# Patient Record
Sex: FEMALE | Race: WHITE | NOT HISPANIC OR LATINO | Employment: OTHER | URBAN - METROPOLITAN AREA
[De-identification: names, ages, dates, MRNs, and addresses within clinical notes are randomized per-mention and may not be internally consistent; named-entity substitution may affect disease eponyms.]

---

## 2017-01-05 RX ORDER — METFORMIN HYDROCHLORIDE 500 MG/1
500 TABLET ORAL
Qty: 90 TABLET | Refills: 3 | Status: SHIPPED | OUTPATIENT
Start: 2017-01-05 | End: 2017-12-22 | Stop reason: SDUPTHER

## 2017-01-11 ENCOUNTER — TELEPHONE (OUTPATIENT)
Dept: SLEEP MEDICINE | Facility: CLINIC | Age: 62
End: 2017-01-11

## 2017-01-19 ENCOUNTER — OFFICE VISIT (OUTPATIENT)
Dept: ORTHOPEDICS | Facility: CLINIC | Age: 62
End: 2017-01-19
Payer: COMMERCIAL

## 2017-01-19 VITALS — BODY MASS INDEX: 32.96 KG/M2 | WEIGHT: 186 LBS | HEIGHT: 63 IN

## 2017-01-19 DIAGNOSIS — M25.812 SHOULDER IMPINGEMENT, LEFT: Primary | ICD-10-CM

## 2017-01-19 DIAGNOSIS — M54.30 SCIATICA, UNSPECIFIED LATERALITY: ICD-10-CM

## 2017-01-19 DIAGNOSIS — M75.42 IMPINGEMENT SYNDROME OF LEFT SHOULDER: ICD-10-CM

## 2017-01-19 PROCEDURE — 99999 PR PBB SHADOW E&M-EST. PATIENT-LVL II: CPT | Mod: PBBFAC,,, | Performed by: ORTHOPAEDIC SURGERY

## 2017-01-19 PROCEDURE — 99213 OFFICE O/P EST LOW 20 MIN: CPT | Mod: S$GLB,,, | Performed by: ORTHOPAEDIC SURGERY

## 2017-01-19 PROCEDURE — 20610 DRAIN/INJ JOINT/BURSA W/O US: CPT | Mod: LT,S$GLB,, | Performed by: ORTHOPAEDIC SURGERY

## 2017-01-19 PROCEDURE — 1159F MED LIST DOCD IN RCRD: CPT | Mod: S$GLB,,, | Performed by: ORTHOPAEDIC SURGERY

## 2017-01-19 RX ORDER — TRIAMCINOLONE ACETONIDE 40 MG/ML
40 INJECTION, SUSPENSION INTRA-ARTICULAR; INTRAMUSCULAR
Status: COMPLETED | OUTPATIENT
Start: 2017-01-19 | End: 2017-01-19

## 2017-01-19 RX ADMIN — TRIAMCINOLONE ACETONIDE 40 MG: 40 INJECTION, SUSPENSION INTRA-ARTICULAR; INTRAMUSCULAR at 03:01

## 2017-01-19 NOTE — PROGRESS NOTES
Ms. Dimas seen previously for triggering of the thumb, had excellent result   after the previous injection.  No further triggering of the right thumb.    However, she has ongoing symptoms in the left shoulder related to lifting and   overhead activities.    PHYSICAL EXAMINATION:  LEFT SHOULDER:  Demonstrating mild tenderness anterolaterally.  No bruising, no   swelling.  Range of motion of left shoulder is full, but she does have a mildly   positive impingement sign with abduction, internal rotation of the shoulder.    Rotator cuff strength intact.  NEUROLOGIC:  Normal.  NECK:  Nontender.  She does have a mild muscle spasm on the left side at the   base of the neck in the trapezius.  NEUROLOGIC:  Intact right, hand demonstrate no triggering of the thumb.    IMPRESSION:  Left shoulder impingement with mild muscle spasm.    PLAN:  I recommended and performed an injection, left shoulder, combination of   Kenalog 40 mg, 2 mL of Xylocaine, sterile technique.  I have also recommended   anti-inflammatory medication by mouth and maybe some heat to the area and   stretching exercises.  Avoidance of overhead lifting.  Follow up in one month   for recheck.      AUTUMN/IN  dd: 01/19/2017 15:41:16 (CST)  td: 01/20/2017 04:23:48 (CST)  Doc ID   #[id number]  Job ID #[id number]    CC:

## 2017-01-23 ENCOUNTER — PATIENT MESSAGE (OUTPATIENT)
Dept: FAMILY MEDICINE | Facility: CLINIC | Age: 62
End: 2017-01-23

## 2017-01-24 ENCOUNTER — PATIENT MESSAGE (OUTPATIENT)
Dept: FAMILY MEDICINE | Facility: CLINIC | Age: 62
End: 2017-01-24

## 2017-02-20 ENCOUNTER — OFFICE VISIT (OUTPATIENT)
Dept: ORTHOPEDICS | Facility: CLINIC | Age: 62
End: 2017-02-20
Payer: COMMERCIAL

## 2017-02-20 VITALS — BODY MASS INDEX: 31.89 KG/M2 | WEIGHT: 180 LBS | HEIGHT: 63 IN

## 2017-02-20 DIAGNOSIS — M75.42 IMPINGEMENT SYNDROME OF LEFT SHOULDER: Primary | ICD-10-CM

## 2017-02-20 PROCEDURE — 99213 OFFICE O/P EST LOW 20 MIN: CPT | Mod: S$GLB,,, | Performed by: ORTHOPAEDIC SURGERY

## 2017-02-20 PROCEDURE — 99999 PR PBB SHADOW E&M-EST. PATIENT-LVL II: CPT | Mod: PBBFAC,,, | Performed by: ORTHOPAEDIC SURGERY

## 2017-02-20 NOTE — PROGRESS NOTES
Ms. Dimas in followup of left shoulder impingement, doing much better.  The   injection basically relieved all of her symptoms a month ago.    PHYSICAL EXAMINATION:  LEFT SHOULDER:  No tenderness.  No swelling.  Full range of motion.  Negative   impingement sign.  Good strength.    PLAN:  I have recommended she return to regular activities, but may be avoid   overhead lifting.  Advil or Motrin as needed.  Follow up siri CASTELLANOS  dd: 02/20/2017 15:47:20 (CST)  td: 02/21/2017 08:13:52 (CST)  Doc ID   #[id number]  Job ID #[id number]    CC:

## 2017-02-24 ENCOUNTER — HOSPITAL ENCOUNTER (OUTPATIENT)
Dept: DIABETES | Facility: HOSPITAL | Age: 62
Discharge: HOME | End: 2017-02-24
Attending: FAMILY MEDICINE
Payer: COMMERCIAL

## 2017-02-24 VITALS — BODY MASS INDEX: 32.06 KG/M2 | WEIGHT: 181 LBS

## 2017-02-24 DIAGNOSIS — E11.9 NON-INSULIN TREATED TYPE 2 DIABETES MELLITUS: Primary | ICD-10-CM

## 2017-02-24 PROCEDURE — G0108 DIAB MANAGE TRN  PER INDIV: HCPCS | Performed by: REGISTERED NURSE

## 2017-02-24 NOTE — PROGRESS NOTES
Pt came to clinic for 3 month follow up visit. Pt has lost over 10 lbs in the last 3 months. Pt measures and weighs her food and plans her meals. Review of pts current meal plan showed that she is eating balanced and portioned. Pt drinks mainly water. Pt does eat meals out but she plans ahead for these and tries to put her food groups together. Pt does exercise by walking. She states that she has cut her portions and enjoys the variety of foods. Pt was encouraged to continue with compliance. Pt is due for her A1C and she will check with her mD regarding this. Encouraged pt to continue her good work and to call for any problems or questions.

## 2017-02-27 ENCOUNTER — PATIENT MESSAGE (OUTPATIENT)
Dept: OBSTETRICS AND GYNECOLOGY | Facility: CLINIC | Age: 62
End: 2017-02-27

## 2017-03-01 DIAGNOSIS — Z12.31 VISIT FOR SCREENING MAMMOGRAM: Primary | ICD-10-CM

## 2017-04-13 ENCOUNTER — HOSPITAL ENCOUNTER (OUTPATIENT)
Dept: RADIOLOGY | Facility: HOSPITAL | Age: 62
Discharge: HOME | End: 2017-04-13
Attending: OBSTETRICS & GYNECOLOGY
Payer: COMMERCIAL

## 2017-04-13 ENCOUNTER — OFFICE VISIT (OUTPATIENT)
Dept: OBSTETRICS AND GYNECOLOGY | Facility: CLINIC | Age: 62
End: 2017-04-13
Payer: COMMERCIAL

## 2017-04-13 VITALS
DIASTOLIC BLOOD PRESSURE: 84 MMHG | BODY MASS INDEX: 32.58 KG/M2 | WEIGHT: 183.88 LBS | SYSTOLIC BLOOD PRESSURE: 140 MMHG | HEIGHT: 63 IN

## 2017-04-13 DIAGNOSIS — Z12.4 ROUTINE PAPANICOLAOU SMEAR: ICD-10-CM

## 2017-04-13 DIAGNOSIS — Z01.419 ROUTINE GYNECOLOGICAL EXAMINATION: Primary | ICD-10-CM

## 2017-04-13 DIAGNOSIS — Z12.31 VISIT FOR SCREENING MAMMOGRAM: ICD-10-CM

## 2017-04-13 PROCEDURE — 99999 PR PBB SHADOW E&M-EST. PATIENT-LVL II: CPT | Mod: PBBFAC,,, | Performed by: OBSTETRICS & GYNECOLOGY

## 2017-04-13 PROCEDURE — 77063 BREAST TOMOSYNTHESIS BI: CPT | Mod: 26,,, | Performed by: RADIOLOGY

## 2017-04-13 PROCEDURE — 88175 CYTOPATH C/V AUTO FLUID REDO: CPT | Performed by: PATHOLOGY

## 2017-04-13 PROCEDURE — 77067 SCR MAMMO BI INCL CAD: CPT | Mod: TC

## 2017-04-13 PROCEDURE — 87624 HPV HI-RISK TYP POOLED RSLT: CPT

## 2017-04-13 PROCEDURE — 3079F DIAST BP 80-89 MM HG: CPT | Mod: S$GLB,,, | Performed by: OBSTETRICS & GYNECOLOGY

## 2017-04-13 PROCEDURE — 3077F SYST BP >= 140 MM HG: CPT | Mod: S$GLB,,, | Performed by: OBSTETRICS & GYNECOLOGY

## 2017-04-13 PROCEDURE — 88141 CYTOPATH C/V INTERPRET: CPT | Mod: ,,, | Performed by: PATHOLOGY

## 2017-04-13 PROCEDURE — 99396 PREV VISIT EST AGE 40-64: CPT | Mod: S$GLB,,, | Performed by: OBSTETRICS & GYNECOLOGY

## 2017-04-13 PROCEDURE — 77067 SCR MAMMO BI INCL CAD: CPT | Mod: 26,,, | Performed by: RADIOLOGY

## 2017-04-13 NOTE — PROGRESS NOTES
"OBSTETRIC HISTORY:   OB History      Para Term  AB TAB SAB Ectopic Multiple Living    4 4 4                COMPREHENSIVE GYN HISTORY:  PAP History: Denies abnormal Paps.  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 Denies PID.  Benign History: Denies uterine fibroids. Denies ovarian cysts. Denies endometriosis.   Cancer History: Denies cervical cancer. Denies uterine cancer or hyperplasia. Denies ovarian cancer. Denies vulvar cancer or pre-cancer. Denies vaginal cancer or pre-cancer. Denies breast cancer. Denies colon cancer.  Sexual Activity History:   reports that she currently engages in sexual activity and has had male partners.       HPI:   62 y.o.  No LMP recorded. Patient is postmenopausal.   Patient is  here for her annual gynecologic exam.  She has no complaints. She denies bladder, breast and bowel complaints.    ROS:  GENERAL: Denies weight gain or weight loss. Feeling well overall.   SKIN: Denies rash or lesions.   HEAD: Denies headache.   NODES: Denies enlarged lymph nodes.   CHEST: Denies shortness of breath.   ABDOMEN: No abdominal pain, constipation, diarrhea, nausea, vomiting or rectal bleeding.   URINARY: No frequency, dysuria, hematuria, or burning on urination.  REPRODUCTIVE: See HPI.   BREASTS: The patient denies pain, lumps, or nipple discharge.   HEMATOLOGIC: No easy bruisability.   MUSCULOSKELETAL: Denies joint pain or back pain.   NEUROLOGIC: Denies weakness.   PSYCHIATRIC: Denies depression, anxiety or mood swings.    PE:   BP (!) 140/84  Ht 5' 3" (1.6 m)  Wt 83.4 kg (183 lb 13.8 oz)  BMI 32.57 kg/m2  APPEARANCE: Well nourished, well developed, in no acute distress.  NECK: Neck symmetric without  thyromegaly.  NODES: No inguinal, cervical lymph node enlargement.  CHEST: Lungs clear to auscultation.  HEART: Regular rate and rhythm, no murmurs, rubs or gallops.  ABDOMEN: Soft. No tenderness or masses. No hernias.  BREASTS: Symmetrical, no skin changes or visible lesions. No " palpable masses, nipple discharge or adenopathy bilaterally.  PELVIC:   VULVA: No lesions. Normal female genitalia.  URETHRAL MEATUS: Normal size and location, no lesions, no prolapse.  URETHRA: No masses, tenderness, prolapse or scarring.  VAGINA: Moist and well rugated, no discharge, no significant cystocele or rectocele.  CERVIX: No lesions and discharge.  UTERUS: Normal size, regular shape, mobile, non-tender, bladder base nontender.  ADNEXA: No masses or tenderness.    PROCEDURES:  Pap smear    Assessment:  Normal Gynecologic Exam    Plan:  Mammogram and Colonoscopy if indicated by current recommendations.  Return to clinic in one year or for any problems or complaints.    Counseling:  Patient was counseled today on A.C.S. Pap guidelines and recommendations for yearly pelvic exams and monthly self breast exams; to see her PCP for other health maintenance. Regular exercise and healthy diet.

## 2017-05-01 ENCOUNTER — OFFICE VISIT (OUTPATIENT)
Dept: FAMILY MEDICINE | Facility: CLINIC | Age: 62
End: 2017-05-01
Payer: COMMERCIAL

## 2017-05-01 VITALS
SYSTOLIC BLOOD PRESSURE: 124 MMHG | BODY MASS INDEX: 32.46 KG/M2 | HEIGHT: 63 IN | OXYGEN SATURATION: 97 % | DIASTOLIC BLOOD PRESSURE: 76 MMHG | HEART RATE: 77 BPM | WEIGHT: 183.19 LBS

## 2017-05-01 DIAGNOSIS — R19.7 DIARRHEA, UNSPECIFIED TYPE: ICD-10-CM

## 2017-05-01 DIAGNOSIS — R10.32 LEFT LOWER QUADRANT PAIN: Primary | ICD-10-CM

## 2017-05-01 DIAGNOSIS — Z87.19 HISTORY OF DIVERTICULITIS: ICD-10-CM

## 2017-05-01 LAB
BILIRUB UR QL STRIP: NEGATIVE
CLARITY UR REFRACT.AUTO: CLEAR
COLOR UR AUTO: NORMAL
GLUCOSE UR QL STRIP: NEGATIVE
HGB UR QL STRIP: NEGATIVE
KETONES UR QL STRIP: NEGATIVE
LEUKOCYTE ESTERASE UR QL STRIP: NEGATIVE
NITRITE UR QL STRIP: NEGATIVE
PH UR STRIP: 7 [PH] (ref 5–8)
PROT UR QL STRIP: NEGATIVE
SP GR UR STRIP: 1 (ref 1–1.03)
URN SPEC COLLECT METH UR: NORMAL
UROBILINOGEN UR STRIP-ACNC: NEGATIVE EU/DL

## 2017-05-01 PROCEDURE — 3078F DIAST BP <80 MM HG: CPT | Mod: S$GLB,,, | Performed by: NURSE PRACTITIONER

## 2017-05-01 PROCEDURE — 3074F SYST BP LT 130 MM HG: CPT | Mod: S$GLB,,, | Performed by: NURSE PRACTITIONER

## 2017-05-01 PROCEDURE — 1160F RVW MEDS BY RX/DR IN RCRD: CPT | Mod: S$GLB,,, | Performed by: NURSE PRACTITIONER

## 2017-05-01 PROCEDURE — 99213 OFFICE O/P EST LOW 20 MIN: CPT | Mod: S$GLB,,, | Performed by: NURSE PRACTITIONER

## 2017-05-01 PROCEDURE — 81003 URINALYSIS AUTO W/O SCOPE: CPT

## 2017-05-01 PROCEDURE — 99999 PR PBB SHADOW E&M-EST. PATIENT-LVL IV: CPT | Mod: PBBFAC,,, | Performed by: NURSE PRACTITIONER

## 2017-05-01 RX ORDER — AMOXICILLIN AND CLAVULANATE POTASSIUM 875; 125 MG/1; MG/1
1 TABLET, FILM COATED ORAL EVERY 12 HOURS
Qty: 20 TABLET | Refills: 0 | Status: SHIPPED | OUTPATIENT
Start: 2017-05-01 | End: 2017-05-11

## 2017-05-01 RX ORDER — AMOXICILLIN AND CLAVULANATE POTASSIUM 875; 125 MG/1; MG/1
1 TABLET, FILM COATED ORAL EVERY 12 HOURS
Qty: 20 TABLET | Refills: 0 | Status: CANCELLED | OUTPATIENT
Start: 2017-05-01 | End: 2017-05-11

## 2017-05-01 NOTE — MR AVS SNAPSHOT
[facility]   [place]  [place] [address]  Phone: [phone]  Fax: [phone]                  Parisa Dimas   2017 4:20 PM   Office Visit    Description:  Female : 1955   Provider:  Avani Abarca NP   Department:  [facility]           Reason for Visit     Abdominal Pain           Diagnoses this Visit        Comments    Left lower quadrant pain    -  Primary     Diarrhea, unspecified type         History of diverticulitis                To Do List           Future Appointments        Provider Department Dept Phone    2017 9:00 AM [facility] XR1 [address] LIMIT [facility] [phone]      Goals (5 Years of Data)     None      Follow-Up and Disposition     Return if symptoms worsen or fail to improve.       These Medications        Disp Refills Start End    amoxicillin-clavulanate 875-125mg (AUGMENTIN) 875-125 mg per tablet 20 tablet 0 2017    Take 1 tablet by mouth every 12 (twelve) hours. - Oral    Pharmacy: [facility] - [address] [facility] [address] [place] Ph #: [phone]         [facility] On Call     [facility] -  Assistance  Unless otherwise directed by your provider, please contact [facility], our nurse care line that is available for  assistance.     Registered nurses in the [facility] provide: appointment scheduling, clinical advisement, health education, and other advisory services.  Call: [phone] (toll free)               Medications           Message regarding Medications     Verify the changes and/or additions to your medication regime listed below are the same as discussed with your clinician today.  If any of these changes or additions are incorrect, please notify your healthcare provider.        START taking these NEW medications        Refills    amoxicillin-clavulanate 875-125mg (AUGMENTIN) 875-125 mg per tablet 0    Sig: Take 1 tablet by  "mouth every 12 (twelve) hours.    Class: Normal    Route: Oral           Verify that the below list of medications is an accurate representation of the medications you are currently taking.  If none reported, the list may be blank. If incorrect, please contact your healthcare provider. Carry this list with you in case of emergency.           Current Medications     amlodipine (NORVASC) 10 MG tablet Take 1 tablet (10 mg total) by mouth once daily.    amoxicillin-clavulanate 875-125mg (AUGMENTIN) 875-125 mg per tablet Take 1 tablet by mouth every 12 (twelve) hours.    aspirin (ECOTRIN) 81 MG EC tablet Take 81 mg by mouth once daily.      b complex vitamins tablet Take 1 tablet by mouth once daily.    blood sugar diagnostic Strp Test daily, insurance covered brand.  Include 100 lancets    calcium carbonate (OS-RYLEE) 600 mg (1,500 mg) Tab Take 600 mg by mouth once daily.      cetirizine (ZYRTEC) 10 MG tablet Take 10 mg by mouth once daily.      co-enzyme Q-10 30 mg capsule Take 30 mg by mouth 3 (three) times daily.    hydrochlorothiazide (HYDRODIURIL) 25 MG tablet Take 1 tablet (25 mg total) by mouth once daily.    lancets Misc Test daily    lovastatin (MEVACOR) 20 MG tablet Take 1 tablet (20 mg total) by mouth every evening.    metformin (GLUCOPHAGE) 500 MG tablet Take 1 tablet (500 mg total) by mouth daily with breakfast.    multivitamin capsule Take 1 capsule by mouth once daily.      OMEGA-3S/DHA/EPA/FISH OIL (OMEGA 3 ORAL) Take 2,800 mg by mouth once daily.      omeprazole (PRILOSEC) 40 MG capsule Take 1 capsule (40 mg total) by mouth once daily.    vitamin E 400 UNIT capsule Take 400 Units by mouth 2 (two) times daily.             Clinical Reference Information           Your Vitals Were     BP Pulse Height Weight SpO2 BMI    124/76 77 5' 3" (1.6 m) 83.1 kg (183 lb 3.2 oz) 97% 32.45 kg/m2      Blood Pressure          Most Recent Value    BP  124/76      Allergies as of 5/1/2017     No Known Allergies    "   Immunizations Administered on Date of Encounter - 5/1/2017     None      Orders Placed During Today's Visit      Normal Orders This Visit    Urinalysis     Future Labs/Procedures Expected by Expires    X-Ray Abdomen AP 1 View  5/1/2017 5/1/2018      Language Assistance Services     ATTENTION: Language assistance services are available, free of charge. Please call [phone].      ATENCIÓN: Si habla español, tiene a harvey disposición servicios gratuitos de asistencia lingüística. Llame al [phone].     CHÚ Ý: N?u b?n nói Ti?ng Vi?t, có các d?ch v? h? tr? ngôn ng? mi?n phí dành cho b?n. G?i s? [phone].         [facility] complies with applicable Federal civil rights laws and does not discriminate on the basis of race, color, national origin, age, disability, or sex.

## 2017-05-01 NOTE — PROGRESS NOTES
Subjective:       Patient ID: Parisa Dimas is a 62 y.o. female.    Chief Complaint: Abdominal Pain    Abdominal Pain   This is a new problem. The current episode started in the past 7 days. The onset quality is undetermined. The problem occurs intermittently. The problem has been unchanged. The pain is located in the LUQ and left flank. The pain is at a severity of 6/10. The pain is moderate. The quality of the pain is aching. The abdominal pain does not radiate. Associated symptoms include diarrhea. The pain is aggravated by palpation and movement. The pain is relieved by nothing. She has tried nothing for the symptoms. Her past medical history is significant for GERD.     Review of Systems   Gastrointestinal: Positive for abdominal pain and diarrhea.   All other systems reviewed and are negative.      Objective:      Physical Exam   Constitutional: She is oriented to person, place, and time. She appears well-developed and well-nourished. No distress.   HENT:   Head: Normocephalic and atraumatic.   Eyes: EOM are normal. Pupils are equal, round, and reactive to light.   Neck: Neck supple. No JVD present. No tracheal deviation present.   Cardiovascular: Normal rate, regular rhythm, normal heart sounds and intact distal pulses.    No murmur heard.  Pulmonary/Chest: Effort normal and breath sounds normal. No respiratory distress. She has no wheezes. She has no rales.   Abdominal: Soft. Normal appearance and bowel sounds are normal. She exhibits no distension, no abdominal bruit and no mass. There is tenderness in the left upper quadrant and left lower quadrant. There is no CVA tenderness and no tenderness at McBurney's point.   Musculoskeletal: Normal range of motion. She exhibits no edema or tenderness.   Neurological: She is alert and oriented to person, place, and time. Coordination normal.   Skin: Skin is warm and dry. No erythema. No pallor.   Psychiatric: She has a normal mood and affect. Her behavior is  normal. Judgment and thought content normal. Cognition and memory are normal. She expresses no homicidal and no suicidal ideation.   Nursing note and vitals reviewed.      Assessment:       1. Left lower quadrant pain    2. Diarrhea, unspecified type    3. History of diverticulitis        Plan:     Parisa was seen today for abdominal pain.    Diagnoses and all orders for this visit:    Left lower quadrant pain  -     Urinalysis  -     X-Ray Abdomen AP 1 View; Future  -     amoxicillin-clavulanate 875-125mg (AUGMENTIN) 875-125 mg per tablet; Take 1 tablet by mouth every 12 (twelve) hours.    Diarrhea, unspecified type  -     amoxicillin-clavulanate 875-125mg (AUGMENTIN) 875-125 mg per tablet; Take 1 tablet by mouth every 12 (twelve) hours.    History of diverticulitis  -     X-Ray Abdomen AP 1 View; Future  -     amoxicillin-clavulanate 875-125mg (AUGMENTIN) 875-125 mg per tablet; Take 1 tablet by mouth every 12 (twelve) hours.      Follow-up with PCP if symptoms worsen or fail to improve.

## 2017-05-02 ENCOUNTER — HOSPITAL ENCOUNTER (OUTPATIENT)
Dept: RADIOLOGY | Facility: HOSPITAL | Age: 62
Discharge: HOME | End: 2017-05-02
Attending: NURSE PRACTITIONER
Payer: COMMERCIAL

## 2017-05-02 DIAGNOSIS — Z87.19 HISTORY OF DIVERTICULITIS: ICD-10-CM

## 2017-05-02 DIAGNOSIS — R10.32 LEFT LOWER QUADRANT PAIN: ICD-10-CM

## 2017-05-02 PROCEDURE — 74000 XR ABDOMEN AP 1 VIEW: CPT | Mod: TC,PO

## 2017-05-02 PROCEDURE — 74000 XR ABDOMEN AP 1 VIEW: CPT | Mod: 26,,, | Performed by: RADIOLOGY

## 2017-05-04 ENCOUNTER — TELEPHONE (OUTPATIENT)
Dept: OBSTETRICS AND GYNECOLOGY | Facility: CLINIC | Age: 62
End: 2017-05-04

## 2017-05-04 LAB
HPV16 DNA SPEC QL NAA+PROBE: NEGATIVE
HPV16+18+H RISK 12 DNA CVX-IMP: NEGATIVE
HPV18 DNA SPEC QL NAA+PROBE: NEGATIVE

## 2017-05-12 ENCOUNTER — OFFICE VISIT (OUTPATIENT)
Dept: PODIATRY | Facility: CLINIC | Age: 62
End: 2017-05-12
Payer: COMMERCIAL

## 2017-05-12 VITALS
HEART RATE: 89 BPM | DIASTOLIC BLOOD PRESSURE: 83 MMHG | BODY MASS INDEX: 32.46 KG/M2 | SYSTOLIC BLOOD PRESSURE: 156 MMHG | HEIGHT: 63 IN | WEIGHT: 183.19 LBS

## 2017-05-12 DIAGNOSIS — E11.9 ENCOUNTER FOR COMPREHENSIVE DIABETIC FOOT EXAMINATION, TYPE 2 DIABETES MELLITUS: Primary | ICD-10-CM

## 2017-05-12 DIAGNOSIS — I87.2 VENOUS INSUFFICIENCY OF BOTH LOWER EXTREMITIES: ICD-10-CM

## 2017-05-12 DIAGNOSIS — L84 CORN OR CALLUS: ICD-10-CM

## 2017-05-12 DIAGNOSIS — M21.619 HALLUX VALGUS WITH BUNIONS, UNSPECIFIED LATERALITY: ICD-10-CM

## 2017-05-12 DIAGNOSIS — M20.10 HALLUX VALGUS WITH BUNIONS, UNSPECIFIED LATERALITY: ICD-10-CM

## 2017-05-12 PROCEDURE — 1160F RVW MEDS BY RX/DR IN RCRD: CPT | Mod: S$GLB,,, | Performed by: PODIATRIST

## 2017-05-12 PROCEDURE — 99999 PR PBB SHADOW E&M-EST. PATIENT-LVL III: CPT | Mod: PBBFAC,,, | Performed by: PODIATRIST

## 2017-05-12 PROCEDURE — 3045F PR MOST RECENT HEMOGLOBIN A1C LEVEL 7.0-9.0%: CPT | Mod: S$GLB,,, | Performed by: PODIATRIST

## 2017-05-12 PROCEDURE — 3077F SYST BP >= 140 MM HG: CPT | Mod: S$GLB,,, | Performed by: PODIATRIST

## 2017-05-12 PROCEDURE — 99213 OFFICE O/P EST LOW 20 MIN: CPT | Mod: S$GLB,,, | Performed by: PODIATRIST

## 2017-05-12 PROCEDURE — 3079F DIAST BP 80-89 MM HG: CPT | Mod: S$GLB,,, | Performed by: PODIATRIST

## 2017-05-12 NOTE — MR AVS SNAPSHOT
[facility]   [place] [address]  Phone: [phone]                  Parisa Dimas   2017 3:15 PM   Office Visit    Description:  Female : 1955   Provider:  Refugio Hodges DPM   Department:  [facility]           Reason for Visit     Diabetic Foot Exam           Diagnoses this Visit        Comments    Encounter for comprehensive diabetic foot examination, type 2 diabetes mellitus    -  Primary     Venous insufficiency of both lower extremities         Hallux valgus with bunions, unspecified laterality         Corn or callus                To Do List           Goals (5 Years of Data)     None      Follow-Up and Disposition     Return in about 1 year (around 2018).      [facility]     [facility] Care [facility] -  Assistance  Unless otherwise directed by your provider, please contact [facility], our nurse care line that is available for  assistance.     Registered nurses in the [facility] provide: appointment scheduling, clinical advisement, health education, and other advisory services.  Call: [phone] (toll free)               Medications           Message regarding Medications     Verify the changes and/or additions to your medication regime listed below are the same as discussed with your clinician today.  If any of these changes or additions are incorrect, please notify your healthcare provider.             Verify that the below list of medications is an accurate representation of the medications you are currently taking.  If none reported, the list may be blank. If incorrect, please contact your healthcare provider. Carry this list with you in case of emergency.           Current Medications     amlodipine (NORVASC) 10 MG tablet Take 1 tablet (10 mg total) by mouth once daily.    aspirin (ECOTRIN) 81 MG EC tablet Take 81 mg by mouth once daily.      b complex vitamins tablet Take 1 tablet by mouth once  "daily.    blood sugar diagnostic Strp Test daily, insurance covered brand.  Include 100 lancets    calcium carbonate (OS-RYLEE) 600 mg (1,500 mg) Tab Take 600 mg by mouth once daily.      cetirizine (ZYRTEC) 10 MG tablet Take 10 mg by mouth once daily.      co-enzyme Q-10 30 mg capsule Take 30 mg by mouth 3 (three) times daily.    hydrochlorothiazide (HYDRODIURIL) 25 MG tablet Take 1 tablet (25 mg total) by mouth once daily.    lancets Misc Test daily    lovastatin (MEVACOR) 20 MG tablet Take 1 tablet (20 mg total) by mouth every evening.    metformin (GLUCOPHAGE) 500 MG tablet Take 1 tablet (500 mg total) by mouth daily with breakfast.    multivitamin capsule Take 1 capsule by mouth once daily.      OMEGA-3S/DHA/EPA/FISH OIL (OMEGA 3 ORAL) Take 2,800 mg by mouth once daily.      omeprazole (PRILOSEC) 40 MG capsule Take 1 capsule (40 mg total) by mouth once daily.    vitamin E 400 UNIT capsule Take 400 Units by mouth 2 (two) times daily.             Clinical Reference Information           Your Vitals Were     BP Pulse Height Weight BMI    156/83 89 5' 3" (1.6 m) 83.1 kg (183 lb 3.2 oz) 32.45 kg/m2      Blood Pressure          Most Recent Value    BP  (!)  156/83      Allergies as of 5/12/2017     No Known Allergies      Immunizations Administered on Date of Encounter - 5/12/2017     None      Orders Placed During Today's Visit      Normal Orders This Visit    COMPRESSION STOCKINGS     DIABETIC SHOES FOR HOME USE       Instructions      Diabetic Foot Care  Diabetes can lead to a number of foot complications. Fortunately, you can prevent most of these with a little extra foot care. If diabetes is not well controlled, it can cause damage to blood vessels and result in poor circulation to the foot. When the skin does not get enough blood flow, it becomes prone to pressure sores and ulcers, which heal slowly.  Diabetes can also damage nerves, interfering with the ability to feel pain and pressure. When you cant feel your " foot normally, it is easy to injure your skin, bones, and joints without knowing it. For these reasons diabetes increases the risk of fungal infections, bunions, and ulcers. An ulcer is a sore or break in the skin. With ulcers, often the skin seems to have worn away. Deep ulcers can lead to bone infection.  Gangrene is the most serious foot complication of diabetes. It usually occurs on the tips of the toes as blackened areas of skin. The black area is dead tissue. In severe cases, gangrene spreads to involve the entire toe, other toes, and the entire foot. Foot or toe amputation may be required. Good foot care and blood sugar control can prevent this.  Home care  · Wear comfortable, well-fitting shoes.  · Wash your feet daily with warm water and mild soap.  · After drying, apply a moisturizing cream or lotion to the top and bottom of your feet. Don't put lotion between toes.  · Check your feet daily for skin breaks, blisters, swelling, or redness. Look between your toes as well. If you cannot see the bottoms of your feet, ask someone to look or use a mirror.  · Wear cotton socks and change them every day.  · Trim toenails carefully, and do not cut your cuticles.  · Strive to keep your blood sugar under control with a combination of medicines, diet, and activity.  · If you smoke and have diabetes, it is very important that you stop. Smoking reduces blood flow to your foot.  · Schedule foot exams at least every year, or more often if you have foot problems.  · Put your feet up when sitting, wiggle toes, and move ankles to help improve blood flow.  Avoid activities that increase your risk of foot injury:  · Do not walk barefoot.  · Do not use heating pads or hot water bottles on your feet.  · Do not put your foot in a hot tub without first checking the temperature with your hand.  Follow-up care  Follow up with your healthcare provider, or as advised. Be sure to take off your shoes and socks before your appointment  starts so your healthcare provider will be sure to check your feet. Report any cut, puncture, scrape, blister, or other injury to your foot. Also report if you have a bunion, hammertoes, ingrown toenail, or ulcer on your foot.  When to seek medical advice  Call your healthcare provider right away if any of these occur:  · Black skin color anywhere on the foot  · Open ulcer with pus draining from the wound  · Increasing foot or leg pain  · New areas of redness or swelling or tender areas of the foot  · Fever of 100.4°F (38°C) or greater  Date Last Reviewed: 5/25/2016  © 6100-8781 [employer]. [address] [place] [address], [place] [address]. All rights reserved. This information is not intended as a substitute for professional medical care. Always follow your healthcare professional's instructions.             Language Assistance Services     ATTENTION: Language assistance services are available, free of charge. Please call [phone].      ATENCIÓN: Si habla trish, tiene a harvey disposición servicios gratuitos de asistencia lingüística. Llame al [phone].     [facility] Ý: N?u b?n nói Ti?ng Vi?t, có các d?ch v? h? tr? ngôn ng? mi?n phí dành cho b?n. G?i s? [phone].         [facility] complies with applicable Federal civil rights laws and does not discriminate on the basis of race, color, national origin, age, disability, or sex.

## 2017-05-12 NOTE — PATIENT INSTRUCTIONS
Diabetic Foot Care  Diabetes can lead to a number of foot complications. Fortunately, you can prevent most of these with a little extra foot care. If diabetes is not well controlled, it can cause damage to blood vessels and result in poor circulation to the foot. When the skin does not get enough blood flow, it becomes prone to pressure sores and ulcers, which heal slowly.  Diabetes can also damage nerves, interfering with the ability to feel pain and pressure. When you cant feel your foot normally, it is easy to injure your skin, bones, and joints without knowing it. For these reasons diabetes increases the risk of fungal infections, bunions, and ulcers. An ulcer is a sore or break in the skin. With ulcers, often the skin seems to have worn away. Deep ulcers can lead to bone infection.  Gangrene is the most serious foot complication of diabetes. It usually occurs on the tips of the toes as blackened areas of skin. The black area is dead tissue. In severe cases, gangrene spreads to involve the entire toe, other toes, and the entire foot. Foot or toe amputation may be required. Good foot care and blood sugar control can prevent this.  Home care  · Wear comfortable, well-fitting shoes.  · Wash your feet daily with warm water and mild soap.  · After drying, apply a moisturizing cream or lotion to the top and bottom of your feet. Don't put lotion between toes.  · Check your feet daily for skin breaks, blisters, swelling, or redness. Look between your toes as well. If you cannot see the bottoms of your feet, ask someone to look or use a mirror.  · Wear cotton socks and change them every day.  · Trim toenails carefully, and do not cut your cuticles.  · Strive to keep your blood sugar under control with a combination of medicines, diet, and activity.  · If you smoke and have diabetes, it is very important that you stop. Smoking reduces blood flow to your foot.  · Schedule foot exams at least every year, or more often if  you have foot problems.  · Put your feet up when sitting, wiggle toes, and move ankles to help improve blood flow.  Avoid activities that increase your risk of foot injury:  · Do not walk barefoot.  · Do not use heating pads or hot water bottles on your feet.  · Do not put your foot in a hot tub without first checking the temperature with your hand.  Follow-up care  Follow up with your healthcare provider, or as advised. Be sure to take off your shoes and socks before your appointment starts so your healthcare provider will be sure to check your feet. Report any cut, puncture, scrape, blister, or other injury to your foot. Also report if you have a bunion, hammertoes, ingrown toenail, or ulcer on your foot.  When to seek medical advice  Call your healthcare provider right away if any of these occur:  · Black skin color anywhere on the foot  · Open ulcer with pus draining from the wound  · Increasing foot or leg pain  · New areas of redness or swelling or tender areas of the foot  · Fever of 100.4°F (38°C) or greater  Date Last Reviewed: 5/25/2016  © 4716-7654 [employer]. [address] [place] [address], [place] [address]. All rights reserved. This information is not intended as a substitute for professional medical care. Always follow your healthcare professional's instructions.

## 2017-05-15 NOTE — PROGRESS NOTES
"Subjective:      Patient ID: Parisa Dimas is a 62 y.o. female.    Chief Complaint: Diabetic Foot Exam    Parisa is a 62 y.o. female who presents to the clinic for evaluation and treatment of high risk feet. Parisa has a past medical history of AR (allergic rhinitis); Carotid stenosis; Diabetes mellitus; Fatty liver; GERD (gastroesophageal reflux disease); HLD (hyperlipidemia); and HTN (hypertension). This patient has documented high risk feet requiring routine maintenance secondary to diabetes mellitis and those secondary complications of diabetes, as mentioned. She has a history of venous stasis edema controlled with compression stockings.       PCP: Alex Cast MD    Date Last Seen by PCP: 5/1/17    Current shoe gear:   Tennis shoes    Hemoglobin A1C   Date Value Ref Range Status   11/12/2016 7.0 (H) 4.5 - 6.2 % Final     Comment:     According to ADA guidelines, hemoglobin A1C <7.0% represents  optimal control in non-pregnant diabetic patients.  Different  metrics may apply to specific populations.   Standards of Medical Care in Diabetes - 2016.  For the purpose of screening for the presence of diabetes:  <5.7%     Consistent with the absence of diabetes  5.7-6.4%  Consistent with increasing risk for diabetes   (prediabetes)  >or=6.5%  Consistent with diabetes  Currently no consensus exists for use of hemoglobin A1C  for diagnosis of diabetes for children.       Vitals:    05/12/17 1506   BP: (!) 156/83   Pulse: 89   Weight: 83.1 kg (183 lb 3.2 oz)   Height: 5' 3" (1.6 m)   PainSc:   3      Past Medical History:   Diagnosis Date    AR (allergic rhinitis)     Carotid stenosis     50% RCA    Diabetes mellitus     Fatty liver     GERD (gastroesophageal reflux disease)     HLD (hyperlipidemia)     HTN (hypertension)        No past surgical history on file.    Family History   Problem Relation Age of Onset    Cancer Mother      gallbladder    Prostate cancer Brother 57    Diabetes Brother     Diabetes " Maternal Grandmother     Hypertension       multiple    Coronary artery disease Father 79    Diabetes Father     Diabetes Maternal Aunt     Diabetes Maternal Uncle        Social History     Social History    Marital status:      Spouse name: N/A    Number of children: N/A    Years of education: N/A     Social History Main Topics    Smoking status: Never Smoker    Smokeless tobacco: Never Used    Alcohol use Yes    Drug use: No    Sexual activity: Yes     Partners: Male     Other Topics Concern    None     Social History Narrative       Current Outpatient Prescriptions   Medication Sig Dispense Refill    amlodipine (NORVASC) 10 MG tablet Take 1 tablet (10 mg total) by mouth once daily. 90 tablet 3    aspirin (ECOTRIN) 81 MG EC tablet Take 81 mg by mouth once daily.        b complex vitamins tablet Take 1 tablet by mouth once daily.      blood sugar diagnostic Strp Test daily, insurance covered brand.  Include 100 lancets 100 strip 11    calcium carbonate (OS-RYLEE) 600 mg (1,500 mg) Tab Take 600 mg by mouth once daily.        cetirizine (ZYRTEC) 10 MG tablet Take 10 mg by mouth once daily.        co-enzyme Q-10 30 mg capsule Take 30 mg by mouth 3 (three) times daily.      hydrochlorothiazide (HYDRODIURIL) 25 MG tablet Take 1 tablet (25 mg total) by mouth once daily. 90 tablet 3    lancets Misc Test daily 100 each 11    lovastatin (MEVACOR) 20 MG tablet Take 1 tablet (20 mg total) by mouth every evening. 90 tablet 3    metformin (GLUCOPHAGE) 500 MG tablet Take 1 tablet (500 mg total) by mouth daily with breakfast. 90 tablet 3    multivitamin capsule Take 1 capsule by mouth once daily.        OMEGA-3S/DHA/EPA/FISH OIL (OMEGA 3 ORAL) Take 2,800 mg by mouth once daily.        omeprazole (PRILOSEC) 40 MG capsule Take 1 capsule (40 mg total) by mouth once daily. 90 capsule 3    vitamin E 400 UNIT capsule Take 400 Units by mouth 2 (two) times daily.         No current facility-administered  medications for this visit.        Review of patient's allergies indicates:  No Known Allergies    Review of Systems   Constitution: Negative for chills and fever.   Cardiovascular: Positive for leg swelling. Negative for claudication.   Respiratory: Negative for shortness of breath.    Skin: Positive for nail changes. Negative for itching and rash.   Musculoskeletal: Negative for muscle cramps, muscle weakness and myalgias.   Gastrointestinal: Negative for nausea and vomiting.   Neurological: Negative for focal weakness, loss of balance, numbness and paresthesias.           Objective:      Physical Exam   Constitutional: She is oriented to person, place, and time. She appears well-developed and well-nourished. No distress.   Cardiovascular:   Pulses:       Dorsalis pedis pulses are 2+ on the right side, and 2+ on the left side.        Posterior tibial pulses are 2+ on the right side, and 2+ on the left side.   < 3 sec capillary refill time to toes 1-5 bilateral. Toes and feet are warm to touch proximally with normal distal cooling b/l. There is some hair growth on the feet and toes b/l. There is moderate 2+ edema b/l. There are varicosities present b/l.      Musculoskeletal:   Bilateral Medial 1st MTPJ exostosis. Lateral deviation of hallux, non trackbound. No pain w/ ROM to 1st or 2nd MTPJs. No First ray hypermobility or sub second MT head callus. No lesser toe deformities or pain.     Equinus noted b/l ankles with < 10 deg DF noted. MMT 5/5 in DF/PF/Inv/Ev resistance with no reproduction of pain in any direction. Passive range of motion of ankle and pedal joints is painless b/l.     Neurological: She is alert and oriented to person, place, and time. She has normal strength. She displays no atrophy and no tremor. No sensory deficit. She exhibits normal muscle tone.   Reflex Scores:       Achilles reflexes are 2+ on the right side and 2+ on the left side.  Negative tinel sign bilateral.   Skin: Skin is warm, dry  and intact. Lesion noted. No abrasion, no bruising, no burn, no ecchymosis, no laceration, no petechiae and no rash noted. She is not diaphoretic. No cyanosis or erythema. No pallor. Nails show no clubbing.   Skin temperature, texture and turgor within normal limits.    Bilateral hyperkeratotic lesions at the plantar medial hallux IPJ   Psychiatric: She has a normal mood and affect. Her behavior is normal.             Assessment:       Encounter Diagnoses   Name Primary?    Encounter for comprehensive diabetic foot examination, type 2 diabetes mellitus Yes    Venous insufficiency of both lower extremities     Hallux valgus with bunions, unspecified laterality     Corn or callus          Plan:       Parisa was seen today for diabetic foot exam.    Diagnoses and all orders for this visit:    Encounter for comprehensive diabetic foot examination, type 2 diabetes mellitus  -     COMPRESSION STOCKINGS  -     DIABETIC SHOES FOR HOME USE    Venous insufficiency of both lower extremities  -     COMPRESSION STOCKINGS  -     DIABETIC SHOES FOR HOME USE    Hallux valgus with bunions, unspecified laterality  -     DIABETIC SHOES FOR HOME USE    Corn or callus  -     DIABETIC SHOES FOR HOME USE      I counseled the patient on her conditions, their implications and medical management.    Shoe inspection. Diabetic Foot Education. Patient reminded of the importance of good nutrition and blood sugar control to help prevent podiatric complications of diabetes. Patient instructed on proper foot hygeine. We discussed wearing proper shoe gear, daily foot inspections, never walking without protective shoe gear, never putting sharp instruments to feet    -Diabetic shoes prescribed    -Continue with compression stockings    Return in 1 year for diabetic foot exam    Yemi Garcia DPM PGY-3    I have personally taken the history and examined this patient and agree with the resident's note as stated as above.   Refugio Hodges DPM,  FACFAS

## 2017-05-23 ENCOUNTER — TELEPHONE (OUTPATIENT)
Dept: FAMILY MEDICINE | Facility: CLINIC | Age: 62
End: 2017-05-23

## 2017-05-26 DIAGNOSIS — E11.9 TYPE 2 DIABETES MELLITUS WITHOUT COMPLICATION: ICD-10-CM

## 2017-05-29 ENCOUNTER — PATIENT MESSAGE (OUTPATIENT)
Dept: FAMILY MEDICINE | Facility: CLINIC | Age: 62
End: 2017-05-29

## 2017-05-31 ENCOUNTER — PATIENT MESSAGE (OUTPATIENT)
Dept: FAMILY MEDICINE | Facility: CLINIC | Age: 62
End: 2017-05-31

## 2017-05-31 ENCOUNTER — LAB VISIT (OUTPATIENT)
Dept: LAB | Facility: HOSPITAL | Age: 62
End: 2017-05-31
Attending: FAMILY MEDICINE
Payer: COMMERCIAL

## 2017-05-31 ENCOUNTER — PATIENT MESSAGE (OUTPATIENT)
Dept: ADMINISTRATIVE | Facility: OTHER | Age: 62
End: 2017-05-31

## 2017-05-31 LAB
ALBUMIN SERPL BCP-MCNC: 4.3 G/DL
ALP SERPL-CCNC: 65 U/L
ALT SERPL W/O P-5'-P-CCNC: 75 U/L
ANION GAP SERPL CALC-SCNC: 9 MMOL/L
AST SERPL-CCNC: 57 U/L
BILIRUB SERPL-MCNC: 0.7 MG/DL
BUN SERPL-MCNC: 14 MG/DL
CALCIUM SERPL-MCNC: 10.3 MG/DL
CHLORIDE SERPL-SCNC: 98 MMOL/L
CHOLEST/HDLC SERPL: 3.5 {RATIO}
CO2 SERPL-SCNC: 32 MMOL/L
CREAT SERPL-MCNC: 0.8 MG/DL
EST. GFR  (AFRICAN AMERICAN): >60 ML/MIN/1.73 M^2
EST. GFR  (NON AFRICAN AMERICAN): >60 ML/MIN/1.73 M^2
GLUCOSE SERPL-MCNC: 88 MG/DL
HDL/CHOLESTEROL RATIO: 28.4 %
HDLC SERPL-MCNC: 190 MG/DL
HDLC SERPL-MCNC: 54 MG/DL
LDLC SERPL CALC-MCNC: 90 MG/DL
NONHDLC SERPL-MCNC: 136 MG/DL
POTASSIUM SERPL-SCNC: 3.4 MMOL/L
PROT SERPL-MCNC: 7.7 G/DL
SODIUM SERPL-SCNC: 139 MMOL/L
TRIGL SERPL-MCNC: 230 MG/DL

## 2017-05-31 PROCEDURE — 80061 LIPID PANEL: CPT

## 2017-05-31 PROCEDURE — 83036 HEMOGLOBIN GLYCOSYLATED A1C: CPT

## 2017-05-31 PROCEDURE — 80053 COMPREHEN METABOLIC PANEL: CPT

## 2017-05-31 PROCEDURE — 36415 COLL VENOUS BLD VENIPUNCTURE: CPT

## 2017-06-01 ENCOUNTER — PATIENT MESSAGE (OUTPATIENT)
Dept: ADMINISTRATIVE | Facility: OTHER | Age: 62
End: 2017-06-01

## 2017-06-01 LAB
ESTIMATED AVG GLUCOSE: 114 MG/DL
HBA1C MFR BLD HPLC: 5.6 %

## 2017-06-06 ENCOUNTER — PATIENT MESSAGE (OUTPATIENT)
Dept: FAMILY MEDICINE | Facility: CLINIC | Age: 62
End: 2017-06-06

## 2017-06-19 ENCOUNTER — TELEPHONE (OUTPATIENT)
Dept: PODIATRY | Facility: CLINIC | Age: 62
End: 2017-06-19

## 2017-06-19 NOTE — TELEPHONE ENCOUNTER
----- Message from Shabana Carrasco sent at 6/19/2017 10:46 AM CDT -----  Patient's , Pio, called.   No. [phone]   Patient needs a new script for compression stockings.  [facility] fax to [facility] at fax no. [phone]

## 2017-06-20 NOTE — TELEPHONE ENCOUNTER
----- Message from Melissa Thacker sent at 6/19/2017  4:47 PM CDT -----  Contact: Self [phone]  Patient Returning Your Phone Call

## 2017-10-26 RX ORDER — OMEPRAZOLE 40 MG/1
CAPSULE, DELAYED RELEASE ORAL
Qty: 90 CAPSULE | Refills: 3 | Status: SHIPPED | OUTPATIENT
Start: 2017-10-26 | End: 2018-03-20 | Stop reason: SDUPTHER

## 2017-10-26 RX ORDER — LOVASTATIN 20 MG/1
TABLET ORAL
Qty: 90 TABLET | Refills: 3 | Status: SHIPPED | OUTPATIENT
Start: 2017-10-26 | End: 2018-03-20 | Stop reason: SDUPTHER

## 2017-10-26 RX ORDER — AMLODIPINE BESYLATE 10 MG/1
TABLET ORAL
Qty: 90 TABLET | Refills: 3 | Status: SHIPPED | OUTPATIENT
Start: 2017-10-26 | End: 2018-03-20 | Stop reason: SDUPTHER

## 2017-10-26 RX ORDER — HYDROCHLOROTHIAZIDE 25 MG/1
TABLET ORAL
Qty: 90 TABLET | Refills: 3 | Status: SHIPPED | OUTPATIENT
Start: 2017-10-26 | End: 2018-03-20 | Stop reason: SDUPTHER

## 2017-11-10 ENCOUNTER — TELEPHONE (OUTPATIENT)
Dept: FAMILY MEDICINE | Facility: CLINIC | Age: 62
End: 2017-11-10

## 2017-11-10 DIAGNOSIS — K76.0 NAFL (NONALCOHOLIC FATTY LIVER): ICD-10-CM

## 2017-11-10 DIAGNOSIS — Z00.00 ROUTINE GENERAL MEDICAL EXAMINATION AT A HEALTH CARE FACILITY: ICD-10-CM

## 2017-11-10 DIAGNOSIS — I10 ESSENTIAL HYPERTENSION: ICD-10-CM

## 2017-11-14 ENCOUNTER — LAB VISIT (OUTPATIENT)
Dept: LAB | Facility: HOSPITAL | Age: 62
End: 2017-11-14
Attending: FAMILY MEDICINE
Payer: COMMERCIAL

## 2017-11-14 DIAGNOSIS — Z00.00 ROUTINE GENERAL MEDICAL EXAMINATION AT A HEALTH CARE FACILITY: ICD-10-CM

## 2017-11-14 DIAGNOSIS — K76.0 NAFL (NONALCOHOLIC FATTY LIVER): ICD-10-CM

## 2017-11-14 DIAGNOSIS — I10 ESSENTIAL HYPERTENSION: ICD-10-CM

## 2017-11-14 LAB
ALBUMIN SERPL BCP-MCNC: 4.2 G/DL
ALP SERPL-CCNC: 79 U/L
ALT SERPL W/O P-5'-P-CCNC: 84 U/L
ANION GAP SERPL CALC-SCNC: 9 MMOL/L
AST SERPL-CCNC: 58 U/L
BASOPHILS # BLD AUTO: 0.03 K/UL
BASOPHILS NFR BLD: 0.6 %
BILIRUB SERPL-MCNC: 0.8 MG/DL
BUN SERPL-MCNC: 11 MG/DL
CALCIUM SERPL-MCNC: 10 MG/DL
CHLORIDE SERPL-SCNC: 99 MMOL/L
CHOLEST SERPL-MCNC: 195 MG/DL
CHOLEST/HDLC SERPL: 3.3 {RATIO}
CO2 SERPL-SCNC: 32 MMOL/L
CREAT SERPL-MCNC: 0.8 MG/DL
DIFFERENTIAL METHOD: NORMAL
EOSINOPHIL # BLD AUTO: 0.1 K/UL
EOSINOPHIL NFR BLD: 1.8 %
ERYTHROCYTE [DISTWIDTH] IN BLOOD BY AUTOMATED COUNT: 12.8 %
EST. GFR  (AFRICAN AMERICAN): >60 ML/MIN/1.73 M^2
EST. GFR  (NON AFRICAN AMERICAN): >60 ML/MIN/1.73 M^2
ESTIMATED AVG GLUCOSE: 111 MG/DL
GLUCOSE SERPL-MCNC: 126 MG/DL
HBA1C MFR BLD HPLC: 5.5 %
HCT VFR BLD AUTO: 43.8 %
HDLC SERPL-MCNC: 59 MG/DL
HDLC SERPL: 30.3 %
HGB BLD-MCNC: 14.8 G/DL
LDLC SERPL CALC-MCNC: 96.6 MG/DL
LYMPHOCYTES # BLD AUTO: 1.8 K/UL
LYMPHOCYTES NFR BLD: 36.3 %
MCH RBC QN AUTO: 30 PG
MCHC RBC AUTO-ENTMCNC: 33.8 G/DL
MCV RBC AUTO: 89 FL
MONOCYTES # BLD AUTO: 0.5 K/UL
MONOCYTES NFR BLD: 9.4 %
NEUTROPHILS # BLD AUTO: 2.5 K/UL
NEUTROPHILS NFR BLD: 51.7 %
NONHDLC SERPL-MCNC: 136 MG/DL
PLATELET # BLD AUTO: 168 K/UL
PMV BLD AUTO: 9.4 FL
POTASSIUM SERPL-SCNC: 3.6 MMOL/L
PROT SERPL-MCNC: 7.9 G/DL
RBC # BLD AUTO: 4.93 M/UL
SODIUM SERPL-SCNC: 140 MMOL/L
TRIGL SERPL-MCNC: 197 MG/DL
TSH SERPL DL<=0.005 MIU/L-ACNC: 0.72 UIU/ML
WBC # BLD AUTO: 4.91 K/UL

## 2017-11-14 PROCEDURE — 80061 LIPID PANEL: CPT

## 2017-11-14 PROCEDURE — 83036 HEMOGLOBIN GLYCOSYLATED A1C: CPT

## 2017-11-14 PROCEDURE — 84443 ASSAY THYROID STIM HORMONE: CPT

## 2017-11-14 PROCEDURE — 85025 COMPLETE CBC W/AUTO DIFF WBC: CPT

## 2017-11-14 PROCEDURE — 80053 COMPREHEN METABOLIC PANEL: CPT

## 2017-11-14 PROCEDURE — 36415 COLL VENOUS BLD VENIPUNCTURE: CPT

## 2017-11-24 ENCOUNTER — OFFICE VISIT (OUTPATIENT)
Dept: FAMILY MEDICINE | Facility: CLINIC | Age: 62
End: 2017-11-24
Payer: COMMERCIAL

## 2017-11-24 VITALS
HEART RATE: 77 BPM | SYSTOLIC BLOOD PRESSURE: 140 MMHG | BODY MASS INDEX: 33.25 KG/M2 | HEIGHT: 63 IN | WEIGHT: 187.63 LBS | OXYGEN SATURATION: 95 % | DIASTOLIC BLOOD PRESSURE: 78 MMHG | TEMPERATURE: 99 F

## 2017-11-24 DIAGNOSIS — E11.9 CONTROLLED TYPE 2 DIABETES MELLITUS WITHOUT COMPLICATION, WITHOUT LONG-TERM CURRENT USE OF INSULIN: ICD-10-CM

## 2017-11-24 DIAGNOSIS — Z00.00 ROUTINE GENERAL MEDICAL EXAMINATION AT A HEALTH CARE FACILITY: Primary | ICD-10-CM

## 2017-11-24 DIAGNOSIS — K76.0 NAFL (NONALCOHOLIC FATTY LIVER): ICD-10-CM

## 2017-11-24 DIAGNOSIS — R29.818 SUSPECTED SLEEP APNEA: ICD-10-CM

## 2017-11-24 DIAGNOSIS — I10 ESSENTIAL HYPERTENSION: ICD-10-CM

## 2017-11-24 DIAGNOSIS — G47.61 PLMD (PERIODIC LIMB MOVEMENT DISORDER): ICD-10-CM

## 2017-11-24 PROCEDURE — 99396 PREV VISIT EST AGE 40-64: CPT | Mod: 25,S$GLB,, | Performed by: FAMILY MEDICINE

## 2017-11-24 PROCEDURE — 99999 PR PBB SHADOW E&M-EST. PATIENT-LVL III: CPT | Mod: PBBFAC,,, | Performed by: FAMILY MEDICINE

## 2017-11-24 PROCEDURE — 90662 IIV NO PRSV INCREASED AG IM: CPT | Mod: S$GLB,,, | Performed by: FAMILY MEDICINE

## 2017-11-24 PROCEDURE — 90471 IMMUNIZATION ADMIN: CPT | Mod: S$GLB,,, | Performed by: FAMILY MEDICINE

## 2017-11-24 RX ORDER — LISINOPRIL 10 MG/1
10 TABLET ORAL DAILY
Qty: 30 TABLET | Refills: 11 | Status: SHIPPED | OUTPATIENT
Start: 2017-11-24 | End: 2018-03-20 | Stop reason: SDUPTHER

## 2017-11-24 NOTE — PROGRESS NOTES
(Portions of this note were dictated using voice recognition software and may contain dictation related errors in spelling/grammar/syntax not found on text review)    CC: Annual exam    HPI: 62 y.o. female here for annual exam    Hypertension on HCTZ 25 mg daily and amlodipine 10 mg daily     Hyperlipidemia on lovastatin 20 mg daily     Fatty liver disease, elevated LFTs . Negative hepatitis screening. Ultrasound of the liver done in 2015 demonstrating hepatic cyst and fatty infiltration.  Has followed up with hepatology clinic December of 2015     Carotid stenosis history. Last ultrasound 2016 showed less than 50% stenosis bilaterally    Diabetes: On metformin 500 mg daily.  Last A1c has below.  Peak A1c of 7.0 in November 2016.  Was recent test was 5.5.  Normal [facility] ratio.  Saw podiatry for foot exam in May    At last visit also had reported symptoms concerning of sleep apnea.  ESS 14.  She also complained of some symptoms of periodic limb movement disorder  Sleep study was ordered but apparently was not covered by insurance for the in-hospital test      Past Medical History:   Diagnosis Date    AR (allergic rhinitis)     Carotid stenosis     50% RCA    Diabetes mellitus     Fatty liver     GERD (gastroesophageal reflux disease)     HLD (hyperlipidemia)     HTN (hypertension)        No past surgical history on file.    Family History   Problem Relation Age of Onset    Cancer Mother      gallbladder    Prostate cancer Brother 57    Diabetes Brother     Diabetes Maternal Grandmother     Hypertension       multiple    Coronary artery disease Father 79    Diabetes Father     Diabetes Maternal Aunt     Diabetes Maternal Uncle        Social History     Social History    Marital status:      Spouse name: N/A    Number of children: N/A    Years of education: N/A     Occupational History    Not on file.     Social History Main Topics    Smoking status: Never Smoker    Smokeless tobacco: Never  Used    Alcohol use Yes    Drug use: No    Sexual activity: Yes     Partners: Male     Other Topics Concern    Not on file     Social History Narrative    No narrative on file      SCREENINGS:  Colonoscopy 2014.  2 diminutive polyps, diverticulosis. Repeat in 5 years.    GYN: Dr. Cynthia Dinero    MMG 2017, April    Tetanus: 2012  Flu today  PVX: We will wait until she 63 since this just a couple months away  Zvx: Has not had but can get at local pharmacy    Lab Results   Component Value Date    WBC 4.91 11/14/2017    HGB 14.8 11/14/2017    HCT 43.8 11/14/2017     11/14/2017    CHOL 195 11/14/2017    TRIG 197 (H) 11/14/2017    HDL 59 11/14/2017    ALT 84 (H) 11/14/2017    AST 58 (H) 11/14/2017     11/14/2017    K 3.6 11/14/2017    CL 99 11/14/2017    CREATININE 0.8 11/14/2017    CALCIUM 10.0 11/14/2017    BUN 11 11/14/2017    CO2 32 (H) 11/14/2017    TSH 0.717 11/14/2017    INR 1.0 12/01/2015    HGBA1C 5.5 11/14/2017    LDLCALC 96.6 11/14/2017     (H) 11/14/2017                     Vital signs reviewed  PE:   APPEARANCE: Well nourished, well developed, in no acute distress.    HEAD: Normocephalic, atraumatic.  EYES: PERRL. EOMI.   Conjunctivae noninjected.  EARS: TM's intact. Light reflex normal. No retraction or perforation.    NOSE: Mucosa pink. Airway clear.  MOUTH & THROAT: No tonsillar enlargement. No pharyngeal erythema or exudate.   NECK: Supple with no cervical lymphadenopathy.  No carotid bruits.  No thyromegaly  CHEST: Good inspiratory effort. Lungs clear to auscultation with no wheezes or crackles.  CARDIOVASCULAR: Normal S1, S2. No rubs, murmurs, or gallops.  ABDOMEN: Bowel sounds normal. Not distended. Soft. No tenderness or masses. No organomegaly.  EXTREMITIES: No edema, cyanosis, or clubbing.      IMPRESSION  1. Routine general medical examination at a health care facility    2. Suspected sleep apnea    3. PLMD (periodic limb movement disorder)    4. NAFL (nonalcoholic fatty  liver)    5. Essential hypertension    6. Controlled type 2 diabetes mellitus without complication, without long-term current use of insulin        PLAN  Orders Placed This Encounter   Procedures    Influenza - High Dose (65+) (PF) (IM)    Ambulatory consult to Sleep Disorders    Hypertension Digital Medicine (HDMP) Enrollment Order     Reviewed recent labs    Diabetes health maintenance:  -- advise regular and consistent glucose monitoring and medication compliance.  -- advise daily foot checks  -- advise yearly ophthalmologic exams  -- advise adequate dietary and exercise modification  -- advise regular dental visits  -- advise daily low-dose aspirin use if patient is not on other anticoagulants and there are no other contraindications.  -- Medication management: Continue metformin 500 mg daily    Hypertension: Suboptimal.  Continue amlodipine 10 and HCTZ 25 g daily.  Add lisinopril 10 mg daily.  Enrollment in digital hypertension program    Hyperlipidemia: Continue statin    Fatty liver: Lifestyle modification    Suspected sleep apnea with periodic limb movement disorder: Insurance denied hospital sleep study.  We will consult sleep medicine for consideration of in-home sleep study if she qualifies.    Answers for HPI/ROS submitted by the patient on 11/21/2017   activity change: No  unexpected weight change: No  neck pain: No  hearing loss: No  rhinorrhea: No  trouble swallowing: No  eye discharge: No  visual disturbance: No  chest tightness: No  wheezing: No  chest pain: No  palpitations: No  blood in stool: No  constipation: No  vomiting: No  diarrhea: No  polydipsia: No  polyuria: No  difficulty urinating: No  hematuria: No  menstrual problem: No  dysuria: No  joint swelling: Yes  arthralgias: Yes  headaches: No  weakness: No  confusion: No  dysphoric mood: No

## 2017-11-30 ENCOUNTER — LAB VISIT (OUTPATIENT)
Dept: LAB | Facility: OTHER | Age: 62
End: 2017-11-30
Attending: NURSE PRACTITIONER
Payer: COMMERCIAL

## 2017-11-30 ENCOUNTER — OFFICE VISIT (OUTPATIENT)
Dept: SLEEP MEDICINE | Facility: CLINIC | Age: 62
End: 2017-11-30
Payer: COMMERCIAL

## 2017-11-30 VITALS
OXYGEN SATURATION: 97 % | HEART RATE: 80 BPM | BODY MASS INDEX: 33.42 KG/M2 | DIASTOLIC BLOOD PRESSURE: 76 MMHG | HEIGHT: 63 IN | SYSTOLIC BLOOD PRESSURE: 130 MMHG | WEIGHT: 188.63 LBS

## 2017-11-30 DIAGNOSIS — G47.30 SLEEP APNEA, UNSPECIFIED TYPE: Primary | ICD-10-CM

## 2017-11-30 DIAGNOSIS — G25.81 RESTLESS LEG SYNDROME: ICD-10-CM

## 2017-11-30 LAB — FERRITIN SERPL-MCNC: 142 NG/ML

## 2017-11-30 PROCEDURE — 36415 COLL VENOUS BLD VENIPUNCTURE: CPT

## 2017-11-30 PROCEDURE — 99204 OFFICE O/P NEW MOD 45 MIN: CPT | Mod: S$GLB,,, | Performed by: NURSE PRACTITIONER

## 2017-11-30 PROCEDURE — 82728 ASSAY OF FERRITIN: CPT

## 2017-11-30 PROCEDURE — 99999 PR PBB SHADOW E&M-EST. PATIENT-LVL III: CPT | Mod: PBBFAC,,, | Performed by: NURSE PRACTITIONER

## 2017-11-30 NOTE — LETTER
November 30, 2017      Alex Cast MD  [address]  [address]  [place] [address]           [facility]  [address]  [facility] [address]  Phone: [phone]          Patient: Parisa Dimas   MR Number: [id number]   YOB: 1955   Date of Visit: 11/30/2017       Dear Dr. Alex Cast:    Thank you for referring Parisa Dimas to me for evaluation. Attached you will find relevant portions of my assessment and plan of care.    If you have questions, please do not hesitate to call me. I look forward to following Parisa Dimas along with you.    Sincerely,    Carol Bajwa, MARK    Enclosure  CC:  No Recipients    If you would like to receive this communication electronically, please contact [email or web address] or [phone] to request more information on [employer] access.    For providers and/or their staff who would like to refer a patient to [facility], please contact us through our one-stop-shop provider referral line, [facility] , at [phone].    If you feel you have received this communication in error or would no longer like to receive these types of communications, please e-mail [email or web address]

## 2017-11-30 NOTE — PROGRESS NOTES
Parisa Dimas  was seen as a new patient at the request of  Alex Cast MD for the evaluation of obstructive sleep apnea.    11/30/2017 Checked-in at 8:52 am for 8:40 am appt.     CHIEF COMPLAINT:    Chief Complaint   Patient presents with    Snoring    Sleep Apnea       11/30/2017 RM Bajwa NP: Initial HISTORY OF PRESENT ILLNESS: Parisa Dimas is a 62 y.o. female is here for sleep evaluation.       PSG ordered in recent past but denied by insurance. Never had sleep study done prior    Patient complaints include: snoring, air gasping, interrupted sleep, and excessive daytime sleepiness.   Difficulty maintaining sleep. Has tried melatonin and trazodone in past for sleep maintenance.     Reports symptoms of restless legs or kicking during sleep. Leg cramps and discomfort if sedentary in car. Leg cramps and pain as she's falling asleep. Limb jerking at night that wake her from sleep.     [place] Sleepiness Scale score during initial sleep evaluation was 10.    SLEEP ROUTINE:    Bed partner:    Time to bed:  10 pm  Sleep onset latency:  <1 hour  Disruptions or awakenings:    2-3  Wakeup time:     6 am  Perceived sleep quality:  fair      PAST MEDICAL HISTORY:    Active Ambulatory Problems     Diagnosis Date Noted    HTN (hypertension)     HLD (hyperlipidemia)     Fatty liver     GERD (gastroesophageal reflux disease)     AR (allergic rhinitis)     Foot pain 08/21/2014    Heel cord tightness 08/21/2014    Disorder of soft tissue 08/21/2014    Special screening for malignant neoplasms, colon 10/10/2014    Foot pain, right 09/30/2015    Tendonitis of ankle or foot 09/30/2015    Impingement syndrome of left shoulder 01/19/2017    History of diverticulitis 05/01/2017     Resolved Ambulatory Problems     Diagnosis Date Noted    No Resolved Ambulatory Problems     Past Medical History:   Diagnosis Date    AR (allergic rhinitis)     Carotid stenosis     Diabetes mellitus     Fatty liver      GERD (gastroesophageal reflux disease)     HLD (hyperlipidemia)     HTN (hypertension)                 PAST SURGICAL HISTORY:    No past surgical history on file.      FAMILY HISTORY:                Family History   Problem Relation Age of Onset    Cancer Mother      gallbladder    Prostate cancer Brother 57    Diabetes Brother     Diabetes Maternal Grandmother     Hypertension       multiple    Coronary artery disease Father 79    Diabetes Father     Diabetes Maternal Aunt     Diabetes Maternal Uncle        SOCIAL HISTORY:          Tobacco:   History   Smoking Status    Never Smoker   Smokeless Tobacco    Never Used       Alcohol use:    History   Alcohol Use    Yes                 ALLERGIES:  Review of patient's allergies indicates:  No Known Allergies    CURRENT MEDICATIONS:    Current Outpatient Prescriptions   Medication Sig Dispense Refill    amLODIPine (NORVASC) 10 MG tablet TAKE 1 TABLET DAILY 90 tablet 3    aspirin (ECOTRIN) 81 MG EC tablet Take 81 mg by mouth once daily.        b complex vitamins tablet Take 1 tablet by mouth once daily.      blood sugar diagnostic Strp Test daily, insurance covered brand.  Include 100 lancets 100 strip 11    calcium carbonate (OS-RYLEE) 600 mg (1,500 mg) Tab Take 600 mg by mouth once daily.        cetirizine (ZYRTEC) 10 MG tablet Take 10 mg by mouth once daily.        co-enzyme Q-10 30 mg capsule Take 30 mg by mouth 3 (three) times daily.      hydroCHLOROthiazide (HYDRODIURIL) 25 MG tablet TAKE 1 TABLET DAILY 90 tablet 3    lancets Misc Test daily 100 each 11    lisinopril 10 MG tablet Take 1 tablet (10 mg total) by mouth once daily. 30 tablet 11    lovastatin (MEVACOR) 20 MG tablet TAKE 1 TABLET EVERY EVENING 90 tablet 3    metformin (GLUCOPHAGE) 500 MG tablet Take 1 tablet (500 mg total) by mouth daily with breakfast. 90 tablet 3    multivitamin capsule Take 1 capsule by mouth once daily.        OMEGA-3S/DHA/EPA/FISH OIL (OMEGA 3 ORAL) Take  "2,800 mg by mouth once daily.        omeprazole (PRILOSEC) 40 MG capsule TAKE 1 CAPSULE DAILY 90 capsule 3    vitamin E 400 UNIT capsule Take 400 Units by mouth 2 (two) times daily.         No current facility-administered medications for this visit.                   REVIEW OF SYSTEMS:     Sleep related symptoms as per HPI.  CONST:Denies weight gain    HEENT: Denies sinus congestion  PULM: Denies dyspnea  CARD:  Denies palpitations   GI:  Reports acid reflux  : Denies polyuria  NEURO: Denies headaches  PSYCH: Sometimes mood disturbance  HEME: Denies anemia    Otherwise, a balance of systems reviewed is negative.          PHYSICAL EXAM:  Vitals:    11/30/17 0856   BP: 130/76   Pulse: 80   SpO2: 97%   Weight: 85.6 kg (188 lb 9.7 oz)   Height: 5' 3" (1.6 m)   PainSc: 0-No pain     Body mass index is 33.41 kg/m².     GENERAL: Obese body habitus, well groomed  HEENT:  Conjunctivae are non-erythematous; Pupils equal, round, and reactive to light; Nose is symmetrical; Nasal mucosa is pink and moist; Septum is midline; Inferior turbinates are normal; Nasal airflow is normal; Posterior pharynx is pink; Modified Mallampati: IV; Posterior palate is normal; Tonsils +1; Uvula is normal and pink;Tongue is scalloped; Dentition is fair; No TMJ tenderness; Jaw opening and protrusion without click and without discomfort.  NECK: Supple. Neck circumference is 13.75 inches. No thyromegaly. No palpable nodes.    SKIN: On face and neck: No abrasions, no rashes, no lesions.  No subcutaneous nodules are palpable.  RESPIRATORY: Chest is clear to auscultation.  Normal chest expansion and non-labored breathing at rest.  CARDIOVASCULAR: Normal S1, S2.  No murmurs, gallops or rubs. No carotid bruits bilaterally.  EXTREMITIES: No edema. No clubbing. No cyanosis. Station normal. Gait normal.        NEURO/PSYCH: Oriented to time, place and person. Normal attention span and concentration. Affect is full. Mood is normal.                  " 11/14/2017 CBC, TSH, and creatinine normal. Glucose elevated.                                 ASSESSMENT:    Sleep apnea, unspecified. The patient symptomatically has snoring, air gasping, interrupted sleep, and excessive daytime sleepiness with findings of crowded oral airway and elevated body mas index. Medical co-morbidities: systemic HTN, RLS, HLD, GERD, and obesity. This warrants further investigation for possible obstructive sleep apnea.      Obesity, BMI > 30, discussed relationship between SHOAIB and weight    Restless leg syndrome, akathisia worsens in late afternoon relieved by movement and rubbing alcohol on legs, limb jerking affecting sleep maintenance       PLAN:    Diagnostic: Polysomnogram in-lab only due to RLS causing sleep disturbance. The nature of this procedure and its indication was discussed with the patient. If not PSG, then HST. Discussed with patient that if HST is negative or depending on severity of positive HST, in-lab sleep study may be necessary. Patient will be contacted after sleep study is done.  RTC to discuss results.     RLS   -check Ferritin levels, email results via [facility]  -general measurements discussed: discontinue provocative habits, avoid sleep deprivation, relaxing bedtime routine  -if + SHOAIB, SHOAIB tx may also alleviate RLS symptoms; if not, further discuss pharmacologic management later     Education: During our discussion today, we talked about the etiology of obstructive sleep apnea as well as the potential ramifications of untreated sleep apnea, which could include daytime sleepiness, hypertension, heart disease and/or stroke. We discussed potential treatment options, which could include weight loss, body positioning, continuous positive airway pressure (CPAP), OA, EPAP, or referral for surgical consideration.     Precautions: The patient was advised to abstain from driving should they feel sleepy  or drowsy.     Thank you for allowing me the opportunity to participate  in the care of your patient.

## 2017-12-01 ENCOUNTER — PATIENT MESSAGE (OUTPATIENT)
Dept: ADMINISTRATIVE | Facility: OTHER | Age: 62
End: 2017-12-01

## 2017-12-01 ENCOUNTER — PATIENT MESSAGE (OUTPATIENT)
Dept: SLEEP MEDICINE | Facility: CLINIC | Age: 62
End: 2017-12-01

## 2017-12-12 ENCOUNTER — PATIENT MESSAGE (OUTPATIENT)
Dept: ADMINISTRATIVE | Facility: OTHER | Age: 62
End: 2017-12-12

## 2017-12-14 ENCOUNTER — PATIENT OUTREACH (OUTPATIENT)
Dept: OTHER | Facility: OTHER | Age: 62
End: 2017-12-14

## 2017-12-14 NOTE — LETTER
Dayan Anders, PharmD  [address] [place] [address]     Dear Parisa Dimas,    Welcome to the [facility] Program!         My name is Dayan Anders PharmD and I am your dedicated Digital Medicine clinician.  As an expert in medication management, I will help ensure that the medications you are taking continue to provide you with the intended benefits.      I am Kelsey Summers and I will be your health  for the duration of the program.  My  job is to help you identify lifestyle changes to improve your blood pressure control.  We will talk about nutrition, exercise, and other ways that you may be able to adjust your current habits to better your health. Together, we will work to improve your overall health and encourage you to meet your goals for a healthier lifestyle.    What we expect from YOU:    You will need to take blood pressure readings multiple times a week and no less than one reading per week.   It is important that you take your measurements at different times during the day, when possible.     What you should expect from your Digital Medicine Care Team:   We will provide you with education about high blood pressure, including lifestyle changes that could help you to control your blood pressure.   We will review your weekly readings and provide you with monthly blood pressure progress reports after you have been in the program for more than 30 days.   We will send monthly progress reports on your blood pressure control to your physician so they can follow along with your progress as well.    You will be able to reach me by phone at [phone] or through your [facility] account by clicking my name under Care Team on the right side of the home screen.    I look forward to working with you to achieve your blood pressure goals!    Sincerely,    Dayan Anders PharmD  Your personal clinician    Please visit  [email or web address] to learn more about high blood pressure and what you can do lower your blood pressure.                                                                                           Parisa Dimas  [address] [place] [id number]

## 2017-12-14 NOTE — PROGRESS NOTES
Mrs. Parisa Dimas is a 62 y.o. female who is newly enrolled in the [facility]pert[facility].     The following information was reviewed/updated:  Preferred pharmacy   [facility] [address] [place], [place] [address] - [address] [address] [place] [address]  Phone: [phone] Fax: [phone]    [facility] - [address] [address]  Phone: [phone] Fax: [phone]    [facility] - [address] [address]  Phone: [phone] Fax: [phone]    Patient prefers a 90 days supply  Review of patient's allergies indicates:  No Known Allergies  Current Outpatient Prescriptions on File Prior to Visit   Medication Sig Dispense Refill    amLODIPine (NORVASC) 10 MG tablet TAKE 1 TABLET DAILY 90 tablet 3    aspirin (ECOTRIN) 81 MG EC tablet Take 81 mg by mouth once daily.        b complex vitamins tablet Take 1 tablet by mouth once daily.      blood sugar diagnostic Strp Test daily, insurance covered brand.  Include 100 lancets 100 strip 11    calcium carbonate (OS-RYLEE) 600 mg (1,500 mg) Tab Take 600 mg by mouth once daily.        cetirizine (ZYRTEC) 10 MG tablet Take 10 mg by mouth once daily.        co-enzyme Q-10 30 mg capsule Take 30 mg by mouth 3 (three) times daily.      hydroCHLOROthiazide (HYDRODIURIL) 25 MG tablet TAKE 1 TABLET DAILY 90 tablet 3    lancets Misc Test daily 100 each 11    lisinopril 10 MG tablet Take 1 tablet (10 mg total) by mouth once daily. 30 tablet 11    lovastatin (MEVACOR) 20 MG tablet TAKE 1 TABLET EVERY EVENING 90 tablet 3    metformin (GLUCOPHAGE) 500 MG tablet Take 1 tablet (500 mg total) by mouth daily with breakfast. 90 tablet 3    multivitamin capsule Take 1 capsule by mouth once daily.        OMEGA-3S/DHA/EPA/FISH OIL (OMEGA 3 ORAL) Take 2,800 mg by mouth once daily.        omeprazole (PRILOSEC)  40 MG capsule TAKE 1 CAPSULE DAILY 90 capsule 3    vitamin E 400 UNIT capsule Take 400 Units by mouth 2 (two) times daily.         No current facility-administered medications on file prior to visit.        IF DEPRESSED    Responses to the depression screening suggest patient is having depressive symptoms. Patient is not followed for this and is not interested in referral. Patient has good days and bad days.     Reviewed non-pharmacologic therapies and impact on BP:    1. Low-sodium diet- 11 mmHg reduction 2-4 weeks. I have reviewed D.A.S.[facility] diet sodium restrictions (<2000mg/day). Patient watches her sodium intake. She does not add salt to her foods. She does not count her sodium intake. She adds salt to her foods when she is cooking.  2. Exercise- 7 mmHg reduction 4-12 weeks. I have recommended patient engage in exercise as tolerated at least 30 minutes 5x per week to improve cardiovascular health. Patient exercising with a walking DVD three times a week for 30 minutes. She has been doing this regimen off and on depending on her life.   3. Alcohol intake- 3 mmHg reduction 4-12 weeks. I have discussed with patient the maximum recommended number of 2 drink(s) per day for men/women. Patient does not consume alcohol.    Explained that we expect patient to obtain several blood pressures per week at random times of day.   Our goal is to get  BP to consistently below 130/80mmHg and make the process convenient so patient can avoid extra trips to the office. Getting your blood pressure below 130/80mmHg (definition of control) will reduce your risk for heart attack, kidney failure, stroke and death (as well as kidney failure, eye disease, & dementia).     Patient is meeting the goal already.     Instructed patient not to allow anyone else to use phone and BP cuff.   I'm not available for emergencies. Patient will call [facility] on-call ([phone] or [phone]) or [phone] if needed.     Discussed appropriate BP measuring  technique: She is not resting for five minutes prior to taking her pressure. I recommended starting today 12/15.  Before taking your blood pressure  Find a quiet place. You will need to listen for your heartbeat.  Roll up the sleeve on your left arm or remove any tight-sleeved clothing, if needed. (It's best to take your blood pressure from your left arm if you are right-handed.You can use the other arm if you have been told by your health care provider to do so.)  Rest in a chair next to a table for 5 to 10 minutes. (Your left arm should rest comfortably at heart level.)  Sit up straight with your back against the chair, legs uncrossed and on the ground.  Rest your forearm on the table with the palm of your hand facing up.  You should not talk, read the newspaper, or watch television during this process.      Patient and I agreed that she will continue to monitor blood pressure and sodium intake, and continue to remain adherent to medications.     I will plan to follow-up with the patient in 3 weeks.   Emailed patient link to [facility] webpage and my contact information in case she has any questions.       Last 5 Patient Entered Readings Current 30 Day Average: 127/77       Units 12/14/2017 12/14/2017 12/13/2017 12/12/2017 12/12/2017    Time -  7:35 AM 12:00 AM 12:00 AM  2:56 PM 12:00 AM    Systolic Blood Pressure - 137 - - 117 -    Diastolic Blood Pressure - 77 - - 76 -    Pulse bpm 77 - - 76 -    Steps Walked - - 8 5805 - 0816         Maintain.

## 2017-12-20 ENCOUNTER — PATIENT OUTREACH (OUTPATIENT)
Dept: OTHER | Facility: OTHER | Age: 62
End: 2017-12-20

## 2017-12-20 NOTE — PROGRESS NOTES
Last 5 Patient Entered Readings Current 30 Day Average: 124/71       Units 12/20/2017 12/19/2017 12/18/2017 12/18/2017 12/17/2017    Time - 12:00 AM 12:00 AM  3:07 PM 12:00 AM 11:46 PM    Systolic Blood Pressure - - - 129 - 127    Diastolic Blood Pressure - - - 71 - 64    Pulse bpm - - 76 - 86    Steps Walked - 5682 14 - 8504 -          Hypertension Digital Medicine Program (HDMP): Health  Introduction    Introduced Mrs. Parisa Dimas to HDMP. Discussed health  role and recommended lifestyle modifications.    Diet (i.e. Low sodium, food labels): Patient reports she eats mainly at home but goes dining out occasionally. Patient states she eats a variety of foods when eating at restaurants but hardly never eat fried foods. Patient reports she bakes food like chicken or beef at home. Patient states when she is cooking she adds a little of salt to her meals while she prepares them but no additional salt once the food is finished. Patient reports she buys fresh or frozen vegetables from the store and does not add salt to them when she cooks them. Patient states when she is grocery shopping she does read food labels for the sodium content. We discussed trying salt free seasonings and looking for low sodium options when dining out.     Exercise: Patient reports she has started doing exercise videos at least 4 times a week for half an hour.    Alcohol/Tobacco: Patient reports she does not smoke or drink.     Medication Adherence: has been compliant with the medicaiton regimen.    Other goals: Patient reports she wants to lose weight and increase physical activity as her goal.     Reviewed AHA recommendations:  Limit sodium intake to <2000mg/day  Perform 150 minutes of physical activity per week    Patient is aware of the importance of taking daily BP readings at various times of the day  Patient aware that the health  can be used as a resource for lifestyle modifications to help reduce or maintain a healthy  BP  Patient is aware of the importance of medication adherence.  Patient is aware that HDMP team is not available for emergencies. Patient should call 911 or [facility] if one arises.

## 2017-12-23 RX ORDER — METFORMIN HYDROCHLORIDE 500 MG/1
TABLET ORAL
Qty: 90 TABLET | Refills: 3 | Status: SHIPPED | OUTPATIENT
Start: 2017-12-23 | End: 2018-03-20 | Stop reason: SDUPTHER

## 2018-01-03 ENCOUNTER — TELEPHONE (OUTPATIENT)
Dept: SLEEP MEDICINE | Facility: CLINIC | Age: 63
End: 2018-01-03

## 2018-01-03 DIAGNOSIS — G47.30 SLEEP APNEA, UNSPECIFIED TYPE: Primary | ICD-10-CM

## 2018-01-03 NOTE — TELEPHONE ENCOUNTER
Please notify pt that insurance denied in-lab sleep study. As discussed during our clinic visit, home sleep test will be completed instead.     Pending insurance approval, Diana Woodard will contact pt to schedule sleep study. Their number is [phone] (ext [phone]). The [facility] is located on 7th floor of the [facility].    We will call pt when the sleep study results are ready - if pt has not heard from us by 2 weeks from the date of the study, please call [phone] (ext 1).

## 2018-01-05 ENCOUNTER — PATIENT OUTREACH (OUTPATIENT)
Dept: OTHER | Facility: OTHER | Age: 63
End: 2018-01-05

## 2018-01-05 NOTE — PROGRESS NOTES
Last 5 Patient Entered Readings Current 30 Day Average: 125/71     Recent Readings 1/4/2018 1/3/2018 1/2/2018 1/1/2018 12/30/2017    SBP (mmHg) 132 114 124 128 119    DBP (mmHg) 72 67 77 71 66    Pulse 81 80 82 77 78        Patient's BP average is controlled based on 2017 ACC/AHA HTN guidelines of goal BP <130/80.      Patient denies s/s of hypotension (lightheadedness, dizziness, nausea, fatigue) associated with low readings. Instructed patient to inform me if this occurs, patient confirms understanding.      Patient denies s/s of hypertension (SOB, CP, severe headaches, changes in vision) associated with high readings. Instructed patient to go to the ED if BP > 180/110 and accompanied by hypertensive s/s, patient confirms understanding.     Patient's health , Kelsey Summers, will be following up every 3-4 weeks. I will continue to monitor regularly and will follow up in 2 months, sooner if BP begins to trend upward or downward.    Patient has my contact information and knows to call with any concerns or clinical changes.     Current HTN regimen:  Hypertension Medications             amLODIPine (NORVASC) 10 MG tablet TAKE 1 TABLET DAILY    hydroCHLOROthiazide (HYDRODIURIL) 25 MG tablet TAKE 1 TABLET DAILY    lisinopril 10 MG tablet Take 1 tablet (10 mg total) by mouth once daily.

## 2018-01-19 ENCOUNTER — TELEPHONE (OUTPATIENT)
Dept: SLEEP MEDICINE | Facility: HOSPITAL | Age: 63
End: 2018-01-19

## 2018-01-26 ENCOUNTER — PATIENT OUTREACH (OUTPATIENT)
Dept: OTHER | Facility: OTHER | Age: 63
End: 2018-01-26

## 2018-01-26 ENCOUNTER — TELEPHONE (OUTPATIENT)
Dept: SLEEP MEDICINE | Facility: HOSPITAL | Age: 63
End: 2018-01-26

## 2018-01-26 NOTE — PROGRESS NOTES
"Last 5 Patient Entered Readings                                      Current 30 Day Average: 118/69     Recent Readings 1/26/2018 1/25/2018 1/23/2018 1/21/2018 1/20/2018    SBP (mmHg) 115 114 107 128 98    DBP (mmHg) 66 67 65 85 62    Pulse 81 78 83 77 81          Hypertension Digital Medicine Program (HDMP): Health  Follow Up    Lifestyle Modifications:    1.Low sodium diet: yes. Patient reports she been trying to watch her sodium intake. Patient states she has cut back from dining out.     2.Physical activity: yes. Patient reports she is exercising more and still doing her exercise videos.     3.Hypotension/Hypertension symptoms: no. Patient reports she has no s/s of hypo/hypertension.   Frequency/Alleviating factors/Precipitating factors, etc.     4.Patient has been compliant with the medication regimen.     Follow up with Mrs. Parisa Kwokball completed. No further questions or concerns. I will follow up in a few weeks to assess progress.     Patient states she is doing " good".   "

## 2018-01-30 ENCOUNTER — TELEPHONE (OUTPATIENT)
Dept: SLEEP MEDICINE | Facility: HOSPITAL | Age: 63
End: 2018-01-30

## 2018-02-01 ENCOUNTER — TELEPHONE (OUTPATIENT)
Dept: SLEEP MEDICINE | Facility: HOSPITAL | Age: 63
End: 2018-02-01

## 2018-02-02 ENCOUNTER — HOSPITAL ENCOUNTER (OUTPATIENT)
Dept: SLEEP MEDICINE | Facility: HOSPITAL | Age: 63
Discharge: HOME | End: 2018-02-02
Attending: FAMILY MEDICINE
Payer: COMMERCIAL

## 2018-02-02 DIAGNOSIS — G47.30 SLEEP APNEA, UNSPECIFIED TYPE: ICD-10-CM

## 2018-02-02 PROCEDURE — 95800 SLP STDY UNATTENDED: CPT | Mod: 26,,, | Performed by: INTERNAL MEDICINE

## 2018-02-02 PROCEDURE — 95800 PR SLEEP STUDY, UNATTENDED, RECORD HEART RATE/O2 SAT/RESP ANAL/SLEEP TIME: ICD-10-PCS | Mod: 26,,, | Performed by: INTERNAL MEDICINE

## 2018-02-02 PROCEDURE — 95800 SLP STDY UNATTENDED: CPT

## 2018-02-03 NOTE — PATIENT INSTRUCTIONS
Your Home sleep  study will be scored and interpreted by one of our physicians who are board certified in sleep medicine.  Within two weeks the results will be sent to the physician who referred you. Your physician should then contact you to go over the results, along with any recommendations. If you do not hear from your physician within two weeks, please call them.

## 2018-02-09 ENCOUNTER — TELEPHONE (OUTPATIENT)
Dept: SLEEP MEDICINE | Facility: CLINIC | Age: 63
End: 2018-02-09

## 2018-02-09 NOTE — TELEPHONE ENCOUNTER
Left voice message informing patient sleep study results available, requested call back to schedule f/u

## 2018-02-09 NOTE — TELEPHONE ENCOUNTER
Please notify pt that 02/02/2018 home sleep test results available. Schedule for f/u to discuss results.

## 2018-02-15 ENCOUNTER — OFFICE VISIT (OUTPATIENT)
Dept: SLEEP MEDICINE | Facility: CLINIC | Age: 63
End: 2018-02-15
Payer: COMMERCIAL

## 2018-02-15 VITALS
WEIGHT: 188.06 LBS | SYSTOLIC BLOOD PRESSURE: 107 MMHG | HEART RATE: 81 BPM | BODY MASS INDEX: 33.32 KG/M2 | HEIGHT: 63 IN | DIASTOLIC BLOOD PRESSURE: 66 MMHG

## 2018-02-15 DIAGNOSIS — G47.33 OBSTRUCTIVE SLEEP APNEA HYPOPNEA, SEVERE: Primary | ICD-10-CM

## 2018-02-15 PROCEDURE — 99214 OFFICE O/P EST MOD 30 MIN: CPT | Mod: S$GLB,,, | Performed by: NURSE PRACTITIONER

## 2018-02-15 PROCEDURE — 99999 PR PBB SHADOW E&M-EST. PATIENT-LVL IV: CPT | Mod: PBBFAC,,, | Performed by: NURSE PRACTITIONER

## 2018-02-15 PROCEDURE — 3008F BODY MASS INDEX DOCD: CPT | Mod: S$GLB,,, | Performed by: NURSE PRACTITIONER

## 2018-02-15 NOTE — PROGRESS NOTES
Parisa Dimas  was seen in follow-up to discuss sleep study results and potential treatment options.     11/30/2017 Checked-in at 8:52 am for 8:40 am appt.     CHIEF COMPLAINT:    Chief Complaint   Patient presents with    Sleep Apnea     review results      INTERVAL HISTORY:    02/15/2018 RM Bajwa NP: Discussed 02/02/2018 home sleep study results in detail. Of note, PSG recommended but denied by insurance. Pt complaints of snoring, air gasping, interrupted sleep, and excessive daytime sleepiness remain the same. ESS 9.     Baseline Sleep Study: 02/02/2018  lb. The overall AHI was 35 and overall RDI was 57 . The oxygen demetrius was 64.9 % and % time < 90% SpO2 was 3.5 %.      11/30/2017 RM Bajwa NP: Initial HISTORY OF PRESENT ILLNESS: Parisa Dimas is a 62 y.o. female is here for sleep evaluation.       PSG ordered in recent past but denied by insurance. Never had sleep study done prior    Patient complaints include: snoring, air gasping, interrupted sleep, and excessive daytime sleepiness.   Difficulty maintaining sleep. Has tried melatonin and trazodone in past for sleep maintenance.     Reports symptoms of restless legs or kicking during sleep. Leg cramps and discomfort if sedentary in car. Leg cramps and pain as she's falling asleep. Limb jerking at night that wake her from sleep.     [place] Sleepiness Scale score during initial sleep evaluation was 10.    SLEEP ROUTINE:    Bed partner:    Time to bed:  10 pm  Sleep onset latency:  <1 hour  Disruptions or awakenings:    2-3  Wakeup time:     6 am  Perceived sleep quality:  fair      PAST MEDICAL HISTORY:    Active Ambulatory Problems     Diagnosis Date Noted    HTN (hypertension)     HLD (hyperlipidemia)     Fatty liver     GERD (gastroesophageal reflux disease)     AR (allergic rhinitis)     Foot pain 08/21/2014    Heel cord tightness 08/21/2014    Disorder of soft tissue 08/21/2014    Special screening for malignant neoplasms, colon  10/10/2014    Foot pain, right 09/30/2015    Tendonitis of ankle or foot 09/30/2015    Impingement syndrome of left shoulder 01/19/2017    History of diverticulitis 05/01/2017    Sleep apnea      Resolved Ambulatory Problems     Diagnosis Date Noted    No Resolved Ambulatory Problems     Past Medical History:   Diagnosis Date    AR (allergic rhinitis)     Carotid stenosis     Diabetes mellitus     Fatty liver     GERD (gastroesophageal reflux disease)     HLD (hyperlipidemia)     HTN (hypertension)                 PAST SURGICAL HISTORY:    No past surgical history on file.      FAMILY HISTORY:                Family History   Problem Relation Age of Onset    Cancer Mother      gallbladder    Prostate cancer Brother 57    Diabetes Brother     Diabetes Maternal Grandmother     Hypertension       multiple    Coronary artery disease Father 79    Diabetes Father     Diabetes Maternal Aunt     Diabetes Maternal Uncle        SOCIAL HISTORY:          Tobacco:   History   Smoking Status    Never Smoker   Smokeless Tobacco    Never Used       Alcohol use:    History   Alcohol Use    Yes                 ALLERGIES:  Review of patient's allergies indicates:  No Known Allergies    CURRENT MEDICATIONS:    Current Outpatient Prescriptions   Medication Sig Dispense Refill    amLODIPine (NORVASC) 10 MG tablet TAKE 1 TABLET DAILY 90 tablet 3    aspirin (ECOTRIN) 81 MG EC tablet Take 81 mg by mouth once daily.        b complex vitamins tablet Take 1 tablet by mouth once daily.      blood sugar diagnostic Strp Test daily, insurance covered brand.  Include 100 lancets 100 strip 11    calcium carbonate (OS-RYLEE) 600 mg (1,500 mg) Tab Take 600 mg by mouth once daily.        cetirizine (ZYRTEC) 10 MG tablet Take 10 mg by mouth once daily.        co-enzyme Q-10 30 mg capsule Take 30 mg by mouth once daily.      hydroCHLOROthiazide (HYDRODIURIL) 25 MG tablet TAKE 1 TABLET DAILY 90 tablet 3     "L.ACID/L.CASEI/B.BIF/B.MELISSA/FOS (PROBIOTIC BLEND ORAL) Take 2 tablets by mouth once daily.      lancets Misc Test daily 100 each 11    lisinopril 10 MG tablet Take 1 tablet (10 mg total) by mouth once daily. 30 tablet 11    lovastatin (MEVACOR) 20 MG tablet TAKE 1 TABLET EVERY EVENING 90 tablet 3    metFORMIN (GLUCOPHAGE) 500 MG tablet TAKE 1 TABLET DAILY WITH BREAKFAST 90 tablet 3    multivitamin capsule Take 1 capsule by mouth once daily.        OMEGA-3S/DHA/EPA/FISH OIL (OMEGA 3 ORAL) Take 2,800 mg by mouth once daily.        omeprazole (PRILOSEC) 40 MG capsule TAKE 1 CAPSULE DAILY 90 capsule 3    vitamin E 400 UNIT capsule Take 400 Units by mouth once daily.         No current facility-administered medications for this visit.                   REVIEW OF SYSTEMS:     Sleep related symptoms as per HPI.  CONST:Denies weight gain    HEENT: Denies sinus congestion  PULM: Denies dyspnea  CARD:  Denies palpitations   GI:  Reports acid reflux  : Denies polyuria  NEURO: Denies headaches  PSYCH: Sometimes mood disturbance  HEME: Denies anemia    Otherwise, a balance of systems reviewed is negative.          PHYSICAL EXAM:  Vitals:    02/15/18 1104   Weight: 85.3 kg (188 lb 0.8 oz)   Height: 5' 3" (1.6 m)   PainSc: 0-No pain     Body mass index is 33.31 kg/m².     GENERAL: Obese body habitus, well groomed  HEENT:  Conjunctivae are non-erythematous; Pupils equal, round, and reactive to light; Nose is symmetrical; Nasal mucosa is pink and moist; Septum is midline; Inferior turbinates are normal; Nasal airflow is normal; Posterior pharynx is pink; Modified Mallampati: IV; Posterior palate is normal; Tonsils +1; Uvula is normal and pink;Tongue is scalloped; Dentition is fair; No TMJ tenderness; Jaw opening and protrusion without click and without discomfort.  NECK: Supple. Neck circumference is 13.75 inches. No thyromegaly. No palpable nodes.    SKIN: On face and neck: No abrasions, no rashes, no lesions.  No " subcutaneous nodules are palpable.  RESPIRATORY: Chest is clear to auscultation.  Normal chest expansion and non-labored breathing at rest.  CARDIOVASCULAR: Normal S1, S2.  No murmurs, gallops or rubs. No carotid bruits bilaterally.  EXTREMITIES: No edema. No clubbing. No cyanosis. Station normal. Gait normal.        NEURO/PSYCH: Oriented to time, place and person. Normal attention span and concentration. Affect is full. Mood is normal.                  11/14/2017 CBC, TSH, and creatinine normal. Glucose elevated.                  11/30/2017 Ferritin 142     ASSESSMENT:    SHOAIB, severe by AHI. The patient symptomatically has snoring, air gasping, interrupted sleep, and excessive daytime sleepiness with findings of crowded oral airway and elevated body mas index. Medical co-morbidities: systemic HTN, RLS, HLD, GERD, and obesity. This warrants treatment for obstructive sleep apnea.      Obesity, BMI > 30, discussed relationship between SHOAIB and weight    Restless leg syndrome, akathisia worsens in late afternoon relieved by movement and rubbing alcohol on legs, limb jerking affecting sleep maintenance       PLAN:    Education: During our discussion today, we talked about the etiology of obstructive sleep apnea as well as the potential ramifications of untreated sleep apnea, which could include daytime sleepiness, hypertension, heart disease and/or stroke. We discussed potential treatment options, which could include weight loss, body positioning, continuous positive airway pressure (CPAP), OA, EPAP, or referral for surgical consideration.     SHOAIB:  -trial APAP 6 - 20 cm @ [facility] [employer] [facility] (pt switching from [employer] to Medicare March 2018). RTC 31 - 90 days after set up  -If patient has difficulty with CPAP adherence or ongoing SHOAIB symptoms despite CPAP adherence, then consider an in-lab titration sleep study in order to determine optimal fixed CPAP setting.    RLS   -general measurements discussed: discontinue  provocative habits, avoid sleep deprivation, relaxing bedtime routine  - SHOAIB tx may also alleviate RLS symptoms; if not, further discuss pharmacologic management later       Precautions: The patient was advised to abstain from driving should they feel sleepy  or drowsy.

## 2018-02-16 ENCOUNTER — PATIENT OUTREACH (OUTPATIENT)
Dept: OTHER | Facility: OTHER | Age: 63
End: 2018-02-16

## 2018-02-16 NOTE — PROGRESS NOTES
"Last 5 Patient Entered Readings                                      Current 30 Day Average: 115/69     Recent Readings 2/14/2018 2/11/2018 2/7/2018 2/4/2018 2/1/2018    SBP (mmHg) 107 118 121 118 114    DBP (mmHg) 68 72 70 73 64    Pulse 76 78 75 79 82          Hypertension Digital Medicine Program (HDMP): Health  Follow Up    Lifestyle Modifications:    1.Low sodium diet: yes. Patient reports she has been watching her sodium intake. Patient reports still cutting back on dining out.     2.Physical activity: yes.  Patient reports she has been moving more doing a fish koch. Patient reports she still doing her exercise videos.     3.Hypotension/Hypertension symptoms: no. Patient reports she has no s/s of hypo/hypertension.   Frequency/Alleviating factors/Precipitating factors, etc.     4.Patient has been compliant with the medication regimen.     Follow up with Mrs. Parisa Kwokball completed. No further questions or concerns. I will follow up in a few weeks to assess progress.     Patient states she is doing "good".  "

## 2018-03-09 ENCOUNTER — PATIENT OUTREACH (OUTPATIENT)
Dept: OTHER | Facility: OTHER | Age: 63
End: 2018-03-09

## 2018-03-12 ENCOUNTER — PATIENT OUTREACH (OUTPATIENT)
Dept: OTHER | Facility: OTHER | Age: 63
End: 2018-03-12

## 2018-03-12 NOTE — PROGRESS NOTES
Last 5 Patient Entered Readings                                      Current 30 Day Average: 122/69     Recent Readings 3/11/2018 3/8/2018 3/7/2018 3/5/2018 3/1/2018    SBP (mmHg) 123 123 125 128 127    DBP (mmHg) 64 73 66 74 62    Pulse 84 78 85 76 81

## 2018-03-16 ENCOUNTER — PATIENT OUTREACH (OUTPATIENT)
Dept: OTHER | Facility: OTHER | Age: 63
End: 2018-03-16

## 2018-03-20 RX ORDER — LISINOPRIL 10 MG/1
10 TABLET ORAL DAILY
Qty: 30 TABLET | Refills: 11 | Status: SHIPPED | OUTPATIENT
Start: 2018-03-20 | End: 2018-03-29 | Stop reason: SDUPTHER

## 2018-03-20 RX ORDER — METFORMIN HYDROCHLORIDE 500 MG/1
500 TABLET ORAL
Qty: 90 TABLET | Refills: 3 | Status: SHIPPED | OUTPATIENT
Start: 2018-03-20 | End: 2018-03-29 | Stop reason: SDUPTHER

## 2018-03-20 RX ORDER — HYDROCHLOROTHIAZIDE 25 MG/1
25 TABLET ORAL DAILY
Qty: 90 TABLET | Refills: 3 | Status: SHIPPED | OUTPATIENT
Start: 2018-03-20 | End: 2020-01-23 | Stop reason: SDUPTHER

## 2018-03-20 RX ORDER — LOVASTATIN 20 MG/1
20 TABLET ORAL NIGHTLY
Qty: 90 TABLET | Refills: 3 | Status: SHIPPED | OUTPATIENT
Start: 2018-03-20 | End: 2019-02-17 | Stop reason: SDUPTHER

## 2018-03-20 RX ORDER — AMLODIPINE BESYLATE 10 MG/1
10 TABLET ORAL DAILY
Qty: 90 TABLET | Refills: 3 | Status: SHIPPED | OUTPATIENT
Start: 2018-03-20 | End: 2018-03-29 | Stop reason: SDUPTHER

## 2018-03-20 RX ORDER — OMEPRAZOLE 40 MG/1
40 CAPSULE, DELAYED RELEASE ORAL DAILY
Qty: 90 CAPSULE | Refills: 3 | Status: SHIPPED | OUTPATIENT
Start: 2018-03-20 | End: 2019-05-30 | Stop reason: SDUPTHER

## 2018-03-20 NOTE — TELEPHONE ENCOUNTER
----- Message from Kevyn Pope sent at 3/20/2018  3:44 PM CDT -----  Contact: [phone] self  Patient would like refill of hydroCHLOROthiazide (HYDRODIURIL) 25 MG tablet, lovastatin (MEVACOR) 20 MG tablet, omeprazole (PRILOSEC) 40 MG capsule, metFORMIN (GLUCOPHAGE) 500 MG tablet, lisinopril 10 MG tablet and amLODIPine (NORVASC) 10 MG tablet sent to [facility]. Please advise.

## 2018-03-26 ENCOUNTER — OFFICE VISIT (OUTPATIENT)
Dept: FAMILY MEDICINE | Facility: CLINIC | Age: 63
End: 2018-03-26
Payer: MEDICARE

## 2018-03-26 VITALS
HEIGHT: 63 IN | HEART RATE: 89 BPM | OXYGEN SATURATION: 98 % | DIASTOLIC BLOOD PRESSURE: 68 MMHG | TEMPERATURE: 98 F | WEIGHT: 186.31 LBS | BODY MASS INDEX: 33.01 KG/M2 | SYSTOLIC BLOOD PRESSURE: 112 MMHG

## 2018-03-26 DIAGNOSIS — R25.2 LEG CRAMP: ICD-10-CM

## 2018-03-26 DIAGNOSIS — E11.9 CONTROLLED TYPE 2 DIABETES MELLITUS WITHOUT COMPLICATION, WITHOUT LONG-TERM CURRENT USE OF INSULIN: ICD-10-CM

## 2018-03-26 DIAGNOSIS — Z78.0 MENOPAUSE: ICD-10-CM

## 2018-03-26 DIAGNOSIS — K21.9 GASTROESOPHAGEAL REFLUX DISEASE, ESOPHAGITIS PRESENCE NOT SPECIFIED: ICD-10-CM

## 2018-03-26 DIAGNOSIS — I65.29 STENOSIS OF CAROTID ARTERY, UNSPECIFIED LATERALITY: Primary | ICD-10-CM

## 2018-03-26 DIAGNOSIS — I10 HYPERTENSION, UNSPECIFIED TYPE: ICD-10-CM

## 2018-03-26 DIAGNOSIS — I77.1 STRICTURE OF ARTERY: ICD-10-CM

## 2018-03-26 DIAGNOSIS — G56.00 CARPAL TUNNEL SYNDROME, UNSPECIFIED LATERALITY: ICD-10-CM

## 2018-03-26 PROCEDURE — 99215 OFFICE O/P EST HI 40 MIN: CPT | Mod: S$PBB,,, | Performed by: FAMILY MEDICINE

## 2018-03-26 PROCEDURE — G0009 ADMIN PNEUMOCOCCAL VACCINE: HCPCS | Mod: PBBFAC,PO

## 2018-03-26 PROCEDURE — 99215 OFFICE O/P EST HI 40 MIN: CPT | Mod: PBBFAC,PO,25 | Performed by: FAMILY MEDICINE

## 2018-03-26 PROCEDURE — 99999 PR PBB SHADOW E&M-EST. PATIENT-LVL V: CPT | Mod: PBBFAC,,, | Performed by: FAMILY MEDICINE

## 2018-03-26 RX ORDER — IBUPROFEN 200 MG
200 TABLET ORAL EVERY 6 HOURS PRN
Status: ON HOLD | COMMUNITY
End: 2022-03-07 | Stop reason: HOSPADM

## 2018-03-26 NOTE — PROGRESS NOTES
(Portions of this note were dictated using voice recognition software and may contain dictation related errors in spelling/grammar/syntax not found on text review)    CC:   Chief Complaint   Patient presents with    Follow-up     4 month    Medication Refill       HPI: 63 y.o. female Seen in November for annual exam.  Blood pressure was high.  Her amlodipine 10 mg and hydrochlorothiazide 25 mg daily were continued.  Added lisinopril 10 mg daily.  Pressure has been doing much better    Diabetes on metformin 500 mg daily.  Last labs as below    Sometimes tingling and numbness in both hands of the median nerve distribution, sometimes when she wakes up in the morning.  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  This is just completing however but she wonders whether a lot of this activity could have flared something up.  No significant radiating type symptoms.  Does get some stiffness in her hands when she wakes up in the morning    Couple weeks ago she started with a severe pain in her right thigh and inguinal region that got better after 10 minutes or so.  She had similar symptoms several years prior.  This was not exertional, in that she was sitting on the couch watching TV when the symptoms happen.  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    Family History   Problem Relation Age of Onset    Cancer Mother      gallbladder    Prostate cancer Brother 57    Diabetes Brother     Diabetes Maternal Grandmother     Hypertension       multiple    Coronary artery disease  Father 79    Diabetes Father     Diabetes Maternal Aunt     Diabetes Maternal Uncle        Social History     Social History    Marital status:      Spouse name: N/A    Number of children: N/A    Years of education: N/A     Occupational History    Not on file.     Social History Main Topics    Smoking status: Never Smoker    Smokeless tobacco: Never Used    Alcohol use Yes    Drug use: No    Sexual activity: Yes     Partners: Male     Other Topics Concern    Not on file     Social History Narrative    No narrative on file      SCREENINGS:  Colonoscopy 2014.  2 diminutive polyps, diverticulosis. Repeat in 5 years.     GYN: Dr. Cynthia Dinero     MMG 2017, April     Tetanus: 2012  Flu up-to-date  PVX:  Has not done yet  Prevnar today  Zvx: Has not had but can get at local pharmacy     DEXA due      Lab Results   Component Value Date    WBC 4.91 11/14/2017    HGB 14.8 11/14/2017    HCT 43.8 11/14/2017     11/14/2017    CHOL 195 11/14/2017    TRIG 197 (H) 11/14/2017    HDL 59 11/14/2017    ALT 84 (H) 11/14/2017    AST 58 (H) 11/14/2017     11/14/2017    K 3.6 11/14/2017    CL 99 11/14/2017    CREATININE 0.8 11/14/2017    CALCIUM 10.0 11/14/2017    BUN 11 11/14/2017    CO2 32 (H) 11/14/2017    TSH 0.717 11/14/2017    INR 1.0 12/01/2015    HGBA1C 5.5 11/14/2017    LDLCALC 96.6 11/14/2017     (H) 11/14/2017     normal [facility] ratio in November 2017    ROS:  GENERAL: No fever, chills, fatigability or weight loss.  SKIN: No rashes, no itching.  HEAD: No headaches.  EYES: No visual changes  EARS: No ear pain or changes in hearing.  NOSE: No congestion or rhinorrhea.  MOUTH & THROAT: No hoarseness, change in voice, or sore throat.  NODES: Denies swollen glands.  CHEST: Denies TOMLINSON, cyanosis, wheezing, cough and sputum production.  CARDIOVASCULAR: Denies chest pain, PND, orthopnea.  ABDOMEN: No nausea, vomiting, or changes in bowel function.  URINARY: No flank pain, dysuria or  hematuria.  PERIPHERAL VASCULAR: No claudication or cyanosis.  MUSCULOSKELETAL: Above.  NEUROLOGIC: Above.    Vital signs reviewed  PE:   APPEARANCE: Well nourished, well developed, in no acute distress.    HEAD: Normocephalic, atraumatic.  EYES: PERRL. EOMI.   Conjunctivae noninjected.  EARS: TM's intact. Light reflex normal. No retraction or perforation  NOSE: Mucosa pink. Airway clear.  MOUTH & THROAT: No tonsillar enlargement. No pharyngeal erythema or exudate.   NECK: Supple with no cervical lymphadenopathy.  No carotid bruits.  No thyromegaly  CHEST: Good inspiratory effort. Lungs clear to auscultation with no wheezes or crackles.  CARDIOVASCULAR: Normal S1, S2.  2/6 systolic ejection murmur noted aortic region   ABDOMEN: Bowel sounds normal. Not distended. Soft. No tenderness or masses. No organomegaly.  EXTREMITIES: No edema, cyanosis, or clubbing.  2+ DP and PT pulses bilaterally  NEURO: Negative sign bilaterally.  Positive Phalen sign bilaterally      IMPRESSION    1. Stenosis of carotid artery, unspecified laterality    2. Stricture of artery    3. Leg cramp    4. Controlled type 2 diabetes mellitus without complication, without long-term current use of insulin    5. Hypertension, unspecified type    6. Gastroesophageal reflux disease, esophagitis presence not specified    7. Menopause    8. Carpal tunnel syndrome, unspecified laterality          PLAN  Reviewed prior note from November for her annual exam.  Reviewed her labs and health screenings as noted above.    Hypertension much better controlled.  Continue current therapy    Diabetes health maintenance:  -- advise regular and consistent glucose monitoring and medication compliance.  -- advise daily foot checks  -- advise yearly ophthalmologic exams  -- advise adequate dietary and exercise modification  -- advise regular dental visits  -- advise daily low-dose aspirin use if patient is not on other anticoagulants and there are no other  contraindications.  -- Medication management: Continue metformin 500 mg daily    Hyperlipidemia: Continue statin    Likely carpal tunnel syndrome bilaterally.  Advised wrist bracing daily for 4 weeks when sleeping and with doing a lot of activities with her hands    Carotid artery stenosis: Repeat ultrasound    Slight aortic region murmur: Possibly aortic sclerosis/stenosis.  Patient is asymptomatic.  No echo on file.  Can defer echo for now but can consider if worsening murmur or if any cardiac symptoms develop    Leg cramp sensation.  Did not seem PAD related given history.  Could be activity related given the fact that she was working a lot of last few weeks and was on her feet a lot as above.  We will check labs as below.  Notify symptoms worsen.  Leg stretches advise    Health maintenance: Needs DEXA scan.  We are out of Prevnar but will do Pneumovax and the do Prevnar next year for pneumococcal vaccination        Orders Placed This Encounter   Procedures    US Carotid Bilateral    DXA Bone Density Spine And Hip    (In Office Administered) Pneumococcal Polysaccharide Vaccine (23 Valent) (SQ/IM)    Magnesium    Comprehensive metabolic panel    Hemoglobin A1c    Lipid panel             Answers for HPI/ROS submitted by the patient on 3/26/2018   Hypertension  Chronicity: chronic  Onset: more than 1 year ago  Progression since onset: gradually improving  Condition status: controlled  anxiety: Yes  blurred vision: No  chest pain: No  headaches: Yes  malaise/fatigue: Yes  neck pain: Yes  orthopnea: No  palpitations: No  peripheral edema: Yes  PND: Yes  shortness of breath: No  sweats: No  Agents associated with hypertension: NSAIDs  CAD risks: diabetes mellitus, dyslipidemia, obesity, post-menopausal state, stress  Compliance problems: exercise, psychosocial issues  Past treatments: diuretics  Improvement on treatment: moderate

## 2018-03-29 RX ORDER — METFORMIN HYDROCHLORIDE 500 MG/1
500 TABLET ORAL
Qty: 90 TABLET | Refills: 3 | Status: SHIPPED | OUTPATIENT
Start: 2018-03-29 | End: 2018-12-07

## 2018-03-29 RX ORDER — AMLODIPINE BESYLATE 10 MG/1
10 TABLET ORAL DAILY
Qty: 90 TABLET | Refills: 3 | Status: SHIPPED | OUTPATIENT
Start: 2018-03-29 | End: 2019-02-17 | Stop reason: SDUPTHER

## 2018-03-29 RX ORDER — LISINOPRIL 10 MG/1
10 TABLET ORAL DAILY
Qty: 90 TABLET | Refills: 3 | Status: SHIPPED | OUTPATIENT
Start: 2018-03-29 | End: 2019-02-07 | Stop reason: DRUGHIGH

## 2018-04-06 ENCOUNTER — HOSPITAL ENCOUNTER (OUTPATIENT)
Dept: RADIOLOGY | Facility: HOSPITAL | Age: 63
Discharge: HOME | End: 2018-04-06
Attending: FAMILY MEDICINE
Payer: MEDICARE

## 2018-04-06 DIAGNOSIS — I77.1 STRICTURE OF ARTERY: ICD-10-CM

## 2018-04-06 DIAGNOSIS — I65.29 STENOSIS OF CAROTID ARTERY, UNSPECIFIED LATERALITY: ICD-10-CM

## 2018-04-06 DIAGNOSIS — Z78.0 MENOPAUSE: ICD-10-CM

## 2018-04-06 PROCEDURE — 93880 EXTRACRANIAL BILAT STUDY: CPT | Mod: 26,,, | Performed by: RADIOLOGY

## 2018-04-06 PROCEDURE — 77080 DXA BONE DENSITY AXIAL: CPT | Mod: 26,,, | Performed by: RADIOLOGY

## 2018-04-06 PROCEDURE — 77080 DXA BONE DENSITY AXIAL: CPT | Mod: TC

## 2018-04-06 PROCEDURE — 93880 EXTRACRANIAL BILAT STUDY: CPT | Mod: TC

## 2018-04-15 ENCOUNTER — PATIENT MESSAGE (OUTPATIENT)
Dept: FAMILY MEDICINE | Facility: CLINIC | Age: 63
End: 2018-04-15

## 2018-04-19 ENCOUNTER — TELEPHONE (OUTPATIENT)
Dept: SLEEP MEDICINE | Facility: CLINIC | Age: 63
End: 2018-04-19

## 2018-04-19 ENCOUNTER — PATIENT MESSAGE (OUTPATIENT)
Dept: SLEEP MEDICINE | Facility: CLINIC | Age: 63
End: 2018-04-19

## 2018-04-24 ENCOUNTER — OFFICE VISIT (OUTPATIENT)
Dept: SLEEP MEDICINE | Facility: CLINIC | Age: 63
End: 2018-04-24
Payer: MEDICARE

## 2018-04-24 VITALS
SYSTOLIC BLOOD PRESSURE: 120 MMHG | OXYGEN SATURATION: 97 % | BODY MASS INDEX: 33.71 KG/M2 | WEIGHT: 190.25 LBS | HEIGHT: 63 IN | HEART RATE: 80 BPM | DIASTOLIC BLOOD PRESSURE: 72 MMHG

## 2018-04-24 DIAGNOSIS — G47.33 OBSTRUCTIVE SLEEP APNEA HYPOPNEA, SEVERE: Primary | ICD-10-CM

## 2018-04-24 PROCEDURE — 99215 OFFICE O/P EST HI 40 MIN: CPT | Mod: PBBFAC | Performed by: NURSE PRACTITIONER

## 2018-04-24 PROCEDURE — 99999 PR PBB SHADOW E&M-EST. PATIENT-LVL V: CPT | Mod: PBBFAC,,, | Performed by: NURSE PRACTITIONER

## 2018-04-24 PROCEDURE — 99213 OFFICE O/P EST LOW 20 MIN: CPT | Mod: S$PBB,,, | Performed by: NURSE PRACTITIONER

## 2018-04-24 NOTE — PATIENT INSTRUCTIONS
Dentist options for an oral appliance    1. Kolby Polk DDS ([email or web address], [place] based) [phone]   2. Caio Cartagena DDS (more lester, [place] based, but an office in [facility]) [phone]   3. Aneudy Hu DDS ([facility]) [phone]   4. Moises Hill DDS ([facility]) [address]   5. Ramy Martinez DDS ([place]) [phone]   6. [facility] [phone]

## 2018-04-24 NOTE — PROGRESS NOTES
"Parisa Dimas  was seen in follow-up to discuss alternative treatments for SHOAIB     11/30/2017 Checked-in at 8:52 am for 8:40 am appt.     CHIEF COMPLAINT:    No chief complaint on file.    INTERVAL HISTORY:    04/24/2018 RM Bajwa NP: Pt could not tolerate CPAP interface during set up at [facility]. Here today to discuss alternative therapies. Pt had "severe panic attack" during set up and is not open to recommended desensitization steps prior to CPAP set up. Most interested in body positioning avoiding supine sleep and weight reduction. In addition to lifestyle modification pt considering OA/MAD. Provided pt with list of local dentists for this service should her DDS not provide this service. Pt to contact me regarding treatment decision.     02/15/2018 RM Bajwa NP: Discussed 02/02/2018 home sleep study results in detail. Of note, PSG recommended but denied by insurance. Pt complaints of snoring, air gasping, interrupted sleep, and excessive daytime sleepiness remain the same. ESS 9.     Baseline Sleep Study: 02/02/2018  lb. The overall AHI was 35 and overall RDI was 57 . The oxygen demetrius was 64.9 % and % time < 90% SpO2 was 3.5 %.      11/30/2017 RM Bajwa NP: Initial HISTORY OF PRESENT ILLNESS: Parisa Dimas is a 63 y.o. female is here for sleep evaluation.       PSG ordered in recent past but denied by insurance. Never had sleep study done prior    Patient complaints include: snoring, air gasping, interrupted sleep, and excessive daytime sleepiness.   Difficulty maintaining sleep. Has tried melatonin and trazodone in past for sleep maintenance.     Reports symptoms of restless legs or kicking during sleep. Leg cramps and discomfort if sedentary in car. Leg cramps and pain as she's falling asleep. Limb jerking at night that wake her from sleep.     [place] Sleepiness Scale score during initial sleep evaluation was 10.    SLEEP ROUTINE:    Bed partner:    Time to bed:  10 pm  Sleep onset latency:  " <1 hour  Disruptions or awakenings:    2-3  Wakeup time:     6 am  Perceived sleep quality:  fair      PAST MEDICAL HISTORY:    Active Ambulatory Problems     Diagnosis Date Noted    HTN (hypertension)     HLD (hyperlipidemia)     Fatty liver     GERD (gastroesophageal reflux disease)     AR (allergic rhinitis)     Foot pain 08/21/2014    Heel cord tightness 08/21/2014    Disorder of soft tissue 08/21/2014    Special screening for malignant neoplasms, colon 10/10/2014    Foot pain, right 09/30/2015    Tendonitis of ankle or foot 09/30/2015    Impingement syndrome of left shoulder 01/19/2017    History of diverticulitis 05/01/2017    Obstructive sleep apnea hypopnea, severe      Resolved Ambulatory Problems     Diagnosis Date Noted    No Resolved Ambulatory Problems     Past Medical History:   Diagnosis Date    AR (allergic rhinitis)     Carotid stenosis     Diabetes mellitus     Fatty liver     GERD (gastroesophageal reflux disease)     HLD (hyperlipidemia)     HTN (hypertension)                 PAST SURGICAL HISTORY:    History reviewed. No pertinent surgical history.      FAMILY HISTORY:                Family History   Problem Relation Age of Onset    Cancer Mother      gallbladder    Prostate cancer Brother 57    Diabetes Brother     Diabetes Maternal Grandmother     Hypertension       multiple    Coronary artery disease Father 79    Diabetes Father     Diabetes Maternal Aunt     Diabetes Maternal Uncle        SOCIAL HISTORY:          Tobacco:   History   Smoking Status    Never Smoker   Smokeless Tobacco    Never Used       Alcohol use:    History   Alcohol Use    Yes                 ALLERGIES:  Review of patient's allergies indicates:  No Known Allergies    CURRENT MEDICATIONS:    Current Outpatient Prescriptions   Medication Sig Dispense Refill    amLODIPine (NORVASC) 10 MG tablet Take 1 tablet (10 mg total) by mouth once daily. 90 tablet 3    aspirin (ECOTRIN) 81 MG EC  tablet Take 81 mg by mouth once daily.        b complex vitamins tablet Take 1 tablet by mouth once daily.      blood sugar diagnostic Strp Test daily, insurance covered brand.  Include 100 lancets 100 strip 11    calcium carbonate (OS-RYLEE) 600 mg (1,500 mg) Tab Take 600 mg by mouth once daily.        cetirizine (ZYRTEC) 10 MG tablet Take 10 mg by mouth once daily.        co-enzyme Q-10 30 mg capsule Take 30 mg by mouth once daily.      hydroCHLOROthiazide (HYDRODIURIL) 25 MG tablet Take 1 tablet (25 mg total) by mouth once daily. 90 tablet 3    ibuprofen (ADVIL,MOTRIN) 200 MG tablet Take 200 mg by mouth every 6 (six) hours as needed for Pain.      L.ACID/L.CASEI/B.BIF/B.MELISSA/FOS (PROBIOTIC BLEND ORAL) Take 2 tablets by mouth once daily.      lancets Misc Test daily 100 each 11    lisinopril 10 MG tablet Take 1 tablet (10 mg total) by mouth once daily. 90 tablet 3    lovastatin (MEVACOR) 20 MG tablet Take 1 tablet (20 mg total) by mouth every evening. 90 tablet 3    metFORMIN (GLUCOPHAGE) 500 MG tablet Take 1 tablet (500 mg total) by mouth daily with breakfast. 90 tablet 3    multivitamin capsule Take 1 capsule by mouth once daily.        OMEGA-3S/DHA/EPA/FISH OIL (OMEGA 3 ORAL) Take 2,800 mg by mouth once daily.        omeprazole (PRILOSEC) 40 MG capsule Take 1 capsule (40 mg total) by mouth once daily. 90 capsule 3    vitamin E 400 UNIT capsule Take 400 Units by mouth once daily.         No current facility-administered medications for this visit.                   REVIEW OF SYSTEMS:     Sleep related symptoms as per HPI.  CONST:Denies weight gain    HEENT: Denies sinus congestion  PULM: Denies dyspnea  CARD:  Denies palpitations   GI:  Reports acid reflux  : Denies polyuria  NEURO: Denies headaches  PSYCH: Sometimes mood disturbance  HEME: Denies anemia    Otherwise, a balance of systems reviewed is negative.          PHYSICAL EXAM:  Vitals:    04/24/18 1008   BP: 120/72   Pulse: 80   SpO2: 97%  "  Weight: 86.3 kg (190 lb 4.1 oz)   Height: 5' 3" (1.6 m)   PainSc: 0-No pain     Body mass index is 33.7 kg/m².     GENERAL: Obese body habitus, well groomed  HEENT:  Conjunctivae are non-erythematous; Pupils equal, round, and reactive to light; Nose is symmetrical; Nasal mucosa is pink and moist; Septum is midline; Inferior turbinates are normal; Nasal airflow is normal; Posterior pharynx is pink; Modified Mallampati: IV; Posterior palate is normal; Tonsils +1; Uvula is normal and pink;Tongue is scalloped; Dentition is fair; No TMJ tenderness; Jaw opening and protrusion without click and without discomfort.  NECK: Supple. Neck circumference is 13.75 inches. No thyromegaly. No palpable nodes.    SKIN: On face and neck: No abrasions, no rashes, no lesions.  No subcutaneous nodules are palpable.  RESPIRATORY: Chest is clear to auscultation.  Normal chest expansion and non-labored breathing at rest.  CARDIOVASCULAR: Normal S1, S2.  No murmurs, gallops or rubs. No carotid bruits bilaterally.  EXTREMITIES: No edema. No clubbing. No cyanosis. Station normal. Gait normal.        NEURO/PSYCH: Oriented to time, place and person. Normal attention span and concentration. Affect is full. Mood is normal.                  11/14/2017 CBC, TSH, and creatinine normal. Glucose elevated.                  11/30/2017 Ferritin 142     ASSESSMENT:    SHOAIB, severe by AHI. The patient symptomatically has snoring, air gasping, interrupted sleep, and excessive daytime sleepiness with findings of crowded oral airway and elevated body mas index. Medical co-morbidities: systemic HTN, RLS, HLD, GERD, and obesity. This warrants treatment for obstructive sleep apnea.  Unable to tolerate CPAP interface and pressure during set up, considering alternative therapies to include body positioning, weight reduction, and potentially OA.     Obesity, BMI > 30, discussed relationship between SHOAIB and weight    Restless leg syndrome, currently controlled " without meds, akathisia worsens in late afternoon relieved by movement and rubbing alcohol on legs, limb jerking affecting sleep maintenance       PLAN:    Education: During our discussion today, we talked about the etiology of obstructive sleep apnea as well as the potential ramifications of untreated sleep apnea, which could include daytime sleepiness, hypertension, heart disease and/or stroke. We discussed potential treatment options, which could include weight loss, body positioning, continuous positive airway pressure (CPAP), OA, EPAP, or referral for surgical consideration.     SHOAIB:  -Prefers body positioning avoiding supine sleep and weight loss for primary treatment. In addition, considering recommended OA/MAD therapy. Pt understands that repeat HST necessary in order to check OA/MAD efficacy. Provided with dentist list. Pt to contact me with decision.     RLS   -general measurements discussed: discontinue provocative habits, avoid sleep deprivation, relaxing bedtime routine  - SHOAIB tx may also alleviate RLS symptoms; if not, further discuss pharmacologic management later       Precautions: The patient was advised to abstain from driving should they feel sleepy  or drowsy.

## 2018-04-30 ENCOUNTER — TELEPHONE (OUTPATIENT)
Dept: OBSTETRICS AND GYNECOLOGY | Facility: CLINIC | Age: 63
End: 2018-04-30

## 2018-04-30 DIAGNOSIS — Z12.31 ENCOUNTER FOR SCREENING MAMMOGRAM FOR MALIGNANT NEOPLASM OF BREAST: Primary | ICD-10-CM

## 2018-05-04 ENCOUNTER — OFFICE VISIT (OUTPATIENT)
Dept: OBSTETRICS AND GYNECOLOGY | Facility: CLINIC | Age: 63
End: 2018-05-04
Payer: MEDICARE

## 2018-05-04 ENCOUNTER — HOSPITAL ENCOUNTER (OUTPATIENT)
Dept: RADIOLOGY | Facility: HOSPITAL | Age: 63
Discharge: HOME | End: 2018-05-04
Attending: OBSTETRICS & GYNECOLOGY
Payer: MEDICARE

## 2018-05-04 VITALS
DIASTOLIC BLOOD PRESSURE: 78 MMHG | SYSTOLIC BLOOD PRESSURE: 132 MMHG | HEIGHT: 63 IN | WEIGHT: 190.25 LBS | BODY MASS INDEX: 33.71 KG/M2

## 2018-05-04 DIAGNOSIS — Z12.31 ENCOUNTER FOR SCREENING MAMMOGRAM FOR MALIGNANT NEOPLASM OF BREAST: ICD-10-CM

## 2018-05-04 DIAGNOSIS — Z91.89 PERSONAL HISTORY PRESENTING HAZARDS TO HEALTH: Primary | ICD-10-CM

## 2018-05-04 DIAGNOSIS — Z87.42 HISTORY OF ABNORMAL CERVICAL PAP SMEAR: ICD-10-CM

## 2018-05-04 DIAGNOSIS — Z12.4 ROUTINE PAPANICOLAOU SMEAR: ICD-10-CM

## 2018-05-04 PROCEDURE — 77063 BREAST TOMOSYNTHESIS BI: CPT | Mod: 26,,, | Performed by: RADIOLOGY

## 2018-05-04 PROCEDURE — 88175 CYTOPATH C/V AUTO FLUID REDO: CPT

## 2018-05-04 PROCEDURE — 77067 SCR MAMMO BI INCL CAD: CPT | Mod: 26,,, | Performed by: RADIOLOGY

## 2018-05-04 PROCEDURE — 77067 SCR MAMMO BI INCL CAD: CPT | Mod: TC

## 2018-05-04 PROCEDURE — G0101 CA SCREEN;PELVIC/BREAST EXAM: HCPCS | Mod: S$PBB,,, | Performed by: OBSTETRICS & GYNECOLOGY

## 2018-05-04 PROCEDURE — 99999 PR PBB SHADOW E&M-EST. PATIENT-LVL III: CPT | Mod: PBBFAC,,, | Performed by: OBSTETRICS & GYNECOLOGY

## 2018-05-04 PROCEDURE — 99213 OFFICE O/P EST LOW 20 MIN: CPT | Mod: PBBFAC,PO,25 | Performed by: OBSTETRICS & GYNECOLOGY

## 2018-05-04 PROCEDURE — G0101 CA SCREEN;PELVIC/BREAST EXAM: HCPCS | Mod: PBBFAC,PO | Performed by: OBSTETRICS & GYNECOLOGY

## 2018-05-14 NOTE — PROGRESS NOTES
Last 5 Patient Entered Readings                                      Current 30 Day Average: 117/68     Recent Readings 5/9/2018 5/6/2018 5/1/2018 4/25/2018 4/22/2018    SBP (mmHg) 116 115 111 108 133    DBP (mmHg) 68 64 63 65 76    Pulse 75 74 84 82 74        Digital [facility]: Health  Follow Up    Lifestyle Modifications:    1.Dietary Modifications (Sodium intake <2,000mg/day, food labels, dining out): Patient reports she still is watching her sodium intake.     2.Physical Activity: Patient reports she still doing her exercise videos.     3.Medication Therapy: Patient has been compliant with the medication regimen.    4.Patient has the following medication side effects/concerns: Patient reports she has no s/s of hypotension ( dizziness, LH, nausea, or fatigue) with low readings. Patient reports she has no s/s of hypertension ( CP, SOB, severe headaches, or change in vision ) with high readings.   (Frequency/Alleviating factors/Precipitating factors, etc.)     Follow up with Mrs. Parisa EARL Wing completed. No further questions or concerns.     Patient's current 30 day average is at goal.    Plan: Will continue follow up to achieve health goals.

## 2018-05-17 ENCOUNTER — PATIENT MESSAGE (OUTPATIENT)
Dept: OTHER | Facility: OTHER | Age: 63
End: 2018-05-17

## 2018-06-11 ENCOUNTER — PATIENT OUTREACH (OUTPATIENT)
Dept: OTHER | Facility: OTHER | Age: 63
End: 2018-06-11

## 2018-06-11 NOTE — PROGRESS NOTES
Last 5 Patient Entered Readings                                      Current 30 Day Average: 120/75     Recent Readings 6/4/2018 6/1/2018 5/27/2018 5/21/2018 5/17/2018    SBP (mmHg) 118 124 115 121 122    DBP (mmHg) 75 77 71 70 80    Pulse 74 80 71 83 76        6/11-Digital Medicine: Health  Follow Up  Left voicemail to follow up with Mrs. Parisa Dimas.  Current BP average 120/75 mmHg is at goal, [<130/80].

## 2018-06-12 ENCOUNTER — PATIENT OUTREACH (OUTPATIENT)
Dept: OTHER | Facility: OTHER | Age: 63
End: 2018-06-12

## 2018-06-26 NOTE — PROGRESS NOTES
Last 5 Patient Entered Readings                                      Current 30 Day Average: 120/70     Recent Readings 6/26/2018 6/25/2018 6/17/2018 6/4/2018 6/1/2018    SBP (mmHg) 124 124 113 118 124    DBP (mmHg) 69 64 65 75 77    Pulse 88 87 83 74 80        6/26-Digital Medicine: Health  Follow Up  Left voicemail to follow up with  Parisalinda Dimas.  Current BP average 120/70 mmHg is at goal, [<130/80].

## 2018-07-12 NOTE — PROGRESS NOTES
Last 5 Patient Entered Readings                                      Current 30 Day Average: 117/66     Recent Readings 7/5/2018 7/2/2018 6/26/2018 6/25/2018 6/17/2018    SBP (mmHg) 116 108 124 124 113    DBP (mmHg) 67 63 69 64 65    Pulse 77 75 88 87 83        7/12-Digital Medicine: Health  Follow Up  Left voicemail to follow up with Brenna Parisa MADY Dimas.  Current BP average 117/66 mmHg is at goal, [<130/80].

## 2018-07-26 NOTE — PROGRESS NOTES
Last 5 Patient Entered Readings                                      Current 30 Day Average: 122/70     Recent Readings 7/21/2018 7/17/2018 7/13/2018 7/5/2018 7/2/2018    SBP (mmHg) 129 127 128 116 108    DBP (mmHg) 75 69 77 67 63    Pulse 81 78 78 77 75        7/26-Digital Medicine: Health  Follow Up  Left voicemail to follow up with Brenna Parisa MADY Dimas.  Current BP average 122/70 mmHg is at goal, [<130/80].

## 2018-08-13 NOTE — PROGRESS NOTES
Last 5 Patient Entered Readings                                      Current 30 Day Average: 129/70     Recent Readings 8/12/2018 8/6/2018 7/29/2018 7/29/2018 7/21/2018    SBP (mmHg) 133 126 132 140 129    DBP (mmHg) 71 68 69 76 75    Pulse 82 78 77 81 81        Digital Medicine: Health  Follow Up    Lifestyle Modifications:    1.Dietary Modifications (Sodium intake <2,000mg/day, food labels, dining out): Patient reports she still watching her sodium intake closely.     2.Physical Activity: Patient reports she has not been exercising lately d/t her sciatic nerve. Asked patient if she would like information on stretches to do to help with sciatic pain. Patient states she would like information on stretches to do to help with sciatic and shoulder pain.  Will send resources to patient's personal e-mail.     3.Medication Therapy: Patient has been compliant with the medication regimen.    4.Patient has the following medication side effects/concerns: Patient denies any s/s of hypotension (dizziness, LH, nausea, or fatigue) with low readings. Patient denies any s/s of hypertension (CP,SOB,severe headaches,or change in vision) with high readings.   (Frequency/Alleviating factors/Precipitating factors, etc.)     Follow up with Mrs. Parisa Kwokball completed. No further questions or concerns.     Patient's current 30 day average BP is at goal.     Plan: Will continue follow up to achieve health goals.

## 2018-09-04 NOTE — PROGRESS NOTES
Last 5 Patient Entered Readings                                      Current 30 Day Average: 129/70     Recent Readings 8/29/2018 8/23/2018 8/19/2018 8/15/2018 8/12/2018    SBP (mmHg) 130 124 128 132 133    DBP (mmHg) 74 64 67 73 71    Pulse 87 85 80 86 82        Reviewed chart and no BP medication adjustments are required at this time. She is due for a repeat BMP 4/19.

## 2018-09-10 ENCOUNTER — PATIENT OUTREACH (OUTPATIENT)
Dept: OTHER | Facility: OTHER | Age: 63
End: 2018-09-10

## 2018-09-10 NOTE — PROGRESS NOTES
Last 5 Patient Entered Readings                                      Current 30 Day Average: 126/70     Recent Readings 9/5/2018 8/29/2018 8/23/2018 8/19/2018 8/15/2018    SBP (mmHg) 111 130 124 128 132    DBP (mmHg) 72 74 64 67 73    Pulse 76 87 85 80 86        Digital Medicine: Health  Follow Up    Lifestyle Modifications:    1.Dietary Modifications (Sodium intake <2,000mg/day, food labels, dining out): Patient reports she still watching her sodium intake closely.     2.Physical Activity: Patient reports she has not been doing any stretches for her sciatic pain d/t the pian going away. Patient states she plans to getting back to doing her stretches for her back soon.     3.Medication Therapy: Patient has been compliant with the medication regimen.    4.Patient has the following medication side effects/concerns: Patient denies any s/s of hypotension (dizziness, LH, nausea,or fatigue) with low readings. Patient denies any s/s of hypertension (CP,SOB,severe headaches, or change in vision) with high readings.   (Frequency/Alleviating factors/Precipitating factors, etc.)     Follow up with Mrs. Parisa Kwokball completed. No further questions or concerns.     Patient's current 30 day average BP is at goal.     Plan: Will continue follow up to achieve health goals.

## 2018-10-22 ENCOUNTER — PATIENT OUTREACH (OUTPATIENT)
Dept: OTHER | Facility: OTHER | Age: 63
End: 2018-10-22

## 2018-10-22 NOTE — PROGRESS NOTES
Last 5 Patient Entered Readings                                      Current 30 Day Average: 128/70     Recent Readings 10/17/2018 10/16/2018 10/11/2018 10/10/2018 10/2/2018    SBP (mmHg) 137 131 126 125 134    DBP (mmHg) 71 72 66 71 68    Pulse 78 80 85 78 83        Digital Medicine: Health  Follow Up    Lifestyle Modifications:    1.Dietary Modifications (Sodium intake <2,000mg/day, food labels, dining out): Patient reports she still watching her sodium intake closely.     2.Physical Activity: Patient reports she still not exercising. Encouraged patient to get as much physical activity as possible.     3.Medication Therapy: Patient has been compliant with the medication regimen.    4.Patient has the following medication side effects/concerns: Patient denies any s/s of hypotenison (dizziness, LH,nausea,or fatigue) with low readings. Patient denies any s/s of hypertension (CP,SOB,severe headaches, or change in vision) with high readings.   (Frequency/Alleviating factors/Precipitating factors, etc.)     Follow up with Mrs. Parisa Kwokball completed. No further questions or concerns.     Patient's current 30 day average BP is at goal.     Plan: Will continue to follow up to achieve health goals.

## 2018-11-02 ENCOUNTER — TELEPHONE (OUTPATIENT)
Dept: FAMILY MEDICINE | Facility: CLINIC | Age: 63
End: 2018-11-02

## 2018-11-02 DIAGNOSIS — E11.9 CONTROLLED TYPE 2 DIABETES MELLITUS WITHOUT COMPLICATION, WITHOUT LONG-TERM CURRENT USE OF INSULIN: Primary | ICD-10-CM

## 2018-11-02 DIAGNOSIS — E78.5 HYPERLIPIDEMIA, UNSPECIFIED HYPERLIPIDEMIA TYPE: ICD-10-CM

## 2018-11-02 DIAGNOSIS — I10 HYPERTENSION, UNSPECIFIED TYPE: ICD-10-CM

## 2018-11-02 DIAGNOSIS — K76.0 FATTY LIVER: ICD-10-CM

## 2018-11-05 NOTE — TELEPHONE ENCOUNTER
Orders Placed This Encounter   Procedures    CBC auto differential    Comprehensive metabolic panel    Lipid panel    TSH    Hemoglobin A1c    Microalbumin/creatinine urine ratio     Labs 1 wk prior to appt

## 2018-11-06 ENCOUNTER — TELEPHONE (OUTPATIENT)
Dept: FAMILY MEDICINE | Facility: CLINIC | Age: 63
End: 2018-11-06

## 2018-11-08 ENCOUNTER — LAB VISIT (OUTPATIENT)
Dept: LAB | Facility: HOSPITAL | Age: 63
End: 2018-11-08
Attending: FAMILY MEDICINE
Payer: MEDICARE

## 2018-11-08 DIAGNOSIS — E11.9 CONTROLLED TYPE 2 DIABETES MELLITUS WITHOUT COMPLICATION, WITHOUT LONG-TERM CURRENT USE OF INSULIN: ICD-10-CM

## 2018-11-08 DIAGNOSIS — I10 HYPERTENSION, UNSPECIFIED TYPE: ICD-10-CM

## 2018-11-08 DIAGNOSIS — E78.5 HYPERLIPIDEMIA, UNSPECIFIED HYPERLIPIDEMIA TYPE: ICD-10-CM

## 2018-11-08 DIAGNOSIS — K76.0 FATTY LIVER: ICD-10-CM

## 2018-11-08 LAB
ALBUMIN SERPL BCP-MCNC: 4.2 G/DL
ALP SERPL-CCNC: 64 U/L
ALT SERPL W/O P-5'-P-CCNC: 106 U/L
ANION GAP SERPL CALC-SCNC: 9 MMOL/L
AST SERPL-CCNC: 67 U/L
BASOPHILS # BLD AUTO: 0.05 K/UL
BASOPHILS NFR BLD: 0.9 %
BILIRUB SERPL-MCNC: 0.6 MG/DL
BUN SERPL-MCNC: 17 MG/DL
CALCIUM SERPL-MCNC: 10.3 MG/DL
CHLORIDE SERPL-SCNC: 100 MMOL/L
CHOLEST SERPL-MCNC: 185 MG/DL
CHOLEST/HDLC SERPL: 2.8 {RATIO}
CO2 SERPL-SCNC: 28 MMOL/L
CREAT SERPL-MCNC: 0.8 MG/DL
DIFFERENTIAL METHOD: NORMAL
EOSINOPHIL # BLD AUTO: 0.2 K/UL
EOSINOPHIL NFR BLD: 3.1 %
ERYTHROCYTE [DISTWIDTH] IN BLOOD BY AUTOMATED COUNT: 12.7 %
EST. GFR  (AFRICAN AMERICAN): >60 ML/MIN/1.73 M^2
EST. GFR  (NON AFRICAN AMERICAN): >60 ML/MIN/1.73 M^2
ESTIMATED AVG GLUCOSE: 123 MG/DL
GLUCOSE SERPL-MCNC: 129 MG/DL
HBA1C MFR BLD HPLC: 5.9 %
HCT VFR BLD AUTO: 40.8 %
HDLC SERPL-MCNC: 65 MG/DL
HDLC SERPL: 35.1 %
HGB BLD-MCNC: 13.5 G/DL
IMM GRANULOCYTES # BLD AUTO: 0.02 K/UL
IMM GRANULOCYTES NFR BLD AUTO: 0.4 %
LDLC SERPL CALC-MCNC: 85.8 MG/DL
LYMPHOCYTES # BLD AUTO: 2.1 K/UL
LYMPHOCYTES NFR BLD: 38.4 %
MCH RBC QN AUTO: 29.8 PG
MCHC RBC AUTO-ENTMCNC: 33.1 G/DL
MCV RBC AUTO: 90 FL
MONOCYTES # BLD AUTO: 0.6 K/UL
MONOCYTES NFR BLD: 11.2 %
NEUTROPHILS # BLD AUTO: 2.5 K/UL
NEUTROPHILS NFR BLD: 46 %
NONHDLC SERPL-MCNC: 120 MG/DL
NRBC BLD-RTO: 0 /100 WBC
PLATELET # BLD AUTO: 199 K/UL
PMV BLD AUTO: 10.4 FL
POTASSIUM SERPL-SCNC: 3.8 MMOL/L
PROT SERPL-MCNC: 7.7 G/DL
RBC # BLD AUTO: 4.53 M/UL
SODIUM SERPL-SCNC: 137 MMOL/L
TRIGL SERPL-MCNC: 171 MG/DL
TSH SERPL DL<=0.005 MIU/L-ACNC: 0.7 UIU/ML
WBC # BLD AUTO: 5.47 K/UL

## 2018-11-08 PROCEDURE — 36415 COLL VENOUS BLD VENIPUNCTURE: CPT | Mod: PO

## 2018-11-08 PROCEDURE — 80053 COMPREHEN METABOLIC PANEL: CPT

## 2018-11-08 PROCEDURE — 83036 HEMOGLOBIN GLYCOSYLATED A1C: CPT

## 2018-11-08 PROCEDURE — 85025 COMPLETE CBC W/AUTO DIFF WBC: CPT

## 2018-11-08 PROCEDURE — 84443 ASSAY THYROID STIM HORMONE: CPT

## 2018-11-08 PROCEDURE — 80061 LIPID PANEL: CPT

## 2018-11-13 ENCOUNTER — CLINICAL SUPPORT (OUTPATIENT)
Dept: FAMILY MEDICINE | Facility: CLINIC | Age: 63
End: 2018-11-13
Payer: MEDICARE

## 2018-11-13 DIAGNOSIS — Z23 FLU VACCINE NEED: Primary | ICD-10-CM

## 2018-11-13 PROCEDURE — 90662 IIV NO PRSV INCREASED AG IM: CPT | Mod: PBBFAC,PO

## 2018-11-15 ENCOUNTER — PATIENT OUTREACH (OUTPATIENT)
Dept: OTHER | Facility: OTHER | Age: 63
End: 2018-11-15

## 2018-11-15 NOTE — PROGRESS NOTES
Last 5 Patient Entered Readings                                      Current 30 Day Average: 129/69     Recent Readings 11/14/2018 11/4/2018 10/29/2018 10/28/2018 10/22/2018    SBP (mmHg) 132 119 118 139 124    DBP (mmHg) 73 67 71 72 60    Pulse 79 81 82 77 80        Digital Medicine: Health  Follow Up    Lifestyle Modifications:    1.Dietary Modifications (Sodium intake <2,000mg/day, food labels, dining out): Patient reports she has not been watching her sodium intake closely these few past days. Advised patient to watch her sodium intake and choose low sodium options.     2.Physical Activity: Patient reports she still not exercising at all. Encouraged patient to get as much physical activity as possible.     3.Medication Therapy: Patient has been compliant with the medication regimen.    4.Patient has the following medication side effects/concerns: Patient denies any s/s of hypotension (dizziness, LH, nausea, or fatigue) with low readings. Patient denies any s/s of hypertension (CP,SOB,severe headaches, or change in vision) with high readings.   (Frequency/Alleviating factors/Precipitating factors, etc.)     Follow up with Mrs. Parisa EARL Wing completed. No further questions or concerns.     Patient's current 30 day average BP is slightly elevated.     Plan: Will continue to follow up to achieve health goals.

## 2018-11-27 ENCOUNTER — PATIENT OUTREACH (OUTPATIENT)
Dept: OTHER | Facility: OTHER | Age: 63
End: 2018-11-27

## 2018-11-27 DIAGNOSIS — I10 ESSENTIAL HYPERTENSION: Primary | ICD-10-CM

## 2018-11-27 NOTE — PROGRESS NOTES
Last 5 Patient Entered Readings                                      Current 30 Day Average: 129/71     Recent Readings 11/25/2018 11/19/2018 11/14/2018 11/4/2018 10/29/2018    SBP (mmHg) 133 133 132 119 118    DBP (mmHg) 73 69 73 67 71    Pulse 80 79 79 81 82        LMB for routine follow-up.

## 2018-11-27 NOTE — LETTER
January 10, 2019     Parisa Dimas  [address] [place] [address]       Dear Parisa,    Thank you for enrolling in the [facility]. To participate in our programs, we ask that you submit weekly home readings through your [facility] account and maintain regular contact with your Care Team.  We have not received any data or heard from you in some time.     The Digital Medicine Care Team has attempted to reach you on multiple occasions to determine if you would like to continue participating in the program. While we encourage you to continue participating fully, we understand that circumstances may change.      To continue participating in the program, please contact me at [phone]. If we do not hear back, you will be un-enrolled and your physician will be notified of your decision.    If you have submitted home readings readings during the past 3 days and believe you are receiving this letter in error, please call the Digital Medicine Patient Support Line at [phone] for troubleshooting.      We look forward to hearing from you soon.    Sincerely,     Dayan Anders  [facility]

## 2018-12-07 ENCOUNTER — OFFICE VISIT (OUTPATIENT)
Dept: FAMILY MEDICINE | Facility: CLINIC | Age: 63
End: 2018-12-07
Payer: MEDICARE

## 2018-12-07 DIAGNOSIS — Z00.00 ROUTINE GENERAL MEDICAL EXAMINATION AT A HEALTH CARE FACILITY: Primary | ICD-10-CM

## 2018-12-07 DIAGNOSIS — E11.9 CONTROLLED TYPE 2 DIABETES MELLITUS WITHOUT COMPLICATION, WITHOUT LONG-TERM CURRENT USE OF INSULIN: ICD-10-CM

## 2018-12-07 DIAGNOSIS — I10 HYPERTENSION, UNSPECIFIED TYPE: ICD-10-CM

## 2018-12-07 DIAGNOSIS — G47.33 OBSTRUCTIVE SLEEP APNEA HYPOPNEA, SEVERE: ICD-10-CM

## 2018-12-07 DIAGNOSIS — E78.5 HYPERLIPIDEMIA, UNSPECIFIED HYPERLIPIDEMIA TYPE: ICD-10-CM

## 2018-12-07 PROCEDURE — 99397 PER PM REEVAL EST PAT 65+ YR: CPT | Mod: S$PBB,,, | Performed by: FAMILY MEDICINE

## 2018-12-07 PROCEDURE — 99999 PR PBB SHADOW E&M-EST. PATIENT-LVL III: CPT | Mod: PBBFAC,,, | Performed by: FAMILY MEDICINE

## 2018-12-07 PROCEDURE — 99213 OFFICE O/P EST LOW 20 MIN: CPT | Mod: PBBFAC,PO | Performed by: FAMILY MEDICINE

## 2018-12-07 RX ORDER — METFORMIN HYDROCHLORIDE 500 MG/1
500 TABLET, EXTENDED RELEASE ORAL
Qty: 90 TABLET | Refills: 3 | Status: SHIPPED | OUTPATIENT
Start: 2018-12-07 | End: 2019-05-30 | Stop reason: SDUPTHER

## 2018-12-07 NOTE — PROGRESS NOTES
(Portions of this note were dictated using voice recognition software and may contain dictation related errors in spelling/grammar/syntax not found on text review)    CC:   Chief Complaint   Patient presents with    Annual Exam    Diabetic Foot Exam       HPI: 63 y.o. female here for annual exam    Hypertension on HCTZ 25 mg daily, amlodipine 10 mg daily, lisinopril 10 mg daily.  Occasionally gets leg cramps, not frequently but sometimes severely when they happen.  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  Does try to wear compression stockings when she is standing for while.  No significant exercise but admits that she needs to get started on this.     Hyperlipidemia on lovastatin 20 mg daily     Fatty liver disease, elevated LFTs . Negative hepatitis screening. Ultrasound of the liver done in 2015 demonstrating hepatic cyst and fatty infiltration.  Has followed up with hepatology clinic December of 2015     Carotid stenosis history. Last ultrasound April 2018 showed an no hemodynamically significant stenosis     Diabetes: On metformin 500 mg daily.  Last A1c controlled as below  Normal [facility] ratio.   occasionally gets diarrhea and some lower abdominal cramping     Sleep apnea, visit with Sleep Medicine in April.  Could not tolerate CPAP secondary to anxiety with machine.      Past Medical History:   Diagnosis Date    AR (allergic rhinitis)     Carotid stenosis     50% RCA    Diabetes mellitus     Fatty liver     GERD (gastroesophageal reflux disease)     HLD (hyperlipidemia)     HTN (hypertension)     Sleep apnea 2018       Past Surgical History:   Procedure Laterality Date    COLONOSCOPY N/A 10/10/2014    Performed by V[facility] Washington [facility]       Family History   Problem Relation Age of Onset    Cancer Mother         gallbladder    Prostate cancer Brother 57    Diabetes  Brother     Diabetes Maternal Grandmother     Hypertension Unknown         multiple    Coronary artery disease Father 79    Diabetes Father     Diabetes Maternal Aunt     Diabetes Maternal Uncle        Social History     Socioeconomic History    Marital status:      Spouse name: Not on file    Number of children: Not on file    Years of education: Not on file    Highest education level: Not on file   Social Needs    Financial resource strain: Not on file    Food insecurity - worry: Not on file    Food insecurity - inability: Not on file    Transportation needs - medical: Not on file    Transportation needs - non-medical: Not on file   Occupational History    Not on file   Tobacco Use    Smoking status: Never Smoker    Smokeless tobacco: Never Used   Substance and Sexual Activity    Alcohol use: Yes    Drug use: No    Sexual activity: Yes     Partners: Male   Other Topics Concern    Not on file   Social History Narrative    Not on file       Lab Results   Component Value Date    WBC 5.47 11/08/2018    HGB 13.5 11/08/2018    HCT 40.8 11/08/2018     11/08/2018    CHOL 185 11/08/2018    TRIG 171 (H) 11/08/2018    HDL 65 11/08/2018     (H) 11/08/2018    AST 67 (H) 11/08/2018     11/08/2018    K 3.8 11/08/2018     11/08/2018    CREATININE 0.8 11/08/2018    CALCIUM 10.3 11/08/2018    ALBUMIN 4.2 11/08/2018    BUN 17 11/08/2018    CO2 28 11/08/2018    TSH 0.704 11/08/2018    INR 1.0 12/01/2015    HGBA1C 5.9 (H) 11/08/2018    LDLCALC 85.8 11/08/2018     (H) 11/08/2018                 Vital signs reviewed  PE:   APPEARANCE: Well nourished, well developed, in no acute distress.    HEAD: Normocephalic, atraumatic.  EYES: PERRL. EOMI.   Conjunctivae noninjected.  EARS: TM's intact. Light reflex normal. No retraction or perforation.    NOSE: Mucosa pink. Airway clear.  MOUTH & THROAT: No tonsillar enlargement. No pharyngeal erythema or exudate.   NECK: Supple with no  cervical lymphadenopathy.  No carotid bruits.  No thyromegaly  CHEST: Good inspiratory effort. Lungs clear to auscultation with no wheezes or crackles.  CARDIOVASCULAR: Normal S1, S2. No rubs, murmurs, or gallops.  ABDOMEN: Bowel sounds normal. Not distended. Soft. No tenderness or masses. No organomegaly.  EXTREMITIES: No edema, cyanosis, or clubbing.  DIABETIC FOOT EXAM: Protective Sensation (w/ 10 gram monofilament):  Right: Intact  Left: Intact    Visual Inspection:  Normal -  Bilateral bilateral hallux valgus    Pedal Pulses:   Right: Present  Left: Present    Posterior tibialis:   Right:Present  Left: Present          IMPRESSION  1. Routine general medical examination at a health care facility    2. Controlled type 2 diabetes mellitus without complication, without long-term current use of insulin    3. Hypertension, unspecified type    4. Hyperlipidemia, unspecified hyperlipidemia type    5. Obstructive sleep apnea hypopnea, severe        PLAN  Diabetes health maintenance  -advise proper dietary and exercise modification  -advise medication compliance  -advise daily aspirin if there are no other contraindications  -advise consistent blood sugar monitoring  -advise regular dentist and ophthalmology visits  -advise regular foot checks  -Medication management:  Switched to metformin extended release 500 mg daily in case this alleviates some of her diarrhea and GI cramping issues    Hypertension well controlled.  Continue lisinopril 10 mg daily, amlodipine 10 mg daily, hydrochlorothiazide 25 mg daily.  Advise adequate fluid hydration in case some mild dehydration could be contributing to some occasional leg cramps that she experiences.  If continued issues, could consider trying off her thiazide perhaps just titrating her lisinopril to manage her blood pressure along with her amlodipine    Sleep apnea, could not tolerate CPAP.  Encourage importance of lifestyle modification including healthy diet, regular  exercise, and weight loss not only on sleep apnea management but also improvement with some occasional myalgias symptoms, blood pressure control, diabetes control    Dyslipidemia:  Continue lovastatin         SCREENINGS:  Colonoscopy 2014.  2 diminutive polyps, diverticulosis. Repeat in 5 years.     GYN: Dr. Cynthia Dinero     MMG 2018     Tetanus: 2012  Flu up-to-date  PVX:   March 2018  Prevnar needed 3/2019  Zvx: Has not had but can get at local pharmacy     DEXA 04/2018:  Osteopenia right femoral neck T-score -1.1.  Normal density lumbar spine and left femoral neck.

## 2019-02-07 RX ORDER — LOSARTAN POTASSIUM 50 MG/1
50 TABLET ORAL DAILY
Qty: 30 TABLET | Refills: 3 | Status: SHIPPED | OUTPATIENT
Start: 2019-02-07 | End: 2019-02-17 | Stop reason: SDUPTHER

## 2019-02-07 NOTE — PROGRESS NOTES
Last 5 Patient Entered Readings                                      Current 30 Day Average: 134/73     Recent Readings 2/4/2019 1/27/2019 1/26/2019 1/23/2019 1/19/2019    SBP (mmHg) 132 142 131 140 127    DBP (mmHg) 71 73 75 70 73    Pulse 83 87 83 82 81      HPI:  Called patient to follow up on her upward trend. Patient endorses adherence to medication regimen. Patient denies hypotensive s/sx (lightheadedness, dizziness, nausea, fatigue); patient denies hypertensive s/sx (SOB, CP, severe headaches, changes in vision, dizziness, fatigue, confusion, anxiety, nosebleeds). Instructed patient to seek medical care if BP > 180/110 and is accompanied by hypertensive s/sx associated, patient confirms understanding.     Assessment:  Reviewed recent readings. Per 2017 ACC/ AHA HTN guidelines (goal of BP < 130/80), current 30-day average needs to be addressed more thoroughly today.     Plan:  Change lisinopril 10mg to losartan 50mg daily to help improve her blood pressure and reduce her lung cancer risk. I will continue to monitor regularly and will follow-up in 2 to 3 weeks, sooner if blood pressure begins to trend upward or downward.     Current medication regimen:  Hypertension Medications             amLODIPine (NORVASC) 10 MG tablet Take 1 tablet (10 mg total) by mouth once daily.    hydroCHLOROthiazide (HYDRODIURIL) 25 MG tablet Take 1 tablet (25 mg total) by mouth once daily.    lisinopril 10 MG tablet Take 1 tablet (10 mg total) by mouth once daily.          Patient denies having questions or concerns. Patient has my contact information and knows to call with any concerns or clinical changes.

## 2019-02-12 ENCOUNTER — PATIENT MESSAGE (OUTPATIENT)
Dept: FAMILY MEDICINE | Facility: CLINIC | Age: 64
End: 2019-02-12

## 2019-02-12 ENCOUNTER — TELEPHONE (OUTPATIENT)
Dept: FAMILY MEDICINE | Facility: CLINIC | Age: 64
End: 2019-02-12

## 2019-02-12 DIAGNOSIS — I10 ESSENTIAL HYPERTENSION: ICD-10-CM

## 2019-02-12 DIAGNOSIS — G47.33 OBSTRUCTIVE SLEEP APNEA HYPOPNEA, SEVERE: Primary | ICD-10-CM

## 2019-02-12 NOTE — TELEPHONE ENCOUNTER
Patient is requesting a referral to [facility].  She needs this so she can get a membership through [employer] insurance.

## 2019-02-13 ENCOUNTER — PATIENT MESSAGE (OUTPATIENT)
Dept: ADMINISTRATIVE | Facility: OTHER | Age: 64
End: 2019-02-13

## 2019-02-13 ENCOUNTER — PATIENT MESSAGE (OUTPATIENT)
Dept: FAMILY MEDICINE | Facility: CLINIC | Age: 64
End: 2019-02-13

## 2019-02-15 ENCOUNTER — PATIENT OUTREACH (OUTPATIENT)
Dept: OTHER | Facility: OTHER | Age: 64
End: 2019-02-15

## 2019-02-15 DIAGNOSIS — I10 ESSENTIAL HYPERTENSION: ICD-10-CM

## 2019-02-15 NOTE — PROGRESS NOTES
Last 5 Patient Entered Readings                                      Current 30 Day Average: 136/74     Recent Readings 2/13/2019 2/13/2019 2/9/2019 2/8/2019 2/4/2019    SBP (mmHg) 133 133 142 141 132    DBP (mmHg) 73 73 75 80 71    Pulse 86 86 84 82 83        Digital Medicine: Health  Follow Up    Lifestyle Modifications:    1.Dietary Modifications (Sodium intake <2,000mg/day, food labels, dining out): Patient reports she still watching her sodium intake.     2.Physical Activity: Patient reports she join [employer] yesterday. Patient states she look forward to working out at the gym next week.     3.Medication Therapy: Patient has been compliant with the medication regimen. Patient reports she has not started the New BP medication and wait to finish out this week and will start new medication next week.     4.Patient has the following medication side effects/concerns: Patient denies any s/s of hypotension (Dizziness, LH, nausea, or fatigue) with low readings. Patient denies any s/s of hypertension (CP,SOB,severe headaches, or change in vision) with high readings.   (Frequency/Alleviating factors/Precipitating factors, etc.)     Follow up with Mrs. Parisa Kwokball completed. No further questions or concerns.     Plan: Will continue to follow up to achieve health goals.

## 2019-02-17 RX ORDER — LOVASTATIN 20 MG/1
20 TABLET ORAL NIGHTLY
Qty: 90 TABLET | Refills: 3 | Status: SHIPPED | OUTPATIENT
Start: 2019-02-17 | End: 2020-01-23 | Stop reason: SDUPTHER

## 2019-02-17 RX ORDER — AMLODIPINE BESYLATE 10 MG/1
10 TABLET ORAL DAILY
Qty: 90 TABLET | Refills: 3 | Status: SHIPPED | OUTPATIENT
Start: 2019-02-17 | End: 2020-01-23 | Stop reason: SDUPTHER

## 2019-02-17 RX ORDER — LOSARTAN POTASSIUM 50 MG/1
50 TABLET ORAL DAILY
Qty: 30 TABLET | Refills: 3 | Status: SHIPPED | OUTPATIENT
Start: 2019-02-17 | End: 2019-05-20 | Stop reason: SDUPTHER

## 2019-02-21 ENCOUNTER — PATIENT OUTREACH (OUTPATIENT)
Dept: OTHER | Facility: OTHER | Age: 64
End: 2019-02-21

## 2019-02-21 DIAGNOSIS — I10 ESSENTIAL HYPERTENSION: ICD-10-CM

## 2019-02-21 NOTE — LETTER
March 25, 2019     Parisa Dimas  [address] [place] [address]       Dear Parisa,    Thank you for enrolling in [facility]. To participate, we ask that you submit information at least once weekly through your [facility] account and maintain regular contact with your Care Team. We have not received any data or heard from you in some time.     The Digital Medicine Care Team has attempted to reach you on multiple occasions to determine if you would like to continue participating in the program. While we encourage you to continue participating fully, we understand that circumstances may change.     To continue participating in the program, please contact me at [phone]. If we do not hear back, you will be un-enrolled, and your physician will be notified of your decision.    If you have submitted data and believe you are receiving this letter in error, please call the Digital Medicine Patient Support Line at [phone] for troubleshooting.      We look forward to hearing from you soon.    Sincerely,     Kelsey Summers  [facility]

## 2019-02-21 NOTE — PROGRESS NOTES
Last 5 Patient Entered Readings                                      Current 30 Day Average: 135/74     Recent Readings 2/20/2019 2/20/2019 2/18/2019 2/18/2019 2/18/2019    SBP (mmHg) 113 113 139 139 144    DBP (mmHg) 63 63 82 82 79    Pulse 81 81 69 69 75        LMFCB to discuss how she is tolerating losartan and discuss increasing to 100mg daily.    4/22: She is now at goal. Will continue to monitor and will space out her noncompliance protocol.

## 2019-05-20 ENCOUNTER — PATIENT OUTREACH (OUTPATIENT)
Dept: OTHER | Facility: OTHER | Age: 64
End: 2019-05-20

## 2019-05-20 RX ORDER — LOSARTAN POTASSIUM 100 MG/1
100 TABLET ORAL DAILY
Qty: 90 TABLET | Refills: 1 | Status: SHIPPED | OUTPATIENT
Start: 2019-05-20 | End: 2019-05-20 | Stop reason: SDUPTHER

## 2019-05-20 RX ORDER — LOSARTAN POTASSIUM 100 MG/1
100 TABLET ORAL DAILY
Qty: 30 TABLET | Refills: 3 | Status: SHIPPED | OUTPATIENT
Start: 2019-05-20 | End: 2019-05-20

## 2019-05-20 RX ORDER — LOSARTAN POTASSIUM 100 MG/1
100 TABLET ORAL DAILY
Qty: 90 TABLET | Refills: 1 | Status: SHIPPED | OUTPATIENT
Start: 2019-05-20 | End: 2019-11-13 | Stop reason: SDUPTHER

## 2019-05-20 NOTE — PROGRESS NOTES
Last 5 Patient Entered Readings                                      Current 30 Day Average: 137/76     Recent Readings 5/13/2019 5/13/2019 5/12/2019 5/12/2019 5/7/2019    SBP (mmHg) 139 139 133 133 143    DBP (mmHg) 75 75 71 71 88    Pulse 79 79 81 81 73        HPI:  Called patient to follow up on her high blood pressure.   Patient endorses adherence to medication regimen.   She hasn't been exercising causing her upward trend in BP.  Patient denies hypotensive s/sx (lightheadedness, dizziness, nausea, fatigue); patient denies hypertensive s/sx (SOB, CP, severe headaches, changes in vision). Instructed patient to seek medical care if BP > 180/110 and is accompanied by hypertensive s/sx associated, patient confirms understanding.     Assessment:  Reviewed recent readings. Per 2017 ACC/ AHA HTN guidelines (goal of BP < 130/80), current 30-day average needs to be addressed more thoroughly today.     Plan:  Increase losartan to 100mg daily.   Resume lifestyle changes by exercising.   I will continue to monitor regularly and will follow-up in 2 to 3 weeks, sooner if blood pressure begins to trend upward or downward.     Current medication regimen:  Hypertension Medications             amLODIPine (NORVASC) 10 MG tablet Take 1 tablet (10 mg total) by mouth once daily.    hydroCHLOROthiazide (HYDRODIURIL) 25 MG tablet Take 1 tablet (25 mg total) by mouth once daily.    losartan (COZAAR) 50 MG tablet Take 1 tablet (50 mg total) by mouth once daily.          Patient denies having questions or concerns. Patient has my contact information and knows to call with any concerns or clinical changes.

## 2019-05-29 ENCOUNTER — TELEPHONE (OUTPATIENT)
Dept: FAMILY MEDICINE | Facility: CLINIC | Age: 64
End: 2019-05-29

## 2019-05-29 RX ORDER — LANCETS
EACH MISCELLANEOUS
Qty: 100 EACH | Refills: 11 | Status: SHIPPED | OUTPATIENT
Start: 2019-05-29 | End: 2019-06-21 | Stop reason: SDUPTHER

## 2019-05-29 RX ORDER — LANCETS
EACH MISCELLANEOUS
Qty: 100 EACH | Refills: 11 | Status: SHIPPED | OUTPATIENT
Start: 2019-05-29 | End: 2019-05-29 | Stop reason: SDUPTHER

## 2019-05-29 RX ORDER — INSULIN PUMP SYRINGE, 3 ML
EACH MISCELLANEOUS
Qty: 1 EACH | Refills: 0 | Status: SHIPPED | OUTPATIENT
Start: 2019-05-29 | End: 2019-06-21 | Stop reason: SDUPTHER

## 2019-05-29 RX ORDER — INSULIN PUMP SYRINGE, 3 ML
EACH MISCELLANEOUS
Qty: 1 EACH | Refills: 0 | Status: SHIPPED | OUTPATIENT
Start: 2019-05-29 | End: 2019-05-29 | Stop reason: SDUPTHER

## 2019-05-30 RX ORDER — OMEPRAZOLE 40 MG/1
40 CAPSULE, DELAYED RELEASE ORAL DAILY
Qty: 90 CAPSULE | Refills: 3 | Status: SHIPPED | OUTPATIENT
Start: 2019-05-30 | End: 2020-01-23 | Stop reason: SDUPTHER

## 2019-05-30 RX ORDER — METFORMIN HYDROCHLORIDE 500 MG/1
500 TABLET, EXTENDED RELEASE ORAL
Qty: 90 TABLET | Refills: 3 | Status: SHIPPED | OUTPATIENT
Start: 2019-05-30 | End: 2020-01-23 | Stop reason: SDUPTHER

## 2019-05-31 ENCOUNTER — OFFICE VISIT (OUTPATIENT)
Dept: OBSTETRICS AND GYNECOLOGY | Facility: CLINIC | Age: 64
End: 2019-05-31
Payer: MEDICARE

## 2019-05-31 VITALS
DIASTOLIC BLOOD PRESSURE: 76 MMHG | BODY MASS INDEX: 34.57 KG/M2 | WEIGHT: 195.13 LBS | SYSTOLIC BLOOD PRESSURE: 138 MMHG | HEIGHT: 63 IN

## 2019-05-31 DIAGNOSIS — Z87.42 HISTORY OF ABNORMAL CERVICAL PAP SMEAR: ICD-10-CM

## 2019-05-31 DIAGNOSIS — Z91.89 PERSONAL HISTORY PRESENTING HAZARDS TO HEALTH: Primary | ICD-10-CM

## 2019-05-31 DIAGNOSIS — Z12.4 ROUTINE PAPANICOLAOU SMEAR: ICD-10-CM

## 2019-05-31 DIAGNOSIS — Z12.31 ENCOUNTER FOR SCREENING MAMMOGRAM FOR MALIGNANT NEOPLASM OF BREAST: ICD-10-CM

## 2019-05-31 PROCEDURE — 88175 CYTOPATH C/V AUTO FLUID REDO: CPT

## 2019-05-31 PROCEDURE — G0101 PR CA SCREEN;PELVIC/BREAST EXAM: ICD-10-PCS | Mod: S$GLB,,, | Performed by: OBSTETRICS & GYNECOLOGY

## 2019-05-31 PROCEDURE — 99999 PR PBB SHADOW E&M-EST. PATIENT-LVL III: CPT | Mod: PBBFAC,,, | Performed by: OBSTETRICS & GYNECOLOGY

## 2019-05-31 PROCEDURE — G0101 CA SCREEN;PELVIC/BREAST EXAM: HCPCS | Mod: S$GLB,,, | Performed by: OBSTETRICS & GYNECOLOGY

## 2019-05-31 PROCEDURE — 99999 PR PBB SHADOW E&M-EST. PATIENT-LVL III: ICD-10-PCS | Mod: PBBFAC,,, | Performed by: OBSTETRICS & GYNECOLOGY

## 2019-05-31 NOTE — PROGRESS NOTES
"OBSTETRIC HISTORY:   OB History        4    Para   4    Term   4            AB        Living           SAB        TAB        Ectopic        Multiple        Live Births                      COMPREHENSIVE GYN HISTORY:  PAP History: Denies abnormal Paps.  Infection History: Denies STDs. Denies PID.  Benign History: Denies uterine fibroids. Denies ovarian cysts. Denies endometriosis.   Cancer History: Denies cervical cancer. Denies uterine cancer or hyperplasia. Denies ovarian cancer. Denies vulvar cancer or pre-cancer. Denies vaginal cancer or pre-cancer. Denies breast cancer. Denies colon cancer.  Sexual Activity History:   reports that she currently engages in sexual activity and has had partners who are Male.       HPI:   64 y.o. Patient's last menstrual period was 2003.    Patient is  here for high risk Medicare pap, pelvic and breast exam. She has no complaints. She denies bladder, bowel and breast complaints.    ROS:  GENERAL: Denies weight gain or weight loss. Feeling well overall.   SKIN: Denies rash or lesions.   HEAD: Denies headache.   NODES: Denies enlarged lymph nodes.   CHEST: Denies shortness of breath.   ABDOMEN: No abdominal pain, constipation, diarrhea, nausea, vomiting or rectal bleeding.   URINARY: No frequency, dysuria, hematuria, or burning on urination.  REPRODUCTIVE: See HPI.   BREASTS: The patient denies pain, lumps, or nipple discharge.   HEMATOLOGIC: No easy bruisability.   MUSCULOSKELETAL: Denies joint pain or back pain.   NEUROLOGIC: Denies weakness.   PSYCHIATRIC: Denies depression, anxiety or mood swings.        PE:   /76   Ht 5' 2.5" (1.588 m)   Wt 88.5 kg (195 lb 1.7 oz)   LMP 2003   BMI 35.12 kg/m²   APPEARANCE: Well nourished, well developed, in no acute distress.  NECK: Neck symmetric without thyromegaly.  NODES: No inguinal or cervical lymph node enlargement.  CHEST: Lungs clear to auscultation.  HEART: Regular rate and rhythm, no murmurs, rubs or " gallops.  ABDOMEN: Soft. No tenderness or masses. No hernias.  BREASTS: Symmetrical, no skin changes or visible lesions. No palpable masses, nipple discharge or adenopathy bilaterally.  VULVA: No lesions. Normal female genitalia.  URETHRAL MEATUS: Normal size and location, no lesions, no prolapse.  URETHRA: No masses, tenderness, prolapse or scarring.  VAGINA: Atrophic and not well rugated, no discharge, no significant cystocele or rectocele.  CERVIX: No lesions and discharge.  UTERUS: Normal size, regular shape, mobile, non-tender, bladder base nontender.  ADNEXA: No masses or tenderness.    PROCEDURES:  Pap smear     Assessment/Plan:  Medicare exam  Abnormal pap high risk   Discussed weight loss for breast cancer risk reduction

## 2019-06-06 ENCOUNTER — HOSPITAL ENCOUNTER (OUTPATIENT)
Dept: RADIOLOGY | Facility: HOSPITAL | Age: 64
Discharge: HOME | End: 2019-06-06
Attending: OBSTETRICS & GYNECOLOGY
Payer: MEDICARE

## 2019-06-06 ENCOUNTER — TELEPHONE (OUTPATIENT)
Dept: OBSTETRICS AND GYNECOLOGY | Facility: CLINIC | Age: 64
End: 2019-06-06

## 2019-06-06 DIAGNOSIS — Z12.31 ENCOUNTER FOR SCREENING MAMMOGRAM FOR MALIGNANT NEOPLASM OF BREAST: ICD-10-CM

## 2019-06-06 PROCEDURE — 77063 MAMMO DIGITAL SCREENING BILAT WITH TOMOSYNTHESIS_CAD: ICD-10-PCS | Mod: 26,,, | Performed by: RADIOLOGY

## 2019-06-06 PROCEDURE — 77063 BREAST TOMOSYNTHESIS BI: CPT | Mod: 26,,, | Performed by: RADIOLOGY

## 2019-06-06 PROCEDURE — 77067 MAMMO DIGITAL SCREENING BILAT WITH TOMOSYNTHESIS_CAD: ICD-10-PCS | Mod: 26,,, | Performed by: RADIOLOGY

## 2019-06-06 PROCEDURE — 77067 SCR MAMMO BI INCL CAD: CPT | Mod: TC

## 2019-06-06 PROCEDURE — 77067 SCR MAMMO BI INCL CAD: CPT | Mod: 26,,, | Performed by: RADIOLOGY

## 2019-06-06 NOTE — TELEPHONE ENCOUNTER
----- Message from Cynthia Dinero MD sent at 6/6/2019 11:56 AM CDT -----  Please send Pap letter wnl

## 2019-06-06 NOTE — LETTER
June 6, 2019    Parisa Dimas  [address] [place] [id number]             [facility]  [address] [facility], [address]  5th [address] [facility]  [place] [address]  Phone: [phone] Dear Ms. Dimas:    The results of your most recent Pap smear are normal. This means that no cancerous or precancerous cells were seen. We recommend that you come back in 1 year for your annual exam.    If you have any questions or concerns, please don't hesitate to call.    Sincerely,          Cynthia Dinero MD

## 2019-06-10 ENCOUNTER — PATIENT OUTREACH (OUTPATIENT)
Dept: OTHER | Facility: OTHER | Age: 64
End: 2019-06-10

## 2019-06-10 NOTE — PROGRESS NOTES
Last 5 Patient Entered Readings                                      Current 30 Day Average: 133/73     Recent Readings 6/6/2019 6/6/2019 6/1/2019 6/1/2019 5/28/2019    SBP (mmHg) 145 145 123 123 122    DBP (mmHg) 80 80 69 69 73    Pulse 72 72 88 88 78        LMFCB to assess how she is tolerating losartan 100mg daily.

## 2019-06-17 ENCOUNTER — PATIENT OUTREACH (OUTPATIENT)
Dept: OTHER | Facility: OTHER | Age: 64
End: 2019-06-17

## 2019-06-17 NOTE — PROGRESS NOTES
Last 5 Patient Entered Readings                                      Current 30 Day Average: 132/73     Recent Readings 6/14/2019 6/14/2019 6/12/2019 6/12/2019 6/6/2019    SBP (mmHg) 130 130 135 135 145    DBP (mmHg) 70 70 80 80 80    Pulse 77 77 78 78 72        Digital Medicine: Health  Follow Up    Lifestyle Modifications:  Dietary Modifications (Sodium intake <2,000mg/day, food labels, dining out):   Patient stated that her and her  had been on the road a lot recently and so they have been eating out more than she wants to but still trying to use tips/tricks to keep sodium intake down.    Physical Activity:   Patient states that she has been walking 2-3x per week.   She plans to start walking more but for now will continue on with walking 2-3x per week.    Medication Therapy:   Patient has been compliant with the medication regimen.    Patient has the following medication side effects/concerns: None  (Frequency/Alleviating factors/Precipitating factors, etc.)     Follow up with Mrs. Parisa EARL [place] completed. No further questions or concerns. Will continue to follow up to achieve health goals.    Notes:  Patient is actively managed and BP numbers are trending upward slightly.  Will f/u on walking 2-3x per week and patient's diet at next outreach.

## 2019-06-20 ENCOUNTER — TELEPHONE (OUTPATIENT)
Dept: FAMILY MEDICINE | Facility: CLINIC | Age: 64
End: 2019-06-20

## 2019-06-20 DIAGNOSIS — E11.9 CONTROLLED TYPE 2 DIABETES MELLITUS WITHOUT COMPLICATION, WITHOUT LONG-TERM CURRENT USE OF INSULIN: Primary | ICD-10-CM

## 2019-06-21 RX ORDER — LANCETS
EACH MISCELLANEOUS
Qty: 100 EACH | Refills: 11 | Status: SHIPPED | OUTPATIENT
Start: 2019-06-21

## 2019-06-21 RX ORDER — INSULIN PUMP SYRINGE, 3 ML
EACH MISCELLANEOUS
Qty: 1 EACH | Refills: 0 | Status: SHIPPED | OUTPATIENT
Start: 2019-06-21 | End: 2024-01-02

## 2019-06-27 ENCOUNTER — OFFICE VISIT (OUTPATIENT)
Dept: PODIATRY | Facility: CLINIC | Age: 64
End: 2019-06-27
Payer: MEDICARE

## 2019-06-27 VITALS
DIASTOLIC BLOOD PRESSURE: 81 MMHG | HEART RATE: 82 BPM | WEIGHT: 195 LBS | SYSTOLIC BLOOD PRESSURE: 144 MMHG | BODY MASS INDEX: 35.88 KG/M2 | HEIGHT: 62 IN

## 2019-06-27 DIAGNOSIS — M20.12 VALGUS DEFORMITY OF BOTH GREAT TOES: ICD-10-CM

## 2019-06-27 DIAGNOSIS — M20.11 VALGUS DEFORMITY OF BOTH GREAT TOES: ICD-10-CM

## 2019-06-27 DIAGNOSIS — I87.2 VENOUS INSUFFICIENCY OF BOTH LOWER EXTREMITIES: ICD-10-CM

## 2019-06-27 DIAGNOSIS — E11.9 TYPE 2 DIABETES MELLITUS WITHOUT COMPLICATION, WITHOUT LONG-TERM CURRENT USE OF INSULIN: Primary | ICD-10-CM

## 2019-06-27 PROCEDURE — 99213 OFFICE O/P EST LOW 20 MIN: CPT | Mod: S$GLB,,, | Performed by: PODIATRIST

## 2019-06-27 PROCEDURE — 99499 RISK ADDL DX/OHS AUDIT: ICD-10-PCS | Mod: S$GLB,,, | Performed by: PODIATRIST

## 2019-06-27 PROCEDURE — 3079F PR MOST RECENT DIASTOLIC BLOOD PRESSURE 80-89 MM HG: ICD-10-PCS | Mod: CPTII,S$GLB,, | Performed by: PODIATRIST

## 2019-06-27 PROCEDURE — 3077F PR MOST RECENT SYSTOLIC BLOOD PRESSURE >= 140 MM HG: ICD-10-PCS | Mod: CPTII,S$GLB,, | Performed by: PODIATRIST

## 2019-06-27 PROCEDURE — 1101F PR PT FALLS ASSESS DOC 0-1 FALLS W/OUT INJ PAST YR: ICD-10-PCS | Mod: CPTII,S$GLB,, | Performed by: PODIATRIST

## 2019-06-27 PROCEDURE — 3077F SYST BP >= 140 MM HG: CPT | Mod: CPTII,S$GLB,, | Performed by: PODIATRIST

## 2019-06-27 PROCEDURE — 3044F PR MOST RECENT HEMOGLOBIN A1C LEVEL <7.0%: ICD-10-PCS | Mod: CPTII,S$GLB,, | Performed by: PODIATRIST

## 2019-06-27 PROCEDURE — 3044F HG A1C LEVEL LT 7.0%: CPT | Mod: CPTII,S$GLB,, | Performed by: PODIATRIST

## 2019-06-27 PROCEDURE — 99999 PR PBB SHADOW E&M-EST. PATIENT-LVL III: ICD-10-PCS | Mod: PBBFAC,,, | Performed by: PODIATRIST

## 2019-06-27 PROCEDURE — 99999 PR PBB SHADOW E&M-EST. PATIENT-LVL III: CPT | Mod: PBBFAC,,, | Performed by: PODIATRIST

## 2019-06-27 PROCEDURE — 99499 UNLISTED E&M SERVICE: CPT | Mod: S$GLB,,, | Performed by: PODIATRIST

## 2019-06-27 PROCEDURE — 3079F DIAST BP 80-89 MM HG: CPT | Mod: CPTII,S$GLB,, | Performed by: PODIATRIST

## 2019-06-27 PROCEDURE — 1101F PT FALLS ASSESS-DOCD LE1/YR: CPT | Mod: CPTII,S$GLB,, | Performed by: PODIATRIST

## 2019-06-27 PROCEDURE — 99213 PR OFFICE/OUTPT VISIT, EST, LEVL III, 20-29 MIN: ICD-10-PCS | Mod: S$GLB,,, | Performed by: PODIATRIST

## 2019-06-27 NOTE — PROGRESS NOTES
Subjective:      Patient ID: Parisa Dimas is a 64 y.o. female.    Chief Complaint: Diabetes Mellitus (Dr. Cast 12/7/18); Diabetic Foot Exam (cramping in toe going up foot and sometime ankle ); and Routine Foot Care    Parisa is a 64 y.o. female who presents to the clinic for evaluation and treatment of diabetic feet. Parisa has a past medical history of AR (allergic rhinitis), Carotid stenosis, Diabetes mellitus, Fatty liver, GERD (gastroesophageal reflux disease), HLD (hyperlipidemia), HTN (hypertension), and Sleep apnea (2018). Patient relates no major problem with feet.     PCP: Alex Cast MD    Date Last Seen by PCP:  12/07/2018    Current shoe gear: Tennis shoes    Hemoglobin A1C   Date Value Ref Range Status   11/08/2018 5.9 (H) 4.0 - 5.6 % Final     Comment:     ADA Screening Guidelines:  5.7-6.4%  Consistent with prediabetes  >or=6.5%  Consistent with diabetes  High levels of fetal hemoglobin interfere with the HbA1C  assay. Heterozygous hemoglobin variants (HbS, HgC, etc)do  not significantly interfere with this assay.   However, presence of multiple variants may affect accuracy.     04/06/2018 5.5 4.0 - 5.6 % Final     Comment:     According to ADA guidelines, hemoglobin A1c <7.0% represents  optimal control in non-pregnant diabetic patients. Different  metrics may apply to specific patient populations.   Standards of Medical Care in Diabetes-2016.  For the purpose of screening for the presence of diabetes:  <5.7%     Consistent with the absence of diabetes  5.7-6.4%  Consistent with increasing risk for diabetes   (prediabetes)  >or=6.5%  Consistent with diabetes  Currently, no consensus exists for use of hemoglobin A1c  for diagnosis of diabetes for children.  This Hemoglobin A1c assay has significant interference with fetal   hemoglobin   (HbF). The results are invalid for patients with abnormal amounts of   HbF,   including those with known Hereditary Persistence   of Fetal Hemoglobin.  "Heterozygous hemoglobin variants (HbAS, HbAC,   HbAD, HbAE, HbA2) do not significantly interfere with this assay;   however, presence of multiple variants in a sample may impact the %   interference.     11/14/2017 5.5 4.0 - 5.6 % Final     Comment:     According to ADA guidelines, hemoglobin A1c <7.0% represents  optimal control in non-pregnant diabetic patients. Different  metrics may apply to specific patient populations.   Standards of Medical Care in Diabetes-2016.  For the purpose of screening for the presence of diabetes:  <5.7%     Consistent with the absence of diabetes  5.7-6.4%  Consistent with increasing risk for diabetes   (prediabetes)  >or=6.5%  Consistent with diabetes  Currently, no consensus exists for use of hemoglobin A1c  for diagnosis of diabetes for children.  This Hemoglobin A1c assay has significant interference with fetal   hemoglobin   (HbF). The results are invalid for patients with abnormal amounts of   HbF,   including those with known Hereditary Persistence   of Fetal Hemoglobin. Heterozygous hemoglobin variants (HbAS, HbAC,   HbAD, HbAE, HbA2) do not significantly interfere with this assay;   however, presence of multiple variants in a sample may impact the %   interference.       Vitals:    06/27/19 0917   BP: (!) 144/81   Pulse: 82   Weight: 88.5 kg (195 lb)   Height: 5' 2" (1.575 m)      Past Medical History:   Diagnosis Date    AR (allergic rhinitis)     Carotid stenosis     50% RCA    Diabetes mellitus     Fatty liver     GERD (gastroesophageal reflux disease)     HLD (hyperlipidemia)     HTN (hypertension)     Sleep apnea 2018       Past Surgical History:   Procedure Laterality Date    COLONOSCOPY N/A 10/10/2014    Performed by [facility] Washington [facility] at [facility]       Family History   Problem Relation Age of Onset    Cancer Mother         gallbladder    Prostate cancer Brother 57    Diabetes Brother     Diabetes Maternal Grandmother     Hypertension Unknown     "     multiple    Coronary artery disease Father 79    Diabetes Father     Diabetes Maternal Aunt     Diabetes Maternal Uncle        Social History     Socioeconomic History    Marital status:      Spouse name: Not on file    Number of children: Not on file    Years of education: Not on file    Highest education level: Not on file   Occupational History    Not on file   Social Needs    Financial resource strain: Not on file    Food insecurity:     Worry: Not on file     Inability: Not on file    Transportation needs:     Medical: Not on file     Non-medical: Not on file   Tobacco Use    Smoking status: Never Smoker    Smokeless tobacco: Never Used   Substance and Sexual Activity    Alcohol use: Yes    Drug use: No    Sexual activity: Yes     Partners: Male   Lifestyle    Physical activity:     Days per week: Not on file     Minutes per session: Not on file    Stress: Not on file   Relationships    Social connections:     Talks on phone: Not on file     Gets together: Not on file     Attends Orthodoxy service: Not on file     Active member of club or organization: Not on file     Attends meetings of clubs or organizations: Not on file     Relationship status: Not on file   Other Topics Concern    Not on file   Social History Narrative    Not on file       Current Outpatient Medications   Medication Sig Dispense Refill    amLODIPine (NORVASC) 10 MG tablet Take 1 tablet (10 mg total) by mouth once daily. 90 tablet 3    aspirin (ECOTRIN) 81 MG EC tablet Take 81 mg by mouth once daily.        b complex vitamins tablet Take 1 tablet by mouth once daily.      blood sugar diagnostic Strp Test daily, insurance covered brand.  Include 100 lancets 100 strip 11    blood sugar diagnostic Strp To check BG 1 times daily, to use with insurance preferred meter 100 strip 11    blood-glucose meter kit To check BG 1 times daily, to use with insurance preferred meter 1 each 0    calcium carbonate  (OS-RYLEE) 600 mg (1,500 mg) Tab Take 600 mg by mouth once daily.        cetirizine (ZYRTEC) 10 MG tablet Take 10 mg by mouth once daily.        co-enzyme Q-10 30 mg capsule Take 30 mg by mouth once daily.      hydroCHLOROthiazide (HYDRODIURIL) 25 MG tablet Take 1 tablet (25 mg total) by mouth once daily. 90 tablet 3    ibuprofen (ADVIL,MOTRIN) 200 MG tablet Take 200 mg by mouth every 6 (six) hours as needed for Pain.      L.ACID/L.CASEI/B.BIF/B.MELISSA/FOS (PROBIOTIC BLEND ORAL) Take 2 tablets by mouth once daily.      lancets Misc Test daily 100 each 11    lancets Misc To check BG 1 times daily, to use with insurance preferred meter 100 each 11    losartan (COZAAR) 100 MG tablet Take 1 tablet (100 mg total) by mouth once daily. 90 tablet 1    lovastatin (MEVACOR) 20 MG tablet Take 1 tablet (20 mg total) by mouth every evening. 90 tablet 3    metFORMIN (GLUCOPHAGE-XR) 500 MG 24 hr tablet Take 1 tablet (500 mg total) by mouth daily with breakfast. 90 tablet 3    multivitamin capsule Take 1 capsule by mouth once daily.        OMEGA-3S/DHA/EPA/FISH OIL (OMEGA 3 ORAL) Take 2,800 mg by mouth once daily.        omeprazole (PRILOSEC) 40 MG capsule Take 1 capsule (40 mg total) by mouth once daily. 90 capsule 3    vitamin E 400 UNIT capsule Take 400 Units by mouth once daily.         No current facility-administered medications for this visit.        Review of patient's allergies indicates:  No Known Allergies        Review of Systems   Constitution: Negative for chills, fever and malaise/fatigue.   HENT: Negative for congestion.    Cardiovascular: Negative for chest pain, claudication and leg swelling.   Respiratory: Negative for cough and shortness of breath.    Skin: Negative for color change, itching and poor wound healing.   Musculoskeletal: Negative for back pain, joint pain, muscle cramps and muscle weakness.   Gastrointestinal: Negative for nausea and vomiting.   Neurological: Negative for numbness,  paresthesias and weakness.   Psychiatric/Behavioral: Negative for altered mental status.           Objective:      Physical Exam   Constitutional: She is oriented to person, place, and time. She appears well-developed and well-nourished. No distress.   Cardiovascular: Intact distal pulses.   Pulses:       Dorsalis pedis pulses are 2+ on the right side, and 2+ on the left side.        Posterior tibial pulses are 2+ on the right side, and 2+ on the left side.   CFT< 3 secs all toes bilateral foot, skin temp warm bilateral foot, + digital hair growth bilateral foot, mild to moderate lower extremity edema with telangiectasia bilateral.     Musculoskeletal:        Right foot: There is decreased range of motion and deformity.        Left foot: There is decreased range of motion and deformity.   Track bound hallux abductovalgus bilateral foot.    Reducible claw-toe deformities toes 2 through 4 bilateral foot. Mild adductovarus contracture 5th toe bilateral.    Case with structure bilateral foot.    Mild reducible gastrocnemius equinus bilateral.   Feet:   Right Foot:   Protective Sensation: 10 sites tested. 10 sites sensed.   Skin Integrity: Negative for ulcer, blister, skin breakdown, erythema, warmth, callus or dry skin.   Left Foot:   Protective Sensation: 10 sites tested. 10 sites sensed.   Skin Integrity: Negative for ulcer, blister, skin breakdown, erythema, warmth, callus or dry skin.   Neurological: She is alert and oriented to person, place, and time. She has normal strength. No sensory deficit.   Skin: Skin is warm, dry and intact. Capillary refill takes less than 2 seconds. No ecchymosis and no rash noted. She is not diaphoretic. No cyanosis or erythema. No pallor. Nails show no clubbing.   No open lesions or macerations bilateral lower extremity.    Nails 1-5 bilateral foot are well trimmed and normal in appearance.      Skin turgor and elasticity are within normal limits bilateral foot.             Assessment:        Encounter Diagnoses   Name Primary?    Type 2 diabetes mellitus without complication, without long-term current use of insulin Yes    Venous insufficiency of both lower extremities     Valgus deformity of both great toes          Plan:       Parisa was seen today for diabetes mellitus, diabetic foot exam and routine foot care.    Diagnoses and all orders for this visit:    Type 2 diabetes mellitus without complication, without long-term current use of insulin  -     COMPRESSION STOCKINGS    Venous insufficiency of both lower extremities  -     COMPRESSION STOCKINGS    Valgus deformity of both great toes      I counseled the patient on her conditions, their implications and medical management.    Shoe inspection. Diabetic Foot Education. Patient reminded of the importance of good nutrition and blood sugar control to help prevent podiatric complications of diabetes. Patient instructed on proper foot hygeine. We discussed wearing proper shoe gear, daily foot inspections, never walking without protective shoe gear, never putting sharp instruments to feet.      Discussed appropriate shoe gear characteristics and frequent replacement every 6-12 months.    Patient is a low risk for diabetic foot complication.    Return to clinic 1 year or as needed.

## 2019-06-27 NOTE — PATIENT INSTRUCTIONS
Diabetic Foot Care  Diabetes can lead to a number of foot complications. Fortunately, you can prevent most of these with a little extra foot care. If diabetes is not well controlled, it can cause damage to blood vessels and result in poor circulation to the foot. When the skin does not get enough blood flow, it becomes prone to pressure sores and ulcers, which heal slowly.  Diabetes can also damage nerves, interfering with the ability to feel pain and pressure. When you cant feel your foot normally, it is easy to injure your skin, bones, and joints without knowing it. For these reasons diabetes increases the risk of fungal infections, bunions, and ulcers. An ulcer is a sore or break in the skin. With ulcers, often the skin seems to have worn away. Deep ulcers can lead to bone infection.  Gangrene is the most serious foot complication of diabetes. It usually occurs on the tips of the toes as blackened areas of skin. The black area is dead tissue. In severe cases, gangrene spreads to involve the entire toe, other toes, and the entire foot. Foot or toe amputation may be required. Good foot care and blood sugar control can prevent this.  Home care  · Wear comfortable, well-fitting shoes.  · Wash your feet daily with warm water and mild soap.  · After drying, apply a moisturizing cream or lotion to the top and bottom of your feet. Don't put lotion between toes.  · Check your feet daily for skin breaks, blisters, swelling, or redness. Look between your toes as well. If you cannot see the bottoms of your feet, ask someone to look or use a mirror.  · Wear cotton socks and change them every day.  · Trim toenails carefully, and do not cut your cuticles.  · Strive to keep your blood sugar under control with a combination of medicines, diet, and activity.  · If you smoke and have diabetes, it is very important that you stop. Smoking reduces blood flow to your foot.  · Schedule foot exams at least every year, or more often if  you have foot problems.  · Put your feet up when sitting, wiggle toes, and move ankles to help improve blood flow.  Avoid activities that increase your risk of foot injury:  · Do not walk barefoot.  · Do not use heating pads or hot water bottles on your feet.  · Do not put your foot in a hot tub without first checking the temperature with your hand.  Follow-up care  Follow up with your healthcare provider, or as advised. Be sure to take off your shoes and socks before your appointment starts so your healthcare provider will be sure to check your feet. Report any cut, puncture, scrape, blister, or other injury to your foot. Also report if you have a bunion, hammertoes, ingrown toenail, or ulcer on your foot.  When to seek medical advice  Call your healthcare provider right away if any of these occur:  · Black skin color anywhere on the foot  · Open ulcer with pus draining from the wound  · Increasing foot or leg pain  · New areas of redness or swelling or tender areas of the foot  · Fever of 100.4°F (38°C) or greater  Date Last Reviewed: 5/25/2016  © 8952-9351 [employer]. [address] [place] [address], [place] [address]. All rights reserved. This information is not intended as a substitute for professional medical care. Always follow your healthcare professional's instructions.

## 2019-06-28 ENCOUNTER — TELEPHONE (OUTPATIENT)
Dept: PODIATRY | Facility: CLINIC | Age: 64
End: 2019-06-28

## 2019-06-28 NOTE — TELEPHONE ENCOUNTER
----- Message from Socorro Maddox sent at 6/28/2019  4:05 PM CDT -----  Contact: Jose elias/[facility]  [phone]   Jose is requesting a order for support stocking above the knee # [id number] faxed to [phone]

## 2019-06-28 NOTE — TELEPHONE ENCOUNTER
Order for compression stockings was faxed at the number that was sent in the message to [employer].

## 2019-07-01 ENCOUNTER — OFFICE VISIT (OUTPATIENT)
Dept: DERMATOLOGY | Facility: CLINIC | Age: 64
End: 2019-07-01
Payer: MEDICARE

## 2019-07-01 DIAGNOSIS — Z12.83 SCREENING EXAM FOR SKIN CANCER: ICD-10-CM

## 2019-07-01 DIAGNOSIS — D18.00 ANGIOMA: ICD-10-CM

## 2019-07-01 DIAGNOSIS — L81.4 LENTIGO: ICD-10-CM

## 2019-07-01 DIAGNOSIS — L82.1 SK (SEBORRHEIC KERATOSIS): Primary | ICD-10-CM

## 2019-07-01 PROCEDURE — 99999 PR PBB SHADOW E&M-EST. PATIENT-LVL II: ICD-10-PCS | Mod: PBBFAC,,, | Performed by: DERMATOLOGY

## 2019-07-01 PROCEDURE — 99203 PR OFFICE/OUTPT VISIT, NEW, LEVL III, 30-44 MIN: ICD-10-PCS | Mod: S$GLB,,, | Performed by: DERMATOLOGY

## 2019-07-01 PROCEDURE — 1101F PT FALLS ASSESS-DOCD LE1/YR: CPT | Mod: CPTII,S$GLB,, | Performed by: DERMATOLOGY

## 2019-07-01 PROCEDURE — 1101F PR PT FALLS ASSESS DOC 0-1 FALLS W/OUT INJ PAST YR: ICD-10-PCS | Mod: CPTII,S$GLB,, | Performed by: DERMATOLOGY

## 2019-07-01 PROCEDURE — 99999 PR PBB SHADOW E&M-EST. PATIENT-LVL II: CPT | Mod: PBBFAC,,, | Performed by: DERMATOLOGY

## 2019-07-01 PROCEDURE — 99203 OFFICE O/P NEW LOW 30 MIN: CPT | Mod: S$GLB,,, | Performed by: DERMATOLOGY

## 2019-07-01 NOTE — PROGRESS NOTES
Subjective:       Patient ID:  Parisa Dimas is a 64 y.o. female who presents for   Chief Complaint   Patient presents with    Skin Check     TBSE     Here for TBSE  No h/o nmsc or mm  Last seen by Dr. [facility] 1 year ago    Lesions on back x years + raised and itchy no prev treatment      Review of Systems   Skin: Positive for activity-related sunscreen use. Negative for daily sunscreen use and recent sunburn.   Hematologic/Lymphatic: Does not bruise/bleed easily.        Objective:    Physical Exam   Constitutional: She appears well-developed and well-nourished. No distress.   Neurological: She is alert and oriented to person, place, and time. She is not disoriented.   Psychiatric: She has a normal mood and affect.   Skin:   Areas Examined (abnormalities noted in diagram):   Scalp / Hair Palpated and Inspected  Head / Face Inspection Performed  Neck Inspection Performed  Chest / Axilla Inspection Performed  Abdomen Inspection Performed  Genitals / Buttocks / Groin Inspection Performed  Back Inspection Performed  RUE Inspected  LUE Inspection Performed  RLE Inspected  LLE Inspection Performed  Nails and Digits Inspection Performed                       Diagram Legend     Erythematous scaling macule/papule c/w actinic keratosis       Vascular papule c/w angioma      Pigmented verrucoid papule/plaque c/w seborrheic keratosis      Yellow umbilicated papule c/w sebaceous hyperplasia      Irregularly shaped tan macule c/w lentigo     1-2 mm smooth white papules consistent with Milia      Movable subcutaneous cyst with punctum c/w epidermal inclusion cyst      Subcutaneous movable cyst c/w pilar cyst      Firm pink to brown papule c/w dermatofibroma      Pedunculated fleshy papule(s) c/w skin tag(s)      Evenly pigmented macule c/w junctional nevus     Mildly variegated pigmented, slightly irregular-bordered macule c/w mildly atypical nevus      Flesh colored to evenly pigmented papule c/w intradermal nevus       Pink  pearly papule/plaque c/w basal cell carcinoma      Erythematous hyperkeratotic cursted plaque c/w SCC      Surgical scar with no sign of skin cancer recurrence      Open and closed comedones      Inflammatory papules and pustules      Verrucoid papule consistent consistent with wart     Erythematous eczematous patches and plaques     Dystrophic onycholytic nail with subungual debris c/w onychomycosis     Umbilicated papule    Erythematous-base heme-crusted tan verrucoid plaque consistent with inflamed seborrheic keratosis     Erythematous Silvery Scaling Plaque c/w Psoriasis     See annotation      Assessment / Plan:        SK (seborrheic keratosis)  These are benign inherited growths without a malignant potential. Reassurance given to patient. No treatment is necessary.       Angiomas  This is a benign vascular lesion. Reassurance given. No treatment required.       Lentigo  This is a benign hyperpigmented sun induced lesion. Daily sun protection will reduce the number of new lesions. Treatment of these benign lesions are considered cosmetic.      Screening exam for skin cancer  Total body skin examination performed today including at least 12 points as noted in physical examination. No lesions suspicious for malignancy noted.               Follow up if symptoms worsen or fail to improve.

## 2019-07-24 ENCOUNTER — PATIENT OUTREACH (OUTPATIENT)
Dept: OTHER | Facility: OTHER | Age: 64
End: 2019-07-24

## 2019-07-24 NOTE — PROGRESS NOTES
Last 5 Patient Entered Readings                                      Current 30 Day Average: 134/70     Recent Readings 7/19/2019 7/19/2019 7/14/2019 7/14/2019 7/8/2019    SBP (mmHg) 141 141 134 134 144    DBP (mmHg) 77 77 68 68 75    Pulse 75 75 83 83 78          Digital Medicine: Health  Follow Up    Left voicemail to follow up with Mrs. Parisa Kwokball.  Current BP average 134/70 mmHg is not at goal, 130/80.  Patient BP is almost within goal. WCB in 3 weeks.

## 2019-08-14 NOTE — PROGRESS NOTES
Last 5 Patient Entered Readings                                      Current 30 Day Average: 133/73     Recent Readings 8/12/2019 8/12/2019 8/10/2019 8/10/2019 8/1/2019    SBP (mmHg) 146 146 137 137 125    DBP (mmHg) 77 77 71 71 74    Pulse 71 71 79 79 79        Digital Medicine: Health  Follow Up    Lifestyle Modifications:  Dietary Modifications (Sodium intake <2,000mg/day, food labels, dining out):   Patient states that her and her  have been cooking at home a lot more which has helped to regulate their salt intake and have cut back eating out to once a week.    Physical Activity:   Patient is still walking 2-3x per week, while also doing house chores and yard work weekly.     Medication Therapy:   Patient has been compliant with the medication regimen.    Patient has the following medication side effects/concerns: none  (Frequency/Alleviating factors/Precipitating factors, etc.)     Follow up with Mrs. Parisa Kwokball completed. No further questions or concerns. Will continue to follow up to achieve health goals.    Notes:  Patient seems to be doing well with diet and physical activity. Patient asked about foods to help reduce inflammation in joints. Sent resource with some information about different types of diets that could be beneficial for reducing inflammation. Will f/u in 4-6 weeks.

## 2019-09-13 DIAGNOSIS — Z13.5 SCREENING FOR DIABETIC RETINOPATHY: Primary | ICD-10-CM

## 2019-09-20 ENCOUNTER — PATIENT OUTREACH (OUTPATIENT)
Dept: OTHER | Facility: OTHER | Age: 64
End: 2019-09-20

## 2019-09-20 NOTE — PROGRESS NOTES
Digital Medicine: Health  Follow-Up    Patient is doing well. BP is well within goal and numbers are trending down. Will f/u in 6 weeks.     The history is provided by the patient.     Follow Up  Follow-up reason(s): reading review      Readings are trending down due to lifestyle change.            Diet:   Patient reports eating or drinking the following: fruit, water and fresh vegetablesShe has the following dietary restrictions: none    Patient states that they only eat out once a week at the most.     Physical Activity:   When asked if exercising, patient responded: yes    Patient participates in the following activities: walking and yard/housework    Medication Adherence:   She misses doses: never      Patient reports no s/s or any issues taking medication as prescribed.     Tobacco and Alcohol:       Patient is not eligible for referral to smoking cessation.        [facility]    INTERVENTION(S)  encouragement/support    Patients numbers look great. Encouraged patient to keep up the good work.           Topic    Hemoglobin A1C        Last 5 Patient Entered Readings                                      Current 30 Day Average: 117/64     Recent Readings 9/17/2019 9/17/2019 9/12/2019 9/12/2019 9/8/2019    SBP (mmHg) 118 118 108 108 108    DBP (mmHg) 61 61 65 65 57    Pulse 82 82 77 77 74

## 2019-10-03 VITALS
SYSTOLIC BLOOD PRESSURE: 127 MMHG | WEIGHT: 196.63 LBS | DIASTOLIC BLOOD PRESSURE: 74 MMHG | BODY MASS INDEX: 34.84 KG/M2 | TEMPERATURE: 99 F | HEIGHT: 63 IN | HEART RATE: 83 BPM | OXYGEN SATURATION: 97 %

## 2019-10-04 ENCOUNTER — PATIENT OUTREACH (OUTPATIENT)
Dept: OTHER | Facility: OTHER | Age: 64
End: 2019-10-04

## 2019-10-04 ENCOUNTER — PATIENT OUTREACH (OUTPATIENT)
Dept: ADMINISTRATIVE | Facility: HOSPITAL | Age: 64
End: 2019-10-04

## 2019-10-04 DIAGNOSIS — E11.9 DIABETES MELLITUS WITHOUT COMPLICATION: Primary | ICD-10-CM

## 2019-10-04 DIAGNOSIS — Z13.5 DIABETIC RETINOPATHY SCREENING: Primary | ICD-10-CM

## 2019-10-08 NOTE — PROGRESS NOTES
Digital Medicine: Clinician Follow-Up    Patient reports restless leg symptoms that occur on occasion. She reported this to a physician who checked her labs that came back WNL. She cannot correlate when this is occurring. She stated it's almost every night.     The history is provided by the patient.     Follow Up  Follow-up reason(s): medication change follow-up      Medication Change: dose increase    Patient started new medication.    Is patient tolerating med change?:  YesPatient is tolerating the higher dose of losartan.    Patient feels fatigue at times when she is more active. She hasn't tested her BP during this time. She denies dizziness, lightheadedness, or nausea.                  Medication Adherence:   She misses doses: never    She does not wonder if medications are working.  She knows purpose of medications.      Adherence tools used: pill box      INTERVENTION(S)  reviewed appropriate dose schedule    PLAN  patient verbalizes understanding    Patient will continue her current HTN medications.    Patient will keep track of fatigue symptoms and will take her BP during this time. Patient will contact me if she notices any trends before my next outreach.    Recommended contacting her MD for further investigation into her restless leg symptoms. She will see her PCP in one week and will discuss her symptoms at this visit.    Patient will contact me with any hypotension symptoms.           Topic    Hemoglobin A1C        Last 5 Patient Entered Readings                                      Current 30 Day Average: 121/66     Recent Readings 10/7/2019 10/7/2019 10/3/2019 10/3/2019 9/25/2019    SBP (mmHg) 116 116 127 127 146    DBP (mmHg) 68 68 74 74 71    Pulse 78 78 77 77 88             Hypertension Medications             amLODIPine (NORVASC) 10 MG tablet Take 1 tablet (10 mg total) by mouth once daily.    hydroCHLOROthiazide (HYDRODIURIL) 25 MG tablet Take 1 tablet (25 mg total) by mouth once daily.     losartan (COZAAR) 100 MG tablet Take 1 tablet (100 mg total) by mouth once daily.

## 2019-10-17 NOTE — PROGRESS NOTES
(Portions of this note were dictated using voice recognition software and may contain dictation related errors in spelling/grammar/syntax not found on text review)    CC:   Chief Complaint   Patient presents with    Follow-up       HPI: 64 y.o. female here for annual exam    Hypertension on HCTZ 25 mg daily, amlodipine 10 mg daily, losartan 100 mg daily.  BP initially elevated but better on recheck     Hyperlipidemia on lovastatin 20 mg daily     Fatty liver disease, elevated LFTs . Negative hepatitis screening. Ultrasound of the liver done in 2015 demonstrating hepatic cyst and fatty infiltration.  Has followed up with hepatology clinic December of 2015     Carotid stenosis history. Last ultrasound April 2018 showed an no hemodynamically significant stenosis     Diabetes: On metformin extended release 500 mg daily.  Last A1c controlled as below  Normal [facility] ratio.        Sleep apnea, visit with Sleep Medicine in April.  Could not tolerate CPAP secondary to anxiety with machine.    URI symptoms started last night with coughing and sore throat and congestion. Tried to take some Tessalon Perles that she had left over but they did not seem to help.    Trying to eat healthy diet given her diabetes.  Not much exercise, does state that she needs to get better with this.  Does have some family stresses going on, feels that exercise may with.    Past Medical History:   Diagnosis Date    AR (allergic rhinitis)     Carotid stenosis     50% RCA    Diabetes mellitus     Fatty liver     GERD (gastroesophageal reflux disease)     HLD (hyperlipidemia)     HTN (hypertension)     Joint pain     Sleep apnea 2018       Past Surgical History:   Procedure Laterality Date    SKIN BIOPSY  10yrs.    negative       Family History   Problem Relation Age of Onset    Cancer Mother         gallbladder    Prostate cancer Brother 57    Diabetes Brother     Diabetes Maternal Grandmother     Hypertension Unknown         multiple     Coronary artery disease Father 79    Diabetes Father     Diabetes Maternal Aunt     Diabetes Maternal Uncle     Melanoma Neg Hx        Social History     Socioeconomic History    Marital status:      Spouse name: Not on file    Number of children: Not on file    Years of education: Not on file    Highest education level: Not on file   Occupational History    Not on file   Social Needs    Financial resource strain: Not on file    Food insecurity:     Worry: Not on file     Inability: Not on file    Transportation needs:     Medical: Not on file     Non-medical: Not on file   Tobacco Use    Smoking status: Never Smoker    Smokeless tobacco: Never Used   Substance and Sexual Activity    Alcohol use: Not Currently     Alcohol/week: 0.0 standard drinks    Drug use: No    Sexual activity: Yes     Partners: Male     Birth control/protection: Post-menopausal   Lifestyle    Physical activity:     Days per week: Not on file     Minutes per session: Not on file    Stress: Not on file   Relationships    Social connections:     Talks on phone: Not on file     Gets together: Not on file     Attends Restorationism service: Not on file     Active member of club or organization: Not on file     Attends meetings of clubs or organizations: Not on file     Relationship status: Not on file   Other Topics Concern    Are you pregnant or think you may be? No    Breast-feeding No   Social History Narrative    Not on file       Lab Results   Component Value Date    WBC 5.47 11/08/2018    HGB 13.5 11/08/2018    HCT 40.8 11/08/2018    MCV 90 11/08/2018     11/08/2018    CHOL 185 11/08/2018    TRIG 171 (H) 11/08/2018    HDL 65 11/08/2018     (H) 11/08/2018    AST 67 (H) 11/08/2018    BILITOT 0.6 11/08/2018    ALKPHOS 64 11/08/2018     11/08/2018    K 3.8 11/08/2018     11/08/2018    CREATININE 0.8 11/08/2018    ESTGFRAFRICA >60.0 11/08/2018    EGFRNONAA >60.0 11/08/2018    CALCIUM 10.3  11/08/2018    ALBUMIN 4.2 11/08/2018    BUN 17 11/08/2018    CO2 28 11/08/2018    TSH 0.704 11/08/2018    INR 1.0 12/01/2015    HGBA1C 5.9 (H) 11/08/2018    MICALBCREAT 7.7 11/08/2018    LDLCALC 85.8 11/08/2018     (H) 11/08/2018                       Vital signs reviewed  PE:   APPEARANCE: Well nourished, well developed, in no acute distress.    HEAD: Normocephalic, atraumatic.  EYES: PERRL. EOMI.   Conjunctivae noninjected.  EARS: TM's intact. Light reflex normal. No retraction or perforation.    NOSE: Mucosa pink. Airway clear.  MOUTH & THROAT: No tonsillar enlargement. No pharyngeal erythema or exudate.   NECK: Supple with no cervical lymphadenopathy.  No carotid bruits.  No thyromegaly  CHEST: Good inspiratory effort. Lungs clear to auscultation with no wheezes or crackles.  CARDIOVASCULAR: Normal S1, S2.  2/6 systolic ejection murmur noted in aortic region.  No rubs, murmurs, or gallops.  ABDOMEN: Bowel sounds normal. Not distended. Soft. No tenderness or masses. No organomegaly.  EXTREMITIES: No edema, cyanosis, or clubbing.  Diabetic foot exam up-to-date June 2019        IMPRESSION  1. Routine general medical examination at a health care facility    2. Controlled type 2 diabetes mellitus without complication, without long-term current use of insulin    3. Obstructive sleep apnea hypopnea, severe    4. Essential hypertension    5. Hyperlipidemia, unspecified hyperlipidemia type    6. Fatty liver    7. Colon cancer screening    8. Upper respiratory tract infection, unspecified type    9. Stricture of artery    10. Stenosis of carotid artery, unspecified laterality        PLAN  Diabetes health maintenance  -advise proper dietary and exercise modification  -advise medication compliance  -advise daily aspirin if there are no other contraindications  -advise consistent blood sugar monitoring  -advise regular dentist and ophthalmology visits  -advise regular foot checks  -Medication management:   Continue  metformin 500 mg extended release daily.  Check labs    Hypertension well controlled.  Continue losartan 100 mg daily, amlodipine 10 mg daily, hydrochlorothiazide 25 mg daily.      Sleep apnea, could not tolerate CPAP.  Encourage importance of lifestyle modification including healthy diet, regular exercise, and weight loss not only on sleep apnea management but also improvement with some occasional myalgias symptoms, blood pressure control, diabetes control    Dyslipidemia:  Continue lovastatin    Carotid artery plaque, last ultrasound him significant stenosis.  Emphasize risk factor control with blood pressure management, aspirin, statin, diabetes control, healthy diet, regular exercise    URI:  Conservative management.  Can take Mucinex DM.  As long as blood pressure is doing well she did take temporary Sudafed    Orders Placed This Encounter   Procedures    CBC auto differential    Comprehensive metabolic panel    Lipid panel    TSH    Microalbumin/creatinine urine ratio    Hemoglobin A1c           SCREENINGS:  Colonoscopy 2014.  2 diminutive hyperplastic polyps, diverticulosis. .     GYN: Dr. Cynthia Dinero     MMG 06/06/2019     Tetanus: 2012  Flu .  She would like to get when she is feeling better, nurse's visit 2 weeks  PVX:   March 2018  Prevnar needed 3/2019.  She would like to get an feeling better, nurse's visit in 2 weeks  Zvx: Has not had but can get at local pharmacy     DEXA 04/2018:  Osteopenia right femoral neck T-score -1.1.  Normal density lumbar spine and left femoral neck.

## 2019-10-18 ENCOUNTER — OFFICE VISIT (OUTPATIENT)
Dept: FAMILY MEDICINE | Facility: CLINIC | Age: 64
End: 2019-10-18
Payer: MEDICARE

## 2019-10-18 VITALS
WEIGHT: 199.94 LBS | OXYGEN SATURATION: 97 % | TEMPERATURE: 98 F | HEIGHT: 62 IN | HEART RATE: 86 BPM | BODY MASS INDEX: 36.79 KG/M2 | SYSTOLIC BLOOD PRESSURE: 136 MMHG | DIASTOLIC BLOOD PRESSURE: 80 MMHG

## 2019-10-18 DIAGNOSIS — K76.0 FATTY LIVER: ICD-10-CM

## 2019-10-18 DIAGNOSIS — I65.29 STENOSIS OF CAROTID ARTERY, UNSPECIFIED LATERALITY: ICD-10-CM

## 2019-10-18 DIAGNOSIS — Z00.00 ROUTINE GENERAL MEDICAL EXAMINATION AT A HEALTH CARE FACILITY: Primary | ICD-10-CM

## 2019-10-18 DIAGNOSIS — Z12.11 COLON CANCER SCREENING: ICD-10-CM

## 2019-10-18 DIAGNOSIS — G47.33 OBSTRUCTIVE SLEEP APNEA HYPOPNEA, SEVERE: ICD-10-CM

## 2019-10-18 DIAGNOSIS — E78.5 HYPERLIPIDEMIA, UNSPECIFIED HYPERLIPIDEMIA TYPE: ICD-10-CM

## 2019-10-18 DIAGNOSIS — I77.1 STRICTURE OF ARTERY: ICD-10-CM

## 2019-10-18 DIAGNOSIS — E66.01 SEVERE OBESITY WITH BODY MASS INDEX (BMI) OF 36.0 TO 36.9 WITH SERIOUS COMORBIDITY: ICD-10-CM

## 2019-10-18 DIAGNOSIS — J06.9 UPPER RESPIRATORY TRACT INFECTION, UNSPECIFIED TYPE: ICD-10-CM

## 2019-10-18 DIAGNOSIS — I10 ESSENTIAL HYPERTENSION: ICD-10-CM

## 2019-10-18 DIAGNOSIS — E11.9 CONTROLLED TYPE 2 DIABETES MELLITUS WITHOUT COMPLICATION, WITHOUT LONG-TERM CURRENT USE OF INSULIN: ICD-10-CM

## 2019-10-18 PROCEDURE — 99214 OFFICE O/P EST MOD 30 MIN: CPT | Mod: S$GLB,,, | Performed by: FAMILY MEDICINE

## 2019-10-18 PROCEDURE — 3075F SYST BP GE 130 - 139MM HG: CPT | Mod: CPTII,S$GLB,, | Performed by: FAMILY MEDICINE

## 2019-10-18 PROCEDURE — 99214 PR OFFICE/OUTPT VISIT, EST, LEVL IV, 30-39 MIN: ICD-10-PCS | Mod: S$GLB,,, | Performed by: FAMILY MEDICINE

## 2019-10-18 PROCEDURE — 99499 UNLISTED E&M SERVICE: CPT | Mod: S$GLB,,, | Performed by: FAMILY MEDICINE

## 2019-10-18 PROCEDURE — 3079F PR MOST RECENT DIASTOLIC BLOOD PRESSURE 80-89 MM HG: ICD-10-PCS | Mod: CPTII,S$GLB,, | Performed by: FAMILY MEDICINE

## 2019-10-18 PROCEDURE — 99999 PR PBB SHADOW E&M-EST. PATIENT-LVL V: CPT | Mod: PBBFAC,,, | Performed by: FAMILY MEDICINE

## 2019-10-18 PROCEDURE — 3075F PR MOST RECENT SYSTOLIC BLOOD PRESS GE 130-139MM HG: ICD-10-PCS | Mod: CPTII,S$GLB,, | Performed by: FAMILY MEDICINE

## 2019-10-18 PROCEDURE — 3079F DIAST BP 80-89 MM HG: CPT | Mod: CPTII,S$GLB,, | Performed by: FAMILY MEDICINE

## 2019-10-18 PROCEDURE — 99499 RISK ADDL DX/OHS AUDIT: ICD-10-PCS | Mod: S$GLB,,, | Performed by: FAMILY MEDICINE

## 2019-10-18 PROCEDURE — 3044F PR MOST RECENT HEMOGLOBIN A1C LEVEL <7.0%: ICD-10-PCS | Mod: CPTII,S$GLB,, | Performed by: FAMILY MEDICINE

## 2019-10-18 PROCEDURE — 99999 PR PBB SHADOW E&M-EST. PATIENT-LVL V: ICD-10-PCS | Mod: PBBFAC,,, | Performed by: FAMILY MEDICINE

## 2019-10-18 PROCEDURE — 3044F HG A1C LEVEL LT 7.0%: CPT | Mod: CPTII,S$GLB,, | Performed by: FAMILY MEDICINE

## 2019-10-18 NOTE — PROGRESS NOTES
Patient, Parisa Dimas (MRN #[id number]), presented with a recorded BMI of 36.57 kg/m^2 and a documented comorbidity(s):  - Diabetes Mellitus Type 2  - Obstructive Sleep Apnea  - Hypertension  - Hyperlipidemia  to which the severe obesity is a contributing factor. This is consistent with the definition of severe obesity (BMI 35.0-39.9) with comorbidity (ICD-10 E66.01, Z68.35). The patient's severe obesity was monitored, evaluated, addressed and/or treated. This addendum to the medical record is made on 10/18/2019.

## 2019-10-28 ENCOUNTER — CLINICAL SUPPORT (OUTPATIENT)
Dept: FAMILY MEDICINE | Facility: CLINIC | Age: 64
End: 2019-10-28
Payer: MEDICARE

## 2019-10-28 ENCOUNTER — LAB VISIT (OUTPATIENT)
Dept: LAB | Facility: HOSPITAL | Age: 64
End: 2019-10-28
Attending: FAMILY MEDICINE
Payer: MEDICARE

## 2019-10-28 DIAGNOSIS — G47.33 OBSTRUCTIVE SLEEP APNEA HYPOPNEA, SEVERE: ICD-10-CM

## 2019-10-28 DIAGNOSIS — Z00.00 ROUTINE GENERAL MEDICAL EXAMINATION AT A HEALTH CARE FACILITY: ICD-10-CM

## 2019-10-28 DIAGNOSIS — K76.0 FATTY LIVER: ICD-10-CM

## 2019-10-28 DIAGNOSIS — E11.9 CONTROLLED TYPE 2 DIABETES MELLITUS WITHOUT COMPLICATION, WITHOUT LONG-TERM CURRENT USE OF INSULIN: ICD-10-CM

## 2019-10-28 DIAGNOSIS — I10 ESSENTIAL HYPERTENSION: ICD-10-CM

## 2019-10-28 DIAGNOSIS — E78.5 HYPERLIPIDEMIA, UNSPECIFIED HYPERLIPIDEMIA TYPE: ICD-10-CM

## 2019-10-28 LAB
ALBUMIN SERPL BCP-MCNC: 4.1 G/DL (ref 3.5–5.2)
ALP SERPL-CCNC: 73 U/L (ref 55–135)
ALT SERPL W/O P-5'-P-CCNC: 77 U/L (ref 10–44)
ANION GAP SERPL CALC-SCNC: 10 MMOL/L (ref 8–16)
AST SERPL-CCNC: 49 U/L (ref 10–40)
BASOPHILS # BLD AUTO: 0.02 K/UL (ref 0–0.2)
BASOPHILS NFR BLD: 0.4 % (ref 0–1.9)
BILIRUB SERPL-MCNC: 0.5 MG/DL (ref 0.1–1)
BUN SERPL-MCNC: 15 MG/DL (ref 8–23)
CALCIUM SERPL-MCNC: 10.2 MG/DL (ref 8.7–10.5)
CHLORIDE SERPL-SCNC: 100 MMOL/L (ref 95–110)
CHOLEST SERPL-MCNC: 173 MG/DL (ref 120–199)
CHOLEST/HDLC SERPL: 3.3 {RATIO} (ref 2–5)
CO2 SERPL-SCNC: 29 MMOL/L (ref 23–29)
CREAT SERPL-MCNC: 0.7 MG/DL (ref 0.5–1.4)
DIFFERENTIAL METHOD: NORMAL
EOSINOPHIL # BLD AUTO: 0.1 K/UL (ref 0–0.5)
EOSINOPHIL NFR BLD: 2.8 % (ref 0–8)
ERYTHROCYTE [DISTWIDTH] IN BLOOD BY AUTOMATED COUNT: 12.7 % (ref 11.5–14.5)
EST. GFR  (AFRICAN AMERICAN): >60 ML/MIN/1.73 M^2
EST. GFR  (NON AFRICAN AMERICAN): >60 ML/MIN/1.73 M^2
ESTIMATED AVG GLUCOSE: 131 MG/DL (ref 68–131)
GLUCOSE SERPL-MCNC: 142 MG/DL (ref 70–110)
HBA1C MFR BLD HPLC: 6.2 % (ref 4–5.6)
HCT VFR BLD AUTO: 40.7 % (ref 37–48.5)
HDLC SERPL-MCNC: 53 MG/DL (ref 40–75)
HDLC SERPL: 30.6 % (ref 20–50)
HGB BLD-MCNC: 13.3 G/DL (ref 12–16)
LDLC SERPL CALC-MCNC: 76.2 MG/DL (ref 63–159)
LYMPHOCYTES # BLD AUTO: 1.7 K/UL (ref 1–4.8)
LYMPHOCYTES NFR BLD: 33.9 % (ref 18–48)
MCH RBC QN AUTO: 29.8 PG (ref 27–31)
MCHC RBC AUTO-ENTMCNC: 32.7 G/DL (ref 32–36)
MCV RBC AUTO: 91 FL (ref 82–98)
MONOCYTES # BLD AUTO: 0.7 K/UL (ref 0.3–1)
MONOCYTES NFR BLD: 13.3 % (ref 4–15)
NEUTROPHILS # BLD AUTO: 2.5 K/UL (ref 1.8–7.7)
NEUTROPHILS NFR BLD: 49.6 % (ref 38–73)
NONHDLC SERPL-MCNC: 120 MG/DL
PLATELET # BLD AUTO: 167 K/UL (ref 150–350)
PMV BLD AUTO: 9.7 FL (ref 9.2–12.9)
POTASSIUM SERPL-SCNC: 4.4 MMOL/L (ref 3.5–5.1)
PROT SERPL-MCNC: 7.3 G/DL (ref 6–8.4)
RBC # BLD AUTO: 4.46 M/UL (ref 4–5.4)
SODIUM SERPL-SCNC: 139 MMOL/L (ref 136–145)
TRIGL SERPL-MCNC: 219 MG/DL (ref 30–150)
TSH SERPL DL<=0.005 MIU/L-ACNC: 0.85 UIU/ML (ref 0.4–4)
WBC # BLD AUTO: 5.04 K/UL (ref 3.9–12.7)

## 2019-10-28 PROCEDURE — 85025 COMPLETE CBC W/AUTO DIFF WBC: CPT

## 2019-10-28 PROCEDURE — 36415 COLL VENOUS BLD VENIPUNCTURE: CPT

## 2019-10-28 PROCEDURE — G0009 ADMIN PNEUMOCOCCAL VACCINE: HCPCS | Mod: S$GLB,,, | Performed by: FAMILY MEDICINE

## 2019-10-28 PROCEDURE — 83036 HEMOGLOBIN GLYCOSYLATED A1C: CPT

## 2019-10-28 PROCEDURE — 80053 COMPREHEN METABOLIC PANEL: CPT

## 2019-10-28 PROCEDURE — 90662 IIV NO PRSV INCREASED AG IM: CPT | Mod: S$GLB,,, | Performed by: FAMILY MEDICINE

## 2019-10-28 PROCEDURE — 80061 LIPID PANEL: CPT

## 2019-10-28 PROCEDURE — 90662 FLU VACCINE - HIGH DOSE (65+) PRESERVATIVE FREE IM: ICD-10-PCS | Mod: S$GLB,,, | Performed by: FAMILY MEDICINE

## 2019-10-28 PROCEDURE — G0009 PNEUMOCOCCAL CONJUGATE VACCINE 13-VALENT LESS THAN 5YO & GREATER THAN: ICD-10-PCS | Mod: S$GLB,,, | Performed by: FAMILY MEDICINE

## 2019-10-28 PROCEDURE — G0008 ADMIN INFLUENZA VIRUS VAC: HCPCS | Mod: S$GLB,,, | Performed by: FAMILY MEDICINE

## 2019-10-28 PROCEDURE — 90670 PNEUMOCOCCAL CONJUGATE VACCINE 13-VALENT LESS THAN 5YO & GREATER THAN: ICD-10-PCS | Mod: S$GLB,,, | Performed by: FAMILY MEDICINE

## 2019-10-28 PROCEDURE — 90670 PCV13 VACCINE IM: CPT | Mod: S$GLB,,, | Performed by: FAMILY MEDICINE

## 2019-10-28 PROCEDURE — 84443 ASSAY THYROID STIM HORMONE: CPT

## 2019-10-28 PROCEDURE — G0008 FLU VACCINE - HIGH DOSE (65+) PRESERVATIVE FREE IM: ICD-10-PCS | Mod: S$GLB,,, | Performed by: FAMILY MEDICINE

## 2019-10-30 ENCOUNTER — PATIENT MESSAGE (OUTPATIENT)
Dept: FAMILY MEDICINE | Facility: CLINIC | Age: 64
End: 2019-10-30

## 2019-10-30 NOTE — TELEPHONE ENCOUNTER
Dear Parisa Dimas    Your recent labs were reviewed and released to your account. Your lab results show diabetes is controlled. Liver tests are mildly high but improving. Focusing on healthy diet and regular exercise should help.  Please let me know if you have any questions.    Alex Cast MD

## 2019-11-05 NOTE — PROGRESS NOTES
Digital Medicine: Health  Follow-Up    Patient seems to be doing pretty well despite recent health setbacks. Patient caught a cold about two weeks ago coinciding with the increase in BP on 10/18/19. Patient went [place] a [place] the mountains and fell on cement busting her knee and spraining her wrist. Patient attributes higher BP readings in the last two weeks to that. Will f/u in 4-6 weeks.    The history is provided by the patient.     Follow Up  Follow-up reason(s): reading review      Readings are trending up due to lifestyle change.        INTERVENTION(S)  encouragement/support          Topic    Eye Exam        Last 5 Patient Entered Readings                                      Current 30 Day Average: 132/72     Recent Readings 10/30/2019 10/30/2019 10/27/2019 10/27/2019 10/22/2019    SBP (mmHg) 145 145 143 143 142    DBP (mmHg) 77 77 71 71 74    Pulse 75 75 79 79 78                      Diet Screening       Patient states that her diet was a bit more lax while out on vacation. Patient stated that they tried to make healthy choices while out but they were on vacation. Patient is back and has gotten back to making healthier choices.     Physical Activity Screening   When asked if exercising, patient responded: yes    Patient participates in the following activities: walking and yard/housework    She identified the following barriers to physical activity: pain/injury/recent surgery    Patient got plenty of walking in while on vacation until she fell and bruised knee/sprained wrist. Patient has been limited since. She is still able to get around and it is getting easier but its still labored to move around.     Medication Adherence Screening   She misses doses: never      Patient identified the following reasons for non-compliance: none    Patient reports no s/s or issues taking medication as prescribed.       SDOH

## 2019-11-13 DIAGNOSIS — I10 ESSENTIAL HYPERTENSION: ICD-10-CM

## 2019-11-13 RX ORDER — LOSARTAN POTASSIUM 100 MG/1
TABLET ORAL
Qty: 90 TABLET | Refills: 3 | Status: SHIPPED | OUTPATIENT
Start: 2019-11-13 | End: 2020-01-23 | Stop reason: SDUPTHER

## 2019-12-09 ENCOUNTER — PATIENT OUTREACH (OUTPATIENT)
Dept: OTHER | Facility: OTHER | Age: 64
End: 2019-12-09

## 2019-12-09 NOTE — PROGRESS NOTES
Digital Medicine: Health  Follow-Up    Patient reports that she is doing well. Patient is mostly healed up from her fall in October. Patient states that she has been trying to increase her activity levels by walking with her . Patient states that they will try to fit in actvitiy and walking where they can.  Patient and I also reviewed BP monitoring technique to see if maybe there was something we could fix. Patient is resting before taking readings but not resting with the cuff on. Directed patient to start resting with the cuff on for 2-5 minutes to see if she gets some better readings and also so she's not having to take multiple readings. Patient understood. Will f/u in 4-6 weeks.     The history is provided by the patient.     Follow Up  Follow-up reason(s): reading review and routine education      Readings are trending up due to lifestyle change and inaccurate technique.    Routine Education Topics: Diabetes distress        INTERVENTION(S)  reviewed monitoring technique and encouragement/support      There are no preventive care reminders to display for this patient.    Last 5 Patient Entered Readings                                      Current 30 Day Average: 141/73     Recent Readings 12/9/2019 12/9/2019 12/5/2019 12/5/2019 12/1/2019    SBP (mmHg) 154 154 139 139 150    DBP (mmHg) 73 73 70 70 75    Pulse 84 84 78 78 85                      Diet Screening   No change to diet.      Patient reports making no major changes to her diet. She states that its not bad but it's not the greatest, especially during the holidays. I reminded patient that moderation and portion control is key to not letting the holidays get the best of her.     Intervention(s): portion control    Physical Activity Screening   When asked if exercising, patient responded: yes    Patient participates in the following activities: walking and yard/housework    Patient states that she and her  have been trying to increase the  amount of walking they do since she is mostly healed up. Encouraged patient that making a designated time to go walk and then also adding in walking where she can will help to regulate BP numbers.    Medication Adherence Screening   She misses doses: never      Patient identified the following reasons for non-compliance: none    Patient reports no s/s or issues taking BP medications as prescribed.       SDOH

## 2020-01-17 ENCOUNTER — PATIENT OUTREACH (OUTPATIENT)
Dept: OTHER | Facility: OTHER | Age: 65
End: 2020-01-17

## 2020-01-17 NOTE — PROGRESS NOTES
Digital Medicine: Health  Follow-Up    Patient reports that she is doing well. Patient used the tip about resting with the cuff on to take BP readings and her readings have looked much better. Patient asked about a cough being associated with any of her BP medications. Will let PharmD Dayan MCDONALD Know that patient is experiencing persistent cough since before Thanksgiving. Patient thought it may be due to sinus drainage. Patient reports remaining active and is still working on her diet. Will f/u in 6 weeks.    The history is provided by the patient.     Follow Up  Follow-up reason(s): reading review and routine education      Readings are trending down       INTERVENTION(S)  encouragement/support      There are no preventive care reminders to display for this patient.    Last 5 Patient Entered Readings                                      Current 30 Day Average: 123/71     Recent Readings 1/15/2020 1/15/2020 1/7/2020 1/7/2020 1/3/2020    SBP (mmHg) 112 112 131 131 114    DBP (mmHg) 65 65 71 71 67    Pulse 72 72 75 75 86                      Diet Screening   No change to diet.  Patient reports eating or drinking the following: fruit, water and fresh vegetables    Barriers to a Healthy Diet: no barriers to healthy eating    Patient reports that she has made no major change sot her diet. Her and her  eat plenty of fruits and vegetables. Encouraged patient to keep monitoring sodium intake to help regulate BP numbers.    Physical Activity Screening   When asked if exercising, patient responded: yes    Patient participates in the following activities: yard/housework and walking    She identified the following barriers to physical activity: no barriers to being active    Patient states that she and her  have increased activity by trying to walk more where they can. They will walk around stores, park in the back of parking lots, go for walks in the evening, etc.    Medication Adherence Screening   She misses  doses: never      Patient identified the following reasons for non-compliance: none    Patient reports a dry cough that has been lingering since before thanksgiving. Patient thought it may have to do with sinus issues or that she had a cold but that has passed and patient is still experiencing dry cough. Will notify PharmMADY MCDONALD Patient reports taking BP medication as prescribed.       SDOH

## 2020-01-20 ENCOUNTER — TELEPHONE (OUTPATIENT)
Dept: FAMILY MEDICINE | Facility: CLINIC | Age: 65
End: 2020-01-20

## 2020-01-20 NOTE — TELEPHONE ENCOUNTER
----- Message from Michael Mitchell sent at 1/20/2020  4:29 PM CST -----  Contact: Pio/   called in and wanted to speak with someone at the office regarding the patient prescription and would like a call back from the office. He can be reached at    [phone]

## 2020-01-21 ENCOUNTER — PATIENT OUTREACH (OUTPATIENT)
Dept: OTHER | Facility: OTHER | Age: 65
End: 2020-01-21

## 2020-01-22 DIAGNOSIS — I10 ESSENTIAL HYPERTENSION: ICD-10-CM

## 2020-01-23 RX ORDER — METFORMIN HYDROCHLORIDE 500 MG/1
500 TABLET, EXTENDED RELEASE ORAL
Qty: 90 TABLET | Refills: 3 | Status: SHIPPED | OUTPATIENT
Start: 2020-01-23 | End: 2020-06-30

## 2020-01-23 RX ORDER — LOVASTATIN 20 MG/1
20 TABLET ORAL NIGHTLY
Qty: 90 TABLET | Refills: 3 | Status: SHIPPED | OUTPATIENT
Start: 2020-01-23 | End: 2020-11-08 | Stop reason: SDUPTHER

## 2020-01-23 RX ORDER — HYDROCHLOROTHIAZIDE 25 MG/1
25 TABLET ORAL DAILY
Qty: 90 TABLET | Refills: 3 | Status: SHIPPED | OUTPATIENT
Start: 2020-01-23 | End: 2020-11-30 | Stop reason: SDUPTHER

## 2020-01-23 RX ORDER — AMLODIPINE BESYLATE 10 MG/1
10 TABLET ORAL DAILY
Qty: 90 TABLET | Refills: 3 | Status: SHIPPED | OUTPATIENT
Start: 2020-01-23 | End: 2020-11-08 | Stop reason: SDUPTHER

## 2020-01-23 RX ORDER — OMEPRAZOLE 40 MG/1
40 CAPSULE, DELAYED RELEASE ORAL DAILY
Qty: 90 CAPSULE | Refills: 3 | Status: SHIPPED | OUTPATIENT
Start: 2020-01-23 | End: 2020-12-04 | Stop reason: SDUPTHER

## 2020-01-23 RX ORDER — LOSARTAN POTASSIUM 100 MG/1
100 TABLET ORAL DAILY
Qty: 90 TABLET | Refills: 3 | Status: SHIPPED | OUTPATIENT
Start: 2020-01-23 | End: 2020-11-30 | Stop reason: SDUPTHER

## 2020-02-07 NOTE — PROGRESS NOTES
Digital Medicine: Clinician Follow-Up    Called patient to discuss her dry cough.    The history is provided by the patient.     Follow Up  Follow-up reason(s): reading review    Patient remains well controlled.    She has a dry cough that is occurring but she is assuming it's allergy related.    She reported one hypotensive episode since our last outreach.       INTERVENTION(S)  reviewed appropriate dose schedule and encouragement/support    PLAN  patient verbalizes understanding and continue monitoring    Patient would like to continue her current hypertension medication regimen. We discussed trying to reduce losartan to see if this improves her cough and prevents further hypotension symptoms however she decided to maintain her medications at this time. She will contact me if further hypotension symptoms occur or if she could like to reduce hypertension medications.       There are no preventive care reminders to display for this patient.    Last 5 Patient Entered Readings                                      Current 30 Day Average: 114/68     Recent Readings 2/4/2020 2/4/2020 1/27/2020 1/27/2020 1/22/2020    SBP (mmHg) 120 120 111 111 111    DBP (mmHg) 71 71 67 67 70    Pulse 79 79 67 67 70             Hypertension Medications             amLODIPine (NORVASC) 10 MG tablet Take 1 tablet (10 mg total) by mouth once daily.    hydroCHLOROthiazide (HYDRODIURIL) 25 MG tablet Take 1 tablet (25 mg total) by mouth once daily.    losartan (COZAAR) 100 MG tablet Take 1 tablet (100 mg total) by mouth once daily.                 Screenings

## 2020-02-19 ENCOUNTER — PATIENT OUTREACH (OUTPATIENT)
Dept: OTHER | Facility: OTHER | Age: 65
End: 2020-02-19

## 2020-04-03 NOTE — PROGRESS NOTES
Digital Medicine: Health  Follow-Up    Patient reports that she is doing well. Patient AVG BP has increased since our last encounter but patient BP readings are trending down. Patient attributes increase due to a recent decline in activity. Will f/u in 6 weeks.    The history is provided by the patient. No  was used.     Follow Up  Follow-up reason(s): reading review      Readings are trending down due to lifestyle change.    Routine Education Topics: eating patterns and physical activity        INTERVENTION(S)  recommended diet modifications, recommend physical activity and encouragement/support      There are no preventive care reminders to display for this patient.    Last 5 Patient Entered Readings                                      Current 30 Day Average: 132/75     Recent Readings 3/28/2020 3/28/2020 3/21/2020 3/21/2020 3/17/2020    SBP (mmHg) 136 136 138 138 120    DBP (mmHg) 73 73 75 75 65    Pulse 79 79 73 73 75                      Diet Screening   No change to diet.  She has the following dietary restrictions: low sodium diet and GERD    Barriers to a Healthy Diet: no barriers to healthy eating    Patient reports making no major changes to her diet since our last encounter.     Physical Activity Screening   When asked if exercising, patient responded: yes    Patient participates in the following activities: walking and yard/housework    She identified the following barriers to physical activity: coronavirus    Patient reports that her and her 's activity levels are down since our last encounter. 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 Encouraged patient to look for opportunities to be active and to try and break up long sedentary periods with some movement. Patient understood.     Medication Adherence Screening   She did not miss a dose this month.    Patient identified the following reasons for  non-compliance: None    Patient reports no s/s or issues taking BP medication as prescribed.       SDOH

## 2020-05-01 NOTE — PROGRESS NOTES
Chart review completed. No changes in antihypertensive medications. Informed health  that she has declined in activity and stated that COIVD19 pandemic is the cause. Will follow up at future encounter.

## 2020-05-22 ENCOUNTER — TELEPHONE (OUTPATIENT)
Dept: FAMILY MEDICINE | Facility: CLINIC | Age: 65
End: 2020-05-22

## 2020-05-22 ENCOUNTER — LAB VISIT (OUTPATIENT)
Dept: INTERNAL MEDICINE | Facility: CLINIC | Age: 65
End: 2020-05-22
Payer: MEDICARE

## 2020-05-22 DIAGNOSIS — R59.1 LYMPHADENOPATHY: ICD-10-CM

## 2020-05-22 DIAGNOSIS — R53.83 FATIGUE, UNSPECIFIED TYPE: ICD-10-CM

## 2020-05-22 DIAGNOSIS — R59.1 LYMPHADENOPATHY: Primary | ICD-10-CM

## 2020-05-22 LAB — SARS-COV-2 RNA RESP QL NAA+PROBE: NOT DETECTED

## 2020-05-22 PROCEDURE — U0003 INFECTIOUS AGENT DETECTION BY NUCLEIC ACID (DNA OR RNA); SEVERE ACUTE RESPIRATORY SYNDROME CORONAVIRUS 2 (SARS-COV-2) (CORONAVIRUS DISEASE [COVID-19]), AMPLIFIED PROBE TECHNIQUE, MAKING USE OF HIGH THROUGHPUT TECHNOLOGIES AS DESCRIBED BY CMS-2020-01-R: HCPCS

## 2020-05-22 NOTE — TELEPHONE ENCOUNTER
----- Message from Mara Parmar sent at 5/22/2020 11:10 AM CDT -----  Contact: Pio Dimas ()  Type:  Needs Medical Advice    Who Called:  Pio  Symptoms (please be specific):  Right side swollen neck glands, temp of 99.4 on 5/16/2020   How long has patient had these symptoms:  A week  Pharmacy name and phone #:  [id number] - [place], [place] - [address] [phone] (Phone)  [phone] (Fax)  Would the patient rather a call back or a response via [facility]?  Call back  Best Call Back Number:  [phone]  Additional Information:

## 2020-05-22 NOTE — TELEPHONE ENCOUNTER
Patient is requesting Covid testing.  Patient c/o swollen gland in neck, has been running 99.4 fever but is controlling with tylenol and has been extremely fatigued.  Please advise.

## 2020-06-01 ENCOUNTER — PATIENT OUTREACH (OUTPATIENT)
Dept: OTHER | Facility: OTHER | Age: 65
End: 2020-06-01

## 2020-06-01 NOTE — PROGRESS NOTES
Digital Medicine: Clinician Follow-Up    Called patient to follow up. Patient endorses adherence to medication regimen. Patient denies hypotensive s/sx (lightheadedness, dizziness, nausea); patient denies hypertensive s/sx (SOB, CP, changes in vision). she reports that she has been having fevers and takes Tylenol. She has been having headaches, fatigue, and high blood sugar. She received a flu shot in October 2019. She states that she got tested for COVID-19 last week due to her symptoms however her results came back negative. She states that she has a virtual appointment with her PCP on Wednesday, June 3rd, 2020.     The history is provided by the patient. No  was used.     Follow Up  Follow-up reason(s): routine education      Assessment:  Reviewed recent readings. Per 2017 ACC/ AHA HTN guidelines (goal of BP < 130/80), current 30-day average is well controlled.     Last 5 Patient Entered Readings                                      Current 30 Day Average: 124/72     Recent Readings 5/29/2020 5/29/2020 5/21/2020 5/21/2020 5/13/2020    SBP (mmHg) 119 119 120 120 135    DBP (mmHg) 75 75 73 73 72    Pulse 84 84 77 77 80        INTERVENTION(S)  reviewed appropriate dose schedule, encouragement/support and goal setting    PLAN  patient verbalizes understanding and continue monitoring    >Continue current medication regimen.   >Reviewed maximum Tylenol dose per day is 3200 mg. Informed patient that she can self treat fever for 2 days.   >I will continue to monitor regularly and will follow-up in 12 weeks, sooner if blood pressure begins to trend upward or downward.   >Patient denies having questions or concerns. Patient has my contact information and knows to call with any concerns or clinical changes.          Topic    Hemoglobin A1C      Hypertension Medications             amLODIPine (NORVASC) 10 MG tablet Take 1 tablet (10 mg total) by mouth once daily.    hydroCHLOROthiazide (HYDRODIURIL) 25  MG tablet Take 1 tablet (25 mg total) by mouth once daily.    losartan (COZAAR) 100 MG tablet Take 1 tablet (100 mg total) by mouth once daily.

## 2020-06-03 ENCOUNTER — OFFICE VISIT (OUTPATIENT)
Dept: FAMILY MEDICINE | Facility: CLINIC | Age: 65
End: 2020-06-03
Payer: MEDICARE

## 2020-06-03 DIAGNOSIS — R53.83 FATIGUE, UNSPECIFIED TYPE: Primary | ICD-10-CM

## 2020-06-03 DIAGNOSIS — E11.9 CONTROLLED TYPE 2 DIABETES MELLITUS WITHOUT COMPLICATION, WITHOUT LONG-TERM CURRENT USE OF INSULIN: ICD-10-CM

## 2020-06-03 DIAGNOSIS — R50.9 FEVER, UNSPECIFIED FEVER CAUSE: ICD-10-CM

## 2020-06-03 DIAGNOSIS — R59.1 LYMPHADENOPATHY: ICD-10-CM

## 2020-06-03 PROCEDURE — 99499 RISK ADDL DX/OHS AUDIT: ICD-10-PCS | Mod: 95,,, | Performed by: FAMILY MEDICINE

## 2020-06-03 PROCEDURE — 1159F PR MEDICATION LIST DOCUMENTED IN MEDICAL RECORD: ICD-10-PCS | Mod: 95,,, | Performed by: FAMILY MEDICINE

## 2020-06-03 PROCEDURE — 99499 UNLISTED E&M SERVICE: CPT | Mod: 95,,, | Performed by: FAMILY MEDICINE

## 2020-06-03 PROCEDURE — 3044F HG A1C LEVEL LT 7.0%: CPT | Mod: CPTII,95,, | Performed by: FAMILY MEDICINE

## 2020-06-03 PROCEDURE — 99214 PR OFFICE/OUTPT VISIT, EST, LEVL IV, 30-39 MIN: ICD-10-PCS | Mod: 95,,, | Performed by: FAMILY MEDICINE

## 2020-06-03 PROCEDURE — 99214 OFFICE O/P EST MOD 30 MIN: CPT | Mod: 95,,, | Performed by: FAMILY MEDICINE

## 2020-06-03 PROCEDURE — 1101F PT FALLS ASSESS-DOCD LE1/YR: CPT | Mod: CPTII,95,, | Performed by: FAMILY MEDICINE

## 2020-06-03 PROCEDURE — 1159F MED LIST DOCD IN RCRD: CPT | Mod: 95,,, | Performed by: FAMILY MEDICINE

## 2020-06-03 PROCEDURE — 1101F PR PT FALLS ASSESS DOC 0-1 FALLS W/OUT INJ PAST YR: ICD-10-PCS | Mod: CPTII,95,, | Performed by: FAMILY MEDICINE

## 2020-06-03 PROCEDURE — 3044F PR MOST RECENT HEMOGLOBIN A1C LEVEL <7.0%: ICD-10-PCS | Mod: CPTII,95,, | Performed by: FAMILY MEDICINE

## 2020-06-03 NOTE — PROGRESS NOTES
The patient location is: home  The chief complaint leading to consultation is:  No chief complaint on file.    Visit type: Virtual visit with synchronous audio and video  Total time spent with patient:  25 min  Each patient to whom he or she provides medical services by telemedicine is:  (1) informed of the relationship between the physician and patient and the respective role of any other health care provider with respect to management of the patient; and (2) notified that he or she may decline to receive medical services by telemedicine and may withdraw from such care at any time.    (Portions of this note were dictated using voice recognition software and may contain dictation related errors in spelling/grammar/syntax not found on text review)         HPI: 65 y.o. female      virtual visit for:  Fever, lymphadenopathy    Couple weeks ago had swollen lymph node pre-auricular right side, tender to palpate.  Seemed to get better but then a week later she started running low-grade fever.  Had been tested for CoVID and was negative.  Symptoms lasted a few days and then went away.  Fever came back on May 29th and May 30th.  Was ranging from 100-101 degrees.  Had headache.  Off and on lymphadenopathy pre-auricular right-sided again but not as bad as the 1st time.  Occasional cough.  Occasional chest tightness but was not sure if this was related to allergies.  Occasional off and on shortness of breath.  Would occasionally get night sweats.  Tylenol would be use occasionally with fever and would help symptoms.  She denies any throat pain.  Denies any trouble swallowing.  Denies any body aches, loss of taste and smell, abdominal pain, nausea, vomiting, diarrhea.  No sick contacts known although a week before she started with the initial symptoms she had started back reading at [employer].  They do have very strict social distancing and mask wearing guidelines since returning to [employer].   is at home with her, he is  not ill    Other hx as follows    Hypertension on HCTZ 25 mg daily, amlodipine 10 mg daily, losartan 100 mg daily.        Hyperlipidemia on lovastatin 20 mg daily     Fatty liver disease, elevated LFTs . Negative hepatitis screening. Ultrasound of the liver done in 2015 demonstrating hepatic cyst and fatty infiltration.  Has followed up with hepatology clinic December of 2015     Carotid stenosis history. Last ultrasound April 2018 showed an no hemodynamically significant stenosis     Diabetes: On metformin extended release 500 mg daily.  Last A1c controlled as below  Normal [facility] ratio.  Has been noticing some increased blood sugars in the last couple weeks given her illness above, couple of morning sugars fasting were 146 and 159.  Had some 170-198 postprandial readings.         Sleep apnea,   Could not tolerate CPAP secondary to anxiety with machine.              Past Medical History:   Diagnosis Date    AR (allergic rhinitis)     Carotid stenosis     50% RCA    Diabetes mellitus     Fatty liver     GERD (gastroesophageal reflux disease)     HLD (hyperlipidemia)     HTN (hypertension)     Joint pain     Sleep apnea 2018       Past Surgical History:   Procedure Laterality Date    SKIN BIOPSY  10yrs.    negative       Family History   Problem Relation Age of Onset    Cancer Mother         gallbladder    Prostate cancer Brother 57    Diabetes Brother     Diabetes Maternal Grandmother     Hypertension Unknown         multiple    Coronary artery disease Father 79    Diabetes Father     Diabetes Maternal Aunt     Diabetes Maternal Uncle     Melanoma Neg Hx        Social History     Tobacco Use    Smoking status: Never Smoker    Smokeless tobacco: Never Used   Substance Use Topics    Alcohol use: Not Currently     Alcohol/week: 0.0 standard drinks    Drug use: No       Lab Results   Component Value Date    WBC 5.04 10/28/2019    HGB 13.3 10/28/2019    HCT 40.7 10/28/2019    MCV 91 10/28/2019    PLT  167 10/28/2019    CHOL 173 10/28/2019    TRIG 219 (H) 10/28/2019    HDL 53 10/28/2019    ALT 77 (H) 10/28/2019    AST 49 (H) 10/28/2019    BILITOT 0.5 10/28/2019    ALKPHOS 73 10/28/2019     10/28/2019    K 4.4 10/28/2019     10/28/2019    CREATININE 0.7 10/28/2019    ESTGFRAFRICA >60 10/28/2019    EGFRNONAA >60 10/28/2019    CALCIUM 10.2 10/28/2019    ALBUMIN 4.1 10/28/2019    BUN 15 10/28/2019    CO2 29 10/28/2019    TSH 0.846 10/28/2019    INR 1.0 12/01/2015    HGBA1C 6.2 (H) 10/28/2019    MICALBCREAT 9.6 10/28/2019    LDLCALC 76.2 10/28/2019     (H) 10/28/2019             Answers for HPI/ROS submitted by the patient on 6/1/2020   Fever  Chronicity: new  Onset: 1 to 4 weeks ago  Frequency: every several days  Progression since onset: waxing and waning  Max temp prior to arrival: 101 to 101.9 F  Temperature source: an oral thermometer  abdominal pain: No  chest pain: No  congestion: No  cough: No  diarrhea: No  ear pain: No  headaches: Yes  muscle aches: No  nausea: No  rash: No  sleepiness: Yes  sore throat: No  vomiting: No  wheezing: No  urinary pain: No  Treatments tried: acetaminophen  Improvement on treatment: significant       PE (elements able to be captured on virtual encounter)  APPEARANCE: Well nourished, well developed, in no acute distress.    HEAD: Normocephalic, atraumatic.  EYES:  .   Conjunctivae noninjected.  RESPIRATORY:  No increased work of breathing or objective visual signs of dyspnea  PSYCHIATRIC:  Normal mood and affect, alert and oriented, appropriate answers to questions      IMPRESSION  1. Fatigue, unspecified type    2. Fever, unspecified fever cause    3. Controlled type 2 diabetes mellitus without complication, without long-term current use of insulin    4. Lymphadenopathy            PLAN  Symptoms above, likely seem viral in nature.  Given current state of COVID prevalence, did recommend retesting of CoVID in case of initial false negative.  Have recommended  Tylenol, rest, fluids, continued monitoring of symptoms.  Continue monitoring blood sugars as it is not unexpected that blood sugars could mildly elevated in the face of acute infection.  Continue current diabetes medications.  Had some questions about recent news report on recall of metformin:  Advised to check with pharmacy to see if her particular lot was part of the recall list.  If so, could perhaps transition to immediate release metformin 250 b.i.d. given issue with diarrhea on 500 mg daily which is prompting her changed over to the extended release in the 1st place    Notify for any progressive issues with the above symptoms at warrants further evaluation and testing    At the end of the visit patient does state that occasionally she gets some myoclonic leg episodes at nighttime,  states that it is also during the day sometimes.  Had some bad cramping at 1 time but not recently.  States that if she rubs some rubbing alcohol on it it seems to get better.  Advise regular leg massage and also leg stretches especially with activity before bedtime.  She also at the end of the visit reports some occasional chronic numbness in her fingertips, usually sparing the 5th finger.  This is bilateral.  No numbness of the hand.  No numbness up into her arms.  No neck issues.  Advised some potential etiologies like carpal tunnel syndrome.  Can try wrist brace at nighttime and with any activity.  Notify for further evaluation for any progressive symptoms      Orders Placed This Encounter   Procedures    COVID-19 Routine Screening

## 2020-06-04 ENCOUNTER — LAB VISIT (OUTPATIENT)
Dept: FAMILY MEDICINE | Facility: CLINIC | Age: 65
End: 2020-06-04
Payer: MEDICARE

## 2020-06-04 DIAGNOSIS — R59.1 LYMPHADENOPATHY: ICD-10-CM

## 2020-06-04 DIAGNOSIS — R50.9 FEVER, UNSPECIFIED FEVER CAUSE: ICD-10-CM

## 2020-06-04 DIAGNOSIS — R53.83 FATIGUE, UNSPECIFIED TYPE: ICD-10-CM

## 2020-06-04 PROCEDURE — U0003 INFECTIOUS AGENT DETECTION BY NUCLEIC ACID (DNA OR RNA); SEVERE ACUTE RESPIRATORY SYNDROME CORONAVIRUS 2 (SARS-COV-2) (CORONAVIRUS DISEASE [COVID-19]), AMPLIFIED PROBE TECHNIQUE, MAKING USE OF HIGH THROUGHPUT TECHNOLOGIES AS DESCRIBED BY CMS-2020-01-R: HCPCS

## 2020-06-05 LAB — SARS-COV-2 RNA RESP QL NAA+PROBE: NOT DETECTED

## 2020-07-20 ENCOUNTER — PATIENT OUTREACH (OUTPATIENT)
Dept: OTHER | Facility: OTHER | Age: 65
End: 2020-07-20

## 2020-08-25 LAB
LEFT EYE DM RETINOPATHY: NEGATIVE
RIGHT EYE DM RETINOPATHY: NEGATIVE

## 2020-09-28 NOTE — PROGRESS NOTES
Digital Medicine: Health  Follow-Up    The history is provided by the patient.             Reason for review: Blood pressure at goal        Topics Covered on Call: physical activity and Diet    Additional Follow-up details: Patient reports that she is doing pretty well. Patient AVG BP is within goal. Patient reports making no major changes to her diet but she states that she has been working to increase her activity levels. Patient and discussed how being active throughout the day should help to regulate BP readings. Patient understood. Will f/u in 4-6 weeks to check in.           Diet-no change to diet  No 24 hour dietary recall  No change to diet.  Patient reports eating or drinking the following: Patient reports making no major changes to her diet since our last encounter.       Physical Activity-Change      Additional physical activity details: Patient reports that while she is still not getting plenty of activity she is working to increase how active she is throughout the day with chores and whatnot. Encouraged patient to keep up the good work.       Medication Adherence-Medication adherence was assessed.      Substance, Sleep, Stress-Not assessed      Continue current diet/physical activity routine.       Addressed patient questions and patient has my contact information if needed prior to next outreach. Patient verbalizes understanding.          There are no preventive care reminders to display for this patient.    Last 5 Patient Entered Readings                                      Current 30 Day Average: 130/72     Recent Readings 9/24/2020 9/24/2020 9/18/2020 9/18/2020 9/9/2020    SBP (mmHg) 124 124 136 136 119    DBP (mmHg) 72 72 81 81 73    Pulse 77 77 76 76 70

## 2020-11-16 ENCOUNTER — PATIENT OUTREACH (OUTPATIENT)
Dept: OTHER | Facility: OTHER | Age: 65
End: 2020-11-16

## 2020-11-16 NOTE — PROGRESS NOTES
Digital Medicine: Clinician Follow-Up    The history is provided by the patient.   Follow-up reason(s): routine follow up.     Hypertension    Patient's blood pressure is stable.   Patient is not experiencing signs/symptoms of hypotension.  Patient is not experiencing signs/symptoms of hypertension.      Additional Follow-up details: She has orthostatic hypotension at times. She consumes at least 64 ounces of water per day and has 8 ounces of coffee in the morning.           Last 5 Patient Entered Readings                                      Current 30 Day Average: 119/66     Recent Readings 11/10/2020 11/10/2020 11/3/2020 11/3/2020 10/27/2020    SBP (mmHg) 120 120 126 126 111    DBP (mmHg) 57 57 75 75 65    Pulse 80 80 73 73 77                 Depression Screening  Did not address depression screening.    Sleep Apnea Screening    Did not address sleep apnea screening.     Medication Affordability Screening  Did not address medication affordability screening.     Medication Adherence-Medication adherence was assessed.          ASSESSMENT(S)  Patients BP average is 119/66 mmHg, which is at goal. Patient's BP goal is less than or equal to 130/80.    Hypertension Plan  Continue current therapy.  Provided patient education. Discussed holding HTN medications if she experiences hypotension symptoms and/or <90/60.   Discussed maintaining at least 64 ounces of water per day.     We discussed that she is due for annual labs and that I can schedule a BMP. She choose to contact Dr. Cast for a full panel of labs. She will contact me if she has any trouble obtaining these results.     Addressed patient questions and patient has my contact information if needed prior to next outreach. Patient verbalizes understanding.                 Topic    Lipid (Cholesterol) Test      There are no preventive care reminders to display for this patient.      Hypertension Medications             amLODIPine (NORVASC) 10 MG tablet TAKE 1  TABLET BY MOUTH ONCE DAILY    hydroCHLOROthiazide (HYDRODIURIL) 25 MG tablet Take 1 tablet (25 mg total) by mouth once daily.    losartan (COZAAR) 100 MG tablet Take 1 tablet (100 mg total) by mouth once daily.

## 2020-11-17 ENCOUNTER — OFFICE VISIT (OUTPATIENT)
Dept: FAMILY MEDICINE | Facility: CLINIC | Age: 65
End: 2020-11-17
Payer: MEDICARE

## 2020-11-17 ENCOUNTER — TELEPHONE (OUTPATIENT)
Dept: FAMILY MEDICINE | Facility: CLINIC | Age: 65
End: 2020-11-17

## 2020-11-17 DIAGNOSIS — R07.89 CHEST PRESSURE: ICD-10-CM

## 2020-11-17 DIAGNOSIS — I10 ESSENTIAL HYPERTENSION: Primary | ICD-10-CM

## 2020-11-17 DIAGNOSIS — E11.9 CONTROLLED TYPE 2 DIABETES MELLITUS WITHOUT COMPLICATION, WITHOUT LONG-TERM CURRENT USE OF INSULIN: ICD-10-CM

## 2020-11-17 DIAGNOSIS — Z20.822 EXPOSURE TO COVID-19 VIRUS: ICD-10-CM

## 2020-11-17 PROCEDURE — 99499 UNLISTED E&M SERVICE: CPT | Mod: 95,,, | Performed by: FAMILY MEDICINE

## 2020-11-17 PROCEDURE — 1159F MED LIST DOCD IN RCRD: CPT | Mod: 95,,, | Performed by: FAMILY MEDICINE

## 2020-11-17 PROCEDURE — 99214 PR OFFICE/OUTPT VISIT, EST, LEVL IV, 30-39 MIN: ICD-10-PCS | Mod: 95,,, | Performed by: FAMILY MEDICINE

## 2020-11-17 PROCEDURE — 99499 RISK ADDL DX/OHS AUDIT: ICD-10-PCS | Mod: 95,,, | Performed by: FAMILY MEDICINE

## 2020-11-17 PROCEDURE — 1159F PR MEDICATION LIST DOCUMENTED IN MEDICAL RECORD: ICD-10-PCS | Mod: 95,,, | Performed by: FAMILY MEDICINE

## 2020-11-17 PROCEDURE — 99214 OFFICE O/P EST MOD 30 MIN: CPT | Mod: 95,,, | Performed by: FAMILY MEDICINE

## 2020-11-17 RX ORDER — METFORMIN HYDROCHLORIDE 500 MG/1
1000 TABLET, EXTENDED RELEASE ORAL DAILY
Qty: 180 TABLET | Refills: 3 | Status: SHIPPED | OUTPATIENT
Start: 2020-11-17 | End: 2021-09-12 | Stop reason: SDUPTHER

## 2020-11-17 RX ORDER — METFORMIN HYDROCHLORIDE 500 MG/1
1000 TABLET, EXTENDED RELEASE ORAL DAILY
Qty: 90 TABLET | Refills: 3 | Status: SHIPPED | OUTPATIENT
Start: 2020-11-17 | End: 2020-11-17 | Stop reason: SDUPTHER

## 2020-11-17 RX ORDER — METFORMIN HYDROCHLORIDE 500 MG/1
1000 TABLET, EXTENDED RELEASE ORAL DAILY
Qty: 180 TABLET | Refills: 3 | Status: SHIPPED | OUTPATIENT
Start: 2020-11-17 | End: 2022-01-27

## 2020-11-17 NOTE — PROGRESS NOTES
"The patient location is: home  The chief complaint leading to consultation is:  No chief complaint on file.    Visit type: Virtual visit with synchronous audio and video  Total time spent with patient:  30 min  Each patient to whom he or she provides medical services by telemedicine is:  (1) informed of the relationship between the physician and patient and the respective role of any other health care provider with respect to management of the patient; and (2) notified that he or she may decline to receive medical services by telemedicine and may withdraw from such care at any time.    (Portions of this note were dictated using voice recognition software and may contain dictation related errors in spelling/grammar/syntax not found on text review)         HPI: 65 y.o. female Last visit 06/03/2020.  Had symptoms of fever and lymphadenopathy had concern about CoVID although testing has been negative at that time.  Had some prolonged low-grade fevers but later eventually improved.  Medical history as below.  Active would digital medicine for hypertension, on HCTZ 25 mg daily, amlodipine 10 mg daily, losartan 100 mg daily.  BP readings have been normal.  She has diabetes well controlled on metformin 500 mg extended release daily, hyperlipidemia on lovastatin 20 mg daily.  She has elevated LFTs and fatty liver disease noted on prior ultrasound 2015, negative hepatitis testing and negative NICKIE and anti smooth muscle and antimitochondrial antibody testing.    Currently pressure in middle upper chest with deep breathing, radiates to upper back. No sharp chest pain. Duration "couple weeks".  Dtr, son-in-law, and grandchild +claudy for covid but sx started before exposure.     No coughing, fever, chills, sweats, abdominal pain, nausea, vomiting.  She denies any similar symptoms with exertion or with movement change.  Denies any heavy exertion recently.  She denies any significant pain with shallow breathing although she will feel " some sensation in her upper back    Due for labs.  Feels like her blood sugars been running high recently around 150s to 170s.  She is on metformin 500 mg extended release daily        Past Medical History:   Diagnosis Date    AR (allergic rhinitis)     Carotid stenosis     50% RCA    Diabetes mellitus     Fatty liver     GERD (gastroesophageal reflux disease)     HLD (hyperlipidemia)     HTN (hypertension)     Joint pain     Sleep apnea 2018       Past Surgical History:   Procedure Laterality Date    SKIN BIOPSY  10yrs.    negative       Family History   Problem Relation Age of Onset    Cancer Mother         gallbladder    Prostate cancer Brother 57    Diabetes Brother     Diabetes Maternal Grandmother     Hypertension Unknown         multiple    Coronary artery disease Father 79    Diabetes Father     Diabetes Maternal Aunt     Diabetes Maternal Uncle     Melanoma Neg Hx        Social History     Tobacco Use    Smoking status: Never Smoker    Smokeless tobacco: Never Used   Substance Use Topics    Alcohol use: Not Currently     Alcohol/week: 0.0 standard drinks    Drug use: No       Lab Results   Component Value Date    WBC 5.04 10/28/2019    HGB 13.3 10/28/2019    HCT 40.7 10/28/2019    MCV 91 10/28/2019     10/28/2019    CHOL 173 10/28/2019    TRIG 219 (H) 10/28/2019    HDL 53 10/28/2019    ALT 77 (H) 10/28/2019    AST 49 (H) 10/28/2019    BILITOT 0.5 10/28/2019    ALKPHOS 73 10/28/2019     10/28/2019    K 4.4 10/28/2019     10/28/2019    CREATININE 0.7 10/28/2019    ESTGFRAFRICA >60 10/28/2019    EGFRNONAA >60 10/28/2019    CALCIUM 10.2 10/28/2019    ALBUMIN 4.1 10/28/2019    BUN 15 10/28/2019    CO2 29 10/28/2019    TSH 0.846 10/28/2019    INR 1.0 12/01/2015    HGBA1C 6.2 (H) 10/28/2019    MICALBCREAT 9.6 10/28/2019    LDLCALC 76.2 10/28/2019     (H) 10/28/2019         Hemoglobin A1C (%)   Date Value   10/28/2019 6.2 (H)     AST (U/L)   Date Value   10/28/2019  49 (H)   11/08/2018 67 (H)   04/06/2018 31   11/14/2017 58 (H)   05/31/2017 57 (H)   11/12/2016 65 (H)     ALT (U/L)   Date Value   10/28/2019 77 (H)   11/08/2018 106 (H)   04/06/2018 50 (H)   11/14/2017 84 (H)   05/31/2017 75 (H)   11/12/2016 88 (H)       Answers for HPI/ROS submitted by the patient on 11/17/2020   Shortness of breath  Chronicity: new  Onset: 1 to 4 weeks ago  Frequency: intermittently  Progression since onset: unchanged  Episode duration: 15 seconds  abdominal pain: No  chest pain: No  claudication: No  coryza: No  ear pain: No  fever: No  headaches: No  hemoptysis: No  leg pain: No  leg swelling: No  neck pain: Yes  orthopnea: No  PND: No  rash: No  rhinorrhea: No  sore throat: No  sputum production: No  swollen glands: No  syncope: No  vomiting: No  wheezing: No  Aggravating factors: nothing  Risk factors for DVT/PE: no known risk factors  Treatments tried: nothing  asthma: No  allergies: No  COPD: No  pneumonia: No  aspirin allergies: No  CAD: No  DVT: No  heart failure: No  PE: No  recent surgery: No  bronchiolitis: No  chronic lung disease: No       PE (elements able to be captured on virtual encounter)  APPEARANCE: Well nourished, well developed, in no acute distress.    HEAD: Normocephalic, atraumatic.  EYES:  .   Conjunctivae noninjected.  RESPIRATORY:  No increased work of breathing or objective visual signs of dyspnea  PSYCHIATRIC:  Normal mood and affect, alert and oriented, appropriate answers to questions  Denies any chest tenderness to palpation in the upper chest.  No worsening tenderness with palpation with deep breathing although she feels the abnormal sensation in her upper back during this maneuver.    IMPRESSION  1. Essential hypertension    2. Controlled type 2 diabetes mellitus without complication, without long-term current use of insulin    3. Chest pressure            PLAN  Her chest symptoms seem more related to MSK pathology such as costochondritis.  There is no  exertional component.  It seems less dyspnea related and more chest pressure with taking a deep breath.  Advised temporary trial of NSAID, heat locally to the chest and upper back, exercises for upper back and costochondritis which have been provided.  Notify if symptoms do not improve in which case further evaluation may need to include chest x-ray an EKG    Diabetes, increase metformin to 1000 mg daily.  Check labs below    Recent CoVID exposure on November 12th.  Discussed given close exposure that she and her  must quarantine for 14 days.  Notify for any development of new symptoms.  Will try to set up testing below after quarantine ends    Orders Placed This Encounter   Procedures    CBC Auto Differential    Comprehensive Metabolic Panel    Lipid Panel    TSH    Microalbumin/Creatinine Ratio, Urine    Hemoglobin A1C       Can follow-up in the next couple months in the office or sooner as needed

## 2020-11-17 NOTE — PATIENT INSTRUCTIONS
Costochondritis exercises:         Wall slide. Stand with your back against the wall. Raise your arms up so that your fingertips point to the ceiling and your elbows are raised about shoulder height. Keeping your arms against the wall, raise them up overhead. Hold and then lower to the start position. Repeat 10 to 12 times per session.   Scapula squeeze. Stand straight and tall, reaching your head toward the ceiling and breathing comfortably. Squeeze your shoulder blades together and hold the squeeze as long as it feels comfortable. Release. Repeat five to 10 times per session.   Stretch the pecs. Stand just outside a doorway and reach your arms up as high as you can on either side of the frame. Supporting your weight with your hands, push your chest and body straight forward. Keep your abs tight so that your back doesn't wobble or arch. Hold for a few seconds and return to the starting position. Repeat five to 10 times per session.   Thorax stretch. Sit down with your legs straight out in front of you, keeping your back nice and straight. With your hands on the floor at the side of your thighs, lean forward slowly. Curve down over your legs so that your bellybutton moves toward your spine. Hold for a few seconds and slowly sit back up. Repeat five to 10 times per session.   Side stretch. Starting in the same seated position as you did for the thorax stretch, except place your hands on your lower thighs. Bend your right arm and lean forward. Try to get your right elbow down to the knee on that side, allowing your hand to slide across to your left leg. For extra stretch, hold your elbow on the inside of the right knee, using the pressure between them to turn your chest further toward the left. Slowly release and return to the start position. Repeat with the left side of the body. Alternate sides, stretching each side 10 to 12 times per session.              Chest Wall Pain: Costochondritis    The chest  pain that you have had today is caused by costochondritis. This condition is caused by an inflammation of the cartilage joining your ribs to your breastbone. It is not caused by heart or lung problems. Your healthcare team has made sure that the chest pain you feel is not from a life threatening cause of chest pain such as heart attack, collapsed lung, blood clot in the lung, tear in the aorta, or esophageal rupture. The inflammation may have been brought on by a blow to the chest, lifting heavy objects, intense exercise, or an illness that made you cough and sneeze a lot. It often occurs during times of emotional stress. It can be painful, but it is not dangerous. It usually goes away in 1 to 2 weeks. But it may happen again. Rarely, a more serious condition may cause symptoms similar to costochondritis. Thats why its important to watch for the warning signs listed below.  Home care  Follow these guidelines when caring for yourself at home:  · If you feel that emotional stress is a cause of your condition, try to figure out the sources of that stress. It may not be obvious. Learn ways to deal with the stress in your life. This can include regular exercise, muscle relaxation, meditation, or simply taking time out for yourself.  · You may use acetaminophen, ibuprofen, or naproxen to control pain, unless another pain medicine was prescribed. If you have liver or kidney disease or ever had a stomach ulcer, talk with your healthcare provider before using these medicines.  · You can also help ease pain by using a hot, wet compress or heating pad. Use this with or without a medicated skin cream that helps relieves pain.  · Do stretching exercise as advised by your provider.  · Take any prescribed medicines as directed.  Follow-up care  Follow up with your healthcare provider, or as advised, if you do not start to get better in the next 2 days.  When to seek medical advice  Call your healthcare provider right away if any  of these occur:  · A change in the type of pain. Call if it feels different, becomes more serious, lasts longer, or spreads into your shoulder, arm, neck, jaw, or back.  · Shortness of breath or pain gets worse when you breathe  · Weakness, dizziness, or fainting  · Cough with dark-colored sputum (phlegm) or blood  · Abdominal pain  · Dark red or black stools  · Fever of 100.4ºF (38ºC) or higher, or as directed by your healthcare provider  Date Last Reviewed: 12/1/2016  © 2835-4362 [employer]. [address] [place] [id number]. All rights reserved. This information is not intended as a substitute for professional medical care. Always follow your healthcare professional's instructions.

## 2020-11-25 ENCOUNTER — PATIENT OUTREACH (OUTPATIENT)
Dept: OTHER | Facility: OTHER | Age: 65
End: 2020-11-25

## 2020-11-25 NOTE — PROGRESS NOTES
Digital Medicine: Health  Follow-Up    The history is provided by the patient.             Reason for review: Blood pressure at goal        Topics Covered on Call: physical activity and Diet    Additional Follow-up details: Patient reports that she is doing well. Patient AVG BP is within goal and readings are stable. Patient states that she has made no major changes to her diet or activity levels since our last encounter. Encouraged patient to keep up the good work. Will f/u in 6 weeks to check in.             Diet-no change to diet  No 24 hour dietary recall  No change to diet.  Patient reports eating or drinking the following: Patient reports making no major changes to her diet since our last encounter.       Physical Activity-no change to routine  No change to exercise routine.       Additional physical activity details: Patient reports making no major changes to her activity levels since our last encounter. Patient staying at home mostly. Walks around the house, performs chores, and will work in the yard. Encouraged patient to keep up the good work.       Medication Adherence-Medication adherence was assessed.      Substance, Sleep, Stress-Not assessed      Continue current diet/physical activity routine.       Addressed patient questions and patient has my contact information if needed prior to next outreach. Patient verbalizes understanding.      Explained the importance of self-monitoring and medication adherence. Encouraged the patient to communicate with their health  for lifestyle modifications to help improve or maintain a healthy lifestyle.                   Topic    Lipid (Cholesterol) Test          Last 5 Patient Entered Readings                                      Current 30 Day Average: 119/67     Recent Readings 11/23/2020 11/23/2020 11/17/2020 11/17/2020 11/10/2020    SBP (mmHg) 110 110 129 129 120    DBP (mmHg) 68 68 70 70 57    Pulse 72 72 72 72 80

## 2020-12-04 ENCOUNTER — LAB VISIT (OUTPATIENT)
Dept: LAB | Facility: HOSPITAL | Age: 65
End: 2020-12-04
Attending: FAMILY MEDICINE
Payer: MEDICARE

## 2020-12-04 DIAGNOSIS — E11.9 CONTROLLED TYPE 2 DIABETES MELLITUS WITHOUT COMPLICATION, WITHOUT LONG-TERM CURRENT USE OF INSULIN: ICD-10-CM

## 2020-12-04 DIAGNOSIS — I10 ESSENTIAL HYPERTENSION: ICD-10-CM

## 2020-12-04 LAB
ALBUMIN SERPL BCP-MCNC: 4.4 G/DL (ref 3.5–5.2)
ALP SERPL-CCNC: 64 U/L (ref 55–135)
ALT SERPL W/O P-5'-P-CCNC: 71 U/L (ref 10–44)
ANION GAP SERPL CALC-SCNC: 10 MMOL/L (ref 8–16)
AST SERPL-CCNC: 52 U/L (ref 10–40)
BASOPHILS # BLD AUTO: 0.02 K/UL (ref 0–0.2)
BASOPHILS NFR BLD: 0.5 % (ref 0–1.9)
BILIRUB SERPL-MCNC: 0.7 MG/DL (ref 0.1–1)
BUN SERPL-MCNC: 15 MG/DL (ref 8–23)
CALCIUM SERPL-MCNC: 9.9 MG/DL (ref 8.7–10.5)
CHLORIDE SERPL-SCNC: 99 MMOL/L (ref 95–110)
CHOLEST SERPL-MCNC: 162 MG/DL (ref 120–199)
CHOLEST/HDLC SERPL: 3.1 {RATIO} (ref 2–5)
CO2 SERPL-SCNC: 30 MMOL/L (ref 23–29)
CREAT SERPL-MCNC: 0.8 MG/DL (ref 0.5–1.4)
DIFFERENTIAL METHOD: NORMAL
EOSINOPHIL # BLD AUTO: 0.1 K/UL (ref 0–0.5)
EOSINOPHIL NFR BLD: 2.8 % (ref 0–8)
ERYTHROCYTE [DISTWIDTH] IN BLOOD BY AUTOMATED COUNT: 13 % (ref 11.5–14.5)
EST. GFR  (AFRICAN AMERICAN): >60 ML/MIN/1.73 M^2
EST. GFR  (NON AFRICAN AMERICAN): >60 ML/MIN/1.73 M^2
ESTIMATED AVG GLUCOSE: 131 MG/DL (ref 68–131)
GLUCOSE SERPL-MCNC: 146 MG/DL (ref 70–110)
HBA1C MFR BLD HPLC: 6.2 % (ref 4–5.6)
HCT VFR BLD AUTO: 41 % (ref 37–48.5)
HDLC SERPL-MCNC: 53 MG/DL (ref 40–75)
HDLC SERPL: 32.7 % (ref 20–50)
HGB BLD-MCNC: 13.7 G/DL (ref 12–16)
IMM GRANULOCYTES # BLD AUTO: 0.01 K/UL (ref 0–0.04)
IMM GRANULOCYTES NFR BLD AUTO: 0.2 % (ref 0–0.5)
LDLC SERPL CALC-MCNC: 63.6 MG/DL (ref 63–159)
LYMPHOCYTES # BLD AUTO: 1.9 K/UL (ref 1–4.8)
LYMPHOCYTES NFR BLD: 44.3 % (ref 18–48)
MCH RBC QN AUTO: 29.9 PG (ref 27–31)
MCHC RBC AUTO-ENTMCNC: 33.4 G/DL (ref 32–36)
MCV RBC AUTO: 90 FL (ref 82–98)
MONOCYTES # BLD AUTO: 0.5 K/UL (ref 0.3–1)
MONOCYTES NFR BLD: 11.1 % (ref 4–15)
NEUTROPHILS # BLD AUTO: 1.8 K/UL (ref 1.8–7.7)
NEUTROPHILS NFR BLD: 41.1 % (ref 38–73)
NONHDLC SERPL-MCNC: 109 MG/DL
NRBC BLD-RTO: 0 /100 WBC
PLATELET # BLD AUTO: 165 K/UL (ref 150–350)
PMV BLD AUTO: 10 FL (ref 9.2–12.9)
POTASSIUM SERPL-SCNC: 3.9 MMOL/L (ref 3.5–5.1)
PROT SERPL-MCNC: 7.7 G/DL (ref 6–8.4)
RBC # BLD AUTO: 4.58 M/UL (ref 4–5.4)
SODIUM SERPL-SCNC: 139 MMOL/L (ref 136–145)
TRIGL SERPL-MCNC: 227 MG/DL (ref 30–150)
TSH SERPL DL<=0.005 MIU/L-ACNC: 1.01 UIU/ML (ref 0.4–4)
WBC # BLD AUTO: 4.33 K/UL (ref 3.9–12.7)

## 2020-12-04 PROCEDURE — 80053 COMPREHEN METABOLIC PANEL: CPT

## 2020-12-04 PROCEDURE — 80061 LIPID PANEL: CPT

## 2020-12-04 PROCEDURE — 83036 HEMOGLOBIN GLYCOSYLATED A1C: CPT

## 2020-12-04 PROCEDURE — 36415 COLL VENOUS BLD VENIPUNCTURE: CPT

## 2020-12-04 PROCEDURE — 85025 COMPLETE CBC W/AUTO DIFF WBC: CPT

## 2020-12-04 PROCEDURE — 84443 ASSAY THYROID STIM HORMONE: CPT

## 2020-12-14 ENCOUNTER — PATIENT MESSAGE (OUTPATIENT)
Dept: FAMILY MEDICINE | Facility: CLINIC | Age: 65
End: 2020-12-14

## 2020-12-14 NOTE — TELEPHONE ENCOUNTER
Dear Parisa Dimas    Your recent labs were reviewed and released to your account. Your diabetes is well controlled.  Your triglycerides or fat in the blood were elevated along with a couple of your liver enzymes which can go along with fatty liver disease.  Healthy diet, regular exercise, and weight loss should help with normalizing these values.  We can recheck in around 3 months        Alex Cast MD

## 2021-01-11 ENCOUNTER — IMMUNIZATION (OUTPATIENT)
Dept: INTERNAL MEDICINE | Facility: CLINIC | Age: 66
End: 2021-01-11
Payer: MEDICARE

## 2021-01-11 PROCEDURE — 90694 VACC AIIV4 NO PRSRV 0.5ML IM: CPT | Mod: S$GLB,,, | Performed by: FAMILY MEDICINE

## 2021-01-11 PROCEDURE — G0008 ADMIN INFLUENZA VIRUS VAC: HCPCS | Mod: S$GLB,,, | Performed by: FAMILY MEDICINE

## 2021-01-11 PROCEDURE — 90694 FLU VACCINE - QUADRIVALENT - ADJUVANTED: ICD-10-PCS | Mod: S$GLB,,, | Performed by: FAMILY MEDICINE

## 2021-01-11 PROCEDURE — G0008 FLU VACCINE - QUADRIVALENT - ADJUVANTED: ICD-10-PCS | Mod: S$GLB,,, | Performed by: FAMILY MEDICINE

## 2021-03-25 ENCOUNTER — OFFICE VISIT (OUTPATIENT)
Dept: FAMILY MEDICINE | Facility: CLINIC | Age: 66
End: 2021-03-25
Payer: MEDICARE

## 2021-03-25 VITALS
OXYGEN SATURATION: 98 % | HEART RATE: 88 BPM | DIASTOLIC BLOOD PRESSURE: 70 MMHG | WEIGHT: 197.56 LBS | TEMPERATURE: 97 F | HEIGHT: 62 IN | BODY MASS INDEX: 36.35 KG/M2 | SYSTOLIC BLOOD PRESSURE: 126 MMHG

## 2021-03-25 DIAGNOSIS — G62.9 NEUROPATHY: ICD-10-CM

## 2021-03-25 DIAGNOSIS — H61.20 IMPACTED CERUMEN, UNSPECIFIED LATERALITY: ICD-10-CM

## 2021-03-25 DIAGNOSIS — Z00.00 ROUTINE GENERAL MEDICAL EXAMINATION AT A HEALTH CARE FACILITY: Primary | ICD-10-CM

## 2021-03-25 DIAGNOSIS — E11.9 CONTROLLED TYPE 2 DIABETES MELLITUS WITHOUT COMPLICATION, WITHOUT LONG-TERM CURRENT USE OF INSULIN: ICD-10-CM

## 2021-03-25 DIAGNOSIS — I77.1 STRICTURE OF ARTERY: ICD-10-CM

## 2021-03-25 DIAGNOSIS — E78.5 HYPERLIPIDEMIA, UNSPECIFIED HYPERLIPIDEMIA TYPE: ICD-10-CM

## 2021-03-25 DIAGNOSIS — M85.80 OSTEOPENIA, UNSPECIFIED LOCATION: ICD-10-CM

## 2021-03-25 DIAGNOSIS — I10 ESSENTIAL HYPERTENSION: ICD-10-CM

## 2021-03-25 DIAGNOSIS — M85.88 OTHER SPECIFIED DISORDERS OF BONE DENSITY AND STRUCTURE, OTHER SITE: ICD-10-CM

## 2021-03-25 DIAGNOSIS — R20.8 OTHER DISTURBANCES OF SKIN SENSATION (CODE): ICD-10-CM

## 2021-03-25 DIAGNOSIS — E66.01 SEVERE OBESITY WITH BODY MASS INDEX (BMI) OF 36.0 TO 36.9 WITH SERIOUS COMORBIDITY: ICD-10-CM

## 2021-03-25 DIAGNOSIS — Z12.31 SCREENING MAMMOGRAM, ENCOUNTER FOR: ICD-10-CM

## 2021-03-25 DIAGNOSIS — G47.33 OBSTRUCTIVE SLEEP APNEA HYPOPNEA, SEVERE: ICD-10-CM

## 2021-03-25 PROCEDURE — 99499 RISK ADDL DX/OHS AUDIT: ICD-10-PCS | Mod: S$GLB,,, | Performed by: FAMILY MEDICINE

## 2021-03-25 PROCEDURE — 3008F BODY MASS INDEX DOCD: CPT | Mod: CPTII,S$GLB,, | Performed by: FAMILY MEDICINE

## 2021-03-25 PROCEDURE — 99214 OFFICE O/P EST MOD 30 MIN: CPT | Mod: S$GLB,,, | Performed by: FAMILY MEDICINE

## 2021-03-25 PROCEDURE — 3078F PR MOST RECENT DIASTOLIC BLOOD PRESSURE < 80 MM HG: ICD-10-PCS | Mod: CPTII,S$GLB,, | Performed by: FAMILY MEDICINE

## 2021-03-25 PROCEDURE — 99999 PR PBB SHADOW E&M-EST. PATIENT-LVL V: ICD-10-PCS | Mod: PBBFAC,,, | Performed by: FAMILY MEDICINE

## 2021-03-25 PROCEDURE — 99499 UNLISTED E&M SERVICE: CPT | Mod: S$GLB,,, | Performed by: FAMILY MEDICINE

## 2021-03-25 PROCEDURE — 3044F PR MOST RECENT HEMOGLOBIN A1C LEVEL <7.0%: ICD-10-PCS | Mod: CPTII,S$GLB,, | Performed by: FAMILY MEDICINE

## 2021-03-25 PROCEDURE — 3008F PR BODY MASS INDEX (BMI) DOCUMENTED: ICD-10-PCS | Mod: CPTII,S$GLB,, | Performed by: FAMILY MEDICINE

## 2021-03-25 PROCEDURE — 3074F SYST BP LT 130 MM HG: CPT | Mod: CPTII,S$GLB,, | Performed by: FAMILY MEDICINE

## 2021-03-25 PROCEDURE — 99214 PR OFFICE/OUTPT VISIT, EST, LEVL IV, 30-39 MIN: ICD-10-PCS | Mod: S$GLB,,, | Performed by: FAMILY MEDICINE

## 2021-03-25 PROCEDURE — 3044F HG A1C LEVEL LT 7.0%: CPT | Mod: CPTII,S$GLB,, | Performed by: FAMILY MEDICINE

## 2021-03-25 PROCEDURE — 99999 PR PBB SHADOW E&M-EST. PATIENT-LVL V: CPT | Mod: PBBFAC,,, | Performed by: FAMILY MEDICINE

## 2021-03-25 PROCEDURE — 3078F DIAST BP <80 MM HG: CPT | Mod: CPTII,S$GLB,, | Performed by: FAMILY MEDICINE

## 2021-03-25 PROCEDURE — 3074F PR MOST RECENT SYSTOLIC BLOOD PRESSURE < 130 MM HG: ICD-10-PCS | Mod: CPTII,S$GLB,, | Performed by: FAMILY MEDICINE

## 2021-04-15 ENCOUNTER — PATIENT MESSAGE (OUTPATIENT)
Dept: RESEARCH | Facility: HOSPITAL | Age: 66
End: 2021-04-15

## 2021-04-16 ENCOUNTER — PATIENT OUTREACH (OUTPATIENT)
Dept: ADMINISTRATIVE | Facility: HOSPITAL | Age: 66
End: 2021-04-16

## 2021-07-22 ENCOUNTER — HOSPITAL ENCOUNTER (OUTPATIENT)
Dept: RADIOLOGY | Facility: HOSPITAL | Age: 66
Discharge: HOME | End: 2021-07-22
Attending: FAMILY MEDICINE
Payer: MEDICARE

## 2021-07-22 DIAGNOSIS — M85.80 OSTEOPENIA, UNSPECIFIED LOCATION: ICD-10-CM

## 2021-07-22 DIAGNOSIS — M85.88 OTHER SPECIFIED DISORDERS OF BONE DENSITY AND STRUCTURE, OTHER SITE: ICD-10-CM

## 2021-07-22 DIAGNOSIS — Z12.31 SCREENING MAMMOGRAM, ENCOUNTER FOR: ICD-10-CM

## 2021-07-22 PROCEDURE — 77080 DXA BONE DENSITY AXIAL: CPT | Mod: 26,,, | Performed by: RADIOLOGY

## 2021-07-22 PROCEDURE — 77080 DXA BONE DENSITY AXIAL: CPT | Mod: TC

## 2021-07-22 PROCEDURE — 77063 MAMMO DIGITAL SCREENING BILAT WITH TOMO: ICD-10-PCS | Mod: 26,,, | Performed by: RADIOLOGY

## 2021-07-22 PROCEDURE — 77080 DEXA BONE DENSITY SPINE HIP: ICD-10-PCS | Mod: 26,,, | Performed by: RADIOLOGY

## 2021-07-22 PROCEDURE — 77063 BREAST TOMOSYNTHESIS BI: CPT | Mod: 26,,, | Performed by: RADIOLOGY

## 2021-07-22 PROCEDURE — 77067 SCR MAMMO BI INCL CAD: CPT | Mod: TC

## 2021-07-22 PROCEDURE — 77067 MAMMO DIGITAL SCREENING BILAT WITH TOMO: ICD-10-PCS | Mod: 26,,, | Performed by: RADIOLOGY

## 2021-07-22 PROCEDURE — 77067 SCR MAMMO BI INCL CAD: CPT | Mod: 26,,, | Performed by: RADIOLOGY

## 2021-07-28 ENCOUNTER — PATIENT MESSAGE (OUTPATIENT)
Dept: FAMILY MEDICINE | Facility: CLINIC | Age: 66
End: 2021-07-28

## 2021-12-22 ENCOUNTER — HOSPITAL ENCOUNTER (EMERGENCY)
Facility: HOSPITAL | Age: 66
Discharge: HOME | End: 2021-12-23
Attending: EMERGENCY MEDICINE
Payer: MEDICARE
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
## 2021-12-22 DIAGNOSIS — S42.202A CLOSED FRACTURE OF PROXIMAL END OF LEFT HUMERUS, UNSPECIFIED FRACTURE MORPHOLOGY, INITIAL ENCOUNTER: Primary | ICD-10-CM

## 2021-12-22 PROCEDURE — 23605 CLTX PRX HMRL FX MNPJ+-TRACT: CPT | Mod: 54,LT,, | Performed by: EMERGENCY MEDICINE

## 2021-12-22 PROCEDURE — 23605 PR CLOSED RX PROX HUMERUS FX,MANIP: ICD-10-PCS | Mod: 54,LT,, | Performed by: EMERGENCY MEDICINE

## 2021-12-22 PROCEDURE — 99284 PR EMERGENCY DEPT VISIT,LEVEL IV: ICD-10-PCS | Mod: 57,,, | Performed by: EMERGENCY MEDICINE

## 2021-12-22 PROCEDURE — 99284 EMERGENCY DEPT VISIT MOD MDM: CPT | Mod: 57,,, | Performed by: EMERGENCY MEDICINE

## 2021-12-22 PROCEDURE — 99284 EMERGENCY DEPT VISIT MOD MDM: CPT | Mod: 25

## 2021-12-22 RX ORDER — OXYCODONE AND ACETAMINOPHEN 5; 325 MG/1; MG/1
1 TABLET ORAL EVERY 6 HOURS PRN
Qty: 10 TABLET | Refills: 0 | Status: ON HOLD | OUTPATIENT
Start: 2021-12-22 | End: 2022-03-07 | Stop reason: HOSPADM

## 2021-12-23 VITALS
SYSTOLIC BLOOD PRESSURE: 126 MMHG | WEIGHT: 196 LBS | TEMPERATURE: 99 F | RESPIRATION RATE: 18 BRPM | HEART RATE: 78 BPM | BODY MASS INDEX: 34.73 KG/M2 | HEIGHT: 63 IN | OXYGEN SATURATION: 97 % | DIASTOLIC BLOOD PRESSURE: 57 MMHG

## 2021-12-23 NOTE — ED NOTES
Discharge instuctions reviewed with pt by MD and this RN, pt verbalizes understanding. Pt questions were answered by this RN.  Pt is A&Ox4, respirations clear and unlabored, pt in no apparent distress. Sling was placed on pt by this RN and pt and pt  were educated on sling use.

## 2021-12-23 NOTE — ED PROVIDER NOTES
Encounter Date: 12/22/2021       History     Chief Complaint   Patient presents with    Fall     Fell in bathroom tonight, L shoulder pain, appears to be dislocated. PMS+, denies head injury or blood thinner use     Pleasant 66-year-old female presents the ER for evaluation of left shoulder pain and deformity.  Onset earlier today.  She fell in the bathroom and hit her left shoulder the bathtub.  No head to head or loss of consciousness, not on blood thinners.  She reports since the incident, difficulty moving her left arm from the shoulder with pain with movement.  Came to the ER for further evaluation.        Review of patient's allergies indicates:  No Known Allergies  Past Medical History:   Diagnosis Date    AR (allergic rhinitis)     AR (allergic rhinitis)     Carotid stenosis     50% RCA    Diabetes mellitus     Fatty liver     GERD (gastroesophageal reflux disease)     HLD (hyperlipidemia)     HTN (hypertension)     Joint pain     Sleep apnea 2018     Past Surgical History:   Procedure Laterality Date    SKIN BIOPSY  10yrs.    negative     Family History   Problem Relation Age of Onset    Cancer Mother         gallbladder    Prostate cancer Brother 57    Diabetes Brother     Diabetes Maternal Grandmother     Hypertension Unknown         multiple    Coronary artery disease Father 79    Diabetes Father     Diabetes Maternal Aunt     Diabetes Maternal Uncle     Melanoma Neg Hx      Social History     Tobacco Use    Smoking status: Never Smoker    Smokeless tobacco: Never Used   Substance Use Topics    Alcohol use: Not Currently     Alcohol/week: 0.0 standard drinks    Drug use: No     Review of Systems   Constitutional: Negative for fatigue and fever.   Respiratory: Negative for shortness of breath.    Cardiovascular: Negative for chest pain.   Musculoskeletal: Positive for arthralgias, joint swelling and myalgias.   Skin: Negative for rash and wound.   All other systems reviewed and  are negative.      Physical Exam     Initial Vitals [12/22/21 2214]   BP Pulse Resp Temp SpO2   134/63 76 18 98.5 °F (36.9 °C) 97 %      MAP       --         Physical Exam    Nursing note and vitals reviewed.  Constitutional: She appears well-developed and well-nourished.   HENT:   Head: Normocephalic and atraumatic.   Eyes: Pupils are equal, round, and reactive to light.   Neck:   Normal range of motion.  Abdominal: She exhibits no distension.   Musculoskeletal:      Cervical back: Normal range of motion.      Comments: Right shoulder normal    Left shoulder humeral head appears displaced inferior, reproducible tenderness on the elbow and humerus, no neurovascular compromise in distal extremity, no signs of open wounds     Neurological: She is alert and oriented to person, place, and time. She has normal strength. GCS score is 15. GCS eye subscore is 4. GCS verbal subscore is 5. GCS motor subscore is 6.   Skin: Skin is warm and dry. Capillary refill takes less than 2 seconds.   Psychiatric: She has a normal mood and affect. Thought content normal.         ED Course   Procedures  Labs Reviewed - No data to display       Imaging Results          X-Ray Shoulder Trauma Left (Final result)  Result time 12/22/21 23:48:08    Final result by Phoenix Arana MD (12/22/21 23:48:08)                 Impression:      Acute displaced fracture of the left proximal humerus as above.    Anterior-inferior subluxation of the humerus relative to the glenoid.      Electronically signed by: Phoenix Arana MD  Date:    12/22/2021  Time:    23:48             Narrative:    EXAMINATION:  XR SHOULDER TRAUMA 3 VIEW LEFT    CLINICAL HISTORY:  Unspecified fall, initial encounter    TECHNIQUE:  Three views of the left shoulder were performed.    COMPARISON  01/29/2016.    FINDINGS:  The bone mineralization is within normal limits.  There is an acute displaced fracture of the left proximal humerus with approximately 4.2 cm fragment involving the  greater tuberosity.  There is fracture extension into the surgical neck.  No additional fracture are identified.    There is some component of anterior-inferior subluxation of the left humerus relative to the glenoid.  The acromioclavicular joint is within normal limits. The coracoclavicular interval is within normal limits.                                X-Ray Humerus 2 View Left (Final result)  Result time 12/22/21 23:42:23    Final result by Davonte Serna MD (12/22/21 23:42:23)                 Impression:      Comminuted fracture of the proximal aspect of the left humerus.    No apparent injury involving the scapula or the distal clavicle.    This report was flagged in Epic as abnormal.      Electronically signed by: Davonte Serna  Date:    12/22/2021  Time:    23:42             Narrative:    EXAMINATION:  XR HUMERUS 2 VIEW LEFT    CLINICAL HISTORY:  fall;    TECHNIQUE:  X-ray: Left humerus-two views    COMPARISON:  None    FINDINGS:  There is a comminuted fracture of the head of the humerus.  The fracture fragments appear to be relatively approximated however there is significant angulation noted.    No apparent acute dish in all injury involving the distal aspect of the humerus.                               X-Ray Elbow Complete Left (Final result)  Result time 12/22/21 23:38:01    Final result by Davonte Serna MD (12/22/21 23:38:01)                 Impression:      No apparent evidence of acute fracture injury involving the left elbow.      Electronically signed by: Davonte Serna  Date:    12/22/2021  Time:    23:38             Narrative:    EXAMINATION:  XR ELBOW COMPLETE 3 VIEW LEFT    CLINICAL HISTORY:  Unspecified fall, initial encounter    TECHNIQUE:  AP, lateral, and oblique views of the left elbow were performed.    COMPARISON:  None    FINDINGS:  Multiple views of the left elbow indicates no obvious evidence of an acute fracture injury involving the proximal radius or ulna.  The distal  humerus also appears to be within normal limits for.  The fat pad adjacent to the distal humerus is not displaced.  No indication of soft tissue swelling or foreign body.                                 Medications - No data to display  Medical Decision Making:   Initial Assessment:   66-year-old female presents the ER for evaluation of left shoulder deformity after fall.  Concern for dislocation versus fracture.  No neurovascular compromise no nerve injury noted.  Will obtain x-ray imaging.  Pain adequately controlled at this time.  Will reassess after x-rays.  Differential Diagnosis:   Fracture, dislocation, sprain.  Clinical Tests:   Radiological Study: Ordered and Reviewed  ED Management:  Parisa Dimas is a 66 y.o. female with left shoulder pain.  Closed, neurovascular intact.  X-rays performed showing left comminuted proximal humerus fracture humeral head is well reduced.  Will set her up for discharge to home.  Will place her in a shoulder immobilizer, sling.  Will have her follow-up in 1 week in the orthopedic clinic.  Percocet for pain.  Call with questions.               ED Course as of 12/22/21 2357   Wed Dec 22, 2021   2350 X-Ray Shoulder Trauma Left [MW]      ED Course User Index  [MW] Davonte Newby MD             Clinical Impression:   Final diagnoses:  [W19.XXXA] Fall  [W19.XXXA] Fall - L shoulderdeformity  [S42.202A] Closed fracture of proximal end of left humerus, unspecified fracture morphology, initial encounter (Primary)          ED Disposition Condition    Discharge Stable        ED Prescriptions     Medication Sig Dispense Start Date End Date Auth. Provider    oxyCODONE-acetaminophen (PERCOCET) 5-325 mg per tablet Take 1 tablet by mouth every 6 (six) hours as needed for Pain. 10 tablet 12/22/2021  Davonte eNwby MD        Follow-up Information     Follow up With Specialties Details Why Contact Info Additional Information    [facility] 5[facility] [address] Orthoped[facility]   [address]  [facility] [id number] [phone] [facility] - Main Building, 5th Floor Please park in [facility] and take Atrium elevator           Davonte Newby MD  Resident  12/22/21 2179

## 2021-12-27 ENCOUNTER — PATIENT OUTREACH (OUTPATIENT)
Dept: ADMINISTRATIVE | Facility: OTHER | Age: 66
End: 2021-12-27
Payer: MEDICARE

## 2021-12-29 ENCOUNTER — HOSPITAL ENCOUNTER (OUTPATIENT)
Dept: RADIOLOGY | Facility: HOSPITAL | Age: 66
Discharge: HOME | End: 2021-12-29
Attending: ORTHOPAEDIC SURGERY
Payer: MEDICARE

## 2021-12-29 ENCOUNTER — OFFICE VISIT (OUTPATIENT)
Dept: ORTHOPEDICS | Facility: CLINIC | Age: 66
End: 2021-12-29
Payer: MEDICARE

## 2021-12-29 DIAGNOSIS — T14.8XXA FRACTURE: Primary | ICD-10-CM

## 2021-12-29 DIAGNOSIS — S42.292A CLOSED 4-PART FRACTURE OF PROXIMAL HUMERUS, LEFT, INITIAL ENCOUNTER: Primary | ICD-10-CM

## 2021-12-29 DIAGNOSIS — T14.8XXA FRACTURE: ICD-10-CM

## 2021-12-29 PROCEDURE — 99999 PR PBB SHADOW E&M-EST. PATIENT-LVL III: CPT | Mod: PBBFAC,,, | Performed by: ORTHOPAEDIC SURGERY

## 2021-12-29 PROCEDURE — 99999 PR PBB SHADOW E&M-EST. PATIENT-LVL III: ICD-10-PCS | Mod: PBBFAC,,, | Performed by: ORTHOPAEDIC SURGERY

## 2021-12-29 PROCEDURE — 1160F PR REVIEW ALL MEDS BY PRESCRIBER/CLIN PHARMACIST DOCUMENTED: ICD-10-PCS | Mod: CPTII,S$GLB,, | Performed by: ORTHOPAEDIC SURGERY

## 2021-12-29 PROCEDURE — 1159F PR MEDICATION LIST DOCUMENTED IN MEDICAL RECORD: ICD-10-PCS | Mod: CPTII,S$GLB,, | Performed by: ORTHOPAEDIC SURGERY

## 2021-12-29 PROCEDURE — 4010F PR ACE/ARB THEARPY RXD/TAKEN: ICD-10-PCS | Mod: CPTII,S$GLB,, | Performed by: ORTHOPAEDIC SURGERY

## 2021-12-29 PROCEDURE — 1160F RVW MEDS BY RX/DR IN RCRD: CPT | Mod: CPTII,S$GLB,, | Performed by: ORTHOPAEDIC SURGERY

## 2021-12-29 PROCEDURE — 73030 XR SHOULDER COMPLETE 2 OR MORE VIEWS LEFT: ICD-10-PCS | Mod: 26,LT,, | Performed by: RADIOLOGY

## 2021-12-29 PROCEDURE — 1159F MED LIST DOCD IN RCRD: CPT | Mod: CPTII,S$GLB,, | Performed by: ORTHOPAEDIC SURGERY

## 2021-12-29 PROCEDURE — 4010F ACE/ARB THERAPY RXD/TAKEN: CPT | Mod: CPTII,S$GLB,, | Performed by: ORTHOPAEDIC SURGERY

## 2021-12-29 PROCEDURE — 73030 X-RAY EXAM OF SHOULDER: CPT | Mod: TC,LT

## 2021-12-29 PROCEDURE — 99203 OFFICE O/P NEW LOW 30 MIN: CPT | Mod: S$GLB,,, | Performed by: ORTHOPAEDIC SURGERY

## 2021-12-29 PROCEDURE — 3044F PR MOST RECENT HEMOGLOBIN A1C LEVEL <7.0%: ICD-10-PCS | Mod: CPTII,S$GLB,, | Performed by: ORTHOPAEDIC SURGERY

## 2021-12-29 PROCEDURE — 73030 X-RAY EXAM OF SHOULDER: CPT | Mod: 26,LT,, | Performed by: RADIOLOGY

## 2021-12-29 PROCEDURE — 99203 PR OFFICE/OUTPT VISIT, NEW, LEVL III, 30-44 MIN: ICD-10-PCS | Mod: S$GLB,,, | Performed by: ORTHOPAEDIC SURGERY

## 2021-12-29 PROCEDURE — 3044F HG A1C LEVEL LT 7.0%: CPT | Mod: CPTII,S$GLB,, | Performed by: ORTHOPAEDIC SURGERY

## 2021-12-30 ENCOUNTER — HOSPITAL ENCOUNTER (OUTPATIENT)
Dept: RADIOLOGY | Facility: HOSPITAL | Age: 66
Discharge: HOME | End: 2021-12-30
Attending: ORTHOPAEDIC SURGERY
Payer: MEDICARE

## 2021-12-30 DIAGNOSIS — S42.292A CLOSED 4-PART FRACTURE OF PROXIMAL HUMERUS, LEFT, INITIAL ENCOUNTER: ICD-10-CM

## 2021-12-30 PROCEDURE — 73200 CT SHOULDER WITHOUT CONTRAST LEFT: ICD-10-PCS | Mod: 26,LT,, | Performed by: RADIOLOGY

## 2021-12-30 PROCEDURE — 73200 CT UPPER EXTREMITY W/O DYE: CPT | Mod: 26,LT,, | Performed by: RADIOLOGY

## 2021-12-30 PROCEDURE — 73200 CT UPPER EXTREMITY W/O DYE: CPT | Mod: TC,LT

## 2022-01-04 ENCOUNTER — OFFICE VISIT (OUTPATIENT)
Dept: ORTHOPEDICS | Facility: CLINIC | Age: 67
End: 2022-01-04
Payer: MEDICARE

## 2022-01-04 VITALS — HEIGHT: 63 IN | WEIGHT: 196 LBS | BODY MASS INDEX: 34.73 KG/M2

## 2022-01-04 DIAGNOSIS — S42.202D CLOSED TRAUMATIC DISPLACED FRACTURE OF PROXIMAL END OF LEFT HUMERUS WITH ROUTINE HEALING, SUBSEQUENT ENCOUNTER: Primary | ICD-10-CM

## 2022-01-04 PROCEDURE — 99212 PR OFFICE/OUTPT VISIT, EST, LEVL II, 10-19 MIN: ICD-10-PCS | Mod: S$GLB,,, | Performed by: ORTHOPAEDIC SURGERY

## 2022-01-04 PROCEDURE — 1125F AMNT PAIN NOTED PAIN PRSNT: CPT | Mod: CPTII,S$GLB,, | Performed by: ORTHOPAEDIC SURGERY

## 2022-01-04 PROCEDURE — 1160F RVW MEDS BY RX/DR IN RCRD: CPT | Mod: CPTII,S$GLB,, | Performed by: ORTHOPAEDIC SURGERY

## 2022-01-04 PROCEDURE — 99999 PR PBB SHADOW E&M-EST. PATIENT-LVL IV: ICD-10-PCS | Mod: PBBFAC,,, | Performed by: ORTHOPAEDIC SURGERY

## 2022-01-04 PROCEDURE — 1159F PR MEDICATION LIST DOCUMENTED IN MEDICAL RECORD: ICD-10-PCS | Mod: CPTII,S$GLB,, | Performed by: ORTHOPAEDIC SURGERY

## 2022-01-04 PROCEDURE — 99999 PR PBB SHADOW E&M-EST. PATIENT-LVL IV: CPT | Mod: PBBFAC,,, | Performed by: ORTHOPAEDIC SURGERY

## 2022-01-04 PROCEDURE — 3008F BODY MASS INDEX DOCD: CPT | Mod: CPTII,S$GLB,, | Performed by: ORTHOPAEDIC SURGERY

## 2022-01-04 PROCEDURE — 3008F PR BODY MASS INDEX (BMI) DOCUMENTED: ICD-10-PCS | Mod: CPTII,S$GLB,, | Performed by: ORTHOPAEDIC SURGERY

## 2022-01-04 PROCEDURE — 99212 OFFICE O/P EST SF 10 MIN: CPT | Mod: S$GLB,,, | Performed by: ORTHOPAEDIC SURGERY

## 2022-01-04 PROCEDURE — 1159F MED LIST DOCD IN RCRD: CPT | Mod: CPTII,S$GLB,, | Performed by: ORTHOPAEDIC SURGERY

## 2022-01-04 PROCEDURE — 1160F PR REVIEW ALL MEDS BY PRESCRIBER/CLIN PHARMACIST DOCUMENTED: ICD-10-PCS | Mod: CPTII,S$GLB,, | Performed by: ORTHOPAEDIC SURGERY

## 2022-01-04 PROCEDURE — 1125F PR PAIN SEVERITY QUANTIFIED, PAIN PRESENT: ICD-10-PCS | Mod: CPTII,S$GLB,, | Performed by: ORTHOPAEDIC SURGERY

## 2022-01-04 NOTE — PROGRESS NOTES
CC:  Left proximal humerus fracture      HISTORY       HPI:  66-year-old female, mechanical fall onto left shoulder 12/22/2021, immediate pain.  Seen in emergency department.  Diagnosed with 4 part left proximal humerus fracture.  Treated in a sling, referred to Orthopedics for follow-up.  Presents today for repeat eval        Currently complains of isolated pain left shoulder 5/10, sharp, stabbing no other areas of pain.  No numbness no tingling.    Having some difficulty sleeping.    Right-hand dominant  Retired  Lives at home with   Diabetes, hemoglobin A1c 6.3  Carotid stenosis  Denies history of heart attack, stroke, blood clot, cancer    ROS:  Constitutional: Denies fever/chills  Neurological: Denies numbness/tingling (any exceptions noted in orthopaedic exam)   Psychiatric/Behavioral: Denies change in normal mood  Eyes: Denies change in vision  Cardiovascular: Denies chest pain  Respiratory: Denies shortness of breath  Hematologic/Lymphatic: Denies easy bleeding/bruising   Skin: Denies new rash or skin lesions   Gastrointestinal: Denies nausea/vomitting/diarrhea, change in bowel habits, abdominal pain   Allergic/Immunologic: Denies adverse reactions to current medications  Musculoskeletal: see HPI    PAST MEDICAL HISTORY:   Past Medical History:   Diagnosis Date    AR (allergic rhinitis)     AR (allergic rhinitis)     Carotid stenosis     50% RCA    Colon polyp 10/2014    Diabetes mellitus     Fatty liver     GERD (gastroesophageal reflux disease)     HLD (hyperlipidemia)     HTN (hypertension)     Joint pain     Sleep apnea 2018     PAST SURGICAL HISTORY:   Past Surgical History:   Procedure Laterality Date    SKIN BIOPSY  10yrs.    negative     FAMILY HISTORY:   Family History   Problem Relation Age of Onset    Cancer Mother         gallbladder    Prostate cancer Brother 57    Diabetes Brother     Diabetes Maternal Grandmother     Hypertension Unknown         multiple     Coronary artery disease Father 79    Diabetes Father     Diabetes Maternal Aunt     Diabetes Maternal Uncle     Melanoma Neg Hx      SOCIAL HISTORY:   Social History     Socioeconomic History    Marital status:    Tobacco Use    Smoking status: Never Smoker    Smokeless tobacco: Never Used   Substance and Sexual Activity    Alcohol use: Not Currently     Alcohol/week: 0.0 standard drinks    Drug use: No    Sexual activity: Yes     Partners: Male     Birth control/protection: Post-menopausal   Other Topics Concern    Are you pregnant or think you may be? No    Breast-feeding No     Social Determinants of Health     Financial Resource Strain: Low Risk     Difficulty of Paying Living Expenses: Not hard at all   Food Insecurity: No Food Insecurity    Worried About Running Out of Food in the Last Year: Never true    Ran Out of Food in the Last Year: Never true   Transportation Needs: No Transportation Needs    Lack of Transportation (Medical): No    Lack of Transportation (Non-Medical): No   Physical Activity: Inactive    Days of Exercise per Week: 0 days    Minutes of Exercise per Session: 0 min   Stress: Stress Concern Present    Feeling of Stress : To some extent   Social Connections: Unknown    Frequency of Communication with Friends and Family: More than three times a week    Frequency of Social Gatherings with Friends and Family: More than three times a week    Active Member of Clubs or Organizations: Yes    Attends Club or Organization Meetings: More than 4 times per year    Marital Status:      MEDICATIONS:   Current Outpatient Medications:     amLODIPine (NORVASC) 10 MG tablet, TAKE 1 TABLET BY MOUTH ONCE DAILY, Disp: 90 tablet, Rfl: 3    aspirin (ECOTRIN) 81 MG EC tablet, Take 81 mg by mouth once daily., Disp: , Rfl:     b complex vitamins tablet, Take 1 tablet by mouth once daily., Disp: , Rfl:     blood sugar diagnostic Strp, Test daily, insurance covered brand.   Include 100 lancets, Disp: 100 strip, Rfl: 11    blood sugar diagnostic Strp, To check BG 1 times daily, to use with insurance preferred meter, Disp: 100 strip, Rfl: 11    calcium carbonate (OS-RYLEE) 600 mg (1,500 mg) Tab, Take 600 mg by mouth once daily., Disp: , Rfl:     cetirizine (ZYRTEC) 10 MG tablet, Take 10 mg by mouth once daily., Disp: , Rfl:     co-enzyme Q-10 30 mg capsule, Take 30 mg by mouth once daily., Disp: , Rfl:     hydroCHLOROthiazide (HYDRODIURIL) 25 MG tablet, TAKE 1 TABLET BY MOUTH ONCE DAILY, Disp: 90 tablet, Rfl: 3    ibuprofen (ADVIL,MOTRIN) 200 MG tablet, Take 200 mg by mouth every 6 (six) hours as needed for Pain., Disp: , Rfl:     L.ACID/L.CASEI/B.BIF/B.MELISSA/FOS (PROBIOTIC BLEND ORAL), Take 2 tablets by mouth once daily., Disp: , Rfl:     lancets Misc, Test daily, Disp: 100 each, Rfl: 11    lancets Misc, To check BG 1 times daily, to use with insurance preferred meter, Disp: 100 each, Rfl: 11    losartan (COZAAR) 100 MG tablet, TAKE 1 TABLET BY MOUTH ONCE DAILY, Disp: 90 tablet, Rfl: 3    lovastatin (MEVACOR) 20 MG tablet, TAKE 1 TABLET BY MOUTH IN  THE EVENING, Disp: 90 tablet, Rfl: 3    metFORMIN (GLUCOPHAGE-XR) 500 MG ER 24hr tablet, TAKE 2 TABLETS BY MOUTH  ONCE DAILY, Disp: 180 tablet, Rfl: 3    multivitamin capsule, Take 1 capsule by mouth once daily., Disp: , Rfl:     OMEGA-3S/DHA/EPA/FISH OIL (OMEGA 3 ORAL), Take 2,800 mg by mouth once daily., Disp: , Rfl:     omeprazole (PRILOSEC) 40 MG capsule, TAKE 1 CAPSULE BY MOUTH  ONCE DAILY, Disp: 90 capsule, Rfl: 3    oxyCODONE-acetaminophen (PERCOCET) 5-325 mg per tablet, Take 1 tablet by mouth every 6 (six) hours as needed for Pain., Disp: 10 tablet, Rfl: 0    vitamin E 400 UNIT capsule, Take 400 Units by mouth once daily., Disp: , Rfl:     zinc sulfate (ZINC-15 ORAL), Take by mouth., Disp: , Rfl:     blood-glucose meter kit, To check BG 1 times daily, to use with insurance preferred meter, Disp: 1 each, Rfl: 0     "metFORMIN (GLUCOPHAGE-XR) 500 MG ER 24hr tablet, Take 2 tablets (1,000 mg total) by mouth once daily., Disp: 180 tablet, Rfl: 3  ALLERGIES: Review of patient's allergies indicates:  No Known Allergies      EXAM      VITAL SIGNS:   Ht 5' 3" (1.6 m)   Wt 88.9 kg (195 lb 15.8 oz)   LMP 08/01/2003   BMI 34.72 kg/m²       PE:  General:  no acute distress, appears stated age    Neuro: alert and oriented x3  Psych: normal mood  Head: normocephalic, atraumatic.   Eyes: no scleral icterus  Mouth: moist mucous membranes  Cardiovascular: extremities warm and well perfused  Lungs: breathing comfortably, equal chest rise bilat  Skin: clean, dry, intact (any exceptions noted in below musculoskeletal exam)    Musculoskeletal:  Right upper extremity, bilateral lower extremities no overt signs of trauma, no bony tenderness or pain with range of motion    Left upper extremity significant edema and ecchymosis left shoulder, arm, chest wall at site of known proximal humerus fracture  Tender to palpation about proximal humerus.  Pain with any range of motion of shoulder.  Elbow range of motion full, 0-130 flexion no pain.  Pronation 80, supination 80.  Motor  1/5 deltoid  3/5 biceps, triceps limited due to pain  5/5 wrist flexion/extension, interossei, finger flexion/extension  Sensation intact to light touch axillary, radial, ulnar, median nerves  Palpable radial pulse, black brisk cap refill        XRAYS:  X-ray left shoulder and CT scan left shoulder demonstrate a comminuted 4 part proximal humerus fracture with limited remaining bone stock  (I independently reviewed and interpreted the above imaging)    MEDICAL DECISION MAKING       Encounter Diagnosis   Name Primary?    Closed traumatic displaced fracture of proximal end of left humerus with routine healing, subsequent encounter Yes       66-year-old female, fall 12/22/2021, left 4 part proximal humerus fracture  Significantly comminuted, displaced, limited bone " stock    Counseled on diagnosis treatment options.  Discussed both non operative, ORIF, reverse shoulder arthroplasty    If left in her current position and treated non operatively I have concern that there will be significant malunion both the head and tuberosities causing decreased/suboptimal outcome.      Discussed operative intervention the form of reverse shoulder arthroplasty.  I feel this best operative option due to her limited remaining bone stock.  Hopefully this would improve her pain, mobility, overall long-term function.    Pt would like to attempt nonop management at this time    Will refer to PT  Sling for comfort only  ROMAT LUE  NWB LUE x 6 wks    Refer to [facility]/sports for 2nd opinion      =====================  Hosea Simms MD  Orthopaedic Surgery

## 2022-01-05 ENCOUNTER — PATIENT MESSAGE (OUTPATIENT)
Dept: ORTHOPEDICS | Facility: CLINIC | Age: 67
End: 2022-01-05
Payer: MEDICARE

## 2022-01-05 ENCOUNTER — TELEPHONE (OUTPATIENT)
Dept: SPORTS MEDICINE | Facility: CLINIC | Age: 67
End: 2022-01-05
Payer: MEDICARE

## 2022-01-05 NOTE — TELEPHONE ENCOUNTER
----- Message from Greg Parker sent at 1/5/2022 12:03 PM CST -----  Regarding: FW: L prox humerus fx  Good Afternoon!    Could you please schedule this patient to see Dr. Santoyo?    Thanks!    Greg Parker, Ms, OTC,   Clinical Assistant to Dr. Santoyo      ----- Message -----  From: Duc Langston MA  Sent: 1/4/2022   2:07 PM CST  To: Duc Langston MA, Greg Parker  Subject: FW: L prox humerus fx                              ----- Message -----  From: MISSY Santoyo MD  Sent: 1/4/2022  12:46 PM CST  To: [facility] Staff  Subject: FW: L prox humerus fx                            Schedule patient to see me for a 2nd opinion for shoulder.    ----- Message -----  From: Hosea Simms MD  Sent: 1/4/2022  12:03 PM CST  To: MISSY Santoyo MD, Stacey Lorenz MA  Subject: L prox humerus fx                                66-year-old female, mechanical fall onto left shoulder 12/22/2021,     4 part left proximal humerus fracture.   Comminuted, poor bone stock.       Right-hand dominant  Retired  Lives at home with   Diabetes, hemoglobin A1c 6.3  Carotid stenosis  Denies history of heart attack, stroke, blood clot, cancer    I was planning on rTSA this Friday, but pt wants to give nonop a try.    Can I send her to your for a 2nd opinion f/u?    I will be out the next 6 wks.    If she wants surgery, go for it.

## 2022-01-14 NOTE — PROGRESS NOTES
CC: Left shoulder pain     66 y.o. Female presents as a new patient to me.  Accompanied by her . Patient was referred from Dr. Hosea Finn. Patient sustained a traumatic fall onto her left shoulder on 12/22/2021. Diagnosed with 4-part proximal humerus fracture. She has been NWB in a sling since the injury. Pain localizes over the anterior and lateral shoulder. Worse with attempted shoulder movement. Pain is disruptive to sleep at night. Better with rest. Denies neck pain or radicular symptoms. Treatment thus far has included activity modifications, rest, and oral medication. She discussed proceeding with reverse total shoulder arthroplasty previously with Dr. Finn but wanted to get a second opinion. Here today to discuss diagnosis and treatment options.     Right-hand-dominant.  Remains active around the house at baseline.  Denies any pre-existing left shoulder issues of significance.    No neck radicular symptoms.    Pain Score:   6     PAST MEDICAL HISTORY:   Past Medical History:   Diagnosis Date    AR (allergic rhinitis)     AR (allergic rhinitis)     Carotid stenosis     50% RCA    Colon polyp 10/2014    Diabetes mellitus     Fatty liver     GERD (gastroesophageal reflux disease)     HLD (hyperlipidemia)     HTN (hypertension)     Joint pain     Sleep apnea 2018     PAST SURGICAL HISTORY:  Past Surgical History:   Procedure Laterality Date    SKIN BIOPSY  10yrs.    negative     FAMILY HISTORY:  Family History   Problem Relation Age of Onset    Cancer Mother         gallbladder    Prostate cancer Brother 57    Diabetes Brother     Diabetes Maternal Grandmother     Hypertension Unknown         multiple    Coronary artery disease Father 79    Diabetes Father     Diabetes Maternal Aunt     Diabetes Maternal Uncle     Melanoma Neg Hx      MEDICATIONS:    Current Outpatient Medications:     amLODIPine (NORVASC) 10 MG tablet, TAKE 1 TABLET BY MOUTH ONCE DAILY, Disp: 90  tablet, Rfl: 3    aspirin (ECOTRIN) 81 MG EC tablet, Take 81 mg by mouth once daily., Disp: , Rfl:     b complex vitamins tablet, Take 1 tablet by mouth once daily., Disp: , Rfl:     blood sugar diagnostic Strp, Test daily, insurance covered brand.  Include 100 lancets, Disp: 100 strip, Rfl: 11    blood sugar diagnostic Strp, To check BG 1 times daily, to use with insurance preferred meter, Disp: 100 strip, Rfl: 11    blood-glucose meter kit, To check BG 1 times daily, to use with insurance preferred meter, Disp: 1 each, Rfl: 0    calcium carbonate (OS-RYLEE) 600 mg (1,500 mg) Tab, Take 600 mg by mouth once daily., Disp: , Rfl:     cetirizine (ZYRTEC) 10 MG tablet, Take 10 mg by mouth once daily., Disp: , Rfl:     co-enzyme Q-10 30 mg capsule, Take 30 mg by mouth once daily., Disp: , Rfl:     hydroCHLOROthiazide (HYDRODIURIL) 25 MG tablet, TAKE 1 TABLET BY MOUTH ONCE DAILY, Disp: 90 tablet, Rfl: 3    ibuprofen (ADVIL,MOTRIN) 200 MG tablet, Take 200 mg by mouth every 6 (six) hours as needed for Pain., Disp: , Rfl:     L.ACID/L.CASEI/B.BIF/B.MELISSA/FOS (PROBIOTIC BLEND ORAL), Take 2 tablets by mouth once daily., Disp: , Rfl:     lancets Misc, Test daily, Disp: 100 each, Rfl: 11    lancets Misc, To check BG 1 times daily, to use with insurance preferred meter, Disp: 100 each, Rfl: 11    losartan (COZAAR) 100 MG tablet, TAKE 1 TABLET BY MOUTH ONCE DAILY, Disp: 90 tablet, Rfl: 3    lovastatin (MEVACOR) 20 MG tablet, TAKE 1 TABLET BY MOUTH IN  THE EVENING, Disp: 90 tablet, Rfl: 3    metFORMIN (GLUCOPHAGE-XR) 500 MG ER 24hr tablet, Take 2 tablets (1,000 mg total) by mouth once daily., Disp: 180 tablet, Rfl: 3    metFORMIN (GLUCOPHAGE-XR) 500 MG ER 24hr tablet, TAKE 2 TABLETS BY MOUTH  ONCE DAILY, Disp: 180 tablet, Rfl: 3    multivitamin capsule, Take 1 capsule by mouth once daily., Disp: , Rfl:     OMEGA-3S/DHA/EPA/FISH OIL (OMEGA 3 ORAL), Take 2,800 mg by mouth once daily., Disp: , Rfl:     omeprazole  "(PRILOSEC) 40 MG capsule, TAKE 1 CAPSULE BY MOUTH  ONCE DAILY, Disp: 90 capsule, Rfl: 3    oxyCODONE-acetaminophen (PERCOCET) 5-325 mg per tablet, Take 1 tablet by mouth every 6 (six) hours as needed for Pain., Disp: 10 tablet, Rfl: 0    vitamin E 400 UNIT capsule, Take 400 Units by mouth once daily., Disp: , Rfl:     zinc sulfate (ZINC-15 ORAL), Take by mouth., Disp: , Rfl:     ALLERGIES:  Review of patient's allergies indicates:  No Known Allergies     REVIEW OF SYSTEMS:  Constitution: Negative. Negative for chills, fever and night sweats.    Hematologic/Lymphatic: Negative for bleeding problem. Does not bruise/bleed easily.   Skin: Negative for dry skin, itching and rash.   Musculoskeletal: Negative for falls. Positive for left shoulder pain and muscle weakness.     All other review of symptoms were reviewed and found to be noncontributory.    PHYSICAL EXAMINATION:  Vitals:  /71   Pulse 88   Ht 5' 3" (1.6 m)   Wt 88.5 kg (195 lb)   LMP 08/01/2003   BMI 34.54 kg/m²    General: Well-developed well-nourished 66 y.o. femalein no acute distress   Cardiovascular: Regular rhythm by palpation of distal pulse, normal color and temperature, no concerning varicosities on symptomatic side   Lungs: No labored breathing or wheezing appreciated   Neuro: Alert and oriented ×3   Psychiatric: well oriented to person, place and time, demonstrates normal mood and affect   Skin: No rashes, lesions or ulcers, normal temperature, turgor, and texture on uninvolved extremity    Ortho/SPM Exam  Examination of the left shoulder demonstrates active forward elevation to 20 degrees, ER with arm at side to 10, IR to hip. Passive FE to 70, ER to 30. Significant pain with both active and passive range of motion.  Diffuse tenderness about the shoulder. Negative AC tenderness. No midline neck tenderness. Negative Spurling's maneuver. NVI distally in the hand. Sensation intact over axillary distribution. Fires anterior, middle and " posterior deltoid.     IMAGING:  Xrays including AP, Outlet and Axillary Lateral of Left shoulder are ordered / images reviewed by me:   Displaced 4 part proximal humerus fracture with significant displacement.  Pseudosubluxation seen.    ASSESSMENT:      ICD-10-CM ICD-9-CM   1. Closed 4-part fracture of proximal humerus, left, initial encounter  S42.292A 812.00   2. Chronic left shoulder pain  M25.512 719.41    G89.29 338.29   3. Pre-op testing  Z01.818 V72.84       PLAN:     -Findings and treatment options were discussed with the patient and her .  Pain and functional limitation with regards to her left shoulder.  X-rays along with CT scan show significant comminuted proximal humerus fracture with displacement.  Discussed both operative and non operative treatment options.  I do think she will have some functional limitation with regards to her shoulder no matter what however I do think functional limitation will be more significant with non operative treatment most likely given her current alignment.  We discussed the details of reverse shoulder arthroplasty.  I do think that that procedure will be her better option with regards to regaining some overhead range of motion and function.  The details of surgery were discussed include the expected postop rehab and recovery course.  She understands the inherent risks of surgery which include but are not limited to dislocation, stiffness, continued functional limitation, continued or recurrent pain and upwards of 30% of patients, weakness, nerve injury, and potential need for further surgery.  She does wish to proceed.  -Plan for Reverse shoulder arthroplasty with the [place] system.  Have the fracture stem available.   -No dental clearance needed - recent cleaning w/o issues  -Will need medical clearance from PCP  -RTC for pre op appt  -All questions answered    Informed Consent:    The details of the surgical procedure were explained, including the location of  probable incisions and a description of possible hardware and/or grafts to be used. Alternatives to both operative and non-operative options with associated risks and benefits were discussed. The patient understands the likely convalescence after surgery and, in particular, the expected postop rehab and recovery course. The outlined risks and potential complications of the proposed procedure include but are not limited to: infection, poor wound healing, scarring, deformity, stiffness, swelling, continued or recurrent pain, instability, hardware or prosthetic failure if implanted, symptomatic hardware requiring removal, dislocation, weakness, neurovascular injury, numbness, chronic regional pain disorder, tissue nonhealing/irreparability/retear, subsequent contralateral limb injury or pathology, chondral injury, arthritis, fracture, blood clot formation, inability to return to previous level of activity, anesthetic or regional block complication up to death, need for additional procedure as indicated intraoperatively, and potential need for further surgery.    The patient was also informed and understands that the risks of surgery are greater for patients with a current condition or history of heart disease, obesity, clotting disorders, recurrent infections, steroid use, current or past smoking, and factors such as sedentary lifestyle and noncompliance with medications, therapy or follow-up. The degree of the increased risk is hard to estimate with any degree of precision. If applicable, smoking cessation was discussed.     All questions were answered. The patient has verbalized understanding of these issues and wishes to proceed with the surgery as discussed.    Procedures

## 2022-01-17 ENCOUNTER — PATIENT MESSAGE (OUTPATIENT)
Dept: SPORTS MEDICINE | Facility: CLINIC | Age: 67
End: 2022-01-17
Payer: MEDICARE

## 2022-01-18 ENCOUNTER — OFFICE VISIT (OUTPATIENT)
Dept: SPORTS MEDICINE | Facility: CLINIC | Age: 67
End: 2022-01-18
Payer: MEDICARE

## 2022-01-18 ENCOUNTER — HOSPITAL ENCOUNTER (OUTPATIENT)
Dept: RADIOLOGY | Facility: HOSPITAL | Age: 67
Discharge: HOME | End: 2022-01-18
Attending: ORTHOPAEDIC SURGERY
Payer: MEDICARE

## 2022-01-18 VITALS
WEIGHT: 195 LBS | HEIGHT: 63 IN | BODY MASS INDEX: 34.55 KG/M2 | DIASTOLIC BLOOD PRESSURE: 71 MMHG | SYSTOLIC BLOOD PRESSURE: 138 MMHG | HEART RATE: 88 BPM

## 2022-01-18 DIAGNOSIS — S42.292A CLOSED 4-PART FRACTURE OF PROXIMAL HUMERUS, LEFT, INITIAL ENCOUNTER: Primary | ICD-10-CM

## 2022-01-18 DIAGNOSIS — G89.29 CHRONIC LEFT SHOULDER PAIN: ICD-10-CM

## 2022-01-18 DIAGNOSIS — M25.512 CHRONIC LEFT SHOULDER PAIN: ICD-10-CM

## 2022-01-18 DIAGNOSIS — Z01.818 PRE-OP TESTING: ICD-10-CM

## 2022-01-18 PROCEDURE — 99204 PR OFFICE/OUTPT VISIT, NEW, LEVL IV, 45-59 MIN: ICD-10-PCS | Mod: S$GLB,,, | Performed by: ORTHOPAEDIC SURGERY

## 2022-01-18 PROCEDURE — 99999 PR PBB SHADOW E&M-EST. PATIENT-LVL III: CPT | Mod: PBBFAC,,, | Performed by: ORTHOPAEDIC SURGERY

## 2022-01-18 PROCEDURE — 73030 X-RAY EXAM OF SHOULDER: CPT | Mod: 26,LT,, | Performed by: RADIOLOGY

## 2022-01-18 PROCEDURE — 3008F PR BODY MASS INDEX (BMI) DOCUMENTED: ICD-10-PCS | Mod: CPTII,S$GLB,, | Performed by: ORTHOPAEDIC SURGERY

## 2022-01-18 PROCEDURE — 99204 OFFICE O/P NEW MOD 45 MIN: CPT | Mod: S$GLB,,, | Performed by: ORTHOPAEDIC SURGERY

## 2022-01-18 PROCEDURE — 1101F PT FALLS ASSESS-DOCD LE1/YR: CPT | Mod: CPTII,S$GLB,, | Performed by: ORTHOPAEDIC SURGERY

## 2022-01-18 PROCEDURE — 3075F SYST BP GE 130 - 139MM HG: CPT | Mod: CPTII,S$GLB,, | Performed by: ORTHOPAEDIC SURGERY

## 2022-01-18 PROCEDURE — 3078F PR MOST RECENT DIASTOLIC BLOOD PRESSURE < 80 MM HG: ICD-10-PCS | Mod: CPTII,S$GLB,, | Performed by: ORTHOPAEDIC SURGERY

## 2022-01-18 PROCEDURE — 73030 XR SHOULDER COMPLETE 2 OR MORE VIEWS LEFT: ICD-10-PCS | Mod: 26,LT,, | Performed by: RADIOLOGY

## 2022-01-18 PROCEDURE — 73030 X-RAY EXAM OF SHOULDER: CPT | Mod: TC,LT

## 2022-01-18 PROCEDURE — 1101F PR PT FALLS ASSESS DOC 0-1 FALLS W/OUT INJ PAST YR: ICD-10-PCS | Mod: CPTII,S$GLB,, | Performed by: ORTHOPAEDIC SURGERY

## 2022-01-18 PROCEDURE — 3288F PR FALLS RISK ASSESSMENT DOCUMENTED: ICD-10-PCS | Mod: CPTII,S$GLB,, | Performed by: ORTHOPAEDIC SURGERY

## 2022-01-18 PROCEDURE — 3075F PR MOST RECENT SYSTOLIC BLOOD PRESS GE 130-139MM HG: ICD-10-PCS | Mod: CPTII,S$GLB,, | Performed by: ORTHOPAEDIC SURGERY

## 2022-01-18 PROCEDURE — 3008F BODY MASS INDEX DOCD: CPT | Mod: CPTII,S$GLB,, | Performed by: ORTHOPAEDIC SURGERY

## 2022-01-18 PROCEDURE — 3288F FALL RISK ASSESSMENT DOCD: CPT | Mod: CPTII,S$GLB,, | Performed by: ORTHOPAEDIC SURGERY

## 2022-01-18 PROCEDURE — 1125F PR PAIN SEVERITY QUANTIFIED, PAIN PRESENT: ICD-10-PCS | Mod: CPTII,S$GLB,, | Performed by: ORTHOPAEDIC SURGERY

## 2022-01-18 PROCEDURE — 99999 PR PBB SHADOW E&M-EST. PATIENT-LVL III: ICD-10-PCS | Mod: PBBFAC,,, | Performed by: ORTHOPAEDIC SURGERY

## 2022-01-18 PROCEDURE — 3078F DIAST BP <80 MM HG: CPT | Mod: CPTII,S$GLB,, | Performed by: ORTHOPAEDIC SURGERY

## 2022-01-18 PROCEDURE — 1125F AMNT PAIN NOTED PAIN PRSNT: CPT | Mod: CPTII,S$GLB,, | Performed by: ORTHOPAEDIC SURGERY

## 2022-01-19 ENCOUNTER — TELEPHONE (OUTPATIENT)
Dept: PREADMISSION TESTING | Facility: HOSPITAL | Age: 67
End: 2022-01-19
Payer: MEDICARE

## 2022-01-19 DIAGNOSIS — E11.9 TYPE 2 DIABETES MELLITUS WITHOUT COMPLICATION, WITHOUT LONG-TERM CURRENT USE OF INSULIN: ICD-10-CM

## 2022-01-19 DIAGNOSIS — Z01.818 PRE-OP TESTING: Primary | ICD-10-CM

## 2022-01-19 NOTE — TELEPHONE ENCOUNTER
----- Message from Silvana Garcia RN sent at 1/19/2022  2:00 PM CST -----  (2/2) Please schedule NP appt, EKG and Labs (A1C, BMP).  Thanks,  Silvana

## 2022-01-20 DIAGNOSIS — Z12.11 COLON CANCER SCREENING: Primary | ICD-10-CM

## 2022-01-25 ENCOUNTER — PATIENT MESSAGE (OUTPATIENT)
Dept: SPORTS MEDICINE | Facility: CLINIC | Age: 67
End: 2022-01-25
Payer: MEDICARE

## 2022-01-27 ENCOUNTER — OFFICE VISIT (OUTPATIENT)
Dept: INTERNAL MEDICINE | Facility: CLINIC | Age: 67
End: 2022-01-27
Payer: MEDICARE

## 2022-01-27 ENCOUNTER — HOSPITAL ENCOUNTER (OUTPATIENT)
Dept: CARDIOLOGY | Facility: CLINIC | Age: 67
Discharge: HOME | End: 2022-01-27
Payer: MEDICARE

## 2022-01-27 ENCOUNTER — OFFICE VISIT (OUTPATIENT)
Dept: SPORTS MEDICINE | Facility: CLINIC | Age: 67
End: 2022-01-27
Payer: MEDICARE

## 2022-01-27 VITALS
HEIGHT: 63 IN | OXYGEN SATURATION: 96 % | BODY MASS INDEX: 35.08 KG/M2 | SYSTOLIC BLOOD PRESSURE: 145 MMHG | HEART RATE: 84 BPM | WEIGHT: 198 LBS | TEMPERATURE: 99 F | DIASTOLIC BLOOD PRESSURE: 71 MMHG

## 2022-01-27 VITALS
BODY MASS INDEX: 35.08 KG/M2 | DIASTOLIC BLOOD PRESSURE: 75 MMHG | RESPIRATION RATE: 18 BRPM | SYSTOLIC BLOOD PRESSURE: 145 MMHG | WEIGHT: 198 LBS | HEIGHT: 63 IN | HEART RATE: 94 BPM

## 2022-01-27 DIAGNOSIS — R01.1 HEART MURMUR: ICD-10-CM

## 2022-01-27 DIAGNOSIS — I10 PRIMARY HYPERTENSION: ICD-10-CM

## 2022-01-27 DIAGNOSIS — K21.9 GASTROESOPHAGEAL REFLUX DISEASE, UNSPECIFIED WHETHER ESOPHAGITIS PRESENT: ICD-10-CM

## 2022-01-27 DIAGNOSIS — G47.33 OBSTRUCTIVE SLEEP APNEA HYPOPNEA, SEVERE: ICD-10-CM

## 2022-01-27 DIAGNOSIS — S42.292A CLOSED 4-PART FRACTURE OF PROXIMAL HUMERUS, LEFT, INITIAL ENCOUNTER: Primary | ICD-10-CM

## 2022-01-27 DIAGNOSIS — E78.5 HYPERLIPIDEMIA, UNSPECIFIED HYPERLIPIDEMIA TYPE: ICD-10-CM

## 2022-01-27 DIAGNOSIS — Z87.19 HISTORY OF DIVERTICULITIS: ICD-10-CM

## 2022-01-27 DIAGNOSIS — M79.609 PAIN IN EXTREMITY, UNSPECIFIED EXTREMITY: Primary | ICD-10-CM

## 2022-01-27 DIAGNOSIS — K76.0 FATTY LIVER: ICD-10-CM

## 2022-01-27 DIAGNOSIS — Z01.818 PRE-OP TESTING: ICD-10-CM

## 2022-01-27 DIAGNOSIS — G25.81 RESTLESS LEG SYNDROME: ICD-10-CM

## 2022-01-27 DIAGNOSIS — R00.2 PALPITATIONS: ICD-10-CM

## 2022-01-27 PROBLEM — I77.9 BILATERAL CAROTID ARTERY DISEASE: Status: ACTIVE | Noted: 2022-01-27

## 2022-01-27 PROBLEM — I77.1 STRICTURE OF ARTERY: Status: RESOLVED | Noted: 2021-03-25 | Resolved: 2022-01-27

## 2022-01-27 PROBLEM — E11.9 TYPE 2 DIABETES MELLITUS, WITHOUT LONG-TERM CURRENT USE OF INSULIN: Status: ACTIVE | Noted: 2022-01-27

## 2022-01-27 PROCEDURE — 1101F PR PT FALLS ASSESS DOC 0-1 FALLS W/OUT INJ PAST YR: ICD-10-PCS | Mod: CPTII,S$GLB,, | Performed by: PHYSICIAN ASSISTANT

## 2022-01-27 PROCEDURE — 3008F PR BODY MASS INDEX (BMI) DOCUMENTED: ICD-10-PCS | Mod: CPTII,S$GLB,, | Performed by: PHYSICIAN ASSISTANT

## 2022-01-27 PROCEDURE — 3077F PR MOST RECENT SYSTOLIC BLOOD PRESSURE >= 140 MM HG: ICD-10-PCS | Mod: CPTII,S$GLB,, | Performed by: PHYSICIAN ASSISTANT

## 2022-01-27 PROCEDURE — 3288F PR FALLS RISK ASSESSMENT DOCUMENTED: ICD-10-PCS | Mod: CPTII,S$GLB,, | Performed by: PHYSICIAN ASSISTANT

## 2022-01-27 PROCEDURE — 3078F DIAST BP <80 MM HG: CPT | Mod: CPTII,S$GLB,, | Performed by: PHYSICIAN ASSISTANT

## 2022-01-27 PROCEDURE — 93005 EKG 12-LEAD: ICD-10-PCS | Mod: S$GLB,,, | Performed by: ANESTHESIOLOGY

## 2022-01-27 PROCEDURE — 3044F HG A1C LEVEL LT 7.0%: CPT | Mod: CPTII,S$GLB,, | Performed by: PHYSICIAN ASSISTANT

## 2022-01-27 PROCEDURE — 1159F MED LIST DOCD IN RCRD: CPT | Mod: CPTII,S$GLB,, | Performed by: HOSPITALIST

## 2022-01-27 PROCEDURE — 1160F PR REVIEW ALL MEDS BY PRESCRIBER/CLIN PHARMACIST DOCUMENTED: ICD-10-PCS | Mod: CPTII,S$GLB,, | Performed by: PHYSICIAN ASSISTANT

## 2022-01-27 PROCEDURE — 1159F MED LIST DOCD IN RCRD: CPT | Mod: CPTII,S$GLB,, | Performed by: PHYSICIAN ASSISTANT

## 2022-01-27 PROCEDURE — 3078F PR MOST RECENT DIASTOLIC BLOOD PRESSURE < 80 MM HG: ICD-10-PCS | Mod: CPTII,S$GLB,, | Performed by: HOSPITALIST

## 2022-01-27 PROCEDURE — 99499 NO LOS: ICD-10-PCS | Mod: S$GLB,,, | Performed by: PHYSICIAN ASSISTANT

## 2022-01-27 PROCEDURE — 93010 EKG 12-LEAD: ICD-10-PCS | Mod: S$GLB,,, | Performed by: INTERNAL MEDICINE

## 2022-01-27 PROCEDURE — 3008F BODY MASS INDEX DOCD: CPT | Mod: CPTII,S$GLB,, | Performed by: PHYSICIAN ASSISTANT

## 2022-01-27 PROCEDURE — 93010 ELECTROCARDIOGRAM REPORT: CPT | Mod: S$GLB,,, | Performed by: INTERNAL MEDICINE

## 2022-01-27 PROCEDURE — 3008F BODY MASS INDEX DOCD: CPT | Mod: CPTII,S$GLB,, | Performed by: HOSPITALIST

## 2022-01-27 PROCEDURE — 99214 PR OFFICE/OUTPT VISIT, EST, LEVL IV, 30-39 MIN: ICD-10-PCS | Mod: S$GLB,,, | Performed by: HOSPITALIST

## 2022-01-27 PROCEDURE — 3288F FALL RISK ASSESSMENT DOCD: CPT | Mod: CPTII,S$GLB,, | Performed by: PHYSICIAN ASSISTANT

## 2022-01-27 PROCEDURE — 1159F PR MEDICATION LIST DOCUMENTED IN MEDICAL RECORD: ICD-10-PCS | Mod: CPTII,S$GLB,, | Performed by: HOSPITALIST

## 2022-01-27 PROCEDURE — 3044F PR MOST RECENT HEMOGLOBIN A1C LEVEL <7.0%: ICD-10-PCS | Mod: CPTII,S$GLB,, | Performed by: HOSPITALIST

## 2022-01-27 PROCEDURE — 99499 UNLISTED E&M SERVICE: CPT | Mod: S$GLB,,, | Performed by: PHYSICIAN ASSISTANT

## 2022-01-27 PROCEDURE — 99214 OFFICE O/P EST MOD 30 MIN: CPT | Mod: S$GLB,,, | Performed by: HOSPITALIST

## 2022-01-27 PROCEDURE — 3077F PR MOST RECENT SYSTOLIC BLOOD PRESSURE >= 140 MM HG: ICD-10-PCS | Mod: CPTII,S$GLB,, | Performed by: HOSPITALIST

## 2022-01-27 PROCEDURE — 1101F PT FALLS ASSESS-DOCD LE1/YR: CPT | Mod: CPTII,S$GLB,, | Performed by: PHYSICIAN ASSISTANT

## 2022-01-27 PROCEDURE — 99999 PR PBB SHADOW E&M-EST. PATIENT-LVL V: CPT | Mod: PBBFAC,,, | Performed by: PHYSICIAN ASSISTANT

## 2022-01-27 PROCEDURE — 3078F PR MOST RECENT DIASTOLIC BLOOD PRESSURE < 80 MM HG: ICD-10-PCS | Mod: CPTII,S$GLB,, | Performed by: PHYSICIAN ASSISTANT

## 2022-01-27 PROCEDURE — 93005 ELECTROCARDIOGRAM TRACING: CPT | Mod: S$GLB,,, | Performed by: ANESTHESIOLOGY

## 2022-01-27 PROCEDURE — 99999 PR PBB SHADOW E&M-EST. PATIENT-LVL V: ICD-10-PCS | Mod: PBBFAC,,,

## 2022-01-27 PROCEDURE — 3008F PR BODY MASS INDEX (BMI) DOCUMENTED: ICD-10-PCS | Mod: CPTII,S$GLB,, | Performed by: HOSPITALIST

## 2022-01-27 PROCEDURE — 3044F PR MOST RECENT HEMOGLOBIN A1C LEVEL <7.0%: ICD-10-PCS | Mod: CPTII,S$GLB,, | Performed by: PHYSICIAN ASSISTANT

## 2022-01-27 PROCEDURE — 99999 PR PBB SHADOW E&M-EST. PATIENT-LVL V: CPT | Mod: PBBFAC,,,

## 2022-01-27 PROCEDURE — 1160F PR REVIEW ALL MEDS BY PRESCRIBER/CLIN PHARMACIST DOCUMENTED: ICD-10-PCS | Mod: CPTII,S$GLB,, | Performed by: HOSPITALIST

## 2022-01-27 PROCEDURE — 1160F RVW MEDS BY RX/DR IN RCRD: CPT | Mod: CPTII,S$GLB,, | Performed by: HOSPITALIST

## 2022-01-27 PROCEDURE — 1159F PR MEDICATION LIST DOCUMENTED IN MEDICAL RECORD: ICD-10-PCS | Mod: CPTII,S$GLB,, | Performed by: PHYSICIAN ASSISTANT

## 2022-01-27 PROCEDURE — 3077F SYST BP >= 140 MM HG: CPT | Mod: CPTII,S$GLB,, | Performed by: HOSPITALIST

## 2022-01-27 PROCEDURE — 3078F DIAST BP <80 MM HG: CPT | Mod: CPTII,S$GLB,, | Performed by: HOSPITALIST

## 2022-01-27 PROCEDURE — 1160F RVW MEDS BY RX/DR IN RCRD: CPT | Mod: CPTII,S$GLB,, | Performed by: PHYSICIAN ASSISTANT

## 2022-01-27 PROCEDURE — 99999 PR PBB SHADOW E&M-EST. PATIENT-LVL V: ICD-10-PCS | Mod: PBBFAC,,, | Performed by: PHYSICIAN ASSISTANT

## 2022-01-27 PROCEDURE — 3077F SYST BP >= 140 MM HG: CPT | Mod: CPTII,S$GLB,, | Performed by: PHYSICIAN ASSISTANT

## 2022-01-27 PROCEDURE — 3044F HG A1C LEVEL LT 7.0%: CPT | Mod: CPTII,S$GLB,, | Performed by: HOSPITALIST

## 2022-01-27 RX ORDER — ASPIRIN 81 MG/1
TABLET ORAL
Qty: 56 TABLET | Refills: 0 | Status: ON HOLD | OUTPATIENT
Start: 2022-01-27 | End: 2022-02-08 | Stop reason: HOSPADM

## 2022-01-27 RX ORDER — CELECOXIB 200 MG/1
200 CAPSULE ORAL DAILY
Status: CANCELLED | OUTPATIENT
Start: 2022-01-28

## 2022-01-27 RX ORDER — DEXTROSE MONOHYDRATE AND SODIUM CHLORIDE 5; .9 G/100ML; G/100ML
INJECTION, SOLUTION INTRAVENOUS CONTINUOUS
Status: CANCELLED | OUTPATIENT
Start: 2022-01-27

## 2022-01-27 RX ORDER — CHOLECALCIFEROL (VITAMIN D3) 25 MCG
1000 TABLET ORAL DAILY
COMMUNITY

## 2022-01-27 RX ORDER — ACETAMINOPHEN 500 MG
1000 TABLET ORAL EVERY 6 HOURS
Status: CANCELLED | OUTPATIENT
Start: 2022-01-27 | End: 2022-01-29

## 2022-01-27 RX ORDER — ROPIVACAINE HYDROCHLORIDE 2 MG/ML
6 INJECTION, SOLUTION EPIDURAL; INFILTRATION; PERINEURAL CONTINUOUS
Status: CANCELLED | OUTPATIENT
Start: 2022-01-27

## 2022-01-27 RX ORDER — MUPIROCIN 20 MG/G
OINTMENT TOPICAL
Status: CANCELLED | OUTPATIENT
Start: 2022-01-27

## 2022-01-27 RX ORDER — PREGABALIN 150 MG/1
150 CAPSULE ORAL NIGHTLY
Status: CANCELLED | OUTPATIENT
Start: 2022-01-27

## 2022-01-27 RX ORDER — OXYCODONE HYDROCHLORIDE 5 MG/1
5 TABLET ORAL
Status: CANCELLED | OUTPATIENT
Start: 2022-01-27

## 2022-01-27 RX ORDER — SODIUM CHLORIDE 0.9 % (FLUSH) 0.9 %
10 SYRINGE (ML) INJECTION
Status: CANCELLED | OUTPATIENT
Start: 2022-01-27

## 2022-01-27 RX ORDER — MUPIROCIN 20 MG/G
1 OINTMENT TOPICAL 2 TIMES DAILY
Status: CANCELLED | OUTPATIENT
Start: 2022-01-27 | End: 2022-02-01

## 2022-01-27 RX ORDER — ONDANSETRON 8 MG/1
8 TABLET, ORALLY DISINTEGRATING ORAL EVERY 8 HOURS PRN
Status: CANCELLED | OUTPATIENT
Start: 2022-01-27

## 2022-01-27 RX ORDER — OXYCODONE HYDROCHLORIDE 5 MG/1
TABLET ORAL
Qty: 28 TABLET | Refills: 0 | Status: SHIPPED | OUTPATIENT
Start: 2022-01-27 | End: 2022-06-24

## 2022-01-27 RX ORDER — ACETAMINOPHEN 500 MG
1000 TABLET ORAL
Status: CANCELLED | OUTPATIENT
Start: 2022-01-27

## 2022-01-27 RX ORDER — PROCHLORPERAZINE EDISYLATE 5 MG/ML
5 INJECTION INTRAMUSCULAR; INTRAVENOUS EVERY 6 HOURS PRN
Status: CANCELLED | OUTPATIENT
Start: 2022-01-27

## 2022-01-27 RX ORDER — IBUPROFEN 100 MG/5ML
1000 SUSPENSION, ORAL (FINAL DOSE FORM) ORAL DAILY
COMMUNITY

## 2022-01-27 RX ORDER — ONDANSETRON 4 MG/1
4 TABLET, FILM COATED ORAL EVERY 8 HOURS PRN
Qty: 30 TABLET | Refills: 0 | Status: SHIPPED | OUTPATIENT
Start: 2022-01-27 | End: 2022-10-05

## 2022-01-27 RX ORDER — OXYCODONE HYDROCHLORIDE 5 MG/1
10 TABLET ORAL
Status: CANCELLED | OUTPATIENT
Start: 2022-01-27

## 2022-01-27 RX ORDER — FAMOTIDINE 20 MG/1
20 TABLET, FILM COATED ORAL 2 TIMES DAILY
Status: CANCELLED | OUTPATIENT
Start: 2022-01-27

## 2022-01-27 RX ORDER — CELECOXIB 200 MG/1
400 CAPSULE ORAL
Status: CANCELLED | OUTPATIENT
Start: 2022-01-27

## 2022-01-27 RX ORDER — AMOXICILLIN 250 MG
1 CAPSULE ORAL 2 TIMES DAILY
Status: CANCELLED | OUTPATIENT
Start: 2022-01-27

## 2022-01-27 RX ORDER — POLYETHYLENE GLYCOL 3350 17 G/17G
17 POWDER, FOR SOLUTION ORAL DAILY
Status: CANCELLED | OUTPATIENT
Start: 2022-01-27

## 2022-01-27 NOTE — ASSESSMENT & PLAN NOTE
EXAMINATION: US CAROTID BILATERAL     CLINICAL HISTORY: Occlusion and stenosis of unspecified carotid artery     TECHNIQUE: Grayscale and color Doppler ultrasound examination of the carotid and vertebral artery systems bilaterally.  Stenosis estimates are per the NASCET measurement criteria.     COMPARISON: Prior dated 11/28/2016     FINDINGS:  Right:     Internal Carotid Artery:     Peak systolic velocity 109 cm/sec     End diastolic velocity 43  cm/sec     IC/CC ratio: 1.8     Plaque formation: Moderate     Vertebral artery: Antegrade flow and normal waveform.     Left:     Internal Carotid Artery:     Peak systolic velocity 82 cm/sec     End diastolic velocity 26 cm/sec     IC/CC ratio: 1.3.     Plaque formation: Mild     Vertebral artery: Antegrade flow and normal waveform.     IMPRESSION:   No evidence of a hemodynamically significant carotid bifurcation stenosis.        Electronically signed by: Dorothy Negrete MD  Date:                                            04/06/2018  Time:                                           09:01

## 2022-01-27 NOTE — ASSESSMENT & PLAN NOTE
This client has a possible diagnosis of obstructive sleep apnea (SHOAIB)    Unable to tolerate CPAP- tries to sleep on side to prevent apnea episodes    Instructions were given to avoid the following: sleeping supine, weight gain ,alcoholic beverages , sedative medications, and CNS depressant use as these can all worsen SHOAIB.    Untreated sleep apnea has been shown to increase daytime sleepiness, hypertension, heart disease and stroke which were discussed with the patient at this time      Please use caution with medications that induce respiratory depression in the perioperative period

## 2022-01-27 NOTE — ASSESSMENT & PLAN NOTE
DM x 6  years.  Currently takes: Metformin    Most recent A1c is 5.9.  Average daily accuchecks are 162. Reports peripheral neuropathy. Denies visual changes.  Maintain healthy weight. Exercise at least 150 minutes weekly. Encouraged diet rich in nutrients such as fruits, vegetables, and whole grains; reduce sugar intake from cakes, candy, and sugared drinks.    Micro changes: Denies- Retinopathy, Nephropathy   Macro changes: Denies-  Carotid, Coronary , Peripheral disease

## 2022-01-27 NOTE — ASSESSMENT & PLAN NOTE
Current BP  145/72  today.  Taking: Norvasc, HCTZ  Patient reports home BP readings of: normally 112//70; pressures have been higher since she has pain in her shoulder  Encouraged keeping a healthy weight and BMI  Lifestyle changes to reduce systolic BP:  exercise 30 minutes per day,  5 days per week or 150 minutes weekly; sodium reduction and avoidance of high salt foods such as processed meats, frozen meals and  fast foods.     BP acceptable for surgery. I recommend monitoring BP during perioperative period as well as uncontrolled pain which can elevated blood pressure.

## 2022-01-27 NOTE — PATIENT INSTRUCTIONS
Preventive perioperative care    Thromboembolic prophylaxis:  Her risk factors for thrombosis include obesity, surgical procedure and age.I suggest  thromboembolic prophylaxis ( mechanical/pharmacological, weighing the risk benefits of pharmacological agent use considering bisi procedural bleeding )  during the perioperative period.I suggested being active in the post operative period.      Postoperative pulmonary complication prophylaxis-Risk factors for post operative pulmonary complications include age over 65 years, surgery lasting over 3 hours and ASA class >2- I suggest incentive spirometry use, early ambulation and pain control so as to avoid diaphragmatic splinting  Brush teeth twice per day, oral rinses, sleep with the head of the bed up 30 degrees     Renal complication prophylaxis-Risk factors for renal complications include age, diabetes mellitus and hypertension . I suggest keeping her well hydrated and avoidance/ minimizing the use of  NSAID's,MACHADO 2 Inhibitors ,IV contrast if possible in the perioperative period.I suggested drinking 2 litre's of water a day      Surgical site Infection Prophylaxis-I  suggest appropriate antibiotic for Prophylaxis against Surgical site infections Shower with Hibiclens in the night before surgery and the morning of surgery     This visit was focused on Preoperative evaluation, Perioperative Medical management, complication reduction plans. I suggest that the patient follows up with primary care or relevant sub specialists for ongoing health care.    I appreciate the opportunity to be involved in this patients care. Please feel free to contact me if there were any questions about this consultation.    Patient is pending optimization

## 2022-01-27 NOTE — ASSESSMENT & PLAN NOTE
Enzymes  Component      Latest Ref Rng & Units 1/27/2022   PROTEIN TOTAL      6.0 - 8.4 g/dL 8.1   Albumin      3.5 - 5.2 g/dL 4.3   BILIRUBIN TOTAL      0.1 - 1.0 mg/dL 0.6   Alkaline Phosphatase      55 - 135 U/L 108   AST      10 - 40 U/L 45 (H)   ALT      10 - 44 U/L 67 (H)     PLT  INR 1    No evidence of hepatic decompensation

## 2022-01-27 NOTE — ASSESSMENT & PLAN NOTE
Currently being treated with Omeprazole  Symptoms controlled with medication  Encouraged to elevate HOB and avoid laying down for 2-3 hours after meals.   Weight loss encouraged as well as dietary changes such as reduction or avoidance of fatty foods, caffeine, spicy foods, and chocolate.    Smoking cessation was recommended as well as reduction of alcohol consumption.

## 2022-01-27 NOTE — PROGRESS NOTES
Cuong Burns [address]  Progress Note    Patient Name: Parisa Dimas  MRM: [id number]  Date of Evaluation- 02/23/2022  PCP- Alex Cast MD    Future cases for Parisa Dimas [[id number]]     Case ID Status Date Time Asad Procedure Provider Location    [id number] [facility] 2/2/2022 10:17  ARTHROPLASTY, SHOULDER, TOTAL, REVERSE BW Frandy Santoyo MD [[id number]] [facility]          HPI:  This is a 66 y.o. female  who presents today for a preoperative evaluation in preparation for a Orthopedic  procedure.  Scheduled for  2/2/22  Surgery is indicated for left shoulder arthroplasty.   Patient is new to me.  Details of current problem: The duration of problem   had an injury to her shoulder- tripped and had blunt force injury by fell into the door frame- occurred 12/22/22   Reports symptoms of  decreased ROM at shoulder pain  Aggravating factors include: movement, twisting   Relieving factors are   immobility, sleeping in recliner  Treated with Ibuprofen and Tylenol    Reports pain: 5/10  The history has been obtained by speaking with the patient and reviewing the electronic medical record and/or outside health information. Significant health conditions for the perioperative period are discussed below in assessment and plan.     Patient reports current health status to be Good.  Denies any new symptoms before surgery.         Subjective/ Objective:     Chief Complaint: Preoperative evaulation, perioperative medical management, and complication reduction plan.     Functional Capacity: Able to climb a flight of stairs without CP SOB or passing out- walked from car to office without complications.  Works at her [employer], housework, grocery shopping      Anesthesia issues: None known- has only had MAC    Difficulty mouth opening: no    Steroid use in the last 12 months: none     Dental Issues: none    Family anesthesia difficulty: None     Family Hx of Thrombosis none    Past Medical History:   Diagnosis Date    AR (allergic rhinitis)  "    AR (allergic rhinitis)     Carotid stenosis     50% RCA    Colon polyp 10/2014    Diabetes mellitus     Diabetes mellitus, type 2     Fatty liver     GERD (gastroesophageal reflux disease)     Heart murmur 1/27/2022    HLD (hyperlipidemia)     HTN (hypertension)     Joint pain     Sleep apnea 2018    Stricture of artery 3/25/2021       Past Surgical History:   Procedure Laterality Date    SKIN BIOPSY  10yrs.    negative       Review of Systems   Constitutional: Negative for chills, fatigue and fever.   HENT: Negative for trouble swallowing and voice change.    Eyes: Negative for photophobia and visual disturbance.        No acute visual changes   Respiratory: Negative for apnea, cough, chest tightness, shortness of breath and wheezing.         SHOAIB- declined CPAP   Cardiovascular: Positive for leg swelling. Negative for chest pain and palpitations.   Gastrointestinal: Positive for diarrhea. Negative for abdominal distention, abdominal pain, blood in stool, constipation, nausea and vomiting.        NO hepatitis, cirrhosis  No BRB or black tarry stool    Has had fecal urge incontinence     FLD   Endocrine: Negative for cold intolerance, heat intolerance, polydipsia, polyphagia and polyuria.   Genitourinary: Negative for difficulty urinating, dysuria, flank pain, frequency, hematuria and urgency.   Musculoskeletal: Positive for arthralgias. Negative for back pain, gait problem, joint swelling, myalgias, neck pain and neck stiffness.   Neurological: Positive for numbness. Negative for dizziness, tremors, seizures, syncope, weakness, light-headedness and headaches.        Fingers   Psychiatric/Behavioral: Negative for suicidal ideas. The patient is not nervous/anxious.       VITALS  Visit Vitals  BP (!) 145/71 (BP Location: Right arm, Patient Position: Sitting)   Pulse 84   Temp 98.7 °F (37.1 °C) (Oral)   Ht 5' 3" (1.6 m)   Wt 89.8 kg (198 lb)   LMP 08/01/2003   SpO2 96%   BMI 35.07 kg/m²    "       Physical Exam  Constitutional:       General: She is not in acute distress.     Appearance: She is well-developed and well-nourished. She is not diaphoretic.   HENT:      Head: Normocephalic.      Nose: Nose normal.      Mouth/Throat:      Mouth: Mucous membranes are moist.      Pharynx: No oropharyngeal exudate.   Eyes:      General: Lids are normal.         Right eye: No discharge.         Left eye: No discharge.      Extraocular Movements: EOM normal.      Conjunctiva/sclera: Conjunctivae normal.   Neck:      Thyroid: No thyromegaly.      Vascular: No JVD.      Trachea: Trachea and phonation normal. No tracheal deviation.   Cardiovascular:      Rate and Rhythm: Normal rate and regular rhythm.      Pulses: Intact distal pulses.           Carotid pulses are 2+ on the right side and 2+ on the left side.       Radial pulses are 2+ on the right side and 2+ on the left side.        Posterior tibial pulses are 2+ on the right side and 2+ on the left side.      Heart sounds: Murmur heard.    Systolic murmur is present with a grade of 2/6.  No friction rub. No gallop.       Comments: Systolic heart murmur noted loudest at the right sternal border, second IS, MCL  Pulmonary:      Effort: Pulmonary effort is normal. No respiratory distress.      Breath sounds: Normal breath sounds. No stridor. No wheezing or rales.   Chest:      Chest wall: No tenderness.   Abdominal:      General: Bowel sounds are normal. There is no distension.      Palpations: Abdomen is soft.      Tenderness: There is no abdominal tenderness. There is no guarding.   Musculoskeletal:         General: No tenderness, deformity or edema. Normal range of motion.      Cervical back: Normal range of motion and neck supple.      Right lower leg: No edema.      Left lower leg: No edema.   Lymphadenopathy:      Head:      Right side of head: No submental, submandibular, tonsillar, preauricular, posterior auricular or occipital adenopathy.      Left side of  head: No submental, submandibular, tonsillar, preauricular, posterior auricular or occipital adenopathy.      Cervical: No cervical adenopathy.      Right cervical: No superficial cervical adenopathy.     Left cervical: No superficial cervical adenopathy.   Skin:     General: Skin is warm and dry.      Capillary Refill: Capillary refill takes 2 to 3 seconds.      Coloration: Skin is not pale.      Findings: No erythema or rash.   Neurological:      Mental Status: She is alert and oriented to person, place, and time.      GCS: GCS eye subscore is 4. GCS verbal subscore is 5. GCS motor subscore is 6.      Motor: No abnormal muscle tone.      Coordination: Coordination normal.   Psychiatric:         Attention and Perception: Attention and perception normal.         Mood and Affect: Mood and affect, mood and affect normal.         Speech: Speech normal.         Behavior: Behavior normal. Behavior is cooperative.         Thought Content: Thought content normal.         Cognition and Memory: Cognition and memory normal.         Judgment: Judgment normal.          Significant Labs:  Lab Results   Component Value Date    WBC 4.39 02/08/2022    HGB 13.8 02/08/2022    HCT 42.4 02/08/2022     02/08/2022    CHOL 169 07/22/2021    TRIG 253 (H) 07/22/2021    HDL 48 07/22/2021    ALT 67 (H) 01/27/2022    AST 45 (H) 01/27/2022     02/08/2022    K 4.0 02/08/2022     02/08/2022    CREATININE 0.7 02/08/2022    BUN 12 02/08/2022    CO2 27 02/08/2022    TSH 0.865 07/22/2021    INR 1.0 02/08/2022    HGBA1C 5.9 (H) 01/27/2022       Diagnostic Studies: No relevant studies.    EKG:   Results for orders placed or performed during the hospital encounter of 02/08/22   EKG 12-lead    Collection Time: 02/08/22  9:20 AM    Narrative    Test Reason : Q24.8,E11.9,Q24.8,    Vent. Rate : 080 BPM     Atrial Rate : 080 BPM     P-R Int : 214 ms          QRS Dur : 076 ms      QT Int : 398 ms       P-R-T Axes : 069 018 046 degrees      QTc Int : 459 ms    Sinus rhythm with 1st degree A-V block with Premature atrial complexes  Nonspecific T wave abnormality  Abnormal ECG  When compared with ECG of 27-JAN-2022 08:07,  Premature atrial complexes are now Present  Nonspecific T wave abnormality has replaced inverted T waves in Anterior  leads  QT has lengthened  Confirmed by Dennise Watson MD ([id number]) on 2/9/2022 7:02:31 AM    Referred By: PRASHANT GARCIA           Confirmed By:Dennise Watson MD       2D ECHO:  TTE:  1/31/22  Summary    · The left ventricle is normal in size with concentric remodeling and hyperdynamic systolic function.  · The estimated ejection fraction is 70%.  · Normal left ventricular diastolic function.  · Elevated LVOT and aortic velocities of 3m/s, which increaes to 4m/s with valsalva. Turbulent flow seen along the basal septum. Unable to adequately visualize the aortic valve and LVOT. Consider further imaging evaluation.  · Normal right ventricular size with normal right ventricular systolic function.  · The estimated PA systolic pressure is 34 mmHg.  · Normal central venous pressure (3 mmHg).      MILDRED:  No results found for this or any previous visit.     Imaging     Active Cardiac Conditions: None      Revised Cardiac Risk Index   High -Risk Surgery  Intraperitoneal; Intrathoracic; suprainguinal vascular Yes- + 1 No- 0   History of Ischemic Heart Disease   (Hx of MI/positive exercise test/current chest pain due to ischemia/use of nitrate therapy/EKG with pathological Q waves) Yes- + 1 No- 0   History of CHF  (Pulmonary edema/bilateral rales or S3 gallop/PND/CXR showing pulmonary vascular redistribution) Yes- + 1 No- 0   History of CVA   (Prior stroke or TIA) Yes- + 1 No- 0   Pre-operative treatment with insulin Yes- + 1 No- 0   Pre-operative creatinine > 2mg/dl Yes- + 1 No- 0   Total:      Risk Status:  Estimated risk of cardiac complications after non-cardiac surgery using the Revised Cardiac Risk Index for Preoperative risk is  3.9 %      ARISCAT (Canet) risk index: Intermediate: 13.3% risk of post-op pulmonary complications.    American Society of Anesthesiologists Physical Status classification (ASA): 3             No further cardiac workup needed prior to surgery.        Preoperative cardiac risk assessment-  The patient does not have any active cardiac conditions . Revised cardiac risk index predictors- ---.Functional capacity is more than 4 Mets. She will be undergoing a Orthopedic procedure that carries a Moderate Risk risk     The estimated risk of the rate of adverse cardiac outcomes  3.9%    No further cardiac work up is indicated prior to proceeding with the surgery     Orders Placed This Encounter    Comprehensive Metabolic Panel    Protime-INR    Ambulatory referral/consult to Cardiology       American Society of Anesthesiologists Physical status classification ( ASA ) class: 3     Postoperative pulmonary complication risk assessment: 13.3     VARICATE ( Ca net) risk index- risk class -  Low, if duration of surgery is under 3 hours, intermediate, if duration of surgery is over 3 hours        Assessment/Plan:     HTN (hypertension)  Current BP  145/72  today.  Taking: Norvasc, HCTZ  Patient reports home BP readings of: normally 112//70; pressures have been higher since she has pain in her shoulder  Encouraged keeping a healthy weight and BMI  Lifestyle changes to reduce systolic BP:  exercise 30 minutes per day,  5 days per week or 150 minutes weekly; sodium reduction and avoidance of high salt foods such as processed meats, frozen meals and  fast foods.     BP acceptable for surgery. I recommend monitoring BP during perioperative period as well as uncontrolled pain which can elevated blood pressure.           Fatty liver  Enzymes  Component      Latest Ref Rng & Units 1/27/2022   PROTEIN TOTAL      6.0 - 8.4 g/dL 8.1   Albumin      3.5 - 5.2 g/dL 4.3   BILIRUBIN TOTAL      0.1 - 1.0 mg/dL 0.6   Alkaline Phosphatase       55 - 135 U/L 108   AST      10 - 40 U/L 45 (H)   ALT      10 - 44 U/L 67 (H)     PLT  INR 1    No evidence of hepatic decompensation    GERD (gastroesophageal reflux disease)  Currently being treated with Omeprazole  Symptoms controlled with medication  Encouraged to elevate HOB and avoid laying down for 2-3 hours after meals.   Weight loss encouraged as well as dietary changes such as reduction or avoidance of fatty foods, caffeine, spicy foods, and chocolate.    Smoking cessation was recommended as well as reduction of alcohol consumption.    Obstructive sleep apnea hypopnea, severe    This client has a possible diagnosis of obstructive sleep apnea (SHOAIB)    Unable to tolerate CPAP- tries to sleep on side to prevent apnea episodes    Instructions were given to avoid the following: sleeping supine, weight gain ,alcoholic beverages , sedative medications, and CNS depressant use as these can all worsen SHOAIB.    Untreated sleep apnea has been shown to increase daytime sleepiness, hypertension, heart disease and stroke which were discussed with the patient at this time      Please use caution with medications that induce respiratory depression in the perioperative period      BMI 35.0-35.9,adult  BMI 35.07    Patient would benefit from weight loss and has  tried to set goals to achieve success. Lifestyle changes should be made by eating healthy, exercising at least 150 minutes weekly, and avoiding sedentary behavior.   BMI 35    HLD (hyperlipidemia)  On statin    History of diverticulitis  Last episode was 20 years ago    Type 2 diabetes mellitus, without long-term current use of insulin   DM x 6  years.  Currently takes: Metformin    Most recent A1c is 5.9.  Average daily accuchecks are 162. Reports peripheral neuropathy. Denies visual changes.  Maintain healthy weight. Exercise at least 150 minutes weekly. Encouraged diet rich in nutrients such as fruits, vegetables, and whole grains; reduce sugar intake from cakes,  "candy, and sugared drinks.    Micro changes: Denies- Retinopathy, Nephropathy   Macro changes: Denies-  Carotid, Coronary , Peripheral disease       Heart murmur  Systolic murmur- 2/6 Pender loudest at the right sternal border MCL  She denies any symptoms of chest pain, shortness of breath at rest, peripheral edema, orthopnea, lightheadedness, presyncope, or syncope.  Reports one episode of    palpitations- she used a pulse ox to count her HR and it was 175  Sleeps with her beds head elevated 20-30 degrees    Cardiology consult placed and scheduled for 1/31/22  Dr. Felix Pre-op evaluation  Uses my chart.  No further cardiovascular  Evaluation is recommended prior to surgery.  RCRI score of 0 with 3.9% risk of 30 day events of death, MI or cardiac arrest.  Pt ahceives > 4 METS, walks 1 mile with no active symptoms. Walked from parking lot to clinic up ramp with no symptoms. Does not climb stairs.    Cardiologist notes addended:  "Update: TTE done today showed The estimated ejection fraction is 70%. Normal left ventricular diastolic function. Elevated LVOT and aortic velocities of 3m/s, which increaes to 4m/s with valsalva. Turbulent flow seen along the basal septum. Unable to adequately visualize the aortic valve and LVOT. Consider further imaging evaluation. Normal right ventricular size with normal right ventricular systolic function.     Will obtain MILDRED to further evaluate site of obstruction. Will consider cardiac MRI.  Will have to postpone surgery.  Updated patient.  Will discuss with surgery team."        Bilateral carotid artery disease  EXAMINATION: US CAROTID BILATERAL     CLINICAL HISTORY: Occlusion and stenosis of unspecified carotid artery     TECHNIQUE: Grayscale and color Doppler ultrasound examination of the carotid and vertebral artery systems bilaterally.  Stenosis estimates are per the NASCET measurement criteria.     COMPARISON: Prior dated 11/28/2016     FINDINGS:  Right:     Internal Carotid " Artery:     Peak systolic velocity 109 cm/sec     End diastolic velocity 43  cm/sec     IC/CC ratio: 1.8     Plaque formation: Moderate     Vertebral artery: Antegrade flow and normal waveform.     Left:     Internal Carotid Artery:     Peak systolic velocity 82 cm/sec     End diastolic velocity 26 cm/sec     IC/CC ratio: 1.3.     Plaque formation: Mild     Vertebral artery: Antegrade flow and normal waveform.     IMPRESSION:   No evidence of a hemodynamically significant carotid bifurcation stenosis.        Electronically signed by: Dorothy Negrete MD  Date:                                            04/06/2018  Time:                                           09:01      Preventive perioperative care    Thromboembolic prophylaxis:  Her risk factors for thrombosis include obesity, surgical procedure and age.I suggest  thromboembolic prophylaxis ( mechanical/pharmacological, weighing the risk benefits of pharmacological agent use considering bisi procedural bleeding )  during the perioperative period.I suggested being active in the post operative period.      Postoperative pulmonary complication prophylaxis-Risk factors for post operative pulmonary complications include age over 65 years, surgery lasting over 3 hours and ASA class >2- I suggest incentive spirometry use, early ambulation and pain control so as to avoid diaphragmatic splinting  Brush teeth twice per day, oral rinses, sleep with the head of the bed up 30 degrees     Renal complication prophylaxis-Risk factors for renal complications include age, diabetes mellitus and hypertension . I suggest keeping her well hydrated and avoidance/ minimizing the use of  NSAID's,MACHADO 2 Inhibitors ,IV contrast if possible in the perioperative period.I suggested drinking 2 litre's of water a day      Surgical site Infection Prophylaxis-I  suggest appropriate antibiotic for Prophylaxis against Surgical site infections Shower with Hibiclens in the night before surgery and  "the morning of surgery     This visit was focused on Preoperative evaluation, Perioperative Medical management, complication reduction plans. I suggest that the patient follows up with primary care or relevant sub specialists for ongoing health care.    I appreciate the opportunity to be involved in this patientS care. Please feel free to contact me if there were any questions about this consultation.    Patient is optimized    Cardiology clearance addended:  "Update: TE done today showed The estimated ejection fraction is 70%. Normal left ventricular diastolic function. Elevated LVOT and aortic velocities of 3m/s, which increases to 4m/s with valsalva. Turbulent flow seen along the basal septum. Unable to adequately visualize the aortic valve and LVOT. Consider further imaging evaluation. Normal right ventricular size with normal right ventricular systolic function.     Will obtain MILDRED to further evaluate site of obstruction. Will consider cardiac MRI.  Will have to postpone surgery.  Updated patient.  Will discuss with surgery team."    Cardiology addendum:    Addendum 3/2/22 Cardiology per Helga Felix MD   TTE  abnormal  but should not delay surgery.     Per Pato Damon MD  Should not# delay the surgery. I agree with good hydration perioperatively and phenylephrine if needed as the initial pressor.       Mo Quezada NP  Perioperative Medicine  [facility]   Pager [phone]    "

## 2022-01-27 NOTE — OUTPATIENT SUBJECTIVE & OBJECTIVE
Outpatient Subjective & Objective      Chief Complaint: Preoperative evaulation, perioperative medical management, and complication reduction plan.     Functional Capacity: Able to climb a flight of stairs without CP SOB or passing out- walked from car to office without complications.  Works at her [employer], housework, grocery shopping      Anesthesia issues: None known- has only had MAC    Difficulty mouth opening: no    Steroid use in the last 12 months: none     Dental Issues: none    Family anesthesia difficulty: None     Family Hx of Thrombosis none    Past Medical History:   Diagnosis Date    AR (allergic rhinitis)     AR (allergic rhinitis)     Carotid stenosis     50% RCA    Colon polyp 10/2014    Diabetes mellitus     Diabetes mellitus, type 2     Fatty liver     GERD (gastroesophageal reflux disease)     Heart murmur 1/27/2022    HLD (hyperlipidemia)     HTN (hypertension)     Joint pain     Sleep apnea 2018    Stricture of artery 3/25/2021       Past Surgical History:   Procedure Laterality Date    SKIN BIOPSY  10yrs.    negative       Review of Systems   Constitutional: Negative for chills, fatigue and fever.   HENT: Negative for trouble swallowing and voice change.    Eyes: Negative for photophobia and visual disturbance.        No acute visual changes   Respiratory: Negative for apnea, cough, chest tightness, shortness of breath and wheezing.         SHOAIB- declined CPAP   Cardiovascular: Positive for leg swelling. Negative for chest pain and palpitations.   Gastrointestinal: Positive for diarrhea. Negative for abdominal distention, abdominal pain, blood in stool, constipation, nausea and vomiting.        NO hepatitis, cirrhosis  No BRB or black tarry stool    Has had fecal urge incontinence     FLD   Endocrine: Negative for cold intolerance, heat intolerance, polydipsia, polyphagia and polyuria.   Genitourinary: Negative for difficulty urinating, dysuria, flank pain, frequency, hematuria  "and urgency.   Musculoskeletal: Positive for arthralgias. Negative for back pain, gait problem, joint swelling, myalgias, neck pain and neck stiffness.   Neurological: Positive for numbness. Negative for dizziness, tremors, seizures, syncope, weakness, light-headedness and headaches.        Fingers   Psychiatric/Behavioral: Negative for suicidal ideas. The patient is not nervous/anxious.       VITALS  Visit Vitals  BP (!) 145/71 (BP Location: Right arm, Patient Position: Sitting)   Pulse 84   Temp 98.7 °F (37.1 °C) (Oral)   Ht 5' 3" (1.6 m)   Wt 89.8 kg (198 lb)   LMP 08/01/2003   SpO2 96%   BMI 35.07 kg/m²          Physical Exam  Constitutional:       General: She is not in acute distress.     Appearance: She is well-developed and well-nourished. She is not diaphoretic.   HENT:      Head: Normocephalic.      Nose: Nose normal.      Mouth/Throat:      Mouth: Mucous membranes are moist.      Pharynx: No oropharyngeal exudate.   Eyes:      General: Lids are normal.         Right eye: No discharge.         Left eye: No discharge.      Extraocular Movements: EOM normal.      Conjunctiva/sclera: Conjunctivae normal.   Neck:      Thyroid: No thyromegaly.      Vascular: No JVD.      Trachea: Trachea and phonation normal. No tracheal deviation.   Cardiovascular:      Rate and Rhythm: Normal rate and regular rhythm.      Pulses: Intact distal pulses.           Carotid pulses are 2+ on the right side and 2+ on the left side.       Radial pulses are 2+ on the right side and 2+ on the left side.        Posterior tibial pulses are 2+ on the right side and 2+ on the left side.      Heart sounds: Murmur heard.    Systolic murmur is present with a grade of 2/6.  No friction rub. No gallop.       Comments: Systolic heart murmur noted loudest at the right sternal border, second IS, MCL  Pulmonary:      Effort: Pulmonary effort is normal. No respiratory distress.      Breath sounds: Normal breath sounds. No stridor. No wheezing or " rales.   Chest:      Chest wall: No tenderness.   Abdominal:      General: Bowel sounds are normal. There is no distension.      Palpations: Abdomen is soft.      Tenderness: There is no abdominal tenderness. There is no guarding.   Musculoskeletal:         General: No tenderness, deformity or edema. Normal range of motion.      Cervical back: Normal range of motion and neck supple.      Right lower leg: No edema.      Left lower leg: No edema.   Lymphadenopathy:      Head:      Right side of head: No submental, submandibular, tonsillar, preauricular, posterior auricular or occipital adenopathy.      Left side of head: No submental, submandibular, tonsillar, preauricular, posterior auricular or occipital adenopathy.      Cervical: No cervical adenopathy.      Right cervical: No superficial cervical adenopathy.     Left cervical: No superficial cervical adenopathy.   Skin:     General: Skin is warm and dry.      Capillary Refill: Capillary refill takes 2 to 3 seconds.      Coloration: Skin is not pale.      Findings: No erythema or rash.   Neurological:      Mental Status: She is alert and oriented to person, place, and time.      GCS: GCS eye subscore is 4. GCS verbal subscore is 5. GCS motor subscore is 6.      Motor: No abnormal muscle tone.      Coordination: Coordination normal.   Psychiatric:         Attention and Perception: Attention and perception normal.         Mood and Affect: Mood and affect, mood and affect normal.         Speech: Speech normal.         Behavior: Behavior normal. Behavior is cooperative.         Thought Content: Thought content normal.         Cognition and Memory: Cognition and memory normal.         Judgment: Judgment normal.          Significant Labs:  Lab Results   Component Value Date    WBC 4.39 02/08/2022    HGB 13.8 02/08/2022    HCT 42.4 02/08/2022     02/08/2022    CHOL 169 07/22/2021    TRIG 253 (H) 07/22/2021    HDL 48 07/22/2021    ALT 67 (H) 01/27/2022    AST 45 (H)  01/27/2022     02/08/2022    K 4.0 02/08/2022     02/08/2022    CREATININE 0.7 02/08/2022    BUN 12 02/08/2022    CO2 27 02/08/2022    TSH 0.865 07/22/2021    INR 1.0 02/08/2022    HGBA1C 5.9 (H) 01/27/2022       Diagnostic Studies: No relevant studies.    EKG:   Results for orders placed or performed during the hospital encounter of 02/08/22   EKG 12-lead    Collection Time: 02/08/22  9:20 AM    Narrative    Test Reason : Q24.8,E11.9,Q24.8,    Vent. Rate : 080 BPM     Atrial Rate : 080 BPM     P-R Int : 214 ms          QRS Dur : 076 ms      QT Int : 398 ms       P-R-T Axes : 069 018 046 degrees     QTc Int : 459 ms    Sinus rhythm with 1st degree A-V block with Premature atrial complexes  Nonspecific T wave abnormality  Abnormal ECG  When compared with ECG of 27-JAN-2022 08:07,  Premature atrial complexes are now Present  Nonspecific T wave abnormality has replaced inverted T waves in Anterior  leads  QT has lengthened  Confirmed by Dennise Watson MD ([id number]) on 2/9/2022 7:02:31 AM    Referred By: PRASHANT GARCIA           Confirmed By:Dennise Watson MD       2D ECHO:  TTE:  1/31/22  Summary    · The left ventricle is normal in size with concentric remodeling and hyperdynamic systolic function.  · The estimated ejection fraction is 70%.  · Normal left ventricular diastolic function.  · Elevated LVOT and aortic velocities of 3m/s, which increaes to 4m/s with valsalva. Turbulent flow seen along the basal septum. Unable to adequately visualize the aortic valve and LVOT. Consider further imaging evaluation.  · Normal right ventricular size with normal right ventricular systolic function.  · The estimated PA systolic pressure is 34 mmHg.  · Normal central venous pressure (3 mmHg).      MILDRED:  No results found for this or any previous visit.     Imaging     Active Cardiac Conditions: None      Revised Cardiac Risk Index   High -Risk Surgery  Intraperitoneal; Intrathoracic; suprainguinal vascular Yes- + 1 No- 0    History of Ischemic Heart Disease   (Hx of MI/positive exercise test/current chest pain due to ischemia/use of nitrate therapy/EKG with pathological Q waves) Yes- + 1 No- 0   History of CHF  (Pulmonary edema/bilateral rales or S3 gallop/PND/CXR showing pulmonary vascular redistribution) Yes- + 1 No- 0   History of CVA   (Prior stroke or TIA) Yes- + 1 No- 0   Pre-operative treatment with insulin Yes- + 1 No- 0   Pre-operative creatinine > 2mg/dl Yes- + 1 No- 0   Total:      Risk Status:  Estimated risk of cardiac complications after non-cardiac surgery using the Revised Cardiac Risk Index for Preoperative risk is 3.9 %      ARISCAT (Canet) risk index: Intermediate: 13.3% risk of post-op pulmonary complications.    American Society of Anesthesiologists Physical Status classification (ASA): 3             No further cardiac workup needed prior to surgery.    Outpatient Subjective & Objective

## 2022-01-27 NOTE — DISCHARGE INSTRUCTIONS
Your surgery has been scheduled for:__________________________________________    You should report to:  ____[facility], located on the [place] side of the first floor of the           [facility] ([phone])  ____[facility], located on the [facility] side of the            Second floor of the [facility] ([phone])  ____3rd Floor [facility] located on the [facility] side of the [facility] [phone]  ____[facility]: located at [address] [address].          Check in on the first floor of the hospital ([phone])    Please Note   - Tell your doctor if you take Aspirin, products containing Aspirin, herbal medications  or blood thinners, such as Coumadin, Ticlid, or Plavix.  (Consult your provider regarding holding or stopping before surgery).  - Arrange for someone to drive you home following surgery.  You will not be allowed to leave the surgical facility alone or drive yourself home following sedation and anesthesia.  Before Surgery  - Stop taking all herbal medications & vitamins 7 days prior to surgery  - No Motrin/Advil (Ibuprofen)  7 day before surgery  - No Aleve (Naproxen) 7 days before surgery  - Stop Taking Asprin, products containing Asprin _____days before surgery  - Stop taking blood thinners_______days before surgery  - No Goody's/BC  Powder 7 days before surgery  - Refrain from drinking alcoholic beverages for 24hours before and after surgery  - Stop or limit smoking _________days before surgery  - You may take Tylenol for pain    Night before Surgery  - Do not eat or drink after midnight  - Take a shower & bathe with Hibiclens soap or an antibacterial soap from the neck down.  If not supplied by your surgeon, hibiclens soap will need to be purchased over the counter in pharmacy.  Rinse soap off thoroughly.  - Shampoo your hair with your regular  shampoo    The Day of Surgery  - Take another bath or shower with hibiclens or any antibacterial soap, to reduce the chance of infection.  - Take heart and blood pressure medications with a small sip of water, as advised by the perioperative team.  - Do not take fluid pills  - You may brush your teeth and rinse your mouth, but do not swall any additional water.   - Do not apply perfumes, powder, body lotions or deodorant on the day of surgery.  - Nail polish should be removed.  - Do not wear makeup or moisturizer  - Wear comfortable clothes, such as a button front shirt and loose fitting pants.  - Leave all jewelry, including body piercings, and valuables at home.    - Bring any devices you will neeed after surgery such as crutches or canes.  - If you have sleep apnea, please bring your CPAP machine  In the event that your physical condition changes including the onset of a cold or respiratory illness, or if you have to delay or cancel your surgery, please notify your surgeon.     Anesthesia: General Anesthesia     You are watched continuously during your procedure by your anesthesia provider.     Youre due to have surgery. During surgery, youll be given medicine called anesthesia or anesthetic. This will keep you comfortable and pain-free. Your anesthesia provider will use general anesthesia.  What is general anesthesia?  General anesthesia puts you into a state like deep sleep. It goes into the bloodstream (IV anesthetics), into the lungs (gas anesthetics), or both. You feel nothing during the procedure. You will not remember it. During the procedure, the anesthesia provider monitors you continuously. He or she checks your heart rate and rhythm, blood pressure, breathing, and blood oxygen.  1. IV anesthetics. IV anesthetics are given through an IV line in your arm. Theyre often given first. This is so you are asleep before a gas anesthetic is started. Some kinds of IV anesthetics relieve pain. Others relax you.  Your doctor will decide which kind is best in your case.  2. Gas anesthetics. Gas anesthetics are breathed into the lungs. They are often used to keep you asleep. They can be given through a facemask or a tube placed in your larynx or trachea (breathing tube).  ? If you have a facemask, your anesthesia provider will most likely place it over your nose and mouth while youre still awake. Youll breathe oxygen through the mask as your IV anesthetic is started. Gas anesthetic may be added through the mask.  ? If you have a tube in the larynx or trachea, it will be inserted into your throat after youre asleep.  Anesthesia tools and medicines  You will likely have:  · IV anesthetics. These are put into an IV line into your bloodstream.  · Gas anesthetics. You breathe these anesthetics into your lungs, where they pass into your bloodstream.  · Pulse oximeter. This is a small clip that is attached to the end of your finger. This measures your blood oxygen level.  · Electrocardiography leads (electrodes). These are small sticky pads that are placed on your chest. They record your heart rate and rhythm.  · Blood pressure cuff. This reads your blood pressure.  Risks and possible complications  General anesthesia has some risks. These include:  · Breathing problems  · Nausea and vomiting  · Sore throat or hoarseness (usually temporary)  · Allergic reaction to the anesthetic  · Irregular heartbeat (rare)  · Cardiac arrest (rare)   Anesthesia safety  1. Follow all instructions you are given for how long not to eat or drink before your procedure.  2. Be sure your doctor knows what medicines and drugs you take. This includes over-the-counter medicines, herbs, supplements, alcohol or other drugs. You will be asked when those were last taken.  3. Have an adult family member or friend drive you home after the procedure.  4. For the first 24 hours after your surgery:  ? Do not drive or use heavy equipment.  ? Do not make important  decisions or sign legal documents. If important decisions or signing legal documents is necessary during the first 24 hours after surgery, have a trusted family member or spouse act on your behalf.  ? Avoid alcohol.  ? Have a responsible adult stay with you. He or she can watch for problems and help keep you safe.  Date Last Reviewed: 12/1/2016  © 4927-0192 [employer]. [address] [place] [address], [place] [address]. All rights reserved. This information is not intended as a substitute for professional medical care. Always follow your healthcare professional's instructions.

## 2022-01-27 NOTE — ASSESSMENT & PLAN NOTE
"Systolic murmur- 2/6 [place] loudest at the right sternal border MCL  She denies any symptoms of chest pain, shortness of breath at rest, peripheral edema, orthopnea, lightheadedness, presyncope, or syncope.  Reports one episode of    palpitations- she used a pulse ox to count her HR and it was 175  Sleeps with her beds head elevated 20-30 degrees    Cardiology consult placed and scheduled for 1/31/22  Dr. Felix Pre-op evaluation  Uses my chart.  No further cardiovascular  Evaluation is recommended prior to surgery.  RCRI score of 0 with 3.9% risk of 30 day events of death, MI or cardiac arrest.  Pt ahceives > 4 METS, walks 1 mile with no active symptoms. Walked from parking lot to clinic up ramp with no symptoms. Does not climb stairs.    Cardiologist notes addended:  "Update: TTE done today showed The estimated ejection fraction is 70%. Normal left ventricular diastolic function. Elevated LVOT and aortic velocities of 3m/s, which increaes to 4m/s with valsalva. Turbulent flow seen along the basal septum. Unable to adequately visualize the aortic valve and LVOT. Consider further imaging evaluation. Normal right ventricular size with normal right ventricular systolic function.     Will obtain MILDRED to further evaluate site of obstruction. Will consider cardiac MRI.  Will have to postpone surgery.  Updated patient.  Will discuss with surgery team."    Addendum 3/2/22 Cardiology per Helga Felix MD   TTE  abnormal  but should not delay surgery.     Per Pato Damon MD  Should not# delay the surgery. I agree with good hydration perioperatively and phenylephrine if needed as the initial pressor.         "

## 2022-01-27 NOTE — ASSESSMENT & PLAN NOTE
BMI 35.07    Patient would benefit from weight loss and has  tried to set goals to achieve success. Lifestyle changes should be made by eating healthy, exercising at least 150 minutes weekly, and avoiding sedentary behavior.   BMI 35

## 2022-01-27 NOTE — HPI
This is a 66 y.o. female  who presents today for a preoperative evaluation in preparation for a Orthopedic  procedure.  Scheduled for  2/2/22  Surgery is indicated for left shoulder arthroplasty.   Patient is new to me.  Details of current problem: The duration of problem   had an injury to her shoulder- tripped and had blunt force injury by fell into the door frame- occurred 12/22/22   Reports symptoms of  decreased ROM at shoulder pain  Aggravating factors include: movement, twisting   Relieving factors are   immobility, sleeping in recliner  Treated with Ibuprofen and Tylenol    Reports pain: 5/10  The history has been obtained by speaking with the patient and reviewing the electronic medical record and/or outside health information. Significant health conditions for the perioperative period are discussed below in assessment and plan.     Patient reports current health status to be Good.  Denies any new symptoms before surgery.

## 2022-01-27 NOTE — PRE-PROCEDURE INSTRUCTIONS
PreOp Instructions given:    *Medication information (what to hold and what to take)  *NPO guidelines as follows: (or as per your surgeon)  1. Stop ALL solid food, gum & candy @ MIDNIGHT.  2. If you are to take any medications on surgery morning, it may be taken with a sip of water or clear liquid.    3. Clear liquids includes only water, black coffee with no creamer, clear oral rehydration drinks, clear sports drinks or clear fruit JUICES (NO orange, pulpy or apple cider juices),   4. IF IN DOUBT, drink water instead.    * Arrival place and directions given; time to be given the day before surgery by the surgeon's office.  *Wash hair with regular shampoo the night before surgery.  * Bathing with antibacterial soap or Hibiclens the night before and morning of surgery from neck on down.  * Do not wear any jewelry or bring valuables AM of surgery.  * No makeup or moisturizer to face AM of surgery.  * Do not wear any perfume/cologne, powder, lotions, or aftershave AM of surgery.     Pt verbalized understanding.PreOp Instructions given:

## 2022-01-27 NOTE — H&P
Parisa Dimas  is here for a completion of her perioperative paperwork. she  Is scheduled to undergo left Reverse shoulder arthroplasty with the [place] system.  Have the fracture stem available on 2/2/2022.      She does need clearance for this procedure.     Per Preop center, optimization pending...    Per Cardiology, pending...    Risks, indications and benefits of the surgical procedure were discussed with the patient. All questions with regard to surgery, rehab, expected return to functional activities, activities of daily living and recreational endeavors were answered to her satisfaction.    Discussed COVID-19 with the patient, they are aware of our current policies and procedures, were given the option of delaying surgery, and they elect to proceed.    Patient was informed and understands the risks of surgery are greater for patients with a current condition or hx of heart disease, obesity, clotting disorders, recurrent infections, steroid use, current or past smoking, and factors such as sedentary lifestyle and noncompliance with medications, therapy or f/u. The degree of the increased risk is hard to estimate w/ any degree of precision.    Once no other questions were asked, a brief history and physical exam was then performed.    PAST MEDICAL HISTORY:   Past Medical History:   Diagnosis Date    AR (allergic rhinitis)     AR (allergic rhinitis)     Carotid stenosis     50% RCA    Colon polyp 10/2014    Diabetes mellitus     Diabetes mellitus, type 2     Fatty liver     GERD (gastroesophageal reflux disease)     Heart murmur 1/27/2022    HLD (hyperlipidemia)     HTN (hypertension)     Joint pain     Sleep apnea 2018    Stricture of artery 3/25/2021     PAST SURGICAL HISTORY:   Past Surgical History:   Procedure Laterality Date    SKIN BIOPSY  10yrs.    negative     FAMILY HISTORY:   Family History   Problem Relation Age of Onset    Cancer Mother         gallbladder    Prostate cancer  Brother 57    Diabetes Brother     Diabetes Maternal Grandmother     Hypertension Unknown         multiple    Coronary artery disease Father 79    Diabetes Father     Diabetes Maternal Aunt     Diabetes Maternal Uncle     Melanoma Neg Hx      SOCIAL HISTORY:   Social History     Socioeconomic History    Marital status:      Spouse name: Pio    Number of children: 4   Occupational History     Comment: retired- school work   Tobacco Use    Smoking status: Never Smoker    Smokeless tobacco: Never Used   Substance and Sexual Activity    Alcohol use: Not Currently     Alcohol/week: 0.0 standard drinks    Drug use: No    Sexual activity: Yes     Partners: Male     Birth control/protection: Post-menopausal   Other Topics Concern    Are you pregnant or think you may be? No    Breast-feeding No   Social History Narrative    Stairs- none     Social Determinants of Health     Financial Resource Strain: Low Risk     Difficulty of Paying Living Expenses: Not hard at all   Food Insecurity: No Food Insecurity    Worried About Running Out of Food in the Last Year: Never true    Ran Out of Food in the Last Year: Never true   Transportation Needs: No Transportation Needs    Lack of Transportation (Medical): No    Lack of Transportation (Non-Medical): No   Physical Activity: Inactive    Days of Exercise per Week: 0 days    Minutes of Exercise per Session: 0 min   Stress: Stress Concern Present    Feeling of Stress : To some extent   Social Connections: Unknown    Frequency of Communication with Friends and Family: More than three times a week    Frequency of Social Gatherings with Friends and Family: More than three times a week    Active Member of Clubs or Organizations: Yes    Attends Club or Organization Meetings: More than 4 times per year    Marital Status:        MEDICATIONS:   Current Outpatient Medications:     amLODIPine (NORVASC) 10 MG tablet, TAKE 1 TABLET BY MOUTH ONCE  DAILY, Disp: 90 tablet, Rfl: 3    ascorbic acid, vitamin C, (VITAMIN C) 1000 MG tablet, Take 1,000 mg by mouth once daily., Disp: , Rfl:     aspirin (ECOTRIN) 81 MG EC tablet, Take 81 mg by mouth once daily., Disp: , Rfl:     b complex vitamins tablet, Take 1 tablet by mouth once daily., Disp: , Rfl:     blood sugar diagnostic Strp, To check BG 1 times daily, to use with insurance preferred meter, Disp: 100 strip, Rfl: 11    calcium carbonate (OS-RYLEE) 600 mg (1,500 mg) Tab, Take 600 mg by mouth once daily., Disp: , Rfl:     cetirizine (ZYRTEC) 10 MG tablet, Take 10 mg by mouth once daily., Disp: , Rfl:     co-enzyme Q-10 30 mg capsule, Take 30 mg by mouth once daily., Disp: , Rfl:     hydroCHLOROthiazide (HYDRODIURIL) 25 MG tablet, TAKE 1 TABLET BY MOUTH ONCE DAILY, Disp: 90 tablet, Rfl: 3    ibuprofen (ADVIL,MOTRIN) 200 MG tablet, Take 200 mg by mouth every 6 (six) hours as needed for Pain., Disp: , Rfl:     L.ACID/L.CASEI/B.BIF/B.MELISSA/FOS (PROBIOTIC BLEND ORAL), Take 2 tablets by mouth once daily., Disp: , Rfl:     lancets Misc, To check BG 1 times daily, to use with insurance preferred meter, Disp: 100 each, Rfl: 11    losartan (COZAAR) 100 MG tablet, TAKE 1 TABLET BY MOUTH ONCE DAILY, Disp: 90 tablet, Rfl: 3    lovastatin (MEVACOR) 20 MG tablet, TAKE 1 TABLET BY MOUTH IN  THE EVENING, Disp: 90 tablet, Rfl: 3    metFORMIN (GLUCOPHAGE-XR) 500 MG ER 24hr tablet, TAKE 2 TABLETS BY MOUTH  ONCE DAILY, Disp: 180 tablet, Rfl: 3    multivitamin capsule, Take 1 capsule by mouth once daily., Disp: , Rfl:     OMEGA-3S/DHA/EPA/FISH OIL (OMEGA 3 ORAL), Take 2,800 mg by mouth once daily., Disp: , Rfl:     omeprazole (PRILOSEC) 40 MG capsule, TAKE 1 CAPSULE BY MOUTH  ONCE DAILY, Disp: 90 capsule, Rfl: 3    oxyCODONE-acetaminophen (PERCOCET) 5-325 mg per tablet, Take 1 tablet by mouth every 6 (six) hours as needed for Pain., Disp: 10 tablet, Rfl: 0    vitamin D (VITAMIN D3) 1000 units Tab, Take 1,000 Units by  mouth once daily., Disp: , Rfl:     vitamin E 400 UNIT capsule, Take 400 Units by mouth once daily., Disp: , Rfl:     zinc sulfate (ZINC-15 ORAL), Take by mouth., Disp: , Rfl:     aspirin (ECOTRIN) 81 MG EC tablet, Take 1 tablet twice a day with food starting after surgery (breakfast and dinner)., Disp: 56 tablet, Rfl: 0    blood-glucose meter kit, To check BG 1 times daily, to use with insurance preferred meter, Disp: 1 each, Rfl: 0    ondansetron (ZOFRAN) 4 MG tablet, Take 1 tablet (4 mg total) by mouth every 8 (eight) hours as needed for Nausea., Disp: 30 tablet, Rfl: 0    oxyCODONE (ROXICODONE) 5 MG immediate release tablet, Take 1-2 tablets as needed for pain every 6 hours., Disp: 28 tablet, Rfl: 0  ALLERGIES: Review of patient's allergies indicates:  No Known Allergies    Review of Systems   Constitution: Negative. Negative for chills, fever and night sweats.   HENT: Negative for congestion and headaches.    Eyes: Negative for blurred vision, left vision loss and right vision loss.   Cardiovascular: Negative for chest pain and syncope.   Respiratory: Negative for cough and shortness of breath.    Endocrine: Negative for polydipsia, polyphagia and polyuria.   Hematologic/Lymphatic: Negative for bleeding problem. Does not bruise/bleed easily.   Skin: Negative for dry skin, itching and rash.   Musculoskeletal: Negative for falls and muscle weakness.   Gastrointestinal: Negative for abdominal pain and bowel incontinence.   Genitourinary: Negative for bladder incontinence and nocturia.   Neurological: Negative for disturbances in coordination, loss of balance and seizures.   Psychiatric/Behavioral: Negative for depression. The patient does not have insomnia.    Allergic/Immunologic: Negative for hives and persistent infections.     PHYSICAL EXAM:  GEN: A&Ox3, WD WN NAD  HEENT: WNL  CHEST: CTAB, no W/R/R  HEART: RRR, no M/R/G   ABD: Soft, NT ND, BS x4 QUADS  MS: Refer to previous note for detailed MS  exam  NEURO: CN II-XII intact       The surgical consent was then reviewed with the patient, who agreed with all the contents of the consent form and it was signed.     Due to the serious nature of total joint infection and the high prevalence of community acquired MRSA, vancomycin will be used perioperatively.     PHYSICAL THERAPY:  She was also instructed regarding physical therapy and will begin on POD#1-3. She is doing physical therapy at [facility].    POST OP CARE: Instructions were reviewed including care of the wound and dressing after surgery and when she can shower.     PAIN MANAGEMENT: Parisa MADY [place] was instructed regarding the Polar ice unit that will be in place after surgery and her postoperative pain medications.     MEDICATION:  Roxicodone 5 mg 1-2 q 4 hours PRN for pain  Zofran 4 mg q 8 hours PRN for nausea and vomiting.  Aspirin 81mg BID x 4 weeks for DVT prophylaxis starting on the evening after surgery.    Post op meds to be delivered bedside prior to discharge. Deliver to family if patient is in surgery at 5pm.     Patient was instructed to purchase and take Colace to counter possible GI side effects of taking opiates.     DVT prophylaxis was discussed with the patient today including risk factors for developing DVTs and history of DVTs. The patient was asked if any specific recommendations were given from the doctor/s that did pre-operative surgical clearance.      If the patient was previously taking 81mg baby aspirin, they were told to not take additional baby aspirin, using the above stated aspirin and to restart the 81mg aspirin daily after completion of the aspirin dose.      Patient was also told to buy over the counter Prilosec medication and take it once daily for GI protection as long as they are taking NSAIDs or Aspirin.     The patient was told that narcotic pain medications may make them drowsy and instructions were given to not sign legal  documents, drive or operate heavy machinery, cars, or equipment while under the influence of narcotic medications.     Dr. Santoyo was present in clinic during this pre-op evaluation. The patient was offered the opportunity to ask Dr. Santoyo any further questions regarding the procedure which may not have been addressed during their previous informed consent discussion. The patient has declined to see Dr. Santoyo today.    As there were no other questions to be asked, she was given my business card along with Dr. Santoyo's business card if she has any questions or concerns prior to surgery or in the postop period.

## 2022-01-28 ENCOUNTER — OFFICE VISIT (OUTPATIENT)
Dept: FAMILY MEDICINE | Facility: CLINIC | Age: 67
End: 2022-01-28
Payer: MEDICARE

## 2022-01-28 VITALS
SYSTOLIC BLOOD PRESSURE: 126 MMHG | BODY MASS INDEX: 35.24 KG/M2 | DIASTOLIC BLOOD PRESSURE: 74 MMHG | TEMPERATURE: 97 F | HEIGHT: 63 IN | OXYGEN SATURATION: 98 % | HEART RATE: 89 BPM | WEIGHT: 198.88 LBS

## 2022-01-28 DIAGNOSIS — Z01.818 PREOP EXAMINATION: ICD-10-CM

## 2022-01-28 DIAGNOSIS — I77.1 STRICTURE OF ARTERY: ICD-10-CM

## 2022-01-28 DIAGNOSIS — R01.1 HEART MURMUR: ICD-10-CM

## 2022-01-28 DIAGNOSIS — E11.40 TYPE 2 DIABETES MELLITUS WITH DIABETIC NEUROPATHY, UNSPECIFIED WHETHER LONG TERM INSULIN USE: Primary | ICD-10-CM

## 2022-01-28 DIAGNOSIS — E66.01 SEVERE OBESITY WITH BODY MASS INDEX (BMI) OF 36.0 TO 36.9 WITH SERIOUS COMORBIDITY: ICD-10-CM

## 2022-01-28 DIAGNOSIS — S42.292A HUMERAL HEAD FRACTURE, LEFT, CLOSED, INITIAL ENCOUNTER: ICD-10-CM

## 2022-01-28 PROCEDURE — 3008F BODY MASS INDEX DOCD: CPT | Mod: CPTII,S$GLB,, | Performed by: FAMILY MEDICINE

## 2022-01-28 PROCEDURE — 3288F FALL RISK ASSESSMENT DOCD: CPT | Mod: CPTII,S$GLB,, | Performed by: FAMILY MEDICINE

## 2022-01-28 PROCEDURE — 99999 PR PBB SHADOW E&M-EST. PATIENT-LVL V: CPT | Mod: PBBFAC,,, | Performed by: FAMILY MEDICINE

## 2022-01-28 PROCEDURE — 99999 PR PBB SHADOW E&M-EST. PATIENT-LVL V: ICD-10-PCS | Mod: PBBFAC,,, | Performed by: FAMILY MEDICINE

## 2022-01-28 PROCEDURE — 3288F PR FALLS RISK ASSESSMENT DOCUMENTED: ICD-10-PCS | Mod: CPTII,S$GLB,, | Performed by: FAMILY MEDICINE

## 2022-01-28 PROCEDURE — 1100F PTFALLS ASSESS-DOCD GE2>/YR: CPT | Mod: CPTII,S$GLB,, | Performed by: FAMILY MEDICINE

## 2022-01-28 PROCEDURE — 1100F PR PT FALLS ASSESS DOC 2+ FALLS/FALL W/INJURY/YR: ICD-10-PCS | Mod: CPTII,S$GLB,, | Performed by: FAMILY MEDICINE

## 2022-01-28 PROCEDURE — 1160F RVW MEDS BY RX/DR IN RCRD: CPT | Mod: CPTII,S$GLB,, | Performed by: FAMILY MEDICINE

## 2022-01-28 PROCEDURE — 3078F PR MOST RECENT DIASTOLIC BLOOD PRESSURE < 80 MM HG: ICD-10-PCS | Mod: CPTII,S$GLB,, | Performed by: FAMILY MEDICINE

## 2022-01-28 PROCEDURE — 3044F HG A1C LEVEL LT 7.0%: CPT | Mod: CPTII,S$GLB,, | Performed by: FAMILY MEDICINE

## 2022-01-28 PROCEDURE — 99215 PR OFFICE/OUTPT VISIT, EST, LEVL V, 40-54 MIN: ICD-10-PCS | Mod: 25,S$GLB,, | Performed by: FAMILY MEDICINE

## 2022-01-28 PROCEDURE — 3044F PR MOST RECENT HEMOGLOBIN A1C LEVEL <7.0%: ICD-10-PCS | Mod: CPTII,S$GLB,, | Performed by: FAMILY MEDICINE

## 2022-01-28 PROCEDURE — 1125F PR PAIN SEVERITY QUANTIFIED, PAIN PRESENT: ICD-10-PCS | Mod: CPTII,S$GLB,, | Performed by: FAMILY MEDICINE

## 2022-01-28 PROCEDURE — 3074F PR MOST RECENT SYSTOLIC BLOOD PRESSURE < 130 MM HG: ICD-10-PCS | Mod: CPTII,S$GLB,, | Performed by: FAMILY MEDICINE

## 2022-01-28 PROCEDURE — 3078F DIAST BP <80 MM HG: CPT | Mod: CPTII,S$GLB,, | Performed by: FAMILY MEDICINE

## 2022-01-28 PROCEDURE — 1159F PR MEDICATION LIST DOCUMENTED IN MEDICAL RECORD: ICD-10-PCS | Mod: CPTII,S$GLB,, | Performed by: FAMILY MEDICINE

## 2022-01-28 PROCEDURE — 1160F PR REVIEW ALL MEDS BY PRESCRIBER/CLIN PHARMACIST DOCUMENTED: ICD-10-PCS | Mod: CPTII,S$GLB,, | Performed by: FAMILY MEDICINE

## 2022-01-28 PROCEDURE — 3074F SYST BP LT 130 MM HG: CPT | Mod: CPTII,S$GLB,, | Performed by: FAMILY MEDICINE

## 2022-01-28 PROCEDURE — 1159F MED LIST DOCD IN RCRD: CPT | Mod: CPTII,S$GLB,, | Performed by: FAMILY MEDICINE

## 2022-01-28 PROCEDURE — 99499 RISK ADDL DX/OHS AUDIT: ICD-10-PCS | Mod: S$GLB,,, | Performed by: FAMILY MEDICINE

## 2022-01-28 PROCEDURE — 3008F PR BODY MASS INDEX (BMI) DOCUMENTED: ICD-10-PCS | Mod: CPTII,S$GLB,, | Performed by: FAMILY MEDICINE

## 2022-01-28 PROCEDURE — 99215 OFFICE O/P EST HI 40 MIN: CPT | Mod: 25,S$GLB,, | Performed by: FAMILY MEDICINE

## 2022-01-28 PROCEDURE — 99499 UNLISTED E&M SERVICE: CPT | Mod: S$GLB,,, | Performed by: FAMILY MEDICINE

## 2022-01-28 PROCEDURE — 1125F AMNT PAIN NOTED PAIN PRSNT: CPT | Mod: CPTII,S$GLB,, | Performed by: FAMILY MEDICINE

## 2022-01-28 NOTE — LETTER
January 28, 2022        W Frandy Santoyo MD  [address] [facility]  [address]  [facility] [address]             [facility]  [address]  [facility] [address]  Phone: [phone]  Fax: [phone]   Patient: Parisa Dimas   MR Number: [id number]   YOB: 1955   Date of Visit: 1/28/2022       Dear Dr. Santoyo:    Thank you for referring Parisa Dimas to me for evaluation.  She is overall feeling well from a cardiovascular perspective and is medically stable for upcoming surgery.  She does have a heart murmur that I believe she is seeing a cardiologist for on Monday January 31st.  I do not immediately suspect any significant impact for surgical prospects to delay her procedure unless her cardiologist feels that further urgent cardiac workup is needed.  Otherwise her other medical conditions are well controlled.          If you have questions, please do not hesitate to call me. I look forward to following Parisa along with you.    Sincerely,           Alex Cast MD

## 2022-01-28 NOTE — PROGRESS NOTES
(Portions of this note were dictated using voice recognition software and may contain dictation related errors in spelling/grammar/syntax not found on text review)    CC:   Chief Complaint   Patient presents with    Pre-op Exam     Shoulder, 2/2       HPI: 66 y.o. female   Last visit for annual exam in March 2021.    Preoperative evaluation  Surgery:  Left reverse shoulder arthroplasty secondary to comminuted fracture left proximal humerus that occurred on 12/22/2021.  Followed by Orthopedics.  Surgeon: Dr. Santoyo  Date:  02/02/2022       Medical history includes hypertension on losartan 100 mg daily and amlodipine 10 mg daily, hyperlipidemia on lovastatin 20 mg daily,, fatty liver disease, carotid  atherosclerosis but with no significant stenosis, diabetes on metformin extended release 1000 mg daily, sleep apnea intolerant of CPAP      Had lab work below  EKG done yesterday personally reviewed shows normal sinus rhythm, normal axis.  Normal intervals.  Nonspecific T-wave changes noted throughout  precordial leads.      Denies any chest pain or shortness of breath with exertion.  Pain in the left arm is off and on.  Is not currently immobilized.    Does have cardiology appointment coming up on Monday January 31st    Past Medical History:   Diagnosis Date    AR (allergic rhinitis)     AR (allergic rhinitis)     Carotid stenosis     50% RCA    Colon polyp 10/2014    Diabetes mellitus     Diabetes mellitus, type 2     Fatty liver     GERD (gastroesophageal reflux disease)     Heart murmur 1/27/2022    HLD (hyperlipidemia)     HTN (hypertension)     Joint pain     Sleep apnea 2018    Stricture of artery 3/25/2021       Past Surgical History:   Procedure Laterality Date    SKIN BIOPSY  10yrs.    negative       Family History   Problem Relation Age of Onset    Cancer Mother         gallbladder    Prostate cancer Brother 57    Diabetes Brother     Diabetes Maternal Grandmother     Hypertension Unknown          multiple    Coronary artery disease Father 79    Diabetes Father     Diabetes Maternal Aunt     Diabetes Maternal Uncle     Melanoma Neg Hx        Social History     Tobacco Use    Smoking status: Never Smoker    Smokeless tobacco: Never Used   Substance Use Topics    Alcohol use: Not Currently     Alcohol/week: 0.0 standard drinks    Drug use: No       Lab Results   Component Value Date    WBC 6.76 01/27/2022    HGB 13.9 01/27/2022    HCT 43.7 01/27/2022    MCV 91 01/27/2022     01/27/2022    CHOL 169 07/22/2021    TRIG 253 (H) 07/22/2021    HDL 48 07/22/2021    ALT 67 (H) 01/27/2022    AST 45 (H) 01/27/2022    BILITOT 0.6 01/27/2022    ALKPHOS 108 01/27/2022     01/27/2022    K 3.7 01/27/2022    CL 98 01/27/2022    CREATININE 0.7 01/27/2022    ESTGFRAFRICA >60.0 01/27/2022    EGFRNONAA >60.0 01/27/2022    CALCIUM 11.1 (H) 01/27/2022    ALBUMIN 4.3 01/27/2022    BUN 14 01/27/2022    CO2 30 (H) 01/27/2022    TSH 0.865 07/22/2021    INR 1.0 01/27/2022    HGBA1C 5.9 (H) 01/27/2022    MICALBCREAT 8.9 12/04/2020    LDLCALC 70.4 07/22/2021     (H) 01/27/2022    DFOYXBON39SY 39 07/22/2021             Vital signs reviewed  PE:   APPEARANCE: Well nourished, well developed, in no acute distress.    HEAD: Normocephalic, atraumatic.  EYES: PERRL. EOMI.   Conjunctivae noninjected.  EARS: TM's intact. Light reflex normal. No retraction or perforation  NOSE: Mucosa pink. Airway clear.  MOUTH & THROAT: No tonsillar enlargement. No pharyngeal erythema or exudate.   NECK: Supple with no cervical lymphadenopathy.    CHEST: Good inspiratory effort. Lungs clear to auscultation with no wheezes or crackles.  CARDIOVASCULAR: Normal S1, S2.  3/6 systolic ejection murmur at the aortic region radiating up to both carotid arteries.  ABDOMEN: Bowel sounds normal. Not distended. Soft. No tenderness or masses. No organomegaly.  EXTREMITIES: No edema       IMPRESSION  1. Type 2 diabetes mellitus with diabetic  neuropathy, unspecified whether long term insulin use    2. Severe obesity with body mass index (BMI) of 36.0 to 36.9 with serious comorbidity    3. Stricture of artery    4. Humeral head fracture, left, closed, initial encounter    5. Preop examination    6. Heart murmur            PLAN  Reviewed orthopedic documentation.  Reviewed her labs.  Personally reviewed EKG as above    Blood pressure stable.  No clinical symptoms of ischemic heart disease.  Does have cardiac murmur, suspect aortic stenosis but not causing any clinical symptoms.  She has was see a cardiologist on Monday.  I do not suspect any emergent impact on her upcoming surgery.  Her diabetes is well controlled.  Will message her surgeon regarding medical stability unless her cardiologist feels that further workup is needed more urgently.

## 2022-01-30 ENCOUNTER — LAB VISIT (OUTPATIENT)
Dept: PRIMARY CARE CLINIC | Facility: CLINIC | Age: 67
End: 2022-01-30
Payer: MEDICARE

## 2022-01-30 DIAGNOSIS — Z01.818 PRE-OP TESTING: ICD-10-CM

## 2022-01-30 PROCEDURE — U0005 INFEC AGEN DETEC AMPLI PROBE: HCPCS | Performed by: ORTHOPAEDIC SURGERY

## 2022-01-30 PROCEDURE — U0003 INFECTIOUS AGENT DETECTION BY NUCLEIC ACID (DNA OR RNA); SEVERE ACUTE RESPIRATORY SYNDROME CORONAVIRUS 2 (SARS-COV-2) (CORONAVIRUS DISEASE [COVID-19]), AMPLIFIED PROBE TECHNIQUE, MAKING USE OF HIGH THROUGHPUT TECHNOLOGIES AS DESCRIBED BY CMS-2020-01-R: HCPCS | Performed by: ORTHOPAEDIC SURGERY

## 2022-01-31 ENCOUNTER — OFFICE VISIT (OUTPATIENT)
Dept: CARDIOLOGY | Facility: CLINIC | Age: 67
End: 2022-01-31
Payer: MEDICARE

## 2022-01-31 ENCOUNTER — HOSPITAL ENCOUNTER (OUTPATIENT)
Dept: CARDIOLOGY | Facility: HOSPITAL | Age: 67
Discharge: HOME | End: 2022-01-31
Payer: MEDICARE

## 2022-01-31 VITALS
SYSTOLIC BLOOD PRESSURE: 161 MMHG | WEIGHT: 198.88 LBS | HEART RATE: 93 BPM | HEIGHT: 63 IN | BODY MASS INDEX: 35.24 KG/M2 | DIASTOLIC BLOOD PRESSURE: 74 MMHG

## 2022-01-31 VITALS
WEIGHT: 198 LBS | DIASTOLIC BLOOD PRESSURE: 74 MMHG | BODY MASS INDEX: 35.08 KG/M2 | HEART RATE: 70 BPM | HEIGHT: 63 IN | SYSTOLIC BLOOD PRESSURE: 161 MMHG

## 2022-01-31 DIAGNOSIS — R01.1 HEART MURMUR: ICD-10-CM

## 2022-01-31 DIAGNOSIS — E11.40 TYPE 2 DIABETES MELLITUS WITH DIABETIC NEUROPATHY, UNSPECIFIED WHETHER LONG TERM INSULIN USE: ICD-10-CM

## 2022-01-31 DIAGNOSIS — G47.33 OBSTRUCTIVE SLEEP APNEA HYPOPNEA, SEVERE: ICD-10-CM

## 2022-01-31 DIAGNOSIS — R01.1 HEART MURMUR: Primary | ICD-10-CM

## 2022-01-31 DIAGNOSIS — I10 PRIMARY HYPERTENSION: ICD-10-CM

## 2022-01-31 DIAGNOSIS — I35.0 NONRHEUMATIC AORTIC (VALVE) STENOSIS: ICD-10-CM

## 2022-01-31 DIAGNOSIS — Z01.818 PRE-OP EVALUATION: ICD-10-CM

## 2022-01-31 DIAGNOSIS — E78.5 HYPERLIPIDEMIA, UNSPECIFIED HYPERLIPIDEMIA TYPE: ICD-10-CM

## 2022-01-31 DIAGNOSIS — R00.2 PALPITATIONS: ICD-10-CM

## 2022-01-31 LAB
ASCENDING AORTA: 3.4 CM
AV PEAK GRADIENT: 46 MMHG
BSA FOR ECHO PROCEDURE: 2 M2
CV ECHO LV RWT: 0.43 CM
DOP CALC AO PEAK VEL: 3.4 M/S
DOP CALC AO VTI: 57.6 CM
DOP CALC LVOT AREA: 2.8 CM2
DOP CALC LVOT DIAMETER: 1.88 CM
E WAVE DECELERATION TIME: 294.29 MSEC
E/A RATIO: 0.67
E/E' RATIO: 10 M/S
ECHO LV POSTERIOR WALL: 0.9 CM (ref 0.6–1.1)
EJECTION FRACTION: 70 %
FRACTIONAL SHORTENING: 50 % (ref 28–44)
INTERVENTRICULAR SEPTUM: 0.71 CM (ref 0.6–1.1)
LA MAJOR: 4.84 CM
LA MINOR: 4.67 CM
LA WIDTH: 3.22 CM
LEFT ATRIUM SIZE: 2.93 CM
LEFT ATRIUM VOLUME INDEX MOD: 18.5 ML/M2
LEFT ATRIUM VOLUME INDEX: 19.8 ML/M2
LEFT ATRIUM VOLUME MOD: 35.7 CM3
LEFT ATRIUM VOLUME: 38.12 CM3
LEFT INTERNAL DIMENSION IN SYSTOLE: 2.1 CM (ref 2.1–4)
LEFT VENTRICLE DIASTOLIC VOLUME INDEX: 40.33 ML/M2
LEFT VENTRICLE DIASTOLIC VOLUME: 77.84 ML
LEFT VENTRICLE MASS INDEX: 53 G/M2
LEFT VENTRICLE SYSTOLIC VOLUME INDEX: 7.5 ML/M2
LEFT VENTRICLE SYSTOLIC VOLUME: 14.41 ML
LEFT VENTRICULAR INTERNAL DIMENSION IN DIASTOLE: 4.18 CM (ref 3.5–6)
LEFT VENTRICULAR MASS: 101.33 G
LV LATERAL E/E' RATIO: 10 M/S
LV SEPTAL E/E' RATIO: 10 M/S
MV A" WAVE DURATION": 12.84 MSEC
MV PEAK A VEL: 1.04 M/S
MV PEAK E VEL: 0.7 M/S
MV STENOSIS PRESSURE HALF TIME: 85.34 MS
MV VALVE AREA P 1/2 METHOD: 2.58 CM2
PISA TR MAX VEL: 2.78 M/S
PULM VEIN S/D RATIO: 1.53
PV PEAK D VEL: 0.32 M/S
PV PEAK S VEL: 0.49 M/S
RA MAJOR: 4.63 CM
RA PRESSURE: 3 MMHG
RA WIDTH: 2.95 CM
RIGHT VENTRICULAR END-DIASTOLIC DIMENSION: 2.53 CM
RV TISSUE DOPPLER FREE WALL SYSTOLIC VELOCITY 1 (APICAL 4 CHAMBER VIEW): 18.53 CM/S
SARS-COV-2 RNA RESP QL NAA+PROBE: NOT DETECTED
SARS-COV-2- CYCLE NUMBER: NORMAL
SINUS: 2.7 CM
STJ: 3.14 CM
TDI LATERAL: 0.07 M/S
TDI SEPTAL: 0.07 M/S
TDI: 0.07 M/S
TR MAX PG: 31 MMHG
TRICUSPID ANNULAR PLANE SYSTOLIC EXCURSION: 2.68 CM
TV REST PULMONARY ARTERY PRESSURE: 34 MMHG

## 2022-01-31 PROCEDURE — 93306 TTE W/DOPPLER COMPLETE: CPT | Mod: 26,,, | Performed by: INTERNAL MEDICINE

## 2022-01-31 PROCEDURE — 93306 ECHO (CUPID ONLY): ICD-10-PCS | Mod: 26,,, | Performed by: INTERNAL MEDICINE

## 2022-01-31 PROCEDURE — 99999 PR PBB SHADOW E&M-EST. PATIENT-LVL V: ICD-10-PCS | Mod: PBBFAC,GC,,

## 2022-01-31 PROCEDURE — 99204 OFFICE O/P NEW MOD 45 MIN: CPT | Mod: GC,S$GLB,,

## 2022-01-31 PROCEDURE — 93306 TTE W/DOPPLER COMPLETE: CPT

## 2022-01-31 PROCEDURE — 99204 PR OFFICE/OUTPT VISIT, NEW, LEVL IV, 45-59 MIN: ICD-10-PCS | Mod: GC,S$GLB,,

## 2022-01-31 PROCEDURE — 99999 PR PBB SHADOW E&M-EST. PATIENT-LVL V: CPT | Mod: PBBFAC,GC,,

## 2022-01-31 NOTE — PROGRESS NOTES
Subjective:       Patient ID:  Parisa Dimas is a 66 y.o. female who presents for evaluation of Pre-op Exam (ARTHROPLASTY, SHOULDER, TOTAL, REVERSE)    66 yr old with known hypertension on losartan 100 mg daily and amlodipine 10 mg daily, hyperlipidemia on lovastatin 20 mg daily,, fatty liver disease, carotid  atherosclerosis but with no significant stenosis, diabetes on metformin extended release 1000 mg daily, sleep apnea intolerant of CPAP    Sent to PCP for Preoperative evaluation. Had h/o heart murmur so sent here for further evaluation. Stays active, walks around a mile with no symptoms, walked from parking lot to clinic and regularly in [email or web address] with no symptoms. No chest pain, pressure, tightness, SOB, orthopnea, PND, leg swelling, syncope.  No prior cardiac disease.   Surgery:  Left reverse shoulder arthroplasty secondary to comminuted fracture left proximal humerus that occurred on 12/22/2021.  Followed by Orthopedics.  Surgeon: Dr. Santoyo  Date:  02/02/2022  EKG: NSR with T wave flat to inversions V2-V6 ( no prior EKG).      HPI  Review of patient's allergies indicates:  No Known Allergies   Lab Results   Component Value Date     01/27/2022    K 3.7 01/27/2022    CL 98 01/27/2022    CO2 30 (H) 01/27/2022    BUN 14 01/27/2022    CREATININE 0.7 01/27/2022     (H) 01/27/2022    HGBA1C 5.9 (H) 01/27/2022    MG 2.1 04/06/2018    AST 45 (H) 01/27/2022    ALT 67 (H) 01/27/2022    ALBUMIN 4.3 01/27/2022    PROT 8.1 01/27/2022    BILITOT 0.6 01/27/2022    WBC 6.76 01/27/2022    HGB 13.9 01/27/2022    HCT 43.7 01/27/2022    MCV 91 01/27/2022     01/27/2022    INR 1.0 01/27/2022    TSH 0.865 07/22/2021         Lab Results   Component Value Date    CHOL 169 07/22/2021    HDL 48 07/22/2021    TRIG 253 (H) 07/22/2021       Lab Results   Component Value Date    LDLCALC 70.4 07/22/2021       Past Medical History:   Diagnosis Date    AR (allergic rhinitis)     AR (allergic rhinitis)     Carotid  stenosis     50% RCA    Colon polyp 10/2014    Diabetes mellitus     Diabetes mellitus, type 2     Fatty liver     GERD (gastroesophageal reflux disease)     Heart murmur 1/27/2022    HLD (hyperlipidemia)     HTN (hypertension)     Joint pain     Sleep apnea 2018    Stricture of artery 3/25/2021     Past Surgical History:   Procedure Laterality Date    SKIN BIOPSY  10yrs.    negative      Tobacco Use    Smoking status: Never Smoker    Smokeless tobacco: Never Used   Substance and Sexual Activity    Alcohol use: Not Currently     Alcohol/week: 0.0 standard drinks    Drug use: No    Sexual activity: Yes     Partners: Male     Birth control/protection: Post-menopausal      Family History   Problem Relation Age of Onset    Cancer Mother         gallbladder    Prostate cancer Brother 57    Diabetes Brother     Diabetes Maternal Grandmother     Hypertension Unknown         multiple    Coronary artery disease Father 79    Diabetes Father     Diabetes Maternal Aunt     Diabetes Maternal Uncle     Melanoma Neg Hx         Current Outpatient Medications:     amLODIPine (NORVASC) 10 MG tablet, TAKE 1 TABLET BY MOUTH ONCE DAILY, Disp: 90 tablet, Rfl: 3    ascorbic acid, vitamin C, (VITAMIN C) 1000 MG tablet, Take 1,000 mg by mouth once daily., Disp: , Rfl:     aspirin (ECOTRIN) 81 MG EC tablet, Take 81 mg by mouth once daily., Disp: , Rfl:     aspirin (ECOTRIN) 81 MG EC tablet, Take 1 tablet twice a day with food starting after surgery (breakfast and dinner)., Disp: 56 tablet, Rfl: 0    b complex vitamins tablet, Take 1 tablet by mouth once daily., Disp: , Rfl:     blood sugar diagnostic Strp, To check BG 1 times daily, to use with insurance preferred meter, Disp: 100 strip, Rfl: 11    calcium carbonate (OS-RYLEE) 600 mg (1,500 mg) Tab, Take 600 mg by mouth once daily., Disp: , Rfl:     cetirizine (ZYRTEC) 10 MG tablet, Take 10 mg by mouth once daily., Disp: , Rfl:     co-enzyme Q-10 30 mg  capsule, Take 30 mg by mouth once daily., Disp: , Rfl:     hydroCHLOROthiazide (HYDRODIURIL) 25 MG tablet, TAKE 1 TABLET BY MOUTH ONCE DAILY, Disp: 90 tablet, Rfl: 3    ibuprofen (ADVIL,MOTRIN) 200 MG tablet, Take 200 mg by mouth every 6 (six) hours as needed for Pain., Disp: , Rfl:     L.ACID/L.CASEI/B.BIF/B.MELISSA/FOS (PROBIOTIC BLEND ORAL), Take 2 tablets by mouth once daily., Disp: , Rfl:     lancets Misc, To check BG 1 times daily, to use with insurance preferred meter, Disp: 100 each, Rfl: 11    losartan (COZAAR) 100 MG tablet, TAKE 1 TABLET BY MOUTH ONCE DAILY, Disp: 90 tablet, Rfl: 3    lovastatin (MEVACOR) 20 MG tablet, TAKE 1 TABLET BY MOUTH IN  THE EVENING, Disp: 90 tablet, Rfl: 3    metFORMIN (GLUCOPHAGE-XR) 500 MG ER 24hr tablet, TAKE 2 TABLETS BY MOUTH  ONCE DAILY, Disp: 180 tablet, Rfl: 3    multivitamin capsule, Take 1 capsule by mouth once daily., Disp: , Rfl:     OMEGA-3S/DHA/EPA/FISH OIL (OMEGA 3 ORAL), Take 2,800 mg by mouth once daily., Disp: , Rfl:     omeprazole (PRILOSEC) 40 MG capsule, TAKE 1 CAPSULE BY MOUTH  ONCE DAILY, Disp: 90 capsule, Rfl: 3    ondansetron (ZOFRAN) 4 MG tablet, Take 1 tablet (4 mg total) by mouth every 8 (eight) hours as needed for Nausea., Disp: 30 tablet, Rfl: 0    oxyCODONE (ROXICODONE) 5 MG immediate release tablet, Take 1-2 tablets as needed for pain every 6 hours., Disp: 28 tablet, Rfl: 0    oxyCODONE-acetaminophen (PERCOCET) 5-325 mg per tablet, Take 1 tablet by mouth every 6 (six) hours as needed for Pain., Disp: 10 tablet, Rfl: 0    vitamin D (VITAMIN D3) 1000 units Tab, Take 1,000 Units by mouth once daily., Disp: , Rfl:     vitamin E 400 UNIT capsule, Take 400 Units by mouth once daily., Disp: , Rfl:     zinc sulfate (ZINC-15 ORAL), Take by mouth., Disp: , Rfl:     blood-glucose meter kit, To check BG 1 times daily, to use with insurance preferred meter, Disp: 1 each, Rfl: 0          Review of Systems   Constitutional: Negative for decreased  "appetite, fever and malaise/fatigue.   Eyes: Negative for blurred vision.   Cardiovascular: Negative for chest pain, dyspnea on exertion, orthopnea and palpitations.   Respiratory: Negative for cough and shortness of breath.    Gastrointestinal: Negative for abdominal pain.   Psychiatric/Behavioral: Negative for altered mental status.        Objective:BP (!) 161/74 (BP Location: Right arm, Patient Position: Sitting, BP Method: Medium (Automatic))   Pulse 93   Ht 5' 3" (1.6 m)   Wt 90.2 kg (198 lb 13.7 oz)   LMP 08/01/2003   BMI 35.23 kg/m²             Physical Exam  Constitutional:       Appearance: She is well-developed and well-nourished.   HENT:      Head: Normocephalic and atraumatic.      Mouth/Throat:      Mouth: Mucous membranes are moist.   Eyes:      Extraocular Movements: EOM normal.      Pupils: Pupils are equal, round, and reactive to light.   Cardiovascular:      Rate and Rhythm: Normal rate and regular rhythm.      Pulses: Normal pulses and intact distal pulses.      Heart sounds: Murmur (ESM best heard at arotic area. ) heard.       Pulmonary:      Effort: Pulmonary effort is normal. No respiratory distress.      Breath sounds: Normal breath sounds. No rales.   Abdominal:      General: Bowel sounds are normal. There is no distension.      Palpations: Abdomen is soft.      Tenderness: There is no abdominal tenderness. There is no guarding.   Musculoskeletal:         General: No edema.      Cervical back: Normal range of motion.      Right lower leg: No edema.      Left lower leg: No edema.   Skin:     General: Skin is warm.   Neurological:      General: No focal deficit present.      Mental Status: She is alert and oriented to person, place, and time.   Psychiatric:         Mood and Affect: Mood and affect and mood normal.           Assessment:       1. Primary hypertension    2. Pre-op evaluation    3. Hyperlipidemia, unspecified hyperlipidemia type    4. Type 2 diabetes mellitus with diabetic " neuropathy, unspecified whether long term insulin use    5. Obstructive sleep apnea hypopnea, severe         Plan:       Parisa was seen today for pre-op exam.    Diagnoses and all orders for this visit:    Pre-op evaluation  Uses my chart.  TTE abnormal, will have to postpone surgery until further studies.   RCRI score of 0 with 3.9% risk of 30 day events of death, MI or cardiac arrest.  Pt ahceives > 4 METS, walks 1 mile with no active symptoms. Walked from parking lot to clinic up ramp with no symptoms. Does not climb stairs.    Heart murmur:  Will order echo for baseline.    Primary hypertension  Checks at home, well controlled around 120-130.  C/W medications.     Hyperlipidemia, unspecified hyperlipidemia type    Type 2 diabetes mellitus with diabetic neuropathy, unspecified whether long term insulin use    Obstructive sleep apnea hypopnea, severe              Update: TTE done today showed The estimated ejection fraction is 70%. Normal left ventricular diastolic function. Elevated LVOT and aortic velocities of 3m/s, which increaes to 4m/s with valsalva. Turbulent flow seen along the basal septum. Unable to adequately visualize the aortic valve and LVOT. Consider further imaging evaluation. Normal right ventricular size with normal right ventricular systolic function.    Will obtain MILDRED to further evaluate site of obstruction. Will consider cardiac MRI.  Will have to postpone surgery.  Updated patient.  Will discuss with surgery team.    D/W Dr. Damon

## 2022-02-01 ENCOUNTER — TELEPHONE (OUTPATIENT)
Dept: SPORTS MEDICINE | Facility: CLINIC | Age: 67
End: 2022-02-01
Payer: MEDICARE

## 2022-02-01 NOTE — TELEPHONE ENCOUNTER
----- Message from MISSY Santoyo MD sent at 1/31/2022  5:02 PM CST -----  Her case needs to cancel for Wednesday.  Not cleared yet from a cardiac standpoint.  Put on hold and we will reschedule when she clears.

## 2022-02-07 ENCOUNTER — TELEPHONE (OUTPATIENT)
Dept: SPORTS MEDICINE | Facility: CLINIC | Age: 67
End: 2022-02-07
Payer: MEDICARE

## 2022-02-07 NOTE — TELEPHONE ENCOUNTER
Spoke with patient in regards to postponing surgery due to not being cleared by cardiologist. She stated she is having tests done tomorrow. I advised once her cardiologist gives us the go ahead, then we will call to reschedule her surgery. She verbalized understanding.

## 2022-02-07 NOTE — TELEPHONE ENCOUNTER
----- Message from Kai Villasenor sent at 2/7/2022 12:14 PM CST -----  Regarding: pt missed your call    The Pt states that her Cardiologist postponed her surgery, as he needs more testing to clear the Pt.    Please contact the Pt to discuss as she is having a test on her heart tomorrow to see about the heart murmer.      # [phone]

## 2022-02-08 ENCOUNTER — ANESTHESIA (OUTPATIENT)
Dept: MEDSURG UNIT | Facility: HOSPITAL | Age: 67
End: 2022-02-08
Payer: MEDICARE

## 2022-02-08 ENCOUNTER — HOSPITAL ENCOUNTER (OUTPATIENT)
Dept: CARDIOLOGY | Facility: HOSPITAL | Age: 67
Discharge: HOME | End: 2022-02-08
Payer: MEDICARE

## 2022-02-08 ENCOUNTER — HOSPITAL ENCOUNTER (OUTPATIENT)
Facility: HOSPITAL | Age: 67
Discharge: HOME | End: 2022-02-08
Attending: INTERNAL MEDICINE | Admitting: INTERNAL MEDICINE
Payer: MEDICARE

## 2022-02-08 ENCOUNTER — ANESTHESIA EVENT (OUTPATIENT)
Dept: MEDSURG UNIT | Facility: HOSPITAL | Age: 67
End: 2022-02-08
Payer: MEDICARE

## 2022-02-08 VITALS
WEIGHT: 196 LBS | BODY MASS INDEX: 34.73 KG/M2 | DIASTOLIC BLOOD PRESSURE: 77 MMHG | SYSTOLIC BLOOD PRESSURE: 161 MMHG | HEIGHT: 63 IN

## 2022-02-08 VITALS
BODY MASS INDEX: 34.73 KG/M2 | HEART RATE: 80 BPM | RESPIRATION RATE: 21 BRPM | DIASTOLIC BLOOD PRESSURE: 84 MMHG | TEMPERATURE: 97 F | HEIGHT: 63 IN | WEIGHT: 196 LBS | SYSTOLIC BLOOD PRESSURE: 166 MMHG | OXYGEN SATURATION: 99 %

## 2022-02-08 DIAGNOSIS — G47.33 OBSTRUCTIVE SLEEP APNEA HYPOPNEA, SEVERE: ICD-10-CM

## 2022-02-08 DIAGNOSIS — R01.1 HEART MURMUR: ICD-10-CM

## 2022-02-08 DIAGNOSIS — E66.01 SEVERE OBESITY WITH BODY MASS INDEX (BMI) OF 36.0 TO 36.9 WITH SERIOUS COMORBIDITY: ICD-10-CM

## 2022-02-08 DIAGNOSIS — Z12.11 SPECIAL SCREENING FOR MALIGNANT NEOPLASMS, COLON: ICD-10-CM

## 2022-02-08 DIAGNOSIS — I35.0 NONRHEUMATIC AORTIC (VALVE) STENOSIS: ICD-10-CM

## 2022-02-08 DIAGNOSIS — Z01.818 PRE-OP EVALUATION: ICD-10-CM

## 2022-02-08 DIAGNOSIS — E11.9 TYPE 2 DIABETES MELLITUS WITHOUT COMPLICATION, WITHOUT LONG-TERM CURRENT USE OF INSULIN: Primary | ICD-10-CM

## 2022-02-08 DIAGNOSIS — Z87.19 HISTORY OF DIVERTICULITIS: ICD-10-CM

## 2022-02-08 DIAGNOSIS — M79.671 FOOT PAIN, RIGHT: ICD-10-CM

## 2022-02-08 DIAGNOSIS — Q24.8 LEFT VENTRICULAR OUTFLOW TRACT OBSTRUCTION: ICD-10-CM

## 2022-02-08 DIAGNOSIS — G25.81 RESTLESS LEG SYNDROME: ICD-10-CM

## 2022-02-08 DIAGNOSIS — E11.40 TYPE 2 DIABETES MELLITUS WITH DIABETIC NEUROPATHY, UNSPECIFIED WHETHER LONG TERM INSULIN USE: ICD-10-CM

## 2022-02-08 DIAGNOSIS — M79.9 DISORDER OF SOFT TISSUE: ICD-10-CM

## 2022-02-08 DIAGNOSIS — M77.50 TENDONITIS OF ANKLE OR FOOT: ICD-10-CM

## 2022-02-08 DIAGNOSIS — M75.42 IMPINGEMENT SYNDROME OF LEFT SHOULDER: ICD-10-CM

## 2022-02-08 DIAGNOSIS — K76.0 FATTY LIVER: ICD-10-CM

## 2022-02-08 LAB
ANION GAP SERPL CALC-SCNC: 10 MMOL/L (ref 8–16)
BSA FOR ECHO PROCEDURE: 1.99 M2
BUN SERPL-MCNC: 12 MG/DL (ref 8–23)
CALCIUM SERPL-MCNC: 10.5 MG/DL (ref 8.7–10.5)
CHLORIDE SERPL-SCNC: 100 MMOL/L (ref 95–110)
CO2 SERPL-SCNC: 27 MMOL/L (ref 23–29)
CREAT SERPL-MCNC: 0.7 MG/DL (ref 0.5–1.4)
CTP QC/QA: YES
EJECTION FRACTION: 70 %
ERYTHROCYTE [DISTWIDTH] IN BLOOD BY AUTOMATED COUNT: 12.8 % (ref 11.5–14.5)
EST. GFR  (AFRICAN AMERICAN): >60 ML/MIN/1.73 M^2
EST. GFR  (NON AFRICAN AMERICAN): >60 ML/MIN/1.73 M^2
GLUCOSE SERPL-MCNC: 136 MG/DL (ref 70–110)
HCT VFR BLD AUTO: 42.4 % (ref 37–48.5)
HGB BLD-MCNC: 13.8 G/DL (ref 12–16)
INR PPP: 1 (ref 0.8–1.2)
MCH RBC QN AUTO: 29.8 PG (ref 27–31)
MCHC RBC AUTO-ENTMCNC: 32.5 G/DL (ref 32–36)
MCV RBC AUTO: 92 FL (ref 82–98)
PLATELET # BLD AUTO: 206 K/UL (ref 150–450)
PMV BLD AUTO: 10.1 FL (ref 9.2–12.9)
POCT GLUCOSE: 134 MG/DL (ref 70–110)
POTASSIUM SERPL-SCNC: 4 MMOL/L (ref 3.5–5.1)
PROTHROMBIN TIME: 10.5 SEC (ref 9–12.5)
RBC # BLD AUTO: 4.63 M/UL (ref 4–5.4)
SARS-COV-2 AG RESP QL IA.RAPID: NEGATIVE
SINUS: 2.9 CM
SODIUM SERPL-SCNC: 137 MMOL/L (ref 136–145)
STJ: 2.5 CM
WBC # BLD AUTO: 4.39 K/UL (ref 3.9–12.7)

## 2022-02-08 PROCEDURE — 85610 PROTHROMBIN TIME: CPT

## 2022-02-08 PROCEDURE — 37000009 HC ANESTHESIA EA ADD 15 MINS

## 2022-02-08 PROCEDURE — 25000003 PHARM REV CODE 250

## 2022-02-08 PROCEDURE — 87040 BLOOD CULTURE FOR BACTERIA: CPT | Mod: 59 | Performed by: INTERNAL MEDICINE

## 2022-02-08 PROCEDURE — 93320 DOPPLER ECHO COMPLETE: CPT | Mod: 26,,, | Performed by: INTERNAL MEDICINE

## 2022-02-08 PROCEDURE — 36415 COLL VENOUS BLD VENIPUNCTURE: CPT | Performed by: INTERNAL MEDICINE

## 2022-02-08 PROCEDURE — D9220A PRA ANESTHESIA: Mod: ANES,,, | Performed by: ANESTHESIOLOGY

## 2022-02-08 PROCEDURE — 63600175 PHARM REV CODE 636 W HCPCS: Performed by: NURSE ANESTHETIST, CERTIFIED REGISTERED

## 2022-02-08 PROCEDURE — 93005 ELECTROCARDIOGRAM TRACING: CPT

## 2022-02-08 PROCEDURE — 93010 EKG 12-LEAD: ICD-10-PCS | Mod: ,,, | Performed by: STUDENT IN AN ORGANIZED HEALTH CARE EDUCATION/TRAINING PROGRAM

## 2022-02-08 PROCEDURE — 25000003 PHARM REV CODE 250: Performed by: NURSE ANESTHETIST, CERTIFIED REGISTERED

## 2022-02-08 PROCEDURE — 93010 ELECTROCARDIOGRAM REPORT: CPT | Mod: ,,, | Performed by: STUDENT IN AN ORGANIZED HEALTH CARE EDUCATION/TRAINING PROGRAM

## 2022-02-08 PROCEDURE — D9220A PRA ANESTHESIA: ICD-10-PCS | Mod: ANES,,, | Performed by: ANESTHESIOLOGY

## 2022-02-08 PROCEDURE — 93325 DOPPLER ECHO COLOR FLOW MAPG: CPT

## 2022-02-08 PROCEDURE — 93325 DOPPLER ECHO COLOR FLOW MAPG: CPT | Mod: 26,,, | Performed by: INTERNAL MEDICINE

## 2022-02-08 PROCEDURE — 85027 COMPLETE CBC AUTOMATED: CPT

## 2022-02-08 PROCEDURE — 93320 TRANSESOPHAGEAL ECHO (TEE) (CUPID ONLY): ICD-10-PCS | Mod: 26,,, | Performed by: INTERNAL MEDICINE

## 2022-02-08 PROCEDURE — 93312 TRANSESOPHAGEAL ECHO (TEE) (CUPID ONLY): ICD-10-PCS | Mod: 26,,, | Performed by: INTERNAL MEDICINE

## 2022-02-08 PROCEDURE — D9220A PRA ANESTHESIA: Mod: CRNA,,, | Performed by: NURSE ANESTHETIST, CERTIFIED REGISTERED

## 2022-02-08 PROCEDURE — 93325 TRANSESOPHAGEAL ECHO (TEE) (CUPID ONLY): ICD-10-PCS | Mod: 26,,, | Performed by: INTERNAL MEDICINE

## 2022-02-08 PROCEDURE — 37000008 HC ANESTHESIA 1ST 15 MINUTES

## 2022-02-08 PROCEDURE — D9220A PRA ANESTHESIA: ICD-10-PCS | Mod: CRNA,,, | Performed by: NURSE ANESTHETIST, CERTIFIED REGISTERED

## 2022-02-08 PROCEDURE — 80048 BASIC METABOLIC PNL TOTAL CA: CPT

## 2022-02-08 PROCEDURE — 93312 ECHO TRANSESOPHAGEAL: CPT | Mod: 26,,, | Performed by: INTERNAL MEDICINE

## 2022-02-08 RX ORDER — ACETAMINOPHEN 325 MG/1
650 TABLET ORAL EVERY 4 HOURS PRN
Status: DISCONTINUED | OUTPATIENT
Start: 2022-02-08 | End: 2022-02-08 | Stop reason: HOSPADM

## 2022-02-08 RX ORDER — PROPOFOL 10 MG/ML
VIAL (ML) INTRAVENOUS CONTINUOUS PRN
Status: DISCONTINUED | OUTPATIENT
Start: 2022-02-08 | End: 2022-02-08

## 2022-02-08 RX ORDER — ONDANSETRON 8 MG/1
8 TABLET, ORALLY DISINTEGRATING ORAL EVERY 8 HOURS PRN
Status: DISCONTINUED | OUTPATIENT
Start: 2022-02-08 | End: 2022-02-08 | Stop reason: HOSPADM

## 2022-02-08 RX ORDER — LIDOCAINE HYDROCHLORIDE 20 MG/ML
INJECTION INTRAVENOUS
Status: DISCONTINUED | OUTPATIENT
Start: 2022-02-08 | End: 2022-02-08

## 2022-02-08 RX ORDER — SODIUM CHLORIDE 9 MG/ML
INJECTION, SOLUTION INTRAVENOUS ONCE
Status: COMPLETED | OUTPATIENT
Start: 2022-02-08 | End: 2022-02-08

## 2022-02-08 RX ORDER — PROPOFOL 10 MG/ML
VIAL (ML) INTRAVENOUS
Status: DISCONTINUED | OUTPATIENT
Start: 2022-02-08 | End: 2022-02-08

## 2022-02-08 RX ADMIN — SODIUM CHLORIDE: 0.9 INJECTION, SOLUTION INTRAVENOUS at 10:02

## 2022-02-08 RX ADMIN — Medication 150 MCG/KG/MIN: at 10:02

## 2022-02-08 RX ADMIN — PROPOFOL 50 MG: 10 INJECTION, EMULSION INTRAVENOUS at 10:02

## 2022-02-08 RX ADMIN — LIDOCAINE HYDROCHLORIDE 100 MG: 20 INJECTION, SOLUTION INTRAVENOUS at 10:02

## 2022-02-08 NOTE — DISCHARGE SUMMARY
[facility]  [facility] Cardiology  Discharge Summary      Patient Name: Parisa Dimas  MRN: [id number]  Admission Date: 2/8/2022  Hospital Length of Stay: 0 days  Discharge Date and Time:  02/08/2022 9:18 AM  Attending Physician: Helga Felix MD    Discharging Provider: Jenniffer Dey  Primary Care Physician: Alex Cast MD    Procedure(s) (LRB):  MILDRED     Hospital Course:  Parisa Dimas is a 66 y.o. female who presents for evaluation for LVOT gradient/MILDRED.      66 yr old with known hypertension on losartan 100 mg daily and amlodipine 10 mg daily, hyperlipidemia on lovastatin 20 mg daily, fatty liver disease, carotid  atherosclerosis but with no significant stenosis, diabetes on metformin extended release 1000 mg daily, sleep apnea intolerant of CPAP. Surface TTE with high LVOT and aortic valve velocities (3 m/s which increased to 4 m/s) however unable to adequately visualize LVOT. MILDRED today with nonspecific thickening of A2 leaflet and prolapse suspicious of endocarditis. Of note, A2 thickening and respective chordae likely causing systolic LVOT obstruction. Blood cultures ordered however patient denies stigama of endocarditis or infectious symptoms. Plan to pursue FDG PET if blood cultures negative.      Cardiac History:   Sent to PCP for Preoperative evaluation. Had h/o heart murmur so sent here for further evaluation. Stays active, walks around a mile with no symptoms, walked from parking lot to clinic and regularly in [employer] with no symptoms. No chest pain, pressure, tightness, SOB, orthopnea, PND, leg swelling, syncope.  No prior cardiac disease.   Surgery:  Left reverse shoulder arthroplasty secondary to comminuted fracture left proximal humerus that occurred on 12/22/2021.  Followed by Orthopedics.     EKG: NSR with T wave flat to inversions V2-V6 ( no prior EKG).     TTE 1/31/22  · The left ventricle is normal in size with concentric remodeling and hyperdynamic systolic  function.  · The estimated ejection fraction is 70%.  · Normal left ventricular diastolic function.  · Elevated LVOT and aortic velocities of 3m/s, which increaes to 4m/s with valsalva. Turbulent flow seen along the basal septum. Unable to adequately visualize the aortic valve and LVOT. Consider further imaging evaluation.  · Normal right ventricular size with normal right ventricular systolic function.  · The estimated PA systolic pressure is 34 mmHg.  · Normal central venous pressure (3 mmHg).          Discharged Condition: good    Disposition:   Home    Follow Up:  No follow-ups on file.    Patient Instructions:       Parisa Dimas was given education on their disease process and medications.      Medications:  Reconciled Home Medications:      Medication List      CHANGE how you take these medications    aspirin 81 MG EC tablet  Commonly known as: ECOTRIN  Take 81 mg by mouth once daily.  What changed: Another medication with the same name was removed. Continue taking this medication, and follow the directions you see here.        CONTINUE taking these medications    amLODIPine 10 MG tablet  Commonly known as: NORVASC  TAKE 1 TABLET BY MOUTH ONCE DAILY     ascorbic acid (vitamin C) 1000 MG tablet  Commonly known as: VITAMIN C  Take 1,000 mg by mouth once daily.     b complex vitamins tablet  Take 1 tablet by mouth once daily.     blood sugar diagnostic Strp  To check BG 1 times daily, to use with insurance preferred meter     blood-glucose meter kit  To check BG 1 times daily, to use with insurance preferred meter     calcium carbonate 600 mg calcium (1,500 mg) Tab  Commonly known as: OS-RYLEE  Take 600 mg by mouth once daily.     cetirizine 10 MG tablet  Commonly known as: ZYRTEC  Take 10 mg by mouth once daily.     co-enzyme Q-10 30 mg capsule  Take 30 mg by mouth once daily.     hydroCHLOROthiazide 25 MG tablet  Commonly known as: HYDRODIURIL  TAKE 1 TABLET BY MOUTH ONCE DAILY     ibuprofen 200 MG  tablet  Commonly known as: ADVIL,MOTRIN  Take 200 mg by mouth every 6 (six) hours as needed for Pain.     lancets Misc  To check BG 1 times daily, to use with insurance preferred meter     losartan 100 MG tablet  Commonly known as: COZAAR  TAKE 1 TABLET BY MOUTH ONCE DAILY     lovastatin 20 MG tablet  Commonly known as: MEVACOR  TAKE 1 TABLET BY MOUTH IN  THE EVENING     metFORMIN 500 MG ER 24hr tablet  Commonly known as: GLUCOPHAGE-XR  TAKE 2 TABLETS BY MOUTH  ONCE DAILY     multivitamin capsule  Take 1 capsule by mouth once daily.     OMEGA 3 ORAL  Take 2,800 mg by mouth once daily.     omeprazole 40 MG capsule  Commonly known as: PRILOSEC  TAKE 1 CAPSULE BY MOUTH  ONCE DAILY     ondansetron 4 MG tablet  Commonly known as: ZOFRAN  Take 1 tablet (4 mg total) by mouth every 8 (eight) hours as needed for Nausea.     oxyCODONE 5 MG immediate release tablet  Commonly known as: ROXICODONE  Take 1-2 tablets as needed for pain every 6 hours.     oxyCODONE-acetaminophen 5-325 mg per tablet  Commonly known as: PERCOCET  Take 1 tablet by mouth every 6 (six) hours as needed for Pain.     PROBIOTIC BLEND ORAL  Take 2 tablets by mouth once daily.     vitamin D 1000 units Tab  Commonly known as: VITAMIN D3  Take 1,000 Units by mouth once daily.     vitamin E 400 UNIT capsule  Take 400 Units by mouth once daily.     ZINC-15 ORAL  Take by mouth.            Time spent on the discharge of patient: 30 minutes    Signed:  Jenniffer Dey M.D.  Cardiology Fellow  [facility]

## 2022-02-08 NOTE — TRANSFER OF CARE
"Anesthesia Transfer of Care Note    Patient: Parisa Dimas    Procedure(s) Performed: Procedure(s) (LRB):  ECHOCARDIOGRAM,TRANSESOPHAGEAL (N/A)    Patient location: Cath Lab    Anesthesia Type: general    Transport from OR: Transported from OR on room air with adequate spontaneous ventilation    Post pain: adequate analgesia    Post assessment: no apparent anesthetic complications and tolerated procedure well    Post vital signs: stable    Level of consciousness: awake    Nausea/Vomiting: no nausea/vomiting    Complications: none    Transfer of care protocol was followed      Last vitals:   Visit Vitals  BP (!) 165/96 (BP Location: Right arm, Patient Position: Lying)   Pulse 81   Temp 36.7 °C (98.1 °F) (Temporal)   Resp 20   Ht 5' 3" (1.6 m)   Wt 88.9 kg (196 lb)   LMP 08/01/2003   SpO2 96%   Breastfeeding No   BMI 34.72 kg/m²     "

## 2022-02-08 NOTE — H&P
[facility] Short Stay Cardiac Unit  Cardiology  History and Physical     Patient Name: Parisa Dimas  MRN: [id number]  Admission Date: 2/8/2022  Code Status: Full Code   Attending Provider: Helga Felix MD   Primary Care Physician: Alex Cast MD  Principal Problem:<principal problem not specified>    Patient information was obtained from patient and ER records.     Subjective:       HPI:   Parisa iDmas is a 66 y.o. female who presents for evaluation for LVOT gradient/MILDRED.      66 yr old with known hypertension on losartan 100 mg daily and amlodipine 10 mg daily, hyperlipidemia on lovastatin 20 mg daily,, fatty liver disease, carotid  atherosclerosis but with no significant stenosis, diabetes on metformin extended release 1000 mg daily, sleep apnea intolerant of CPAP    Cardiac History:   Sent to PCP for Preoperative evaluation. Had h/o heart murmur so sent here for further evaluation. Stays active, walks around a mile with no symptoms, walked from parking lot to clinic and regularly in [employer] ([employer]) with no symptoms. No chest pain, pressure, tightness, SOB, orthopnea, PND, leg swelling, syncope.  No prior cardiac disease.   Surgery:  Left reverse shoulder arthroplasty secondary to comminuted fracture left proximal humerus that occurred on 12/22/2021.  Followed by Orthopedics.    EKG: NSR with T wave flat to inversions V2-V6 ( no prior EKG).    TTE 1/31/22  · The left ventricle is normal in size with concentric remodeling and hyperdynamic systolic function.  · The estimated ejection fraction is 70%.  · Normal left ventricular diastolic function.  · Elevated LVOT and aortic velocities of 3m/s, which increaes to 4m/s with valsalva. Turbulent flow seen along the basal septum. Unable to adequately visualize the aortic valve and LVOT. Consider further imaging evaluation.  · Normal right ventricular size with normal right ventricular systolic function.  · The estimated PA systolic pressure is 34 mmHg.  · Normal central  venous pressure (3 mmHg).        Dysphagia or odynophagia:  No  Liver Disease, esophageal disease, or known varices:  No  Upper GI Bleeding: No, history of PUD  Snoring:  Yes  Sleep Apnea:  Yes  Prior neck surgery or radiation:  No  History of anesthetic difficulties:  No  Family history of anesthetic difficulties:  No  Last oral intake: yesterday before midnight  Able to move neck in all directions:  Yes  Platelet count: 200  INR: 1        Past Medical History:   Diagnosis Date    AR (allergic rhinitis)     AR (allergic rhinitis)     Carotid stenosis     50% RCA    Colon polyp 10/2014    Diabetes mellitus     Diabetes mellitus, type 2     Fatty liver     GERD (gastroesophageal reflux disease)     Heart murmur 1/27/2022    HLD (hyperlipidemia)     HTN (hypertension)     Joint pain     Sleep apnea 2018    Stricture of artery 3/25/2021       Past Surgical History:   Procedure Laterality Date    SKIN BIOPSY  10yrs.    negative       Review of patient's allergies indicates:  No Known Allergies    No current facility-administered medications on file prior to encounter.     Current Outpatient Medications on File Prior to Encounter   Medication Sig    amLODIPine (NORVASC) 10 MG tablet TAKE 1 TABLET BY MOUTH ONCE DAILY    ascorbic acid, vitamin C, (VITAMIN C) 1000 MG tablet Take 1,000 mg by mouth once daily.    aspirin (ECOTRIN) 81 MG EC tablet Take 81 mg by mouth once daily.    aspirin (ECOTRIN) 81 MG EC tablet Take 1 tablet twice a day with food starting after surgery (breakfast and dinner).    b complex vitamins tablet Take 1 tablet by mouth once daily.    blood sugar diagnostic Strp To check BG 1 times daily, to use with insurance preferred meter    blood-glucose meter kit To check BG 1 times daily, to use with insurance preferred meter    calcium carbonate (OS-RYLEE) 600 mg (1,500 mg) Tab Take 600 mg by mouth once daily.    cetirizine (ZYRTEC) 10 MG tablet Take 10 mg by mouth once daily.     co-enzyme Q-10 30 mg capsule Take 30 mg by mouth once daily.    hydroCHLOROthiazide (HYDRODIURIL) 25 MG tablet TAKE 1 TABLET BY MOUTH ONCE DAILY    ibuprofen (ADVIL,MOTRIN) 200 MG tablet Take 200 mg by mouth every 6 (six) hours as needed for Pain.    L.ACID/L.CASEI/B.BIF/B.MELISSA/FOS (PROBIOTIC BLEND ORAL) Take 2 tablets by mouth once daily.    lancets Misc To check BG 1 times daily, to use with insurance preferred meter    losartan (COZAAR) 100 MG tablet TAKE 1 TABLET BY MOUTH ONCE DAILY    lovastatin (MEVACOR) 20 MG tablet TAKE 1 TABLET BY MOUTH IN  THE EVENING    metFORMIN (GLUCOPHAGE-XR) 500 MG ER 24hr tablet TAKE 2 TABLETS BY MOUTH  ONCE DAILY    multivitamin capsule Take 1 capsule by mouth once daily.    OMEGA-3S/DHA/EPA/FISH OIL (OMEGA 3 ORAL) Take 2,800 mg by mouth once daily.    omeprazole (PRILOSEC) 40 MG capsule TAKE 1 CAPSULE BY MOUTH  ONCE DAILY    ondansetron (ZOFRAN) 4 MG tablet Take 1 tablet (4 mg total) by mouth every 8 (eight) hours as needed for Nausea.    oxyCODONE (ROXICODONE) 5 MG immediate release tablet Take 1-2 tablets as needed for pain every 6 hours.    oxyCODONE-acetaminophen (PERCOCET) 5-325 mg per tablet Take 1 tablet by mouth every 6 (six) hours as needed for Pain.    vitamin D (VITAMIN D3) 1000 units Tab Take 1,000 Units by mouth once daily.    vitamin E 400 UNIT capsule Take 400 Units by mouth once daily.    zinc sulfate (ZINC-15 ORAL) Take by mouth.     Family History     Problem Relation (Age of Onset)    Cancer Mother    Coronary artery disease Father (79)    Diabetes Brother, Maternal Grandmother, Father, Maternal Aunt, Maternal Uncle    Hypertension     Prostate cancer Brother (57)        Tobacco Use    Smoking status: Never Smoker    Smokeless tobacco: Never Used   Substance and Sexual Activity    Alcohol use: Not Currently     Alcohol/week: 0.0 standard drinks    Drug use: No    Sexual activity: Yes     Partners: Male     Birth control/protection:  Post-menopausal     ROS     Constitution: Negative for fever, chills, weight loss or gain.   HENT: Negative for sore throat, rhinorrhea, or headache.  Eyes: Negative for blurred or double vision.   Cardiovascular: Negative for exertional chest pain  Pulmonary: - Dyspnea on exertion  Gastrointestinal: Negative for abdominal pain, nausea, vomiting, or diarrhea. Negative for melena/hematochezia.  : Negative for dysuria.   Neurological: Negative for focal weakness or sensory changes.  Skin: No bleeding or bruising    Objective:     Vital Signs (Most Recent):    Vital Signs (24h Range):           There is no height or weight on file to calculate BMI.            No intake or output data in the 24 hours ending 02/08/22 0913    Lines/Drains/Airways     Peripheral Intravenous Line                 Peripheral IV - Single Lumen 10/10/14 0950 Right Hand 2678 days                Physical Exam  Constitutional: No distress, obese, conversant  HEENT: Sclera anicteric, PERRLA, EOMI  Neck: No JVD, no masses, good movement  CV: RRR, S1 and S2 normal, no additional heart sounds or murmurs. Pulses 2+ and equal bilaterally in radial arteries, Chase's normal on right. Distal pulses are 2+ and equal in the femoral, DP and PT areas bilaterally  Pulm: Clear to auscultation bilaterally with symmetrical expansion. Chest wall palpated for reproduction of pain symptoms, and no pain was able to be produced on palpation or resistance exercises  GI: Abdomen soft, non-tender, good bowel sounds  Extremities: Both extremities intact and grossly normal, skin is warm, no edema noted  Skin: No ecchymosis, erythema, or ulcers  Psych: AOx3, appropriate affect  Neuro: CNII-XII intact, no focal deficits      Significant Labs: BMP: No results for input(s): GLU, NA, K, CL, CO2, BUN, CREATININE, CALCIUM, MG in the last 48 hours., CMP No results for input(s): NA, K, CL, CO2, GLU, BUN, CREATININE, CALCIUM, PROT, ALBUMIN, BILITOT, ALKPHOS, AST, ALT, ANIONGAP,  "ESTGFRAFRICA, EGFRNONAA in the last 48 hours., CBC No results for input(s): WBC, HGB, HCT, PLT in the last 48 hours., INR No results for input(s): INR, PROTIME in the last 48 hours. and Lipid Panel No results for input(s): CHOL, HDL, LDLCALC, TRIG, CHOLHDL in the last 48 hours.    Significant Imaging: Echocardiogram:   2D echo with color flow doppler: No results found for this or any previous visit. and Transthoracic echo (TTE) complete (Cupid Only):   Results for orders placed or performed during the hospital encounter of 01/31/22   Echo   Result Value Ref Range    Ascending aorta 3.40 cm    STJ 3.14 cm    IVS 0.71 0.6 - 1.1 cm    LA size 2.93 cm    Left Atrium Major Axis 4.84 cm    Left Atrium Minor Axis 4.67 cm    LVIDd 4.18 3.5 - 6.0 cm    LVIDs 2.10 2.1 - 4.0 cm    LVOT diameter 1.88 cm    Posterior Wall 0.90 0.6 - 1.1 cm    MV Peak A Vito 1.04 m/s    E wave deceleration time 294.29 msec    MV Peak E Vito 0.70 m/s    PV Peak D Vito 0.32 m/s    PV Peak S Vito 0.49 m/s    RA Major Axis 4.63 cm    RA Width 2.95 cm    RVDD 2.53 cm    Sinus 2.70 cm    TAPSE 2.68 cm    TR Max Vito 2.78 m/s    TDI LATERAL 0.07 m/s    TDI SEPTAL 0.07 m/s    LA WIDTH 3.22 cm    MV stenosis pressure 1/2 time 85.34 ms    LV Diastolic Volume 77.84 mL    LV Systolic Volume 14.41 mL    RV S' 18.53 cm/s    LA volume (mod) 35.70 cm3    MV "A" wave duration 12.84 msec    LV LATERAL E/E' RATIO 10.00 m/s    LV SEPTAL E/E' RATIO 10.00 m/s    FS 50 %    LA volume 38.12 cm3    LV mass 101.33 g    Left Ventricle Relative Wall Thickness 0.43 cm    MV valve area p 1/2 method 2.58 cm2    E/A ratio 0.67     Mean e' 0.07 m/s    Pulm vein S/D ratio 1.53     LVOT area 2.8 cm2    E/E' ratio 10.00 m/s    LV Systolic Volume Index 7.5 mL/m2    LV Diastolic Volume Index 40.33 mL/m2    LA Volume Index 19.8 mL/m2    LV Mass Index 53 g/m2    Triscuspid Valve Regurgitation Peak Gradient 31 mmHg    LA Volume Index (Mod) 18.5 mL/m2    BSA 2 m2    Right Atrial Pressure " (from IVC) 3 mmHg    EF 70 %    Ao peak jean marie 3.4 m/s    Ao VTI 57.60 cm    AV peak gradient 46 mmHg    TV rest pulmonary artery pressure 34 mmHg    Narrative    · The left ventricle is normal in size with concentric remodeling and   hyperdynamic systolic function.  · The estimated ejection fraction is 70%.  · Normal left ventricular diastolic function.  · Elevated LVOT and aortic velocities of 3m/s, which increaes to 4m/s with   valsalva. Turbulent flow seen along the basal septum. Unable to adequately   visualize the aortic valve and LVOT. Consider further imaging evaluation.  · Normal right ventricular size with normal right ventricular systolic   function.  · The estimated PA systolic pressure is 34 mmHg.  · Normal central venous pressure (3 mmHg).        Assessment and Plan:   Here today for MILDRED for eval of LVOT gradient.     -No absolute contraindications of esophageal stricture, tumor, perforation, laceration,or diverticulum and/or active GI bleed  -The risks, benefits & alternatives of the procedure were explained to the patient.   -The risks of transesophageal echo include but are not limited to:  Dental trauma, esophageal trauma/perforation, bleeding, laryngospasm/brochospasm, aspiration, sore throat/hoarseness, & dislodgement of the endotracheal tube/nasogastric tube (where applicable).    -The risks of ANES monitored sedation include hypotension, respiratory depression, arrhythmias, bronchospasm, & death.    -Informed consent was obtained. The patient is agreeable to proceed with the procedure and all questions and concerns addressed.    Case discussed with an attending in echocardiography lab.    Further recommendations per attending addendum    Jenniffer Dey MD  Cardiology   [facility] Short [facility] Cardiac [facility]

## 2022-02-08 NOTE — PLAN OF CARE
Patient arrived to room. PIV placed, labs sent. Admit assessment completed. Plan of care discussed with patient.  at bedside. Nurse call bell within reach.  Will monitor

## 2022-02-08 NOTE — DISCHARGE INSTRUCTIONS
"Patient Education       Endocarditis   The Basics   Written by the doctors and editors at [facility]   What is endocarditis? -- Endocarditis is the term doctors use when a heart valve (or valves) gets inflamed or infected (figure 1).  Endocarditis can lead to serious problems, such as:  · Heart damage and heart problems - Damage of the heart valves can lead to a condition called "heart failure." Heart failure is when the heart has trouble pumping blood around the body.  · Small growths that form on the heart valves - Pieces of these growths can break off and travel through the blood to different parts of the body. They can then get stuck in and block off small blood vessels. This can cause organ damage and symptoms.  What causes endocarditis? -- Endocarditis is usually caused by bacteria. It can happen when:  · People have an infection in another part of the body. The bacteria from that infection can travel through the blood to the heart.  · Bacteria enter the body through the skin or mouth, and then travel through the blood to the heart. This can happen during dental surgery, or if people have a catheter (a thin tube that goes into a vein) for a long time. It can also happen if people inject drugs into their vein.  Many people who get endocarditis already have a heart problem or have had heart valve surgery in the past.  What are the symptoms of endocarditis? -- Endocarditis usually causes a fever and chills.  If the endocarditis has caused problems with the heart or other organs, people can have other symptoms, too. For example, people who have heart failure can have trouble breathing, and swelling in their feet or legs. People who have kidney damage (from pieces of a growth that have broken off and traveled to the kidneys) can have red or brown urine.  If pieces of a growth travel to and block blood vessels in the brain, it can cause a stroke. A stroke is when a part of the brain is damaged because of a problem " "with blood flow. Symptoms of a stroke can include trouble speaking, or weakness or numbness in one or both arms.  Should I see a doctor or nurse? -- Yes. See your doctor or nurse if you have a fever that lasts for many days, especially if you have a heart condition. You should also tell your doctor or nurse if you have any of the other symptoms mentioned above.  Will I need tests? -- Yes. Your doctor or nurse will ask about your symptoms and do an exam. During the exam, they will listen for a heart murmur, which is an extra sound in your heartbeat. A heart murmur is sometimes a sign of endocarditis.  Your doctor or nurse will also probably do:  · Blood tests, including a test called a "blood culture"  · A chest X-ray  · An ECG (also called an "electrocardiogram") - This test measures the electrical activity in your heart (figure 2).  · An echocardiogram (also called an "echo") - This test uses sound waves to create a picture of your heart as it beats (figure 3).  How is endocarditis treated? -- Endocarditis is usually treated in the hospital with antibiotic medicines. These medicines go into your vein through a tube called an "IV."  In most cases, the antibiotic medicines get rid of the infection. But if they don't, or if your endocarditis has caused serious heart valve problems, you might need surgery. Surgery for endocarditis usually involves replacing your diseased heart valve with a heart valve that works normally.  How can I prevent getting endocarditis again? -- Some people need to take antibiotics before they go to the dentist or have certain procedures. This can help prevent getting endocarditis again. Ask your doctor or nurse if you need to take antibiotics before those times.  All topics are updated as new evidence becomes available and our peer review process is complete.  This topic retrieved from [employer] on: Sep 21, 2021.  Topic 68876 Version 8.0  Release: 29.4.2 - C29.263  © 2021 UpToDate, Inc. and/or " "its affiliates. All rights reserved.  figure 1: Chambers and valves of the heart     The heart has four main sections, or "chambers." The top two chambers are called the right atrium and left atrium. The bottom two chambers are called the right and left ventricles. Each of these chambers has a valve that keeps blood flowing in one direction. The valves in this picture appear in gray. When the heart is working normally, blood comes in from the body through the right atrium and into the right ventricle. From there it goes to the lungs, where it picks up oxygen. Then the blood comes back through the left atrium, into the left ventricle, and back out to the body through a blood vessel called the aorta. The aorta appears in red. If a valve doesn't work properly, it will either let blood flow backwards in the wrong direction or not let enough blood flow forwards.  Graphic 88102 Version 12.0    figure 2: Person having an ECG     This drawing shows a man having an ECG (also called an electrocardiogram or EKG). He has patches, called "electrodes," stuck onto his chest, arms, and legs. Wires run from the electrodes to the ECG machine. An ECG measures the electrical activity in the heart.  Graphic 45364 Version 2.0    figure 3: Transthoracic echocardiogram (echo)     This picture shows a person getting an echocardiogram (or "echo"). To do an echo, a doctor or nurse puts some gel on a person's chest. He or she presses a thick wand (called a "transducer") against the chest and moves it around. An echo uses sound waves to create images of the heart that appear on a computer screen. A test called an electrocardiogram (ECG) is done during an echo. For an ECG, patches (called "electrodes") are stuck to a person's chest. Wires run from the patches to a machine that records the electrical activity of the heart.  Graphic 39113 Version 4.0    Consumer Information Use and Disclaimer   This information is not specific medical advice and " does not replace information you receive from your health care provider. This is only a brief summary of general information. It does NOT include all information about conditions, illnesses, injuries, tests, procedures, treatments, therapies, discharge instructions or life-style choices that may apply to you. You must talk with your health care provider for complete information about your health and treatment options. This information should not be used to decide whether or not to accept your health care provider's advice, instructions or recommendations. Only your health care provider has the knowledge and training to provide advice that is right for you. The use of this information is governed by the [employer] End User License Agreement, available at https://www.[employer]/en/solutions/TVbeat/about/roel.The use of [employer] content is governed by the [employer] Terms of Use. ©2021 UpToDate, Inc. All rights reserved.  Copyright   © 2021 UpToDate, Inc. and/or its affiliates. All rights reserved.      Patient Education       Heart Failure, Adult   About this topic   Heart failure is a condition where your heart does not pump well. Your heart has trouble pumping the right amount of blood throughout your body. Because of this, fluid can back up in your body and your organs may not get as much blood as they need.  Heart failure is a long-term problem and will get worse over time. Your doctor will work hard to treat your heart failure and to keep you as healthy as possible.     What are the causes?   Heart failure is most often caused by coronary artery disease or a heart attack. It may also be caused by problems with the heart's valves. You may have heart failure because you had an infection in your heart muscle. Heart failure may be due to high blood pressure or an abnormal heart rhythm. Sleep apnea or high blood sugar may also cause your heart not to work as well as it should. These causes result in a weak or  damaged heart muscle. When your heart is weak or damaged, it has trouble pumping the right amount of blood throughout your body.  What can make this more likely to happen?   You are more likely to have heart failure based on:  · If your blood vessels that supply blood to the heart are narrow or blocked.  · If you have a problem with your heart valves.  · If you smoke.  · If you have high blood pressure.  · If you have diabetes.  · Your age. Your risk goes up as you get older.  · Your weight. Your risk goes up when you are overweight.  What are the main signs?   · Breathing problems like:  ? Shortness of breath  ? Cough that won't go away  ? Wheezing  ? More trouble breathing at night or when you lay down flat  · Extra fluid that causes:  ? Swelling of feet, ankles, legs, or belly  ? Gaining weight and you don't know why  ? Need to pass urine more often, especially at night  · Problems sleeping like:  ? Need to sleep sitting up or on many pillows  ? Waking up often during sleep times  ? Feeling tired, weak, or have no energy  · General signs like:  ? Increased heart rate or irregular heartbeat  ? Loss of appetite and nausea  ? Feeling lightheaded or dizzy, especially right when you stand up  ? Confusion and impaired thinking     How does the doctor diagnose this health problem?   The doctor will take your history and will do an exam. The doctor will listen to your heart and lungs, and may also feel your belly for liver swelling. The doctor will check your feet, ankles, and legs for swelling.  The doctor may order:  · Lab tests  · Chest x-ray  · Electrocardiogram (ECG or EKG)     · Echocardiogram  · Exercise stress test  · Radioactive imaging. This is a radionuclide scan.  · Dye injected into the heart's arteries. This is coronary angiography.  How does the doctor treat this health problem?   Your doctor will treat you to help your heart work better. The doctor will also work to control your signs. Since other health  problems may cause or make heart failure worse, it is important to also treat these. Your doctor may suggest:  · Diet and lifestyle changes to slow down progress of the heart failure  · Drugs to help your heart work better, get rid of the extra fluid, and control your heart rate and blood pressure  · Exercise and cardiac rehab  · Bypass surgery or a heart stent to open blocked vessels to the muscle of your heart  · Devices like a heart pump  · Heart transplant  What follow-up care is needed?   Your doctor may ask you to come back to the office to check on your progress. Be sure to keep these visits.  What lifestyle changes are needed?   · Limit how much beer, wine, and mixed drinks (alcohol) you drink.  · Your doctor may ask you to limit the amount of salt in your diet. You may also be asked to limit how much fluid you drink.  · Try to lose weight if you are overweight. Your doctor or nurse can help.  · If you smoke, try to quit. Your doctor or nurse can help.  · Unless your doctor tells you to limit your activities, try to be active. Walk, garden, or do something active for 30 minutes or more on most days of the week. Stop activity if you have symptoms like chest pain, shortness of breath, or feel dizzy.  · Let your doctor know if you get more tired while you do an activity or you are not able to do as much activity as you did before.  · Be careful that you take your drugs each day as ordered by your doctor.  ? If you cannot afford your drugs, talk to your doctor.  ? Do not stop your drugs if you have side effects. Talk to your doctor about them.  ? Take your drugs even if you feel well.  · Check your weight each morning and write down your weight in a notebook. This will tell you if you are building up too much fluid. Weigh in the morning after you have passed urine. Weigh yourself with clothes on or off, but do it the same way each day. Make sure your scale is on a hard surface, not on carpet. Your doctor will  tell you when you should call based on how much weight you gain in a day or over a week. Take your notebook to your doctor on your next visit.  What drugs may be needed?   Often patients will need to take more than one drug. Together, they will help the heart work as well as it can. Do not take any other prescription drugs, over-the-counter (OTC) drugs, herbals, or diet aids without asking your doctor.  The doctor may order drugs to:  · Help relax blood vessels. This makes it easier for the heart to work and may also lower your blood pressure. These are ACE inhibitors, ARBs, and calcium channel blockers.  · Slow down the heart rate so that it doesn't have to work as hard. These are beta-blockers and calcium channel blockers.  · Help the heart beat stronger and better. This is digoxin.  · Get rid of extra salt and water in the body. These are water pills or diuretics.  What changes to diet are needed?   · Ask your doctor or dietician what kind of diet is best for you. The doctor may tell you to limit your salt and fat or how much fluid you drink.  · The DASH diet may be helpful. The DASH diet helps to lower blood pressure. This diet includes lots of fruits, vegetables, low-fat dairy foods, and foods that are low in saturated fat, total fat, and cholesterol. Using the DASH diet with a low salt diet may lower blood pressure even more.  · Learn to read labels to see how much salt or sodium is in foods. Lowering your salt intake will help you control some of your symptoms.  When do I need to call the doctor?   Activate the emergency medical system right away if you have signs of a heart attack. Call 911 in the [place] or [place]. The sooner treatment begins, the better your chances for recovery. Call for emergency help right away if you have:  · Signs of heart attack, which may include:  ? Severe chest pain, pressure, or discomfort with:  § Breathing trouble; sweating; upset stomach; or cold, clammy skin.  § Pain in  your arms, back, or jaw.  § Worse pain with activity like walking up stairs.  ? Fast or irregular heartbeat.  ? Feeling dizzy, faint, or weak.  Call your doctor if:  · You have so much trouble breathing that you can only say one or two words at a time.  · You need to sit upright at all times to be able to breathe and/or cannot lie down.  · You are very tired from working to catch your breath or you are sweating from trying to breathe.  · You have trouble breathing when talking, sitting still, or with activity.  · You have wheezing or chest tightness when you are resting.  · You wake up at night because you cant breathe well.  · You become very confused or you faint.  · You have a very fast or irregular heartbeat.  · You cough more than normal or cough up frothy or pink saliva.  · You feel very weak, dizzy, or more tired than normal.  · You need more pillows than normal to sleep.  · You gain 2 to 3 pounds (0.9 to 1.4 kg) overnight or more than 5 pounds (2.3 kg) in 1 week.  · You have more swelling than usual, especially in your feet and ankles or in your belly.  · You feel like your heart is beating very fast.  Where can I learn more?   American Heart Association  http://www.heart.org/HEARTORG/Conditions/HeartFailure/AboutHeartFailure/About-Heart-Failure_UCM_002044_Article.jsp   Better Health Channel  https://www.betterhealth.[place]ilar[place].gov.au/health/conditionsandtreatments/congestive-heart-failure-chf   NHS Choices  http://www.nhs.uk/conditions/heart-failure/pages/introduction.aspx   Last Reviewed Date   2021-06-03  Consumer Information Use and Disclaimer   This information is not specific medical advice and does not replace information you receive from your health care provider. This is only a brief summary of general information. It does NOT include all information about conditions, illnesses, injuries, tests, procedures, treatments, therapies, discharge instructions or life-style choices that may apply to you. You must  talk with your health care provider for complete information about your health and treatment options. This information should not be used to decide whether or not to accept your health care providers advice, instructions or recommendations. Only your health care provider has the knowledge and training to provide advice that is right for you.  Copyright   Copyright © 2021 [employer] and its affiliates and/or licensors. All rights reserved.

## 2022-02-08 NOTE — NURSING
Received report from Aneta Zhang CRNA . Patient s/p MILDRED, AAOx3. VSS, no c/o pain or discomfort at this time, resp even and unlabored. Post procedure protocol reviewed with patient and patient's family. Understanding verbalized. Family members at bedside. Nurse call bell within reach. Will continue to monitor per post procedure protocol.

## 2022-02-08 NOTE — ANESTHESIA POSTPROCEDURE EVALUATION
Anesthesia Post Evaluation    Patient: Parisa Dimas    Procedure(s) Performed: Procedure(s) (LRB):  ECHOCARDIOGRAM,TRANSESOPHAGEAL (N/A)    Final Anesthesia Type: general      Patient location during evaluation: PACU  Patient participation: Yes- Able to Participate  Level of consciousness: awake and alert  Post-procedure vital signs: reviewed and stable  Pain management: adequate  Airway patency: patent    PONV status at discharge: No PONV  Anesthetic complications: no      Cardiovascular status: blood pressure returned to baseline  Respiratory status: spontaneous ventilation  Hydration status: euvolemic  Follow-up not needed.          Vitals Value Taken Time   /88 02/08/22 1234   Temp 36.3 °C (97.3 °F) 02/08/22 1110   Pulse 88 02/08/22 1256   Resp 38 02/08/22 1256   SpO2 98 % 02/08/22 1256   Vitals shown include unvalidated device data.      No case tracking events are documented in the log.      Pain/Adele Score: No data recorded

## 2022-02-08 NOTE — ANESTHESIA PREPROCEDURE EVALUATION
[facility]  Anesthesia Pre-Operative Evaluation         Patient Name: Parisa Dimas  YOB: 1955  MRN: [id number]    SUBJECTIVE:     Pre-operative evaluation for Procedure(s) (LRB):  ECHOCARDIOGRAM,TRANSESOPHAGEAL (N/A)     02/08/2022    Parisa Dimas is a 66 y.o. female w/ a significant PMHx of HTN, HLD, DM, SHOAIB (not on CPAP) and concern for LVOT obstruction (EF 70%).    Patient now presents for the above procedure(s).      LDA:        Peripheral IV - Single Lumen 10/10/14 0950 Right Hand (Active)   Number of days: 2678            Peripheral IV - Single Lumen 02/08/22 0938 20 G Right Forearm (Active)   Site Assessment Clean;Dry;Intact;No redness;No swelling;No drainage;No warmth 02/08/22 0938   Extremity Assessment Distal to IV No warmth;No swelling;No redness;No abnormal discoloration 02/08/22 0938   Line Status Blood return noted;Saline locked 02/08/22 0938   Dressing Status Clean;Dry;Intact 02/08/22 0938   Dressing Intervention First dressing 02/08/22 0938   Dressing Change Due 02/12/22 02/08/22 0938   Site Change Due 02/12/22 02/08/22 0938   Number of days: 0       Prev airway: None documented.    Drips: None documented.      Patient Active Problem List   Diagnosis    HTN (hypertension)    HLD (hyperlipidemia)    Fatty liver    GERD (gastroesophageal reflux disease)    AR (allergic rhinitis)    Foot pain    Heel cord tightness    Disorder of soft tissue    Special screening for malignant neoplasms, colon    Foot pain, right    Tendonitis of ankle or foot    Impingement syndrome of left shoulder    History of diverticulitis    Obstructive sleep apnea hypopnea, severe    BMI 35.0-35.9,adult    Restless leg syndrome    Type 2 diabetes mellitus, without long-term current use of insulin    Heart murmur    Bilateral carotid artery disease    Type 2 diabetes mellitus with diabetic neuropathy, unspecified whether long term insulin use    Severe obesity with body  mass index (BMI) of 36.0 to 36.9 with serious comorbidity    Pre-op evaluation       Review of patient's allergies indicates:  No Known Allergies    Current Inpatient Medications:   sodium chloride 0.9%   Intravenous Once       No current facility-administered medications on file prior to encounter.     Current Outpatient Medications on File Prior to Encounter   Medication Sig Dispense Refill    amLODIPine (NORVASC) 10 MG tablet TAKE 1 TABLET BY MOUTH ONCE DAILY 90 tablet 3    ascorbic acid, vitamin C, (VITAMIN C) 1000 MG tablet Take 1,000 mg by mouth once daily.      aspirin (ECOTRIN) 81 MG EC tablet Take 81 mg by mouth once daily.      b complex vitamins tablet Take 1 tablet by mouth once daily.      calcium carbonate (OS-RYLEE) 600 mg (1,500 mg) Tab Take 600 mg by mouth once daily.      cetirizine (ZYRTEC) 10 MG tablet Take 10 mg by mouth once daily.      co-enzyme Q-10 30 mg capsule Take 30 mg by mouth once daily.      hydroCHLOROthiazide (HYDRODIURIL) 25 MG tablet TAKE 1 TABLET BY MOUTH ONCE DAILY 90 tablet 3    ibuprofen (ADVIL,MOTRIN) 200 MG tablet Take 200 mg by mouth every 6 (six) hours as needed for Pain.      L.ACID/L.CASEI/B.BIF/B.MELISSA/FOS (PROBIOTIC BLEND ORAL) Take 2 tablets by mouth once daily.      losartan (COZAAR) 100 MG tablet TAKE 1 TABLET BY MOUTH ONCE DAILY 90 tablet 3    lovastatin (MEVACOR) 20 MG tablet TAKE 1 TABLET BY MOUTH IN  THE EVENING 90 tablet 3    metFORMIN (GLUCOPHAGE-XR) 500 MG ER 24hr tablet TAKE 2 TABLETS BY MOUTH  ONCE DAILY 180 tablet 3    multivitamin capsule Take 1 capsule by mouth once daily.      OMEGA-3S/DHA/EPA/FISH OIL (OMEGA 3 ORAL) Take 2,800 mg by mouth once daily.      omeprazole (PRILOSEC) 40 MG capsule TAKE 1 CAPSULE BY MOUTH  ONCE DAILY 90 capsule 3    vitamin D (VITAMIN D3) 1000 units Tab Take 1,000 Units by mouth once daily.      vitamin E 400 UNIT capsule Take 400 Units by mouth once daily.      zinc sulfate (ZINC-15 ORAL) Take by mouth.       blood sugar diagnostic Strp To check BG 1 times daily, to use with insurance preferred meter 100 strip 11    blood-glucose meter kit To check BG 1 times daily, to use with insurance preferred meter 1 each 0    lancets Misc To check BG 1 times daily, to use with insurance preferred meter 100 each 11    ondansetron (ZOFRAN) 4 MG tablet Take 1 tablet (4 mg total) by mouth every 8 (eight) hours as needed for Nausea. 30 tablet 0    oxyCODONE (ROXICODONE) 5 MG immediate release tablet Take 1-2 tablets as needed for pain every 6 hours. 28 tablet 0    oxyCODONE-acetaminophen (PERCOCET) 5-325 mg per tablet Take 1 tablet by mouth every 6 (six) hours as needed for Pain. 10 tablet 0       Past Surgical History:   Procedure Laterality Date    SKIN BIOPSY  10yrs.    negative       Social History     Socioeconomic History    Marital status:      Spouse name: Pio    Number of children: 4   Occupational History     Comment: retired- school work   Tobacco Use    Smoking status: Never Smoker    Smokeless tobacco: Never Used   Substance and Sexual Activity    Alcohol use: Not Currently     Alcohol/week: 0.0 standard drinks    Drug use: No    Sexual activity: Yes     Partners: Male     Birth control/protection: Post-menopausal   Other Topics Concern    Are you pregnant or think you may be? No    Breast-feeding No   Social History Narrative    Stairs- none     Social Determinants of Health     Financial Resource Strain: Low Risk     Difficulty of Paying Living Expenses: Not hard at all   Food Insecurity: No Food Insecurity    Worried About Running Out of Food in the Last Year: Never true    Ran Out of Food in the Last Year: Never true   Transportation Needs: No Transportation Needs    Lack of Transportation (Medical): No    Lack of Transportation (Non-Medical): No   Physical Activity: Inactive    Days of Exercise per Week: 0 days    Minutes of Exercise per Session: 0 min   Stress: Stress Concern Present     Feeling of Stress : To some extent   Social Connections: Unknown    Frequency of Communication with Friends and Family: More than three times a week    Frequency of Social Gatherings with Friends and Family: More than three times a week    Active Member of Clubs or Organizations: Yes    Attends Club or Organization Meetings: More than 4 times per year    Marital Status:        OBJECTIVE:     Vital Signs Range (Last 24H):  Temp:  [36.7 °C (98.1 °F)]   Pulse:  [81]   Resp:  [20]   BP: (165)/(96)   SpO2:  [96 %]       Significant Labs:  Lab Results   Component Value Date    WBC 4.39 02/08/2022    HGB 13.8 02/08/2022    HCT 42.4 02/08/2022     02/08/2022    CHOL 169 07/22/2021    TRIG 253 (H) 07/22/2021    HDL 48 07/22/2021    ALT 67 (H) 01/27/2022    AST 45 (H) 01/27/2022     01/27/2022    K 3.7 01/27/2022    CL 98 01/27/2022    CREATININE 0.7 01/27/2022    BUN 14 01/27/2022    CO2 30 (H) 01/27/2022    TSH 0.865 07/22/2021    INR 1.0 01/27/2022    HGBA1C 5.9 (H) 01/27/2022       Diagnostic Studies: No relevant studies.    EKG:   Results for orders placed or performed during the hospital encounter of 02/08/22   EKG 12-lead    Collection Time: 02/08/22  9:20 AM    Narrative    Test Reason : Q24.8,E11.9,Q24.8,    Vent. Rate : 080 BPM     Atrial Rate : 080 BPM     P-R Int : 214 ms          QRS Dur : 076 ms      QT Int : 398 ms       P-R-T Axes : 069 018 046 degrees     QTc Int : 459 ms    Sinus rhythm with 1st degree A-V block with Premature atrial complexes  Nonspecific T wave abnormality  Abnormal ECG  When compared with ECG of 27-JAN-2022 08:07,  Premature atrial complexes are now Present  Nonspecific T wave abnormality has replaced inverted T waves in Anterior  leads  QT has lengthened    Referred By: PRASHANT GARCIA           Confirmed By:        2D ECHO:  TTE:  Results for orders placed or performed during the hospital encounter of 01/31/22   Echo   Result Value Ref Range    Ascending aorta  "3.40 cm    STJ 3.14 cm    IVS 0.71 0.6 - 1.1 cm    LA size 2.93 cm    Left Atrium Major Axis 4.84 cm    Left Atrium Minor Axis 4.67 cm    LVIDd 4.18 3.5 - 6.0 cm    LVIDs 2.10 2.1 - 4.0 cm    LVOT diameter 1.88 cm    Posterior Wall 0.90 0.6 - 1.1 cm    MV Peak A Vito 1.04 m/s    E wave deceleration time 294.29 msec    MV Peak E Vito 0.70 m/s    PV Peak D Vito 0.32 m/s    PV Peak S Vito 0.49 m/s    RA Major Axis 4.63 cm    RA Width 2.95 cm    RVDD 2.53 cm    Sinus 2.70 cm    TAPSE 2.68 cm    TR Max Vito 2.78 m/s    TDI LATERAL 0.07 m/s    TDI SEPTAL 0.07 m/s    LA WIDTH 3.22 cm    MV stenosis pressure 1/2 time 85.34 ms    LV Diastolic Volume 77.84 mL    LV Systolic Volume 14.41 mL    RV S' 18.53 cm/s    LA volume (mod) 35.70 cm3    MV "A" wave duration 12.84 msec    LV LATERAL E/E' RATIO 10.00 m/s    LV SEPTAL E/E' RATIO 10.00 m/s    FS 50 %    LA volume 38.12 cm3    LV mass 101.33 g    Left Ventricle Relative Wall Thickness 0.43 cm    MV valve area p 1/2 method 2.58 cm2    E/A ratio 0.67     Mean e' 0.07 m/s    Pulm vein S/D ratio 1.53     LVOT area 2.8 cm2    E/E' ratio 10.00 m/s    LV Systolic Volume Index 7.5 mL/m2    LV Diastolic Volume Index 40.33 mL/m2    LA Volume Index 19.8 mL/m2    LV Mass Index 53 g/m2    Triscuspid Valve Regurgitation Peak Gradient 31 mmHg    LA Volume Index (Mod) 18.5 mL/m2    BSA 2 m2    Right Atrial Pressure (from IVC) 3 mmHg    EF 70 %    Ao peak vito 3.4 m/s    Ao VTI 57.60 cm    AV peak gradient 46 mmHg    TV rest pulmonary artery pressure 34 mmHg    Narrative    · The left ventricle is normal in size with concentric remodeling and   hyperdynamic systolic function.  · The estimated ejection fraction is 70%.  · Normal left ventricular diastolic function.  · Elevated LVOT and aortic velocities of 3m/s, which increaes to 4m/s with   valsalva. Turbulent flow seen along the basal septum. Unable to adequately   visualize the aortic valve and LVOT. Consider further imaging evaluation.  · Normal " right ventricular size with normal right ventricular systolic   function.  · The estimated PA systolic pressure is 34 mmHg.  · Normal central venous pressure (3 mmHg).          MILDRED:  No results found for this or any previous visit.    ASSESSMENT/PLAN:                Anesthesia Evaluation    I have reviewed the Patient Summary Reports.    I have reviewed the Nursing Notes. I have reviewed the NPO Status.   I have reviewed the Medications.     Review of Systems  Anesthesia Hx:  No problems with previous Anesthesia  History of prior surgery of interest to airway management or planning: Denies Family Hx of Anesthesia complications.   Denies Personal Hx of Anesthesia complications.   Hematology/Oncology:  Hematology Normal        EENT/Dental:EENT/Dental Normal   Cardiovascular:   Exercise tolerance: good Hypertension hyperlipidemia    Pulmonary:   Sleep Apnea    Musculoskeletal:  Musculoskeletal Normal    Neurological:  Neurology Normal    Endocrine:   Diabetes    Psych:  Psychiatric Normal           Physical Exam  General:  Well nourished    Airway/Jaw/Neck:  Airway Findings: Mouth Opening: Normal Tongue: Normal  TM Distance: 4 - 6 cm     Eyes/Ears/Nose:  EYES/EARS/NOSE FINDINGS: Normal         Mental Status:  Mental Status Findings:  Cooperative, Alert and Oriented         Anesthesia Plan  Type of Anesthesia, risks & benefits discussed:  Anesthesia Type:  MAC, general    Patient's Preference:   Plan Factors:          Intra-op Monitoring Plan: standard ASA monitors  Intra-op Monitoring Plan Comments:   Post Op Pain Control Plan: multimodal analgesia, IV/PO Opioids PRN and per primary service following discharge from PACU  Post Op Pain Control Plan Comments:     Induction:   IV  Beta Blocker:         Informed Consent: Patient understands risks and agrees with Anesthesia plan.  Questions answered. Anesthesia consent signed with patient.  ASA Score: 3     Day of Surgery Review of History & Physical:    H&P update referred  to the provider.         Ready For Surgery From Anesthesia Perspective.

## 2022-02-09 ENCOUNTER — PATIENT MESSAGE (OUTPATIENT)
Dept: CARDIOLOGY | Facility: CLINIC | Age: 67
End: 2022-02-09
Payer: MEDICARE

## 2022-02-09 LAB — POCT GLUCOSE: 132 MG/DL (ref 70–110)

## 2022-02-10 DIAGNOSIS — C38.0 MALIGNANT NEOPLASM OF HEART: ICD-10-CM

## 2022-02-10 DIAGNOSIS — I33.0 VEGETATION OF HEART VALVE: Primary | ICD-10-CM

## 2022-02-10 NOTE — PROGRESS NOTES
Discussed with patient reg MILDRED results. Blood cultures negative. Will order FDG PET. Informed patient to wait until until further testing before proceeding with surgery. Updated Dr Damon.

## 2022-02-13 ENCOUNTER — PATIENT MESSAGE (OUTPATIENT)
Dept: ADMINISTRATIVE | Facility: OTHER | Age: 67
End: 2022-02-13
Payer: MEDICARE

## 2022-02-13 LAB
BACTERIA BLD CULT: NORMAL
BACTERIA BLD CULT: NORMAL

## 2022-02-15 ENCOUNTER — PES CALL (OUTPATIENT)
Dept: ADMINISTRATIVE | Facility: CLINIC | Age: 67
End: 2022-02-15
Payer: MEDICARE

## 2022-02-16 ENCOUNTER — HOSPITAL ENCOUNTER (OUTPATIENT)
Dept: RADIOLOGY | Facility: HOSPITAL | Age: 67
Discharge: HOME | End: 2022-02-16
Payer: MEDICARE

## 2022-02-16 DIAGNOSIS — C38.0 MALIGNANT NEOPLASM OF HEART: ICD-10-CM

## 2022-02-16 DIAGNOSIS — I33.0 VEGETATION OF HEART VALVE: ICD-10-CM

## 2022-02-16 LAB — POCT GLUCOSE: 152 MG/DL (ref 70–110)

## 2022-02-16 PROCEDURE — A9698 NON-RAD CONTRAST MATERIALNOC: HCPCS

## 2022-02-16 PROCEDURE — 78815 PET IMAGE W/CT SKULL-THIGH: CPT | Mod: TC,PS

## 2022-02-16 PROCEDURE — 25500020 PHARM REV CODE 255

## 2022-02-16 PROCEDURE — 78815 PET IMAGE W/CT SKULL-THIGH: CPT | Mod: 26,PS,, | Performed by: STUDENT IN AN ORGANIZED HEALTH CARE EDUCATION/TRAINING PROGRAM

## 2022-02-16 PROCEDURE — 78815 NM PET CT ROUTINE: ICD-10-PCS | Mod: 26,PS,, | Performed by: STUDENT IN AN ORGANIZED HEALTH CARE EDUCATION/TRAINING PROGRAM

## 2022-02-16 RX ADMIN — IOHEXOL 1000 ML: 9 SOLUTION ORAL at 08:02

## 2022-02-20 ENCOUNTER — PATIENT MESSAGE (OUTPATIENT)
Dept: CARDIOLOGY | Facility: CLINIC | Age: 67
End: 2022-02-20
Payer: MEDICARE

## 2022-02-21 ENCOUNTER — DOCUMENTATION ONLY (OUTPATIENT)
Dept: CARDIOLOGY | Facility: CLINIC | Age: 67
End: 2022-02-21
Payer: MEDICARE

## 2022-02-21 DIAGNOSIS — R01.1 HEART MURMUR: Primary | ICD-10-CM

## 2022-02-23 ENCOUNTER — PATIENT MESSAGE (OUTPATIENT)
Dept: CARDIOLOGY | Facility: CLINIC | Age: 67
End: 2022-02-23
Payer: MEDICARE

## 2022-02-26 ENCOUNTER — DOCUMENTATION ONLY (OUTPATIENT)
Dept: CARDIAC CATH/INVASIVE PROCEDURES | Facility: HOSPITAL | Age: 67
End: 2022-02-26
Payer: MEDICARE

## 2022-02-26 NOTE — PROGRESS NOTES
Reviewed patient details including MILDRED and FDG PET.  Planned to get a cardiac MRI but it is not available for a few months (until mid year). Patient is having pain and would like to undergo surgery and she has already been risk stratified and no significant cardiac symptoms, so this should not delay surgery further. She needs to be well hydrated perioperatively and if she becomes hypotensive to be given fluids and phenylephrine would be the initial pressor of choice. Discussed at length with staff.

## 2022-03-02 ENCOUNTER — TELEPHONE (OUTPATIENT)
Dept: SPORTS MEDICINE | Facility: CLINIC | Age: 67
End: 2022-03-02
Payer: MEDICARE

## 2022-03-02 ENCOUNTER — PATIENT OUTREACH (OUTPATIENT)
Dept: ADMINISTRATIVE | Facility: OTHER | Age: 67
End: 2022-03-02
Payer: MEDICARE

## 2022-03-02 ENCOUNTER — PES CALL (OUTPATIENT)
Dept: ADMINISTRATIVE | Facility: CLINIC | Age: 67
End: 2022-03-02
Payer: MEDICARE

## 2022-03-02 DIAGNOSIS — G89.29 CHRONIC LEFT SHOULDER PAIN: ICD-10-CM

## 2022-03-02 DIAGNOSIS — S42.292A CLOSED 4-PART FRACTURE OF PROXIMAL HUMERUS, LEFT, INITIAL ENCOUNTER: Primary | ICD-10-CM

## 2022-03-02 DIAGNOSIS — Z01.818 PRE-OP TESTING: Primary | ICD-10-CM

## 2022-03-02 DIAGNOSIS — M25.512 CHRONIC LEFT SHOULDER PAIN: ICD-10-CM

## 2022-03-02 NOTE — PROGRESS NOTES
Care Everywhere:   Immunization: updated  Health Maintenance: updated  Media Review: review for outside colon cancer report  Legacy Review:   DIS:  Order placed:   Upcoming appts:  EFAX:  Task Tickets:  Referrals:  Colonoscopy case request 1.20.2022

## 2022-03-02 NOTE — TELEPHONE ENCOUNTER
Left VM for patient to return call to reschedule surgery with Dr. Santoyo. Can get rescheduled for Monday 3/7 at [facility]. Need to schedule pre-op and covid test. Callback number provided.

## 2022-03-02 NOTE — TELEPHONE ENCOUNTER
----- Message from Lorena Vallejo sent at 2/28/2022 11:54 AM CST -----  Regarding: FW: TTE  abnormal    ----- Message -----  From: MISSY Santoyo MD  Sent: 2/27/2022   1:20 PM CST  To: Yahir Lyn PA-C, Natanael Mcfadden Staff  Subject: FW: TTE  abnormal                                Danielle -     See below. Patient has cleared medically. We will have to get it done at [facility] given her heart history. Let's contact the patient tomorrow to find a date. I can do next Mon 3/7 if we can get a time at [facility].    We will need to have her see Celestine for a repeat preop, h&p.    SC    ----- Message -----  From: Helga Felix MD  Sent: 2/26/2022  11:26 AM CST  To: MISSY Santoyo MD, Alex Cast MD, #  Subject: RE: TTE  abnormal                                Hi Dr. Damon and selma,      Sorry for the typo. Should not delay surgery.    Regards,  Helga Felix  ----- Message -----  From: Pato Damon MD  Sent: 2/26/2022  11:07 AM CST  To: MISSY Santoyo MD, Alex Cast MD, #  Subject: RE: TTE  abnormal                                Should not# delay the surgery. I agree with good hydration perioperatively and phenylephrine if needed as the initial pressor.    Pato Damon MD    ----- Message -----  From: Helga Felix MD  Sent: 2/26/2022   7:55 AM CST  To: MISSY Santoyo MD, Alex Cast MD, #  Subject: RE: TTE  abnormal                                Hi all,    This patient had FDG PET with no significant uptake. We attempted to get cardiac MRI but given her shoulder pain is worse and MRI not available until June or July and she is asymptomatic, we recommend this should delay the surgery. She will need hydration in the periop period and if hypotensive during procedure prefer phenylephrine.     Best,  Helga Felix      ----- Message -----  From: MISSY Santoyo MD  Sent: 1/31/2022   5:01 PM CST  To: Alex Cast MD, Pato Damon MD, #  Subject: RE: TTE  abnormal                                 Dr. Felix -     Thank you for letting me know.  I appreciate your assistance on the case.  We will cancel the case for Wednesday and put her on hold.  Please let me know when she clears from a cardiac standpoint.    Froylan,  Frandy    [phone]    ----- Message -----  From: Helga Felix MD  Sent: 1/31/2022   4:50 PM CST  To: MISSY Santoyo MD, Alex Cast MD, #  Subject: TTE  abnormal                                    Hi all,    This patient was sent for heart murmur and preop evaluation. TTE showed LVOT obstruction. Will need MILDRED and further imaging. Patient will need further evaluation before proceeding with surgery. I am available at [phone] if any questions. Please call if any questions.     Regards,  Helga Felix

## 2022-03-02 NOTE — TELEPHONE ENCOUNTER
Spoke with patient about rescheduling surgery with Dr. Santoyo. Rescheduled for 3/7 Mon at [facility]. Patient was ok with this date. Pre-op and covid test scheduled. She asked about stopping certain medications, I advised that pre-op/anesthesia will contact her. She verbalized understanding.

## 2022-03-02 NOTE — TELEPHONE ENCOUNTER
----- Message from Lorena Vallejo sent at 2/28/2022 11:54 AM CST -----  Regarding: FW: TTE  abnormal    ----- Message -----  From: MISSY Santoyo MD  Sent: 2/27/2022   1:20 PM CST  To: Yahir Lyn PA-C, Natanael Mcfadden Staff  Subject: FW: TTE  abnormal                                Danielle -     See below. Patient has cleared medically. We will have to get it done at [facility] given her heart history. Let's contact the patient tomorrow to find a date. I can do next Mon 3/7 if we can get a time at [facility].    We will need to have her see Celestine for a repeat preop, h&p.    SC    ----- Message -----  From: Helga Felix MD  Sent: 2/26/2022  11:26 AM CST  To: MISSY Santoyo MD, Alex Cast MD, #  Subject: RE: TTE  abnormal                                Hi Dr. Damon and selma,      Sorry for the typo. Should not delay surgery.    Regards,  Helga Felix  ----- Message -----  From: Pato Damon MD  Sent: 2/26/2022  11:07 AM CST  To: MISSY Santoyo MD, Alex Cast MD, #  Subject: RE: TTE  abnormal                                Should not# delay the surgery. I agree with good hydration perioperatively and phenylephrine if needed as the initial pressor.    Pato Damon MD    ----- Message -----  From: Helga Felix MD  Sent: 2/26/2022   7:55 AM CST  To: MISSY Santoyo MD, Alex Cast MD, #  Subject: RE: TTE  abnormal                                Hi all,    This patient had FDG PET with no significant uptake. We attempted to get cardiac MRI but given her shoulder pain is worse and MRI not available until June or July and she is asymptomatic, we recommend this should delay the surgery. She will need hydration in the periop period and if hypotensive during procedure prefer phenylephrine.     Best,  Helga Felix      ----- Message -----  From: MISSY Santoyo MD  Sent: 1/31/2022   5:01 PM CST  To: Alex Cast MD, Pato Damon MD, #  Subject: RE: TTE  abnormal                                 Dr. Felix -     Thank you for letting me know.  I appreciate your assistance on the case.  We will cancel the case for Wednesday and put her on hold.  Please let me know when she clears from a cardiac standpoint.    Froylan,  Frandy    [phone]    ----- Message -----  From: Helga Felix MD  Sent: 1/31/2022   4:50 PM CST  To: MISSY Santoyo MD, Alex Cast MD, #  Subject: TTE  abnormal                                    Hi all,    This patient was sent for heart murmur and preop evaluation. TTE showed LVOT obstruction. Will need MILDRED and further imaging. Patient will need further evaluation before proceeding with surgery. I am available at [phone] if any questions. Please call if any questions.     Regards,  Helga Felix

## 2022-03-03 ENCOUNTER — OFFICE VISIT (OUTPATIENT)
Dept: SPORTS MEDICINE | Facility: CLINIC | Age: 67
End: 2022-03-03
Payer: MEDICARE

## 2022-03-03 ENCOUNTER — TELEPHONE (OUTPATIENT)
Dept: FAMILY MEDICINE | Facility: CLINIC | Age: 67
End: 2022-03-03

## 2022-03-03 VITALS
DIASTOLIC BLOOD PRESSURE: 77 MMHG | RESPIRATION RATE: 19 BRPM | HEIGHT: 63 IN | WEIGHT: 196 LBS | SYSTOLIC BLOOD PRESSURE: 156 MMHG | BODY MASS INDEX: 34.73 KG/M2 | HEART RATE: 89 BPM

## 2022-03-03 DIAGNOSIS — M25.512 CHRONIC LEFT SHOULDER PAIN: ICD-10-CM

## 2022-03-03 DIAGNOSIS — G89.29 CHRONIC LEFT SHOULDER PAIN: ICD-10-CM

## 2022-03-03 DIAGNOSIS — S42.292A CLOSED 4-PART FRACTURE OF PROXIMAL HUMERUS, LEFT, INITIAL ENCOUNTER: Primary | ICD-10-CM

## 2022-03-03 PROCEDURE — 3077F PR MOST RECENT SYSTOLIC BLOOD PRESSURE >= 140 MM HG: ICD-10-PCS | Mod: CPTII,S$GLB,, | Performed by: PHYSICIAN ASSISTANT

## 2022-03-03 PROCEDURE — 1125F PR PAIN SEVERITY QUANTIFIED, PAIN PRESENT: ICD-10-PCS | Mod: CPTII,S$GLB,, | Performed by: PHYSICIAN ASSISTANT

## 2022-03-03 PROCEDURE — 4010F PR ACE/ARB THEARPY RXD/TAKEN: ICD-10-PCS | Mod: CPTII,S$GLB,, | Performed by: PHYSICIAN ASSISTANT

## 2022-03-03 PROCEDURE — 99499 NO LOS: ICD-10-PCS | Mod: S$GLB,,, | Performed by: PHYSICIAN ASSISTANT

## 2022-03-03 PROCEDURE — 1160F RVW MEDS BY RX/DR IN RCRD: CPT | Mod: CPTII,S$GLB,, | Performed by: PHYSICIAN ASSISTANT

## 2022-03-03 PROCEDURE — 1125F AMNT PAIN NOTED PAIN PRSNT: CPT | Mod: CPTII,S$GLB,, | Performed by: PHYSICIAN ASSISTANT

## 2022-03-03 PROCEDURE — 3078F PR MOST RECENT DIASTOLIC BLOOD PRESSURE < 80 MM HG: ICD-10-PCS | Mod: CPTII,S$GLB,, | Performed by: PHYSICIAN ASSISTANT

## 2022-03-03 PROCEDURE — 3008F PR BODY MASS INDEX (BMI) DOCUMENTED: ICD-10-PCS | Mod: CPTII,S$GLB,, | Performed by: PHYSICIAN ASSISTANT

## 2022-03-03 PROCEDURE — 3008F BODY MASS INDEX DOCD: CPT | Mod: CPTII,S$GLB,, | Performed by: PHYSICIAN ASSISTANT

## 2022-03-03 PROCEDURE — 4010F ACE/ARB THERAPY RXD/TAKEN: CPT | Mod: CPTII,S$GLB,, | Performed by: PHYSICIAN ASSISTANT

## 2022-03-03 PROCEDURE — 1160F PR REVIEW ALL MEDS BY PRESCRIBER/CLIN PHARMACIST DOCUMENTED: ICD-10-PCS | Mod: CPTII,S$GLB,, | Performed by: PHYSICIAN ASSISTANT

## 2022-03-03 PROCEDURE — 3077F SYST BP >= 140 MM HG: CPT | Mod: CPTII,S$GLB,, | Performed by: PHYSICIAN ASSISTANT

## 2022-03-03 PROCEDURE — 3288F PR FALLS RISK ASSESSMENT DOCUMENTED: ICD-10-PCS | Mod: CPTII,S$GLB,, | Performed by: PHYSICIAN ASSISTANT

## 2022-03-03 PROCEDURE — 1101F PT FALLS ASSESS-DOCD LE1/YR: CPT | Mod: CPTII,S$GLB,, | Performed by: PHYSICIAN ASSISTANT

## 2022-03-03 PROCEDURE — 99999 PR PBB SHADOW E&M-EST. PATIENT-LVL III: ICD-10-PCS | Mod: PBBFAC,,, | Performed by: PHYSICIAN ASSISTANT

## 2022-03-03 PROCEDURE — 99499 UNLISTED E&M SERVICE: CPT | Mod: S$GLB,,, | Performed by: PHYSICIAN ASSISTANT

## 2022-03-03 PROCEDURE — 99999 PR PBB SHADOW E&M-EST. PATIENT-LVL III: CPT | Mod: PBBFAC,,, | Performed by: PHYSICIAN ASSISTANT

## 2022-03-03 PROCEDURE — 1101F PR PT FALLS ASSESS DOC 0-1 FALLS W/OUT INJ PAST YR: ICD-10-PCS | Mod: CPTII,S$GLB,, | Performed by: PHYSICIAN ASSISTANT

## 2022-03-03 PROCEDURE — 3044F HG A1C LEVEL LT 7.0%: CPT | Mod: CPTII,S$GLB,, | Performed by: PHYSICIAN ASSISTANT

## 2022-03-03 PROCEDURE — 3288F FALL RISK ASSESSMENT DOCD: CPT | Mod: CPTII,S$GLB,, | Performed by: PHYSICIAN ASSISTANT

## 2022-03-03 PROCEDURE — 3078F DIAST BP <80 MM HG: CPT | Mod: CPTII,S$GLB,, | Performed by: PHYSICIAN ASSISTANT

## 2022-03-03 PROCEDURE — 1159F PR MEDICATION LIST DOCUMENTED IN MEDICAL RECORD: ICD-10-PCS | Mod: CPTII,S$GLB,, | Performed by: PHYSICIAN ASSISTANT

## 2022-03-03 PROCEDURE — 1159F MED LIST DOCD IN RCRD: CPT | Mod: CPTII,S$GLB,, | Performed by: PHYSICIAN ASSISTANT

## 2022-03-03 PROCEDURE — 3044F PR MOST RECENT HEMOGLOBIN A1C LEVEL <7.0%: ICD-10-PCS | Mod: CPTII,S$GLB,, | Performed by: PHYSICIAN ASSISTANT

## 2022-03-03 RX ORDER — ASPIRIN 81 MG/1
81 TABLET ORAL 2 TIMES DAILY
Qty: 28 TABLET | Refills: 0 | Status: SHIPPED | OUTPATIENT
Start: 2022-03-03 | End: 2022-03-07

## 2022-03-03 RX ORDER — ONDANSETRON 4 MG/1
4 TABLET, FILM COATED ORAL EVERY 8 HOURS PRN
Qty: 21 TABLET | Refills: 0 | Status: SHIPPED | OUTPATIENT
Start: 2022-03-03 | End: 2022-03-14

## 2022-03-03 RX ORDER — OXYCODONE HYDROCHLORIDE 5 MG/1
5 TABLET ORAL EVERY 6 HOURS PRN
Qty: 28 TABLET | Refills: 0 | Status: SHIPPED | OUTPATIENT
Start: 2022-03-03 | End: 2022-03-14

## 2022-03-03 NOTE — TELEPHONE ENCOUNTER
----- Message from Yahir Lyn PA-C sent at 3/3/2022 10:26 AM CST -----  Regarding: pre op clearance  Good Morning,  This patient is scheduled for shoulder surgery on 3/7/22.  You had previously cleared her for surgery back in January, however her surgery was cancelled due to cardiology evaluating a heart murmur.  We now have cardiology clearance, however I was hoping to have documentation stating that you would clear her for this surgery as well since it is outside of the 30 day window from your last clearance.    Yahir Lyn PA-C  [facility]

## 2022-03-03 NOTE — H&P (VIEW-ONLY)
"Parisa Dimas  is here for a completion of her perioperative paperwork. she  Is scheduled to undergo left Reverse shoulder arthroplasty with the Stephenie system.  Have the fracture stem available  on 3/7/22.  She is a healthy individual and does need clearance for this procedure.     Cardiology clearance: in messages with Dr. Santoyo and Dr. Damon    PCP clearance: "I see that as long as Cardiology as provided clearance from the heart issue being investigated, she should be medically stable for surgery with respect to her other conditions." per Dr. Cast    Risks, indications and benefits of the surgical procedure were discussed with the patient. All questions with regard to surgery, rehab, expected return to functional activities, activities of daily living and recreational endeavors were answered to her satisfaction.    Discussed COVID-19 with the patient, they are aware of our current policies and procedures, were given the option of delaying surgery, and they elect to proceed.    Patient was informed and understands the risks of surgery are greater for patients with a current condition or hx of heart disease, obesity, clotting disorders, recurrent infections, steroid use, current or past smoking, and factors such as sedentary lifestyle and noncompliance with medications, therapy or f/u. The degree of the increased risk is hard to estimate w/ any degree of precision.    Once no other questions were asked, a brief history and physical exam was then performed.    PAST MEDICAL HISTORY:   Past Medical History:   Diagnosis Date    AR (allergic rhinitis)     AR (allergic rhinitis)     Carotid stenosis     50% RCA    Colon polyp 10/2014    Diabetes mellitus     Diabetes mellitus, type 2     Fatty liver     GERD (gastroesophageal reflux disease)     Heart murmur 1/27/2022    HLD (hyperlipidemia)     HTN (hypertension)     Joint pain     Sleep apnea 2018    Stricture of artery 3/25/2021     PAST SURGICAL " HISTORY:   Past Surgical History:   Procedure Laterality Date    SKIN BIOPSY  10yrs.    negative     FAMILY HISTORY:   Family History   Problem Relation Age of Onset    Cancer Mother         gallbladder    Prostate cancer Brother 57    Diabetes Brother     Diabetes Maternal Grandmother     Hypertension Unknown         multiple    Coronary artery disease Father 79    Diabetes Father     Diabetes Maternal Aunt     Diabetes Maternal Uncle     Melanoma Neg Hx      SOCIAL HISTORY:   Social History     Socioeconomic History    Marital status:      Spouse name: Pio    Number of children: 4   Occupational History     Comment: retired- school work   Tobacco Use    Smoking status: Never Smoker    Smokeless tobacco: Never Used   Substance and Sexual Activity    Alcohol use: Not Currently     Alcohol/week: 0.0 standard drinks    Drug use: No    Sexual activity: Yes     Partners: Male     Birth control/protection: Post-menopausal   Other Topics Concern    Are you pregnant or think you may be? No    Breast-feeding No   Social History Narrative    Stairs- none     Social Determinants of Health     Financial Resource Strain: Low Risk     Difficulty of Paying Living Expenses: Not hard at all   Food Insecurity: No Food Insecurity    Worried About Running Out of Food in the Last Year: Never true    Ran Out of Food in the Last Year: Never true   Transportation Needs: No Transportation Needs    Lack of Transportation (Medical): No    Lack of Transportation (Non-Medical): No   Physical Activity: Inactive    Days of Exercise per Week: 0 days    Minutes of Exercise per Session: 0 min   Stress: Stress Concern Present    Feeling of Stress : To some extent   Social Connections: Unknown    Frequency of Communication with Friends and Family: More than three times a week    Frequency of Social Gatherings with Friends and Family: More than three times a week    Active Member of Clubs or Organizations: Yes     Attends Club or Organization Meetings: More than 4 times per year    Marital Status:        MEDICATIONS:   Current Outpatient Medications:     amLODIPine (NORVASC) 10 MG tablet, TAKE 1 TABLET BY MOUTH ONCE DAILY, Disp: 90 tablet, Rfl: 3    ascorbic acid, vitamin C, (VITAMIN C) 1000 MG tablet, Take 1,000 mg by mouth once daily., Disp: , Rfl:     aspirin (ECOTRIN) 81 MG EC tablet, Take 81 mg by mouth once daily., Disp: , Rfl:     b complex vitamins tablet, Take 1 tablet by mouth once daily., Disp: , Rfl:     blood sugar diagnostic Strp, To check BG 1 times daily, to use with insurance preferred meter, Disp: 100 strip, Rfl: 11    blood-glucose meter kit, To check BG 1 times daily, to use with insurance preferred meter, Disp: 1 each, Rfl: 0    calcium carbonate (OS-RYLEE) 600 mg (1,500 mg) Tab, Take 600 mg by mouth once daily., Disp: , Rfl:     cetirizine (ZYRTEC) 10 MG tablet, Take 10 mg by mouth once daily., Disp: , Rfl:     co-enzyme Q-10 30 mg capsule, Take 30 mg by mouth once daily., Disp: , Rfl:     hydroCHLOROthiazide (HYDRODIURIL) 25 MG tablet, TAKE 1 TABLET BY MOUTH ONCE DAILY, Disp: 90 tablet, Rfl: 3    ibuprofen (ADVIL,MOTRIN) 200 MG tablet, Take 200 mg by mouth every 6 (six) hours as needed for Pain., Disp: , Rfl:     L.ACID/L.CASEI/B.BIF/B.MELISSA/FOS (PROBIOTIC BLEND ORAL), Take 2 tablets by mouth once daily., Disp: , Rfl:     lancets Misc, To check BG 1 times daily, to use with insurance preferred meter, Disp: 100 each, Rfl: 11    losartan (COZAAR) 100 MG tablet, TAKE 1 TABLET BY MOUTH ONCE DAILY, Disp: 90 tablet, Rfl: 3    lovastatin (MEVACOR) 20 MG tablet, TAKE 1 TABLET BY MOUTH IN  THE EVENING, Disp: 90 tablet, Rfl: 3    metFORMIN (GLUCOPHAGE-XR) 500 MG ER 24hr tablet, TAKE 2 TABLETS BY MOUTH  ONCE DAILY, Disp: 180 tablet, Rfl: 3    multivitamin capsule, Take 1 capsule by mouth once daily., Disp: , Rfl:     OMEGA-3S/DHA/EPA/FISH OIL (OMEGA 3 ORAL), Take 2,800 mg by mouth once  daily., Disp: , Rfl:     omeprazole (PRILOSEC) 40 MG capsule, TAKE 1 CAPSULE BY MOUTH  ONCE DAILY, Disp: 90 capsule, Rfl: 3    ondansetron (ZOFRAN) 4 MG tablet, Take 1 tablet (4 mg total) by mouth every 8 (eight) hours as needed for Nausea., Disp: 30 tablet, Rfl: 0    oxyCODONE (ROXICODONE) 5 MG immediate release tablet, Take 1-2 tablets as needed for pain every 6 hours., Disp: 28 tablet, Rfl: 0    oxyCODONE-acetaminophen (PERCOCET) 5-325 mg per tablet, Take 1 tablet by mouth every 6 (six) hours as needed for Pain., Disp: 10 tablet, Rfl: 0    vitamin D (VITAMIN D3) 1000 units Tab, Take 1,000 Units by mouth once daily., Disp: , Rfl:     vitamin E 400 UNIT capsule, Take 400 Units by mouth once daily., Disp: , Rfl:     zinc sulfate (ZINC-15 ORAL), Take by mouth., Disp: , Rfl:   ALLERGIES: Review of patient's allergies indicates:  No Known Allergies    Review of Systems   Constitution: Negative. Negative for chills, fever and night sweats.   HENT: Negative for congestion and headaches.    Eyes: Negative for blurred vision, left vision loss and right vision loss.   Cardiovascular: Negative for chest pain and syncope.   Respiratory: Negative for cough and shortness of breath.    Endocrine: Negative for polydipsia, polyphagia and polyuria.   Hematologic/Lymphatic: Negative for bleeding problem. Does not bruise/bleed easily.   Skin: Negative for dry skin, itching and rash.   Musculoskeletal: Negative for falls and muscle weakness.   Gastrointestinal: Negative for abdominal pain and bowel incontinence.   Genitourinary: Negative for bladder incontinence and nocturia.   Neurological: Negative for disturbances in coordination, loss of balance and seizures.   Psychiatric/Behavioral: Negative for depression. The patient does not have insomnia.    Allergic/Immunologic: Negative for hives and persistent infections.     PHYSICAL EXAM:  GEN: A&Ox3, WD WN NAD  HEENT: WNL  CHEST: CTAB, no W/R/R  HEART: RRR, no M/R/G   ABD: Soft,  NT ND, BS x4 QUADS  MS: Refer to previous note for detailed MS exam  NEURO: CN II-XII intact       The surgical consent was then reviewed with the patient, who agreed with all the contents of the consent form and it was signed.     PHYSICAL THERAPY:  She was also instructed regarding physical therapy and will begin on POD#1-3. She is doing physical therapy at [facility].    POST OP CARE: Instructions were reviewed including care of the wound and dressing after surgery and when she can shower.     PAIN MANAGEMENT: Parisa Dimas was instructed regarding the Polar ice unit that will be in place after surgery and her postoperative pain medications.     MEDICATION:  Roxicodone 5 mg 1-2 q 4 hours PRN for pain  Zofran 4 mg q 8 hours PRN for nausea and vomiting.  Aspirin 81mg BID x 2 weeks for DVT prophylaxis starting on the evening after surgery.      Post op meds to be delivered bedside prior to discharge. Deliver to family if patient is in surgery at 5pm.     Patient was instructed to purchase and take Colace to counter possible GI side effects of taking opiates.     DVT prophylaxis was discussed with the patient today including risk factors for developing DVTs and history of DVTs. The patient was asked if any specific recommendations were given from the doctor/s that did pre-operative surgical clearance.      If the patient was previously taking 81mg baby aspirin, they were told to not take additional baby aspirin, using the above stated aspirin and to restart the 81mg aspirin daily after completion of the aspirin dose.      Patient was also told to buy over the counter Prilosec medication and take it once daily for GI protection as long as they are taking NSAIDs or Aspirin.     The patient was told that narcotic pain medications may make them drowsy and instructions were given to not sign legal documents, drive or operate heavy machinery, cars, or equipment while under the influence  of narcotic medications.     Dr. Santoyo was present in clinic during this pre-op evaluation. The patient was offered the opportunity to ask Dr. Santoyo any further questions regarding the procedure which may not have been addressed during their previous informed consent discussion. The patient has declined to see Dr. Santoyo today.    As there were no other questions to be asked, she was given my business card along with Dr. Santoyo's business card if she has any questions or concerns prior to surgery or in the postop period.     I explained to following and the patient expressed understanding:  The patient is currently aware of the COVID19 pandemic and that proceeding with their surgical procedure could potentially increase exposure to coronavirus in the community. The patient understands that there is the possibility of delayed or cancelled appts or PT visits in the future. They understand that infection with the coronavirus could complicate their surgery recovery. They are aware of the current policies and procedures of [facility] and the government regarding the pandemic and they were given the option of delaying my surgery. The patient elects to proceed with surgery at this time.      Patient denies having any symptoms such as cough, SOB, fever, loss of taste/smell.     The patient has been scheduled to undergo coronavirus testing on the day prior to surgery.      The patient was instructed to practice strict social distancing, hand washing/hygiene, respiratory hygiene, and cough etiquette from now until 6 weeks following surgery to reduce the risk of rosy coronavirus.

## 2022-03-03 NOTE — TELEPHONE ENCOUNTER
Hi, yes her other medical conditions were stable.  I see that as long as Cardiology as provided clearance from the heart issue being investigated, she should be medically stable for surgery with respect to her other conditions.      Alex Cast MD

## 2022-03-04 ENCOUNTER — TELEPHONE (OUTPATIENT)
Dept: SPORTS MEDICINE | Facility: CLINIC | Age: 67
End: 2022-03-04
Payer: MEDICARE

## 2022-03-04 ENCOUNTER — ANESTHESIA EVENT (OUTPATIENT)
Dept: SURGERY | Facility: HOSPITAL | Age: 67
End: 2022-03-04
Payer: MEDICARE

## 2022-03-04 ENCOUNTER — LAB VISIT (OUTPATIENT)
Dept: PRIMARY CARE CLINIC | Facility: CLINIC | Age: 67
End: 2022-03-04
Payer: MEDICARE

## 2022-03-04 DIAGNOSIS — Z01.818 PRE-OP TESTING: ICD-10-CM

## 2022-03-04 PROCEDURE — U0003 INFECTIOUS AGENT DETECTION BY NUCLEIC ACID (DNA OR RNA); SEVERE ACUTE RESPIRATORY SYNDROME CORONAVIRUS 2 (SARS-COV-2) (CORONAVIRUS DISEASE [COVID-19]), AMPLIFIED PROBE TECHNIQUE, MAKING USE OF HIGH THROUGHPUT TECHNOLOGIES AS DESCRIBED BY CMS-2020-01-R: HCPCS | Performed by: ORTHOPAEDIC SURGERY

## 2022-03-04 PROCEDURE — U0005 INFEC AGEN DETEC AMPLI PROBE: HCPCS | Performed by: ORTHOPAEDIC SURGERY

## 2022-03-04 NOTE — TELEPHONE ENCOUNTER
Spoke with patient for surgery arrival time (5AM) and location ([facility] [place] 2nd floor) on Monday. Patient verbalized understanding. All questions answered. Advised to reach out with any other questions.

## 2022-03-05 LAB
SARS-COV-2 RNA RESP QL NAA+PROBE: NOT DETECTED
SARS-COV-2- CYCLE NUMBER: NORMAL

## 2022-03-06 ENCOUNTER — TELEPHONE (OUTPATIENT)
Dept: SPORTS MEDICINE | Facility: CLINIC | Age: 67
End: 2022-03-06
Payer: MEDICARE

## 2022-03-06 NOTE — TELEPHONE ENCOUNTER
I called the patient today to discuss the status of her shoulder and to answer any questions prior to tomorrow.  We previously discussed both operative and non operative treatment options for the shoulder.  It continues to bother her with regards to pain and functional limitation.  She describes subjective weakness.  Prior x-rays show head splitting comminuted fracture with varus impaction and secondary shortening with pseudosubluxation of the shoulder.  She has been cleared to proceed to surgery from a cardiac standpoint.  She does want to proceed given our prior discussions.  I do think that is reasonable given the extent of her fracture and her self stated goals.  All questions answered regarding surgery for tomorrow.  I will see them tomorrow morning.

## 2022-03-07 ENCOUNTER — HOSPITAL ENCOUNTER (OUTPATIENT)
Facility: HOSPITAL | Age: 67
Discharge: HOME | End: 2022-03-08
Attending: ORTHOPAEDIC SURGERY | Admitting: ORTHOPAEDIC SURGERY
Payer: MEDICARE

## 2022-03-07 ENCOUNTER — ANESTHESIA (OUTPATIENT)
Dept: SURGERY | Facility: HOSPITAL | Age: 67
End: 2022-03-07
Payer: MEDICARE

## 2022-03-07 DIAGNOSIS — S42.292A CLOSED 4-PART FRACTURE OF PROXIMAL HUMERUS, LEFT, INITIAL ENCOUNTER: Primary | ICD-10-CM

## 2022-03-07 DIAGNOSIS — E11.40 TYPE 2 DIABETES MELLITUS WITH DIABETIC NEUROPATHY, UNSPECIFIED WHETHER LONG TERM INSULIN USE: ICD-10-CM

## 2022-03-07 LAB
ABO + RH BLD: NORMAL
BLD GP AB SCN CELLS X3 SERPL QL: NORMAL
POCT GLUCOSE: 136 MG/DL (ref 70–110)
POCT GLUCOSE: 142 MG/DL (ref 70–110)
POCT GLUCOSE: 151 MG/DL (ref 70–110)
POCT GLUCOSE: 261 MG/DL (ref 70–110)

## 2022-03-07 PROCEDURE — 23472 RECONSTRUCT SHOULDER JOINT: CPT | Mod: 62,22,LT, | Performed by: ORTHOPAEDIC SURGERY

## 2022-03-07 PROCEDURE — 25000003 PHARM REV CODE 250: Performed by: PHYSICIAN ASSISTANT

## 2022-03-07 PROCEDURE — D9220A PRA ANESTHESIA: ICD-10-PCS | Mod: CRNA,,, | Performed by: NURSE ANESTHETIST, CERTIFIED REGISTERED

## 2022-03-07 PROCEDURE — 23430 REPAIR BICEPS TENDON: CPT | Mod: 62,59,51,LT | Performed by: ORTHOPAEDIC SURGERY

## 2022-03-07 PROCEDURE — 71000015 HC POSTOP RECOV 1ST HR: Performed by: ORTHOPAEDIC SURGERY

## 2022-03-07 PROCEDURE — 71000033 HC RECOVERY, INTIAL HOUR: Performed by: ORTHOPAEDIC SURGERY

## 2022-03-07 PROCEDURE — 63600175 PHARM REV CODE 636 W HCPCS: Performed by: PHYSICIAN ASSISTANT

## 2022-03-07 PROCEDURE — 36000710: Performed by: ORTHOPAEDIC SURGERY

## 2022-03-07 PROCEDURE — 71000016 HC POSTOP RECOV ADDL HR: Performed by: ORTHOPAEDIC SURGERY

## 2022-03-07 PROCEDURE — D9220A PRA ANESTHESIA: ICD-10-PCS | Mod: ANES,,, | Performed by: ANESTHESIOLOGY

## 2022-03-07 PROCEDURE — 64416 L INTERSCALENE CATHETER: ICD-10-PCS | Mod: 59,LT,, | Performed by: ANESTHESIOLOGY

## 2022-03-07 PROCEDURE — C1769 GUIDE WIRE: HCPCS | Performed by: ORTHOPAEDIC SURGERY

## 2022-03-07 PROCEDURE — 76942 ECHO GUIDE FOR BIOPSY: CPT | Mod: 26,,, | Performed by: ANESTHESIOLOGY

## 2022-03-07 PROCEDURE — 63600175 PHARM REV CODE 636 W HCPCS: Performed by: ANESTHESIOLOGY

## 2022-03-07 PROCEDURE — 25000003 PHARM REV CODE 250: Performed by: SURGERY

## 2022-03-07 PROCEDURE — 63600175 PHARM REV CODE 636 W HCPCS: Performed by: ORTHOPAEDIC SURGERY

## 2022-03-07 PROCEDURE — 63600175 PHARM REV CODE 636 W HCPCS: Performed by: NURSE ANESTHETIST, CERTIFIED REGISTERED

## 2022-03-07 PROCEDURE — 63600175 PHARM REV CODE 636 W HCPCS: Performed by: STUDENT IN AN ORGANIZED HEALTH CARE EDUCATION/TRAINING PROGRAM

## 2022-03-07 PROCEDURE — 76942 L INTERSCALENE CATHETER: ICD-10-PCS | Mod: 26,,, | Performed by: ANESTHESIOLOGY

## 2022-03-07 PROCEDURE — 71000039 HC RECOVERY, EACH ADD'L HOUR: Performed by: ORTHOPAEDIC SURGERY

## 2022-03-07 PROCEDURE — D9220A PRA ANESTHESIA: Mod: ANES,,, | Performed by: ANESTHESIOLOGY

## 2022-03-07 PROCEDURE — 82962 GLUCOSE BLOOD TEST: CPT | Mod: 91 | Performed by: ORTHOPAEDIC SURGERY

## 2022-03-07 PROCEDURE — 86850 RBC ANTIBODY SCREEN: CPT | Performed by: ORTHOPAEDIC SURGERY

## 2022-03-07 PROCEDURE — 82962 GLUCOSE BLOOD TEST: CPT | Performed by: ORTHOPAEDIC SURGERY

## 2022-03-07 PROCEDURE — D9220A PRA ANESTHESIA: Mod: CRNA,,, | Performed by: NURSE ANESTHETIST, CERTIFIED REGISTERED

## 2022-03-07 PROCEDURE — 23472 PR RECONSTR TOTAL SHOULDER IMPLANT: ICD-10-PCS | Mod: 62,22,LT, | Performed by: ORTHOPAEDIC SURGERY

## 2022-03-07 PROCEDURE — 36415 COLL VENOUS BLD VENIPUNCTURE: CPT | Performed by: ORTHOPAEDIC SURGERY

## 2022-03-07 PROCEDURE — 25000003 PHARM REV CODE 250: Performed by: ORTHOPAEDIC SURGERY

## 2022-03-07 PROCEDURE — 25000003 PHARM REV CODE 250: Performed by: STUDENT IN AN ORGANIZED HEALTH CARE EDUCATION/TRAINING PROGRAM

## 2022-03-07 PROCEDURE — 37000009 HC ANESTHESIA EA ADD 15 MINS: Performed by: ORTHOPAEDIC SURGERY

## 2022-03-07 PROCEDURE — 86900 BLOOD TYPING SEROLOGIC ABO: CPT | Performed by: ORTHOPAEDIC SURGERY

## 2022-03-07 PROCEDURE — 25000003 PHARM REV CODE 250: Performed by: NURSE ANESTHETIST, CERTIFIED REGISTERED

## 2022-03-07 PROCEDURE — 36000711: Performed by: ORTHOPAEDIC SURGERY

## 2022-03-07 PROCEDURE — 76942 ECHO GUIDE FOR BIOPSY: CPT | Performed by: SURGERY

## 2022-03-07 PROCEDURE — C1776 JOINT DEVICE (IMPLANTABLE): HCPCS | Performed by: ORTHOPAEDIC SURGERY

## 2022-03-07 PROCEDURE — 23430 PR REPAIR BICEPS LONG TENDON: ICD-10-PCS | Mod: 62,59,51,LT | Performed by: ORTHOPAEDIC SURGERY

## 2022-03-07 PROCEDURE — 99900035 HC TECH TIME PER 15 MIN (STAT)

## 2022-03-07 PROCEDURE — 37000008 HC ANESTHESIA 1ST 15 MINUTES: Performed by: ORTHOPAEDIC SURGERY

## 2022-03-07 PROCEDURE — C1713 ANCHOR/SCREW BN/BN,TIS/BN: HCPCS | Performed by: ORTHOPAEDIC SURGERY

## 2022-03-07 PROCEDURE — 63600175 PHARM REV CODE 636 W HCPCS: Performed by: SURGERY

## 2022-03-07 PROCEDURE — 64416 NJX AA&/STRD BRCH PL NFS IMG: CPT | Mod: 59,LT,, | Performed by: ANESTHESIOLOGY

## 2022-03-07 PROCEDURE — 27201423 OPTIME MED/SURG SUP & DEVICES STERILE SUPPLY: Performed by: ORTHOPAEDIC SURGERY

## 2022-03-07 DEVICE — CUP REUNION HUM RSA 36X10MM: Type: IMPLANTABLE DEVICE | Site: SHOULDER | Status: FUNCTIONAL

## 2022-03-07 DEVICE — IMPLANTABLE DEVICE: Type: IMPLANTABLE DEVICE | Site: SHOULDER | Status: FUNCTIONAL

## 2022-03-07 DEVICE — SCREW BONE TSA 4.5X20MM: Type: IMPLANTABLE DEVICE | Site: SHOULDER | Status: FUNCTIONAL

## 2022-03-07 DEVICE — SCREW PERIPH REUNION 4.5X16MM: Type: IMPLANTABLE DEVICE | Site: SHOULDER | Status: FUNCTIONAL

## 2022-03-07 DEVICE — WIRE FIXATION REUN NIT PILT: Type: IMPLANTABLE DEVICE | Site: SHOULDER | Status: FUNCTIONAL

## 2022-03-07 DEVICE — SCREW BONE TSA 6.5X32MM: Type: IMPLANTABLE DEVICE | Site: SHOULDER | Status: FUNCTIONAL

## 2022-03-07 DEVICE — COMPONENT GLENOSPHERE 36X2MM: Type: IMPLANTABLE DEVICE | Site: SHOULDER | Status: FUNCTIONAL

## 2022-03-07 DEVICE — CEMENT BONE W/GENT SIMPLEX HV: Type: IMPLANTABLE DEVICE | Site: SHOULDER | Status: FUNCTIONAL

## 2022-03-07 RX ORDER — INSULIN ASPART 100 [IU]/ML
0-5 INJECTION, SOLUTION INTRAVENOUS; SUBCUTANEOUS
Status: DISCONTINUED | OUTPATIENT
Start: 2022-03-07 | End: 2022-03-08 | Stop reason: HOSPADM

## 2022-03-07 RX ORDER — ONDANSETRON 8 MG/1
8 TABLET, ORALLY DISINTEGRATING ORAL EVERY 8 HOURS PRN
Status: CANCELLED | OUTPATIENT
Start: 2022-03-07

## 2022-03-07 RX ORDER — AMLODIPINE BESYLATE 10 MG/1
10 TABLET ORAL DAILY
Status: DISCONTINUED | OUTPATIENT
Start: 2022-03-08 | End: 2022-03-08 | Stop reason: HOSPADM

## 2022-03-07 RX ORDER — AMOXICILLIN 250 MG
1 CAPSULE ORAL 2 TIMES DAILY
Status: CANCELLED | OUTPATIENT
Start: 2022-03-07

## 2022-03-07 RX ORDER — MORPHINE SULFATE 2 MG/ML
2 INJECTION, SOLUTION INTRAMUSCULAR; INTRAVENOUS
Status: CANCELLED | OUTPATIENT
Start: 2022-03-07

## 2022-03-07 RX ORDER — FENTANYL CITRATE 50 UG/ML
25 INJECTION, SOLUTION INTRAMUSCULAR; INTRAVENOUS
Status: CANCELLED | OUTPATIENT
Start: 2022-03-07 | End: 2022-03-08

## 2022-03-07 RX ORDER — ROCURONIUM BROMIDE 10 MG/ML
INJECTION, SOLUTION INTRAVENOUS
Status: DISCONTINUED | OUTPATIENT
Start: 2022-03-07 | End: 2022-03-07

## 2022-03-07 RX ORDER — ONDANSETRON 2 MG/ML
4 INJECTION INTRAMUSCULAR; INTRAVENOUS DAILY PRN
Status: DISCONTINUED | OUTPATIENT
Start: 2022-03-07 | End: 2022-03-07 | Stop reason: HOSPADM

## 2022-03-07 RX ORDER — HYDROCHLOROTHIAZIDE 25 MG/1
25 TABLET ORAL DAILY
Status: DISCONTINUED | OUTPATIENT
Start: 2022-03-08 | End: 2022-03-08 | Stop reason: HOSPADM

## 2022-03-07 RX ORDER — KETAMINE HCL IN 0.9 % NACL 50 MG/5 ML
SYRINGE (ML) INTRAVENOUS
Status: DISCONTINUED | OUTPATIENT
Start: 2022-03-07 | End: 2022-03-07

## 2022-03-07 RX ORDER — INSULIN ASPART 100 [IU]/ML
0-5 INJECTION, SOLUTION INTRAVENOUS; SUBCUTANEOUS
Status: DISCONTINUED | OUTPATIENT
Start: 2022-03-07 | End: 2022-03-07 | Stop reason: SDUPTHER

## 2022-03-07 RX ORDER — PREGABALIN 75 MG/1
75 CAPSULE ORAL NIGHTLY
Status: DISCONTINUED | OUTPATIENT
Start: 2022-03-07 | End: 2022-03-08 | Stop reason: HOSPADM

## 2022-03-07 RX ORDER — MUPIROCIN 20 MG/G
OINTMENT TOPICAL
Status: DISCONTINUED | OUTPATIENT
Start: 2022-03-07 | End: 2022-03-07 | Stop reason: HOSPADM

## 2022-03-07 RX ORDER — PROCHLORPERAZINE EDISYLATE 5 MG/ML
5 INJECTION INTRAMUSCULAR; INTRAVENOUS EVERY 6 HOURS PRN
Status: DISCONTINUED | OUTPATIENT
Start: 2022-03-07 | End: 2022-03-08 | Stop reason: HOSPADM

## 2022-03-07 RX ORDER — ONDANSETRON 2 MG/ML
INJECTION INTRAMUSCULAR; INTRAVENOUS
Status: DISCONTINUED | OUTPATIENT
Start: 2022-03-07 | End: 2022-03-07

## 2022-03-07 RX ORDER — ROPIVACAINE/EPI/CLONIDINE/KET 2.46-0.005
SYRINGE (ML) INJECTION
Status: DISCONTINUED | OUTPATIENT
Start: 2022-03-07 | End: 2022-03-07 | Stop reason: HOSPADM

## 2022-03-07 RX ORDER — SODIUM CHLORIDE 0.9 % (FLUSH) 0.9 %
10 SYRINGE (ML) INJECTION
Status: DISCONTINUED | OUTPATIENT
Start: 2022-03-07 | End: 2022-03-08 | Stop reason: HOSPADM

## 2022-03-07 RX ORDER — FAMOTIDINE 20 MG/1
20 TABLET, FILM COATED ORAL 2 TIMES DAILY
Status: DISCONTINUED | OUTPATIENT
Start: 2022-03-07 | End: 2022-03-08 | Stop reason: HOSPADM

## 2022-03-07 RX ORDER — PROCHLORPERAZINE EDISYLATE 5 MG/ML
5 INJECTION INTRAMUSCULAR; INTRAVENOUS EVERY 6 HOURS PRN
Status: CANCELLED | OUTPATIENT
Start: 2022-03-07

## 2022-03-07 RX ORDER — SODIUM CHLORIDE 0.9 % (FLUSH) 0.9 %
10 SYRINGE (ML) INJECTION
Status: CANCELLED | OUTPATIENT
Start: 2022-03-07

## 2022-03-07 RX ORDER — IBUPROFEN 200 MG
16 TABLET ORAL
Status: DISCONTINUED | OUTPATIENT
Start: 2022-03-07 | End: 2022-03-08 | Stop reason: HOSPADM

## 2022-03-07 RX ORDER — CEFAZOLIN SODIUM/D5W 2 G/100 ML
2 PLASTIC BAG, INJECTION (ML) INTRAVENOUS
Status: COMPLETED | OUTPATIENT
Start: 2022-03-07 | End: 2022-03-08

## 2022-03-07 RX ORDER — OXYCODONE HYDROCHLORIDE 5 MG/1
5 TABLET ORAL
Status: DISCONTINUED | OUTPATIENT
Start: 2022-03-07 | End: 2022-03-07

## 2022-03-07 RX ORDER — CELECOXIB 200 MG/1
200 CAPSULE ORAL DAILY
Status: DISCONTINUED | OUTPATIENT
Start: 2022-03-08 | End: 2022-03-08 | Stop reason: HOSPADM

## 2022-03-07 RX ORDER — DEXTROSE MONOHYDRATE AND SODIUM CHLORIDE 5; .9 G/100ML; G/100ML
INJECTION, SOLUTION INTRAVENOUS CONTINUOUS
Status: DISCONTINUED | OUTPATIENT
Start: 2022-03-07 | End: 2022-03-08 | Stop reason: HOSPADM

## 2022-03-07 RX ORDER — MUPIROCIN 20 MG/G
OINTMENT TOPICAL
Status: CANCELLED | OUTPATIENT
Start: 2022-03-07

## 2022-03-07 RX ORDER — GLUCAGON 1 MG
1 KIT INJECTION
Status: DISCONTINUED | OUTPATIENT
Start: 2022-03-07 | End: 2022-03-07 | Stop reason: SDUPTHER

## 2022-03-07 RX ORDER — MIDAZOLAM HYDROCHLORIDE 1 MG/ML
0.5 INJECTION INTRAMUSCULAR; INTRAVENOUS
Status: DISCONTINUED | OUTPATIENT
Start: 2022-03-07 | End: 2022-03-07

## 2022-03-07 RX ORDER — ACETAMINOPHEN 500 MG
1000 TABLET ORAL EVERY 6 HOURS
Status: DISCONTINUED | OUTPATIENT
Start: 2022-03-07 | End: 2022-03-07

## 2022-03-07 RX ORDER — SODIUM CHLORIDE 0.9 % (FLUSH) 0.9 %
10 SYRINGE (ML) INJECTION
Status: DISCONTINUED | OUTPATIENT
Start: 2022-03-07 | End: 2022-03-07 | Stop reason: HOSPADM

## 2022-03-07 RX ORDER — PHENYLEPHRINE HYDROCHLORIDE 10 MG/ML
INJECTION INTRAVENOUS
Status: DISCONTINUED | OUTPATIENT
Start: 2022-03-07 | End: 2022-03-07

## 2022-03-07 RX ORDER — MIDAZOLAM HYDROCHLORIDE 1 MG/ML
0.5 INJECTION INTRAMUSCULAR; INTRAVENOUS
Status: CANCELLED | OUTPATIENT
Start: 2022-03-07 | End: 2022-03-08

## 2022-03-07 RX ORDER — CELECOXIB 200 MG/1
200 CAPSULE ORAL DAILY
Status: CANCELLED | OUTPATIENT
Start: 2022-03-08

## 2022-03-07 RX ORDER — MIDAZOLAM HYDROCHLORIDE 1 MG/ML
INJECTION, SOLUTION INTRAMUSCULAR; INTRAVENOUS
Status: DISCONTINUED | OUTPATIENT
Start: 2022-03-07 | End: 2022-03-07

## 2022-03-07 RX ORDER — CELECOXIB 200 MG/1
400 CAPSULE ORAL
Status: CANCELLED | OUTPATIENT
Start: 2022-03-07

## 2022-03-07 RX ORDER — TRANEXAMIC ACID 100 MG/ML
INJECTION, SOLUTION INTRAVENOUS
Status: DISCONTINUED | OUTPATIENT
Start: 2022-03-07 | End: 2022-03-07

## 2022-03-07 RX ORDER — LIDOCAINE HYDROCHLORIDE 20 MG/ML
INJECTION INTRAVENOUS
Status: DISCONTINUED | OUTPATIENT
Start: 2022-03-07 | End: 2022-03-07

## 2022-03-07 RX ORDER — ONDANSETRON 2 MG/ML
4 INJECTION INTRAMUSCULAR; INTRAVENOUS EVERY 12 HOURS PRN
Status: DISCONTINUED | OUTPATIENT
Start: 2022-03-07 | End: 2022-03-08 | Stop reason: HOSPADM

## 2022-03-07 RX ORDER — IBUPROFEN 200 MG
24 TABLET ORAL
Status: DISCONTINUED | OUTPATIENT
Start: 2022-03-07 | End: 2022-03-08 | Stop reason: HOSPADM

## 2022-03-07 RX ORDER — ROPIVACAINE HYDROCHLORIDE 5 MG/ML
INJECTION, SOLUTION EPIDURAL; INFILTRATION; PERINEURAL
Status: COMPLETED | OUTPATIENT
Start: 2022-03-07 | End: 2022-03-07

## 2022-03-07 RX ORDER — MIDAZOLAM HYDROCHLORIDE 1 MG/ML
.5-4 INJECTION INTRAMUSCULAR; INTRAVENOUS
Status: DISCONTINUED | OUTPATIENT
Start: 2022-03-07 | End: 2022-03-07

## 2022-03-07 RX ORDER — GLUCAGON 1 MG
1 KIT INJECTION
Status: DISCONTINUED | OUTPATIENT
Start: 2022-03-07 | End: 2022-03-08 | Stop reason: HOSPADM

## 2022-03-07 RX ORDER — FAMOTIDINE 20 MG/1
20 TABLET, FILM COATED ORAL 2 TIMES DAILY
Status: CANCELLED | OUTPATIENT
Start: 2022-03-07

## 2022-03-07 RX ORDER — ROPIVACAINE HYDROCHLORIDE 2 MG/ML
INJECTION, SOLUTION EPIDURAL; INFILTRATION; PERINEURAL CONTINUOUS
Status: DISCONTINUED | OUTPATIENT
Start: 2022-03-07 | End: 2022-03-08 | Stop reason: HOSPADM

## 2022-03-07 RX ORDER — DEXTROSE MONOHYDRATE AND SODIUM CHLORIDE 5; .9 G/100ML; G/100ML
INJECTION, SOLUTION INTRAVENOUS CONTINUOUS
Status: CANCELLED | OUTPATIENT
Start: 2022-03-07

## 2022-03-07 RX ORDER — ROPIVACAINE HYDROCHLORIDE 2 MG/ML
6 INJECTION, SOLUTION EPIDURAL; INFILTRATION; PERINEURAL CONTINUOUS
Status: DISCONTINUED | OUTPATIENT
Start: 2022-03-07 | End: 2022-03-07

## 2022-03-07 RX ORDER — ACETAMINOPHEN 500 MG
1000 TABLET ORAL
Status: CANCELLED | OUTPATIENT
Start: 2022-03-07

## 2022-03-07 RX ORDER — CEFAZOLIN SODIUM 2 G/50ML
2 SOLUTION INTRAVENOUS
Status: CANCELLED | OUTPATIENT
Start: 2022-03-07 | End: 2022-03-08

## 2022-03-07 RX ORDER — PREGABALIN 75 MG/1
75 CAPSULE ORAL NIGHTLY
Status: CANCELLED | OUTPATIENT
Start: 2022-03-07

## 2022-03-07 RX ORDER — ACETAMINOPHEN 500 MG
1000 TABLET ORAL
Status: COMPLETED | OUTPATIENT
Start: 2022-03-07 | End: 2022-03-07

## 2022-03-07 RX ORDER — MUPIROCIN 20 MG/G
1 OINTMENT TOPICAL 2 TIMES DAILY
Status: CANCELLED | OUTPATIENT
Start: 2022-03-07 | End: 2022-03-12

## 2022-03-07 RX ORDER — ACETAMINOPHEN 500 MG
1000 TABLET ORAL EVERY 6 HOURS
Status: CANCELLED | OUTPATIENT
Start: 2022-03-07 | End: 2022-03-09

## 2022-03-07 RX ORDER — FENTANYL CITRATE 50 UG/ML
25 INJECTION, SOLUTION INTRAMUSCULAR; INTRAVENOUS EVERY 5 MIN PRN
Status: DISCONTINUED | OUTPATIENT
Start: 2022-03-07 | End: 2022-03-07 | Stop reason: HOSPADM

## 2022-03-07 RX ORDER — ACETAMINOPHEN 500 MG
1000 TABLET ORAL EVERY 6 HOURS SCHEDULED
Status: DISCONTINUED | OUTPATIENT
Start: 2022-03-07 | End: 2022-03-08 | Stop reason: HOSPADM

## 2022-03-07 RX ORDER — FENTANYL CITRATE 50 UG/ML
25 INJECTION, SOLUTION INTRAMUSCULAR; INTRAVENOUS
Status: DISCONTINUED | OUTPATIENT
Start: 2022-03-07 | End: 2022-03-07

## 2022-03-07 RX ORDER — OXYCODONE HYDROCHLORIDE 5 MG/1
5 TABLET ORAL
Status: DISCONTINUED | OUTPATIENT
Start: 2022-03-07 | End: 2022-03-08 | Stop reason: HOSPADM

## 2022-03-07 RX ORDER — METHOCARBAMOL 500 MG/1
500 TABLET, FILM COATED ORAL 4 TIMES DAILY
Status: DISCONTINUED | OUTPATIENT
Start: 2022-03-07 | End: 2022-03-08 | Stop reason: HOSPADM

## 2022-03-07 RX ORDER — CEFAZOLIN SODIUM/D5W 2 G/100 ML
2 PLASTIC BAG, INJECTION (ML) INTRAVENOUS
Status: DISCONTINUED | OUTPATIENT
Start: 2022-03-07 | End: 2022-03-07

## 2022-03-07 RX ORDER — PRAVASTATIN SODIUM 10 MG/1
20 TABLET ORAL DAILY
Refills: 3 | Status: DISCONTINUED | OUTPATIENT
Start: 2022-03-08 | End: 2022-03-08 | Stop reason: HOSPADM

## 2022-03-07 RX ORDER — IBUPROFEN 200 MG
16 TABLET ORAL
Status: DISCONTINUED | OUTPATIENT
Start: 2022-03-07 | End: 2022-03-07 | Stop reason: SDUPTHER

## 2022-03-07 RX ORDER — AMOXICILLIN 250 MG
1 CAPSULE ORAL 2 TIMES DAILY
Status: DISCONTINUED | OUTPATIENT
Start: 2022-03-07 | End: 2022-03-08 | Stop reason: HOSPADM

## 2022-03-07 RX ORDER — CELECOXIB 200 MG/1
400 CAPSULE ORAL
Status: COMPLETED | OUTPATIENT
Start: 2022-03-07 | End: 2022-03-07

## 2022-03-07 RX ORDER — OXYCODONE HYDROCHLORIDE 5 MG/1
5 TABLET ORAL
Status: CANCELLED | OUTPATIENT
Start: 2022-03-07

## 2022-03-07 RX ORDER — PROPOFOL 10 MG/ML
VIAL (ML) INTRAVENOUS
Status: DISCONTINUED | OUTPATIENT
Start: 2022-03-07 | End: 2022-03-07

## 2022-03-07 RX ORDER — IBUPROFEN 200 MG
24 TABLET ORAL
Status: DISCONTINUED | OUTPATIENT
Start: 2022-03-07 | End: 2022-03-07 | Stop reason: SDUPTHER

## 2022-03-07 RX ORDER — ONDANSETRON 8 MG/1
8 TABLET, ORALLY DISINTEGRATING ORAL EVERY 8 HOURS PRN
Status: DISCONTINUED | OUTPATIENT
Start: 2022-03-07 | End: 2022-03-07

## 2022-03-07 RX ORDER — MUPIROCIN 20 MG/G
1 OINTMENT TOPICAL 2 TIMES DAILY
Status: DISCONTINUED | OUTPATIENT
Start: 2022-03-07 | End: 2022-03-08 | Stop reason: HOSPADM

## 2022-03-07 RX ORDER — POLYETHYLENE GLYCOL 3350 17 G/17G
17 POWDER, FOR SOLUTION ORAL DAILY
Status: DISCONTINUED | OUTPATIENT
Start: 2022-03-07 | End: 2022-03-08 | Stop reason: HOSPADM

## 2022-03-07 RX ORDER — OXYCODONE HYDROCHLORIDE 5 MG/1
10 TABLET ORAL
Status: CANCELLED | OUTPATIENT
Start: 2022-03-07

## 2022-03-07 RX ORDER — LOSARTAN POTASSIUM 50 MG/1
100 TABLET ORAL DAILY
Status: DISCONTINUED | OUTPATIENT
Start: 2022-03-08 | End: 2022-03-08 | Stop reason: HOSPADM

## 2022-03-07 RX ORDER — VANCOMYCIN HYDROCHLORIDE 1 G/20ML
INJECTION, POWDER, LYOPHILIZED, FOR SOLUTION INTRAVENOUS
Status: DISCONTINUED | OUTPATIENT
Start: 2022-03-07 | End: 2022-03-07 | Stop reason: HOSPADM

## 2022-03-07 RX ORDER — POLYETHYLENE GLYCOL 3350 17 G/17G
17 POWDER, FOR SOLUTION ORAL DAILY
Status: CANCELLED | OUTPATIENT
Start: 2022-03-07

## 2022-03-07 RX ORDER — OXYCODONE HYDROCHLORIDE 10 MG/1
10 TABLET ORAL
Status: CANCELLED | OUTPATIENT
Start: 2022-03-07

## 2022-03-07 RX ORDER — ROPIVACAINE HYDROCHLORIDE 2 MG/ML
6 INJECTION, SOLUTION EPIDURAL; INFILTRATION; PERINEURAL CONTINUOUS
Status: CANCELLED | OUTPATIENT
Start: 2022-03-07

## 2022-03-07 RX ORDER — FENTANYL CITRATE 50 UG/ML
INJECTION, SOLUTION INTRAMUSCULAR; INTRAVENOUS
Status: DISCONTINUED | OUTPATIENT
Start: 2022-03-07 | End: 2022-03-07

## 2022-03-07 RX ORDER — FENTANYL CITRATE 50 UG/ML
25-200 INJECTION, SOLUTION INTRAMUSCULAR; INTRAVENOUS
Status: DISCONTINUED | OUTPATIENT
Start: 2022-03-07 | End: 2022-03-07

## 2022-03-07 RX ADMIN — MIDAZOLAM 1 MG: 1 INJECTION INTRAMUSCULAR; INTRAVENOUS at 06:03

## 2022-03-07 RX ADMIN — POLYETHYLENE GLYCOL 3350 17 G: 17 POWDER, FOR SOLUTION ORAL at 09:03

## 2022-03-07 RX ADMIN — ACETAMINOPHEN 1000 MG: 500 TABLET ORAL at 07:03

## 2022-03-07 RX ADMIN — PROPOFOL 180 MG: 10 INJECTION, EMULSION INTRAVENOUS at 07:03

## 2022-03-07 RX ADMIN — FENTANYL CITRATE 50 MCG: 50 INJECTION INTRAMUSCULAR; INTRAVENOUS at 06:03

## 2022-03-07 RX ADMIN — VANCOMYCIN HYDROCHLORIDE 1250 MG: 1.25 INJECTION, POWDER, LYOPHILIZED, FOR SOLUTION INTRAVENOUS at 07:03

## 2022-03-07 RX ADMIN — SODIUM CHLORIDE: 0.9 INJECTION, SOLUTION INTRAVENOUS at 07:03

## 2022-03-07 RX ADMIN — ROCURONIUM BROMIDE 50 MG: 10 INJECTION, SOLUTION INTRAVENOUS at 07:03

## 2022-03-07 RX ADMIN — PHENYLEPHRINE HYDROCHLORIDE 100 MCG: 10 INJECTION INTRAVENOUS at 09:03

## 2022-03-07 RX ADMIN — ROCURONIUM BROMIDE 30 MG: 10 INJECTION, SOLUTION INTRAVENOUS at 08:03

## 2022-03-07 RX ADMIN — SENNOSIDES AND DOCUSATE SODIUM 1 TABLET: 50; 8.6 TABLET ORAL at 09:03

## 2022-03-07 RX ADMIN — DEXTROSE 2 G: 50 INJECTION, SOLUTION INTRAVENOUS at 07:03

## 2022-03-07 RX ADMIN — ONDANSETRON HYDROCHLORIDE 4 MG: 2 INJECTION INTRAMUSCULAR; INTRAVENOUS at 11:03

## 2022-03-07 RX ADMIN — CELECOXIB 400 MG: 200 CAPSULE ORAL at 07:03

## 2022-03-07 RX ADMIN — MUPIROCIN: 20 OINTMENT TOPICAL at 07:03

## 2022-03-07 RX ADMIN — DEXTROSE AND SODIUM CHLORIDE: 5; .9 INJECTION, SOLUTION INTRAVENOUS at 12:03

## 2022-03-07 RX ADMIN — METHOCARBAMOL 500 MG: 500 TABLET ORAL at 12:03

## 2022-03-07 RX ADMIN — PHENYLEPHRINE HYDROCHLORIDE 100 MCG: 10 INJECTION INTRAVENOUS at 08:03

## 2022-03-07 RX ADMIN — SUGAMMADEX 200 MG: 100 INJECTION, SOLUTION INTRAVENOUS at 11:03

## 2022-03-07 RX ADMIN — MUPIROCIN 1 G: 20 OINTMENT TOPICAL at 09:03

## 2022-03-07 RX ADMIN — ACETAMINOPHEN 1000 MG: 500 TABLET ORAL at 12:03

## 2022-03-07 RX ADMIN — ROPIVACAINE HYDROCHLORIDE 200 ML: 2 INJECTION, SOLUTION EPIDURAL; INFILTRATION at 12:03

## 2022-03-07 RX ADMIN — Medication 15 MG: at 09:03

## 2022-03-07 RX ADMIN — METHOCARBAMOL 500 MG: 500 TABLET ORAL at 09:03

## 2022-03-07 RX ADMIN — METHOCARBAMOL 500 MG: 500 TABLET ORAL at 05:03

## 2022-03-07 RX ADMIN — PREGABALIN 75 MG: 75 CAPSULE ORAL at 09:03

## 2022-03-07 RX ADMIN — FAMOTIDINE 20 MG: 20 TABLET ORAL at 09:03

## 2022-03-07 RX ADMIN — MIDAZOLAM HYDROCHLORIDE 1 MG: 1 INJECTION, SOLUTION INTRAMUSCULAR; INTRAVENOUS at 07:03

## 2022-03-07 RX ADMIN — ROPIVACAINE HYDROCHLORIDE 7.5 ML: 5 INJECTION, SOLUTION EPIDURAL; INFILTRATION; PERINEURAL at 07:03

## 2022-03-07 RX ADMIN — OXYCODONE 5 MG: 5 TABLET ORAL at 12:03

## 2022-03-07 RX ADMIN — SODIUM CHLORIDE, SODIUM GLUCONATE, SODIUM ACETATE, POTASSIUM CHLORIDE, MAGNESIUM CHLORIDE, SODIUM PHOSPHATE, DIBASIC, AND POTASSIUM PHOSPHATE: .53; .5; .37; .037; .03; .012; .00082 INJECTION, SOLUTION INTRAVENOUS at 08:03

## 2022-03-07 RX ADMIN — FENTANYL CITRATE 100 MCG: 50 INJECTION, SOLUTION INTRAMUSCULAR; INTRAVENOUS at 07:03

## 2022-03-07 RX ADMIN — TRANEXAMIC ACID 500 MG: 100 INJECTION, SOLUTION INTRAVENOUS at 10:03

## 2022-03-07 RX ADMIN — Medication 25 MG: at 07:03

## 2022-03-07 RX ADMIN — ACETAMINOPHEN 1000 MG: 500 TABLET ORAL at 05:03

## 2022-03-07 RX ADMIN — SODIUM CHLORIDE 0.3 MCG/KG/MIN: 9 INJECTION, SOLUTION INTRAVENOUS at 09:03

## 2022-03-07 RX ADMIN — OXYCODONE 5 MG: 5 TABLET ORAL at 09:03

## 2022-03-07 RX ADMIN — LIDOCAINE HYDROCHLORIDE 40 MG: 20 INJECTION, SOLUTION INTRAVENOUS at 07:03

## 2022-03-07 RX ADMIN — TRANEXAMIC ACID 500 MG: 100 INJECTION, SOLUTION INTRAVENOUS at 08:03

## 2022-03-07 RX ADMIN — DEXTROSE 2 G: 50 INJECTION, SOLUTION INTRAVENOUS at 05:03

## 2022-03-07 NOTE — ANESTHESIA PROCEDURE NOTES
Intubation    Date/Time: 3/7/2022 7:27 AM  Performed by: Veronique Chau CRNA  Authorized by: Nadja Atkins MD     Intubation:     Induction:  Intravenous    Intubated:  Postinduction    Mask Ventilation:  Easy mask    Attempts:  1    Attempted By:  CRNA    Method of Intubation:  Video laryngoscopy    Blade:  Beatty 3    Laryngeal View Grade: Grade I - full view of cords      Difficult Airway Encountered?: No      Complications:  None    Airway Device:  Oral endotracheal tube    Airway Device Size:  7.5    Style/Cuff Inflation:  Cuffed (inflated to minimal occlusive pressure)    Tube secured:  20    Secured at:  The lips    Placement Verified By:  Capnometry    Complicating Factors:  Anterior larynx and small mouth    Findings Post-Intubation:  BS equal bilateral and atraumatic/condition of teeth unchanged

## 2022-03-07 NOTE — TRANSFER OF CARE
"Anesthesia Transfer of Care Note    Patient: Parisa Dimas    Procedure(s) Performed: Procedure(s) (LRB):  ARTHROPLASTY, SHOULDER, TOTAL, REVERSE (Left)    Patient location: PACU    Anesthesia Type: general    Transport from OR: Transported from OR on 6-10 L/min O2 by face mask with adequate spontaneous ventilation    Post pain: adequate analgesia    Post assessment: no apparent anesthetic complications and tolerated procedure well    Post vital signs: stable    Level of consciousness: awake    Nausea/Vomiting: no nausea/vomiting    Complications: none    Transfer of care protocol was followed      Last vitals:   Visit Vitals  BP (!) 119/53   Pulse 81   Temp 36.8 °C (98.3 °F) (Oral)   Resp 18   Ht 5' 3" (1.6 m)   Wt 88.9 kg (195 lb 15.8 oz)   LMP 08/01/2003   SpO2 100%   Breastfeeding No   BMI 34.72 kg/m²     "

## 2022-03-07 NOTE — OP NOTE
?[facility]   OPERATIVE REPORT   ORTHOPAEDIC SURGERY   PROVIDER: DR. VAL ZULUAGA    PATIENT INFORMATION   Parisa Dimas 66 y.o. 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  Additional time in the operating room was required to take down the malunited bone and to carefully dissect through the adhesions.  Additional hands also were needed for the exposure and safe dissection.    SURGEON:  MD Hosea Parish MD - Co-Surgeon    Co-Surgeon Duties: Due to the complexity of the case and the need for significant intra-operative decision making co-surgeon duties were medically necessary. The chronicity of the disease process and the level of deformity dictated that co-surgeon duties be undertaken. A separate note will be dictated/reported by Dr. Mehrdad Simms stating the specific co-surgeon activities and roles performed during the surgery.     ASSISTANTS:  SMA Brendan Resident     ANESTHESIA: General with interscalene block and local anesthetic injection (KENYON)     ESTIMATED BLOOD LOSS: 250 mL    IMPLANTS:   Implant Name Type Inv. Item Serial No.  Lot No. LRB No. Used Action   ReUnion Glenoid Baseplate     EDGAR [id number] Left 1 Implanted   CEMENT BONE W/GENT SIMPLEX HV - [id number]  CEMENT BONE W/GENT SIMPLEX HV  [place] ARSLAN. [id number] Left 2 Implanted   SCREW  BONE TSA 6.5X32MM - [id number]  SCREW BONE TSA 6.5X32MM  EDGAR [employer] ARSLAN. [id number] Left 1 Implanted   ReUnion RSA  peripheral screw 4.5 x 32mm    EDGAR [id number] Left 1 Implanted   ReUnion RSA  peripheral screw 4.5 x 32mm     [id number] Left 1 Implanted   SCREW PERIPH REUNION 4.5X16MM - [id number]  SCREW PERIPH REUNION 4.5X16MM  EDGAR [employer] ARSLAN. [id number] Left 1 Implanted   SCREW BONE TSA 4.5X20MM - [id number]  SCREW BONE TSA 4.5X20MM  EDGAR [employer] ARSLAN. [id number] Left 1 Implanted   COMPONENT GLENOSPHERE 36X2MM - [id number]  COMPONENT GLENOSPHERE 36X2MM  EDGAR [employer] ARSLAN. [id number] Left 1 Implanted   STEM RFX REUNION HUM 118MM - [id number]  STEM RFX REUNION HUM 118MM  EDGAR [employer] ARSLAN. [id number] Left 1 Implanted   edgar sized cement plug    EDGAR [id number] Left 1 Implanted   STEM RFX REUNION HUM 118MM - [id number]  STEM RFX REUNION HUM 118MM  EDGAR [employer] ARSLAN. [id number] Left 1 Implanted   reunion RSA humeral insert diam 36mm, thkns 8mm    EDGAR [id number] Left 1 Implanted   CUP REUNION HUM RSA 21N19AM - [id number]  CUP REUNION HUM RSA [id number]  EDGAR [employer] ARSLAN. [id number] Left 1 Implanted      FINDINGS:  4-part proximal humerus fracture malunion with varus and posterior displacement of the humeral head and tuber segment. Significant bridging bone present with complete healing of prior fracture.  This bone extended into the subdeltoid recess posterior laterally.  Bone quality overall was good.  There was no rotator cuff tearing.  Significant biceps tendinopathy was encountered with malpositioning related to the proximal humerus malunion.    SPECIMENS:   Specimen (24h ago, onward)            None        COMPLICATIONS: None.     INTRAOPERATIVE COUNTS: Correct.     PROPHYLACTIC IV ANTIBIOTICS: Given per [facility] Protocol.    INDICATIONS FOR OPERATION: Parisa is a 66 year old female has been seen and evaluated in the office and found to have lifestyle-limiting pain secondary to a comminuted 4 part proximal humerus fracture.   Initial treatment options were reviewed, both operative and non operative.  The patient was found to have significant displacement of the fracture with collapse and head splitting component.  There was pseudo subluxation of the joint.  The patient was indicated for surgery which I think is reasonable given the degree of displacement  and her expectations regarding recovery.  Surgery was delayed due to her cardiac status. She now has been medically cleared to proceed. Full informed consent was obtained prior to proceeding.  The patient was originally referred to me by Dr. Mehrdad Simms for surgical treatment.    DESCRIPTION OF PROCEDURE: After informed consent was obtained, the patient was taken to the operating room and placed in the supine position.  General anesthetic was administered.     The operative shoulder was then prepped and draped.  A time-out was confirmed and it was also noted that the patient had received preoperative antibiotic per protocol.     The deltopectoral interval was exposed. The biceps long head tendon was identified and found to have significant tendinopathy.   There was significant alteration of normal anatomy with varus posterior angulated malunion of the proximal humerus. There was significant global scarring and adhesions which carefully were taken down.  In particular there was significant surrounding adhesions of soft tissue to the posterolateral shoulder.  This required careful dissection.  Additional time in the operating room was needed for identification of the bony anatomy, taking down of bridging bone within the subdeltoid recess, careful handling of soft tissues to avoid neurovascular injury, and removal of malunited proximal humeral bone. The biceps tendon was tagged with a #2 FiberWire suture and sutured to the proximal pectoralis major tendon for biceps tenodesis.  The remainder of the proximal biceps tendon was then cut and resected. This allowed exposure of the bicipital  groove.  A curved osteotome was used to split between the varus malpositioned greater tuberosity and the lesser tuberosity.  A Kocher clamp was then used to gain control over the lesser tuberosity and the subscapularis tendon was peeled from the fractured fragment.  A #2 Fiberwire was used to suture and gain control of the released subscapularis.  The comminuted, malunited greater tuberosity segment was removed piecemeal to some extent.  After this was done, the collapsed and varus malpositioned humeral head was extracted from the wound and the axillary recess.  This was a difficult dissection given the degree of displacement, malunion, and surrounding scar within the subdeltoid recess.  A perforating artery was encountered and required Bovie cauterization.  The axillary nerve was visualized and preserved.  A tug test was performed indicating an intact axillary nerve as well. Attention was then directed towards glenoid exposure. Circumferential releases were performed and the glenoid was exposed. There was no adaptive retroversion.  There was some inferior glenoid osseous changes secondary to the humeral head malpositioning.  The inferior rim of the glenoid was identified and exposed intraoperatively. The glenoid was then prepared for the standard baseplate which was placed and secured with the center screw (32 mm) and 4 peripheral locking screws (1 o'clock rotation clockwise: 32 mm, 20 mm, 32 mm, 20 mm). The base plate fit flush to the glenoid. The 36 +2 mm glenosphere was then impacted onto the baseplate. The larger glenosphere was utilized in this case given the fracture indication for surgery.    Attention was turned back towards the proximal humerus for preparation. The diaphysis was reamed by hand distally to a size 9.  The Stephenie fracture stem broaches were then impacted to a size 8 with the expandable diaphysis secured to fit the stem to the proper height and version.  Trialing was then performed and it was  determined that a size 10 tray would be best fitting.  Likely a +16 or 18 lateral offset.  The retained polyethylene spacers were used in this case again given the fracture indication for surgery.  With proper referencing with regards to trial stem height, the trial humeral components were removed.  The humeral canal was prepped using curette, bottle brush, and thorough irrigation.  A dry lap was placed within the canal and cement technique was used to cement a final [place] size 8 cemented stem, again at the proper height and version.  After the cement was hardened, additional trialing was performed and the final size 10 tray with +8 constrained polyethylene spacer was impacted into position.  Excellent stability was achieved upon examination of the shoulder. There wasx no anterior gapping at 60-70 degrees of passive external rotation.  There was some residual rotator cuff tissue within the subacromial space but nothing impinging.  All relevant bony debris was removed.     Dilute betadine wash followed by normal saline was used to irrigate the wound. Vancomycin powder was also placed prior to wound closure. There were no significant bleeders confirmed prior to wound closure.  Also prior to wound closure, C-arm fluoroscopy was brought into the field to examine the cemented humeral stem and to ensure no fracture or cement extrusion.  The wound was closed in layers per usual. Dermabond Prineo was placed over that incision as well.  Local anesthetic was injected around the surgical incision for postoperative pain relief.     Tranexamic acid 500 mg was given intravenously before incision and upon humeral stem cementation to limit blood loss.      Aquacel dressing was placed over the incision.  A polar Care and sling with abduction pillow was applied for immobilization.  Patient was then extubated and taken to the recovery room in stable condition.                POSTOPERATIVE PLAN: The patient will be admitted for  postoperative care per protocol.  24 hours of antibiotic prophylaxis.  DVT prophylaxis given. Follow a RTSA rehab protocol.

## 2022-03-07 NOTE — OP NOTE
OPERATIVE NOTE    DATE OF PROCEDURE:  03/07/2022    PREOPERATIVE DIAGNOSIS:   Left proximal humerus fracture, 4 part, closed, displaced, subsequent encounter with malunion  Left shoulder biceps tendinopathy    POSTOPERATIVE DIAGNOSIS:   Left proximal humerus fracture, 4 part, closed, displaced, subsequent encounter with malunion  Left shoulder biceps tendinopathy    PROCEDURE:   Left reverse total shoulder arthroplasty for fracture  Left shoulder open biceps tenodesis    SURGEON:  Frandy Santoyo MD    CO-SURGEON:   Hosea Simms MD    ANESTHESIA:   General + regional block    EBL:    250mL    COMPLICATIONS:  None    IMPLANTS:   See operative note by primary surgeon    SPECIMENS:   none    INDICATIONS FOR PROCEDURE:  66-year-old female, fall 12/22/2021, left 4 part proximal humerus fracture  Significantly comminuted, displaced, limited bone stock     Previously seen in clinic by me and Dr. Santoyo.  At that time we discussed non operative versus operative treatment including ORIF versus reverse total shoulder arthroplasty.  Chavies that given her fracture displacement, limited bone stock, best alternative to non operative management would be reverse arthroplasty.  Patient had continued dysfunction, pain and wanted to proceed with reverse total shoulder.  In the interval she had been seen by medical team for clearance.  She presents today for surgery.    Dr. Santoyo did not have assistance of a fellow or resident today, so I assisted with exposure of the glenoid, placement of the glenosphere, and humeral component.    OPERATIVE PROCEDURE:  Patient met in the preoperative hold area and the correct site and side of surgery being the left upper extremity were marked and verified.  Patient brought back to the operative suite.  General anesthesia smoothly induced.  Patient transferred over to operative table.  Placed in semi beach chair position. All bony prominences were appropriately padded.  Patient received 2 g  Ancef for preoperative antibiotics.  The left upper extremity was then prepped and draped in normal sterile fashion.    Time-out was performed verifying the correct patient, site/side of surgery, surgical consent, radiographs as applicable, preop antibiotics, necessary equipment, anticipated blood loss, length of procedure, postoperative disposition.      The deltopectoral interval was exposed. The biceps long head tendon was identified and found to have significant tendinopathy.   There was significant alteration of normal anatomy with varus posterior angulated malunion of the proximal humerus. There was significant global scarring and adhesions which carefully were taken down.  In particular there was significant surrounding adhesions of soft tissue to the posterolateral shoulder.  This required careful dissection.  Additional time in the operating room was needed for identification of the bony anatomy, taking down of bridging bone within the subdeltoid recess, careful handling of soft tissues to avoid neurovascular injury, and removal of malunited proximal humeral bone. The biceps tendon was tagged with a #2 FiberWire suture and sutured to the proximal pectoralis major tendon for biceps tenodesis.  The remainder of the proximal biceps tendon was then cut and resected. This allowed exposure of the bicipital groove.  A curved osteotome was used to split between the varus malpositioned greater tuberosity and the lesser tuberosity.  A Kocher clamp was then used to gain control over the lesser tuberosity and the subscapularis tendon was peeled from the fractured fragment.  A #2 Fiberwire was used to suture and gain control of the released subscapularis.  The comminuted, malunited greater tuberosity segment was removed piecemeal to some extent.  After this was done, the collapsed and varus malpositioned humeral head was extracted from the wound and the axillary recess.  This was a difficult dissection given the degree  of displacement, malunion, and surrounding scar within the subdeltoid recess.  A perforating artery was encountered and required Bovie cauterization.  The axillary nerve was visualized and preserved.  A tug test was performed indicating an intact axillary nerve as well. Attention was then directed towards glenoid exposure. Circumferential releases were performed and the glenoid was exposed. There was no adaptive retroversion.  There was some inferior glenoid osseous changes secondary to the humeral head malpositioning.  The inferior rim of the glenoid was identified and exposed intraoperatively. The glenoid was then prepared for the standard baseplate which was placed and secured with the center screw (32 mm) and 4 peripheral locking screws (1 o'clock rotation clockwise: 32 mm, 20 mm, 32 mm, 20 mm). The base plate fit flush to the glenoid. The 36 +2 mm glenosphere was then impacted onto the baseplate. The larger glenosphere was utilized in this case given the fracture indication for surgery.     Attention was turned back towards the proximal humerus for preparation. The diaphysis was reamed by hand distally to a size 9.  The Stephenie fracture stem broaches were then impacted to a size 8 with the expandable diaphysis secured to fit the stem to the proper height and version.  Trialing was then performed and it was determined that a size 10 tray would be best fitting.  Likely a +16 or 18 lateral offset.  The retained polyethylene spacers were used in this case again given the fracture indication for surgery.  With proper referencing with regards to trial stem height, the trial humeral components were removed.  The humeral canal was prepped using curette, bottle brush, and thorough irrigation.  A dry lap was placed within the canal and cement technique was used to cement a final Stem size 8 cemented stem, again at the proper height and version.  After the cement was hardened, additional trialing was performed and the  final size 10 tray with +8 constrained polyethylene spacer was impacted into position.  Excellent stability was achieved upon examination of the shoulder. There wasx no anterior gapping at 60-70 degrees of passive external rotation.  There was some residual rotator cuff tissue within the subacromial space but nothing impinging.  All relevant bony debris was removed.     Dilute betadine wash followed by normal saline was used to irrigate the wound. Vancomycin powder was also placed prior to wound closure. There were no significant bleeders confirmed prior to wound closure.  Also prior to wound closure, C-arm fluoroscopy was brought into the field to examine the cemented humeral stem and to ensure no fracture or cement extrusion.  The wound was closed in layers per usual. Dermabond Prineo was placed over that incision as well.  Local anesthetic was injected around the surgical incision for postoperative pain relief.     Tranexamic acid 500 mg was given intravenously before incision and upon humeral stem cementation to limit blood loss.      Aquacel dressing was placed over the incision.  A polar Care and sling with abduction pillow was applied for immobilization.  Patient was then extubated and taken to the recovery room in stable condition.    POSTOPERATIVE PLAN:  The patient will be admitted for postoperative care per protocol.  24 hours of antibiotic prophylaxis.  DVT prophylaxis given. Follow a RTSA rehab protocol.    Follow up in [facility] with Dr. Santoyo      =====================  Hosea Simms MD  Orthopaedic Surgery

## 2022-03-07 NOTE — NURSING TRANSFER
Nursing Transfer Note      3/7/2022     Reason patient is being transferred: post procedure    Transfer To: 504    Transfer via stretcher    Transfer with IVF    Transported by pct    Medicines sent: none    Any special needs or follow-up needed: routine    Chart send with patient: Yes    Notified: spouse    Patient reassessed at: 2523 3/7/2022

## 2022-03-07 NOTE — ANESTHESIA PROCEDURE NOTES
L Interscalene catheter    Patient location during procedure: pre-op   Block not for primary anesthetic.  Reason for block: at surgeon's request and post-op pain management   Post-op Pain Location: L shoulder   Start time: 3/7/2022 6:53 AM  Timeout: 3/7/2022 6:52 AM   End time: 3/7/2022 7:05 AM    Staffing  Authorizing Provider: Apple Suero MD  Performing Provider: Davonte Marcos MD    Preanesthetic Checklist  Completed: patient identified, IV checked, site marked, risks and benefits discussed, surgical consent, monitors and equipment checked, pre-op evaluation and timeout performed  Peripheral Block  Patient position: sitting  Prep: ChloraPrep and site prepped and draped  Patient monitoring: heart rate, cardiac monitor, continuous pulse ox, continuous capnometry and frequent blood pressure checks  Block type: interscalene  Laterality: left  Injection technique: continuous  Needle  Needle type: Tuohy   Needle gauge: 18 G  Needle length: 2 in  Needle localization: anatomical landmarks and ultrasound guidance  Catheter type: non-stimulating  Catheter size: 20 G  Test dose: lidocaine 1.5% with Epi 1-to-200,000 and negative   -ultrasound image captured on disc.  Assessment  Injection assessment: negative aspiration, negative parasthesia and local visualized surrounding nerve  Paresthesia pain: none  Heart rate change: no  Slow fractionated injection: yes  Pain Tolerance: comfortable throughout block and no complaints  Medications:    Medications: ropivacaine (NAROPIN) injection 0.5% - Perineural   7.5 mL - 3/7/2022 7:05:00 AM    Additional Notes  VSS.  [facility] RN monitoring vitals throughout procedure.  Patient tolerated procedure well.

## 2022-03-07 NOTE — PROGRESS NOTES
Spoke with a resident that stated not on call for Dr Santoyo, he preceeded to give me cell of Dr Lyn who also stated that he was not with Dr Santoyo today but would try to place some orders in, thanked him, pt updated, v/u.

## 2022-03-07 NOTE — ANESTHESIA PREPROCEDURE EVALUATION
03/07/2022  Parisa Dimas is a 66 y.o., female.      Pre-op Assessment    I have reviewed the Patient Summary Reports.     I have reviewed the Nursing Notes.    I have reviewed the Medications.     Review of Systems  Anesthesia Hx:  No problems with previous Anesthesia  Denies Family Hx of Anesthesia complications.    Cardiovascular:   Exercise tolerance: good Hypertension    Pulmonary:   Sleep Apnea    Hepatic/GI:   GERD Liver Disease,    Neurological:  Neurology Normal    Endocrine:   Diabetes, type 2      Past Medical History:   Diagnosis Date    AR (allergic rhinitis)     AR (allergic rhinitis)     Carotid stenosis     50% RCA    Colon polyp 10/2014    Diabetes mellitus     Diabetes mellitus, type 2     Fatty liver     GERD (gastroesophageal reflux disease)     Heart murmur 1/27/2022    HLD (hyperlipidemia)     HTN (hypertension)     Joint pain     Sleep apnea 2018    Stricture of artery 3/25/2021     Past Surgical History:   Procedure Laterality Date    SKIN BIOPSY  10yrs.    negative     Patient Active Problem List   Diagnosis    HTN (hypertension)    HLD (hyperlipidemia)    Fatty liver    GERD (gastroesophageal reflux disease)    AR (allergic rhinitis)    Foot pain    Heel cord tightness    Disorder of soft tissue    Special screening for malignant neoplasms, colon    Foot pain, right    Tendonitis of ankle or foot    Impingement syndrome of left shoulder    History of diverticulitis    Obstructive sleep apnea hypopnea, severe    BMI 35.0-35.9,adult    Restless leg syndrome    Type 2 diabetes mellitus, without long-term current use of insulin    Heart murmur    Bilateral carotid artery disease    Type 2 diabetes mellitus with diabetic neuropathy, unspecified whether long term insulin use    Severe obesity with body mass index (BMI) of 36.0 to 36.9 with serious  comorbidity    Pre-op evaluation         Physical Exam  General: Well nourished    Airway:  Mallampati: II   Mouth Opening: Normal    Chest/Lungs:  Normal Respiratory Rate    Heart:  Rate: Normal  Rhythm: Regular Rhythm      Wt Readings from Last 3 Encounters:   03/07/22 88.9 kg (195 lb 15.8 oz)   03/03/22 88.9 kg (196 lb)   02/08/22 88.9 kg (196 lb)     Temp Readings from Last 3 Encounters:   03/07/22 36.8 °C (98.3 °F) (Oral)   02/08/22 36.3 °C (97.3 °F) (Temporal)   01/28/22 36.3 °C (97.4 °F) (Oral)     BP Readings from Last 3 Encounters:   03/07/22 (!) 164/72   03/03/22 (!) 156/77   02/08/22 (!) 161/77     Pulse Readings from Last 3 Encounters:   03/07/22 88   03/03/22 89   02/08/22 80     Lab Results   Component Value Date    WBC 4.39 02/08/2022    HGB 13.8 02/08/2022    HCT 42.4 02/08/2022    MCV 92 02/08/2022     02/08/2022         Chemistry        Component Value Date/Time     02/08/2022 0907    K 4.0 02/08/2022 0907     02/08/2022 0907    CO2 27 02/08/2022 0907    BUN 12 02/08/2022 0907    CREATININE 0.7 02/08/2022 0907     (H) 02/08/2022 0907        Component Value Date/Time    CALCIUM 10.5 02/08/2022 0907    ALKPHOS 108 01/27/2022 1059    AST 45 (H) 01/27/2022 1059    ALT 67 (H) 01/27/2022 1059    BILITOT 0.6 01/27/2022 1059    ESTGFRAFRICA >60.0 02/08/2022 0907    EGFRNONAA >60.0 02/08/2022 0907        Results for orders placed or performed during the hospital encounter of 02/08/22   EKG 12-lead    Collection Time: 02/08/22  9:20 AM    Narrative    Test Reason : Q24.8,E11.9,Q24.8,    Vent. Rate : 080 BPM     Atrial Rate : 080 BPM     P-R Int : 214 ms          QRS Dur : 076 ms      QT Int : 398 ms       P-R-T Axes : 069 018 046 degrees     QTc Int : 459 ms    Sinus rhythm with 1st degree A-V block with Premature atrial complexes  Nonspecific T wave abnormality  Abnormal ECG  When compared with ECG of 27-JAN-2022 08:07,  Premature atrial complexes are now Present  Nonspecific T  wave abnormality has replaced inverted T waves in Anterior  leads  QT has lengthened  Confirmed by Dennise Watson MD ([id number]) on 2/9/2022 7:02:31 AM    Referred By: PRASHANT GACRIA           Confirmed By:Dennise Watson MD     Echo 2/8/22  Summary    · The left ventricle is normal in size with hyperdynamic systolic function.  · The estimated ejection fraction is 70%.  · Aortic valve is sclerotic but without aortic insufficiency.  · There is a subvalvular membrane extending from the anterior mitral leaflet to the basal interventricular septum. There is associated turbulent flow at the level of this membrane, responsible for increased LVOT flow velocity seen on surface echo.  · There is nonspecific thickening of A2/3 mitral leaflet on the left atrial aspect of the mitral valve. No torn chords or mitral regurgitation seen. This could represent a vegetation. Recommend obtaining blood cultures.  · Normal right ventricular size with normal right ventricular systolic function.        Anesthesia Plan  Type of Anesthesia, risks & benefits discussed:    Anesthesia Type: Gen ETT  Intra-op Monitoring Plan: Standard ASA Monitors  Post Op Pain Control Plan: multimodal analgesia, peripheral nerve block and IV/PO Opioids PRN  Induction:  IV  Airway Plan: Direct and Video, Post-Induction  ASA Score: 3  Day of Surgery Review of History & Physical: H&P Update referred to the surgeon/provider.    Ready For Surgery From Anesthesia Perspective.     .

## 2022-03-07 NOTE — BRIEF OP NOTE
[facility] - [facility] ([facility])  Brief Operative Note    Surgery Date: 3/7/2022     Surgeon(s) and Role:     * MISSY Santoyo MD - Primary     * Hosea Simms MD - Co-Surgeon    Assisting Surgeon: None    Pre-op Diagnosis:  Closed 4-part fracture of proximal humerus, left, initial encounter [S42.292A]    Post-op Diagnosis:  Post-Op Diagnosis Codes:     * Closed 4-part fracture of proximal humerus, left, initial encounter [S42.292A]    Procedure(s) (LRB):  ARTHROPLASTY, SHOULDER, TOTAL, REVERSE (Left)    Anesthesia: General    Operative Findings: Closed 4-part fracture of proximal humerus, left, initial encounter [S42.292A]      Estimated Blood Loss: * No values recorded between 3/7/2022  8:16 AM and 3/7/2022 11:40 AM *         Specimens:   Specimen (24h ago, onward)            None            Discharge Note    OUTCOME: Patient tolerated treatment/procedure well without complication and is now ready for discharge.    DISPOSITION: Home or Self Care    FINAL DIAGNOSIS: Closed 4-part fracture of proximal humerus, left, initial encounter [S42.292A]      FOLLOWUP: In clinic    DISCHARGE INSTRUCTIONS:  No discharge procedures on file.

## 2022-03-08 VITALS
WEIGHT: 196 LBS | OXYGEN SATURATION: 93 % | BODY MASS INDEX: 34.73 KG/M2 | HEIGHT: 63 IN | DIASTOLIC BLOOD PRESSURE: 76 MMHG | SYSTOLIC BLOOD PRESSURE: 162 MMHG | RESPIRATION RATE: 20 BRPM | TEMPERATURE: 98 F | HEART RATE: 92 BPM

## 2022-03-08 LAB
POCT GLUCOSE: 170 MG/DL (ref 70–110)
POCT GLUCOSE: 250 MG/DL (ref 70–110)

## 2022-03-08 PROCEDURE — 63600175 PHARM REV CODE 636 W HCPCS: Performed by: STUDENT IN AN ORGANIZED HEALTH CARE EDUCATION/TRAINING PROGRAM

## 2022-03-08 PROCEDURE — 63600175 PHARM REV CODE 636 W HCPCS: Performed by: PHYSICIAN ASSISTANT

## 2022-03-08 PROCEDURE — 97535 SELF CARE MNGMENT TRAINING: CPT

## 2022-03-08 PROCEDURE — 25000003 PHARM REV CODE 250: Performed by: SURGERY

## 2022-03-08 PROCEDURE — 25000003 PHARM REV CODE 250: Performed by: STUDENT IN AN ORGANIZED HEALTH CARE EDUCATION/TRAINING PROGRAM

## 2022-03-08 PROCEDURE — 99212 PR OFFICE/OUTPT VISIT, EST, LEVL II, 10-19 MIN: ICD-10-PCS | Mod: ,,, | Performed by: ANESTHESIOLOGY

## 2022-03-08 PROCEDURE — 97165 OT EVAL LOW COMPLEX 30 MIN: CPT

## 2022-03-08 PROCEDURE — 25000003 PHARM REV CODE 250: Performed by: PHYSICIAN ASSISTANT

## 2022-03-08 PROCEDURE — 99212 OFFICE O/P EST SF 10 MIN: CPT | Mod: ,,, | Performed by: ANESTHESIOLOGY

## 2022-03-08 RX ORDER — ROPIVACAINE HYDROCHLORIDE 2 MG/ML
INJECTION, SOLUTION EPIDURAL; INFILTRATION; PERINEURAL CONTINUOUS
Status: DISCONTINUED | OUTPATIENT
Start: 2022-03-08 | End: 2022-03-08 | Stop reason: HOSPADM

## 2022-03-08 RX ADMIN — HYDROCHLOROTHIAZIDE 25 MG: 25 TABLET ORAL at 08:03

## 2022-03-08 RX ADMIN — ACETAMINOPHEN 1000 MG: 500 TABLET ORAL at 05:03

## 2022-03-08 RX ADMIN — CELECOXIB 200 MG: 200 CAPSULE ORAL at 08:03

## 2022-03-08 RX ADMIN — INSULIN ASPART 3 UNITS: 100 INJECTION, SOLUTION INTRAVENOUS; SUBCUTANEOUS at 11:03

## 2022-03-08 RX ADMIN — OXYCODONE 5 MG: 5 TABLET ORAL at 02:03

## 2022-03-08 RX ADMIN — ACETAMINOPHEN 1000 MG: 500 TABLET ORAL at 12:03

## 2022-03-08 RX ADMIN — DEXTROSE 2 G: 50 INJECTION, SOLUTION INTRAVENOUS at 02:03

## 2022-03-08 RX ADMIN — PRAVASTATIN SODIUM 20 MG: 10 TABLET ORAL at 08:03

## 2022-03-08 RX ADMIN — ACETAMINOPHEN 1000 MG: 500 TABLET ORAL at 11:03

## 2022-03-08 RX ADMIN — LOSARTAN POTASSIUM 100 MG: 50 TABLET, FILM COATED ORAL at 08:03

## 2022-03-08 RX ADMIN — METHOCARBAMOL 500 MG: 500 TABLET ORAL at 08:03

## 2022-03-08 RX ADMIN — FAMOTIDINE 20 MG: 20 TABLET ORAL at 08:03

## 2022-03-08 RX ADMIN — MUPIROCIN 1 G: 20 OINTMENT TOPICAL at 08:03

## 2022-03-08 RX ADMIN — AMLODIPINE BESYLATE 10 MG: 10 TABLET ORAL at 08:03
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
## 2022-03-08 NOTE — PLAN OF CARE
[facility] - Surgery  Discharge Assessment    Primary Care Provider: Alex Cast MD     Discharge Assessment (most recent)     BRIEF DISCHARGE ASSESSMENT - 03/08/22 1046        Discharge Planning    Assessment Type Discharge Planning Brief Assessment     Resource/Environmental Concerns none     Support Systems Spouse/significant other     Equipment Currently Used at Home none     Current Living Arrangements home/apartment/condo     Patient/Family Anticipates Transition to home with family     Patient/Family Anticipated Services at Transition none     DME Needed Upon Discharge  none     Discharge Plan A Home with family     Discharge Plan B Home with family;Home Health                   Spoke with patient and spouse at bedside. Anticipate d/c home today pending Therapy evaluation. Will continue to follow for needs.

## 2022-03-08 NOTE — PLAN OF CARE
[facility] - [facility]  Discharge Final Note    Primary Care Provider: Alex Cast MD    Expected Discharge Date: 3/8/2022    Final Discharge Note (most recent)     Final Note - 03/08/22 1446        Final Note    Assessment Type Final Discharge Note     Anticipated Discharge Disposition Home or Self Care     What phone number can be called within the next 1-3 days to see how you are doing after discharge? --   [phone]    Hospital Resources/Appts/Education Provided Appointments scheduled and added to AVS                 Important Message from Medicare             Contact Info     Bogdan Santoyo MD   Specialty: Sports Medicine, Orthopedic Surgery    [address]  [address] [facility] [address]  [facility] [address]   Phone: [phone]       Next Steps: Follow up in 2 week(s)    Alex Cast MD   Specialty: Family Medicine   Relationship: PCP - General  Referring Physician  Hypertension Digital Medicine Responsible Provider    [address]  [address]  [facility] [address]   Phone: [phone]       Next Steps: Follow up    Instructions: Patient declined 7 day follow-up with provider            Future Appointments   Date Time Provider Department Center   3/10/2022  3:00 PM Sanam Davies PT [facility] OP RHB2 [facility]   3/22/2022 10:30 AM Yahir Lyn PA-C [facility]   4/19/2022 10:30 AM MISSY Santoyo MD [facility]   6/7/2022  9:00 AM [facility] OIC-MRI4 [facility]     Patient discharged home to care of family on 3/8/22.

## 2022-03-08 NOTE — PT/OT/SLP PROGRESS
Physical Therapy      Patient Name:  Parisa Dimas   MRN:  [id number]    Patient orders received and chart reviewed.  Patient screened for PT but per OT patient demonstrating independence with ambulation, transfers and bed mobility.  Patient is safe with mobility and with outpatient PT appointment scheduled on 3/10/22.  Thank you for consult.

## 2022-03-08 NOTE — PLAN OF CARE
OT Acute eval complete. Education provided on UB dressing techniques and how to marcos/doff shoulder abduction brace. Pt and spouse acknowledged understanding. Pt does not require Acute OT services at this time.

## 2022-03-08 NOTE — ANESTHESIA POSTPROCEDURE EVALUATION
Anesthesia Post Evaluation    Patient: Parisa Dimas    Procedure(s) Performed: Procedure(s) (LRB):  ARTHROPLASTY, SHOULDER, TOTAL, REVERSE (Left)    Final Anesthesia Type: general      Patient location during evaluation: PACU  Patient participation: Yes- Able to Participate  Level of consciousness: awake and alert  Post-procedure vital signs: reviewed and stable  Pain management: adequate  Airway patency: patent    PONV status at discharge: No PONV  Anesthetic complications: no      Cardiovascular status: blood pressure returned to baseline  Respiratory status: unassisted  Hydration status: euvolemic  Follow-up not needed.          Vitals Value Taken Time   /68 03/08/22 0749   Temp 36.7 °C (98 °F) 03/08/22 0749   Pulse 74 03/08/22 0749   Resp 20 03/08/22 0749   SpO2 93 % 03/08/22 0749         Event Time   Out of Recovery 12:45:00         Pain/Adele Score: Pain Rating Prior to Med Admin: 2 (3/8/2022  8:45 AM)  Pain Rating Post Med Admin: 0 (3/7/2022 12:45 PM)  Adele Score: 10 (3/7/2022 12:45 PM)

## 2022-03-08 NOTE — PT/OT/SLP EVAL
"Occupational Therapy   Evaluation and Discharge Note    Name: Parisa Dimas  MRN: [id number]  Admitting Diagnosis:  Closed 4-part fracture of proximal humerus, left, initial encounter   Recent Surgery: Procedure(s) (LRB):  ARTHROPLASTY, SHOULDER, TOTAL, REVERSE (Left) 1 Day Post-Op    Recommendations:     Discharge Recommendations: outpatient PT  Discharge Equipment Recommendations:  none  Barriers to discharge:  None    Assessment:     Parisa Dimas is a 66 y.o. female with a medical diagnosis of Closed 4-part fracture of proximal humerus, left, initial encounter. At this time, patient is functioning at their prior level of function and does not require further acute OT services.     Plan:     During this hospitalization, patient does not require further acute OT services.  Please re-consult if situation changes.    · Plan of Care Reviewed with: patient, spouse    Subjective     Chief Complaint: "I'm just comfortable"  Patient/Family Comments/goals: to go home    Occupational Profile:  Living Environment: Pt lives with spouse in [facility]  Previous level of function: Independent  Equipment Used at home:  none  Assistance upon Discharge: Pt will have assistance from spouse upon d/c.    Pain/Comfort:  · Pain Rating 1: 2/10  · Location - Side 1: Left  · Location 1: shoulder  · Pain Addressed 1: Pre-medicate for activity, Reposition, Distraction    Patients cultural, spiritual, Bahai conflicts given the current situation: no    Objective:     Communicated with: RN prior to session.  Patient found supine with perineural catheter, telemetry upon OT entry to room.    General Precautions: Standard, fall   Orthopedic Precautions:LUE non weight bearing (no abduction, no external rotation, no active ROM)   Braces: Shoulder abduction brace  Respiratory Status: Room air     Occupational Performance:    Bed Mobility:    · Patient completed Supine to Sit with stand by assistance    Functional Mobility/Transfers:  · Patient " completed Sit <> Stand Transfer with stand by assistance  with  no assistive device   · Functional Mobility: OT Acute eval complete. Education provided on UB dressing techniques and how to marcos/doff shoulder abduction brace. Pt and spouse acknowledged understanding. Pt does not require Acute OT services at this time.     Activities of Daily Living:  · Upper Body Dressing: maximal assistance to marcos shirt and then gown with abduction brace  · Lower Body Dressing: maximal assistance to marcos shoes, socks, underwear, and pants    Cognitive/Visual Perceptual:  Cognitive/Psychosocial Skills:     -       Oriented to: Person, Place, Time and Situation   -       Safety awareness/insight to disability: intact     Physical Exam:  Upper Extremity Range of Motion:     -       Right Upper Extremity: WFL  Upper Extremity Strength:    -       Right Upper Extremity: WFL   Strength:    -       Right Upper Extremity: WFL  -       Left Upper Extremity: WFL    AMPAC 6 Click ADL:  AMPAC Total Score: 15    Treatment & Education:  - OT/POC-  - Importance of mobility to maximize recovery.  - safety w/ functional mobility; hand placement to ensure safe transfers to various surfaces in prep for ADLs  -Education provided on cryotherapy protocols and LUE precautions. Pt demonstrated ability to adjust shoulder abduction brace with assistance of spouse.   - encouraged to ambulate within household environment at least every hour to hour 1/2]  Education:    Patient left sitting edge of bed with all lines intact, call button in reach, RN\ notified and spouse present    GOALS:   Multidisciplinary Problems     Occupational Therapy Goals     Not on file                History:     Past Medical History:   Diagnosis Date    AR (allergic rhinitis)     AR (allergic rhinitis)     Carotid stenosis     50% RCA    Colon polyp 10/2014    Diabetes mellitus     Diabetes mellitus, type 2     Fatty liver     GERD (gastroesophageal reflux disease)      Heart murmur 1/27/2022    HLD (hyperlipidemia)     HTN (hypertension)     Joint pain     Sleep apnea 2018    Stricture of artery 3/25/2021       Past Surgical History:   Procedure Laterality Date    REVERSE TOTAL SHOULDER ARTHROPLASTY Left 3/7/2022    Procedure: ARTHROPLASTY, SHOULDER, TOTAL, REVERSE;  Surgeon: MISSY Santoyo MD;  Location: [facility] OR [address] [place] [address];  Service: Orthopedics;  Laterality: Left;  AND BICEP SYNDEMOSIS    SKIN BIOPSY  10yrs.    negative       Time Tracking:     OT Date of Treatment: 03/08/22  OT Start Time: 1043  OT Stop Time: 1110  OT Total Time (min): 27 min    Billable Minutes:Evaluation 10  Self Care/Home Management 17    3/8/2022

## 2022-03-08 NOTE — CARE UPDATE
I saw the patient this morning.  She denied any significant pain.  No events overnight.  Accompanied by her  at the bedside.  Indwelling interscalene perineural catheter.    Examination of the dressing shows it to be clean dry and intact.  No drainage.  Appropriate swelling around the left shoulder.  Sensation intact to light touch over the axillary nerve distribution.  She reports no numbness in that area.  Motor intact to the left hand.  2+ radial pulse.  Good perfusion to the left hand.    Postoperative radiographs of the left shoulder show an intact reverse shoulder arthroplasty prosthesis in good position.  No fracture or complication otherwise seen    Postoperative day 1 from left reverse shoulder arthroplasty.  Doing well.    She will see the physical therapist at the hospital today only for instructions regarding simple activities of daily living.  No formal therapy for the shoulder otherwise.  Discussed the rehab plan and activity restrictions.  She has physical therapy at [facility] starting later this week. Discussed keeping the dressing in place and checking the skin around the Polar Care.  We will discharge her home today.  All questions answered.

## 2022-03-08 NOTE — PROGRESS NOTES
Discharge Note: Pt discharged home alert and oriented x4, skin warm to touch left ,Left shoulder drsging aquacell drsing , clean,dry and intact, + peripheral pulses ,polar ice intact . Pt verbalized understanding of discharge teaching.

## 2022-03-09 NOTE — ADDENDUM NOTE
Addendum  created 03/09/22 1127 by Suhail Connolly MD    Clinical Note Signed       · Continue PPI  · PRN mylanta

## 2022-03-09 NOTE — DISCHARGE SUMMARY
[facility] - [facility]  Orthopedics  Discharge Summary      Patient Name: Parisa Dimas  MRN: [id number]  Admission Date: 3/7/2022  Hospital Length of Stay: 0 days  Discharge Date and Time: 3/8/2022  1:18 PM  Attending Physician: No att. providers found   Discharging Provider: Davonte Aguayo MD  Primary Care Provider: Alex Cast MD    HPI: See H&P    Procedure(s) (LRB):  ARTHROPLASTY, SHOULDER, TOTAL, REVERSE (Left)      Hospital Course: Patient presented for above procedure.  Tolerated it well and was discharged home POD1 after voiding, tolerating diet, ambulating, pain controlled.  Patient was informed about signs and symptoms of compartment syndrome and if any of these should present then he should immediately call us back and come to the ED.  Discharge instructions, follow-up appointment, and med rec are below.          Significant Diagnostic Studies: Labs: [facility] No results for input(s): NA, K, CL, CO2, GLU, BUN, CREATININE, CALCIUM, PROT, ALBUMIN, BILITOT, ALKPHOS, AST, ALT, ANIONGAP, ESTGFRAFRICA, EGFRNONAA in the last 48 hours.    Pending Diagnostic Studies:     None        Final Active Diagnoses:    Diagnosis Date Noted POA    PRINCIPAL PROBLEM:  Closed 4-part fracture of proximal humerus, left, initial encounter [S42.292A] 03/07/2022 Yes      Problems Resolved During this Admission:      Discharged Condition: good    Disposition: Home or Self Care    Follow Up:   Follow-up Information     Bogdan Santoyo MD Follow up in 2 week(s).    Specialties: Sports Medicine, Orthopedic Surgery  Contact information:  [address]  [address] [facility] [address]  [facility] [address]  [phone]             Alex Cast MD Follow up.    Specialty: Family Medicine  Why: Patient declined 7 day follow-up with provider  Contact information:  [address]  [address]  [facility] [address] [phone]                       Patient Instructions:      Diet Adult Regular     Ice to affected area     Notify your  health care provider if you experience any of the following:  temperature >100.4     Notify your health care provider if you experience any of the following:  persistent nausea and vomiting or diarrhea     Notify your health care provider if you experience any of the following:  severe uncontrolled pain     Notify your health care provider if you experience any of the following:  redness, tenderness, or signs of infection (pain, swelling, redness, odor or green/yellow discharge around incision site)     Leave dressing on - Keep it clean, dry, and intact until clinic visit     Keep surgical extremity elevated     Ice to affected area     Leave dressing on - Keep it clean, dry, and intact until clinic visit     Notify your health care provider if you experience any of the following:  temperature >100.4     Notify your health care provider if you experience any of the following:  persistent nausea and vomiting or diarrhea     Notify your health care provider if you experience any of the following:  severe uncontrolled pain     Notify your health care provider if you experience any of the following:  redness, tenderness, or signs of infection (pain, swelling, redness, odor or green/yellow discharge around incision site)     Notify your health care provider if you experience any of the following:  difficulty breathing or increased cough     Notify your health care provider if you experience any of the following:  severe persistent headache     Notify your health care provider if you experience any of the following:  worsening rash     Notify your health care provider if you experience any of the following:  persistent dizziness, light-headedness, or visual disturbances     Notify your health care provider if you experience any of the following:  increased confusion or weakness     Notify your health care provider if you experience any of the following:     Weight bearing restrictions (specify):      Medications:  Reconciled Home Medications:      Medication List      CONTINUE taking these medications    amLODIPine 10 MG tablet  Commonly known as: NORVASC  TAKE 1 TABLET BY MOUTH ONCE DAILY     ascorbic acid (vitamin C) 1000 MG tablet  Commonly known as: VITAMIN C  Take 1,000 mg by mouth once daily.     blood-glucose meter kit  To check BG 1 times daily, to use with insurance preferred meter     hydroCHLOROthiazide 25 MG tablet  Commonly known as: HYDRODIURIL  TAKE 1 TABLET BY MOUTH ONCE DAILY     lancets Misc  To check BG 1 times daily, to use with insurance preferred meter     losartan 100 MG tablet  Commonly known as: COZAAR  TAKE 1 TABLET BY MOUTH ONCE DAILY     lovastatin 20 MG tablet  Commonly known as: MEVACOR  TAKE 1 TABLET BY MOUTH IN  THE EVENING     multivitamin capsule  Take 1 capsule by mouth once daily.     OMEGA 3 ORAL  Take 2,800 mg by mouth once daily.     omeprazole 40 MG capsule  Commonly known as: PRILOSEC  TAKE 1 CAPSULE BY MOUTH  ONCE DAILY     * ondansetron 4 MG tablet  Commonly known as: ZOFRAN  Take 1 tablet (4 mg total) by mouth every 8 (eight) hours as needed for Nausea.     * ondansetron 4 MG tablet  Commonly known as: ZOFRAN  Take 1 tablet (4 mg total) by mouth every 8 (eight) hours as needed.     * oxyCODONE 5 MG immediate release tablet  Commonly known as: ROXICODONE  Take 1-2 tablets as needed for pain every 6 hours.     * oxyCODONE 5 MG immediate release tablet  Commonly known as: ROXICODONE  Take 1 tablet (5 mg total) by mouth every 6 (six) hours as needed.     PROBIOTIC BLEND ORAL  Take 2 tablets by mouth once daily.     vitamin D 1000 units Tab  Commonly known as: VITAMIN D3  Take 1,000 Units by mouth once daily.     vitamin E 400 UNIT capsule  Take 400 Units by mouth once daily.     ZINC-15 ORAL  Take by mouth.         * This list has 4 medication(s) that are the same as other medications prescribed for you. Read the directions carefully, and ask your doctor or other care  provider to review them with you.            STOP taking these medications    aspirin 81 MG EC tablet  Commonly known as: ECOTRIN     b complex vitamins tablet     blood sugar diagnostic Strp     calcium carbonate 600 mg calcium (1,500 mg) Tab  Commonly known as: OS-RYLEE     cetirizine 10 MG tablet  Commonly known as: ZYRTEC     co-enzyme Q-10 30 mg capsule     ibuprofen 200 MG tablet  Commonly known as: ADVIL,MOTRIN     metFORMIN 500 MG ER 24hr tablet  Commonly known as: GLUCOPHAGE-XR     oxyCODONE-acetaminophen 5-325 mg per tablet  Commonly known as: PERCOCET        ASK your doctor about these medications    OPW TEST CLAIM - DO NOT FILL  OPW test claim. Do not fill.            Davonte Aguayo MD  Orthopedics  [facility] - Surgery

## 2022-03-09 NOTE — ANESTHESIA POST-OP PAIN MANAGEMENT
Spoke with patient today. Reports adequate pain control with Nimbus pump and PRN meds. Denies signs/symptoms of LAST. Questions answered and concerns addressed. Will follow up.        Suhail Connolly MD  Anesthesiology, PGY 1  [facility], [facility]  03/09/2022  11:20 AM

## 2022-03-10 ENCOUNTER — CLINICAL SUPPORT (OUTPATIENT)
Dept: REHABILITATION | Facility: HOSPITAL | Age: 67
End: 2022-03-10
Payer: MEDICARE

## 2022-03-10 DIAGNOSIS — M25.612 DECREASED ROM OF LEFT SHOULDER: ICD-10-CM

## 2022-03-10 DIAGNOSIS — M25.512 CHRONIC LEFT SHOULDER PAIN: ICD-10-CM

## 2022-03-10 DIAGNOSIS — G89.29 CHRONIC LEFT SHOULDER PAIN: ICD-10-CM

## 2022-03-10 DIAGNOSIS — S42.292A CLOSED 4-PART FRACTURE OF PROXIMAL HUMERUS, LEFT, INITIAL ENCOUNTER: ICD-10-CM

## 2022-03-10 PROCEDURE — 97140 MANUAL THERAPY 1/> REGIONS: CPT

## 2022-03-10 PROCEDURE — 97161 PT EVAL LOW COMPLEX 20 MIN: CPT

## 2022-03-10 NOTE — PLAN OF CARE
[facility]   Physical Therapy Initial Evaluation     Date: 3/10/2022   Name: Parisa Dimas  [facility] Number: [id number]    Therapy Diagnosis:   Encounter Diagnoses   Name Primary?    Closed 4-part fracture of proximal humerus, left, initial encounter     Decreased ROM of left shoulder     Chronic left shoulder pain      Physician: Yahir Lyn PA*    Physician Orders: PT Eval and Treat   Medical Diagnosis from Referral: Closed 4-part fracture of proximal humerus, left, initial encounter [S42.292A]  Evaluation Date: 3/10/2022  Authorization Period Expiration: 3/24/2022  Plan of Care Expiration: 06/30/2022  Progress Note Due: 05/05/2022  Visit # / Visits authorized: 1/ 1  FOTO: 96%    Precautions: Standard and Fall     Time In: 0410  Time Out: 0500  Total Appointment Time (timed & untimed codes): 50 minutes      SUBJECTIVE   DOS: 03/07/2022     Date of onset: December 22, 2021    History of current condition - Parisa reports: Getting ready to take  Shower, doesn't remember what she tripped on, hit the door frame, felt a sharp pain instantly, unable to lift arm.minimal movement until surgery had to wait long for heart still unsure      Falls: 1 - lead to humerus fracture    Imaging, XRAY (12/22/2021): Comminuted fracture of the proximal aspect of the left humerus.   No apparent injury involving the scapula or the distal clavicle.    Prior Therapy: Physical therapy 8 yrs ago (plantar fascitis)  Social History: lives with their spouse, daughter and son in law  Occupation: Retired  Prior Level of Function: no limitations  Current Level of Function: only independent with using the bathroom    Pain:  Current 2/10, worst 6/10, best 2/10   Location: left shoulder    Description: Sharp and Shooting  Aggravating Factors: Sitting, Bending, Walking, Extension and Flexing; going to baathroom  Easing Factors: pain medication, ice and rest    Patients goals: independent with ADLs, wash hair       Medical History:   Past Medical History:   Diagnosis Date    AR (allergic rhinitis)     AR (allergic rhinitis)     Carotid stenosis     50% RCA    Colon polyp 10/2014    Diabetes mellitus     Diabetes mellitus, type 2     Fatty liver     GERD (gastroesophageal reflux disease)     Heart murmur 1/27/2022    HLD (hyperlipidemia)     HTN (hypertension)     Joint pain     Sleep apnea 2018    Stricture of artery 3/25/2021       Surgical History:   Parisa Dimas  has a past surgical history that includes Skin biopsy (10yrs.) and Reverse total shoulder arthroplasty (Left, 3/7/2022).    Medications:   Parisa has a current medication list which includes the following prescription(s): amlodipine, ascorbic acid (vitamin c), blood-glucose meter, hydrochlorothiazide, l.acid/l.casei/b.bif/b.tori/fos, lancets, losartan, lovastatin, multivitamin, omega-3s/dha/epa/fish oil, omeprazole, ondansetron, ondansetron, OPW TEST CLAIM - DO NOT FILL, oxycodone, oxycodone, vitamin d, vitamin e, zinc sulfate, [DISCONTINUED] aspirin, [DISCONTINUED] co-enzyme q-10, and [DISCONTINUED] metformin.    Allergies:   Review of patient's allergies indicates:  No Known Allergies       OBJECTIVE   Observation: arm with abduction pillow; minimal swelling in shoulder; moderate swelling of fingers    Posture: rounded shoulders, pt in shoulder sling with abduction pillow; thoracic kyphosis; unable to lay supine due to breathing and heart condition    Passive Range of Motion:   Shoulder LEFT   Flexion 85 degrees   Abduction 35 degrees   ER at 20 15 degrees   IR 0 degrees      Active Range of Motion:   Shoulder Right Left   Flexion WNL 0 degrees   Abduction WNL 0  degrees   ER at 0 WNL 0 degrees   IR (behind back) WNL 0 degrees     Upper Extremity Strength:  Formal MMT not performed 2/2 POD#3 and increased pain.      Joint Mobility: hypomobility as expected post-op.    Palpation:  TTP shoulder joint, biceps and posterior cuff as expected  post-op.    Sensation: Intact but diminished 2/2 residual nerve block.    Limitation/Restriction for FOTO Survey    Therapist reviewed FOTO scores for Parisa Dimas on 3/10/2022.   FOTO documents entered into EPIC - see Media section.    Limitation Score: 96%         TREATMENT     Total Treatment time (time-based codes) separate from Evaluation: 25 minutes      Parisa received the treatments listed below:      manual therapy techniques: passive range motion were applied to the: left elbow, shoulder and wrist joint for 25 minutes, including:  - Passive shoulder range of motion to 90 degrees  - Passive external rotation to 25 degrees   - Passive stretching of elbow into extension   - AAROM wrist extension, ulnar deviation, forearm pronation and supination     PATIENT EDUCATION AND HOME EXERCISES     Education provided:   - Educated on hand range of motion and strengthening exercises to decrease edema and maintain wrist mobility while in abduction sling.  - Pt to remain in sling in accordance with doctors protocol.    Written Home Exercises Provided: yes. Exercises were reviewed and Parisa was able to demonstrate them prior to the end of the session.  Parisa demonstrated good  understanding of the education provided. See EMR under Patient Instructions for exercises provided during therapy sessions.    ASSESSMENT     Parisa is a 66 y.o. female referred to outpatient Physical Therapy with a medical diagnosis of Closed 4-part fracture of proximal humerus, left, initial encounter [S42.833M]. Patient presents with pain, decreased passive left shoulder range of motion, decreased strength, poor posture, impaired proprioception, and functional deficits with all ADL's and domestic tasks. Pt will continue to progress range of motion and strength according to protocol and in accordance with signs and symptoms.     Patient prognosis is Good.   Patientt will benefit from skilled outpatient Physical Therapy to address the deficits  stated above and in the chart below, provide patient /family education, and to maximize patientt's level of independence.     Plan of care discussed with patient: Yes  Patient's spiritual, cultural and educational needs considered and patient is agreeable to the plan of care and goals as stated below:     Anticipated Barriers for therapy: Comorbidities    Medical Necessity is demonstrated by the following  History  Co-morbidities and personal factors that may impact the plan of care Co-morbidities:   advanced age, CAD, diabetes, high BMI and HTN    Personal Factors:   lifestyle     moderate   Examination  Body Structures and Functions, activity limitations and participation restrictions that may impact the plan of care Body Regions:   upper extremities    Body Systems:    gross symmetry  ROM  strength  motor control  motor learning  blood pressure    Participation Restrictions:   None to note    Activity limitations:   Learning and applying knowledge  no deficits    General Tasks and Commands  no deficits    Communication  no deficits    Mobility  lifting and carrying objects  fine hand use (grasping/picking up)  moving around using equipment (WC)  driving (bike, car, motorcycle)    Self care  washing oneself (bathing, drying, washing hands)  caring for body parts (brushing teeth, shaving, grooming)  dressing  eating  drinking    Domestic Life  shopping  cooking  doing house work (cleaning house, washing dishes, laundry)    Interactions/Relationships  no deficits    Life Areas  no deficits    Community and Social Life  no deficits         moderate   Clinical Presentation stable and uncomplicated low   Decision Making/ Complexity Score: low     GOALS:  Short Term Goals (4-6 Weeks):   1) Pt will demonstrate compliance with initial home exercise program as prescribed by physical therapist to improve independence with management of condition.  2) Pt to improve passive range of motion in flexion and external rotation at  side to at least 100 and 30 degrees, respectively, to allow for improved mobility with dressing and self care.  3) Pt to report R upper extremity pain of <3/10 at worst during exercises to improve tolerance to ADLs and self care.     Long Term Goals (8-12 Weeks):   1. Pt to achieve <40% limitation as measured by the FOTO to demonstrate decreased disability.  2) Pt to improve active range of motion in R shoulder to within 20% of uninvolved extremity to allow for improved functional mobility with work tasks and lifting.  3) Pt to demonstrate at least 3+/5 grades on all upper quarter manual muscle testing to facilitate improved strength for work demands.  4) Pt to able to wash her hair in the shower with minimal assistance from spouse       PLAN   Plan of care Certification: 3/10/2022 to 06/30/2022.    Outpatient Physical Therapy 3 times weekly for 10 weeks to include the following interventions: Manual Therapy, Moist Heat/ Ice, Neuromuscular Re-ed, Patient Education and Therapeutic Exercise.     Sanam Davies, PT      I CERTIFY THE NEED FOR THESE SERVICES FURNISHED UNDER THIS PLAN OF TREATMENT AND WHILE UNDER MY CARE   Physician's comments:     Physician's Signature: ___________________________________________________

## 2022-03-11 ENCOUNTER — PATIENT MESSAGE (OUTPATIENT)
Dept: FAMILY MEDICINE | Facility: CLINIC | Age: 67
End: 2022-03-11
Payer: MEDICARE

## 2022-03-11 NOTE — ANESTHESIA POST-OP PAIN MANAGEMENT
03/11/2022    Called and spoke to Parisa Dimas about the Nimbus pump and PNC.  Pain has been well controlled.  Denies tinnitus, metallic taste in mouth, weakness in extremity.  Denies erythema, warmth, tenderness, bleeding at site of catheter entry.  Dressing c/d/i.  All questions answered.  Encouraged them to call the provided number if any issues arise.  Patient will plan to pull catheter tomorrow.     Williams Estrella MD  Department of Anesthesiology  PGY-2/CA-1  Pager: [phone]

## 2022-03-14 ENCOUNTER — CLINICAL SUPPORT (OUTPATIENT)
Dept: REHABILITATION | Facility: HOSPITAL | Age: 67
End: 2022-03-14
Payer: MEDICARE

## 2022-03-14 DIAGNOSIS — M25.612 DECREASED ROM OF LEFT SHOULDER: Primary | ICD-10-CM

## 2022-03-14 DIAGNOSIS — G89.29 CHRONIC LEFT SHOULDER PAIN: ICD-10-CM

## 2022-03-14 DIAGNOSIS — M25.512 CHRONIC LEFT SHOULDER PAIN: ICD-10-CM

## 2022-03-14 PROCEDURE — 97140 MANUAL THERAPY 1/> REGIONS: CPT

## 2022-03-14 NOTE — PROGRESS NOTES
[facility]   Physical Therapy Treatment Note     Name: Parisa Dimas  [facility] Number: [id number]    Therapy Diagnosis:   Encounter Diagnoses   Name Primary?    Decreased ROM of left shoulder Yes    Chronic left shoulder pain      Physician: Yahir Lyn PA*    Visit Date: 3/14/2022    Physician Orders: PT Eval and Treat   Medical Diagnosis from Referral: Closed 4-part fracture of proximal humerus, left, initial encounter [S42.292A]  Evaluation Date: 3/10/2022  Authorization Period Expiration: 3/24/2022  Plan of Care Expiration: 06/30/2022  Progress Note Due: 05/05/2022  Visit # / Visits authorized: 2/20  FOTO: 96%    Time In: 1000  Time Out:1055  Total Billable Time: 55 minutes    PTA Visit #: 0/5       SUBJECTIVE     Pt reports: Minor soreness after last visit; states her pain pump was removed this Saturday so she is experiencing more pain today.  She was compliant with home exercise program.  Response to previous treatment: Minor soreness  Functional change: None noted   *DOS: 3/07/2022    Pain: 7/10  Location: left shoulder      OBJECTIVE     Objective Measures updated at progress report unless specified.     Treatment       Parisa received the treatments listed below:      manual therapy techniques: passive range motion were applied to the: left elbow, shoulder and wrist joint for 45 minutes, including:  - Passive shoulder range of motion to 90 degrees  - Passive external rotation to 25 degrees   - Passive stretching of elbow into extension   - Passive stretching of elbow into supination   - Tricep extension w/ resistance from PT; 3 x 10  - AAROM wrist extension, ulnar deviation, forearm pronation and supination ; 0#   - Digiflex red (3.0 lbs) 2 min x 2x      Patient Education and Home Exercises     Home Exercises Provided and Patient Education Provided     Education provided:   - Continue to have her arm in the sling in accordance with doctors order; perform wrist/ hand  strengthening exercises as prescribed by PT    Written Home Exercises Provided: yes. Exercises were reviewed and Parisa was able to demonstrate them prior to the end of the session.  Parisa demonstrated good  understanding of the education provided. See EMR under Patient Instructions for exercises provided during therapy sessions    ASSESSMENT   Pt 1 week s/p left reverse TSA. Pt progressing well towards goals following doctors protocol listed below:   Week 1-4: No Internal rotation; Passive forward elevation 0-90 degrees; Passive external rotation 0-20 degrees  **No pendulums; Sling for 5 weeks   Pt w/ complaints of bicep tenderness with forward elevation and passive elbow extension. Pt very hesitant and showing increased anxiety when having to perform wrist AROM. Pt usually requiring increased assurance throughout therapy that the pain she is feeling is a normal part of therapy.      Parias Is progressing well towards her goals.   Pt prognosis is Good.     Pt will continue to benefit from skilled outpatient physical therapy to address the deficits listed in the problem list box on initial evaluation, provide pt/family education and to maximize pt's level of independence in the home and community environment.     Pt's spiritual, cultural and educational needs considered and pt agreeable to plan of care and goals.     Anticipated barriers to physical therapy: Increased anxiety     GOALS:  Short Term Goals (4-6 Weeks):   1) Pt will demonstrate compliance with initial home exercise program as prescribed by physical therapist to improve independence with management of condition.  2) Pt to improve passive range of motion in flexion and external rotation at side to at least 100 and 30 degrees, respectively, to allow for improved mobility with dressing and self care.  3) Pt to report R upper extremity pain of <3/10 at worst during exercises to improve tolerance to ADLs and self care.     Long Term Goals (8-12 Weeks):   1. Pt  to achieve <40% limitation as measured by the FOTO to demonstrate decreased disability.  2) Pt to improve active range of motion in R shoulder to within 20% of uninvolved extremity to allow for improved functional mobility with work tasks and lifting.  3) Pt to demonstrate at least 3+/5 grades on all upper quarter manual muscle testing to facilitate improved strength for work demands.  4) Pt to able to wash her hair in the shower with minimal assistance from spouse     PLAN   Continue to progress per doctor protocol and patients symptoms.     Sanam Davies, PT, DPT

## 2022-03-16 ENCOUNTER — CLINICAL SUPPORT (OUTPATIENT)
Dept: REHABILITATION | Facility: HOSPITAL | Age: 67
End: 2022-03-16
Payer: MEDICARE

## 2022-03-16 DIAGNOSIS — M25.612 DECREASED ROM OF LEFT SHOULDER: Primary | ICD-10-CM

## 2022-03-16 PROCEDURE — 97110 THERAPEUTIC EXERCISES: CPT | Mod: CQ

## 2022-03-16 NOTE — PROGRESS NOTES
[facility]   Physical Therapy Treatment Note     Name: Parisa EARL Wing  [facility] Number: [id number]    Therapy Diagnosis:   Encounter Diagnosis   Name Primary?    Decreased ROM of left shoulder Yes     Physician: Yahir Lyn PA*    Visit Date: 3/16/2022    Physician Orders: PT Eval and Treat   Medical Diagnosis from Referral: Closed 4-part fracture of proximal humerus, left, initial encounter [S42.292A]  Evaluation Date: 3/10/2022  Authorization Period Expiration: 3/24/2022  Plan of Care Expiration: 06/30/2022  Progress Note Due: 05/05/2022  Visit # / Visits authorized: 3/20  FOTO: 96%    Time In: 10:00 am  Time Out:10:55 am  Total Billable Time: 55 minutes    PTA Visit #: 1/5     SUBJECTIVE     Pt reports: doing well today with not much pain at rest . Pt stated she felt a burning pain in the back of her arm yesterday and questions if this is normal .   She was compliant with home exercise program.  Response to previous treatment: Minor soreness  Functional change: None noted   *DOS: 3/07/2022    Pain: 6/10  Location: left shoulder       OBJECTIVE     Objective Measures updated at progress report unless specified.     Treatment     Parisa received the treatments listed below:      manual therapy techniques: passive range motion were applied to the: left elbow, shoulder and wrist joint for 55 minutes, including:  - Passive shoulder range of motion to 90 degrees  - Passive external rotation to 25 degrees   - Passive stretching of elbow into extension   - Passive stretching of elbow into supination   - Tricep extension w/ resistance from PT; 3 x 10  - AAROM wrist extension, ulnar deviation, forearm pronation and supination ; 0#   - Digiflex red (3.0 lbs) 2 min x 2x      Patient Education and Home Exercises     Home Exercises Provided and Patient Education Provided     Education provided:   - Continue to have her arm in the sling in accordance with doctors order; perform wrist/ hand  "strengthening exercises as prescribed by PT    Written Home Exercises Provided: yes. Exercises were reviewed and Parisa was able to demonstrate them prior to the end of the session.  Parisa demonstrated good  understanding of the education provided. See EMR under Patient Instructions for exercises provided during therapy sessions    ASSESSMENT   Pt 1 week s/p left reverse TSA. Pt progressing well towards goals following doctors protocol listed below:   Week 1-4: No Internal rotation; Passive forward elevation 0-90 degrees; Passive external rotation 0-20 degrees  **No pendulums; Sling for 5 weeks   Pt exhibits apprehension with PROM and difficulty remaining relaxed . She denied pain and reported a "stretch" felt with PROM although due to guarding she was unable to achieve full elbow extension today. Overall pt responded well and with no increased pain reported after session. Will continue to progress next per protocol.       Parisa Is progressing well towards her goals.   Pt prognosis is Good.     Pt will continue to benefit from skilled outpatient physical therapy to address the deficits listed in the problem list box on initial evaluation, provide pt/family education and to maximize pt's level of independence in the home and community environment.   Pt's spiritual, cultural and educational needs considered and pt agreeable to plan of care and goals.     Anticipated barriers to physical therapy: Increased anxiety     GOALS:  Short Term Goals (4-6 Weeks):   1) Pt will demonstrate compliance with initial home exercise program as prescribed by physical therapist to improve independence with management of condition.  2) Pt to improve passive range of motion in flexion and external rotation at side to at least 100 and 30 degrees, respectively, to allow for improved mobility with dressing and self care.  3) Pt to report R upper extremity pain of <3/10 at worst during exercises to improve tolerance to ADLs and self " care.     Long Term Goals (8-12 Weeks):   1. Pt to achieve <40% limitation as measured by the FOTO to demonstrate decreased disability.  2) Pt to improve active range of motion in R shoulder to within 20% of uninvolved extremity to allow for improved functional mobility with work tasks and lifting.  3) Pt to demonstrate at least 3+/5 grades on all upper quarter manual muscle testing to facilitate improved strength for work demands.  4) Pt to able to wash her hair in the shower with minimal assistance from spouse     PLAN   Continue to progress per doctor protocol and patients symptoms.     Teresa Alcala, PTA,

## 2022-03-17 ENCOUNTER — CLINICAL SUPPORT (OUTPATIENT)
Dept: REHABILITATION | Facility: HOSPITAL | Age: 67
End: 2022-03-17
Payer: MEDICARE

## 2022-03-17 DIAGNOSIS — M25.512 ACUTE PAIN OF LEFT SHOULDER: ICD-10-CM

## 2022-03-17 DIAGNOSIS — M25.612 DECREASED ROM OF LEFT SHOULDER: Primary | ICD-10-CM

## 2022-03-17 PROCEDURE — 97140 MANUAL THERAPY 1/> REGIONS: CPT

## 2022-03-17 NOTE — PROGRESS NOTES
[facility]   Physical Therapy Treatment Note     Name: Parisa Dimas  [facility] Number: [id number]    Therapy Diagnosis:   Encounter Diagnoses   Name Primary?    Decreased ROM of left shoulder Yes    Acute pain of left shoulder      Physician: Yahir Lyn PA*    Visit Date: 3/17/2022    Physician Orders: PT Eval and Treat   Medical Diagnosis from Referral: Closed 4-part fracture of proximal humerus, left, initial encounter [S42.292A]  Evaluation Date: 3/10/2022  Authorization Period Expiration: 3/24/2022  Plan of Care Expiration: 06/30/2022  Progress Note Due: 05/05/2022  Visit # / Visits authorized: 4/20  FOTO: 96%    Time In: 10:00 am  Time Out:10:55 am  Total Billable Time: 55 minutes    PTA Visit #: 0/5     SUBJECTIVE     Pt reports: doing well today but having some soreness, may have overdone it yesterday with grocery shopping, therapy and crawfish boil.   She was compliant with home exercise program.  Response to previous treatment: Minor soreness  Functional change: None noted   *DOS: 3/07/2022    Pain: 3/10  Location: left shoulder       OBJECTIVE     Objective Measures updated at progress report unless specified.     Treatment     Parisa received the treatments listed below:      manual therapy techniques: passive range motion were applied to the: left elbow, shoulder and wrist joint for 55 minutes, including:  - Passive shoulder range of motion to 90 degrees  - Passive external rotation to 25 degrees   - Passive stretching of elbow into extension   - Passive stretching of elbow into supination   - Tricep extension w/ resistance from PT; 3 x 10  - AROM forearm pronation and supination ; 0# -NT  - Digiflex red (3.0 lbs) 2 min x 2x -NT  - Shoulder shrugs; 3 sec hold; 30x  - Isometrics; 5 sec hold; 20x   - AAROM elbow flexion; 10x      Patient Education and Home Exercises     Home Exercises Provided and Patient Education Provided     Education provided:   - Continue to have  "her arm in the sling in accordance with doctors order; perform wrist/ hand strengthening exercises as prescribed by PT    Written Home Exercises Provided: yes. Exercises were reviewed and Parisa was able to demonstrate them prior to the end of the session.  Parisa demonstrated good  understanding of the education provided. See EMR under Patient Instructions for exercises provided during therapy sessions    ASSESSMENT   Pt 1 week s/p left reverse TSA. Pt progressing well towards goals following doctors protocol listed below:   Week 1-4: No Internal rotation; Passive forward elevation 0-90 degrees; Passive external rotation 0-20 degrees  **No pendulums; Sling for 5 weeks   Pt exhibits apprehension with PROM and difficulty remaining relaxed . She denied pain and reported a "stretch" felt with PROM although due to guarding she was unable to achieve full elbow extension today. Overall pt responded well and with no increased pain reported after session. Will continue to progress next per protocol.       Parisa Is progressing well towards her goals.   Pt prognosis is Good.     Pt will continue to benefit from skilled outpatient physical therapy to address the deficits listed in the problem list box on initial evaluation, provide pt/family education and to maximize pt's level of independence in the home and community environment.   Pt's spiritual, cultural and educational needs considered and pt agreeable to plan of care and goals.     Anticipated barriers to physical therapy: Increased anxiety     GOALS:  Short Term Goals (4-6 Weeks):   1) Pt will demonstrate compliance with initial home exercise program as prescribed by physical therapist to improve independence with management of condition.  2) Pt to improve passive range of motion in flexion and external rotation at side to at least 100 and 30 degrees, respectively, to allow for improved mobility with dressing and self care.  3) Pt to report R upper extremity pain of <3/10 " at worst during exercises to improve tolerance to ADLs and self care.     Long Term Goals (8-12 Weeks):   1. Pt to achieve <40% limitation as measured by the FOTO to demonstrate decreased disability.  2) Pt to improve active range of motion in R shoulder to within 20% of uninvolved extremity to allow for improved functional mobility with work tasks and lifting.  3) Pt to demonstrate at least 3+/5 grades on all upper quarter manual muscle testing to facilitate improved strength for work demands.  4) Pt to able to wash her hair in the shower with minimal assistance from spouse     PLAN   Continue to progress per doctor protocol and patients symptoms.     Sanam Davies, PT,DPT

## 2022-03-21 ENCOUNTER — CLINICAL SUPPORT (OUTPATIENT)
Dept: REHABILITATION | Facility: HOSPITAL | Age: 67
End: 2022-03-21
Payer: MEDICARE

## 2022-03-21 DIAGNOSIS — M25.612 DECREASED ROM OF LEFT SHOULDER: Primary | ICD-10-CM

## 2022-03-21 PROCEDURE — 97140 MANUAL THERAPY 1/> REGIONS: CPT | Mod: CQ

## 2022-03-21 NOTE — PROGRESS NOTES
[facility]   Physical Therapy Treatment Note     Name: Parisa EARL Wing  [facility] Number: [id number]    Therapy Diagnosis:   Encounter Diagnosis   Name Primary?    Decreased ROM of left shoulder Yes     Physician: Yahir Lyn PA*    Visit Date: 3/21/2022    Physician Orders: PT Eval and Treat   Medical Diagnosis from Referral: Closed 4-part fracture of proximal humerus, left, initial encounter [S42.292A]  Evaluation Date: 3/10/2022  Authorization Period Expiration: 3/24/2022  Plan of Care Expiration: 06/30/2022  Progress Note Due: 05/05/2022  Visit # / Visits authorized: 5/20  FOTO: 96%    Time In: 4:05 pm  Time Out: 4:50 pm  Total Billable Time: 45 minutes    PTA Visit #: 1/5     SUBJECTIVE     Pt reports: feeling pretty good , it feels tired if she's moving around a lot which she was this morning but she got to rest before therapy and is now feeling better.   She was compliant with home exercise program.  Response to previous treatment: no adverse effects   Functional change: None noted   *DOS: 3/07/2022    Pain: 2/10  Location: left shoulder       OBJECTIVE     Objective Measures updated at progress report unless specified.     Treatment     Parisa received the treatments listed below:      manual therapy techniques: passive range motion were applied to the: left elbow, shoulder and wrist joint for 45 minutes, including:  - Passive shoulder range of motion to 90 degrees  - Passive external rotation to 25 degrees   - Passive stretching of elbow into extension   - Passive stretching of elbow into supination   - Tricep extension w/ resistance from PT; 3 x 10   - AROM forearm pronation and supination ; 0# -NT  - Digiflex red (3.0 lbs) 2 min x 2x  - Shoulder shrugs; 3 sec hold; 30x  - Isometrics; 5 sec hold; 20x - NP  - AAROM elbow flexion; 10x      Patient Education and Home Exercises     Home Exercises Provided and Patient Education Provided     Education provided:   - Continue to have  her arm in the sling in accordance with doctors order; perform wrist/ hand strengthening exercises as prescribed by PT    Written Home Exercises Provided: yes. Exercises were reviewed and Parisa was able to demonstrate them prior to the end of the session.  Parisa demonstrated good  understanding of the education provided. See EMR under Patient Instructions for exercises provided during therapy sessions    ASSESSMENT   Pt 1 week s/p left reverse TSA. Pt progressing well towards goals following doctors protocol listed below:   Week 1-4: No Internal rotation; Passive forward elevation 0-90 degrees; Passive external rotation 0-20 degrees  **No pendulums; Sling for 5 weeks     Pt requires max cueing to prevent guarding with PROM and significant difficulty noted remaining relaxed while performing. She denies pain with PROM and reports a stretch felt although was unable to achieve full elbow extension today due to guarding. . She demonstrates good compliance with post op precautions , use of sling and HEP compliance .  Overall pt responded well and with no increased pain reported after session. Will continue to progress next per protocol.       Parisa Is progressing well towards her goals.   Pt prognosis is Good.     Pt will continue to benefit from skilled outpatient physical therapy to address the deficits listed in the problem list box on initial evaluation, provide pt/family education and to maximize pt's level of independence in the home and community environment.   Pt's spiritual, cultural and educational needs considered and pt agreeable to plan of care and goals.     Anticipated barriers to physical therapy: Increased anxiety     GOALS:  Short Term Goals (4-6 Weeks):   1) Pt will demonstrate compliance with initial home exercise program as prescribed by physical therapist to improve independence with management of condition.  2) Pt to improve passive range of motion in flexion and external rotation at side to at  least 100 and 30 degrees, respectively, to allow for improved mobility with dressing and self care.  3) Pt to report R upper extremity pain of <3/10 at worst during exercises to improve tolerance to ADLs and self care.     Long Term Goals (8-12 Weeks):   1. Pt to achieve <40% limitation as measured by the FOTO to demonstrate decreased disability.  2) Pt to improve active range of motion in R shoulder to within 20% of uninvolved extremity to allow for improved functional mobility with work tasks and lifting.  3) Pt to demonstrate at least 3+/5 grades on all upper quarter manual muscle testing to facilitate improved strength for work demands.  4) Pt to able to wash her hair in the shower with minimal assistance from spouse     PLAN   Continue to progress per doctor protocol and patients symptoms.     Teresa Alcala, PTA,DPT

## 2022-03-22 ENCOUNTER — OFFICE VISIT (OUTPATIENT)
Dept: SPORTS MEDICINE | Facility: CLINIC | Age: 67
End: 2022-03-22
Payer: MEDICARE

## 2022-03-22 ENCOUNTER — HOSPITAL ENCOUNTER (OUTPATIENT)
Dept: RADIOLOGY | Facility: HOSPITAL | Age: 67
Discharge: HOME | End: 2022-03-22
Attending: PHYSICIAN ASSISTANT
Payer: MEDICARE

## 2022-03-22 VITALS
SYSTOLIC BLOOD PRESSURE: 154 MMHG | WEIGHT: 195 LBS | BODY MASS INDEX: 34.55 KG/M2 | HEIGHT: 63 IN | DIASTOLIC BLOOD PRESSURE: 73 MMHG | HEART RATE: 86 BPM

## 2022-03-22 DIAGNOSIS — Z96.612 STATUS POST REVERSE TOTAL REPLACEMENT OF LEFT SHOULDER: ICD-10-CM

## 2022-03-22 DIAGNOSIS — Z96.612 STATUS POST REVERSE TOTAL REPLACEMENT OF LEFT SHOULDER: Primary | ICD-10-CM

## 2022-03-22 DIAGNOSIS — Z09 SURGERY FOLLOW-UP EXAMINATION: ICD-10-CM

## 2022-03-22 PROCEDURE — 1159F MED LIST DOCD IN RCRD: CPT | Mod: CPTII,S$GLB,, | Performed by: PHYSICIAN ASSISTANT

## 2022-03-22 PROCEDURE — 73030 X-RAY EXAM OF SHOULDER: CPT | Mod: TC,LT

## 2022-03-22 PROCEDURE — 3077F PR MOST RECENT SYSTOLIC BLOOD PRESSURE >= 140 MM HG: ICD-10-PCS | Mod: CPTII,S$GLB,, | Performed by: PHYSICIAN ASSISTANT

## 2022-03-22 PROCEDURE — 4010F ACE/ARB THERAPY RXD/TAKEN: CPT | Mod: CPTII,S$GLB,, | Performed by: PHYSICIAN ASSISTANT

## 2022-03-22 PROCEDURE — 99999 PR PBB SHADOW E&M-EST. PATIENT-LVL IV: CPT | Mod: PBBFAC,,, | Performed by: PHYSICIAN ASSISTANT

## 2022-03-22 PROCEDURE — 73030 XR SHOULDER COMPLETE 2 OR MORE VIEWS LEFT: ICD-10-PCS | Mod: 26,LT,, | Performed by: RADIOLOGY

## 2022-03-22 PROCEDURE — 73030 X-RAY EXAM OF SHOULDER: CPT | Mod: 26,LT,, | Performed by: RADIOLOGY

## 2022-03-22 PROCEDURE — 3078F PR MOST RECENT DIASTOLIC BLOOD PRESSURE < 80 MM HG: ICD-10-PCS | Mod: CPTII,S$GLB,, | Performed by: PHYSICIAN ASSISTANT

## 2022-03-22 PROCEDURE — 1159F PR MEDICATION LIST DOCUMENTED IN MEDICAL RECORD: ICD-10-PCS | Mod: CPTII,S$GLB,, | Performed by: PHYSICIAN ASSISTANT

## 2022-03-22 PROCEDURE — 99024 PR POST-OP FOLLOW-UP VISIT: ICD-10-PCS | Mod: S$GLB,,, | Performed by: PHYSICIAN ASSISTANT

## 2022-03-22 PROCEDURE — 3077F SYST BP >= 140 MM HG: CPT | Mod: CPTII,S$GLB,, | Performed by: PHYSICIAN ASSISTANT

## 2022-03-22 PROCEDURE — 99999 PR PBB SHADOW E&M-EST. PATIENT-LVL IV: ICD-10-PCS | Mod: PBBFAC,,, | Performed by: PHYSICIAN ASSISTANT

## 2022-03-22 PROCEDURE — 1125F PR PAIN SEVERITY QUANTIFIED, PAIN PRESENT: ICD-10-PCS | Mod: CPTII,S$GLB,, | Performed by: PHYSICIAN ASSISTANT

## 2022-03-22 PROCEDURE — 99024 POSTOP FOLLOW-UP VISIT: CPT | Mod: S$GLB,,, | Performed by: PHYSICIAN ASSISTANT

## 2022-03-22 PROCEDURE — 3044F PR MOST RECENT HEMOGLOBIN A1C LEVEL <7.0%: ICD-10-PCS | Mod: CPTII,S$GLB,, | Performed by: PHYSICIAN ASSISTANT

## 2022-03-22 PROCEDURE — 3008F BODY MASS INDEX DOCD: CPT | Mod: CPTII,S$GLB,, | Performed by: PHYSICIAN ASSISTANT

## 2022-03-22 PROCEDURE — 4010F PR ACE/ARB THEARPY RXD/TAKEN: ICD-10-PCS | Mod: CPTII,S$GLB,, | Performed by: PHYSICIAN ASSISTANT

## 2022-03-22 PROCEDURE — 3078F DIAST BP <80 MM HG: CPT | Mod: CPTII,S$GLB,, | Performed by: PHYSICIAN ASSISTANT

## 2022-03-22 PROCEDURE — 3044F HG A1C LEVEL LT 7.0%: CPT | Mod: CPTII,S$GLB,, | Performed by: PHYSICIAN ASSISTANT

## 2022-03-22 PROCEDURE — 3008F PR BODY MASS INDEX (BMI) DOCUMENTED: ICD-10-PCS | Mod: CPTII,S$GLB,, | Performed by: PHYSICIAN ASSISTANT

## 2022-03-22 PROCEDURE — 1160F RVW MEDS BY RX/DR IN RCRD: CPT | Mod: CPTII,S$GLB,, | Performed by: PHYSICIAN ASSISTANT

## 2022-03-22 PROCEDURE — 1160F PR REVIEW ALL MEDS BY PRESCRIBER/CLIN PHARMACIST DOCUMENTED: ICD-10-PCS | Mod: CPTII,S$GLB,, | Performed by: PHYSICIAN ASSISTANT

## 2022-03-22 PROCEDURE — 1125F AMNT PAIN NOTED PAIN PRSNT: CPT | Mod: CPTII,S$GLB,, | Performed by: PHYSICIAN ASSISTANT

## 2022-03-22 NOTE — PROGRESS NOTES
[facility]   Physical Therapy Treatment Note     Name: Parisa Dimas  [facility] Number: [id number]    Therapy Diagnosis:   Encounter Diagnosis   Name Primary?    Decreased ROM of left shoulder Yes     Physician: Yahir Lyn PA*    Visit Date: 3/23/2022    Physician Orders: PT Eval and Treat   Medical Diagnosis from Referral: Closed 4-part fracture of proximal humerus, left, initial encounter [S42.292A]  Evaluation Date: 3/10/2022  Authorization Period Expiration: 3/24/2022  Plan of Care Expiration: 06/30/2022  Progress Note Due: 05/05/2022  Visit # / Visits authorized: 6/20  FOTO: 96%    Time In: 10:10 am   Time Out: 11:00 am  Total Billable Time: 50 minutes    PTA Visit #: 1/5     SUBJECTIVE     Pt reports: she saw the doctor yesterday and her shoulder is a little sore because he pulled the bandage off .   She was compliant with home exercise program.  Response to previous treatment: no adverse effects   Functional change: None noted   *DOS: 3/07/2022    Pain: 3/10  Location: left shoulder       OBJECTIVE     Objective Measures updated at progress report unless specified.     Treatment     Parisa received the treatments listed below:      manual therapy techniques: passive range motion were applied to the: left elbow, shoulder and wrist joint for 30 minutes, including:  - Passive shoulder range of motion to 90 degrees  - Passive external rotation to 25 degrees   - Passive stretching of elbow into extension   - Passive stretching of elbow into supination     therapeutic exercises to develop strength, endurance, ROM and posture for 20 minutes including:  - Tricep extension w/ resistance from PT; 3 x 10   - AROM forearm pronation and supination ; 0# -NT  - Digiflex red (3.0 lbs) 2 min x 2x  - Shoulder shrugs; 3 sec hold; 30x  - Isometrics extension , adduction, flexion, ER; 5 sec hold; 20x   - AAROM elbow flexion; 2 x 10x  - Scapular retractions : x 15 reps , 3-5'' hold         Patient  Education and Home Exercises     Home Exercises Provided and Patient Education Provided     Education provided:   - Continue to have her arm in the sling in accordance with doctors order; perform wrist/ hand strengthening exercises as prescribed by PT    Written Home Exercises Provided: yes. Exercises were reviewed and Parisa was able to demonstrate them prior to the end of the session.  Parisa demonstrated good  understanding of the education provided. See EMR under Patient Instructions for exercises provided during therapy sessions    ASSESSMENT   Pt 1 week s/p left reverse TSA. Pt progressing well towards goals following doctors protocol listed below:   Week 1-4: No Internal rotation; Passive forward elevation 0-90 degrees; Passive external rotation 0-20 degrees  **No pendulums; Sling for 5 weeks     Pt demonstrated improved tolerance to elbow PROM today with less guarding present and able to achieve 0 degrees of elbow extension. Pt also able to achieve 90 degrees of passive shoulder flexion although increased stiffness into ER noted with only minimal ER ROM past neutral . Reviewed HEP and continue wrist and elbow ROM and use of sling to prevent active shoulder ROM.  Will continue to progress next per protocol.       Parisa Is progressing well towards her goals.   Pt prognosis is Good.     Pt will continue to benefit from skilled outpatient physical therapy to address the deficits listed in the problem list box on initial evaluation, provide pt/family education and to maximize pt's level of independence in the home and community environment.   Pt's spiritual, cultural and educational needs considered and pt agreeable to plan of care and goals.     Anticipated barriers to physical therapy: Increased anxiety     GOALS:  Short Term Goals (4-6 Weeks):   1) Pt will demonstrate compliance with initial home exercise program as prescribed by physical therapist to improve independence with management of condition.  2) Pt to  improve passive range of motion in flexion and external rotation at side to at least 100 and 30 degrees, respectively, to allow for improved mobility with dressing and self care.  3) Pt to report R upper extremity pain of <3/10 at worst during exercises to improve tolerance to ADLs and self care.     Long Term Goals (8-12 Weeks):   1. Pt to achieve <40% limitation as measured by the FOTO to demonstrate decreased disability.  2) Pt to improve active range of motion in R shoulder to within 20% of uninvolved extremity to allow for improved functional mobility with work tasks and lifting.  3) Pt to demonstrate at least 3+/5 grades on all upper quarter manual muscle testing to facilitate improved strength for work demands.  4) Pt to able to wash her hair in the shower with minimal assistance from spouse     PLAN   Continue to progress per doctor protocol and patients symptoms.     Teresa Alcala, PTA

## 2022-03-22 NOTE — PROGRESS NOTES
S:Parisa Dimas presents for post-operative evaluation.     DATE OF PROCEDURE: 3/7/2022       PROCEDURES PERFORMED:   Left reverse shoulder arthroplasty (CPT 28577-12)  Left shoulder open biceps tenodesis (CPT 47170)    Parisa Dimas reports to be doing well 2wk s/p the above mentioned procedure. Denies fevers, chills, night sweats, chest pain, difficulty breathing, calf pain or tenderness. Going to PT 2xWeek at the [place] location. Seeing good progress daily. Pain levels are improving. Has d/c'd pain medication. Has been compliant with her sling and PT exercises.  She states that she has not been moving her elbow much though because the instructions in the packet told her to keep the arm in the sling for 23 hours per day.    O: The incisions are healing well.  No signs of infection.  Suture tags were removed. No significant pain or unusual tenderness.    Radiographs:  X-ray of left shoulder taken in clinic today, images reviewed by me, demonstrates post operative hardware in proper position without evidence of fracture or dislocation.    A/P: Steri strips applied, keep dry for 3 more days.  Remain in sling for 4 more weeks.  Discussed dental prophylaxis, also discussed elbow flexion/extension to avoid elbow stiffness. Plan to follow the rehab plan as previously outlined. RTC in 4 weeks with Dr. Santoyo for 6 week post op visit.     POSTOPERATIVE PLAN: The patient will be admitted for postoperative care per protocol.  24 hours of antibiotic prophylaxis.  DVT prophylaxis given. Follow a RTSA rehab protocol.

## 2022-03-23 ENCOUNTER — CLINICAL SUPPORT (OUTPATIENT)
Dept: REHABILITATION | Facility: HOSPITAL | Age: 67
End: 2022-03-23
Payer: MEDICARE

## 2022-03-23 DIAGNOSIS — M25.612 DECREASED ROM OF LEFT SHOULDER: Primary | ICD-10-CM

## 2022-03-23 PROCEDURE — 97110 THERAPEUTIC EXERCISES: CPT | Mod: CQ

## 2022-03-23 PROCEDURE — 97140 MANUAL THERAPY 1/> REGIONS: CPT | Mod: CQ

## 2022-03-24 ENCOUNTER — CLINICAL SUPPORT (OUTPATIENT)
Dept: REHABILITATION | Facility: HOSPITAL | Age: 67
End: 2022-03-24
Payer: MEDICARE

## 2022-03-24 DIAGNOSIS — M25.612 DECREASED ROM OF LEFT SHOULDER: Primary | ICD-10-CM

## 2022-03-24 DIAGNOSIS — M25.512 ACUTE PAIN OF LEFT SHOULDER: ICD-10-CM

## 2022-03-24 PROCEDURE — 97140 MANUAL THERAPY 1/> REGIONS: CPT

## 2022-03-24 PROCEDURE — 97110 THERAPEUTIC EXERCISES: CPT

## 2022-03-24 NOTE — PROGRESS NOTES
[facility]   Physical Therapy Treatment Note     Name: Parisa EARL Wing  [facility] Number: [id number]    Therapy Diagnosis:   Encounter Diagnoses   Name Primary?    Decreased ROM of left shoulder Yes    Acute pain of left shoulder      Physician: Yahir Lyn PA*    Visit Date: 3/24/2022    Physician Orders: PT Eval and Treat   Medical Diagnosis from Referral: Closed 4-part fracture of proximal humerus, left, initial encounter [S42.292A]  Evaluation Date: 3/10/2022  Authorization Period Expiration: 3/24/2022  Plan of Care Expiration: 06/30/2022  Progress Note Due: 05/05/2022  Visit # / Visits authorized: 7/20  FOTO: 96%    Time In: 0910 am   Time Out: 1004 am  Total Billable Time: 54 minutes    PTA Visit #: 0/5     SUBJECTIVE     Pt reports: minimal soreness since last visit, able to move elbow at home ; Pt states her doctor would like for her to start taking her sling off 4-5 times/day and moving her elbow.   She was compliant with home exercise program.  Response to previous treatment: no adverse effects   Functional change: None noted   *DOS: 3/07/2022    Pain: 4/10  Location: left shoulder       OBJECTIVE     Objective Measures updated at progress report unless specified.     Treatment     Parisa received the treatments listed below:      manual therapy techniques: passive range motion were applied to the: left elbow, shoulder and wrist joint for 30 minutes, including:  - Passive shoulder range of motion to 90 degrees  - Passive external rotation to 25 degrees   - Passive stretching of elbow into extension   - Passive stretching of elbow into supination     therapeutic exercises to develop strength, endurance, ROM and posture for 25 minutes including:  - Tricep extension w/ resistance from PT; 3 x 10 -NT  - AROM forearm pronation and supination ; 0# -NT  - Digiflex red (3.0 lbs) 2 min x 2x - NT  - Isometrics (standing) against wall; extension, abduction, flexion; 5 sec hold; 20x    -seated EOM; Scapular retractions: 3 x 10 reps; 5 sec hold   -seated EOM; scapular shrugs; 3 x 10 reps; 5 sec hold   -Elbow extension circuit; extension for 1 minute (2lb weight) + 10 AAROM bicep curls; repeat 3 times      Patient Education and Home Exercises     Home Exercises Provided and Patient Education Provided     Education provided:   - Continue to have her arm in the sling in accordance with doctors order; perform wrist/ hand strengthening exercises as prescribed by PT    Written Home Exercises Provided: yes. Exercises were reviewed and Parisa was able to demonstrate them prior to the end of the session.  Parisa demonstrated good  understanding of the education provided. See EMR under Patient Instructions for exercises provided during therapy sessions    ASSESSMENT   Pt 2 weeks s/p left reverse TSA. Pt progressing well towards goals following doctors protocol listed below:   Week 1-4: No Internal rotation; Passive forward elevation 0-90 degrees; Passive external rotation 0-20 degrees  **No pendulums; Sling for 5 weeks   Pt beginning isometric strengthening today along with active elbow range of motion. Pt tolerating elbow extension hang with minimal discomfort but requiring min assist from PT with elbow extension to avoid adduction. Pt needing verbal cues throughout therapy to avoid shoulder adduction and internal rotation while performing exercises. Continue to progress PROM gradually to avoid shoulder and elbow stiffness.       Parisa Is progressing well towards her goals.   Pt prognosis is Good.     Pt will continue to benefit from skilled outpatient physical therapy to address the deficits listed in the problem list box on initial evaluation, provide pt/family education and to maximize pt's level of independence in the home and community environment.   Pt's spiritual, cultural and educational needs considered and pt agreeable to plan of care and goals.     Anticipated barriers to physical therapy:  Increased anxiety     GOALS:  Short Term Goals (4-6 Weeks):   1) Pt will demonstrate compliance with initial home exercise program as prescribed by physical therapist to improve independence with management of condition.  2) Pt to improve passive range of motion in flexion and external rotation at side to at least 100 and 30 degrees, respectively, to allow for improved mobility with dressing and self care.  3) Pt to report R upper extremity pain of <3/10 at worst during exercises to improve tolerance to ADLs and self care.     Long Term Goals (8-12 Weeks):   1. Pt to achieve <40% limitation as measured by the FOTO to demonstrate decreased disability.  2) Pt to improve active range of motion in R shoulder to within 20% of uninvolved extremity to allow for improved functional mobility with work tasks and lifting.  3) Pt to demonstrate at least 3+/5 grades on all upper quarter manual muscle testing to facilitate improved strength for work demands.  4) Pt to able to wash her hair in the shower with minimal assistance from spouse     PLAN   Continue to progress per doctor protocol and patients symptoms.     Sanam Davies, PT, DPT

## 2022-03-28 ENCOUNTER — CLINICAL SUPPORT (OUTPATIENT)
Dept: REHABILITATION | Facility: HOSPITAL | Age: 67
End: 2022-03-28
Payer: MEDICARE

## 2022-03-28 DIAGNOSIS — M25.512 ACUTE PAIN OF LEFT SHOULDER: ICD-10-CM

## 2022-03-28 DIAGNOSIS — M25.612 DECREASED ROM OF LEFT SHOULDER: Primary | ICD-10-CM

## 2022-03-28 PROCEDURE — 97110 THERAPEUTIC EXERCISES: CPT

## 2022-03-28 PROCEDURE — 97140 MANUAL THERAPY 1/> REGIONS: CPT

## 2022-03-28 NOTE — PROGRESS NOTES
[facility]   Physical Therapy Treatment Note     Name: Parisa EARL Wing  [facility] Number: [id number]    Therapy Diagnosis:   Encounter Diagnoses   Name Primary?    Decreased ROM of left shoulder Yes    Acute pain of left shoulder      Physician: Yahir Lyn PA*    Visit Date: 3/28/2022    Physician Orders: PT Eval and Treat   Medical Diagnosis from Referral: Closed 4-part fracture of proximal humerus, left, initial encounter [S42.292A]  Evaluation Date: 3/10/2022  Authorization Period Expiration: 3/24/2022  Plan of Care Expiration: 06/30/2022  Progress Note Due: 05/05/2022  Visit # / Visits authorized: 8/20  FOTO: 96%    Time In: 0100 am   Time Out: 0200 am  Total Billable Time: 60 minutes    PTA Visit #: 0/5     SUBJECTIVE     Pt reports: minimal soreness since last visit; pt still nervous about taking arm out of sling at home and how much she is allowed to move it.  She was compliant with home exercise program.  Response to previous treatment: no adverse effects   Functional change: None noted   *DOS: 3/07/2022    Pain: 2/10  Location: left shoulder       OBJECTIVE     Objective Measures updated at progress report unless specified.     Treatment     Parisa received the treatments listed below:      manual therapy techniques: passive range motion were applied to the: left elbow, shoulder and wrist joint for 30 minutes, including:  - Passive shoulder range of motion to 90 degrees  - Passive external rotation to 25 degrees   - Passive stretching of elbow into extension   - Passive stretching of elbow into supination     therapeutic exercises to develop strength, endurance, ROM and posture for 25 minutes including:  - AROM forearm pronation and supination; 1# DB; AAROM  - Table slides (flexion/scaption) - emphasis on upper trap relaxation before each rep; 2 x 10 reps; 3 sec hold   - Isometrics (standing) against wall; extension, abduction, flexion; 5 sec hold; 20x   -seated in chair w/  mirror; Scapular retractions: 3 x 10 reps; 5 sec hold   -seated in deidra w/ mirror; scapular shrugs; 3 x 10 reps; 5 sec hold   -Elbow extension circuit in chair; extension for 1 minute (2lb weight) + 10 AROM bicep curls; repeat 3 times      Patient Education and Home Exercises     Home Exercises Provided and Patient Education Provided     Education provided:   - Take shoulder out of sling 4-5 times/day for at least an hour   - Stretch elbow joint to avoid stiffness and pain     Written Home Exercises Provided: yes. Exercises were reviewed and Parisa was able to demonstrate them prior to the end of the session.  Parisa demonstrated good  understanding of the education provided. See EMR under Patient Instructions for exercises provided during therapy sessions    ASSESSMENT   Pt 3 weeks s/p left reverse TSA. Pt progressing well towards goals following doctors protocol listed below:   Week 1-4: No Internal rotation; Passive forward elevation 0-90 degrees; Passive external rotation 0-20 degrees  **No pendulums; Sling for 5 weeks   Minimal pain complaints with table slides; pt requiring verbal cueing to avoid using upper trap while moving shoulder. Pt showing gradual improvement in range of motion and passive elbow extension. Pt tolerating elbow extension hang with minimal discomfort but requiring min assist from PT with elbow extension to avoid adduction. Pt needing standing mirror during shrugs and scapular retractions to avoid poor motor control. Continue to progress PROM gradually to avoid shoulder and elbow stiffness.     Parisa Is progressing well towards her goals.   Pt prognosis is Good.     Pt will continue to benefit from skilled outpatient physical therapy to address the deficits listed in the problem list box on initial evaluation, provide pt/family education and to maximize pt's level of independence in the home and community environment.   Pt's spiritual, cultural and educational needs considered and pt  agreeable to plan of care and goals.     Anticipated barriers to physical therapy: Increased anxiety     GOALS:  Short Term Goals (4-6 Weeks):   1) Pt will demonstrate compliance with initial home exercise program as prescribed by physical therapist to improve independence with management of condition.  2) Pt to improve passive range of motion in flexion and external rotation at side to at least 100 and 30 degrees, respectively, to allow for improved mobility with dressing and self care.  3) Pt to report R upper extremity pain of <3/10 at worst during exercises to improve tolerance to ADLs and self care.     Long Term Goals (8-12 Weeks):   1. Pt to achieve <40% limitation as measured by the FOTO to demonstrate decreased disability.  2) Pt to improve active range of motion in R shoulder to within 20% of uninvolved extremity to allow for improved functional mobility with work tasks and lifting.  3) Pt to demonstrate at least 3+/5 grades on all upper quarter manual muscle testing to facilitate improved strength for work demands.  4) Pt to able to wash her hair in the shower with minimal assistance from spouse     PLAN   Continue to progress per doctor protocol and patients symptoms.     Sanam Davies, PT, DPT

## 2022-03-30 ENCOUNTER — CLINICAL SUPPORT (OUTPATIENT)
Dept: REHABILITATION | Facility: HOSPITAL | Age: 67
End: 2022-03-30
Payer: MEDICARE

## 2022-03-30 DIAGNOSIS — M25.512 ACUTE PAIN OF LEFT SHOULDER: ICD-10-CM

## 2022-03-30 DIAGNOSIS — M25.612 DECREASED ROM OF LEFT SHOULDER: Primary | ICD-10-CM

## 2022-03-30 PROCEDURE — 97140 MANUAL THERAPY 1/> REGIONS: CPT

## 2022-03-30 PROCEDURE — 97110 THERAPEUTIC EXERCISES: CPT

## 2022-03-30 NOTE — PROGRESS NOTES
[facility]   Physical Therapy Treatment Note     Name: Parisa EARL Wing  [facility] Number: [id number]    Therapy Diagnosis:   Encounter Diagnoses   Name Primary?    Decreased ROM of left shoulder Yes    Acute pain of left shoulder      Physician: Yahir Lyn PA*    Visit Date: 3/30/2022    Physician Orders: PT Eval and Treat   Medical Diagnosis from Referral: Closed 4-part fracture of proximal humerus, left, initial encounter [S42.292A]  Evaluation Date: 3/10/2022  Authorization Period Expiration: 3/24/2022  Plan of Care Expiration: 06/30/2022  Progress Note Due: 05/05/2022  Visit # / Visits authorized: 9/20  FOTO: 96%    Time In: 0900 am   Time Out: 1000 am  Total Billable Time: 60 minutes    PTA Visit #: 0/5     SUBJECTIVE     Pt reports: minimal soreness since last visit with some anterior shoulder pain when she woke up this morning; pt able to take her arm out of sling without increased anxiety concerning shoulder movement  She was compliant with home exercise program.  Response to previous treatment: no adverse effects   Functional change: None noted   *DOS: 3/07/2022    Pain: 4/10  Location: left shoulder       OBJECTIVE     Objective Measures updated at progress report unless specified.     Treatment     Parisa received the treatments listed below:      manual therapy techniques: passive range motion were applied to the: left elbow, shoulder and wrist joint for 30 minutes, including:  - Passive shoulder range of motion to 90 degrees  - Passive external rotation to 25 degrees   - Passive stretching of elbow into extension   - Passive stretching of elbow into supination     therapeutic exercises to develop strength, endurance, ROM and posture for 25 minutes including:  - AROM forearm pronation and supination; 1# DB; AAROM - max verbal and tactile cueing to avoid internal rotation with each direction   - Table slides (flexion/scaption) - emphasis on upper trap relaxation before  each rep; 2 x 10 reps; 3 sec hold   - Isometrics (standing) against wall; extension, abduction, flexion; 5 sec hold; 30x   -seated in chair w/ mirror; Scapular retractions: 3 x 10 reps; 5 sec hold  - NT  -seated in deidra w/ mirror; scapular shrugs; 3 x 10 reps; 5 sec hold - NT   -Elbow extension circuit in chair; extension for 1 minute (1 lb DB) + 10 AROM bicep curls (1 lb DB); repeat 3 times      Patient Education and Home Exercises     Home Exercises Provided and Patient Education Provided     Education provided:   - Take shoulder out of sling 4-5 times/day for at least an hour   - Stretch elbow joint to avoid stiffness and pain     Written Home Exercises Provided: yes. Exercises were reviewed and Parisa was able to demonstrate them prior to the end of the session.  Parisa demonstrated good  understanding of the education provided. See EMR under Patient Instructions for exercises provided during therapy sessions    ASSESSMENT   Pt 3 weeks s/p left reverse TSA. Pt progressing well towards goals following doctors protocol listed below:   Week 1-4: No Internal rotation; Passive forward elevation 0-90 degrees; Passive external rotation 0-20 degrees  **No pendulums; Sling for 5 weeks   Minimal pain complaints with table slides; pt requiring verbal cueing to avoid using upper trap while moving shoulder. Pt showing gradual improvement in range of motion and passive elbow extension. Pt tolerating elbow extension hang with minimal discomfort but requiring min assist from PT with elbow extension to avoid adduction. Pt needing standing mirror during shrugs and scapular retractions to avoid poor motor control. Pt requiring max verbal and tactile cueing to improve motor control of wrist flexor and avoid internal rotation. Continue to progress PROM gradually to avoid shoulder and elbow stiffness.     Parisa Is progressing well towards her goals.   Pt prognosis is Good.     Pt will continue to benefit from skilled outpatient  physical therapy to address the deficits listed in the problem list box on initial evaluation, provide pt/family education and to maximize pt's level of independence in the home and community environment.   Pt's spiritual, cultural and educational needs considered and pt agreeable to plan of care and goals.     Anticipated barriers to physical therapy: Increased anxiety     GOALS:  Short Term Goals (4-6 Weeks):   1) Pt will demonstrate compliance with initial home exercise program as prescribed by physical therapist to improve independence with management of condition.  2) Pt to improve passive range of motion in flexion and external rotation at side to at least 100 and 30 degrees, respectively, to allow for improved mobility with dressing and self care.  3) Pt to report R upper extremity pain of <3/10 at worst during exercises to improve tolerance to ADLs and self care.     Long Term Goals (8-12 Weeks):   1. Pt to achieve <40% limitation as measured by the FOTO to demonstrate decreased disability.  2) Pt to improve active range of motion in R shoulder to within 20% of uninvolved extremity to allow for improved functional mobility with work tasks and lifting.  3) Pt to demonstrate at least 3+/5 grades on all upper quarter manual muscle testing to facilitate improved strength for work demands.  4) Pt to able to wash her hair in the shower with minimal assistance from spouse     PLAN   Continue to progress per doctor protocol and patients symptoms.     Sanam Davies, PT, DPT

## 2022-03-31 ENCOUNTER — CLINICAL SUPPORT (OUTPATIENT)
Dept: REHABILITATION | Facility: HOSPITAL | Age: 67
End: 2022-03-31
Payer: MEDICARE

## 2022-03-31 DIAGNOSIS — M25.612 DECREASED ROM OF LEFT SHOULDER: Primary | ICD-10-CM

## 2022-03-31 PROCEDURE — 97140 MANUAL THERAPY 1/> REGIONS: CPT | Mod: CQ

## 2022-03-31 PROCEDURE — 97110 THERAPEUTIC EXERCISES: CPT | Mod: CQ

## 2022-03-31 NOTE — PROGRESS NOTES
[facility]   Physical Therapy Treatment Note     Name: Parisa EARL Wing  [facility] Number: [id number]    Therapy Diagnosis:   Encounter Diagnosis   Name Primary?    Decreased ROM of left shoulder Yes     Physician: Yahir Lyn PA*    Visit Date: 3/31/2022    Physician Orders: PT Eval and Treat   Medical Diagnosis from Referral: Closed 4-part fracture of proximal humerus, left, initial encounter [S42.292A]  Evaluation Date: 3/10/2022  Authorization Period Expiration: 3/24/2022  Plan of Care Expiration: 06/30/2022  Progress Note Due: 05/05/2022  Visit # / Visits authorized: 10/20  FOTO: 96%    Time In: 10:00 am   Time Out: 10:55  am  Total Billable Time: 55 minutes    PTA Visit #: 1/5     SUBJECTIVE     Pt reports: a little sore today from therapy yesterday   She was compliant with home exercise program.  Response to previous treatment: no adverse effects   Functional change: None noted   *DOS: 3/07/2022    Pain: 4/10  Location: left shoulder       OBJECTIVE     Objective Measures updated at progress report unless specified.     Treatment     Parisa received the treatments listed below:      manual therapy techniques: passive range motion were applied to the: left elbow, shoulder and wrist joint for 15 minutes, including:  - Passive shoulder range of motion to 90 degrees  - Passive external rotation to 25 degrees   - Passive stretching of elbow into extension   - Passive stretching of elbow into supination     therapeutic exercises to develop strength, endurance, ROM and posture for 40 minutes including:  - AROM forearm pronation and supination; 1# DB; AAROM - max verbal and tactile cueing to avoid internal rotation with each direction   - Table slides (flexion/scaption) - emphasis on upper trap relaxation before each rep; 2 x 10 reps; 3 sec hold   - Isometrics (standing) against wall; extension, abduction, flexion; 5 sec hold; 30x   -seated in chair w/ mirror; Scapular retractions: 3 x  10 reps; 5 sec hold    -seated in deidra w/ mirror; scapular shrugs; 3 x 10 reps; 5 sec hold - NT   -Elbow extension circuit in chair; extension for 1 minute (1 lb DB) + 10 AROM bicep curls (1 lb DB); repeat 3 times      Patient Education and Home Exercises     Home Exercises Provided and Patient Education Provided     Education provided:   - Take shoulder out of sling 4-5 times/day for at least an hour   - Stretch elbow joint to avoid stiffness and pain     Written Home Exercises Provided: yes. Exercises were reviewed and Parisa was able to demonstrate them prior to the end of the session.  Parisa demonstrated good  understanding of the education provided. See EMR under Patient Instructions for exercises provided during therapy sessions    ASSESSMENT   Pt 3 weeks s/p left reverse TSA. Pt progressing well towards goals following doctors protocol listed below:   Week 1-4: No Internal rotation; Passive forward elevation 0-90 degrees; Passive external rotation 0-20 degrees  **No pendulums; Sling for 5 weeks     Pt requires heavy verbal and tactile cues to avoid shoulder IR with active elbow flexion . Pt also requires heavy cues with most exercises to avoid UT compensations. Pt with little c/o pain during performance of there-ex and manual interventions and is progressing well . Continue to progress PROM gradually to avoid shoulder and elbow stiffness.     Parisa Is progressing well towards her goals.   Pt prognosis is Good.     Pt will continue to benefit from skilled outpatient physical therapy to address the deficits listed in the problem list box on initial evaluation, provide pt/family education and to maximize pt's level of independence in the home and community environment.   Pt's spiritual, cultural and educational needs considered and pt agreeable to plan of care and goals.     Anticipated barriers to physical therapy: Increased anxiety     GOALS:  Short Term Goals (4-6 Weeks):   1) Pt will demonstrate compliance  with initial home exercise program as prescribed by physical therapist to improve independence with management of condition.  2) Pt to improve passive range of motion in flexion and external rotation at side to at least 100 and 30 degrees, respectively, to allow for improved mobility with dressing and self care.  3) Pt to report R upper extremity pain of <3/10 at worst during exercises to improve tolerance to ADLs and self care.     Long Term Goals (8-12 Weeks):   1. Pt to achieve <40% limitation as measured by the FOTO to demonstrate decreased disability.  2) Pt to improve active range of motion in R shoulder to within 20% of uninvolved extremity to allow for improved functional mobility with work tasks and lifting.  3) Pt to demonstrate at least 3+/5 grades on all upper quarter manual muscle testing to facilitate improved strength for work demands.  4) Pt to able to wash her hair in the shower with minimal assistance from spouse     PLAN   Continue to progress per doctor protocol and patients symptoms.     Teresa Alcala, PTA

## 2022-04-04 ENCOUNTER — CLINICAL SUPPORT (OUTPATIENT)
Dept: REHABILITATION | Facility: HOSPITAL | Age: 67
End: 2022-04-04
Payer: MEDICARE

## 2022-04-04 DIAGNOSIS — M25.612 DECREASED ROM OF LEFT SHOULDER: Primary | ICD-10-CM

## 2022-04-04 DIAGNOSIS — M25.512 ACUTE PAIN OF LEFT SHOULDER: ICD-10-CM

## 2022-04-04 PROCEDURE — 97110 THERAPEUTIC EXERCISES: CPT

## 2022-04-04 PROCEDURE — 97140 MANUAL THERAPY 1/> REGIONS: CPT

## 2022-04-04 NOTE — PROGRESS NOTES
[facility]   Physical Therapy Treatment Note     Name: Parisa Dimas  [facility] Number: [id number]    Therapy Diagnosis:   Encounter Diagnoses   Name Primary?    Decreased ROM of left shoulder Yes    Acute pain of left shoulder      Physician: Yahir Lyn PA*    Visit Date: 4/4/2022    Physician Orders: PT Eval and Treat   Medical Diagnosis from Referral: Closed 4-part fracture of proximal humerus, left, initial encounter [S42.292A]  Evaluation Date: 3/10/2022  Authorization Period Expiration: 3/24/2022  Plan of Care Expiration: 06/30/2022  Progress Note Due: 05/05/2022  Visit # / Visits authorized: 11/20  FOTO: 96%    Time In: 0100 pm   Time Out: 0200 pm  Total Billable Time: 60 minutes    PTA Visit #: 0/5     SUBJECTIVE     Pt reports: a little sore today from therapy yesterday; no new complaints   She was compliant with home exercise program.  Response to previous treatment: no adverse effects   Functional change: None noted   *DOS: 3/07/2022    Pain: 2/10  Location: left shoulder       OBJECTIVE     Objective Measures updated at progress report unless specified.     Treatment     Parisa received the treatments listed below:      manual therapy techniques: passive range motion were applied to the: left elbow, shoulder and wrist joint for 15 minutes, including:  - Passive shoulder range of motion to 90 degrees  - Passive external rotation to 25 degrees   - Passive stretching of elbow into extension   - Passive stretching of elbow into supination     therapeutic exercises to develop strength, endurance, ROM and posture for 40 minutes including:  - AROM forearm pronation and supination; 1# DB; AAROM - max verbal and tactile cueing to avoid internal rotation with each direction   - Table slides (flexion/scaption/abduction) - emphasis on upper trap relaxation before each rep; 2 x 10 reps; 3 sec hold   - Isometrics (standing) against wall; extension, abduction, flexion; 5 sec hold; 30x    -seated in chair w/ mirror; Scapular retractions: 3 x 10 reps; 5 sec hold    -seated in deidra w/ mirror; scapular shrugs; 3 x 10 reps; 5 sec hold   -Elbow extension circuit in chair; extension for 1 minute (1 lb DB) + 10 AROM bicep curls (1 lb DB); repeat 3 times  + supine chest press; 1 lb therabar; 3 x 10; 3 sec hold  + supine serratus punch; 1 lb therabar; 3 x 10; 3 sec hold     Patient Education and Home Exercises     Home Exercises Provided and Patient Education Provided     Education provided:   - Take shoulder out of sling 4-5 times/day for at least an hour   - UPDATED 4/4/2022    Written Home Exercises Provided: yes. Exercises were reviewed and Parisa was able to demonstrate them prior to the end of the session.  Parisa demonstrated good  understanding of the education provided. See EMR under Patient Instructions for exercises provided during therapy sessions    ASSESSMENT   Pt 3 weeks s/p left reverse TSA. Pt progressing well towards goals following doctors protocol listed below:   Week 1-4: No Internal rotation; Passive forward elevation 0-90 degrees; Passive external rotation 0-20 degrees  **No pendulums; Sling for 5 weeks     Pt requires heavy verbal and tactile cues to avoid shoulder IR with active elbow flexion . Pt also requires heavy cues with most exercises to avoid UT compensations. Pt with little c/o pain during performance of ther-ex and manual interventions and is progressing well . Continue to progress PROM gradually to avoid shoulder and elbow stiffness.     Parisa Is progressing well towards her goals.   Pt prognosis is Good.     Pt will continue to benefit from skilled outpatient physical therapy to address the deficits listed in the problem list box on initial evaluation, provide pt/family education and to maximize pt's level of independence in the home and community environment.   Pt's spiritual, cultural and educational needs considered and pt agreeable to plan of care and goals.      Anticipated barriers to physical therapy: Increased anxiety about moving arm without sling    GOALS:  Short Term Goals (4-6 Weeks):   1) Pt will demonstrate compliance with initial home exercise program as prescribed by physical therapist to improve independence with management of condition.  2) Pt to improve passive range of motion in flexion and external rotation at side to at least 100 and 30 degrees, respectively, to allow for improved mobility with dressing and self care.  3) Pt to report R upper extremity pain of <3/10 at worst during exercises to improve tolerance to ADLs and self care.     Long Term Goals (8-12 Weeks):   1. Pt to achieve <40% limitation as measured by the FOTO to demonstrate decreased disability.  2) Pt to improve active range of motion in R shoulder to within 20% of uninvolved extremity to allow for improved functional mobility with work tasks and lifting.  3) Pt to demonstrate at least 3+/5 grades on all upper quarter manual muscle testing to facilitate improved strength for work demands.  4) Pt to able to wash her hair in the shower with minimal assistance from spouse     PLAN   Continue to progress per doctor protocol and patients symptoms.     Sanam Davies, PT, DPT

## 2022-04-06 ENCOUNTER — CLINICAL SUPPORT (OUTPATIENT)
Dept: REHABILITATION | Facility: HOSPITAL | Age: 67
End: 2022-04-06
Payer: MEDICARE

## 2022-04-06 DIAGNOSIS — M25.512 CHRONIC LEFT SHOULDER PAIN: ICD-10-CM

## 2022-04-06 DIAGNOSIS — M25.612 DECREASED ROM OF LEFT SHOULDER: Primary | ICD-10-CM

## 2022-04-06 DIAGNOSIS — G89.29 CHRONIC LEFT SHOULDER PAIN: ICD-10-CM

## 2022-04-06 PROCEDURE — 97140 MANUAL THERAPY 1/> REGIONS: CPT

## 2022-04-06 PROCEDURE — 97110 THERAPEUTIC EXERCISES: CPT

## 2022-04-06 NOTE — PROGRESS NOTES
[facility]   Physical Therapy Treatment Note     Name: Parisa Dimas  [facility] Number: [id number]    Therapy Diagnosis:   Encounter Diagnoses   Name Primary?    Decreased ROM of left shoulder Yes    Chronic left shoulder pain      Physician: Yahir Lyn PA*    Visit Date: 4/6/2022    Physician Orders: PT Eval and Treat   Medical Diagnosis from Referral: Closed 4-part fracture of proximal humerus, left, initial encounter [S42.292A]  Evaluation Date: 3/10/2022  Authorization Period Expiration: 3/24/2022  Plan of Care Expiration: 06/30/2022  Progress Note Due: 05/05/2022  Visit # / Visits authorized: 12/20  FOTO: 96%    Time In: 1000 am   Time Out: 1100 am  Total Billable Time: 60 minutes    PTA Visit #: 0/5     SUBJECTIVE     Pt reports: a little sore today from therapy yesterday; no new complaints; pt asking if it is okay for her to not wear her sling for more than 2 hours/day  She was compliant with home exercise program.  Response to previous treatment: no adverse effects   Functional change: None noted   *DOS: 3/07/2022    Pain: 2/10  Location: left shoulder       OBJECTIVE     Objective Measures updated at progress report unless specified.     Treatment     Parisa received the treatments listed below:      manual therapy techniques: passive range motion were applied to the: left elbow, shoulder and wrist joint for 15 minutes, including:  - Passive shoulder range of motion to 120 degrees of flexion  - Passive shoulder range of motion to 100 degrees of abduction  - Passive external rotation to 25 degrees   - Passive stretching of elbow into extension   - Passive stretching of elbow into supination     therapeutic exercises to develop strength, endurance, ROM and posture for 45 minutes including:  - AROM forearm pronation and supination; 1# DB; AAROM - max verbal and tactile cueing to avoid internal rotation with each direction   - Table slides (flexion/scaption/abduction) -  emphasis on upper trap relaxation before each rep; 2 x 10 reps; 3 sec hold   - Isometrics (standing) against wall; extension, abduction, flexion; 5 sec hold; 30x - discharged to HEP   - Seated in chair w/ mirror; Scapular retractions: 3 x 10 reps; 5 sec hold    - Seated in deidra w/ mirror; scapular shrugs; 3 x 10 reps; 5 sec hold   - Elbow extension circuit in chair; extension for 1 minute (1 lb DB) + 10 AROM bicep curls (1 lb DB); repeat 3 times  - Supine chest press; 2 lb therabar; 3 x 10; 3 sec hold  - Supine serratus punch; 2 lb therabar; 3 x 10; 3 sec hold   + Prone row; 3 x 10 reps; 3 sec hold     Patient Education and Home Exercises     Home Exercises Provided and Patient Education Provided     Education provided:   - Take shoulder out of sling 4-5 times/day for at least an hour   - UPDATED 4/4/2022    Written Home Exercises Provided: yes. Exercises were reviewed and Parisa was able to demonstrate them prior to the end of the session.  Parisa demonstrated good  understanding of the education provided. See EMR under Patient Instructions for exercises provided during therapy sessions    ASSESSMENT   Pt 4 weeks s/p left reverse TSA. Pt progressing well towards goals following doctors protocol listed below:   Week 1-4: No Internal rotation; Passive forward elevation 0-90 degrees; Passive external rotation 0-20 degrees  **No pendulums; Sling for 5 weeks     Pt requires heavy verbal and tactile cues to avoid shoulder IR with active elbow flexion . Pt also requires heavy cues with most exercises to avoid UT compensations. Pt with little c/o pain during performance of ther-ex and manual interventions and is progressing well . Pt able to progress to 2 lb weighted therabar along with prone row requiring max cueing for motor control of scapular movement. Continue to progress PROM gradually to avoid shoulder and elbow stiffness. Pt to begin AAROM next week according to symptoms.     Parisa Is progressing well towards her  goals.   Pt prognosis is Good.     Pt will continue to benefit from skilled outpatient physical therapy to address the deficits listed in the problem list box on initial evaluation, provide pt/family education and to maximize pt's level of independence in the home and community environment.   Pt's spiritual, cultural and educational needs considered and pt agreeable to plan of care and goals.     Anticipated barriers to physical therapy: Increased anxiety about moving arm without sling    GOALS:  Short Term Goals (4-6 Weeks):   1) Pt will demonstrate compliance with initial home exercise program as prescribed by physical therapist to improve independence with management of condition.  2) Pt to improve passive range of motion in flexion and external rotation at side to at least 100 and 30 degrees, respectively, to allow for improved mobility with dressing and self care.  3) Pt to report R upper extremity pain of <3/10 at worst during exercises to improve tolerance to ADLs and self care.     Long Term Goals (8-12 Weeks):   1. Pt to achieve <40% limitation as measured by the FOTO to demonstrate decreased disability.  2) Pt to improve active range of motion in R shoulder to within 20% of uninvolved extremity to allow for improved functional mobility with work tasks and lifting.  3) Pt to demonstrate at least 3+/5 grades on all upper quarter manual muscle testing to facilitate improved strength for work demands.  4) Pt to able to wash her hair in the shower with minimal assistance from spouse     PLAN   Continue to progress per doctor protocol and patients symptoms.     Sanam Davies, PT, DPT

## 2022-04-07 ENCOUNTER — CLINICAL SUPPORT (OUTPATIENT)
Dept: REHABILITATION | Facility: HOSPITAL | Age: 67
End: 2022-04-07
Payer: MEDICARE

## 2022-04-07 DIAGNOSIS — M25.612 DECREASED ROM OF LEFT SHOULDER: Primary | ICD-10-CM

## 2022-04-07 PROCEDURE — 97110 THERAPEUTIC EXERCISES: CPT | Mod: CQ

## 2022-04-07 NOTE — PROGRESS NOTES
[facility]   Physical Therapy Treatment Note     Name: Parisa EARL Wing  [facility] Number: [id number]    Therapy Diagnosis:   Encounter Diagnosis   Name Primary?    Decreased ROM of left shoulder Yes     Physician: Yahir Lyn PA*    Visit Date: 4/7/2022    Physician Orders: PT Eval and Treat   Medical Diagnosis from Referral: Closed 4-part fracture of proximal humerus, left, initial encounter [S42.292A]  Evaluation Date: 3/10/2022  Authorization Period Expiration: 3/24/2022  Plan of Care Expiration: 06/30/2022  Progress Note Due: 05/05/2022  Visit # / Visits authorized: 13/20  FOTO: 96%    Time In: 1000 am   Time Out: 1100 am  Total Billable Time: 60 minutes    PTA Visit #: 1/5     SUBJECTIVE     Pt reports: she is doing well and not having much   She was compliant with home exercise program.  Response to previous treatment: no adverse effects   Functional change: None noted   *DOS: 3/07/2022    Pain: 2/10  Location: left shoulder       OBJECTIVE     Objective Measures updated at progress report unless specified.     Treatment     Parisa received the treatments listed below:      manual therapy techniques: passive range motion were applied to the: left elbow, shoulder and wrist joint for 0 minutes, including:  - Passive shoulder range of motion to 120 degrees of flexion  - Passive shoulder range of motion to 100 degrees of abduction  - Passive external rotation to 25 degrees   - Passive stretching of elbow into extension   - Passive stretching of elbow into supination     therapeutic exercises to develop strength, endurance, ROM and posture for 60 minutes including:  - AROM forearm pronation and supination; 1# DB; AAROM - max verbal and tactile cueing to avoid internal rotation with each direction   - Table slides (flexion/scaption/abduction) - emphasis on upper trap relaxation before each rep; 2 x 10 reps; 3 sec hold   - Isometrics (standing) against wall; extension, abduction,  flexion; 5 sec hold; 30x - discharged to HEP   - Seated in chair w/ mirror; Scapular retractions: 3 x 10 reps; 5 sec hold    - Seated in deidra w/ mirror; scapular shrugs; 3 x 10 reps; 5 sec hold   - Elbow extension circuit in chair; extension for 1 minute (1 lb DB) + 10 AROM bicep curls (1 lb DB); repeat 3 times  - Supine chest press; 2 lb therabar; 3 x 10; 3 sec hold  - Supine serratus punch; 2 lb therabar; 3 x 10; 3 sec hold   + Prone row; 3 x 10 reps; 3 sec hold     Patient Education and Home Exercises     Home Exercises Provided and Patient Education Provided     Education provided:   - Take shoulder out of sling 4-5 times/day for at least an hour   - UPDATED 4/4/2022    Written Home Exercises Provided: yes. Exercises were reviewed and Parisa was able to demonstrate them prior to the end of the session.  Parisa demonstrated good  understanding of the education provided. See EMR under Patient Instructions for exercises provided during therapy sessions    ASSESSMENT   Pt 4 weeks s/p left reverse TSA. Pt progressing well towards goals following doctors protocol listed below:   Week 1-4: No Internal rotation; Passive forward elevation 0-90 degrees; Passive external rotation 0-20 degrees  **No pendulums; Sling for 5 weeks     Pt continues to exhibit mild shoulder IR while performing bicep curls and isometrics and requires cues for proper form . She exhibits decreased elbow stiffness and good tolerance to exercises performed today . Pt to begin AAROM next week according to symptoms.     Parisa Is progressing well towards her goals.   Pt prognosis is Good.     Pt will continue to benefit from skilled outpatient physical therapy to address the deficits listed in the problem list box on initial evaluation, provide pt/family education and to maximize pt's level of independence in the home and community environment.   Pt's spiritual, cultural and educational needs considered and pt agreeable to plan of care and goals.      Anticipated barriers to physical therapy: Increased anxiety about moving arm without sling    GOALS:  Short Term Goals (4-6 Weeks):   1) Pt will demonstrate compliance with initial home exercise program as prescribed by physical therapist to improve independence with management of condition.  2) Pt to improve passive range of motion in flexion and external rotation at side to at least 100 and 30 degrees, respectively, to allow for improved mobility with dressing and self care.  3) Pt to report R upper extremity pain of <3/10 at worst during exercises to improve tolerance to ADLs and self care.     Long Term Goals (8-12 Weeks):   1. Pt to achieve <40% limitation as measured by the FOTO to demonstrate decreased disability.  2) Pt to improve active range of motion in R shoulder to within 20% of uninvolved extremity to allow for improved functional mobility with work tasks and lifting.  3) Pt to demonstrate at least 3+/5 grades on all upper quarter manual muscle testing to facilitate improved strength for work demands.  4) Pt to able to wash her hair in the shower with minimal assistance from spouse     PLAN   Continue to progress per doctor protocol and patients symptoms.     Teresa Alcala, PTA

## 2022-04-11 ENCOUNTER — CLINICAL SUPPORT (OUTPATIENT)
Dept: REHABILITATION | Facility: HOSPITAL | Age: 67
End: 2022-04-11
Payer: MEDICARE

## 2022-04-11 DIAGNOSIS — M25.612 DECREASED ROM OF LEFT SHOULDER: Primary | ICD-10-CM

## 2022-04-11 DIAGNOSIS — M25.512 ACUTE PAIN OF LEFT SHOULDER: ICD-10-CM

## 2022-04-11 PROCEDURE — 97140 MANUAL THERAPY 1/> REGIONS: CPT | Mod: CQ

## 2022-04-11 PROCEDURE — 97110 THERAPEUTIC EXERCISES: CPT | Mod: CQ

## 2022-04-11 NOTE — PROGRESS NOTES
[facility]   Physical Therapy Treatment Note     Name: Parisa Dimas  [facility] Number: [id number]    Therapy Diagnosis:   Encounter Diagnoses   Name Primary?    Decreased ROM of left shoulder Yes    Acute pain of left shoulder      Physician: Yahir Lyn PA*    Visit Date: 4/11/2022    Physician Orders: PT Eval and Treat   Medical Diagnosis from Referral: Closed 4-part fracture of proximal humerus, left, initial encounter [S42.292A]  Evaluation Date: 3/10/2022  Authorization Period Expiration: 05/10/2022  Plan of Care Expiration: 06/30/2022  Progress Note Due: 05/05/2022  Visit # / Visits authorized: 10 / 11 (total visit # 14)    PTA Visit #: 2 / 5     Time In: 1307  Time Out: 1400  Total Billable Time: 53 minutes (1 MT, 3 TE)    SUBJECTIVE     Patient reports: that has been sitting out of the sling for 3 hours at a time. She reports that she has some soreness today.  She was compliant with home exercise program.  Response to previous treatment: good, no adverse reaction  Functional change: able to sit out of sling for 3 hours     Pain: 3-4/10, currently  Location: Left shoulder       OBJECTIVE     Objective Measures updated at progress report unless specified.   DOS 3/07/2022  6 week follow up 4/19/2022    Treatment     Parisa received the treatments listed below:      manual therapy techniques: passive range motion were applied to the: left elbow, shoulder and wrist joint for 15 minutes, including:  - Passive shoulder range of motion to 120 degrees of flexion  - Passive shoulder range of motion to 100 degrees of abduction  - Passive external rotation to 25 degrees   - Passive stretching of elbow into extension   - Passive stretching of elbow into supination     therapeutic exercises to develop strength, endurance, ROM and posture for 38 minutes including:  - AROM forearm pronation and supination; 1# DB; AAROM - max verbal and tactile cueing to avoid internal rotation with each  direction   - Table slides (flexion/scaption/abduction) - emphasis on upper trap relaxation before each rep; 2 x 10 reps; 3 sec hold   - Seated in chair w/ mirror; scapular retractions: 3 x 10 reps; 5 sec hold    - Seated in chair w/ mirror; scapular shrugs; 3 x 10 reps; 5 sec hold    - Elbow extension circuit in chair; extension for 1 minute (1 lb DB) + 10 AROM bicep curls (1 lb DB); repeat 3 times  - Supine chest press; 2 lb therabar; 3 x 10; 3 sec hold  - Supine serratus punch; 2 lb therabar; 3 x 10; 3 sec hold   - Prone row; 3 x 10 reps; 5 sec hold     Patient Education and Home Exercises     Home Exercises Provided and Patient Education Provided     Education provided:   - Continued compliance with HEP  - Use of ice for pain reduction  - Taking arm out of sling 4-5x for at least an hour    Written Home Exercises Provided: Patient instructed to cont prior HEP. Exercises were reviewed and Parisa was able to demonstrate them prior to the end of the session.  Parisa demonstrated good  understanding of the education provided. See EMR under Patient Instructions for exercises provided during therapy sessions    ASSESSMENT     Demonstrates good passive range of motion today. She requires tactile cueing with prone rows to maintain proper technique. Visual cueing required with mirror for scapular retractions and shoulder shrugs to avoid upper trap compensation.   Parisa Is progressing well towards her goals.   Pt prognosis is Good.     Pt will continue to benefit from skilled outpatient physical therapy to address the deficits listed in the problem list box on initial evaluation, provide pt/family education and to maximize pt's level of independence in the home and community environment.   Pt's spiritual, cultural and educational needs considered and pt agreeable to plan of care and goals.     Anticipated barriers to physical therapy: Increased anxiety about moving arm without sling    GOALS:  Short Term Goals (4-6 Weeks):    1) Pt will demonstrate compliance with initial home exercise program as prescribed by physical therapist to improve independence with management of condition.  2) Pt to improve passive range of motion in flexion and external rotation at side to at least 100 and 30 degrees, respectively, to allow for improved mobility with dressing and self care.  3) Pt to report R upper extremity pain of <3/10 at worst during exercises to improve tolerance to ADLs and self care.     Long Term Goals (8-12 Weeks):   1. Pt to achieve <40% limitation as measured by the FOTO to demonstrate decreased disability.  2) Pt to improve active range of motion in R shoulder to within 20% of uninvolved extremity to allow for improved functional mobility with work tasks and lifting.  3) Pt to demonstrate at least 3+/5 grades on all upper quarter manual muscle testing to facilitate improved strength for work demands.  4) Pt to able to wash her hair in the shower with minimal assistance from spouse     PLAN     Continue to progress per doctor protocol and patients symptoms.     Neisha Adams, PTA

## 2022-04-12 NOTE — PROGRESS NOTES
[facility]   Physical Therapy Treatment Note     Name: Parisa EARL Wing  [facility] Number: [id number]    Therapy Diagnosis:   Encounter Diagnosis   Name Primary?    Decreased ROM of left shoulder Yes     Physician: Yahir Lyn PA*    Visit Date: 4/13/2022    Physician Orders: PT Eval and Treat   Medical Diagnosis from Referral: Closed 4-part fracture of proximal humerus, left, initial encounter [S42.292A]  Evaluation Date: 3/10/2022  Authorization Period Expiration: 05/10/2022  Plan of Care Expiration: 06/30/2022  Progress Note Due: 05/05/2022  Visit # / Visits authorized: 11 / 11 (total visit # 14)    PTA Visit #: 3 / 5     Time In: 10:00 am   Time Out: 11:00 am  Total Billable Time: 60 minutes (1 MT, 3 TE)    SUBJECTIVE     Patient reports: its feeling okay , just a little bit of pain.   She was compliant with home exercise program.  Response to previous treatment: good, no adverse reaction  Functional change: able to sit out of sling for 3 hours     Pain: 3/10, currently  Location: Left shoulder       OBJECTIVE     Objective Measures updated at progress report unless specified.   DOS 3/07/2022  6 week follow up 4/19/2022    Treatment     Parisa received the treatments listed below:      manual therapy techniques: passive range motion were applied to the: left elbow, shoulder and wrist joint for 15 minutes, including:  - Passive shoulder range of motion to 120 degrees of flexion  - Passive shoulder range of motion to 100 degrees of abduction  - Passive external rotation to 25 degrees   - Passive stretching of elbow into extension   - Passive stretching of elbow into supination     therapeutic exercises to develop strength, endurance, ROM and posture for 42 minutes including:  - AROM forearm pronation and supination; 1# DB; AAROM - max verbal and tactile cueing to avoid internal rotation with each direction   - Table slides (flexion/scaption/abduction) - emphasis on upper trap  relaxation before each rep; 2 x 10 reps; 3 sec hold   - Seated in chair w/ mirror; scapular retractions: 3 x 10 reps; 5 sec hold    - Seated in chair w/ mirror; scapular shrugs; 3 x 10 reps; 5 sec hold    - Elbow extension circuit in chair; extension for 1 minute (1 lb DB) + 10 AROM bicep curls (1 lb DB); repeat 3 times  - Supine chest press; 2 lb therabar; 3 x 10; 3 sec hold  - Supine serratus punch; 2 lb therabar; 3 x 10; 3 sec hold   - Prone row; 3 x 10 reps; 5 sec hold     Patient Education and Home Exercises     Home Exercises Provided and Patient Education Provided     Education provided:   - Continued compliance with HEP  - Use of ice for pain reduction  - Taking arm out of sling 4-5x for at least an hour    Written Home Exercises Provided: Patient instructed to cont prior HEP. Exercises were reviewed and Parisa was able to demonstrate them prior to the end of the session.  Parisa demonstrated good  understanding of the education provided. See EMR under Patient Instructions for exercises provided during therapy sessions    ASSESSMENT     Pt exhibits good PROM although still with some guarding present . She requires tactile cueing with prone rows to maintain proper technique. Visual cueing required with mirror for scapular retractions and shoulder shrugs to avoid upper trap compensation.   Parisa Is progressing well towards her goals.   Pt prognosis is Good.     Pt will continue to benefit from skilled outpatient physical therapy to address the deficits listed in the problem list box on initial evaluation, provide pt/family education and to maximize pt's level of independence in the home and community environment.   Pt's spiritual, cultural and educational needs considered and pt agreeable to plan of care and goals.     Anticipated barriers to physical therapy: Increased anxiety about moving arm without sling    GOALS:  Short Term Goals (4-6 Weeks):   1) Pt will demonstrate compliance with initial home exercise  program as prescribed by physical therapist to improve independence with management of condition.  2) Pt to improve passive range of motion in flexion and external rotation at side to at least 100 and 30 degrees, respectively, to allow for improved mobility with dressing and self care.  3) Pt to report R upper extremity pain of <3/10 at worst during exercises to improve tolerance to ADLs and self care.     Long Term Goals (8-12 Weeks):   1. Pt to achieve <40% limitation as measured by the FOTO to demonstrate decreased disability.  2) Pt to improve active range of motion in R shoulder to within 20% of uninvolved extremity to allow for improved functional mobility with work tasks and lifting.  3) Pt to demonstrate at least 3+/5 grades on all upper quarter manual muscle testing to facilitate improved strength for work demands.  4) Pt to able to wash her hair in the shower with minimal assistance from spouse     PLAN     Continue to progress per doctor protocol and patients symptoms.     Teresa Alcala, PTA

## 2022-04-13 ENCOUNTER — CLINICAL SUPPORT (OUTPATIENT)
Dept: REHABILITATION | Facility: HOSPITAL | Age: 67
End: 2022-04-13
Payer: MEDICARE

## 2022-04-13 DIAGNOSIS — M25.612 DECREASED ROM OF LEFT SHOULDER: Primary | ICD-10-CM

## 2022-04-13 PROCEDURE — 97140 MANUAL THERAPY 1/> REGIONS: CPT | Mod: CQ

## 2022-04-13 PROCEDURE — 97110 THERAPEUTIC EXERCISES: CPT | Mod: CQ

## 2022-04-14 ENCOUNTER — CLINICAL SUPPORT (OUTPATIENT)
Dept: REHABILITATION | Facility: HOSPITAL | Age: 67
End: 2022-04-14
Payer: MEDICARE

## 2022-04-14 DIAGNOSIS — G89.29 CHRONIC LEFT SHOULDER PAIN: ICD-10-CM

## 2022-04-14 DIAGNOSIS — M25.612 DECREASED ROM OF LEFT SHOULDER: Primary | ICD-10-CM

## 2022-04-14 DIAGNOSIS — M25.512 CHRONIC LEFT SHOULDER PAIN: ICD-10-CM

## 2022-04-14 PROCEDURE — 97140 MANUAL THERAPY 1/> REGIONS: CPT

## 2022-04-14 PROCEDURE — 97110 THERAPEUTIC EXERCISES: CPT

## 2022-04-14 NOTE — PROGRESS NOTES
[facility] OUTPATIENT THERAPY AND WELLNESS   Physical Therapy Treatment Note     Name: Parisa Dimas  [facility] Number: [id number]    Therapy Diagnosis:   Encounter Diagnoses   Name Primary?    Decreased ROM of left shoulder Yes    Chronic left shoulder pain      Physician: Yahir Lyn PA*    Visit Date: 4/14/2022    Physician Orders: PT Eval and Treat   Medical Diagnosis from Referral: Closed 4-part fracture of proximal humerus, left, initial encounter [S42.292A]  Evaluation Date: 3/10/2022  Authorization Period Expiration: 05/10/2022  Plan of Care Expiration: 06/30/2022  Progress Note Due: 05/05/2022  Visit # / Visits authorized: 12 / 11 (total visit # 14)    PTA Visit #: 0 / 5     Time In: 0840 am   Time Out: 0940 am  Total Billable Time: 60 minutes (2  MT, 2 TE)    SUBJECTIVE     Patient reports: patient experiencing pain in the middle of the night while sleeping in the recliner, states she only had to reposition herself and the pain stopped.   She was compliant with home exercise program.  Response to previous treatment: good, no adverse reaction  Functional change: able to sit out of sling for 3 hours     Pain: 2/10, currently  Location: Left shoulder       OBJECTIVE     Objective Measures updated at progress report unless specified.   DOS 3/07/2022  6 week follow up 4/19/2022    Passive shoulder flexion: 135 degrees   Treatment     Parisa received the treatments listed below:      manual therapy techniques: passive range motion were applied to the: left elbow, shoulder and wrist joint for 25 minutes, including:  - Passive shoulder range of motion to 120 degrees of flexion  - Passive shoulder range of motion to 100 degrees of abduction  - Passive external rotation to 25 degrees   - Passive stretching of elbow into extension   - Passive stretching of elbow into supination     therapeutic exercises to develop strength, endurance, ROM and posture for 42 minutes including:  -  Scapular retractions: 3 x 10 reps;  5 sec hold    - Elbow extension circuit in chair; extension for 1 minute (3 lb DB) + 10 AROM bicep curls (1 lb DB); repeat 3 times; arm at 45 degree abduction   - Supine chest press; 3 lb therabar; 3 x 10   - Supine serratus punch; 3 lb therabar; 3 x 10; 3 sec hold   - Prone row; 3 x 10 reps; 3 sec hold   - AAROM t-bar flexion; 1 lb therabar; 3 sec hold; 15 times   - AAROM t-bar external rotation; 1 lb therabar; 3 sec hold; 15 times - emphasis on maintaining elbow flexion; right upper extremity doing 90% of movement   - Supine ABD; 2 x 10 reps; 3 sec hold   - wall isometrics; 5 sec hold; (flexion and extension); 3 x 10 reps     Patient Education and Home Exercises     Home Exercises Provided and Patient Education Provided     Education provided:   - Continued compliance with HEP  - Use of ice for pain reduction  - Taking arm out of sling 4-5x for at least an hour    Written Home Exercises Provided: Patient instructed to cont prior HEP. Exercises were reviewed and Parisa was able to demonstrate them prior to the end of the session.  Parisa demonstrated good  understanding of the education provided. See EMR under Patient Instructions for exercises provided during therapy sessions    ASSESSMENT     Pt exhibits good PROM/AAROM although still with some guarding present. Requiring max tactile/verbal cueing with AAROM external rotation with t-bar to maintain correct form. All exercises performed within pain free range. She requires tactile cueing with prone rows to maintain proper technique. Pt motion gradually improving, will begin to progress AAROM after follow-up visit with doctor.     Parisa Is progressing well towards her goals.   Pt prognosis is Good.     Pt will continue to benefit from skilled outpatient physical therapy to address the deficits listed in the problem list box on initial evaluation, provide pt/family education and to maximize pt's level of independence in the home and community environment.   Pt's  spiritual, cultural and educational needs considered and pt agreeable to plan of care and goals.     Anticipated barriers to physical therapy: Increased anxiety about moving arm without sling    GOALS:  Short Term Goals (4-6 Weeks):   1) Pt will demonstrate compliance with initial home exercise program as prescribed by physical therapist to improve independence with management of condition.  2) Pt to improve passive range of motion in flexion and external rotation at side to at least 100 and 30 degrees, respectively, to allow for improved mobility with dressing and self care.  3) Pt to report R upper extremity pain of <3/10 at worst during exercises to improve tolerance to ADLs and self care.     Long Term Goals (8-12 Weeks):   1. Pt to achieve <40% limitation as measured by the FOTO to demonstrate decreased disability.  2) Pt to improve active range of motion in R shoulder to within 20% of uninvolved extremity to allow for improved functional mobility with work tasks and lifting.  3) Pt to demonstrate at least 3+/5 grades on all upper quarter manual muscle testing to facilitate improved strength for work demands.  4) Pt to able to wash her hair in the shower with minimal assistance from spouse     PLAN   Continue to progress per doctor protocol and patients symptoms.     Sanam Davies, PT, DPT

## 2022-04-18 ENCOUNTER — CLINICAL SUPPORT (OUTPATIENT)
Dept: REHABILITATION | Facility: HOSPITAL | Age: 67
End: 2022-04-18
Payer: MEDICARE

## 2022-04-18 DIAGNOSIS — M25.612 DECREASED ROM OF LEFT SHOULDER: Primary | ICD-10-CM

## 2022-04-18 DIAGNOSIS — M25.512 CHRONIC LEFT SHOULDER PAIN: ICD-10-CM

## 2022-04-18 DIAGNOSIS — G89.29 CHRONIC LEFT SHOULDER PAIN: ICD-10-CM

## 2022-04-18 PROCEDURE — 97110 THERAPEUTIC EXERCISES: CPT

## 2022-04-18 NOTE — PROGRESS NOTES
[facility]PAT[facility] THERAPY AND [facility]LL[facility]   Physical Therapy Treatment Note     Name: Parisa EARL [employer]  [facility] Number: [id number]    Therapy Diagnosis:   No diagnosis found.  Physician: Yahir Lyn PA*    Visit Date: 4/18/2022    Physician Orders: PT Eval and Treat   Medical Diagnosis from Referral: Closed 4-part fracture of proximal humerus, left, initial encounter [S42.292A]  Evaluation Date: 3/10/2022  Authorization Period Expiration: 05/10/2022  Plan of Care Expiration: 06/30/2022  Progress Note Due: 05/05/2022  Visit # / Visits authorized: 13 / 11 (total visit # 14)    PTA Visit #: 0 / 5     Time In: 0100 pm   Time Out: 0200 pm  Total Billable Time: 30 minutes (2 TE)    SUBJECTIVE     Patient reports: no new complaints; states her arm does begin to hurt if she has it out of the sling for too long; pt will see the doctor tomorrow for follow up visit   She was compliant with home exercise program.  Response to previous treatment: good, no adverse reaction  Functional change: able to sit out of sling for 3 hours     Pain: 2/10, currently  Location: Left shoulder       OBJECTIVE     Objective Measures updated at progress report unless specified.   DOS 3/07/2022  6 week follow up 4/19/2022    Passive shoulder flexion: 135 degrees   Treatment     Parisa received the treatments listed below:      manual therapy techniques: passive range motion were applied to the: left elbow, shoulder and wrist joint for 25 minutes, including:  - Passive shoulder range of motion to 120 degrees of flexion  - Passive shoulder range of motion to 100 degrees of abduction  - Passive external rotation to 25 degrees   - Passive stretching of elbow into extension   - Passive stretching of elbow into supination     therapeutic exercises to develop strength, endurance, ROM and posture for 42 minutes including:  -  Scapular retractions: 3 x 10 reps; 5 sec hold    - Elbow extension circuit in chair; extension for 1 minute (3 lb DB) + 10 AROM  bicep curls (1 lb DB); repeat 3 times; arm at 45 degree abduction   - Supine chest press + serratus punch; 3 lb therabar; 3 x 10; 3 sec hold   - Prone row; 3 x 10 reps; 3 sec hold   - AAROM t-bar flexion; 1 lb therabar; 3 sec hold; 20 times   - AAROM t-bar external rotation; 1 lb therabar; 3 sec hold; 20 times - emphasis on maintaining elbow flexion; right upper extremity doing 90% of movement   - Supine ABD; 3 x 10 reps; 3 sec hold   - wall isometrics; 5 sec hold; (flexion,extension,abduction); 3 x 10 reps     Patient Education and Home Exercises     Home Exercises Provided and Patient Education Provided     Education provided:   - Continued compliance with HEP  - Use of ice for pain reduction  - Taking arm out of sling 4-5x for at least an hour    Written Home Exercises Provided: Patient instructed to cont prior HEP. Exercises were reviewed and Parisa was able to demonstrate them prior to the end of the session.  Parisa demonstrated good  understanding of the education provided. See EMR under Patient Instructions for exercises provided during therapy sessions    ASSESSMENT     Pt exhibits good PROM/AAROM although still with some guarding present. Requiring max tactile/verbal cueing with AAROM external rotation with t-bar to maintain correct form. All exercises performed within pain free range. She requires tactile cueing with prone rows to maintain proper technique. Pt motion gradually improving, will begin to progress AAROM after follow-up visit with doctor.     Parisa Is progressing well towards her goals.   Pt prognosis is Good.     Pt will continue to benefit from skilled outpatient physical therapy to address the deficits listed in the problem list box on initial evaluation, provide pt/family education and to maximize pt's level of independence in the home and community environment.   Pt's spiritual, cultural and educational needs considered and pt agreeable to plan of care and goals.     Anticipated barriers to  physical therapy: Increased anxiety about moving arm without sling    GOALS:  Short Term Goals (4-6 Weeks):   1) Pt will demonstrate compliance with initial home exercise program as prescribed by physical therapist to improve independence with management of condition.  2) Pt to improve passive range of motion in flexion and external rotation at side to at least 100 and 30 degrees, respectively, to allow for improved mobility with dressing and self care.  3) Pt to report R upper extremity pain of <3/10 at worst during exercises to improve tolerance to ADLs and self care.     Long Term Goals (8-12 Weeks):   1. Pt to achieve <40% limitation as measured by the FOTO to demonstrate decreased disability.  2) Pt to improve active range of motion in R shoulder to within 20% of uninvolved extremity to allow for improved functional mobility with work tasks and lifting.  3) Pt to demonstrate at least 3+/5 grades on all upper quarter manual muscle testing to facilitate improved strength for work demands.  4) Pt to able to wash her hair in the shower with minimal assistance from spouse     PLAN   Continue to progress per doctor protocol and patients symptoms.     Sanam Davies, PT, DPT

## 2022-04-19 ENCOUNTER — HOSPITAL ENCOUNTER (OUTPATIENT)
Dept: RADIOLOGY | Facility: HOSPITAL | Age: 67
Discharge: HOME | End: 2022-04-19
Attending: ORTHOPAEDIC SURGERY
Payer: MEDICARE

## 2022-04-19 ENCOUNTER — OFFICE VISIT (OUTPATIENT)
Dept: SPORTS MEDICINE | Facility: CLINIC | Age: 67
End: 2022-04-19
Payer: MEDICARE

## 2022-04-19 VITALS
HEIGHT: 63 IN | WEIGHT: 203 LBS | SYSTOLIC BLOOD PRESSURE: 163 MMHG | BODY MASS INDEX: 35.97 KG/M2 | DIASTOLIC BLOOD PRESSURE: 77 MMHG | HEART RATE: 85 BPM

## 2022-04-19 DIAGNOSIS — G89.29 CHRONIC LEFT SHOULDER PAIN: Primary | ICD-10-CM

## 2022-04-19 DIAGNOSIS — M25.512 LEFT SHOULDER PAIN, UNSPECIFIED CHRONICITY: ICD-10-CM

## 2022-04-19 DIAGNOSIS — M25.512 CHRONIC LEFT SHOULDER PAIN: Primary | ICD-10-CM

## 2022-04-19 PROCEDURE — 3077F SYST BP >= 140 MM HG: CPT | Mod: CPTII,S$GLB,, | Performed by: ORTHOPAEDIC SURGERY

## 2022-04-19 PROCEDURE — 3288F PR FALLS RISK ASSESSMENT DOCUMENTED: ICD-10-PCS | Mod: CPTII,S$GLB,, | Performed by: ORTHOPAEDIC SURGERY

## 2022-04-19 PROCEDURE — 99999 PR PBB SHADOW E&M-EST. PATIENT-LVL IV: CPT | Mod: PBBFAC,,, | Performed by: ORTHOPAEDIC SURGERY

## 2022-04-19 PROCEDURE — 73030 X-RAY EXAM OF SHOULDER: CPT | Mod: 26,LT,, | Performed by: RADIOLOGY

## 2022-04-19 PROCEDURE — 99024 PR POST-OP FOLLOW-UP VISIT: ICD-10-PCS | Mod: S$GLB,,, | Performed by: ORTHOPAEDIC SURGERY

## 2022-04-19 PROCEDURE — 73030 X-RAY EXAM OF SHOULDER: CPT | Mod: TC,LT

## 2022-04-19 PROCEDURE — 1159F MED LIST DOCD IN RCRD: CPT | Mod: CPTII,S$GLB,, | Performed by: ORTHOPAEDIC SURGERY

## 2022-04-19 PROCEDURE — 3078F PR MOST RECENT DIASTOLIC BLOOD PRESSURE < 80 MM HG: ICD-10-PCS | Mod: CPTII,S$GLB,, | Performed by: ORTHOPAEDIC SURGERY

## 2022-04-19 PROCEDURE — 1159F PR MEDICATION LIST DOCUMENTED IN MEDICAL RECORD: ICD-10-PCS | Mod: CPTII,S$GLB,, | Performed by: ORTHOPAEDIC SURGERY

## 2022-04-19 PROCEDURE — 1101F PR PT FALLS ASSESS DOC 0-1 FALLS W/OUT INJ PAST YR: ICD-10-PCS | Mod: CPTII,S$GLB,, | Performed by: ORTHOPAEDIC SURGERY

## 2022-04-19 PROCEDURE — 3078F DIAST BP <80 MM HG: CPT | Mod: CPTII,S$GLB,, | Performed by: ORTHOPAEDIC SURGERY

## 2022-04-19 PROCEDURE — 1125F AMNT PAIN NOTED PAIN PRSNT: CPT | Mod: CPTII,S$GLB,, | Performed by: ORTHOPAEDIC SURGERY

## 2022-04-19 PROCEDURE — 3044F PR MOST RECENT HEMOGLOBIN A1C LEVEL <7.0%: ICD-10-PCS | Mod: CPTII,S$GLB,, | Performed by: ORTHOPAEDIC SURGERY

## 2022-04-19 PROCEDURE — 1125F PR PAIN SEVERITY QUANTIFIED, PAIN PRESENT: ICD-10-PCS | Mod: CPTII,S$GLB,, | Performed by: ORTHOPAEDIC SURGERY

## 2022-04-19 PROCEDURE — 73030 XR SHOULDER COMPLETE 2 OR MORE VIEWS LEFT: ICD-10-PCS | Mod: 26,LT,, | Performed by: RADIOLOGY

## 2022-04-19 PROCEDURE — 99999 PR PBB SHADOW E&M-EST. PATIENT-LVL IV: ICD-10-PCS | Mod: PBBFAC,,, | Performed by: ORTHOPAEDIC SURGERY

## 2022-04-19 PROCEDURE — 3008F BODY MASS INDEX DOCD: CPT | Mod: CPTII,S$GLB,, | Performed by: ORTHOPAEDIC SURGERY

## 2022-04-19 PROCEDURE — 3044F HG A1C LEVEL LT 7.0%: CPT | Mod: CPTII,S$GLB,, | Performed by: ORTHOPAEDIC SURGERY

## 2022-04-19 PROCEDURE — 3077F PR MOST RECENT SYSTOLIC BLOOD PRESSURE >= 140 MM HG: ICD-10-PCS | Mod: CPTII,S$GLB,, | Performed by: ORTHOPAEDIC SURGERY

## 2022-04-19 PROCEDURE — 3288F FALL RISK ASSESSMENT DOCD: CPT | Mod: CPTII,S$GLB,, | Performed by: ORTHOPAEDIC SURGERY

## 2022-04-19 PROCEDURE — 4010F PR ACE/ARB THEARPY RXD/TAKEN: ICD-10-PCS | Mod: CPTII,S$GLB,, | Performed by: ORTHOPAEDIC SURGERY

## 2022-04-19 PROCEDURE — 4010F ACE/ARB THERAPY RXD/TAKEN: CPT | Mod: CPTII,S$GLB,, | Performed by: ORTHOPAEDIC SURGERY

## 2022-04-19 PROCEDURE — 1101F PT FALLS ASSESS-DOCD LE1/YR: CPT | Mod: CPTII,S$GLB,, | Performed by: ORTHOPAEDIC SURGERY

## 2022-04-19 PROCEDURE — 3008F PR BODY MASS INDEX (BMI) DOCUMENTED: ICD-10-PCS | Mod: CPTII,S$GLB,, | Performed by: ORTHOPAEDIC SURGERY

## 2022-04-19 PROCEDURE — 99024 POSTOP FOLLOW-UP VISIT: CPT | Mod: S$GLB,,, | Performed by: ORTHOPAEDIC SURGERY

## 2022-04-19 RX ORDER — CELECOXIB 200 MG/1
200 CAPSULE ORAL DAILY
Qty: 60 CAPSULE | Refills: 2 | Status: SHIPPED | OUTPATIENT
Start: 2022-04-19 | End: 2023-06-27 | Stop reason: SDUPTHER

## 2022-04-19 NOTE — PROGRESS NOTES
S:Parisa Dimas presents for post-operative evaluation.     DATE OF PROCEDURE: 3/7/2022   PROCEDURES PERFORMED:   Left reverse shoulder arthroplasty (CPT 57186-04)  Left shoulder open biceps tenodesis (CPT 63183)    Parisa Dimas reports to be doing well 6wk s/p the above mentioned procedure. Going to PT at the [place] location.  Accompanied by her .  She has some intermittent expected soreness in the shoulder with activity.  No significant pain.  Getting better with time.    O:  Exam of the left shoulder shows a well-healed incision.  There is a very small eschar over the distal extent of the incision which was easily removed today.  No concern for infection.  Sensation intact to light touch over the axillary nerve distribution.  Active forward elevation in scapular plane to 60-70°, passive to 120° with some mild stiffness.  Good deltoid activation otherwise.  Stable shoulder.    Radiographs:  X-ray of left shoulder taken in clinic today, images reviewed by me, demonstrates the reverse prosthesis to be in good position.  No signs of loosening or complication otherwise.    A/P:  Discussed rehab plan.  May wean use of the sling and pillow.  Begin active range of motion.  Focus on range of motion and terminal stretch.  No lifting more than a Coke can or small book.  We will see her back in 6 weeks with repeat x-rays.

## 2022-04-20 ENCOUNTER — CLINICAL SUPPORT (OUTPATIENT)
Dept: REHABILITATION | Facility: HOSPITAL | Age: 67
End: 2022-04-20
Payer: MEDICARE

## 2022-04-20 DIAGNOSIS — M25.512 ACUTE PAIN OF LEFT SHOULDER: ICD-10-CM

## 2022-04-20 DIAGNOSIS — M25.612 DECREASED ROM OF LEFT SHOULDER: Primary | ICD-10-CM

## 2022-04-20 PROCEDURE — 97110 THERAPEUTIC EXERCISES: CPT

## 2022-04-20 NOTE — PROGRESS NOTES
[facility]   Physical Therapy Treatment Note     Name: Parisa EARL [address]  Clinic Number: [id number]    Therapy Diagnosis:   Encounter Diagnoses   Name Primary?    Decreased ROM of left shoulder Yes    Acute pain of left shoulder      Physician: Yahir Lyn PA*    Visit Date: 4/20/2022    Physician Orders: PT Eval and Treat   Medical Diagnosis from Referral: Closed 4-part fracture of proximal humerus, left, initial encounter [S42.292A]  Evaluation Date: 3/10/2022  Authorization Period Expiration: 05/10/2022  Plan of Care Expiration: 06/30/2022  Progress Note Due: 05/05/2022  Visit # / Visits authorized: 18 / 11     PTA Visit #: 0 / 5     Time In: 1000 am   Time Out: 1100 am  Total Billable Time: 60 minutes     SUBJECTIVE     Patient reports: Went to the doctor yesterday; gave her a prescription for celebrex to take before coming to physical therapy   She was compliant with home exercise program.  Response to previous treatment: feeling increased soreness today; worse she has felt since starting   Functional change: per doctors note on 4/19/2022; A/P:  Discussed rehab plan.  May wean use of the sling and pillow.  Begin active range of motion.  Focus on range of motion and terminal stretch.  No lifting more than a Coke can or small book.  We will see her back in 6 weeks with repeat x-rays.     Pain: 6/10, currently  Location: Left shoulder       OBJECTIVE     Objective Measures updated at progress report unless specified.   DOS 3/07/2022  6 week follow up 4/19/2022    Passive shoulder flexion: 135 degrees   Treatment     Parisa received the treatments listed below:      manual therapy techniques: passive range motion were applied to the: left elbow, shoulder and wrist joint for 00 minutes, including:  No more restrictions; stretch to tolerance in all ranges     therapeutic exercises to develop strength, endurance, ROM and posture for 55 minutes including:  - Elbow flexion against wall;  2 lb - emphasis on eccentric lowering (next visit)   - Serratus punch; 2 lb DB; 3 sec hold; 3 x 10 reps   - Prone row; 3 x 10 reps; 3 sec hold   - AAROM t-bar abduction; 1 lb therabar; 3 sec hold; 30 times   - AAROM t-bar flexion; 1 lb therabar; 3 sec hold; 30 times   - AAROM t-bar external rotation; 1 lb therabar; 3 sec hold; 30 times - emphasis on maintaining elbow flexion; right upper extremity doing 90% of movement   - Supine ABD with elbow straight; 3 x 10 reps   - sidelying flexion with elbow straight; 3 x 10 reps     Hot pack to left shoulder for 10 minutes to improve blood flow and tissue extensibility     Patient Education and Home Exercises     Home Exercises Provided and Patient Education Provided     Education provided:   - Updated HEP (4/20/2022)     Written Home Exercises Provided: Patient instructed to cont prior HEP. Exercises were reviewed and Parisa was able to demonstrate them prior to the end of the session.  Parisa demonstrated good  understanding of the education provided. See EMR under Patient Instructions for exercises provided during therapy sessions    ASSESSMENT     Pt with increased soreness today, new report from doctor is as follows: per doctors note on 4/19/2022; A/P:  Discussed rehab plan.  May wean use of the sling and pillow.  Begin active range of motion.  Focus on range of motion and terminal stretch.  No lifting more than a Coke can or small book.  We will see her back in 6 weeks with repeat x-rays.   Will begin gradual progression from AAROM to AROM based on patient tolerance; pt with marked pain complaints today to the point of tears. Shortened program today due to increased pain/soreness today along with frequent rest breaks needed.      Parisa Is progressing well towards her goals.   Pt prognosis is Good.     Pt will continue to benefit from skilled outpatient physical therapy to address the deficits listed in the problem list box on initial evaluation, provide pt/family  education and to maximize pt's level of independence in the home and community environment.   Pt's spiritual, cultural and educational needs considered and pt agreeable to plan of care and goals.     Anticipated barriers to physical therapy: Increased anxiety about moving arm without sling    GOALS:  Short Term Goals (4-6 Weeks):   1) Pt will demonstrate compliance with initial home exercise program as prescribed by physical therapist to improve independence with management of condition.  2) Pt to improve passive range of motion in flexion and external rotation at side to at least 100 and 30 degrees, respectively, to allow for improved mobility with dressing and self care.  3) Pt to report R upper extremity pain of <3/10 at worst during exercises to improve tolerance to ADLs and self care.     Long Term Goals (8-12 Weeks):   1. Pt to achieve <40% limitation as measured by the FOTO to demonstrate decreased disability.  2) Pt to improve active range of motion in R shoulder to within 20% of uninvolved extremity to allow for improved functional mobility with work tasks and lifting.  3) Pt to demonstrate at least 3+/5 grades on all upper quarter manual muscle testing to facilitate improved strength for work demands.  4) Pt to able to wash her hair in the shower with minimal assistance from spouse     PLAN   Continue to progress per doctor protocol and patients symptoms.     Sanam Davies, PT, DPT

## 2022-04-21 ENCOUNTER — CLINICAL SUPPORT (OUTPATIENT)
Dept: REHABILITATION | Facility: HOSPITAL | Age: 67
End: 2022-04-21
Payer: MEDICARE

## 2022-04-21 DIAGNOSIS — M25.612 DECREASED ROM OF LEFT SHOULDER: Primary | ICD-10-CM

## 2022-04-21 PROCEDURE — 97110 THERAPEUTIC EXERCISES: CPT | Mod: CQ

## 2022-04-21 NOTE — PROGRESS NOTES
[facility]   Physical Therapy Treatment Note     Name: Parisa EARL [employer]  [facility] Number: [id number]    Therapy Diagnosis:   Encounter Diagnosis   Name Primary?    Decreased ROM of left shoulder Yes     Physician: Yahir Lyn PA*    Visit Date: 4/21/2022    Physician Orders: PT Eval and Treat   Medical Diagnosis from Referral: Closed 4-part fracture of proximal humerus, left, initial encounter [S42.292A]  Evaluation Date: 3/10/2022  Authorization Period Expiration: 05/10/2022  Plan of Care Expiration: 06/30/2022  Progress Note Due: 05/05/2022  Visit # / Visits authorized: 19 / 11     PTA Visit #: 1 / 5     Time In: 10:00 am   Time Out: 11:10 am  Total Billable Time: 60 minutes     SUBJECTIVE     Patient reports: she had a lot of pain and anxiety at her last session although she is doing better today and not having as much pain.    She was compliant with home exercise program.  Response to previous treatment: she felt okay , no soreness  Functional change: per doctors note on 4/19/2022; A/P:  Discussed rehab plan.  May wean use of the sling and pillow.  Begin active range of motion.  Focus on range of motion and terminal stretch.  No lifting more than a Coke can or small book.  We will see her back in 6 weeks with repeat x-rays.     Pain: 3-4/10, currently  Location: Left shoulder       OBJECTIVE     Objective Measures updated at progress report unless specified.   DOS 3/07/2022  6 week follow up 4/19/2022    Passive shoulder flexion: 135 degrees   Treatment     Parisa received the treatments listed below:      manual therapy techniques: passive range motion were applied to the: left elbow, shoulder and wrist joint for 00 minutes, including:  No more restrictions; stretch to tolerance in all ranges     therapeutic exercises to develop strength, endurance, ROM and posture for 60 minutes including:  - Elbow flexion against wall; 2 lb - emphasis on eccentric lowering   - Serratus punch; 2  lb DB; 3 sec hold; 3 x 10 reps   - Prone row; 3 x 10 reps; 3 sec hold   - AAROM t-bar abduction; 1 lb therabar; 3 sec hold; 30 times   - AAROM t-bar flexion; 1 lb therabar; 3 sec hold; 30 times   - AAROM t-bar external rotation; 1 lb therabar; 3 sec hold; 30 times - emphasis on maintaining elbow flexion; right upper extremity doing 90% of movement   - Supine ABD with elbow straight; 3 x 10 reps   - sidelying flexion with elbow straight; 3 x 10 reps     Hot pack to left shoulder for 10 minutes to improve blood flow and tissue extensibility     Patient Education and Home Exercises     Home Exercises Provided and Patient Education Provided     Education provided:   - Updated HEP (4/20/2022)     Written Home Exercises Provided: Patient instructed to cont prior HEP. Exercises were reviewed and Parisa was able to demonstrate them prior to the end of the session.  Parisa demonstrated good  understanding of the education provided. See EMR under Patient Instructions for exercises provided during therapy sessions    ASSESSMENT     New report from doctor is as follows: per doctors note on 4/19/2022; A/P:  Discussed rehab plan.  May wean use of the sling and pillow.  Begin active range of motion.  Focus on range of motion and terminal stretch.  No lifting more than a Coke can or small book.  We will see her back in 6 weeks with repeat x-rays.     Pt demonstrated an improved tolerance to AAROM activities today with decreased pain noted. She requires cues to prevent elbow flexion with abduction and cues with all exercises to prevent UT compensations.     Parisa Is progressing well towards her goals.   Pt prognosis is Good.     Pt will continue to benefit from skilled outpatient physical therapy to address the deficits listed in the problem list box on initial evaluation, provide pt/family education and to maximize pt's level of independence in the home and community environment.   Pt's spiritual, cultural and educational needs  considered and pt agreeable to plan of care and goals.     Anticipated barriers to physical therapy: Increased anxiety about moving arm without sling    GOALS:  Short Term Goals (4-6 Weeks):   1) Pt will demonstrate compliance with initial home exercise program as prescribed by physical therapist to improve independence with management of condition.  2) Pt to improve passive range of motion in flexion and external rotation at side to at least 100 and 30 degrees, respectively, to allow for improved mobility with dressing and self care.  3) Pt to report R upper extremity pain of <3/10 at worst during exercises to improve tolerance to ADLs and self care.     Long Term Goals (8-12 Weeks):   1. Pt to achieve <40% limitation as measured by the FOTO to demonstrate decreased disability.  2) Pt to improve active range of motion in R shoulder to within 20% of uninvolved extremity to allow for improved functional mobility with work tasks and lifting.  3) Pt to demonstrate at least 3+/5 grades on all upper quarter manual muscle testing to facilitate improved strength for work demands.  4) Pt to able to wash her hair in the shower with minimal assistance from spouse     PLAN   Continue to progress per doctor protocol and patients symptoms.     Teresa Alcala, PTA

## 2022-04-25 ENCOUNTER — CLINICAL SUPPORT (OUTPATIENT)
Dept: REHABILITATION | Facility: HOSPITAL | Age: 67
End: 2022-04-25
Payer: MEDICARE

## 2022-04-25 DIAGNOSIS — M25.512 ACUTE PAIN OF LEFT SHOULDER: ICD-10-CM

## 2022-04-25 DIAGNOSIS — M25.612 DECREASED ROM OF LEFT SHOULDER: Primary | ICD-10-CM

## 2022-04-25 PROCEDURE — 97110 THERAPEUTIC EXERCISES: CPT

## 2022-04-25 NOTE — PROGRESS NOTES
[facility]   Physical Therapy Treatment Note     Name: Parisa EARL [employer]  Clinic Number: [id number]    Therapy Diagnosis:   Encounter Diagnoses   Name Primary?    Decreased ROM of left shoulder Yes    Acute pain of left shoulder      Physician: Yahir Lyn PA*    Visit Date: 4/25/2022    Physician Orders: PT Eval and Treat   Medical Diagnosis from Referral: Closed 4-part fracture of proximal humerus, left, initial encounter [S42.292A]  Evaluation Date: 3/10/2022  Authorization Period Expiration: 05/10/2022  Plan of Care Expiration: 06/30/2022  Progress Note Due: 05/05/2022  Visit # / Visits authorized: 20 / 11     PTA Visit #: 0 / 5     Time In: 0100 pm   Time Out: 0200 pm  Total Billable Time: 60 minutes (2 TE)     SUBJECTIVE     Patient reports: pt states she had increased soreness after last visit    She was compliant with home exercise program.  Response to previous treatment: increased soreness   Functional change: per doctors note on 4/19/2022; A/P: Discussed rehab plan.  May wean use of the sling and pillow.  Begin active range of motion.  Focus on range of motion and terminal stretch.  No lifting more than a Coke can or small book.  We will see her back in 6 weeks with repeat x-rays.     Pain: 4/10, currently  Location: Left shoulder       OBJECTIVE     Objective Measures updated at progress report unless specified.   DOS 3/07/2022  6 week follow up 06/01/2022    Passive shoulder flexion: 135 degrees    Treatment     Parisa received the treatments listed below:      manual therapy techniques: passive range motion were applied to the: left elbow, shoulder and wrist joint for 00 minutes, including:  No more restrictions; stretch to tolerance in all ranges     therapeutic exercises to develop strength, endurance, ROM and posture for 60 minutes including:  - Elbow flexion against wall; 2 lb - emphasis on eccentric lowering   + Serratus punch; 2 lb DB; 3 sec hold; 3 x 10 reps -  requiring assistance from PT   - Prone row; 3 x 10 reps; 3 sec hold; 2 lb DB   + S/L external rotation; 1 x 10 rep - assisted by PT; towel under elbow   - AAROM t-bar abduction; 1 lb therabar; 3 sec hold; 30 times   - AAROM t-bar flexion; 1 lb therabar; 3 sec hold; 30 times   - AAROM t-bar external rotation; 1 lb therabar; 3 sec hold; 30 times  - Supine ABD with elbow straight; 3 x 10 reps   - sidelying flexion with elbow straight; 3 x 10 reps     Next visit:   Wall crawls   Ball on wall circles   Prone extension     Hot pack to left shoulder for 10 minutes to improve blood flow and tissue extensibility     Patient Education and Home Exercises     Home Exercises Provided and Patient Education Provided     Education provided:   - Updated HEP (4/20/2022)     Written Home Exercises Provided: Patient instructed to cont prior HEP. Exercises were reviewed and Parisa was able to demonstrate them prior to the end of the session.  Parisa demonstrated good  understanding of the education provided. See EMR under Patient Instructions for exercises provided during therapy sessions    ASSESSMENT     New report from doctor is as follows: per doctors note on 4/19/2022; A/P:  Discussed rehab plan.  May wean use of the sling and pillow.  Begin active range of motion.  Focus on range of motion and terminal stretch.  No lifting more than a Coke can or small book.  We will see her back in 6 weeks with repeat x-rays.     Pt demonstrated an improved tolerance to AAROM activities today with decreased pain noted. She requires max verbal and tactile cues with S/L external rotation; still unable to actively perform ER. Pt with improving scapulo-humeral rhythm; requiring moderate verbal cueing.     Parisa Is progressing well towards her goals.   Pt prognosis is Good.     Pt will continue to benefit from skilled outpatient physical therapy to address the deficits listed in the problem list box on initial evaluation, provide pt/family education and  to maximize pt's level of independence in the home and community environment.   Pt's spiritual, cultural and educational needs considered and pt agreeable to plan of care and goals.     Anticipated barriers to physical therapy: Increased anxiety about moving arm without sling    GOALS:  Short Term Goals (4-6 Weeks):   1) Pt will demonstrate compliance with initial home exercise program as prescribed by physical therapist to improve independence with management of condition.  2) Pt to improve passive range of motion in flexion and external rotation at side to at least 100 and 30 degrees, respectively, to allow for improved mobility with dressing and self care.  3) Pt to report R upper extremity pain of <3/10 at worst during exercises to improve tolerance to ADLs and self care.     Long Term Goals (8-12 Weeks):   1. Pt to achieve <40% limitation as measured by the FOTO to demonstrate decreased disability.  2) Pt to improve active range of motion in R shoulder to within 20% of uninvolved extremity to allow for improved functional mobility with work tasks and lifting.  3) Pt to demonstrate at least 3+/5 grades on all upper quarter manual muscle testing to facilitate improved strength for work demands.  4) Pt to able to wash her hair in the shower with minimal assistance from spouse     PLAN   Continue to progress per doctor protocol and patients symptoms.     Sanam Davies, PT, DPT

## 2022-04-27 ENCOUNTER — CLINICAL SUPPORT (OUTPATIENT)
Dept: REHABILITATION | Facility: HOSPITAL | Age: 67
End: 2022-04-27
Payer: MEDICARE

## 2022-04-27 DIAGNOSIS — G89.29 CHRONIC LEFT SHOULDER PAIN: ICD-10-CM

## 2022-04-27 DIAGNOSIS — M25.612 DECREASED ROM OF LEFT SHOULDER: Primary | ICD-10-CM

## 2022-04-27 DIAGNOSIS — M25.512 CHRONIC LEFT SHOULDER PAIN: ICD-10-CM

## 2022-04-27 PROCEDURE — 97140 MANUAL THERAPY 1/> REGIONS: CPT

## 2022-04-27 PROCEDURE — 97110 THERAPEUTIC EXERCISES: CPT

## 2022-04-27 NOTE — PROGRESS NOTES
[facility]   Physical Therapy Treatment Note     Name: Parisa EARL [employer]  [facility] Number: [id number]    Therapy Diagnosis:   Encounter Diagnoses   Name Primary?    Decreased ROM of left shoulder Yes    Chronic left shoulder pain      Physician: Yahir Lyn PA*    Visit Date: 4/27/2022    Physician Orders: PT Eval and Treat   Medical Diagnosis from Referral: Closed 4-part fracture of proximal humerus, left, initial encounter [S42.292A]  Evaluation Date: 3/10/2022  Authorization Period Expiration: 05/10/2022  Plan of Care Expiration: 06/30/2022  Progress Note Due: 05/05/2022  Visit # / Visits authorized: 21 / 11     PTA Visit #: 0 / 5     Time In: 1000 am   Time Out: 1100 am  Total Billable Time: 60 minutes (4 TE)     SUBJECTIVE     Patient reports: pt states she has been trying to use her arm more when getting dressed but she feels exhausted after   She was compliant with home exercise program.  Response to previous treatment: increased soreness   Functional change: per doctors note on 4/19/2022; A/P: Discussed rehab plan.  May wean use of the sling and pillow.  Begin active range of motion.  Focus on range of motion and terminal stretch.  No lifting more than a Coke can or small book.  We will see her back in 6 weeks with repeat x-rays.     Pain: 4/10, currently  Location: Left shoulder       OBJECTIVE     Objective Measures updated at progress report unless specified.   DOS 3/07/2022  6 week follow up 06/01/2022    Passive shoulder flexion: 135 degrees    Treatment     Parisa received the treatments listed below:      manual therapy techniques: passive range motion were applied to the: left elbow, shoulder and wrist joint for 12 minutes, including:  No more restrictions; stretch to tolerance in all ranges   -scapular mobilizations, scapular isometrics     therapeutic exercises to develop strength, endurance, ROM and posture for 45 minutes including:  - Elbow flexion against wall; 2  lb - emphasis on eccentric lowering   - Serratus punch; 3 sec hold; 3 x 10 reps - requiring assistance from PT   - Prone row; 3 x 10 reps; 3 sec hold; 2 lb DB (continue next visit)   + S/L external rotation; 1 x 10 rep - assisted by PT; towel under elbow   - AAROM t-bar abduction; 2 lb therabar; 3 sec hold; 30 times   - AAROM t-bar flexion; 2 lb therabar; 3 sec hold; 30 times   - AAROM t-bar external rotation; 2 lb therabar; 5 sec hold; 30 times  - Sidelying ABD with elbow straight; 3 x 10 reps; emphasis on shoulder depression first   - Supine flexion with elbow straight; 3 x 10 reps; 10 second hold at 90 degrees of flexion   - scapular depression in sidelying; 10 sec hold x 15 times     Next visit:   Wall crawls   Ball on wall circles   Prone extension     Hot pack to left shoulder for 10 minutes to improve blood flow and tissue extensibility     Patient Education and Home Exercises     Home Exercises Provided and Patient Education Provided     Education provided:   - Updated HEP (4/20/2022)     Written Home Exercises Provided: Patient instructed to cont prior HEP. Exercises were reviewed and Parisa was able to demonstrate them prior to the end of the session.  Parisa demonstrated good  understanding of the education provided. See EMR under Patient Instructions for exercises provided during therapy sessions    ASSESSMENT   Pt progressing slower due to sensitivity to pain/soreness and poor motor control requiring frequent rest breaks and max verbal cueing from physical therapist to actively move arm. Pt able to tolerate active sidelying abduction but requiring max verbal and tactile cueing to reduce upper trap compensation. Pt still limited in active external rotation range.   New report from doctor is as follows: per doctors note on 4/19/2022; A/P:  Discussed rehab plan.  May wean use of the sling and pillow.  Begin active range of motion.  Focus on range of motion and terminal stretch.  No lifting more than a Coke  can or small book.  We will see her back in 6 weeks with repeat x-rays.     Parisa Is progressing well towards her goals.   Pt prognosis is Good.     Pt will continue to benefit from skilled outpatient physical therapy to address the deficits listed in the problem list box on initial evaluation, provide pt/family education and to maximize pt's level of independence in the home and community environment.   Pt's spiritual, cultural and educational needs considered and pt agreeable to plan of care and goals.     Anticipated barriers to physical therapy: Increased anxiety about moving arm without sling    GOALS:  Short Term Goals (4-6 Weeks):   1) Pt will demonstrate compliance with initial home exercise program as prescribed by physical therapist to improve independence with management of condition.  2) Pt to improve passive range of motion in flexion and external rotation at side to at least 100 and 30 degrees, respectively, to allow for improved mobility with dressing and self care.  3) Pt to report R upper extremity pain of <3/10 at worst during exercises to improve tolerance to ADLs and self care.     Long Term Goals (8-12 Weeks):   1. Pt to achieve <40% limitation as measured by the FOTO to demonstrate decreased disability.  2) Pt to improve active range of motion in R shoulder to within 20% of uninvolved extremity to allow for improved functional mobility with work tasks and lifting.  3) Pt to demonstrate at least 3+/5 grades on all upper quarter manual muscle testing to facilitate improved strength for work demands.  4) Pt to able to wash her hair in the shower with minimal assistance from spouse     PLAN   Continue to progress per doctor protocol and patients symptoms.     Sanam Davies, PT, DPT

## 2022-04-28 ENCOUNTER — CLINICAL SUPPORT (OUTPATIENT)
Dept: REHABILITATION | Facility: HOSPITAL | Age: 67
End: 2022-04-28
Payer: MEDICARE

## 2022-04-28 DIAGNOSIS — M25.512 ACUTE PAIN OF LEFT SHOULDER: ICD-10-CM

## 2022-04-28 DIAGNOSIS — M25.612 DECREASED ROM OF LEFT SHOULDER: Primary | ICD-10-CM

## 2022-04-28 PROCEDURE — 97110 THERAPEUTIC EXERCISES: CPT

## 2022-04-28 NOTE — PROGRESS NOTES
[facility]   Physical Therapy Treatment Note     Name: Parisa EARL [employer]  [facility] Number: [id number]    Therapy Diagnosis:   Encounter Diagnoses   Name Primary?    Decreased ROM of left shoulder Yes    Acute pain of left shoulder      Physician: Yahir Lyn PA*    Visit Date: 4/28/2022    Physician Orders: PT Eval and Treat   Medical Diagnosis from Referral: Closed 4-part fracture of proximal humerus, left, initial encounter [S42.292A]  Evaluation Date: 3/10/2022  Authorization Period Expiration: 05/10/2022  Plan of Care Expiration: 06/30/2022  Progress Note Due: 05/05/2022  Visit # / Visits authorized: 22 / 11     PTA Visit #: 0 / 5     Time In: 1000 am   Time Out: 1100 am  Total Billable Time: 60 minutes     SUBJECTIVE     Patient reports: Pt with minimal soreness since last visit, states she has been trying to perform t-bar exercises at home to improve her tolerance at therapy.    She was compliant with home exercise program.  Response to previous treatment: increased soreness   Functional change: per doctors note on 4/19/2022; A/P: Discussed rehab plan.  May wean use of the sling and pillow.  Begin active range of motion.  Focus on range of motion and terminal stretch.  No lifting more than a Coke can or small book.  We will see her back in 6 weeks with repeat x-rays.     Pain: 4/10, currently  Location: Left shoulder       OBJECTIVE     Objective Measures updated at progress report unless specified.   DOS 3/07/2022  6 week follow up 06/01/2022    Passive shoulder flexion: 135 degrees    Treatment     Parisa received the treatments listed below:      manual therapy techniques: passive range motion were applied to the: left elbow, shoulder and wrist joint for 00 minutes, including:  No more restrictions; stretch to tolerance in all ranges   -scapular mobilizations, scapular isometrics     therapeutic exercises to develop strength, endurance, ROM and posture for 55 minutes  including:  - Elbow flexion against wall; 3 lb - emphasis on eccentric lowering   - Serratus punch; 3 sec hold; 3 x 10 reps - requiring assistance from PT   - Prone row; 3 x 10 reps; 3 sec hold; 3 lb DB   - S/L external rotation; 1 x 10 rep - assisted by PT; towel under elbow   - AAROM t-bar abduction; 2 lb therabar; 3 sec hold; 30 times   - AAROM t-bar flexion; 2 lb therabar; 5 sec hold; 30 times   - AAROM t-bar external rotation; 2 lb therabar; 5 sec hold; 30 times  - Sidelying ABD with elbow straight; 3 x 10 reps; emphasis on shoulder depression first   - Supine flexion with elbow straight; 3 x 10 reps; 10 second hold at 90 degrees of flexion   - scapular depression in sidelying; 10 sec hold x 15 times   - Prone extension; 3 x 10 reps     Next visit:   Wall crawls   Ball on wall circles     Hot pack to left shoulder for 10 minutes to improve blood flow and tissue extensibility     Patient Education and Home Exercises     Home Exercises Provided and Patient Education Provided     Education provided:   - Updated HEP (4/20/2022)     Written Home Exercises Provided: Patient instructed to cont prior HEP. Exercises were reviewed and Parisa was able to demonstrate them prior to the end of the session.  Parisa demonstrated good  understanding of the education provided. See EMR under Patient Instructions for exercises provided during therapy sessions    ASSESSMENT   Pt progressing slower due to sensitivity to pain/soreness and poor motor control requiring frequent rest breaks and max verbal cueing from physical therapist to actively move arm. Pt able to tolerate active sidelying abduction but requiring max verbal and tactile cueing to reduce upper trap compensation. Pt still limited in active external rotation range.   Pt with poor motor control today with active supine shoulder flexion, experiencing difficulty with maintaining elbow extension and shoulder adduction. Improvement in side lying abduction with shoulder  depression today, requiring fewer verbal and tactile cues.     New report from doctor is as follows: per doctors note on 4/19/2022; A/P:  Discussed rehab plan.  May wean use of the sling and pillow.  Begin active range of motion.  Focus on range of motion and terminal stretch.  No lifting more than a Coke can or small book.  We will see her back in 6 weeks with repeat x-rays.     Parisa Is progressing well towards her goals.   Pt prognosis is Good.     Pt will continue to benefit from skilled outpatient physical therapy to address the deficits listed in the problem list box on initial evaluation, provide pt/family education and to maximize pt's level of independence in the home and community environment.   Pt's spiritual, cultural and educational needs considered and pt agreeable to plan of care and goals.     Anticipated barriers to physical therapy: Increased anxiety about moving arm without sling    GOALS:  Short Term Goals (4-6 Weeks):   1) Pt will demonstrate compliance with initial home exercise program as prescribed by physical therapist to improve independence with management of condition.  2) Pt to improve passive range of motion in flexion and external rotation at side to at least 100 and 30 degrees, respectively, to allow for improved mobility with dressing and self care.  3) Pt to report R upper extremity pain of <3/10 at worst during exercises to improve tolerance to ADLs and self care.     Long Term Goals (8-12 Weeks):   1. Pt to achieve <40% limitation as measured by the FOTO to demonstrate decreased disability.  2) Pt to improve active range of motion in R shoulder to within 20% of uninvolved extremity to allow for improved functional mobility with work tasks and lifting.  3) Pt to demonstrate at least 3+/5 grades on all upper quarter manual muscle testing to facilitate improved strength for work demands.  4) Pt to able to wash her hair in the shower with minimal assistance from spouse     PLAN    Continue to progress per doctor protocol and patients symptoms.     Sanma Keke, PT, DPT

## 2022-05-02 ENCOUNTER — CLINICAL SUPPORT (OUTPATIENT)
Dept: REHABILITATION | Facility: HOSPITAL | Age: 67
End: 2022-05-02
Payer: MEDICARE

## 2022-05-02 DIAGNOSIS — M25.612 DECREASED ROM OF LEFT SHOULDER: Primary | ICD-10-CM

## 2022-05-02 DIAGNOSIS — M25.512 ACUTE PAIN OF LEFT SHOULDER: ICD-10-CM

## 2022-05-02 PROCEDURE — 97110 THERAPEUTIC EXERCISES: CPT

## 2022-05-02 NOTE — PROGRESS NOTES
[facility]   Physical Therapy Treatment Note     Name: Parisa EARL Wing  Clinic Number: [id number]    Therapy Diagnosis:   Encounter Diagnoses   Name Primary?    Decreased ROM of left shoulder Yes    Acute pain of left shoulder      Physician: Yahir Lyn PA*    Visit Date: 2022    Physician Orders: PT Eval and Treat   Medical Diagnosis from Referral: Closed 4-part fracture of proximal humerus, left, initial encounter [S42.292A]  Evaluation Date: 3/10/2022  Authorization Period Expiration: 05/10/2022  Plan of Care Expiration: 2022  Progress Note Due: 2022  Visit # / Visits authorized:      PTA Visit #: 0 / 5     Time In: 0100 pm   Time Out: 0200 pm  Total Billable Time: 60 minutes     SUBJECTIVE     Patient reports: Pt states she has been trying to dress herself and perform more household tasks, pt states she has been trying to be more aware of how she moves her arm   She was compliant with home exercise program.  Response to previous treatment: increased soreness   Functional change: per doctors note on 2022; A/P: Discussed rehab plan.  May wean use of the sling and pillow.  Begin active range of motion.  Focus on range of motion and terminal stretch.  No lifting more than a Coke can or small book.  We will see her back in 6 weeks with repeat x-rays.     Pain: 3/10, currently  Location: Left shoulder       OBJECTIVE     Objective Measures updated at progress report unless specified.   DOS 3/07/2022  6 week follow up 2022    Passive shoulder flexion: 135 degrees    Active shoulder Abduction in sidelyin degrees  Treatment     Parisa received the treatments listed below:      manual therapy techniques: passive range motion were applied to the: left elbow, shoulder and wrist joint for 00 minutes, including:  No more restrictions; stretch to tolerance in all ranges   -scapular mobilizations, scapular isometrics     therapeutic exercises to develop  strength, endurance, ROM and posture for 55 minutes including:  - Pulleys (flexion, scaption) 3 minutes each   - Elbow flexion against wall; 3 lb - emphasis on eccentric lowering - NT  - Serratus punch; 3 sec hold; 3 x 10 reps - requiring assistance from PT   - Prone row; 3 x 10 reps; 3 sec hold; 3 lb DB   - seated external rotation; 1 x 10 rep - assisted by PT; towel under elbow   - AAROM t-bar abduction; 2 lb therabar; 3 sec hold; 30 times   - AAROM t-bar flexion; 2 lb therabar; 5 sec hold; 30 times   - AAROM t-bar external rotation; 2 lb therabar; 5 sec hold; 30 times  - Sidelying ABD with elbow straight; 3 x 10 reps; emphasis on shoulder depression first   - Supine flexion with elbow straight; 3 x 10 reps; 10 second hold at 90 degrees of flexion   - scapular depression in sidelying; 10 sec hold x 15 times   - Prone extension; 3 x 10 reps   - Prone ABD w/ PT assistance; 1 x 15 reps     Next visit:   Wall crawls   Ball on wall circles     Hot pack to left shoulder for 10 minutes to improve blood flow and tissue extensibility     Patient Education and Home Exercises     Home Exercises Provided and Patient Education Provided     Education provided:   - Updated HEP (4/20/2022)     Written Home Exercises Provided: Patient instructed to cont prior HEP. Exercises were reviewed and Parisa was able to demonstrate them prior to the end of the session.  Parisa demonstrated good  understanding of the education provided. See EMR under Patient Instructions for exercises provided during therapy sessions    ASSESSMENT   Pt progressing slower due to sensitivity to pain/soreness and poor motor control requiring frequent rest breaks and max verbal cueing from physical therapist to actively move arm. Pt able to tolerate active sidelying abduction but requiring max verbal and tactile cueing to reduce upper trap compensation. Pt still limited in active external rotation/flexion and abduction range of motion.  Pt with poor motor control  today with active supine shoulder flexion, experiencing difficulty with maintaining elbow extension and shoulder adduction. Improvement in side lying abduction with shoulder depression today, requiring fewer verbal and tactile cues.     New report from doctor is as follows: per doctors note on 4/19/2022; A/P:  Discussed rehab plan.  May wean use of the sling and pillow.  Begin active range of motion.  Focus on range of motion and terminal stretch.  No lifting more than a Coke can or small book.  We will see her back in 6 weeks with repeat x-rays.     Parisa Is progressing well towards her goals.   Pt prognosis is Good.     Pt will continue to benefit from skilled outpatient physical therapy to address the deficits listed in the problem list box on initial evaluation, provide pt/family education and to maximize pt's level of independence in the home and community environment.   Pt's spiritual, cultural and educational needs considered and pt agreeable to plan of care and goals.     Anticipated barriers to physical therapy: Increased anxiety about moving arm without sling    GOALS:  Short Term Goals (4-6 Weeks):   1) Pt will demonstrate compliance with initial home exercise program as prescribed by physical therapist to improve independence with management of condition.  2) Pt to improve passive range of motion in flexion and external rotation at side to at least 100 and 30 degrees, respectively, to allow for improved mobility with dressing and self care.  3) Pt to report R upper extremity pain of <3/10 at worst during exercises to improve tolerance to ADLs and self care.     Long Term Goals (8-12 Weeks):   1. Pt to achieve <40% limitation as measured by the FOTO to demonstrate decreased disability.  2) Pt to improve active range of motion in R shoulder to within 20% of uninvolved extremity to allow for improved functional mobility with work tasks and lifting.  3) Pt to demonstrate at least 3+/5 grades on all upper  quarter manual muscle testing to facilitate improved strength for work demands.  4) Pt to able to wash her hair in the shower with minimal assistance from spouse     PLAN   Continue to progress per doctor protocol and patients symptoms.     Sanam Davies, PT, DPT

## 2022-05-04 ENCOUNTER — CLINICAL SUPPORT (OUTPATIENT)
Dept: REHABILITATION | Facility: HOSPITAL | Age: 67
End: 2022-05-04
Payer: MEDICARE

## 2022-05-04 DIAGNOSIS — M25.612 DECREASED ROM OF LEFT SHOULDER: Primary | ICD-10-CM

## 2022-05-04 DIAGNOSIS — M25.512 ACUTE PAIN OF LEFT SHOULDER: ICD-10-CM

## 2022-05-04 PROCEDURE — 97110 THERAPEUTIC EXERCISES: CPT

## 2022-05-04 PROCEDURE — 97140 MANUAL THERAPY 1/> REGIONS: CPT

## 2022-05-04 NOTE — PROGRESS NOTES
[facility]   Physical Therapy Treatment Note     Name: Parisa EARL [facility]  [facility] Number: [id number]    Therapy Diagnosis:   Encounter Diagnoses   Name Primary?    Decreased ROM of left shoulder Yes    Acute pain of left shoulder      Physician: Yahir Lyn PA*    Visit Date: 2022    Physician Orders: PT Eval and Treat   Medical Diagnosis from Referral: Closed 4-part fracture of proximal humerus, left, initial encounter [S42.292A]  Evaluation Date: 3/10/2022  Authorization Period Expiration: 05/10/2022  Plan of Care Expiration: 2022  Progress Note Due: 2022  Visit # / Visits authorized:      PTA Visit #: 0 / 5     Time In: 1000 am   Time Out: 1100 am  Total Billable Time: 60 minutes     SUBJECTIVE     Patient reports: Pt continuing to try to use her arm for ADL's; states that her shoulder did not hurt as much as it usually does.    She was compliant with home exercise program.  Response to previous treatment: minimal soreness   Functional change: per doctors note on 2022; A/P: Discussed rehab plan.  May wean use of the sling and pillow.  Begin active range of motion.  Focus on range of motion and terminal stretch.  No lifting more than a Coke can or small book.  We will see her back in 6 weeks with repeat x-rays.     Pain: 210, currently  Location: Left shoulder       OBJECTIVE     Objective Measures updated at progress report unless specified.   DOS 3/07/2022  6 week follow up 2022    Passive shoulder flexion: 135 degrees    Active shoulder Abduction in sidelyin degrees  Treatment     Parisa received the treatments listed below:      manual therapy techniques: passive range motion were applied to the: left elbow, shoulder and wrist joint for 25 minutes, including:  No more restrictions; stretch to tolerance in all ranges   -scapular mobilizations, scapular isometrics     therapeutic exercises to develop strength, endurance, ROM and posture for  30 minutes including:  - Pulleys (flexion, scaption, abduction) 4 minutes each   - Seated flexion against wall; 1 lb - emphasis on eccentric lowering   - Serratus punch; 3 sec hold; 3 x 10 reps - requiring assistance from PT   - Prone row; 3 x 10 reps; 3 sec hold; 3 lb DB - NT  - seated external rotation; 1 x 10 rep - assisted by PT; towel under elbow   - AAROM t-bar abduction; 3 lb therabar; 3 sec hold; 30 times   - AAROM t-bar flexion; 3 lb therabar; 5 sec hold; 30 times   - AAROM t-bar external rotation; 3 lb therabar; 5 sec hold; 30 times  - Sidelying ABD with elbow straight; 3 x 10 reps - emphasis on scapular depression first   - Supine flexion with elbow straight; 3 x 10 reps - emphasis on scapular depression first    - Prone extension; 3 x 10 reps - NT  - Prone ABD w/ PT assistance; 1 x 15 reps - NT    With PT (with use of mirror)   Seated shoulder abduction with elbow bent - 3 x 10 reps   Seated shoulder flexion with elbow bent - 3 x 10 reps     Next visit:   Wall crawls   Ball on wall circles     Hot pack to left shoulder for 10 minutes to improve blood flow and tissue extensibility     Patient Education and Home Exercises     Home Exercises Provided and Patient Education Provided     Education provided:   - Updated HEP (4/20/2022)     Written Home Exercises Provided: Patient instructed to cont prior HEP. Exercises were reviewed and Parisa was able to demonstrate them prior to the end of the session.  Parisa demonstrated good  understanding of the education provided. See EMR under Patient Instructions for exercises provided during therapy sessions    ASSESSMENT   Pt continuing to show poor motor control/motor learning with active range of motion, requires max verbal and tactile cueing from PT to decrease upper trapezius compensation. Pt initiating all movements by means of upper trapezius while seated edge of mat. Pt able to achieve at least 120 degrees of active flexion and abduction in side lying and  supine. Pt tolerating scapular mobilizations and cued throughout visit to initiate all movements with scapular depression. Pt demonstrating poor scapular humeral rhythm even when using mirror (seated edge of mat) to decrease compensations. Pt only able to achieve 30 degrees of active flexion without demonstrating upper trapezius compensation. Pt will continue to benefit from skilled PT to improve functional mobility and active range of motion to return pt to [facility].     New report from doctor is as follows: per doctors note on 4/19/2022; A/P:  Discussed rehab plan.  May wean use of the sling and pillow.  Begin active range of motion.  Focus on range of motion and terminal stretch.  No lifting more than a Coke can or small book.  We will see her back in 6 weeks with repeat x-rays.     Parisa Is progressing well towards her goals.   Pt prognosis is Good.     Pt will continue to benefit from skilled outpatient physical therapy to address the deficits listed in the problem list box on initial evaluation, provide pt/family education and to maximize pt's level of independence in the home and community environment.   Pt's spiritual, cultural and educational needs considered and pt agreeable to plan of care and goals.     Anticipated barriers to physical therapy: Increased anxiety about moving arm without sling    GOALS:  Short Term Goals (4-6 Weeks):   1) Pt will demonstrate compliance with initial home exercise program as prescribed by physical therapist to improve independence with management of condition.  2) Pt to improve passive range of motion in flexion and external rotation at side to at least 100 and 30 degrees, respectively, to allow for improved mobility with dressing and self care.  3) Pt to report R upper extremity pain of <3/10 at worst during exercises to improve tolerance to ADLs and self care.     Long Term Goals (8-12 Weeks):   1. Pt to achieve <40% limitation as measured by the FOTO to demonstrate decreased  disability.  2) Pt to improve active range of motion in R shoulder to within 20% of uninvolved extremity to allow for improved functional mobility with work tasks and lifting.  3) Pt to demonstrate at least 3+/5 grades on all upper quarter manual muscle testing to facilitate improved strength for work demands.  4) Pt to able to wash her hair in the shower with minimal assistance from spouse     PLAN   Continue to progress per doctor protocol and patients symptoms.     Sanam Davies, PT, DPT

## 2022-05-05 ENCOUNTER — CLINICAL SUPPORT (OUTPATIENT)
Dept: REHABILITATION | Facility: HOSPITAL | Age: 67
End: 2022-05-05
Payer: MEDICARE

## 2022-05-05 DIAGNOSIS — M25.612 DECREASED ROM OF LEFT SHOULDER: Primary | ICD-10-CM

## 2022-05-05 DIAGNOSIS — M25.512 ACUTE PAIN OF LEFT SHOULDER: ICD-10-CM

## 2022-05-05 PROCEDURE — 97110 THERAPEUTIC EXERCISES: CPT | Mod: KX

## 2022-05-05 PROCEDURE — 97140 MANUAL THERAPY 1/> REGIONS: CPT | Mod: KX

## 2022-05-05 NOTE — PROGRESS NOTES
[facility]   Physical Therapy Treatment Note     Name: Parisa EARL [facility]  [facility] Number: [id number]    Therapy Diagnosis:   Encounter Diagnoses   Name Primary?    Decreased ROM of left shoulder Yes    Acute pain of left shoulder      Physician: Yahir Lyn PA*    Visit Date: 2022    Physician Orders: PT Eval and Treat   Medical Diagnosis from Referral: Closed 4-part fracture of proximal humerus, left, initial encounter [S42.292A]  Evaluation Date: 3/10/2022  Authorization Period Expiration: 05/10/2022  Plan of Care Expiration: 2022  Progress Note Due: 2022  Visit # / Visits authorized:      PTA Visit #: 0 / 5     Time In: 1000 am   Time Out: 1100 am  Total Billable Time: 60 minutes     SUBJECTIVE     Patient reports: Pt continuing to try to use her arm for ADL's; states that her shoulder did not hurt as much as it usually does.    She was compliant with home exercise program.  Response to previous treatment: minimal soreness   Functional change: per doctors note on 2022; A/P: Discussed rehab plan.  May wean use of the sling and pillow.  Begin active range of motion.  Focus on range of motion and terminal stretch.  No lifting more than a Coke can or small book.  We will see her back in 6 weeks with repeat x-rays.     Pain: 210, currently  Location: Left shoulder       OBJECTIVE     Objective Measures updated at progress report unless specified.   DOS 3/07/2022  6 week follow up 2022    Passive shoulder flexion: 135 degrees    Active shoulder Abduction in sidelyin degrees  Treatment     Parisa received the treatments listed below:      manual therapy techniques: passive range motion were applied to the: left elbow, shoulder and wrist joint for 20 minutes, including:  No more restrictions; stretch to tolerance in all ranges   -scapular mobilizations, scapular isometrics     therapeutic exercises to develop strength, endurance, ROM and posture for  40 minutes including:  - Pulleys (flexion, scaption, abduction) 3 minutes each   - standing elbow flexion against wall; 2 lb - emphasis on eccentric lowering; decreasing internal rotation   - standing scaption with cane; 1 x 20 reps - using standing mirror   - standing flexion with cane; 1 x 20 reps - using standing mirror   - Prone row; 3 x 10 reps; 3 sec hold; 2 lb DB - emphasis on no upper trap movement   - Prone extension; 3 x 10 reps; 3 sec hold - emphasis on no upper trap movement     Not today:   - Serratus punch; 3 sec hold; 3 x 10 reps - requiring assistance from PT   - seated external rotation; 1 x 10 rep - assisted by PT; towel under elbow   - AAROM t-bar abduction; 3 lb therabar; 3 sec hold; 30 times   - AAROM t-bar flexion; 3 lb therabar; 5 sec hold; 30 times   - AAROM t-bar external rotation; 3 lb therabar; 5 sec hold; 30 times  - Sidelying ABD with elbow straight; 3 x 10 reps - emphasis on scapular depression first   - Supine flexion with elbow straight; 3 x 10 reps - emphasis on scapular depression first    - Prone ABD w/ PT assistance; 1 x 15 reps - NT    Hot pack to left shoulder for 10 minutes to improve blood flow and tissue extensibility     Patient Education and Home Exercises     Home Exercises Provided and Patient Education Provided     Education provided:   - Updated HEP (4/20/2022)     Written Home Exercises Provided: Patient instructed to cont prior HEP. Exercises were reviewed and Parisa was able to demonstrate them prior to the end of the session.  Parisa demonstrated good  understanding of the education provided. See EMR under Patient Instructions for exercises provided during therapy sessions    ASSESSMENT   Pt with minor improvement in active flexion and scaption while using cane today. Pt still demonstrating use of upper trapezius for initiation of shoulder movement requiring moderate-max verbal cueing from PT. In addition, pt requiring max verbal cueing with prone row and extension to  improve scapular retraction/depression.      Pt able to achieve at least 120 degrees of active flexion and abduction in side lying and supine. Pt tolerating scapular mobilizations and cued throughout visit to initiate all movements with scapular depression. Pt demonstrating poor scapular humeral rhythm even when using mirror (seated edge of mat) to decrease compensations. Pt only able to achieve 30 degrees of active flexion without demonstrating upper trapezius compensation. Pt will continue to benefit from skilled PT to improve functional mobility and active range of motion to return pt to [facility].     New report from doctor is as follows: per doctors note on 4/19/2022; A/P:  Discussed rehab plan.  May wean use of the sling and pillow.  Begin active range of motion.  Focus on range of motion and terminal stretch.  No lifting more than a Coke can or small book.  We will see her back in 6 weeks with repeat x-rays.     Parisa Is progressing well towards her goals.   Pt prognosis is Good.     Pt will continue to benefit from skilled outpatient physical therapy to address the deficits listed in the problem list box on initial evaluation, provide pt/family education and to maximize pt's level of independence in the home and community environment.   Pt's spiritual, cultural and educational needs considered and pt agreeable to plan of care and goals.     Anticipated barriers to physical therapy: Increased anxiety about moving arm without sling    GOALS:  Short Term Goals (4-6 Weeks):   1) Pt will demonstrate compliance with initial home exercise program as prescribed by physical therapist to improve independence with management of condition.  2) Pt to improve passive range of motion in flexion and external rotation at side to at least 100 and 30 degrees, respectively, to allow for improved mobility with dressing and self care.  3) Pt to report R upper extremity pain of <3/10 at worst during exercises to improve tolerance to  ADLs and self care.     Long Term Goals (8-12 Weeks):   1. Pt to achieve <40% limitation as measured by the FOTO to demonstrate decreased disability.  2) Pt to improve active range of motion in R shoulder to within 20% of uninvolved extremity to allow for improved functional mobility with work tasks and lifting.  3) Pt to demonstrate at least 3+/5 grades on all upper quarter manual muscle testing to facilitate improved strength for work demands.  4) Pt to able to wash her hair in the shower with minimal assistance from spouse     PLAN   Continue to progress per doctor protocol and patients symptoms.     Sanam Davies, PT, DPT

## 2022-05-09 ENCOUNTER — CLINICAL SUPPORT (OUTPATIENT)
Dept: REHABILITATION | Facility: HOSPITAL | Age: 67
End: 2022-05-09
Payer: MEDICARE

## 2022-05-09 DIAGNOSIS — M25.612 DECREASED ROM OF LEFT SHOULDER: Primary | ICD-10-CM

## 2022-05-09 PROCEDURE — 97110 THERAPEUTIC EXERCISES: CPT | Mod: CQ

## 2022-05-09 PROCEDURE — 97140 MANUAL THERAPY 1/> REGIONS: CPT | Mod: CQ

## 2022-05-09 NOTE — PROGRESS NOTES
[facility]   Physical Therapy Treatment Note     Name: Parisa EARL [employer]  [facility] [place] Number: [id number]    Therapy Diagnosis:   Encounter Diagnosis   Name Primary?    Decreased ROM of left shoulder Yes     Physician: Yahir Lyn PA*    Visit Date: 2022    Physician Orders: PT Eval and Treat   Medical Diagnosis from Referral: Closed 4-part fracture of proximal humerus, left, initial encounter [S42.292A]  Evaluation Date: 3/10/2022  Authorization Period Expiration: 05/10/2022  Plan of Care Expiration: 2022  Progress Note Due: 2022  Visit # / Visits authorized:      PTA Visit #:      Time In: 3:00 pm  Time Out: 4:00 pm  Total Billable Time: 30 minutes     SUBJECTIVE     Patient reports: she is tired because she didn't sleep well . Pt reports she is having a little bit of pain in the front of the shoulder .    She was compliant with home exercise program.  Response to previous treatment: minimal soreness   Functional change: per doctors note on 2022; A/P: Discussed rehab plan.  May wean use of the sling and pillow.  Begin active range of motion.  Focus on range of motion and terminal stretch.  No lifting more than a Coke can or small book.  We will see her back in 6 weeks with repeat x-rays.     Pain: 210, currently  Location: Left shoulder       OBJECTIVE     Objective Measures updated at progress report unless specified.   DOS 3/07/2022  6 week follow up 2022    Passive shoulder flexion: 135 degrees    Active shoulder Abduction in sidelyin degrees  Treatment     Parisa received the treatments listed below:      manual therapy techniques: passive range motion were applied to the: left elbow, shoulder and wrist joint for 10 minutes, including:  No more restrictions; stretch to tolerance in all ranges   -scapular mobilizations, scapular isometrics     therapeutic exercises to develop strength, endurance, ROM and posture for 50 minutes including:  -  Pulleys (flexion, scaption, abduction) 3 minutes each   - standing elbow flexion against wall; 2 lb - emphasis on eccentric lowering; decreasing internal rotation   - standing scaption with cane; 1 x 20 reps - using standing mirror   - standing flexion with cane; 1 x 20 reps - using standing mirror   - Prone row; 3 x 10 reps; 3 sec hold; 2 lb DB - emphasis on no upper trap movement   - Prone extension; 3 x 10 reps; 3 sec hold - emphasis on no upper trap movement   - AAROM t-bar flexion; 3 lb therabar; 5 sec hold; 30 times     Not today:   - Serratus punch; 3 sec hold; 3 x 10 reps - requiring assistance from PT   - seated external rotation; 1 x 10 rep - assisted by PT; towel under elbow   - AAROM t-bar abduction; 3 lb therabar; 3 sec hold; 30 times   - AAROM t-bar external rotation; 3 lb therabar; 5 sec hold; 30 times  - Sidelying ABD with elbow straight; 3 x 10 reps - emphasis on scapular depression first   - Supine flexion with elbow straight; 3 x 10 reps - emphasis on scapular depression first    - Prone ABD w/ PT assistance; 1 x 15 reps - NT    Hot pack to left shoulder for 0 minutes to improve blood flow and tissue extensibility - NP    Patient Education and Home Exercises     Home Exercises Provided and Patient Education Provided     Education provided:   - Updated HEP (4/20/2022)     Written Home Exercises Provided: Patient instructed to cont prior HEP. Exercises were reviewed and Parisa was able to demonstrate them prior to the end of the session.  Parisa demonstrated good  understanding of the education provided. See EMR under Patient Instructions for exercises provided during therapy sessions    ASSESSMENT     Pt continues to exhibit upper trap compensation and shoulder hiking with active flexion and abduction . She requires visual and verbal cues throughout session to help promote proper form and reduce compensations .   Pt will continue to benefit from skilled PT to improve functional mobility and active  range of motion to return pt to PLOF.       Parisa Is progressing well towards her goals.   Pt prognosis is Good.     Pt will continue to benefit from skilled outpatient physical therapy to address the deficits listed in the problem list box on initial evaluation, provide pt/family education and to maximize pt's level of independence in the home and community environment.   Pt's spiritual, cultural and educational needs considered and pt agreeable to plan of care and goals.     Anticipated barriers to physical therapy: Increased anxiety about moving arm without sling    GOALS:  Short Term Goals (4-6 Weeks):   1) Pt will demonstrate compliance with initial home exercise program as prescribed by physical therapist to improve independence with management of condition.  2) Pt to improve passive range of motion in flexion and external rotation at side to at least 100 and 30 degrees, respectively, to allow for improved mobility with dressing and self care.  3) Pt to report R upper extremity pain of <3/10 at worst during exercises to improve tolerance to ADLs and self care.     Long Term Goals (8-12 Weeks):   1. Pt to achieve <40% limitation as measured by the FOTO to demonstrate decreased disability.  2) Pt to improve active range of motion in R shoulder to within 20% of uninvolved extremity to allow for improved functional mobility with work tasks and lifting.  3) Pt to demonstrate at least 3+/5 grades on all upper quarter manual muscle testing to facilitate improved strength for work demands.  4) Pt to able to wash her hair in the shower with minimal assistance from spouse     PLAN   Continue to progress per doctor protocol and patients symptoms.     Teresa Alcala, PTA

## 2022-05-11 ENCOUNTER — CLINICAL SUPPORT (OUTPATIENT)
Dept: REHABILITATION | Facility: HOSPITAL | Age: 67
End: 2022-05-11
Payer: MEDICARE

## 2022-05-11 DIAGNOSIS — G89.29 CHRONIC LEFT SHOULDER PAIN: ICD-10-CM

## 2022-05-11 DIAGNOSIS — M25.512 CHRONIC LEFT SHOULDER PAIN: ICD-10-CM

## 2022-05-11 DIAGNOSIS — M25.612 DECREASED ROM OF LEFT SHOULDER: Primary | ICD-10-CM

## 2022-05-11 PROCEDURE — 97140 MANUAL THERAPY 1/> REGIONS: CPT | Mod: KX

## 2022-05-11 PROCEDURE — 97110 THERAPEUTIC EXERCISES: CPT | Mod: KX

## 2022-05-11 NOTE — PROGRESS NOTES
[facility]   Physical Therapy Treatment Note     Name: Parisa EARL [employer]  Clinic Number: [id number]    Therapy Diagnosis:   Encounter Diagnoses   Name Primary?    Decreased ROM of left shoulder Yes    Chronic left shoulder pain      Physician: Yahir Lyn PA*    Visit Date: 2022    Physician Orders: PT Eval and Treat   Medical Diagnosis from Referral: Closed 4-part fracture of proximal humerus, left, initial encounter [S42.292A]  Evaluation Date: 3/10/2022  Authorization Period Expiration: 05/10/2022  Plan of Care Expiration: 2022  Progress Note Due: 2022  Visit # / Visits authorized:      PTA Visit #: 0 / 5     Time In: 1000 am  Time Out: 1100 am  Total Billable Time: 30 minutes (2 TE)     SUBJECTIVE     Patient reports: arm is still raising with active movement; minimal soreness after last visit    She was compliant with home exercise program.  Response to previous treatment: minimal soreness   Functional change: per doctors note on 2022; A/P: Discussed rehab plan.  May wean use of the sling and pillow.  Begin active range of motion.  Focus on range of motion and terminal stretch.  No lifting more than a Coke can or small book.  We will see her back in 6 weeks with repeat x-rays.     Pain: 2/10, currently  Location: Left shoulder       OBJECTIVE     Objective Measures updated at progress report unless specified.   DOS 3/07/2022  6 week follow up 2022    Passive shoulder flexion: 135 degrees    Active shoulder Abduction in sidelyin degrees  Treatment     Parisa received the treatments listed below:      manual therapy techniques: passive range motion were applied to the: left elbow, shoulder and wrist joint for 15 minutes, including:  No more restrictions; stretch to tolerance in all ranges   -scapular mobilizations, scapular isometrics     therapeutic exercises to develop strength, endurance, ROM and posture for 50 minutes including:  -  bennie BECERRIL (next visit)   - standing elbow flexion against wall; 2 lb - emphasis on eccentric lowering; decreasing internal rotation   - standing scaption with cane; 1 x 30 reps - using standing mirror   - standing flexion with cane; 1 x 30 reps - using standing mirror   - standing abduction with cane; 1 x 30 - using standing mirror   - standing ER with cane; 1 x 30 reps; 3 sec hold - use standing mirror   - flexion, abduction and extension isometrics with PT to improve deltoid initiation with cane exercises   - Prone row; 3 x 10 reps; 3 sec hold; 2 lb DB - emphasis on no upper trap movement    - Prone extension; 3 x 10 reps; 3 sec hold - emphasis on no upper trap movement - NT    Hot pack to left shoulder for 10 minutes to improve blood flow and tissue extensibility - NP    Patient Education and Home Exercises     Home Exercises Provided and Patient Education Provided     Education provided:   - Updated HEP (4/20/2022)     Written Home Exercises Provided: Patient instructed to cont prior HEP. Exercises were reviewed and Parisa was able to demonstrate them prior to the end of the session.  Parisa demonstrated good  understanding of the education provided. See EMR under Patient Instructions for exercises provided during therapy sessions    ASSESSMENT     Pt continues to exhibit upper trap compensation and shoulder hiking with active flexion and abduction . She requires visual and verbal cues throughout session to help promote proper form and reduce compensations . Limited to 60 degrees of active flexion, scaption and abduction currently due to deltoid weakness and poor motor control and learning.   Pt will continue to benefit from skilled PT to improve functional mobility and active range of motion to return pt to PLOF.       Parisa Is progressing well towards her goals.   Pt prognosis is Good.     Pt will continue to benefit from skilled outpatient physical therapy to address the deficits listed in the problem list  box on initial evaluation, provide pt/family education and to maximize pt's level of independence in the home and community environment.   Pt's spiritual, cultural and educational needs considered and pt agreeable to plan of care and goals.     Anticipated barriers to physical therapy: Increased anxiety about moving arm without sling    GOALS:  Short Term Goals (4-6 Weeks):   1) Pt will demonstrate compliance with initial home exercise program as prescribed by physical therapist to improve independence with management of condition.  2) Pt to improve passive range of motion in flexion and external rotation at side to at least 100 and 30 degrees, respectively, to allow for improved mobility with dressing and self care.  3) Pt to report R upper extremity pain of <3/10 at worst during exercises to improve tolerance to ADLs and self care.     Long Term Goals (8-12 Weeks):   1. Pt to achieve <40% limitation as measured by the FOTO to demonstrate decreased disability.  2) Pt to improve active range of motion in R shoulder to within 20% of uninvolved extremity to allow for improved functional mobility with work tasks and lifting.  3) Pt to demonstrate at least 3+/5 grades on all upper quarter manual muscle testing to facilitate improved strength for work demands.  4) Pt to able to wash her hair in the shower with minimal assistance from spouse     PLAN   Continue to progress per doctor protocol and patients symptoms.     Sanam Davies, PT, DPT

## 2022-05-12 ENCOUNTER — CLINICAL SUPPORT (OUTPATIENT)
Dept: REHABILITATION | Facility: HOSPITAL | Age: 67
End: 2022-05-12
Payer: MEDICARE

## 2022-05-12 DIAGNOSIS — M25.512 ACUTE PAIN OF LEFT SHOULDER: ICD-10-CM

## 2022-05-12 DIAGNOSIS — M25.612 DECREASED ROM OF LEFT SHOULDER: Primary | ICD-10-CM

## 2022-05-12 PROCEDURE — 97110 THERAPEUTIC EXERCISES: CPT | Mod: KX

## 2022-05-12 NOTE — PROGRESS NOTES
[facility]   Physical Therapy Treatment Note     Name: Parisa EARL [employer]  [facility] Number: [id number]    Therapy Diagnosis:   Encounter Diagnoses   Name Primary?    Decreased ROM of left shoulder Yes    Acute pain of left shoulder      Physician: Yahir Lyn PA*    Visit Date: 2022    Physician Orders: PT Eval and Treat   Medical Diagnosis from Referral: Closed 4-part fracture of proximal humerus, left, initial encounter [S42.292A]  Evaluation Date: 3/10/2022  Authorization Period Expiration: 05/10/2022  Plan of Care Expiration: 2022  Progress Note Due: 2022  Visit # / Visits authorized:      PTA Visit #: 0 / 5     Time In: 1000 am  Time Out: 1100 am  Total Billable Time: 30 minutes (2 TE)     SUBJECTIVE     Patient reports: increased soreness after last visit    She was compliant with home exercise program.  Response to previous treatment: moderate soreness   Functional change: none to note     Pain: 5/10; soreness   Location: Left shoulder       OBJECTIVE     Objective Measures updated at progress report unless specified.   DOS 3/07/2022  6 week follow up 2022    Passive shoulder flexion: 135 degrees    Active shoulder Abduction in sidelyin degrees  Treatment     Parisa received the treatments listed below:      manual therapy techniques: passive range motion were applied to the: left elbow, shoulder and wrist joint for 00 minutes, including:  No more restrictions; stretch to tolerance in all ranges   -scapular mobilizations, scapular isometrics     therapeutic exercises to develop strength, endurance, ROM and posture for 60 minutes including:  - pulleys AAROM (scaption, flexion and abduction) - pt initiating movement actively, using other arm to assist with all movements when unable to raise due to strength; 1 x 15 reps each way   - standing elbow flexion against wall; 2 lb - emphasis on eccentric lowering; decreasing internal rotation   -  standing scaption with cane; 1 x 30 reps - using standing mirror (pt raising arm as high as possible, then initiating scapular depression)  - standing flexion with cane; 1 x 30 reps - using standing mirror (pt raising arm as high as possible, then initiating scapular depression)  - standing abduction with cane; 1 x 30 - using standing mirror (pt raising arm as high as possible, then initiating scapular depression)  - standing ER with cane; 1 x 30 reps; 3 sec hold - use standing mirror   - flexion, abduction and extension isometrics with PT to improve deltoid initiation with cane exercises     - Prone row; 3 x 10 reps; 3 sec hold; 2 lb DB - emphasis on no upper trap movement  - NT  - Prone extension; 3 x 10 reps; 3 sec hold - emphasis on no upper trap movement - NT    Hot pack to left shoulder for 10 minutes to improve blood flow and tissue extensibility     Patient Education and Home Exercises     Home Exercises Provided and Patient Education Provided     Education provided:   - Updated HEP (4/20/2022)     Written Home Exercises Provided: Patient instructed to cont prior HEP. Exercises were reviewed and Parisa was able to demonstrate them prior to the end of the session.  Parisa demonstrated good  understanding of the education provided. See EMR under Patient Instructions for exercises provided during therapy sessions    ASSESSMENT   Continuing to emphasis active shoulder movement and good scapulo-humeral rhythm (decreasing upper trap compensation).  Pt continues to exhibit upper trap compensation and shoulder hiking with active flexion and abduction . She requires moderate visual and verbal cues throughout session to help promote proper form and reduce compensations. Limited to 60 degrees of active flexion, scaption and abduction currently due to deltoid weakness and poor motor control. Active shoulder motion slowly improving each visit, pt very motivated to improve each time.   Pt will continue to benefit from  skilled PT to improve functional mobility and active range of motion to return pt to PLOF.     Parisa Is progressing well towards her goals.   Pt prognosis is Good.     Pt will continue to benefit from skilled outpatient physical therapy to address the deficits listed in the problem list box on initial evaluation, provide pt/family education and to maximize pt's level of independence in the home and community environment.   Pt's spiritual, cultural and educational needs considered and pt agreeable to plan of care and goals.     Anticipated barriers to physical therapy: Increased anxiety about moving arm without sling    GOALS:  Short Term Goals (4-6 Weeks):   1) Pt will demonstrate compliance with initial home exercise program as prescribed by physical therapist to improve independence with management of condition.  2) Pt to improve passive range of motion in flexion and external rotation at side to at least 100 and 30 degrees, respectively, to allow for improved mobility with dressing and self care.  3) Pt to report R upper extremity pain of <3/10 at worst during exercises to improve tolerance to ADLs and self care.     Long Term Goals (8-12 Weeks):   1. Pt to achieve <40% limitation as measured by the FOTO to demonstrate decreased disability.  2) Pt to improve active range of motion in R shoulder to within 20% of uninvolved extremity to allow for improved functional mobility with work tasks and lifting.  3) Pt to demonstrate at least 3+/5 grades on all upper quarter manual muscle testing to facilitate improved strength for work demands.  4) Pt to able to wash her hair in the shower with minimal assistance from spouse     PLAN   Continue to progress per doctor protocol and patients symptoms.     Sanam Davies, PT, DPT

## 2022-05-16 ENCOUNTER — CLINICAL SUPPORT (OUTPATIENT)
Dept: REHABILITATION | Facility: HOSPITAL | Age: 67
End: 2022-05-16
Payer: MEDICARE

## 2022-05-16 DIAGNOSIS — G89.29 CHRONIC LEFT SHOULDER PAIN: ICD-10-CM

## 2022-05-16 DIAGNOSIS — M25.612 DECREASED ROM OF LEFT SHOULDER: Primary | ICD-10-CM

## 2022-05-16 DIAGNOSIS — M25.512 CHRONIC LEFT SHOULDER PAIN: ICD-10-CM

## 2022-05-16 PROCEDURE — 97110 THERAPEUTIC EXERCISES: CPT | Mod: KX

## 2022-05-16 NOTE — PROGRESS NOTES
[facility]   Physical Therapy Treatment Note     Name: Parisa EARL [place]  [facility] Number: [id number]    Therapy Diagnosis:   Encounter Diagnoses   Name Primary?    Decreased ROM of left shoulder Yes    Chronic left shoulder pain      Physician: Yahir Lyn PA*    Visit Date: 2022    Physician Orders: PT Eval and Treat   Medical Diagnosis from Referral: Closed 4-part fracture of proximal humerus, left, initial encounter [S42.292A]  Evaluation Date: 3/10/2022  Authorization Period Expiration: 05/10/2022  Plan of Care Expiration: 2022  Progress Note Due: 2022  Visit # / Visits authorized:      PTA Visit #: 0 / 5     Time In: 0110 pm  Time Out: 0200 pm  Total Billable Time: 50 minutes (4 TE)     SUBJECTIVE     Patient reports: increased soreness after last visit in deltoid area    She was compliant with home exercise program.  Response to previous treatment: moderate soreness   Functional change: none to note     Pain: 5/10; soreness   Location: Left shoulder       OBJECTIVE     Objective Measures updated at progress report unless specified.   DOS 3/07/2022  6 week follow up 2022    Passive shoulder flexion: 135 degrees    Active shoulder Abduction in sidelyin degrees  Treatment     Parisa received the treatments listed below:      manual therapy techniques: passive range motion were applied to the: left elbow, shoulder and wrist joint for 00 minutes, including:  No more restrictions; stretch to tolerance in all ranges   -scapular mobilizations, scapular isometrics     therapeutic exercises to develop strength, endurance, ROM and posture for 60 minutes including:  - pulleys AAROM (scaption, flexion and abduction) - pt initiating movement actively, using other arm to assist with all movements when unable to raise due to strength; 1 x 15 reps each way   - standing elbow flexion against wall; 3 lb - emphasis on eccentric lowering; decreasing internal  rotation   - standing scaption with cane; 1 x 30 reps - using standing mirror (pt raising arm as high as possible, then initiating scapular depression) - NT due to time   - standing flexion with cane; 1 x 30 reps - using standing mirror (pt raising arm as high as possible, then initiating scapular depression)  - standing abduction with cane; 1 x 30 - using standing mirror (pt raising arm as high as possible, then initiating scapular depression) - NT due to time   - standing ER with cane; 1 x 30 reps; 3 sec hold - use standing mirror   - flexion, abduction and extension isometrics with PT to improve deltoid initiation with cane exercises     - Prone row; 3 x 10 reps; 3 sec hold; 2 lb DB - emphasis on no upper trap movement  - NT  - Prone extension; 3 x 10 reps; 3 sec hold - emphasis on no upper trap movement - NT    Hot pack to left shoulder for 10 minutes to improve blood flow and tissue extensibility     Patient Education and Home Exercises     Home Exercises Provided and Patient Education Provided     Education provided:   - Updated HEP (4/20/2022)     Written Home Exercises Provided: Patient instructed to cont prior HEP. Exercises were reviewed and Parisa was able to demonstrate them prior to the end of the session.  Parisa demonstrated good  understanding of the education provided. See EMR under Patient Instructions for exercises provided during therapy sessions    ASSESSMENT   Continuing to focus on active flexion, scaption and abduction shoulder movement (patient only lacking ER with passive range of motion). Pt with improvement in initiation of frontal plane movement, still requiring max tactile and verbal cueing along with standing mirror to decrease upper trapezius compensation. Pt requiring more rest breaks today with AAROM pulleys due to increase focus on scapular dyskinesia and decreasing assistance with right upper extremity.   Pt will continue to benefit from skilled PT to improve functional mobility and  active range of motion to return pt to PLOF.     Parisa Is progressing well towards her goals.   Pt prognosis is Good.     Pt will continue to benefit from skilled outpatient physical therapy to address the deficits listed in the problem list box on initial evaluation, provide pt/family education and to maximize pt's level of independence in the home and community environment.   Pt's spiritual, cultural and educational needs considered and pt agreeable to plan of care and goals.     Anticipated barriers to physical therapy: Increased anxiety about moving arm without sling    GOALS:  Short Term Goals (4-6 Weeks):   1) Pt will demonstrate compliance with initial home exercise program as prescribed by physical therapist to improve independence with management of condition.  2) Pt to improve passive range of motion in flexion and external rotation at side to at least 100 and 30 degrees, respectively, to allow for improved mobility with dressing and self care.  3) Pt to report R upper extremity pain of <3/10 at worst during exercises to improve tolerance to ADLs and self care.     Long Term Goals (8-12 Weeks):   1. Pt to achieve <40% limitation as measured by the FOTO to demonstrate decreased disability.  2) Pt to improve active range of motion in R shoulder to within 20% of uninvolved extremity to allow for improved functional mobility with work tasks and lifting.  3) Pt to demonstrate at least 3+/5 grades on all upper quarter manual muscle testing to facilitate improved strength for work demands.  4) Pt to able to wash her hair in the shower with minimal assistance from spouse     PLAN   Continue to progress per doctor protocol and patients symptoms.     Sanam Davies, PT, DPT

## 2022-05-18 ENCOUNTER — CLINICAL SUPPORT (OUTPATIENT)
Dept: REHABILITATION | Facility: HOSPITAL | Age: 67
End: 2022-05-18
Payer: MEDICARE

## 2022-05-18 DIAGNOSIS — M25.612 DECREASED ROM OF LEFT SHOULDER: Primary | ICD-10-CM

## 2022-05-18 DIAGNOSIS — M25.512 ACUTE PAIN OF LEFT SHOULDER: ICD-10-CM

## 2022-05-18 PROCEDURE — 97110 THERAPEUTIC EXERCISES: CPT | Mod: KX

## 2022-05-18 NOTE — PROGRESS NOTES
"[facility]   Physical Therapy Treatment Note     Name: Parisa Dimas  Clinic Number: [id number]    Therapy Diagnosis:   Encounter Diagnoses   Name Primary?    Decreased ROM of left shoulder Yes    Acute pain of left shoulder      Physician: Yahir Lyn PA*    Visit Date: 2022    Physician Orders: PT Eval and Treat   Medical Diagnosis from Referral: Closed 4-part fracture of proximal humerus, left, initial encounter [S42.292A]  Evaluation Date: 3/10/2022  Authorization Period Expiration: 05/10/2022  Plan of Care Expiration: 2022  Progress Note Due: 2022  Visit # / Visits authorized:      PTA Visit #: 0 / 5     Time In: 0110 pm  Time Out: 0200 pm  Total Billable Time: 50 minutes (4 TE)     SUBJECTIVE     Patient reports: "feels like I got punched in the arm"    She was compliant with home exercise program.  Response to previous treatment: increased soreness   Functional change: none to note     Pain: 5/10; soreness   Location: Left shoulder       OBJECTIVE     Objective Measures updated at progress report unless specified.   DOS 3/07/2022  6 week follow up 2022    Passive shoulder flexion: 135 degrees    Active shoulder Abduction in sidelyin degrees  Treatment     Parisa received the treatments listed below:      manual therapy techniques: passive range motion were applied to the: left elbow, shoulder and wrist joint for 00 minutes, including:  No more restrictions; stretch to tolerance in all ranges   -scapular mobilizations, scapular isometrics     therapeutic exercises to develop strength, endurance, ROM and posture for 60 minutes including:  + upper arm bike next visit   - bennie BECERRIL (scaption, flexion and abduction) - pt initiating movement actively, using other arm to assist with all movements when unable to raise due to strength; 1 x 20 reps each way   - standing elbow flexion against wall; 3 lb - emphasis on eccentric lowering; " decreasing internal rotation   - standing scaption with cane; 1 x 30 reps - using standing mirror (pt raising arm as high as possible, then initiating scapular depression)  - standing flexion with cane; 1 x 30 reps - using standing mirror (pt raising arm as high as possible, then initiating scapular depression)  - standing abduction with cane; 1 x 30 - using standing mirror (pt raising arm as high as possible, then initiating scapular depression)      - standing ER with cane; 1 x 30 reps; 3 sec hold - use standing mirror  - NT due to time constraints   - flexion, abduction and extension isometrics with PT to improve deltoid initiation with cane exercises - NT   - Prone row; 3 x 10 reps; 3 sec hold; 2 lb DB - emphasis on no upper trap movement  - NT  - Prone extension; 3 x 10 reps; 3 sec hold - emphasis on no upper trap movement - NT    Hot pack to left shoulder for 10 minutes to improve blood flow and tissue extensibility     Patient Education and Home Exercises     Home Exercises Provided and Patient Education Provided     Education provided:   - Updated HEP (4/20/2022)     Written Home Exercises Provided: Patient instructed to cont prior HEP. Exercises were reviewed and Parisa was able to demonstrate them prior to the end of the session.  Parisa demonstrated good  understanding of the education provided. See EMR under Patient Instructions for exercises provided during therapy sessions    ASSESSMENT   Improvement seen in ability to decrease upper trap compensation with active shoulder movements. Requiring fewer verbal/tactile cues today with most activities. Pt still limited to 90 degrees of active shoulder flexion, scaption and abduction. Will continue to progress per patient symptoms to return pt to PLOF.       Parisa Is progressing well towards her goals.   Pt prognosis is Good.     Pt will continue to benefit from skilled outpatient physical therapy to address the deficits listed in the problem list box on initial  evaluation, provide pt/family education and to maximize pt's level of independence in the home and community environment.   Pt's spiritual, cultural and educational needs considered and pt agreeable to plan of care and goals.     Anticipated barriers to physical therapy: Increased anxiety about moving arm without sling    GOALS:  Short Term Goals (4-6 Weeks):   1) Pt will demonstrate compliance with initial home exercise program as prescribed by physical therapist to improve independence with management of condition.  2) Pt to improve passive range of motion in flexion and external rotation at side to at least 100 and 30 degrees, respectively, to allow for improved mobility with dressing and self care.  3) Pt to report R upper extremity pain of <3/10 at worst during exercises to improve tolerance to ADLs and self care.     Long Term Goals (8-12 Weeks):   1. Pt to achieve <40% limitation as measured by the FOTO to demonstrate decreased disability.  2) Pt to improve active range of motion in R shoulder to within 20% of uninvolved extremity to allow for improved functional mobility with work tasks and lifting.  3) Pt to demonstrate at least 3+/5 grades on all upper quarter manual muscle testing to facilitate improved strength for work demands.  4) Pt to able to wash her hair in the shower with minimal assistance from spouse     PLAN   Continue to progress per doctor protocol and patients symptoms.     Sanam Davies, PT, DPT

## 2022-05-19 ENCOUNTER — CLINICAL SUPPORT (OUTPATIENT)
Dept: REHABILITATION | Facility: HOSPITAL | Age: 67
End: 2022-05-19
Payer: MEDICARE

## 2022-05-19 DIAGNOSIS — M25.612 DECREASED ROM OF LEFT SHOULDER: Primary | ICD-10-CM

## 2022-05-19 PROCEDURE — 97110 THERAPEUTIC EXERCISES: CPT | Mod: CQ

## 2022-05-19 NOTE — PROGRESS NOTES
[facility]   Physical Therapy Treatment Note     Name: Parisa EARL OneTrueFan  [facility] Number: [id number]    Therapy Diagnosis:   Encounter Diagnosis   Name Primary?    Decreased ROM of left shoulder Yes     Physician: Yahir Lyn PA*    Visit Date: 2022    Physician Orders: PT Eval and Treat   Medical Diagnosis from Referral: Closed 4-part fracture of proximal humerus, left, initial encounter [S42.292A]  Evaluation Date: 3/10/2022  Authorization Period Expiration: 05/10/2022  Plan of Care Expiration: 2022  Progress Note Due: 2022  Visit # / Visits authorized:      PTA Visit #:      Time In: 10:00 am  Time Out: 11:00 am  Total Billable Time: 60 minutes (4 TE)     SUBJECTIVE     Patient reports: she is feeling tired .    She was compliant with home exercise program.  Response to previous treatment: increased soreness   Functional change: none to note     Pain: 4/10; soreness   Location: Left shoulder       OBJECTIVE     Objective Measures updated at progress report unless specified.   DOS 3/07/2022  6 week follow up 2022    Passive shoulder flexion: 135 degrees    Active shoulder Abduction in sidelyin degrees  Treatment     Parisa received the treatments listed below:      manual therapy techniques: passive range motion were applied to the: left elbow, shoulder and wrist joint for 00 minutes, including:  No more restrictions; stretch to tolerance in all ranges   -scapular mobilizations, scapular isometrics     therapeutic exercises to develop strength, endurance, ROM and posture for 60 minutes including:  + UBE forward : 6'   - pulleys AAROM (scaption, flexion and abduction) - pt initiating movement actively, using other arm to assist with all movements when unable to raise due to strength; 1 x 20 reps each way   - standing elbow flexion against wall; 3 lb - emphasis on eccentric lowering; decreasing internal rotation   - standing scaption with cane;  1 x 20 reps - using standing mirror (pt raising arm as high as possible, then initiating scapular depression)  - standing flexion with cane; 1 x 20 reps - using standing mirror (pt raising arm as high as possible, then initiating scapular depression)  - standing abduction with cane; 1 x 20 - using standing mirror (pt raising arm as high as possible, then initiating scapular depression)      - standing ER with cane; 1 x 30 reps; 3 sec hold - use standing mirror  - NT due to time constraints   - flexion, abduction and extension isometrics with PT to improve deltoid initiation with cane exercises - NT   - Prone row; 3 x 10 reps; 3 sec hold; 2 lb DB - emphasis on no upper trap movement  - NT  - Prone extension; 3 x 10 reps; 3 sec hold - emphasis on no upper trap movement - NT    Hot pack to left shoulder for 10 minutes to improve blood flow and tissue extensibility     Patient Education and Home Exercises     Home Exercises Provided and Patient Education Provided     Education provided:   - Updated HEP (4/20/2022)     Written Home Exercises Provided: Patient instructed to cont prior HEP. Exercises were reviewed and Parisa was able to demonstrate them prior to the end of the session.  Parisa demonstrated good  understanding of the education provided. See EMR under Patient Instructions for exercises provided during therapy sessions    ASSESSMENT     Pt tolerated addition of recumbent bike forward revolutions well although did not perform backwards revolutions as she exhibited increased shoulder elevation in this direction. She continues to exhibit shoulder elevation with AAROM although does well with self corrections during AAROM exercises .  Pt still limited to 90 degrees of active shoulder flexion, scaption and abduction. Will continue to progress per patient symptoms to return pt to PLOF.     Parisa Is progressing well towards her goals.   Pt prognosis is Good.     Pt will continue to benefit from skilled outpatient  physical therapy to address the deficits listed in the problem list box on initial evaluation, provide pt/family education and to maximize pt's level of independence in the home and community environment.   Pt's spiritual, cultural and educational needs considered and pt agreeable to plan of care and goals.     Anticipated barriers to physical therapy: Increased anxiety about moving arm without sling    GOALS:  Short Term Goals (4-6 Weeks):   1) Pt will demonstrate compliance with initial home exercise program as prescribed by physical therapist to improve independence with management of condition.  2) Pt to improve passive range of motion in flexion and external rotation at side to at least 100 and 30 degrees, respectively, to allow for improved mobility with dressing and self care.  3) Pt to report R upper extremity pain of <3/10 at worst during exercises to improve tolerance to ADLs and self care.     Long Term Goals (8-12 Weeks):   1. Pt to achieve <40% limitation as measured by the FOTO to demonstrate decreased disability.  2) Pt to improve active range of motion in R shoulder to within 20% of uninvolved extremity to allow for improved functional mobility with work tasks and lifting.  3) Pt to demonstrate at least 3+/5 grades on all upper quarter manual muscle testing to facilitate improved strength for work demands.  4) Pt to able to wash her hair in the shower with minimal assistance from spouse     PLAN   Continue to progress per doctor protocol and patients symptoms.     Teresa Alcala, PTA

## 2022-05-23 ENCOUNTER — CLINICAL SUPPORT (OUTPATIENT)
Dept: REHABILITATION | Facility: HOSPITAL | Age: 67
End: 2022-05-23
Payer: MEDICARE

## 2022-05-23 DIAGNOSIS — M25.612 DECREASED ROM OF LEFT SHOULDER: Primary | ICD-10-CM

## 2022-05-23 DIAGNOSIS — M25.512 ACUTE PAIN OF LEFT SHOULDER: ICD-10-CM

## 2022-05-23 PROCEDURE — 97110 THERAPEUTIC EXERCISES: CPT | Mod: KX

## 2022-05-23 NOTE — PROGRESS NOTES
[facility]   Physical Therapy Treatment Note     Name: Parisa EARL Techoz  [facility] Number: [id number]    Therapy Diagnosis:   Encounter Diagnoses   Name Primary?    Decreased ROM of left shoulder Yes    Acute pain of left shoulder      Physician: Yahir Lyn PA*    Visit Date: 2022    Physician Orders: PT Eval and Treat   Medical Diagnosis from Referral: Closed 4-part fracture of proximal humerus, left, initial encounter [S42.292A]  Evaluation Date: 3/10/2022  Authorization Period Expiration: 05/10/2022  Plan of Care Expiration: 2022  Progress Note Due: 2022  Visit # / Visits authorized:      PTA Visit #: 0 / 5     Time In: 0100 pm  Time Out: 0200 pm  Total Billable Time: 60 minutes (4 TE)     SUBJECTIVE     Patient reports: No new complaints    She was compliant with home exercise program.  Response to previous treatment: increased soreness   Functional change: none to note     Pain: 3/10; soreness   Location: Left shoulder       OBJECTIVE     Objective Measures updated at progress report unless specified.   DOS 3/07/2022  12 week follow up 2022    Passive shoulder flexion: 135 degrees    Active shoulder Abduction in sidelyin degrees  Treatment     Parisa received the treatments listed below:      Manual Therapy Techniques: passive range motion were applied to the: left elbow, shoulder and wrist joint for 00 minutes, including:  No more restrictions; stretch to tolerance in all ranges   -scapular mobilizations, scapular isometrics     therapeutic exercises to develop strength, endurance, ROM and posture for 60 minutes including:  + UBE forward for 5 minutes at level 2.0 to improve blood flow to exercised muscles   - pulleys AAROM (scaption, flexion and abduction) - pt initiating movement actively, using other arm to assist with all movements when unable to raise due to strength; 1 x 20 reps each way   - seated elbow flexion against wall; 4 lb -  emphasis on eccentric lowering; decreasing internal rotation   - seated scaption with cane; 1 x 20 reps - using standing mirror (pt raising arm as high as possible, then initiating scapular depression) - active assist range of motion by PT to move past 90 degrees   - seated flexion with cane; 1 x 20 reps - using standing mirror (pt raising arm as high as possible, then initiating scapular depression) - active assist range of motion by PT to move past 90 degrees  - seated abduction with bilateral elbows bent - 1 x 20 reps; 5 sec hold (pt raising arm as high as possible, then initiating scapular depression)  - stopping at 90 degrees     - standing ER with cane; 1 x 30 reps; 3 sec hold - use standing mirror  - NT due to time constraints   - flexion, abduction and extension isometrics with PT to improve deltoid initiation with cane exercises - NT due to time constraints   - Prone row; 3 x 10 reps; 3 sec hold; 2 lb DB - emphasis on no upper trap movement  - NT due to time constraints   - Prone extension; 3 x 10 reps; 3 sec hold - emphasis on no upper trap movement - NT due to time constraints     Hot pack to left shoulder for 10 minutes to improve blood flow and tissue extensibility     Patient Education and Home Exercises     Home Exercises Provided and Patient Education Provided     Education provided:   - Updated HEP (4/20/2022)     Written Home Exercises Provided: Patient instructed to cont prior HEP. Exercises were reviewed and Parisa was able to demonstrate them prior to the end of the session.  Parisa demonstrated good  understanding of the education provided. See EMR under Patient Instructions for exercises provided during therapy sessions    ASSESSMENT     Pt continuing to require max verbal and tactile cues from physical therapist to improve active range of motion (flexion, scaption and abduction). Pt still showing increased upper trapezius compensation even with assistance from PT. Pt still showing poor motor  control and inability to initiate movement with deltoids. Pt still unable to actively raise shoulder past 90 degrees without assistance from PT. Will continue to focus of strengthening of deltoids and decrease upper trapezius compensation.     Parisa Is progressing well towards her goals.   Pt prognosis is Good.     Pt will continue to benefit from skilled outpatient physical therapy to address the deficits listed in the problem list box on initial evaluation, provide pt/family education and to maximize pt's level of independence in the home and community environment.   Pt's spiritual, cultural and educational needs considered and pt agreeable to plan of care and goals.     Anticipated barriers to physical therapy: Increased anxiety about moving arm without sling    GOALS:  Short Term Goals (4-6 Weeks):   1) Pt will demonstrate compliance with initial home exercise program as prescribed by physical therapist to improve independence with management of condition.  2) Pt to improve passive range of motion in flexion and external rotation at side to at least 100 and 30 degrees, respectively, to allow for improved mobility with dressing and self care.  3) Pt to report R upper extremity pain of <3/10 at worst during exercises to improve tolerance to ADLs and self care.     Long Term Goals (8-12 Weeks):   1. Pt to achieve <40% limitation as measured by the FOTO to demonstrate decreased disability.  2) Pt to improve active range of motion in R shoulder to within 20% of uninvolved extremity to allow for improved functional mobility with work tasks and lifting.  3) Pt to demonstrate at least 3+/5 grades on all upper quarter manual muscle testing to facilitate improved strength for work demands.  4) Pt to able to wash her hair in the shower with minimal assistance from spouse     PLAN   Continue to progress per doctor protocol and patients symptoms.     Sanam Davies, PT, DPT

## 2022-05-24 ENCOUNTER — HOSPITAL ENCOUNTER (OUTPATIENT)
Dept: RADIOLOGY | Facility: HOSPITAL | Age: 67
Discharge: HOME | End: 2022-05-24
Payer: MEDICARE

## 2022-05-24 DIAGNOSIS — Q24.9 CONGENITAL MALFORMATION OF HEART, UNSPECIFIED: ICD-10-CM

## 2022-05-24 DIAGNOSIS — M25.512 CHRONIC LEFT SHOULDER PAIN: Primary | ICD-10-CM

## 2022-05-24 DIAGNOSIS — R01.1 HEART MURMUR: ICD-10-CM

## 2022-05-24 DIAGNOSIS — G89.29 CHRONIC LEFT SHOULDER PAIN: Primary | ICD-10-CM

## 2022-05-24 DIAGNOSIS — Q24.9 CONGENITAL MALFORMATION OF HEART, UNSPECIFIED: Primary | ICD-10-CM

## 2022-05-24 PROCEDURE — 75561 CV CARDIAC MRI MORPHOLOGY (CUPID ONLY): ICD-10-PCS | Mod: 26,,, | Performed by: INTERNAL MEDICINE

## 2022-05-24 PROCEDURE — 75561 CARDIAC MRI FOR MORPH W/DYE: CPT | Mod: 26,,, | Performed by: INTERNAL MEDICINE

## 2022-05-24 PROCEDURE — 75561 CARDIAC MRI FOR MORPH W/DYE: CPT | Mod: TC

## 2022-05-24 PROCEDURE — A9577 INJ MULTIHANCE: HCPCS

## 2022-05-24 PROCEDURE — 25500020 PHARM REV CODE 255

## 2022-05-24 PROCEDURE — 75561 MRI CARDIAC MORPHOLOGY FUNCTION W WO: ICD-10-PCS | Mod: 26,,, | Performed by: INTERNAL MEDICINE

## 2022-05-24 RX ADMIN — GADOBENATE DIMEGLUMINE 20 ML: 529 INJECTION, SOLUTION INTRAVENOUS at 11:05

## 2022-05-25 ENCOUNTER — CLINICAL SUPPORT (OUTPATIENT)
Dept: REHABILITATION | Facility: HOSPITAL | Age: 67
End: 2022-05-25
Payer: MEDICARE

## 2022-05-25 ENCOUNTER — PATIENT MESSAGE (OUTPATIENT)
Dept: CARDIOLOGY | Facility: CLINIC | Age: 67
End: 2022-05-25
Payer: MEDICARE

## 2022-05-25 DIAGNOSIS — M25.512 ACUTE PAIN OF LEFT SHOULDER: ICD-10-CM

## 2022-05-25 DIAGNOSIS — Q24.4 SUBAORTIC MEMBRANE: Primary | ICD-10-CM

## 2022-05-25 DIAGNOSIS — M25.612 DECREASED ROM OF LEFT SHOULDER: Primary | ICD-10-CM

## 2022-05-25 PROCEDURE — 97110 THERAPEUTIC EXERCISES: CPT | Mod: KX

## 2022-05-25 NOTE — PROGRESS NOTES
[facility]   Physical Therapy Treatment Note     Name: Parisa EARL Wing  [facility] Number: [id number]    Therapy Diagnosis:   Encounter Diagnoses   Name Primary?    Decreased ROM of left shoulder Yes    Acute pain of left shoulder      Physician: Yahir Lyn PA*    Visit Date: 2022    Physician Orders: PT Eval and Treat   Medical Diagnosis from Referral: Closed 4-part fracture of proximal humerus, left, initial encounter [S42.292A]  Evaluation Date: 3/10/2022  Authorization Period Expiration: 05/10/2022  Plan of Care Expiration: 2022  Progress Note Due: 2022  Visit # / Visits authorized:      PTA Visit #: 0 / 5     Time In: 0100 pm  Time Out: 0200 pm  Total Billable Time: 60 minutes (4 TE)     SUBJECTIVE     Patient reports: Increased soreness in shoulder from last visit.   She was compliant with home exercise program.  Response to previous treatment: increased soreness   Functional change: none to note     Pain: 3/10; soreness   Location: Left shoulder       OBJECTIVE     Objective Measures updated at progress report unless specified.   DOS 3/07/2022  12 week follow up 2022    Passive shoulder flexion: 135 degrees    Active shoulder Abduction in sidelyin degrees  Treatment     Parisa received the treatments listed below:      Manual Therapy Techniques: passive range motion were applied to the: left elbow, shoulder and wrist joint for 00 minutes, including:  No more restrictions; stretch to tolerance in all ranges   -scapular mobilizations, scapular isometrics     therapeutic exercises to develop strength, endurance, ROM and posture for 60 minutes including:  + UBE forward for 5 minutes at level 2.0 to improve blood flow to exercised muscles   - bennie BECERRIL (scaption, flexion and abduction) - pt initiating movement actively, using other arm to assist with all movements when unable to raise due to strength; 3 minutes each way   - seated elbow  flexion against wall; 4 lb - emphasis on eccentric lowering; decreasing internal rotation   - Standing tricep extension against wall: yellow theraband   - standing ER with cane; 1 x 30 reps; 3 sec hold - use standing mirror   - standing shoulder abduction 1 x 30 reps seated in mirror   - standing shoulder flexion 1 x 30 reps seated in mirror   - TB rows; green TB 3 x 10 reps     Hot pack to left shoulder for 10 minutes to improve blood flow and tissue extensibility     Patient Education and Home Exercises     Home Exercises Provided and Patient Education Provided     Education provided:   - Updated HEP (4/20/2022)     Written Home Exercises Provided: Patient instructed to cont prior HEP. Exercises were reviewed and Parisa was able to demonstrate them prior to the end of the session.  Parisa demonstrated good  understanding of the education provided. See EMR under Patient Instructions for exercises provided during therapy sessions    ASSESSMENT     Pt continuing to require max verbal and tactile cues from physical therapist to improve active range of motion (flexion, scaption and abduction). Pt still showing increased upper trapezius compensation even with assistance from PT. Pt still showing poor motor control and inability to initiate movement with deltoids without assistance from PT.  Pt still unable to actively raise shoulder past 90 degrees without assistance from PT. Will continue to focus of strengthening of deltoids and scapular stabilizers. Appropriate muscle response with new exercises today; increased soreness at the end of today's visit.     Parisa Is progressing well towards her goals.   Pt prognosis is Good.     Pt will continue to benefit from skilled outpatient physical therapy to address the deficits listed in the problem list box on initial evaluation, provide pt/family education and to maximize pt's level of independence in the home and community environment.   Pt's spiritual, cultural and educational  needs considered and pt agreeable to plan of care and goals.     Anticipated barriers to physical therapy: Increased anxiety about moving arm without sling    GOALS:  Short Term Goals (4-6 Weeks):   1) Pt will demonstrate compliance with initial home exercise program as prescribed by physical therapist to improve independence with management of condition.  2) Pt to improve passive range of motion in flexion and external rotation at side to at least 100 and 30 degrees, respectively, to allow for improved mobility with dressing and self care.  3) Pt to report R upper extremity pain of <3/10 at worst during exercises to improve tolerance to ADLs and self care.     Long Term Goals (8-12 Weeks):   1. Pt to achieve <40% limitation as measured by the FOTO to demonstrate decreased disability.  2) Pt to improve active range of motion in R shoulder to within 20% of uninvolved extremity to allow for improved functional mobility with work tasks and lifting.  3) Pt to demonstrate at least 3+/5 grades on all upper quarter manual muscle testing to facilitate improved strength for work demands.  4) Pt to able to wash her hair in the shower with minimal assistance from spouse     PLAN   Continue to progress per doctor protocol and patients symptoms.     Sanam Davies, PT, DPT

## 2022-05-26 ENCOUNTER — CLINICAL SUPPORT (OUTPATIENT)
Dept: REHABILITATION | Facility: HOSPITAL | Age: 67
End: 2022-05-26
Payer: MEDICARE

## 2022-05-26 DIAGNOSIS — M25.612 DECREASED ROM OF LEFT SHOULDER: Primary | ICD-10-CM

## 2022-05-26 DIAGNOSIS — M25.512 CHRONIC LEFT SHOULDER PAIN: ICD-10-CM

## 2022-05-26 DIAGNOSIS — G89.29 CHRONIC LEFT SHOULDER PAIN: ICD-10-CM

## 2022-05-26 PROCEDURE — 97110 THERAPEUTIC EXERCISES: CPT | Mod: KX

## 2022-05-26 NOTE — PROGRESS NOTES
[facility]   Physical Therapy Treatment Note     Name: Parisa EARL [place]  [facility] Number: [id number]    Therapy Diagnosis:   Encounter Diagnoses   Name Primary?    Decreased ROM of left shoulder Yes    Chronic left shoulder pain      Physician: Yahri Lyn PA*    Visit Date: 2022    Physician Orders: PT Eval and Treat   Medical Diagnosis from Referral: Closed 4-part fracture of proximal humerus, left, initial encounter [S42.292A]  Evaluation Date: 3/10/2022  Authorization Period Expiration: 05/10/2022  Plan of Care Expiration: 2022  Progress Note Due: 2022  Visit # / Visits authorized:      PTA Visit #: 0 / 5     Time In: 0800 am  Time Out: 0900 am  Total Billable Time: 60 minutes (4 TE)     SUBJECTIVE     Patient reports: Increased soreness in shoulder from last visit.   She was compliant with home exercise program.  Response to previous treatment: increased soreness   Functional change: none to note     Pain: 10; soreness   Location: Left shoulder       OBJECTIVE     Objective Measures updated at progress report unless specified.   DOS 3/07/2022  12 week follow up 2022    Passive shoulder flexion: 135 degrees    Active shoulder Abduction in sidelyin degrees  Treatment     Parisa received the treatments listed below:      Manual Therapy Techniques: passive range motion were applied to the: left elbow, shoulder and wrist joint for 00 minutes, including:  No more restrictions; stretch to tolerance in all ranges   -scapular mobilizations, scapular isometrics     therapeutic exercises to develop strength, endurance, ROM and posture for 60 minutes including:  + UBE forward for 8 minutes at level 4.0 to improve blood flow to exercised muscles   - bennie BECERRIL (scaption, flexion and abduction) - pt initiating movement actively, using other arm to assist with all movements when unable to raise due to strength; 3 minutes each way   - seated elbow flexion  against wall; 4 lb - emphasis on eccentric lowering; decreasing internal rotation   - Standing tricep extension against wall: orange theraband   - standing ER with cane; 1 x 30 reps; 3 sec hold - use standing mirror   - seated shoulder abduction 1 x 30 reps seated in mirror   - seated shoulder flexion 1 x 30 reps seated in mirror   - TB rows; green TB 3 x 10 reps     Hot pack to left shoulder for 10 minutes to improve blood flow and tissue extensibility     Patient Education and Home Exercises     Home Exercises Provided and Patient Education Provided     Education provided:   - Updated HEP (4/20/2022)     Written Home Exercises Provided: Patient instructed to cont prior HEP. Exercises were reviewed and Parisa was able to demonstrate them prior to the end of the session.  Parisa demonstrated good  understanding of the education provided. See EMR under Patient Instructions for exercises provided during therapy sessions    ASSESSMENT     Pt continuing to require max verbal and tactile cues from physical therapist to improve active range of motion (flexion, scaption and abduction). Pt still showing increased upper trapezius compensation even with assistance from PT. Pt still showing poor motor control and inability to initiate movement with deltoids without assistance from PT.  Pt still unable to actively raise shoulder past 90 degrees without assistance from PT. Will continue to focus of strengthening of deltoids and scapular stabilizers. Appropriate muscle response with new exercises today; increased soreness at the end of today's visit.     Parisa Is progressing well towards her goals.   Pt prognosis is Good.     Pt will continue to benefit from skilled outpatient physical therapy to address the deficits listed in the problem list box on initial evaluation, provide pt/family education and to maximize pt's level of independence in the home and community environment.   Pt's spiritual, cultural and educational needs  considered and pt agreeable to plan of care and goals.     Anticipated barriers to physical therapy: Increased anxiety about moving arm without sling    GOALS:  Short Term Goals (4-6 Weeks):   1) Pt will demonstrate compliance with initial home exercise program as prescribed by physical therapist to improve independence with management of condition.  2) Pt to improve passive range of motion in flexion and external rotation at side to at least 100 and 30 degrees, respectively, to allow for improved mobility with dressing and self care.  3) Pt to report R upper extremity pain of <3/10 at worst during exercises to improve tolerance to ADLs and self care.     Long Term Goals (8-12 Weeks):   1. Pt to achieve <40% limitation as measured by the FOTO to demonstrate decreased disability.  2) Pt to improve active range of motion in R shoulder to within 20% of uninvolved extremity to allow for improved functional mobility with work tasks and lifting.  3) Pt to demonstrate at least 3+/5 grades on all upper quarter manual muscle testing to facilitate improved strength for work demands.  4) Pt to able to wash her hair in the shower with minimal assistance from spouse     PLAN   Continue to progress per doctor protocol and patients symptoms.     Sanam Davies, PT, DPT

## 2022-05-28 ENCOUNTER — DOCUMENTATION ONLY (OUTPATIENT)
Dept: CARDIOLOGY | Facility: HOSPITAL | Age: 67
End: 2022-05-28
Payer: MEDICARE

## 2022-05-28 DIAGNOSIS — Q24.4 SUBAORTIC MEMBRANE: Primary | ICD-10-CM

## 2022-05-28 NOTE — PROGRESS NOTES
Patient with cardiac MRI no LGE, concern for subaortic membrane. Also MILDRED with similar findings. No symptoms except fatigue. Will refer to CTS and adult congenital clinic. Updated staff.

## 2022-05-30 ENCOUNTER — CLINICAL SUPPORT (OUTPATIENT)
Dept: REHABILITATION | Facility: HOSPITAL | Age: 67
End: 2022-05-30
Payer: MEDICARE

## 2022-05-30 DIAGNOSIS — G89.29 CHRONIC LEFT SHOULDER PAIN: ICD-10-CM

## 2022-05-30 DIAGNOSIS — M25.512 CHRONIC LEFT SHOULDER PAIN: ICD-10-CM

## 2022-05-30 DIAGNOSIS — M25.612 DECREASED ROM OF LEFT SHOULDER: Primary | ICD-10-CM

## 2022-05-30 PROCEDURE — 97110 THERAPEUTIC EXERCISES: CPT | Mod: KX

## 2022-05-30 NOTE — PROGRESS NOTES
[facility]   Physical Therapy Treatment Note     Name: Parisa EARL [facility]  [facility] Number: [id number]    Therapy Diagnosis:   Encounter Diagnoses   Name Primary?    Chronic left shoulder pain     Decreased ROM of left shoulder Yes     Physician: Davonte Newby MD    Visit Date: 2022    Physician Orders: PT Eval and Treat   Medical Diagnosis from Referral: Closed 4-part fracture of proximal humerus, left, initial encounter [S42.292A]  Evaluation Date: 3/10/2022  Authorization Period Expiration: 05/10/2022  Plan of Care Expiration: 2022  Progress Note Due: 2022  Visit # / Visits authorized:      PTA Visit #: 0 / 5     Time In: 0800 am  Time Out: 0900 am  Total Billable Time: 30 minutes (2 TE)     SUBJECTIVE     Patient reports: Increased soreness in shoulder from last visit.   She was compliant with home exercise program.  Response to previous treatment: increased soreness   Functional change: none to note     Pain: 4/10; soreness   Location: Left shoulder       OBJECTIVE     Objective Measures updated at progress report unless specified.   DOS 3/07/2022  12 week follow up 2022    Passive shoulder flexion: 135 degrees    Active shoulder Abduction in sidelyin degrees  Treatment     Parisa received the treatments listed below:        therapeutic exercises to develop strength, endurance, ROM and posture for 60 minutes including:  + UBE forward for 8 minutes at level 4.0 to improve blood flow to exercised muscles   - pulleys AAROM (scaption, flexion and abduction) - pt initiating movement actively, using other arm to assist with all movements when unable to raise due to strength; 3 minutes each way   - seated elbow flexion against wall; 4 lb - emphasis on eccentric lowering; decreasing internal rotation   - Standing tricep extension against wall: red theraband; 3 sec hold   - standing ER with cane; 1 x 30 reps; 3 sec hold - use standing mirror   - active ER in  chair + scapular retraction; 3 x 10 reps; 3 sec hold   - seated shoulder abduction 1 x 20 reps seated in mirror   - seated shoulder flexion 1 x 20 reps seated in mirror   - TB rows; green TB 3 x 10 reps - NT    Hot pack to left shoulder for 10 minutes to improve blood flow and tissue extensibility     Patient Education and Home Exercises     Home Exercises Provided and Patient Education Provided     Education provided:   - Updated HEP (4/20/2022)     Written Home Exercises Provided: Patient instructed to cont prior HEP. Exercises were reviewed and Parisa was able to demonstrate them prior to the end of the session.  Parisa demonstrated good  understanding of the education provided. See EMR under Patient Instructions for exercises provided during therapy sessions    ASSESSMENT     Pt with slight improvement in active shoulder movement (flexion and abduction); goal per referring MD is for patient to be able to put her hand on the top of her head. Pt limited in this movement currently due to overall shoulder strength and poor active external rotation without assistance from PT. Pt continuing to show full passive range of motion with no pain. Will continue to improve scapular stabilizer and deltoid strength to meet pt and MD goals.     Parisa Is progressing well towards her goals.   Pt prognosis is Good.     Pt will continue to benefit from skilled outpatient physical therapy to address the deficits listed in the problem list box on initial evaluation, provide pt/family education and to maximize pt's level of independence in the home and community environment.   Pt's spiritual, cultural and educational needs considered and pt agreeable to plan of care and goals.     Anticipated barriers to physical therapy: Increased anxiety about moving arm without sling    GOALS:  Short Term Goals (4-6 Weeks):   1) Pt will demonstrate compliance with initial home exercise program as prescribed by physical therapist to improve  independence with management of condition.  2) Pt to improve passive range of motion in flexion and external rotation at side to at least 100 and 30 degrees, respectively, to allow for improved mobility with dressing and self care.  3) Pt to report R upper extremity pain of <3/10 at worst during exercises to improve tolerance to ADLs and self care.     Long Term Goals (8-12 Weeks):   1. Pt to achieve <40% limitation as measured by the FOTO to demonstrate decreased disability.  2) Pt to improve active range of motion in R shoulder to within 20% of uninvolved extremity to allow for improved functional mobility with work tasks and lifting.  3) Pt to demonstrate at least 3+/5 grades on all upper quarter manual muscle testing to facilitate improved strength for work demands.  4) Pt to able to wash her hair in the shower with minimal assistance from spouse     PLAN   Continue to progress per doctor protocol and patients symptoms.     Sanam Davies, PT, DPT

## 2022-06-01 ENCOUNTER — OFFICE VISIT (OUTPATIENT)
Dept: SPORTS MEDICINE | Facility: CLINIC | Age: 67
End: 2022-06-01
Payer: MEDICARE

## 2022-06-01 ENCOUNTER — HOSPITAL ENCOUNTER (OUTPATIENT)
Dept: RADIOLOGY | Facility: HOSPITAL | Age: 67
Discharge: HOME | End: 2022-06-01
Attending: PHYSICIAN ASSISTANT
Payer: MEDICARE

## 2022-06-01 VITALS
BODY MASS INDEX: 36.68 KG/M2 | HEIGHT: 63 IN | DIASTOLIC BLOOD PRESSURE: 73 MMHG | SYSTOLIC BLOOD PRESSURE: 139 MMHG | HEART RATE: 87 BPM | WEIGHT: 207 LBS

## 2022-06-01 DIAGNOSIS — Z96.612 STATUS POST REVERSE TOTAL REPLACEMENT OF LEFT SHOULDER: Primary | ICD-10-CM

## 2022-06-01 DIAGNOSIS — Z96.612 STATUS POST REVERSE TOTAL REPLACEMENT OF LEFT SHOULDER: ICD-10-CM

## 2022-06-01 DIAGNOSIS — Z09 SURGERY FOLLOW-UP EXAMINATION: ICD-10-CM

## 2022-06-01 PROCEDURE — 3044F HG A1C LEVEL LT 7.0%: CPT | Mod: CPTII,S$GLB,, | Performed by: PHYSICIAN ASSISTANT

## 2022-06-01 PROCEDURE — 4010F PR ACE/ARB THEARPY RXD/TAKEN: ICD-10-PCS | Mod: CPTII,S$GLB,, | Performed by: PHYSICIAN ASSISTANT

## 2022-06-01 PROCEDURE — 3078F DIAST BP <80 MM HG: CPT | Mod: CPTII,S$GLB,, | Performed by: PHYSICIAN ASSISTANT

## 2022-06-01 PROCEDURE — 73030 X-RAY EXAM OF SHOULDER: CPT | Mod: TC,LT

## 2022-06-01 PROCEDURE — 73030 X-RAY EXAM OF SHOULDER: CPT | Mod: 26,LT,, | Performed by: RADIOLOGY

## 2022-06-01 PROCEDURE — 99999 PR PBB SHADOW E&M-EST. PATIENT-LVL IV: ICD-10-PCS | Mod: PBBFAC,,, | Performed by: PHYSICIAN ASSISTANT

## 2022-06-01 PROCEDURE — 1160F PR REVIEW ALL MEDS BY PRESCRIBER/CLIN PHARMACIST DOCUMENTED: ICD-10-PCS | Mod: CPTII,S$GLB,, | Performed by: PHYSICIAN ASSISTANT

## 2022-06-01 PROCEDURE — 1160F RVW MEDS BY RX/DR IN RCRD: CPT | Mod: CPTII,S$GLB,, | Performed by: PHYSICIAN ASSISTANT

## 2022-06-01 PROCEDURE — 99024 PR POST-OP FOLLOW-UP VISIT: ICD-10-PCS | Mod: S$GLB,,, | Performed by: PHYSICIAN ASSISTANT

## 2022-06-01 PROCEDURE — 99024 POSTOP FOLLOW-UP VISIT: CPT | Mod: S$GLB,,, | Performed by: PHYSICIAN ASSISTANT

## 2022-06-01 PROCEDURE — 3078F PR MOST RECENT DIASTOLIC BLOOD PRESSURE < 80 MM HG: ICD-10-PCS | Mod: CPTII,S$GLB,, | Performed by: PHYSICIAN ASSISTANT

## 2022-06-01 PROCEDURE — 3075F SYST BP GE 130 - 139MM HG: CPT | Mod: CPTII,S$GLB,, | Performed by: PHYSICIAN ASSISTANT

## 2022-06-01 PROCEDURE — 4010F ACE/ARB THERAPY RXD/TAKEN: CPT | Mod: CPTII,S$GLB,, | Performed by: PHYSICIAN ASSISTANT

## 2022-06-01 PROCEDURE — 3288F FALL RISK ASSESSMENT DOCD: CPT | Mod: CPTII,S$GLB,, | Performed by: PHYSICIAN ASSISTANT

## 2022-06-01 PROCEDURE — 3008F PR BODY MASS INDEX (BMI) DOCUMENTED: ICD-10-PCS | Mod: CPTII,S$GLB,, | Performed by: PHYSICIAN ASSISTANT

## 2022-06-01 PROCEDURE — 3044F PR MOST RECENT HEMOGLOBIN A1C LEVEL <7.0%: ICD-10-PCS | Mod: CPTII,S$GLB,, | Performed by: PHYSICIAN ASSISTANT

## 2022-06-01 PROCEDURE — 1159F PR MEDICATION LIST DOCUMENTED IN MEDICAL RECORD: ICD-10-PCS | Mod: CPTII,S$GLB,, | Performed by: PHYSICIAN ASSISTANT

## 2022-06-01 PROCEDURE — 1100F PR PT FALLS ASSESS DOC 2+ FALLS/FALL W/INJURY/YR: ICD-10-PCS | Mod: CPTII,S$GLB,, | Performed by: PHYSICIAN ASSISTANT

## 2022-06-01 PROCEDURE — 3075F PR MOST RECENT SYSTOLIC BLOOD PRESS GE 130-139MM HG: ICD-10-PCS | Mod: CPTII,S$GLB,, | Performed by: PHYSICIAN ASSISTANT

## 2022-06-01 PROCEDURE — 1126F PR PAIN SEVERITY QUANTIFIED, NO PAIN PRESENT: ICD-10-PCS | Mod: CPTII,S$GLB,, | Performed by: PHYSICIAN ASSISTANT

## 2022-06-01 PROCEDURE — 3008F BODY MASS INDEX DOCD: CPT | Mod: CPTII,S$GLB,, | Performed by: PHYSICIAN ASSISTANT

## 2022-06-01 PROCEDURE — 99999 PR PBB SHADOW E&M-EST. PATIENT-LVL IV: CPT | Mod: PBBFAC,,, | Performed by: PHYSICIAN ASSISTANT

## 2022-06-01 PROCEDURE — 1100F PTFALLS ASSESS-DOCD GE2>/YR: CPT | Mod: CPTII,S$GLB,, | Performed by: PHYSICIAN ASSISTANT

## 2022-06-01 PROCEDURE — 1159F MED LIST DOCD IN RCRD: CPT | Mod: CPTII,S$GLB,, | Performed by: PHYSICIAN ASSISTANT

## 2022-06-01 PROCEDURE — 73030 XR SHOULDER COMPLETE 2 OR MORE VIEWS LEFT: ICD-10-PCS | Mod: 26,LT,, | Performed by: RADIOLOGY

## 2022-06-01 PROCEDURE — 3288F PR FALLS RISK ASSESSMENT DOCUMENTED: ICD-10-PCS | Mod: CPTII,S$GLB,, | Performed by: PHYSICIAN ASSISTANT

## 2022-06-01 PROCEDURE — 1126F AMNT PAIN NOTED NONE PRSNT: CPT | Mod: CPTII,S$GLB,, | Performed by: PHYSICIAN ASSISTANT

## 2022-06-01 NOTE — PROGRESS NOTES
S:Parisa Dimas presents for post-operative evaluation.     DATE OF PROCEDURE: 3/7/2022   PROCEDURES PERFORMED:   Left reverse shoulder arthroplasty (CPT 33696-43)  Left shoulder open biceps tenodesis (CPT 86580)    Parisa Dimas reports to be doing well 3 months s/p the above mentioned procedure. Going to PT at the [place] location 2x/week.  Accompanied by her .  She has some intermittent expected soreness in the shoulder with activity and occasionally at rest.  No significant pain.  Strength and range of motion seem to be getting better with time. She tried some exercises at a friend's pool which she found very beneficial and will likely begin incorporating this at [facility] on a more regular basis    O:  Exam of the left shoulder shows a well-healed incision.  No concern for infection.  Sensation intact to light touch over the axillary nerve distribution.  Active forward elevation in scapular plane to 90°, passive to 120° with some mild stiffness.  Good deltoid activation otherwise.  Acceptable strength to resistance with internal/external rotation at zero degrees. Minimal pain with shoulder abduction. Stable shoulder.     Radiographs:  X-ray of left shoulder taken in clinic today, images reviewed by me, demonstrates the reverse prosthesis to be in good position.  No signs of loosening or complication otherwise.    A/P:  Discussed rehab plan. Continue working on active range of motion and building strength.  Focus on range of motion and terminal stretch.  No lifting more than 5-6 lbs.  Take Celebrex 200 mg capsule twice daily as needed for pain. Follow up in 2 months with Dr. Santoyo.

## 2022-06-02 ENCOUNTER — CLINICAL SUPPORT (OUTPATIENT)
Dept: REHABILITATION | Facility: HOSPITAL | Age: 67
End: 2022-06-02
Payer: MEDICARE

## 2022-06-02 DIAGNOSIS — M25.612 DECREASED ROM OF LEFT SHOULDER: Primary | ICD-10-CM

## 2022-06-02 PROCEDURE — 97110 THERAPEUTIC EXERCISES: CPT | Mod: CQ

## 2022-06-02 NOTE — PROGRESS NOTES
"[facility]   Physical Therapy Treatment Note     Name: Parisa EARL [place]  Clinic Number: [id number]    Therapy Diagnosis:   Encounter Diagnosis   Name Primary?    Decreased ROM of left shoulder Yes     Physician: No ref. provider found    Visit Date: 2022    Physician Orders: PT Eval and Treat   Medical Diagnosis from Referral: Closed 4-part fracture of proximal humerus, left, initial encounter [S42.292A]  Evaluation Date: 3/10/2022  Authorization Period Expiration: 05/10/2022  Plan of Care Expiration: 2022  Progress Note Due: 2022  Visit # / Visits authorized:      PTA Visit #:      Time In: 11:00 am  Time Out: 12:10 am  Total Billable Time: 60 minutes (4 TE)     SUBJECTIVE     Patient reports: "shes feeling it today" - soreness.     She was compliant with home exercise program.  Response to previous treatment: increased soreness   Functional change: none to note     Pain: 4/10; soreness   Location: Left shoulder       OBJECTIVE     Objective Measures updated at progress report unless specified.   DOS 3/07/2022  12 week follow up 2022    Passive shoulder flexion: 135 degrees    Active shoulder Abduction in sidelyin degrees  Treatment     Parisa received the treatments listed below:      therapeutic exercises to develop strength, endurance, ROM and posture for 60 minutes including:  + UBE forward for 8 minutes at level 4.0 to improve blood flow to exercised muscles   - pulleys AAROM (scaption, flexion and abduction) - pt initiating movement actively, using other arm to assist with all movements when unable to raise due to strength; 3 minutes each way   - seated elbow flexion against wall; 4 lb - emphasis on eccentric lowering; decreasing internal rotation   - Standing tricep extension against wall: red theraband; 3 sec hold   - standing ER with cane; 1 x 30 reps; 3 sec hold - use standing mirror   - active ER in chair + scapular retraction; 3 x 10 reps; " 3 sec hold   - seated shoulder abduction 1 x 20 reps seated in mirror   - seated shoulder flexion 1 x 20 reps seated in mirror   - TB rows; green TB 3 x 10 reps    Hot pack to left shoulder for 10 minutes to improve blood flow and tissue extensibility     Patient Education and Home Exercises     Home Exercises Provided and Patient Education Provided     Education provided:   - Updated HEP (4/20/2022)     Written Home Exercises Provided: Patient instructed to cont prior HEP. Exercises were reviewed and Parisa was able to demonstrate them prior to the end of the session.  Parisa demonstrated good  understanding of the education provided. See EMR under Patient Instructions for exercises provided during therapy sessions    ASSESSMENT     Pt demonstartes a good tolerance to exercises although requires max cues to promote proper form . She is progressing well with ROM with no pain reported while performing . She continues to be very limited with active ER .  Will continue to improve scapular stabilizer and deltoid strength to meet pt and MD goals.     Parisa Is progressing well towards her goals.   Pt prognosis is Good.     Pt will continue to benefit from skilled outpatient physical therapy to address the deficits listed in the problem list box on initial evaluation, provide pt/family education and to maximize pt's level of independence in the home and community environment.   Pt's spiritual, cultural and educational needs considered and pt agreeable to plan of care and goals.     Anticipated barriers to physical therapy: Increased anxiety about moving arm without sling    GOALS:  Short Term Goals (4-6 Weeks):   1) Pt will demonstrate compliance with initial home exercise program as prescribed by physical therapist to improve independence with management of condition.  2) Pt to improve passive range of motion in flexion and external rotation at side to at least 100 and 30 degrees, respectively, to allow for improved  mobility with dressing and self care.  3) Pt to report R upper extremity pain of <3/10 at worst during exercises to improve tolerance to ADLs and self care.     Long Term Goals (8-12 Weeks):   1. Pt to achieve <40% limitation as measured by the FOTO to demonstrate decreased disability.  2) Pt to improve active range of motion in R shoulder to within 20% of uninvolved extremity to allow for improved functional mobility with work tasks and lifting.  3) Pt to demonstrate at least 3+/5 grades on all upper quarter manual muscle testing to facilitate improved strength for work demands.  4) Pt to able to wash her hair in the shower with minimal assistance from spouse     PLAN   Continue to progress per doctor protocol and patients symptoms.     Teresa Alcala, PTA

## 2022-06-07 ENCOUNTER — DOCUMENTATION ONLY (OUTPATIENT)
Dept: REHABILITATION | Facility: HOSPITAL | Age: 67
End: 2022-06-07
Payer: MEDICARE

## 2022-06-07 NOTE — PROGRESS NOTES
Patient decided to put a hold on her physical therapy visits for now due financial reasons. Pt is waiting for new authorization to be approved before re-initiating therapy . Pt encouraged to call to get visits re-scheduled when new authorization is approved which she confirms understanding .   Teresa Alcala, PTA

## 2022-06-13 ENCOUNTER — CLINICAL SUPPORT (OUTPATIENT)
Dept: REHABILITATION | Facility: HOSPITAL | Age: 67
End: 2022-06-13
Payer: MEDICARE

## 2022-06-13 DIAGNOSIS — M25.512 CHRONIC LEFT SHOULDER PAIN: ICD-10-CM

## 2022-06-13 DIAGNOSIS — G89.29 CHRONIC LEFT SHOULDER PAIN: ICD-10-CM

## 2022-06-13 DIAGNOSIS — M25.612 DECREASED ROM OF LEFT SHOULDER: Primary | ICD-10-CM

## 2022-06-13 PROCEDURE — 97110 THERAPEUTIC EXERCISES: CPT

## 2022-06-13 NOTE — PROGRESS NOTES
[facility]   Physical Therapy Treatment Note     Name: Parisa EARL virocyt  Clinic Number: [id number]    Therapy Diagnosis:   Encounter Diagnoses   Name Primary?    Decreased ROM of left shoulder Yes    Chronic left shoulder pain      Physician: Yahir Lyn PA*    Visit Date: 2022    Physician Orders: PT Eval and Treat   Medical Diagnosis from Referral: Closed 4-part fracture of proximal humerus, left, initial encounter [S42.292A]  Evaluation Date: 3/10/2022  Authorization Period Expiration: 05/10/2022  Plan of Care Expiration: 2022  Progress Note Due: 2022  Visit # / Visits authorized:      PTA Visit #: 0  / 5     Time In: 1100 am  Time Out: 1200 pm  Total Billable Time: 60 minutes (4 TE)     SUBJECTIVE     Patient reports: Per MD appointment on 2022 - A/P:  Discussed rehab plan. Continue working on active range of motion and building strength.  Focus on range of motion and terminal stretch.  No lifting more than 5-6 lbs.  No new complaints; minimal soreness today.    She was compliant with home exercise program.  Response to previous treatment: increased soreness   Functional change: none to note     Pain: 3-4/10; soreness   Location: Left shoulder       OBJECTIVE     Objective Measures updated at progress report unless specified.   DOS: 3/07/2022  12 week follow up 2022    Passive shoulder flexion: 135 degrees    Active shoulder Abduction in sidelyin degrees  Treatment     Parisa received the treatments listed below:      therapeutic exercises to develop strength, endurance, ROM and posture for 60 minutes including:  + UBE forward for 8 minutes at level 4.0 to improve blood flow to exercised muscles (4 minutes fwd, 4 minutes backwards)   - AAROM (flexion, ER) - 4 minutes; 5 sec hold   - seated shoulder scaption with mat; 1 x 30 reps seated in mirror   - seated shoulder flexion with mat; 1 x 30 reps seated in mirror   - seated shoulder  abduction with mat; 1 x 30 reps seated in mirror   - Prone row; 4 lb DB 3 x 10 reps   - Prone extension;  3 x 10 reps   - Prone abduction; 3 x 10 reps     Not today:   - seated elbow flexion against wall; 4 lb - emphasis on eccentric lowering; decreasing internal rotation   - Standing tricep extension against wall: red theraband; 3 sec hold   - standing ER with cane; 1 x 30 reps; 3 sec hold - use standing mirror   - active ER in chair + scapular retraction; 3 x 10 reps; 3 sec hold   - TB rows; green TB 3 x 10 reps    Hot pack to left shoulder for 10 minutes to improve blood flow and tissue extensibility     Patient Education and Home Exercises     Home Exercises Provided and Patient Education Provided     Education provided:   - Updated HEP (4/20/2022)     Written Home Exercises Provided: Patient instructed to cont prior HEP. Exercises were reviewed and Parisa was able to demonstrate them prior to the end of the session.  Parisa demonstrated good  understanding of the education provided. See EMR under Patient Instructions for exercises provided during therapy sessions    ASSESSMENT     Pt with 5 more appointments approved by insurance. Pt demonstrates good tolerance to backwards cycling, no complaints of pain. Will continue to improve scapular stabilizer and deltoid strength to meet pt and MD goals.     Parisa Is progressing well towards her goals.   Pt prognosis is Good.     Pt will continue to benefit from skilled outpatient physical therapy to address the deficits listed in the problem list box on initial evaluation, provide pt/family education and to maximize pt's level of independence in the home and community environment.   Pt's spiritual, cultural and educational needs considered and pt agreeable to plan of care and goals.     Anticipated barriers to physical therapy: Increased anxiety about moving arm without sling    GOALS:  Short Term Goals (4-6 Weeks):   1) Pt will demonstrate compliance with initial home  exercise program as prescribed by physical therapist to improve independence with management of condition.  2) Pt to improve passive range of motion in flexion and external rotation at side to at least 100 and 30 degrees, respectively, to allow for improved mobility with dressing and self care.  3) Pt to report R upper extremity pain of <3/10 at worst during exercises to improve tolerance to ADLs and self care.     Long Term Goals (8-12 Weeks):   1. Pt to achieve <40% limitation as measured by the FOTO to demonstrate decreased disability.  2) Pt to improve active range of motion in R shoulder to within 20% of uninvolved extremity to allow for improved functional mobility with work tasks and lifting.  3) Pt to demonstrate at least 3+/5 grades on all upper quarter manual muscle testing to facilitate improved strength for work demands.  4) Pt to able to wash her hair in the shower with minimal assistance from spouse     PLAN   Continue to progress per doctor protocol and patients symptoms.     Sanam Davies, PT, DPT

## 2022-06-16 ENCOUNTER — CLINICAL SUPPORT (OUTPATIENT)
Dept: REHABILITATION | Facility: HOSPITAL | Age: 67
End: 2022-06-16
Payer: MEDICARE

## 2022-06-16 DIAGNOSIS — M25.612 DECREASED ROM OF LEFT SHOULDER: Primary | ICD-10-CM

## 2022-06-16 PROCEDURE — 97110 THERAPEUTIC EXERCISES: CPT | Mod: CQ

## 2022-06-16 NOTE — PROGRESS NOTES
[facility]   Physical Therapy Treatment Note     Name: Parisa EARL [employer]  Clinic Number: [id number]    Therapy Diagnosis:   Encounter Diagnosis   Name Primary?    Decreased ROM of left shoulder Yes     Physician: Yahir Lyn PA*    Visit Date: 2022    Physician Orders: PT Eval and Treat   Medical Diagnosis from Referral: Closed 4-part fracture of proximal humerus, left, initial encounter [S42.292A]  Evaluation Date: 3/10/2022  Authorization Period Expiration: 05/10/2022  Plan of Care Expiration: 2022  Progress Note Due: 2022  Visit # / Visits authorized:     PTA Visit #:      Time In: 10:00 am  Time Out: 11:00 am  Total Billable Time: 60 minutes (4 TE)     SUBJECTIVE     Patient reports: doing okay , minimal soreness currently.    She was compliant with home exercise program.  Response to previous treatment: increased soreness   Functional change: none to note     Pain: 3-4/10; soreness   Location: Left shoulder       OBJECTIVE     Objective Measures updated at progress report unless specified.   DOS: 3/07/2022  12 week follow up 2022    Passive shoulder flexion: 135 degrees    Active shoulder Abduction in sidelyin degrees  Treatment     Parisa received the treatments listed below:      therapeutic exercises to develop strength, endurance, ROM and posture for 60 minutes including:  + UBE forward for 8 minutes at level 4.0 to improve blood flow to exercised muscles (4 minutes fwd, 4 minutes backwards)   - AAROM (flexion, ER) - 4 minutes; 5 sec hold   - seated shoulder scaption with mat; 1 x 30 reps seated in mirror   - seated shoulder flexion with mat; 1 x 30 reps seated in mirror   - seated shoulder abduction with mat; 1 x 30 reps seated in mirror   - Prone row; 4 lb DB 3 x 10 reps   - Prone extension;  3 x 10 reps   - Prone abduction; 3 x 10 reps     Not today:   - seated elbow flexion against wall; 4 lb - emphasis on eccentric lowering;  decreasing internal rotation   - Standing tricep extension against wall: red theraband; 3 sec hold   - standing ER with cane; 1 x 30 reps; 3 sec hold - use standing mirror   - active ER in chair + scapular retraction; 3 x 10 reps; 3 sec hold   - TB rows; green TB 3 x 10 reps    Hot pack to left shoulder for 00 minutes to improve blood flow and tissue extensibility     Patient Education and Home Exercises     Home Exercises Provided and Patient Education Provided     Education provided:   - Updated HEP (4/20/2022)     Written Home Exercises Provided: Patient instructed to cont prior HEP. Exercises were reviewed and Parisa was able to demonstrate them prior to the end of the session.  Parisa demonstrated good  understanding of the education provided. See EMR under Patient Instructions for exercises provided during therapy sessions    ASSESSMENT     Cues for shoulder depression prior to lift off with mat exercises with significant difficulty noted. Pt is progressing well with ROM although still shoulder compensations noted to achieve . Will continue to improve scapular stabilizer and deltoid strength to meet pt and MD goals.     Parisa Is progressing well towards her goals.   Pt prognosis is Good.     Pt will continue to benefit from skilled outpatient physical therapy to address the deficits listed in the problem list box on initial evaluation, provide pt/family education and to maximize pt's level of independence in the home and community environment.   Pt's spiritual, cultural and educational needs considered and pt agreeable to plan of care and goals.     Anticipated barriers to physical therapy: Increased anxiety about moving arm without sling    GOALS:  Short Term Goals (4-6 Weeks):   1) Pt will demonstrate compliance with initial home exercise program as prescribed by physical therapist to improve independence with management of condition.  2) Pt to improve passive range of motion in flexion and external rotation  at side to at least 100 and 30 degrees, respectively, to allow for improved mobility with dressing and self care.  3) Pt to report R upper extremity pain of <3/10 at worst during exercises to improve tolerance to ADLs and self care.     Long Term Goals (8-12 Weeks):   1. Pt to achieve <40% limitation as measured by the FOTO to demonstrate decreased disability.  2) Pt to improve active range of motion in R shoulder to within 20% of uninvolved extremity to allow for improved functional mobility with work tasks and lifting.  3) Pt to demonstrate at least 3+/5 grades on all upper quarter manual muscle testing to facilitate improved strength for work demands.  4) Pt to able to wash her hair in the shower with minimal assistance from spouse     PLAN   Continue to progress per doctor protocol and patients symptoms.     Teresa Alcala, PTA

## 2022-06-20 ENCOUNTER — CLINICAL SUPPORT (OUTPATIENT)
Dept: REHABILITATION | Facility: HOSPITAL | Age: 67
End: 2022-06-20
Payer: MEDICARE

## 2022-06-20 DIAGNOSIS — M25.612 DECREASED ROM OF LEFT SHOULDER: Primary | ICD-10-CM

## 2022-06-20 PROCEDURE — 97110 THERAPEUTIC EXERCISES: CPT | Mod: CQ

## 2022-06-20 NOTE — PROGRESS NOTES
[facility]   Physical Therapy Treatment Note     Name: Parisa [employer]  Clinic Number: [id number]    Therapy Diagnosis:   Encounter Diagnosis   Name Primary?    Decreased ROM of left shoulder Yes     Physician: Yahir Lyn PA*    Visit Date: 2022    Physician Orders: PT Eval and Treat   Medical Diagnosis from Referral: Closed 4-part fracture of proximal humerus, left, initial encounter [S42.292A]  Evaluation Date: 3/10/2022  Authorization Period Expiration: 2022  Plan of Care Expiration: 2022  Progress Note Due: 2022  Visit # / Visits authorized:     PTA Visit #: 2       Time In: 10:55 am  Time Out: 12:05 pm    Total Billable Time: 55 minutes (4 TE)     SUBJECTIVE     Patient reports: its been a little sore in the front and top of the shoulder .    She was compliant with home exercise program.  Response to previous treatment: increased soreness   Functional change: none to note     Pain: 3-4/10; soreness   Location: Left shoulder       OBJECTIVE     Objective Measures updated at progress report unless specified.   DOS: 3/07/2022  12 week follow up 2022    Passive shoulder flexion: 135 degrees    Active shoulder Abduction in sidelyin degrees  Treatment     Parisa received the treatments listed below:      therapeutic exercises to develop strength, endurance, ROM and posture for 60 minutes including:  + UBE forward for 8 minutes at level 4.0 to improve blood flow to exercised muscles (4 minutes fwd, 4 minutes backwards)   - AAROM (flexion, ER) - 4 minutes; 5 sec hold   - seated shoulder scaption with mat; 1 x 30 reps seated in mirror   - seated shoulder flexion with mat; 1 x 30 reps seated in mirror   - seated shoulder abduction with mat; 1 x 30 reps seated in mirror   - Prone row; 4 lb DB 3 x 10 reps   - Prone extension;  3 x 10 reps   - Prone abduction; 3 x 10 reps   - seated elbow flexion against wall; 4 lb - emphasis on eccentric  lowering; decreasing internal rotation  - TB rows; green TB 3 x 10 reps    Not today:      - Standing tricep extension against wall: red theraband; 3 sec hold   - standing ER with cane; 1 x 30 reps; 3 sec hold - use standing mirror   - active ER in chair + scapular retraction; 3 x 10 reps; 3 sec hold     Hot pack to left shoulder for 00 minutes to improve blood flow and tissue extensibility     Patient Education and Home Exercises     Home Exercises Provided and Patient Education Provided     Education provided:   - Updated HEP (4/20/2022)     Written Home Exercises Provided: Patient instructed to cont prior HEP. Exercises were reviewed and Parisa was able to demonstrate them prior to the end of the session.  Parisa demonstrated good  understanding of the education provided. See EMR under Patient Instructions for exercises provided during therapy sessions    ASSESSMENT     Cues for shoulder depression prior to lift off with mat exercises with significant difficulty noted. Slight improvements compared to last session and pt benefits from use of mirror while performing for visual cues. Visible muscle fatigue noted with muscle fasciculations present while performing . Pt is progressing well with ROM although still shoulder compensations noted to achieve . Will continue to improve scapular stabilizer and deltoid strength to meet pt and MD goals.     Parisa Is progressing well towards her goals.   Pt prognosis is Good.     Pt will continue to benefit from skilled outpatient physical therapy to address the deficits listed in the problem list box on initial evaluation, provide pt/family education and to maximize pt's level of independence in the home and community environment.   Pt's spiritual, cultural and educational needs considered and pt agreeable to plan of care and goals.     Anticipated barriers to physical therapy: Increased anxiety about moving arm without sling    GOALS:  Short Term Goals (4-6 Weeks):   1) Pt will  demonstrate compliance with initial home exercise program as prescribed by physical therapist to improve independence with management of condition.  2) Pt to improve passive range of motion in flexion and external rotation at side to at least 100 and 30 degrees, respectively, to allow for improved mobility with dressing and self care.  3) Pt to report R upper extremity pain of <3/10 at worst during exercises to improve tolerance to ADLs and self care.     Long Term Goals (8-12 Weeks):   1. Pt to achieve <40% limitation as measured by the FOTO to demonstrate decreased disability.  2) Pt to improve active range of motion in R shoulder to within 20% of uninvolved extremity to allow for improved functional mobility with work tasks and lifting.  3) Pt to demonstrate at least 3+/5 grades on all upper quarter manual muscle testing to facilitate improved strength for work demands.  4) Pt to able to wash her hair in the shower with minimal assistance from spouse     PLAN   Continue to progress per doctor protocol and patients symptoms.     Teresa Alcala, PTA

## 2022-06-22 ENCOUNTER — PATIENT MESSAGE (OUTPATIENT)
Dept: ADMINISTRATIVE | Facility: OTHER | Age: 67
End: 2022-06-22
Payer: MEDICARE

## 2022-06-23 ENCOUNTER — NURSE TRIAGE (OUTPATIENT)
Dept: ADMINISTRATIVE | Facility: CLINIC | Age: 67
End: 2022-06-23
Payer: MEDICARE

## 2022-06-23 ENCOUNTER — TELEPHONE (OUTPATIENT)
Dept: FAMILY MEDICINE | Facility: CLINIC | Age: 67
End: 2022-06-23
Payer: MEDICARE

## 2022-06-23 NOTE — TELEPHONE ENCOUNTER
Please see note from RN.    Called patient for symptom update. She said that she hasn't had a fever at all. She has congestion and just took a dose of OTC cough syrup. She doesn't know if it is effective since it is her first dose and hasn't been long since she took it. She has a headache and feels tired. She is monitoring her oxygen, which hasn't been below 96. I verified her pharmacy of choice and updated it in case you want to prescribe anything. Advised rest and fluids, continuation of cough syrup and Tylenol as needed.

## 2022-06-23 NOTE — TELEPHONE ENCOUNTER
Pt called for hsm.    Pos tues for covid. Pt said she is currently trying to notify her pcp. Tylenol for HA, congestion using cough medicine. Covid protocol used and Pt deemed high risk for complications. Instructed Pt to speak with her doctor today and if she cannot then to call [facility]. Instructed her that this encounter would also be routed to her pcp for follow up if dr determines further tx is needed at this time. Instructed Pt if she cannot speak with her doctor or a [facility] doctor today then to go to the [facility] to seek further tx. Encounter routed to PCP and staff as high priority. Invited Pt to call [facility] anytime for questions or concerns at 1 [phone]. Education provided. Alert and oriented, Pt agrees.           Reason for Disposition   HIGH RISK for severe COVID complications (e.g., weak immune system, age > 64 years, obesity with BMI > 25, pregnant, chronic lung disease or other chronic medical condition) (Exception: Already seen by PCP and no new or worsening symptoms.)    Additional Information   Negative: SEVERE difficulty breathing (e.g., struggling for each breath, speaks in single words)   Negative: Difficult to awaken or acting confused (e.g., disoriented, slurred speech)   Negative: Bluish (or gray) lips or face now   Negative: Shock suspected (e.g., cold/pale/clammy skin, too weak to stand, low BP, rapid pulse)   Negative: Sounds like a life-threatening emergency to the triager   Negative: SEVERE or constant chest pain or pressure  (Exception: Mild central chest pain, present only when coughing.)   Negative: MODERATE difficulty breathing (e.g., speaks in phrases, SOB even at rest, pulse 100-120)   Negative: Headache and stiff neck (can't touch chin to chest)   Negative: Oxygen level (e.g., pulse oximetry) 90 percent or lower   Negative: Chest pain or pressure   Negative: Patient sounds very sick or weak to the triager   Negative: MILD difficulty breathing (e.g., minimal/no SOB at rest, SOB  with walking, pulse <100)   Negative: Fever > 103 F (39.4 C)   Negative: [1] Fever > 101 F (38.3 C) AND [2] over 60 years of age   Negative: [1] Fever > 100.0 F (37.8 C) AND [2] bedridden (e.g., nursing home patient, CVA, chronic illness, recovering from surgery)    Protocols used: CORONAVIRUS (COVID-19) DIAGNOSED OR [id number]

## 2022-06-23 NOTE — TELEPHONE ENCOUNTER
----- Message from Ifeoma Billy sent at 6/23/2022  1:12 PM CDT -----  Contact: [phone]  Type:  Needs Medical Advice    Who Called: pt's  called   Symptoms (please be specific): COVID positive   Would the patient rather a call back or a response via [facility]? Call back  Best Call Back Number: [phone]  Additional Information: pt would like to know if she can have a earlier virtual appt. Please call pt to advice

## 2022-06-24 ENCOUNTER — PATIENT MESSAGE (OUTPATIENT)
Dept: FAMILY MEDICINE | Facility: CLINIC | Age: 67
End: 2022-06-24
Payer: MEDICARE

## 2022-06-24 NOTE — TELEPHONE ENCOUNTER
For positive covid diagnosis:    Regarding treatment:  Typically for mild treatment symptoms will treat at home with medicines like Mucinex DM for cough, Tylenol or ibuprofen for pain or fever, claritin D for nasal congestion, getting plenty of rest and fluids . You are having moderate symptoms, there is a prescription antiviral medication called Paxlovid that can be used  within the 1st 5 days although for milder symptoms we don't really recommend since there are lots of med interactions and also some people can get rebound symptoms after finishing the med.  For severe symptoms like severe shortness of breath or chest pain or weakness or confusion, this would need to be evaluated more urgently in the emergency department.    If you are having more moderate issues, would like to try the Paxlovid, you must stop your lovastatin cholesterol medication when you take the Paxlovid and do not start for 3-4 days after you complete the Paxlovid.    With the weekend coming up, I will send the Paxlovid to your pharmacy just in case you need it.  If you feel like  symptoms are mild you can treat at home with the above over-the-counter recommendations and rest fluids, you do not absolutely need to take the Paxlovid to get over the infection        Per current CDC guidance on isolation for positive covid test.     If You Test Positive for COVID-19 (Isolate)  Everyone, regardless of vaccination status.    Stay home for 5 days from the date of the positive test  Or 1st day of symptoms.  1st day of symptoms is counted as day 0.   If you have no symptoms or your symptoms are resolving after  completing day 5 of isolation, you can leave your house. NOTE IF YOU CURRENTLY ARE STILL HAVING ACTIVE SYMPTOMS THAT HAVE NOT RESOLVED YOU SHOULD ISOLATE FOR FULL 10 DAYS  Continue to wear a mask around others for 5 additional days.  If you have a fever, continue to stay home until your fever resolves.          Yes thats correct.  Didnt know if there was anything as far prescription that would be helpful for Parisa . Marques (Pio) also tested positive today . She is taking Tylenol, OTC severe congestion & cough liquid, throat lozenges. Any other suggestions?         Francisca Pinedo LPN Kimball, Cathy D 2 hours ago (11:14 AM)     HP      It looks like you were scheduled for an appointment on 4/27 with Dr Cast.           Parisa Dimas  You 3 hours ago (10:55 AM)            We have not heard back from your office as to any action on treatment. Is there a prescription that can be of help to Parisa?  She is considered high risk?

## 2022-06-27 ENCOUNTER — OFFICE VISIT (OUTPATIENT)
Dept: FAMILY MEDICINE | Facility: CLINIC | Age: 67
End: 2022-06-27
Payer: MEDICARE

## 2022-06-27 ENCOUNTER — TELEPHONE (OUTPATIENT)
Dept: FAMILY MEDICINE | Facility: CLINIC | Age: 67
End: 2022-06-27
Payer: MEDICARE

## 2022-06-27 DIAGNOSIS — E11.40 TYPE 2 DIABETES MELLITUS WITH DIABETIC NEUROPATHY, UNSPECIFIED WHETHER LONG TERM INSULIN USE: ICD-10-CM

## 2022-06-27 DIAGNOSIS — M65.30 TRIGGER FINGER, UNSPECIFIED FINGER, UNSPECIFIED LATERALITY: ICD-10-CM

## 2022-06-27 DIAGNOSIS — G25.81 RESTLESS LEG SYNDROME: ICD-10-CM

## 2022-06-27 DIAGNOSIS — U07.1 COVID-19: Primary | ICD-10-CM

## 2022-06-27 PROCEDURE — 1160F RVW MEDS BY RX/DR IN RCRD: CPT | Mod: CPTII,95,, | Performed by: FAMILY MEDICINE

## 2022-06-27 PROCEDURE — 4010F ACE/ARB THERAPY RXD/TAKEN: CPT | Mod: CPTII,95,, | Performed by: FAMILY MEDICINE

## 2022-06-27 PROCEDURE — 3044F PR MOST RECENT HEMOGLOBIN A1C LEVEL <7.0%: ICD-10-PCS | Mod: CPTII,95,, | Performed by: FAMILY MEDICINE

## 2022-06-27 PROCEDURE — 1159F PR MEDICATION LIST DOCUMENTED IN MEDICAL RECORD: ICD-10-PCS | Mod: CPTII,95,, | Performed by: FAMILY MEDICINE

## 2022-06-27 PROCEDURE — 3044F HG A1C LEVEL LT 7.0%: CPT | Mod: CPTII,95,, | Performed by: FAMILY MEDICINE

## 2022-06-27 PROCEDURE — 99443 PR PHYSICIAN TELEPHONE EVALUATION 21-30 MIN: CPT | Mod: 95,,, | Performed by: FAMILY MEDICINE

## 2022-06-27 PROCEDURE — 1159F MED LIST DOCD IN RCRD: CPT | Mod: CPTII,95,, | Performed by: FAMILY MEDICINE

## 2022-06-27 PROCEDURE — 4010F PR ACE/ARB THEARPY RXD/TAKEN: ICD-10-PCS | Mod: CPTII,95,, | Performed by: FAMILY MEDICINE

## 2022-06-27 PROCEDURE — 99443 PR PHYSICIAN TELEPHONE EVALUATION 21-30 MIN: ICD-10-PCS | Mod: 95,,, | Performed by: FAMILY MEDICINE

## 2022-06-27 PROCEDURE — 1160F PR REVIEW ALL MEDS BY PRESCRIBER/CLIN PHARMACIST DOCUMENTED: ICD-10-PCS | Mod: CPTII,95,, | Performed by: FAMILY MEDICINE

## 2022-06-27 RX ORDER — METFORMIN HYDROCHLORIDE 500 MG/1
1000 TABLET, EXTENDED RELEASE ORAL DAILY
Qty: 180 TABLET | Refills: 3 | Status: SHIPPED | OUTPATIENT
Start: 2022-06-27 | End: 2023-04-25

## 2022-06-27 NOTE — PROGRESS NOTES
The patient location is: home  The chief complaint leading to consultation is:  COVID    Visit type: Virtual visit initially set up for audio and video but unable to complete be 0 portion because of difficulty signing in.  Completed as audio visit, 21 minutes     Each patient to whom he or she provides medical services by telemedicine is:  (1) informed of the relationship between the physician and patient and the respective role of any other health care provider with respect to management of the patient; and (2) notified that he or she may decline to receive medical services by telemedicine and may withdraw from such care at any time.    (Portions of this note were dictated using voice recognition software and may contain dictation related errors in spelling/grammar/syntax not found on text review)         HPI: 67 y.o. female COVID positive 6/23.  Sent message on treatment options, isolation precautions was given a prescription of Paxlovid if she like symptoms moderate.  Set virtual appointment today    Had fever 1st day, she has passed isolation.  Did need to fill Paxlovid.  Right now just has a cough.  Family was sick as well.  Denies any shortness of breath.  Check pulse ox which was 90%    Also notes some questions about diabetes control.  On metformin 1000 mg extended release daily.  Usually postprandial sugars are around 140. Did not know if extended release was any better or worse than regular release that she was on in the past.    States that every now and then she will get some crampy jump be feeling in her legs bilaterally states that she has had this in the past,    Also notices some locking of her middle finger and will get really painful.    Past Medical History:   Diagnosis Date    AR (allergic rhinitis)     AR (allergic rhinitis)     Carotid stenosis     50% RCA    Colon polyp 10/2014    Diabetes mellitus     Diabetes mellitus, type 2     Fatty liver     GERD (gastroesophageal reflux disease)      Heart murmur 1/27/2022    HLD (hyperlipidemia)     HTN (hypertension)     Joint pain     Sleep apnea 2018    Stricture of artery 3/25/2021       Past Surgical History:   Procedure Laterality Date    REVERSE TOTAL SHOULDER ARTHROPLASTY Left 3/7/2022    Procedure: ARTHROPLASTY, SHOULDER, TOTAL, REVERSE;  Surgeon: MISSY Santoyo MD;  Location: [facility] OR [address] [place] [address];  Service: Orthopedics;  Laterality: Left;  AND BICEP SYNDEMOSIS    SKIN BIOPSY  10yrs.    negative       Family History   Problem Relation Age of Onset    Cancer Mother         gallbladder    Prostate cancer Brother 57    Diabetes Brother     Diabetes Maternal Grandmother     Hypertension Unknown         multiple    Coronary artery disease Father 79    Diabetes Father     Diabetes Maternal Aunt     Diabetes Maternal Uncle     Melanoma Neg Hx        Social History     Tobacco Use    Smoking status: Never Smoker    Smokeless tobacco: Never Used   Substance Use Topics    Alcohol use: Not Currently     Alcohol/week: 0.0 standard drinks    Drug use: No       Lab Results   Component Value Date    WBC 4.39 02/08/2022    HGB 13.8 02/08/2022    HCT 42.4 02/08/2022    MCV 92 02/08/2022     02/08/2022    CHOL 169 07/22/2021    TRIG 253 (H) 07/22/2021    HDL 48 07/22/2021    ALT 67 (H) 01/27/2022    AST 45 (H) 01/27/2022    BILITOT 0.6 01/27/2022    ALKPHOS 108 01/27/2022     02/08/2022    K 4.0 02/08/2022     02/08/2022    CREATININE 0.7 02/08/2022    ESTGFRAFRICA >60.0 02/08/2022    EGFRNONAA >60.0 02/08/2022    CALCIUM 10.5 02/08/2022    ALBUMIN 4.3 01/27/2022    BUN 12 02/08/2022    CO2 27 02/08/2022    TSH 0.865 07/22/2021    INR 1.0 02/08/2022    HGBA1C 5.9 (H) 01/27/2022    MICALBCREAT 8.9 12/04/2020    LDLCALC 70.4 07/22/2021     (H) 02/08/2022                PE (elements able to be captured on virtual encounter)  APPEARANCE: Well nourished, well developed, in no acute distress.    HEAD: Normocephalic,  atraumatic.  EYES:  .   Conjunctivae noninjected.  RESPIRATORY:  No increased work of breathing or objective visual signs of dyspnea  PSYCHIATRIC:  Normal mood and affect, alert and oriented, appropriate answers to questions      IMPRESSION  1. COVID-19    2. Type 2 diabetes mellitus with diabetic neuropathy, unspecified whether long term insulin use    3. Restless leg syndrome    4. Trigger finger, unspecified finger, unspecified laterality            PLAN  Discussed procedures for COVID treatment and transmission prevention.  Encourage booster vaccine  Restless leg:  Advised on leg stretches prior to bed.  Could not find that we had discussed this recently; if she is having persistent issues we should probably get her back in the office in further evaluate and discuss treatment further  Trigger finger, discussed conservative care at home.  If no improvement, could benefit from trigger finger injection after initial evaluation.  Can return in the office  Plan on return to the office in the next 1-2 months, can update labs around that as well            Answers for HPI/ROS submitted by the patient on 6/22/2022  activity change: No  unexpected weight change: No  neck pain: No  hearing loss: No  rhinorrhea: Yes  trouble swallowing: No  eye discharge: No  visual disturbance: No  chest tightness: No  wheezing: No  chest pain: No  palpitations: No  blood in stool: No  constipation: No  vomiting: No  diarrhea: Yes  polydipsia: No  polyuria: No  difficulty urinating: No  hematuria: No  dysuria: No  joint swelling: No  arthralgias: No  headaches: Yes  weakness: Yes  confusion: No  dysphoric mood: No

## 2022-06-27 NOTE — TELEPHONE ENCOUNTER
----- Message from Aleja Antoine sent at 6/27/2022  4:36 PM CDT -----  Type:  Needs Medical Advice    Who Called: pt  Symptoms (please be specific): pt has a virtual visit scheduled for 4;30 and they are waiting to be joined and they wanted to know if there is a problem     Would the patient rather a call back or a response via [facility]? Please call  Best Call Back Number: [phone]  Additional Information:

## 2022-08-02 ENCOUNTER — OFFICE VISIT (OUTPATIENT)
Dept: SPORTS MEDICINE | Facility: CLINIC | Age: 67
End: 2022-08-02
Payer: MEDICARE

## 2022-08-02 ENCOUNTER — HOSPITAL ENCOUNTER (OUTPATIENT)
Dept: RADIOLOGY | Facility: HOSPITAL | Age: 67
Discharge: HOME | End: 2022-08-02
Attending: PHYSICIAN ASSISTANT
Payer: MEDICARE

## 2022-08-02 VITALS
HEART RATE: 90 BPM | HEIGHT: 63 IN | DIASTOLIC BLOOD PRESSURE: 78 MMHG | SYSTOLIC BLOOD PRESSURE: 157 MMHG | WEIGHT: 206 LBS | BODY MASS INDEX: 36.5 KG/M2

## 2022-08-02 DIAGNOSIS — Z96.612 STATUS POST REVERSE TOTAL REPLACEMENT OF LEFT SHOULDER: ICD-10-CM

## 2022-08-02 DIAGNOSIS — M25.512 LEFT SHOULDER PAIN, UNSPECIFIED CHRONICITY: Primary | ICD-10-CM

## 2022-08-02 DIAGNOSIS — M25.512 LEFT SHOULDER PAIN, UNSPECIFIED CHRONICITY: ICD-10-CM

## 2022-08-02 DIAGNOSIS — M25.612 POST-TRAUMATIC STIFFNESS OF LEFT SHOULDER JOINT: ICD-10-CM

## 2022-08-02 PROCEDURE — 1160F PR REVIEW ALL MEDS BY PRESCRIBER/CLIN PHARMACIST DOCUMENTED: ICD-10-PCS | Mod: CPTII,S$GLB,, | Performed by: PHYSICIAN ASSISTANT

## 2022-08-02 PROCEDURE — 1125F PR PAIN SEVERITY QUANTIFIED, PAIN PRESENT: ICD-10-PCS | Mod: CPTII,S$GLB,, | Performed by: PHYSICIAN ASSISTANT

## 2022-08-02 PROCEDURE — 3078F DIAST BP <80 MM HG: CPT | Mod: CPTII,S$GLB,, | Performed by: PHYSICIAN ASSISTANT

## 2022-08-02 PROCEDURE — 73030 XR SHOULDER COMPLETE 2 OR MORE VIEWS LEFT: ICD-10-PCS | Mod: 26,LT,, | Performed by: RADIOLOGY

## 2022-08-02 PROCEDURE — 1159F PR MEDICATION LIST DOCUMENTED IN MEDICAL RECORD: ICD-10-PCS | Mod: CPTII,S$GLB,, | Performed by: PHYSICIAN ASSISTANT

## 2022-08-02 PROCEDURE — 3077F SYST BP >= 140 MM HG: CPT | Mod: CPTII,S$GLB,, | Performed by: PHYSICIAN ASSISTANT

## 2022-08-02 PROCEDURE — 99213 PR OFFICE/OUTPT VISIT, EST, LEVL III, 20-29 MIN: ICD-10-PCS | Mod: S$GLB,,, | Performed by: PHYSICIAN ASSISTANT

## 2022-08-02 PROCEDURE — 73030 X-RAY EXAM OF SHOULDER: CPT | Mod: 26,LT,, | Performed by: RADIOLOGY

## 2022-08-02 PROCEDURE — 99999 PR PBB SHADOW E&M-EST. PATIENT-LVL V: CPT | Mod: PBBFAC,,, | Performed by: PHYSICIAN ASSISTANT

## 2022-08-02 PROCEDURE — 3044F PR MOST RECENT HEMOGLOBIN A1C LEVEL <7.0%: ICD-10-PCS | Mod: CPTII,S$GLB,, | Performed by: PHYSICIAN ASSISTANT

## 2022-08-02 PROCEDURE — 3008F BODY MASS INDEX DOCD: CPT | Mod: CPTII,S$GLB,, | Performed by: PHYSICIAN ASSISTANT

## 2022-08-02 PROCEDURE — 99213 OFFICE O/P EST LOW 20 MIN: CPT | Mod: S$GLB,,, | Performed by: PHYSICIAN ASSISTANT

## 2022-08-02 PROCEDURE — 99999 PR PBB SHADOW E&M-EST. PATIENT-LVL V: ICD-10-PCS | Mod: PBBFAC,,, | Performed by: PHYSICIAN ASSISTANT

## 2022-08-02 PROCEDURE — 3288F PR FALLS RISK ASSESSMENT DOCUMENTED: ICD-10-PCS | Mod: CPTII,S$GLB,, | Performed by: PHYSICIAN ASSISTANT

## 2022-08-02 PROCEDURE — 3044F HG A1C LEVEL LT 7.0%: CPT | Mod: CPTII,S$GLB,, | Performed by: PHYSICIAN ASSISTANT

## 2022-08-02 PROCEDURE — 1160F RVW MEDS BY RX/DR IN RCRD: CPT | Mod: CPTII,S$GLB,, | Performed by: PHYSICIAN ASSISTANT

## 2022-08-02 PROCEDURE — 4010F ACE/ARB THERAPY RXD/TAKEN: CPT | Mod: CPTII,S$GLB,, | Performed by: PHYSICIAN ASSISTANT

## 2022-08-02 PROCEDURE — 3077F PR MOST RECENT SYSTOLIC BLOOD PRESSURE >= 140 MM HG: ICD-10-PCS | Mod: CPTII,S$GLB,, | Performed by: PHYSICIAN ASSISTANT

## 2022-08-02 PROCEDURE — 4010F PR ACE/ARB THEARPY RXD/TAKEN: ICD-10-PCS | Mod: CPTII,S$GLB,, | Performed by: PHYSICIAN ASSISTANT

## 2022-08-02 PROCEDURE — 3008F PR BODY MASS INDEX (BMI) DOCUMENTED: ICD-10-PCS | Mod: CPTII,S$GLB,, | Performed by: PHYSICIAN ASSISTANT

## 2022-08-02 PROCEDURE — 1159F MED LIST DOCD IN RCRD: CPT | Mod: CPTII,S$GLB,, | Performed by: PHYSICIAN ASSISTANT

## 2022-08-02 PROCEDURE — 1101F PT FALLS ASSESS-DOCD LE1/YR: CPT | Mod: CPTII,S$GLB,, | Performed by: PHYSICIAN ASSISTANT

## 2022-08-02 PROCEDURE — 73030 X-RAY EXAM OF SHOULDER: CPT | Mod: TC,LT

## 2022-08-02 PROCEDURE — 3078F PR MOST RECENT DIASTOLIC BLOOD PRESSURE < 80 MM HG: ICD-10-PCS | Mod: CPTII,S$GLB,, | Performed by: PHYSICIAN ASSISTANT

## 2022-08-02 PROCEDURE — 1125F AMNT PAIN NOTED PAIN PRSNT: CPT | Mod: CPTII,S$GLB,, | Performed by: PHYSICIAN ASSISTANT

## 2022-08-02 PROCEDURE — 1101F PR PT FALLS ASSESS DOC 0-1 FALLS W/OUT INJ PAST YR: ICD-10-PCS | Mod: CPTII,S$GLB,, | Performed by: PHYSICIAN ASSISTANT

## 2022-08-02 PROCEDURE — 3288F FALL RISK ASSESSMENT DOCD: CPT | Mod: CPTII,S$GLB,, | Performed by: PHYSICIAN ASSISTANT

## 2022-08-02 NOTE — PROGRESS NOTES
S:Parisa Dimas presents for post-operative evaluation.     DATE OF PROCEDURE: 3/7/2022   PROCEDURES PERFORMED:   Left reverse shoulder arthroplasty (CPT 21790-42)  Left shoulder open biceps tenodesis (CPT 59995)    Parisa Dimas reports to be doing well 5 months s/p the above mentioned procedure.  She attended physical therapy through the middle of June before she had to stop attending secondary to insurance reasons and coming down with COVID.  Accompanied by her .  She has some intermittent expected soreness in the shoulder with activity and occasionally at rest.  No significant pain.  Strength and range of motion seem to be getting better with time.  Main complaint is decreased range of motion and difficulty reaching above shoulder level.    O:  Exam of the left shoulder shows a well-healed incision.  No concern for infection.  Sensation intact to light touch over the axillary nerve distribution.  Active forward elevation in scapular plane to 90°, passive to 120° with some mild stiffness.  Good deltoid activation otherwise.  Acceptable strength to resistance with internal/external rotation at zero degrees. Minimal pain with shoulder abduction. Stable shoulder.     Radiographs:  X-ray of left shoulder taken in clinic today, images reviewed by me, demonstrates the reverse prosthesis to be in good position.  No signs of loosening or complication otherwise.    A/P:  Discussed rehab plan. Continue working on active range of motion and building strength. New PT referral placed today.  Focus on range of motion and terminal stretch.  No lifting more than 10 lbs.  Take Celebrex 200 mg capsule twice daily as needed for pain. Follow up in 2 months with Dr. Santoyo.

## 2022-08-09 ENCOUNTER — CLINICAL SUPPORT (OUTPATIENT)
Dept: REHABILITATION | Facility: HOSPITAL | Age: 67
End: 2022-08-09
Payer: MEDICARE

## 2022-08-09 DIAGNOSIS — Z96.612 STATUS POST REVERSE TOTAL REPLACEMENT OF LEFT SHOULDER: ICD-10-CM

## 2022-08-09 DIAGNOSIS — M25.612 POST-TRAUMATIC STIFFNESS OF LEFT SHOULDER JOINT: ICD-10-CM

## 2022-08-09 DIAGNOSIS — M25.612 DECREASED ROM OF LEFT SHOULDER: Primary | ICD-10-CM

## 2022-08-09 DIAGNOSIS — M25.512 LEFT SHOULDER PAIN, UNSPECIFIED CHRONICITY: ICD-10-CM

## 2022-08-09 PROCEDURE — 97110 THERAPEUTIC EXERCISES: CPT

## 2022-08-09 PROCEDURE — 97164 PT RE-EVAL EST PLAN CARE: CPT

## 2022-08-09 NOTE — PLAN OF CARE
Outpatient Therapy Updated Plan of Care     Visit Date: 8/9/2022  Name: Parisa Dimas  Clinic Number: [id number]    Therapy Diagnosis:   Encounter Diagnoses   Name Primary?    Left shoulder pain, unspecified chronicity     Status post reverse total replacement of left shoulder     Post-traumatic stiffness of left shoulder joint     Decreased ROM of left shoulder Yes     Physician: Tip Blackwood, MIGUEL    Physician Orders: PT Eval and Treat   Medical Diagnosis from Referral: Closed 4-part fracture of proximal humerus, left, initial encounter [S42.292A]  Evaluation Date: 3/10/2022    Total Visits Received: 35 visits   Cancelled Visits: 0 visits   No Show Visits: 0 visits     Current Certification Period:  08/02/2022 to 08/02/2023   Precautions:  Standard   Visits from Evaluation Date:  34 visits   Functional Level Prior to Evaluation: s/p reverse total shoulder arthroplasty on 3/07/2022; unable to use left upper extremity without max assistance from spouse.     Per MD on 0802/2022;  Discussed rehab plan. Continue working on active range of motion and building strength. New PT referral placed today.  Focus on range of motion and terminal stretch.  No lifting more than 10 lbs    Subjective     Update:  Unable to raise arm above 75 degrees, unable to touch top of head and reach behind back. Difficulty with driving; requires assistance with ADLs and dressing.     Objective     Update    Passive Range of Motion:   Shoulder LEFT   Flexion 140 degrees   Abduction 133 degrees   ER at 20 45 degrees   IR WNL       Active Range of Motion:   Shoulder Right Left   Flexion WNL 76 degrees   Abduction WNL 59 degrees   ER at 90 WNL 25 degrees   IR (behind back) WNL Left back pocket      Upper Extremity Strength: (supine)   Flexion- 3-/5   Abduction- 3/5   External Rotation- 2+/5   Internal Rotation (functional)- 3+/5       Assessment     Update: Pt with noticeable decrease in overall left shoulder strength; unable to raise arm  above 78 degrees of flexion without marked shoulder hike and scapular instability. Pt with limitations into external rotation and internal rotation along with active abduction limiting her abilities to complete ADLs (wash hair, wash dishes, drive, etc.). Pt will continue to benefit from skilled physical therapy to improve overall shoulder strength, active range of motion and scapular stability to improve participation in ADLs.     HEP UPDATED 8/9/2022; exercises reviewed with patient and patient demonstrated each. Verbalized understanding.     GOALS:  Short Term Goals (3 Weeks):   1) Pt will demonstrate compliance with initial home exercise program as prescribed by physical therapist to improve independence with management of condition. (MET)  2) Pt to improve passive range of motion in flexion and external rotation at side to at least 100 and 30 degrees, respectively, to allow for improved mobility with dressing and self care. (progressing, not met)  3) Pt to report R upper extremity pain of <3/10 at worst during exercises to improve tolerance to ADLs and self care. (MET)      Long Term Goals (6 Weeks):   1. Pt to achieve <40% limitation as measured by the FOTO to demonstrate decreased disability. (progressing, not met)   2) Pt to improve active range of motion in R shoulder to within 20% of uninvolved extremity to allow for improved functional mobility with work tasks and lifting. (progressing, not met)   3) Pt to demonstrate at least 3+/5 grades on all upper quarter manual muscle testing to facilitate improved strength for work demands. (progressing, not met)  4) Pt to able to wash her hair in the shower with minimal assistance from spouse (progressing, not met)     Previous Short Term Goals Status:   MET 75% OF GOALS   New Short Term Goals Status:   PROGRESSING, NOT MET   Long Term Goal Status:   continue per initial plan of care.  Reasons for Recertification of Therapy: Decreased active range of motion due to  poor strength and overall endurance     Plan     Updated Certification Period: 8/9/2022 to 10/03/2022  Recommended Treatment Plan: 2 times per week for 8 weeks: Manual Therapy, Neuromuscular Re-ed, Patient Education and Therapeutic Exercise  Other Recommendations: None to note     Sanam Davies PT, DPT   8/9/2022      I CERTIFY THE NEED FOR THESE SERVICES FURNISHED UNDER THIS PLAN OF TREATMENT AND WHILE UNDER MY CARE    Physician's comments:        Physician's Signature: ___________________________________________________

## 2022-08-15 ENCOUNTER — CLINICAL SUPPORT (OUTPATIENT)
Dept: REHABILITATION | Facility: HOSPITAL | Age: 67
End: 2022-08-15
Payer: MEDICARE

## 2022-08-15 DIAGNOSIS — G89.29 CHRONIC LEFT SHOULDER PAIN: ICD-10-CM

## 2022-08-15 DIAGNOSIS — M25.512 CHRONIC LEFT SHOULDER PAIN: ICD-10-CM

## 2022-08-15 DIAGNOSIS — M25.612 DECREASED ROM OF LEFT SHOULDER: Primary | ICD-10-CM

## 2022-08-15 PROCEDURE — 97110 THERAPEUTIC EXERCISES: CPT | Mod: KX

## 2022-08-15 NOTE — PROGRESS NOTES
[facility]   Physical Therapy Treatment Note     Name: Parisa EARL [employer]  Clinic Number: [id number]    Therapy Diagnosis:   Encounter Diagnoses   Name Primary?    Decreased ROM of left shoulder Yes    Chronic left shoulder pain      Physician: Tip Blackwood PA-C    Visit Date: 8/15/2022    Physician Orders: PT Eval and Treat   Medical Diagnosis from Referral: Closed 4-part fracture of proximal humerus, left, initial encounter [S42.292A]  Evaluation Date: 3/10/2022  Authorization Period Expiration: 09/12/2022  Plan of Care Certification Period: 8/9/2022 to 10/03/2022  Visit #/Visits authorized: 34/ 47  FOTO: 1/10    Time In: 1100 am   Time Out: 1200 pm  Total Billable Time: 60 minutes (2 TE)     PTA Visit #: 0/5       SUBJECTIVE     Pt reports: No new complaints, still unable to raise her arm over her head.   She was compliant with home exercise program.  Response to previous treatment: none to note   Functional change: none to note     Pain: 0/10  Location: left shoulder      OBJECTIVE     Objective Measures updated at progress report unless specified.     Treatment       Parisa received the treatments listed below:      THERAPEUTIC EXERCISES to develop strength, endurance, ROM and flexibility for 55 minutes including :     - upper arm bike for 8 minutes (4 minutes fwd, 4 minutes backward) at Lvl 2.0 to improve tissue extensibility    Frequent breaks   - supine shoulder flexion; 5 sec hold 30 times - 5 lb therabar   - supine shoulder ER; 5 sec hold 30 times - 5 b theraband   - supine serratus punch; 2 x 10 reps; 3 sec hold   - seated shoulder flexion 2 x 10 reps; 3 sec hold (MAX CUE FOR SHOULDER DEPRESSION AND ELBOW STRAIGHT)  - seated shoulder abduction; 2 x 10 reps; 3 sec hold (palm down)   - seated shoulder flexion (table inclined w/ slideboard); 2 x 10 reps; 3 sec hold - emphasizing decreasing shoulder hike with elbow flexion       Patient Education and Home Exercises     Home  Exercises Provided and Patient Education Provided     Education provided:   - UPDATED 8/18/2022    Written Home Exercises Provided: Patient instructed to cont prior HEP. Exercises were reviewed and Parisa was able to demonstrate them prior to the end of the session.  Parisa demonstrated good  understanding of the education provided. See EMR under Patient Instructions for exercises provided during therapy sessions    ASSESSMENT   Parisa requiring max verbal and tactile cueing to maintain elbow straight and decrease shoulder hike. Pt with poor motor control and motor learning for scapular stabilizers. Still experiencing issues with activation of shoulder depressors even with max tactile and verbal cueing from PT. Will continue to focus on improving active range of motion by improving overall shoulder strength.     Parisa Is progressing well towards her goals.   Pt prognosis is Excellent.     Pt will continue to benefit from skilled outpatient physical therapy to address the deficits listed in the problem list box on initial evaluation, provide pt/family education and to maximize pt's level of independence in the home and community environment.     Pt's spiritual, cultural and educational needs considered and pt agreeable to plan of care and goals.     Anticipated barriers to physical therapy: None to note     GOALS:  Short Term Goals (3 Weeks):   1) Pt will demonstrate compliance with initial home exercise program as prescribed by physical therapist to improve independence with management of condition. (MET)  2) Pt to improve passive range of motion in flexion and external rotation at side to at least 100 and 30 degrees, respectively, to allow for improved mobility with dressing and self care. (progressing, not met)  3) Pt to report R upper extremity pain of <3/10 at worst during exercises to improve tolerance to ADLs and self care. (MET)      Long Term Goals (6 Weeks):   1. Pt to achieve <40% limitation as measured by  the FOTO to demonstrate decreased disability. (progressing, not met)   2) Pt to improve active range of motion in R shoulder to within 20% of uninvolved extremity to allow for improved functional mobility with work tasks and lifting. (progressing, not met)   3) Pt to demonstrate at least 3+/5 grades on all upper quarter manual muscle testing to facilitate improved strength for work demands. (progressing, not met)  4) Pt to able to wash her hair in the shower with minimal assistance from spouse (progressing, not met)     PLAN   Focus on improving active range of motion and scapular stabilizers     Sanam Keke, PT, DPT

## 2022-08-17 NOTE — PROGRESS NOTES
[facility]   Physical Therapy Treatment Note     Name: Parisa EARL [employer]  Clinic Number: [id number]    Therapy Diagnosis:   Encounter Diagnosis   Name Primary?    Decreased ROM of left shoulder Yes     Physician: Tip Blackwood PA-C    Visit Date: 8/18/2022    Physician Orders: PT Eval and Treat   Medical Diagnosis from Referral: Closed 4-part fracture of proximal humerus, left, initial encounter [S42.292A]  Evaluation Date: 3/10/2022  Authorization Period Expiration: 09/12/2022  Plan of Care Certification Period: 8/9/2022 to 10/03/2022  Visit #/Visits authorized: 35/ 47  FOTO: 1/10    PTA Visit #: 1/5     Time In: 1100 am   Time Out: 1200 pm  Total Treatment Time: 60 minutes   Total Billable Time: 30 minutes (1 TE, 1 NM)     SUBJECTIVE     Pt reports: a little bit of soreness in the front of her shoulder .   She was compliant with home exercise program.  Response to previous treatment: none to note   Functional change: none to note     Pain: 0/10  Location: left shoulder      OBJECTIVE     Objective Measures updated at progress report unless specified.     Treatment     Parisa received the treatments listed below:      THERAPEUTIC EXERCISES to develop strength, endurance, ROM and flexibility for 45 minutes including :     - upper arm bike for 8 minutes (4 minutes fwd, 4 minutes backward) at Lvl 2.0 to improve tissue extensibility    Frequent breaks   - supine shoulder flexion; 5 sec hold 30 times - 5 lb therabar   - supine shoulder ER; 5 sec hold 30 times - 5 b theraband   - supine serratus punch; 2 x 10 reps; 3 sec hold     Pt participated in neuromuscular re-education activities to improve: Coordination, Kinesthetic, Proprioception and Posture and motor control for 15 minutes. The following activities were included: After being cleared for contradictions: symmetrical biphasic Electrical Stimulation: to elicit muscle contraction of the deltoids for. Pt received stimulation: Freuqeuncy:  35 Hz, Phase duration: 300 msec, amplitude 29 Joaquin with 5 second on time and 5 second off time. Patient tolerated treatment well without any adverse effects.   - seated shoulder scaption : 5'     - seated shoulder abduction; 5'   - seated ER : 5'     NP:  - seated shoulder flexion (table inclined w/ slideboard); 2 x 10 reps; 3 sec hold - emphasizing decreasing shoulder hike with elbow flexion       Patient Education and Home Exercises     Home Exercises Provided and Patient Education Provided     Education provided:   - UPDATED 8/18/2022    Written Home Exercises Provided: Patient instructed to cont prior HEP. Exercises were reviewed and Parisa was able to demonstrate them prior to the end of the session.  Parisa demonstrated good  understanding of the education provided. See EMR under Patient Instructions for exercises provided during therapy sessions    ASSESSMENT     Pt responded well to addition of NMES electrical stimulation with AROM to improve target muscle recruitment , prevent compensations and to improve strength. Noted improvements in compensations with fwd elevation and abduction with NMES today . She remained very limited with ER due to poor strength and mobility .  Will continue to focus on improving active range of motion by improving overall shoulder strength.     Parisa Is progressing well towards her goals.   Pt prognosis is Excellent.     Pt will continue to benefit from skilled outpatient physical therapy to address the deficits listed in the problem list box on initial evaluation, provide pt/family education and to maximize pt's level of independence in the home and community environment.     Pt's spiritual, cultural and educational needs considered and pt agreeable to plan of care and goals.     Anticipated barriers to physical therapy: None to note     GOALS:  Short Term Goals (3 Weeks):   1) Pt will demonstrate compliance with initial home exercise program as prescribed by physical therapist to  improve independence with management of condition. (MET)  2) Pt to improve passive range of motion in flexion and external rotation at side to at least 100 and 30 degrees, respectively, to allow for improved mobility with dressing and self care. (progressing, not met)  3) Pt to report R upper extremity pain of <3/10 at worst during exercises to improve tolerance to ADLs and self care. (MET)      Long Term Goals (6 Weeks):   1. Pt to achieve <40% limitation as measured by the FOTO to demonstrate decreased disability. (progressing, not met)   2) Pt to improve active range of motion in R shoulder to within 20% of uninvolved extremity to allow for improved functional mobility with work tasks and lifting. (progressing, not met)   3) Pt to demonstrate at least 3+/5 grades on all upper quarter manual muscle testing to facilitate improved strength for work demands. (progressing, not met)  4) Pt to able to wash her hair in the shower with minimal assistance from spouse (progressing, not met)     PLAN   Focus on improving active range of motion and scapular stabilizers     Teresa Alcala, PTA

## 2022-08-18 ENCOUNTER — CLINICAL SUPPORT (OUTPATIENT)
Dept: REHABILITATION | Facility: HOSPITAL | Age: 67
End: 2022-08-18
Payer: MEDICARE

## 2022-08-18 DIAGNOSIS — M25.612 DECREASED ROM OF LEFT SHOULDER: Primary | ICD-10-CM

## 2022-08-18 PROCEDURE — 97112 NEUROMUSCULAR REEDUCATION: CPT | Mod: CQ

## 2022-08-18 PROCEDURE — 97110 THERAPEUTIC EXERCISES: CPT | Mod: CQ

## 2022-08-22 ENCOUNTER — CLINICAL SUPPORT (OUTPATIENT)
Dept: REHABILITATION | Facility: HOSPITAL | Age: 67
End: 2022-08-22
Payer: MEDICARE

## 2022-08-22 DIAGNOSIS — M25.612 DECREASED ROM OF LEFT SHOULDER: Primary | ICD-10-CM

## 2022-08-22 DIAGNOSIS — M25.512 LEFT SHOULDER PAIN, UNSPECIFIED CHRONICITY: ICD-10-CM

## 2022-08-22 PROCEDURE — 97112 NEUROMUSCULAR REEDUCATION: CPT | Mod: KX,CQ

## 2022-08-22 PROCEDURE — 97110 THERAPEUTIC EXERCISES: CPT | Mod: KX,CQ

## 2022-08-22 NOTE — PROGRESS NOTES
[facility]   Physical Therapy Treatment Note     Name: Parisa EARL [employer]  Clinic Number: [id number]    Therapy Diagnosis:   Encounter Diagnoses   Name Primary?    Decreased ROM of left shoulder Yes    Left shoulder pain, unspecified chronicity      Physician: Tip Blackwood PA-C    Visit Date: 8/22/2022    Physician Orders: PT Eval and Treat   Medical Diagnosis from Referral: Closed 4-part fracture of proximal humerus, left, initial encounter [S42.292A]  Evaluation Date: 3/10/2022  Authorization Period Expiration: 09/12/2022  Plan of Care Certification Period: 8/9/2022 to 10/03/2022  Visit #/Visits authorized: 36 / 47    PTA Visit #: 2 / 5     Time In: 1100  Time Out: 1205  Total Treatment Time: 65 minutes   Total Billable Time: 55 minutes (3 TE, 1 NMR)    Precautions: Standard    SUBJECTIVE     Patient reports: that she felt sore after last visit. Denies pain.  She was compliant with home exercise program.  Response to previous treatment: increased muscle soreness   Functional change: none to note     Pain: 0/10, currently  Location: Left shoulder      OBJECTIVE     Objective Measures updated at progress report unless specified.     Treatment     Parisa received the treatments listed below:      THERAPEUTIC EXERCISES to develop strength, endurance, ROM and flexibility for 40 minutes including :     - Upper arm bike for 8 minutes (4 minutes fwd, 4 minutes backward) at Lvl 2.0 to improve tissue extensibility   - Supine shoulder flexion + 5lb therabar; 5 sec hold, 3 minutes    - Supine shoulder ER at 45 degrees and 90 degrees + 5lb therabar; 5 sec hold, 3 minutes each direction  - Supine serratus punch + 5lb therabar; 3 sec hold, 3 minutes    Pt participated in neuromuscular re-education activities to improve: Coordination, Kinesthetic, Proprioception and Posture and motor control for 15 minutes. The following activities were included: After being cleared for contradictions: COURTNEY  Electrical Stimulation to elicit muscle contraction of the deltoids. Pt received stimulation: Frequency 50 pps, Phase duration 200 usec, Ramp 2 sec amplitude 14.5 Joaquin with 5 second on time and 5 second off time. Patient tolerated treatment well without any adverse effects.   - Seated shoulder scaption (4 pads on anterior and middle delt); 5 minutes     - Seated shoulder abduction (4 pads on middle and posterior delt); 5 minutes   - Seated ER (4 pads on middle and posterior delt): 5 minutes     Patient Education and Home Exercises     Home Exercises Provided and Patient Education Provided     Education provided:   - Continued compliance with HEP  - Proper technique with exercises performed; proper muscle activation    Written Home Exercises Provided: Patient instructed to cont prior HEP. Exercises were reviewed and Parisa was able to demonstrate them prior to the end of the session.  Parisa demonstrated good  understanding of the education provided. See EMR under Patient Instructions for exercises provided during therapy sessions    ASSESSMENT     Good tolerance to use of NMES with exercise demonstrating appropriate muscle use. She required mod cueing to maintain appropriate technique with supine shoulder ER with wand. Continue progressing per POC towards treatment goals.  Parisa Is progressing well towards her goals.   Pt prognosis is Excellent.     Pt will continue to benefit from skilled outpatient physical therapy to address the deficits listed in the problem list box on initial evaluation, provide pt/family education and to maximize pt's level of independence in the home and community environment.     Pt's spiritual, cultural and educational needs considered and pt agreeable to plan of care and goals.     Anticipated barriers to physical therapy: None to note     GOALS:  Short Term Goals (3 Weeks):   1) Pt will demonstrate compliance with initial home exercise program as prescribed by physical therapist to improve  independence with management of condition. (MET)  2) Pt to improve passive range of motion in flexion and external rotation at side to at least 100 and 30 degrees, respectively, to allow for improved mobility with dressing and self care. (progressing, not met)  3) Pt to report R upper extremity pain of <3/10 at worst during exercises to improve tolerance to ADLs and self care. (MET)      Long Term Goals (6 Weeks):   1. Pt to achieve <40% limitation as measured by the FOTO to demonstrate decreased disability. (progressing, not met)   2) Pt to improve active range of motion in R shoulder to within 20% of uninvolved extremity to allow for improved functional mobility with work tasks and lifting. (progressing, not met)   3) Pt to demonstrate at least 3+/5 grades on all upper quarter manual muscle testing to facilitate improved strength for work demands. (progressing, not met)  4) Pt to able to wash her hair in the shower with minimal assistance from spouse (progressing, not met)     PLAN     Focus on improving active range of motion and scapular stabilizers.    Neisha Adams, PTA

## 2022-08-25 ENCOUNTER — CLINICAL SUPPORT (OUTPATIENT)
Dept: REHABILITATION | Facility: HOSPITAL | Age: 67
End: 2022-08-25
Payer: MEDICARE

## 2022-08-25 DIAGNOSIS — M25.612 DECREASED ROM OF LEFT SHOULDER: Primary | ICD-10-CM

## 2022-08-25 DIAGNOSIS — M25.512 LEFT SHOULDER PAIN, UNSPECIFIED CHRONICITY: ICD-10-CM

## 2022-08-25 PROCEDURE — 97112 NEUROMUSCULAR REEDUCATION: CPT | Mod: KX,CQ

## 2022-08-25 PROCEDURE — 97110 THERAPEUTIC EXERCISES: CPT | Mod: KX,CQ

## 2022-08-25 NOTE — PROGRESS NOTES
[facility]   Physical Therapy Treatment Note     Name: Parisa EARL GuestSpan  Clinic Number: [id number]    Therapy Diagnosis:   Encounter Diagnoses   Name Primary?    Decreased ROM of left shoulder Yes    Left shoulder pain, unspecified chronicity      Physician: Tip Blackwood PA-C    Visit Date: 8/25/2022    Physician Orders: PT Eval and Treat   Medical Diagnosis from Referral: Closed 4-part fracture of proximal humerus, left, initial encounter [S42.292A]  Evaluation Date: 3/10/2022  Authorization Period Expiration: 09/12/2022  Plan of Care Certification Period: 8/9/2022 to 10/03/2022  Visit #/Visits authorized: 37 / 47    PTA Visit #: 3 / 5     Time In: 1000  Time Out: 1105  Total Treatment Time: 65 minutes   Total Billable Time: 55 minutes (2 TE, 1 NMR)    Precautions: Standard    SUBJECTIVE     Patient reports: that she feels sore today.  She was compliant with home exercise program.  Response to previous treatment: increased muscle soreness   Functional change: none to note today    Pain: 0/10, currently  Location: Left shoulder      OBJECTIVE     Objective Measures updated at progress report unless specified.     Treatment     Parisa received the treatments listed below:      THERAPEUTIC EXERCISES to develop strength, endurance, ROM and flexibility for 35 minutes including:   - Upper arm bike for 8 minutes (4 minutes fwd, 4 minutes backward) at Lvl 2.0 to improve tissue extensibility   - Supine shoulder flexion + 5lb therabar; 5 sec hold, 3 minutes    - Supine shoulder ER at 45 degrees and 90 degrees + 5lb therabar; 5 sec hold, 3 minutes each direction  - Supine serratus punch + 5lb therabar; 3 sec hold, 3 minutes    Pt participated in neuromuscular re-education activities to improve: Coordination, Kinesthetic, Proprioception and Posture and motor control for 20 minutes. The following activities were included: After being cleared for contradictions: VMS Electrical Stimulation to  elicit muscle contraction of the deltoids. Pt received stimulation: Frequency 50 pps, Phase duration 200 usec, Ramp 2 sec amplitude 14.5 Joaquin with 5 second on time and 5 second off time. Patient tolerated treatment well without any adverse effects.   - Seated shoulder scaption (4 pads on anterior and middle delt); 10 minutes     - Seated shoulder abduction (4 pads on middle and posterior delt); 5 minutes   - Seated ER (4 pads on middle and posterior delt): 5 minutes     Patient Education and Home Exercises     Home Exercises Provided and Patient Education Provided     Education provided:   - Continued compliance with HEP  - Proper technique with exercises performed; proper muscle activation    Written Home Exercises Provided: Patient instructed to cont prior HEP. Exercises were reviewed and Parisa was able to demonstrate them prior to the end of the session.  Parisa demonstrated good  understanding of the education provided. See EMR under Patient Instructions for exercises provided during therapy sessions    ASSESSMENT     Visible muscle fasciculations with NMES demonstrating appropriate muscle response and muscle activation. Mod cueing with stretching to maintain proper technique with fatigue.   Parisa Is progressing well towards her goals.   Pt prognosis is Excellent.     Pt will continue to benefit from skilled outpatient physical therapy to address the deficits listed in the problem list box on initial evaluation, provide pt/family education and to maximize pt's level of independence in the home and community environment.     Pt's spiritual, cultural and educational needs considered and pt agreeable to plan of care and goals.     Anticipated barriers to physical therapy: None to note     GOALS:  Short Term Goals (3 Weeks):   1) Pt will demonstrate compliance with initial home exercise program as prescribed by physical therapist to improve independence with management of condition. (MET)  2) Pt to improve passive  range of motion in flexion and external rotation at side to at least 100 and 30 degrees, respectively, to allow for improved mobility with dressing and self care. (progressing, not met)  3) Pt to report R upper extremity pain of <3/10 at worst during exercises to improve tolerance to ADLs and self care. (MET)      Long Term Goals (6 Weeks):   1. Pt to achieve <40% limitation as measured by the FOTO to demonstrate decreased disability. (progressing, not met)   2) Pt to improve active range of motion in R shoulder to within 20% of uninvolved extremity to allow for improved functional mobility with work tasks and lifting. (progressing, not met)   3) Pt to demonstrate at least 3+/5 grades on all upper quarter manual muscle testing to facilitate improved strength for work demands. (progressing, not met)  4) Pt to able to wash her hair in the shower with minimal assistance from spouse (progressing, not met)     PLAN     Focus on improving active range of motion and scapular stabilizers.    Neisha Adams, PTA

## 2022-08-29 ENCOUNTER — CLINICAL SUPPORT (OUTPATIENT)
Dept: REHABILITATION | Facility: HOSPITAL | Age: 67
End: 2022-08-29
Payer: MEDICARE

## 2022-08-29 DIAGNOSIS — G89.29 CHRONIC LEFT SHOULDER PAIN: ICD-10-CM

## 2022-08-29 DIAGNOSIS — M25.512 CHRONIC LEFT SHOULDER PAIN: ICD-10-CM

## 2022-08-29 DIAGNOSIS — M25.612 DECREASED ROM OF LEFT SHOULDER: Primary | ICD-10-CM

## 2022-08-29 PROCEDURE — 97112 NEUROMUSCULAR REEDUCATION: CPT | Mod: KX,CQ

## 2022-08-29 PROCEDURE — 97110 THERAPEUTIC EXERCISES: CPT | Mod: KX,CQ

## 2022-08-29 NOTE — PROGRESS NOTES
"  [facility]   Physical Therapy Treatment Note     Name: Parisa Dimas  Clinic Number: [id number]    Therapy Diagnosis:   Encounter Diagnoses   Name Primary?    Decreased ROM of left shoulder Yes    Chronic left shoulder pain      Physician: Tip Blackwood PA-C    Visit Date: 8/29/2022    Physician Orders: PT Eval and Treat   Medical Diagnosis from Referral: Closed 4-part fracture of proximal humerus, left, initial encounter [S42.292A]  Evaluation Date: 3/10/2022  Authorization Period Expiration: 09/12/2022  Plan of Care Certification Period: 8/9/2022 to 10/03/2022  Visit #/Visits authorized: 38 / 47    PTA Visit #: 4 / 5     Time In: 1100  Time Out: 1205  Total Treatment Time: 65 minutes   Total Billable Time: 30 minutes (1 TE, 1 NMR)    Precautions: Standard    SUBJECTIVE     Patient reports: that she feels sore today, states "I guess that is normal." States that she would like her  to come in to review her home exercises to help her out at home.  She was compliant with home exercise program.  Response to previous treatment: good; increased muscle soreness post  Functional change: able to bring her Left hand to her mouth; difficulty touching her eyebrows    Pain: 0/10, currently  Location: Left shoulder      OBJECTIVE     Objective Measures updated at progress report unless specified.     Treatment     Parisa received the treatments listed below:      THERAPEUTIC EXERCISES to develop strength, endurance, ROM and flexibility for 40 minutes including:   - Upper arm bike for 8 minutes (4 minutes fwd, 4 minutes backward) at Lvl 4.0 to improve tissue extensibility   - Supine shoulder flexion + 5lb therabar; 5 sec hold, 3 minutes    - Supine shoulder ER at 45 degrees and 90 degrees + 5lb therabar; 5 sec hold, 3 minutes each direction  - Supine serratus punch + 5lb therabar; 3 sec hold, 3 minutes    *Add next visit;  - Wall wash  - OH ball bounce with small blue ball  - Serratus wall " slides with/without bolster    Pt participated in neuromuscular re-education activities to improve: Coordination, Kinesthetic, Proprioception and Posture and motor control for 15 minutes. The following activities were included: After being cleared for contradictions: VMS Electrical Stimulation to elicit muscle contraction of the deltoids. Pt received stimulation: Frequency 50 pps, Phase duration 200 usec, Ramp 2 sec amplitude 14 Joaquin with 5 second on time and 5 second off time. Patient tolerated treatment well without any adverse effects.   - Seated shoulder scaption (4 pads on anterior and middle delt); 5 minutes     - Seated shoulder abduction (4 pads on middle and posterior delt); 5 minutes   - Seated ER (4 pads on middle and posterior delt): 5 minutes     Patient Education and Home Exercises     Home Exercises Provided and Patient Education Provided     Education provided:   - Functional Left shoulder movements; hand to mouth, hand to eyebrows, hand to head. Patient demonstrates increased difficulty with hand to eyebrows, mod to max cueing to avoid forward head to reach for hand. Visible muscle fasciculations observed. Encouraged patient to perform throughout the day.    Written Home Exercises Provided: Patient instructed to cont prior HEP. Exercises were reviewed and Parisa was able to demonstrate them prior to the end of the session.  Parisa demonstrated good  understanding of the education provided. See EMR under Patient Instructions for exercises provided during therapy sessions    ASSESSMENT     Patient with good tolerance to exercises performed noting appropriate muscle soreness. She is requiring less cueing during NMES to avoid scapular elevation and compensation. Patient demonstrates increased difficulty with active movements of shoulder and may benefit from progression of exercises next visit.   Parisa Is progressing well towards her goals.   Pt prognosis is Excellent.     Pt will continue to benefit from  skilled outpatient physical therapy to address the deficits listed in the problem list box on initial evaluation, provide pt/family education and to maximize pt's level of independence in the home and community environment.     Pt's spiritual, cultural and educational needs considered and pt agreeable to plan of care and goals.     Anticipated barriers to physical therapy: None to note     GOALS:  Short Term Goals (3 Weeks):   1) Pt will demonstrate compliance with initial home exercise program as prescribed by physical therapist to improve independence with management of condition. (MET)  2) Pt to improve passive range of motion in flexion and external rotation at side to at least 100 and 30 degrees, respectively, to allow for improved mobility with dressing and self care. (progressing, not met)  3) Pt to report R upper extremity pain of <3/10 at worst during exercises to improve tolerance to ADLs and self care. (MET)      Long Term Goals (6 Weeks):   1. Pt to achieve <40% limitation as measured by the FOTO to demonstrate decreased disability. (progressing, not met)   2) Pt to improve active range of motion in R shoulder to within 20% of uninvolved extremity to allow for improved functional mobility with work tasks and lifting. (progressing, not met)   3) Pt to demonstrate at least 3+/5 grades on all upper quarter manual muscle testing to facilitate improved strength for work demands. (progressing, not met)  4) Pt to able to wash her hair in the shower with minimal assistance from spouse (progressing, not met)     PLAN     Focus on improving active range of motion and scapular stabilizers.    Neisha Adams, PTA

## 2022-09-01 ENCOUNTER — CLINICAL SUPPORT (OUTPATIENT)
Dept: REHABILITATION | Facility: HOSPITAL | Age: 67
End: 2022-09-01
Payer: MEDICARE

## 2022-09-01 DIAGNOSIS — M25.612 DECREASED ROM OF LEFT SHOULDER: Primary | ICD-10-CM

## 2022-09-01 DIAGNOSIS — M25.512 CHRONIC LEFT SHOULDER PAIN: ICD-10-CM

## 2022-09-01 DIAGNOSIS — G89.29 CHRONIC LEFT SHOULDER PAIN: ICD-10-CM

## 2022-09-01 PROCEDURE — 97110 THERAPEUTIC EXERCISES: CPT

## 2022-09-01 NOTE — PROGRESS NOTES
"  [facility]   Physical Therapy Treatment Note     Name: Parisa Dimas  Clinic Number: [id number]    Therapy Diagnosis:   Encounter Diagnoses   Name Primary?    Decreased ROM of left shoulder Yes    Chronic left shoulder pain      Physician: Tip Blackwood PA-C    Visit Date: 9/1/2022    Physician Orders: PT Eval and Treat   Medical Diagnosis from Referral: Closed 4-part fracture of proximal humerus, left, initial encounter [S42.292A]  Evaluation Date: 3/10/2022  Authorization Period Expiration: 09/12/2022  Plan of Care Certification Period: 8/9/2022 to 10/03/2022  Visit #/Visits authorized: 39 / 47    PTA Visit #: 0 / 5     Time In: 1057  Time Out:   Total Treatment Time: 65 minutes   Total Billable Time: 30 minutes (1 TE, 1 NMR)    Precautions: Standard    SUBJECTIVE     Patient reports: that     She was compliant with home exercise program.  Response to previous treatment: good; increased muscle soreness post  Functional change: able to bring her Left hand to her mouth; difficulty touching her eyebrows    Pain: 0/10, currently  Location: Left shoulder      OBJECTIVE     Objective Measures updated at progress report unless specified.     Treatment     Parisa received the treatments listed below:      THERAPEUTIC EXERCISES to develop strength, endurance, ROM and flexibility for 40 minutes including:   - Upper arm bike for 8 minutes (4 minutes fwd, 4 minutes backward) at Lvl 4.0 to improve tissue extensibility   - Supine shoulder flexion + 5lb therabar; 5 sec hold, 3 minutes    - Supine shoulder ER at 45 degrees and 90 degrees + 5lb therabar; 5 sec hold, 3 minutes each direction  - Supine serratus punch + 5lb therabar; 3 sec hold, 3 minutes  +Wall wash 3' with 3" hold   +SAPD with RTB 3' with 3" hold   +ER Walk outs with RTB 3' with 3" hold   +Scapular protractions with CW/CCW tiny circles with 2# weight x 1 minute each direction    -NT Pt participated in neuromuscular re-education " activities to improve: Coordination, Kinesthetic, Proprioception and Posture and motor control for 15 minutes. The following activities were included: After being cleared for contradictions: VMS Electrical Stimulation to elicit muscle contraction of the deltoids. Pt received stimulation: Frequency 50 pps, Phase duration 200 usec, Ramp 2 sec amplitude 14 Joaquin with 5 second on time and 5 second off time. Patient tolerated treatment well without any adverse effects.   - Seated shoulder scaption (4 pads on anterior and middle delt); 5 minutes     - Seated shoulder abduction (4 pads on middle and posterior delt); 5 minutes   - Seated ER (4 pads on middle and posterior delt): 5 minutes     Patient Education and Home Exercises     Home Exercises Provided and Patient Education Provided     Education provided:   - Functional Left shoulder movements; hand to mouth, hand to eyebrows, hand to head. Patient demonstrates increased difficulty with hand to eyebrows, mod to max cueing to avoid forward head to reach for hand. Visible muscle fasciculations observed. Encouraged patient to perform throughout the day.    Written Home Exercises Provided: Patient instructed to cont prior HEP. Exercises were reviewed and Parisa was able to demonstrate them prior to the end of the session.  Parisa demonstrated good  understanding of the education provided. See EMR under Patient Instructions for exercises provided during therapy sessions    ASSESSMENT     Patient had difficulty with overhead active attempted exercises due to poor serratus endurance and weakness in deltoids.  Will progress exercises to facilitate stability and strength in left shoulder complex mm.      Parisa Is progressing well towards her goals.   Pt prognosis is Excellent.     Pt will continue to benefit from skilled outpatient physical therapy to address the deficits listed in the problem list box on initial evaluation, provide pt/family education and to maximize pt's level  of independence in the home and community environment.     Pt's spiritual, cultural and educational needs considered and pt agreeable to plan of care and goals.     Anticipated barriers to physical therapy: None to note     GOALS:  Short Term Goals (3 Weeks):   1) Pt will demonstrate compliance with initial home exercise program as prescribed by physical therapist to improve independence with management of condition. (MET)  2) Pt to improve passive range of motion in flexion and external rotation at side to at least 100 and 30 degrees, respectively, to allow for improved mobility with dressing and self care. (progressing, not met)  3) Pt to report R upper extremity pain of <3/10 at worst during exercises to improve tolerance to ADLs and self care. (MET)      Long Term Goals (6 Weeks):   1. Pt to achieve <40% limitation as measured by the FOTO to demonstrate decreased disability. (progressing, not met)   2) Pt to improve active range of motion in R shoulder to within 20% of uninvolved extremity to allow for improved functional mobility with work tasks and lifting. (progressing, not met)   3) Pt to demonstrate at least 3+/5 grades on all upper quarter manual muscle testing to facilitate improved strength for work demands. (progressing, not met)  4) Pt to able to wash her hair in the shower with minimal assistance from spouse (progressing, not met)     PLAN     Focus on improving active range of motion and scapular stabilizers.    Carolin Raymond, PT

## 2022-09-06 ENCOUNTER — CLINICAL SUPPORT (OUTPATIENT)
Dept: REHABILITATION | Facility: HOSPITAL | Age: 67
End: 2022-09-06
Payer: MEDICARE

## 2022-09-06 DIAGNOSIS — M25.612 DECREASED ROM OF LEFT SHOULDER: Primary | ICD-10-CM

## 2022-09-06 DIAGNOSIS — G89.29 CHRONIC LEFT SHOULDER PAIN: ICD-10-CM

## 2022-09-06 DIAGNOSIS — M25.512 CHRONIC LEFT SHOULDER PAIN: ICD-10-CM

## 2022-09-06 PROCEDURE — 97112 NEUROMUSCULAR REEDUCATION: CPT | Mod: CQ

## 2022-09-06 PROCEDURE — 97110 THERAPEUTIC EXERCISES: CPT | Mod: CQ

## 2022-09-06 NOTE — PROGRESS NOTES
"  [facility]   Physical Therapy Treatment Note     Name: Parisa EARL [place]  Clinic Number: [id number]    Therapy Diagnosis:   Encounter Diagnoses   Name Primary?    Decreased ROM of left shoulder Yes    Chronic left shoulder pain        Physician: Tip Blackwood PA-C    Visit Date: 9/6/2022    Physician Orders: PT Eval and Treat   Medical Diagnosis from Referral: Closed 4-part fracture of proximal humerus, left, initial encounter [S42.292A]  Evaluation Date: 3/10/2022  Authorization Period Expiration: 09/12/2022  Plan of Care Certification Period: 8/9/2022 to 10/03/2022  Visit #/Visits authorized: 40 / 47    PTA Visit #: 1 / 5     Time In: 11:00 am   Time Out: 12:00 pm  Total Treatment Time: 60 minutes   Total Billable Time: 60 minutes (3 TE, 1 NMR)    Precautions: Standard    SUBJECTIVE     Patient reports: feeling soreness in her shoulder , also is tender to touch .     She was compliant with home exercise program.  Response to previous treatment: good; increased muscle soreness post  Functional change: able to bring her Left hand to her mouth; difficulty touching her eyebrows    Pain: 0/10, currently  Location: Left shoulder      OBJECTIVE     Objective Measures updated at progress report unless specified.     Treatment     Parisa received the treatments listed below:      THERAPEUTIC EXERCISES to develop strength, endurance, ROM and flexibility for 45 minutes including:   - Upper arm bike for 8 minutes (4 minutes fwd, 4 minutes backward) at Lvl 4.0 to improve tissue extensibility   - Supine shoulder flexion + 5lb therabar; 5 sec hold, 3 minutes    - Supine shoulder ER at 45 degrees and 90 degrees + 5lb therabar; 5 sec hold, 3 minutes each direction  - Supine serratus punch + 5lb therabar; 3 sec hold, 3 minutes  -Wall wash 3' with 3" hold - NP  -SAPD with RTB 3' with 3" hold   -ER Walk outs with RTB 3' with 3" hold   -Scapular protractions with CW/CCW tiny circles with 2# weight x 1 " minute each direction    -NT Pt participated in neuromuscular re-education activities to improve: Coordination, Kinesthetic, Proprioception and Posture and motor control for 15 minutes. The following activities were included: After being cleared for contradictions: VMS Electrical Stimulation to elicit muscle contraction of the deltoids. Pt received stimulation: Frequency 50 pps, Phase duration 200 usec, Ramp 2 sec amplitude 20 Joaquin with 5 second on time and 5 second off time. Patient tolerated treatment well without any adverse effects.   - Seated shoulder scaption (4 pads on anterior and middle delt); 5 minutes     - Seated shoulder abduction (4 pads on middle and posterior delt); 5 minutes   - Seated ER (4 pads on middle and posterior delt): 5 minutes - NP    Patient Education and Home Exercises     Home Exercises Provided and Patient Education Provided     Education provided:   - Functional Left shoulder movements; hand to mouth, hand to eyebrows, hand to head. Patient demonstrates increased difficulty with hand to eyebrows, mod to max cueing to avoid forward head to reach for hand. Visible muscle fasciculations observed. Encouraged patient to perform throughout the day.    Written Home Exercises Provided: Patient instructed to cont prior HEP. Exercises were reviewed and Parisa was able to demonstrate them prior to the end of the session.  Parisa demonstrated good  understanding of the education provided. See EMR under Patient Instructions for exercises provided during therapy sessions    ASSESSMENT     Pt tolerated session well with appropriate muscle soreness reported following . She responds well to NMES to help promote improved AROM and deltoid strengthening with less UT compensations . Difficulty with wall scapular stabilization activities with Mod cues for proper form and only able to tolerate about 30'' at a time due to weakness. Will progress exercises to facilitate stability and strength in left shoulder  complex mm.      Parisa Is progressing well towards her goals.   Pt prognosis is Excellent.     Pt will continue to benefit from skilled outpatient physical therapy to address the deficits listed in the problem list box on initial evaluation, provide pt/family education and to maximize pt's level of independence in the home and community environment.   Pt's spiritual, cultural and educational needs considered and pt agreeable to plan of care and goals.     Anticipated barriers to physical therapy: None to note     GOALS:  Short Term Goals (3 Weeks):   1) Pt will demonstrate compliance with initial home exercise program as prescribed by physical therapist to improve independence with management of condition. (MET)  2) Pt to improve passive range of motion in flexion and external rotation at side to at least 100 and 30 degrees, respectively, to allow for improved mobility with dressing and self care. (progressing, not met)  3) Pt to report R upper extremity pain of <3/10 at worst during exercises to improve tolerance to ADLs and self care. (MET)      Long Term Goals (6 Weeks):   1. Pt to achieve <40% limitation as measured by the FOTO to demonstrate decreased disability. (progressing, not met)   2) Pt to improve active range of motion in R shoulder to within 20% of uninvolved extremity to allow for improved functional mobility with work tasks and lifting. (progressing, not met)   3) Pt to demonstrate at least 3+/5 grades on all upper quarter manual muscle testing to facilitate improved strength for work demands. (progressing, not met)  4) Pt to able to wash her hair in the shower with minimal assistance from spouse (progressing, not met)     PLAN     Focus on improving active range of motion and scapular stabilizers.    Teresa Alcala, PTA

## 2022-09-08 ENCOUNTER — CLINICAL SUPPORT (OUTPATIENT)
Dept: REHABILITATION | Facility: HOSPITAL | Age: 67
End: 2022-09-08
Payer: MEDICARE

## 2022-09-08 DIAGNOSIS — M25.612 DECREASED ROM OF LEFT SHOULDER: Primary | ICD-10-CM

## 2022-09-08 DIAGNOSIS — G89.29 CHRONIC LEFT SHOULDER PAIN: ICD-10-CM

## 2022-09-08 DIAGNOSIS — M25.512 CHRONIC LEFT SHOULDER PAIN: ICD-10-CM

## 2022-09-08 PROCEDURE — 97110 THERAPEUTIC EXERCISES: CPT | Mod: KX,CQ

## 2022-09-08 NOTE — PROGRESS NOTES
[facility]   Physical Therapy Treatment Note     Name: Parisa EARL [employer]  Clinic Number: [id number]    Therapy Diagnosis:   Encounter Diagnoses   Name Primary?    Decreased ROM of left shoulder Yes    Chronic left shoulder pain      Physician: Tip Blackwood PA-C    Visit Date: 9/8/2022    Physician Orders: PT Eval and Treat   Medical Diagnosis from Referral: Closed 4-part fracture of proximal humerus, left, initial encounter [S42.292A]  Evaluation Date: 3/10/2022  Authorization Period Expiration: 09/12/2022  Plan of Care Certification Period: 8/9/2022 to 10/03/2022  Visit #/Visits authorized: 41 / 47    PTA Visit #: 2 / 5     Time In: 1100  Time Out: 1205  Total Treatment Time: 65 minutes   Total Billable Time: 55 minutes (4 TE)    Precautions: Standard    SUBJECTIVE     Patient reports: that she felt sore after last visit. She denies pain today. Has been practicing touching her forehead at home. States she was able to scratch her ear on her own.  She was compliant with home exercise program.  Response to previous treatment: good; increased muscle soreness post  Functional change: able to bring her Left hand to her eyebrow line; working on practice placing her hand on top of her head    Pain: 0/10, currently  Location: Left shoulder      OBJECTIVE     Objective Measures updated at progress report unless specified.     Treatment     Parisa received the treatments listed below:      THERAPEUTIC EXERCISES to develop strength, endurance, ROM and flexibility for 55 minutes including:   - Upper arm bike for 6 minutes (3 minutes fwd, 3 minutes backward) at Lvl 4.0 to improve tissue extensibility   - Supine shoulder flexion + 5lb therabar; 5 sec hold, 3 minutes    - Supine shoulder ER at 45 degrees + 5lb therabar; 5 sec hold, 3 minutes each direction  - Supine serratus punch + 5lb therabar; 3 sec hold, 3 minutes  - Wall wash; 3 second hold, 3 minutes - increased difficulty with exercise,  requires mod cueing to maintain proper technique; attempted addition of wall wash, patient unable to perform, will consider again in future  - SAPD with Red TB; 3 second hold, 3 minutes  - ER with Red TB; 3 second hold, 3 minutes - mod cueing to maintain proper shoulder placement  - Scapular protractions with CW/CCW (tiny circles) with 2# weight x 1 minute each direction    +Standing scaption against wall; 3 minutes, 3-5 second hold  +Standing shoulder abduction against wall; 3 minutes, 3-5 second hold    Moist heat pack for 10 minutes to Left shoulder in sitting post session.    Patient Education and Home Exercises     Home Exercises Provided and Patient Education Provided     Education provided:   - Functional Left shoulder movements; hand to mouth, hand to eyebrows, hand to head. Patient demonstrates increased difficulty with hand to eyebrows, mod to max cueing to avoid forward head to reach for hand. Visible muscle fasciculations observed. Encouraged patient to perform throughout the day.    Written Home Exercises Provided: Patient instructed to cont prior HEP. Exercises were reviewed and Parisa was able to demonstrate them prior to the end of the session.  Parisa demonstrated good  understanding of the education provided. See EMR under Patient Instructions for exercises provided during therapy sessions    ASSESSMENT     Patient is demonstrating improvement in active strength and ROM with lifting arm in scaption and abduction positioning. Discontinued use of NMES today. Patient requiring minimal cueing to avoid scapular elevation with lift demonstrating improvement in strength and motor control. Attempted progression of wall wash with lift off, patient unable to perform right now. Will consider again in future sessions. Continue progressing patient per POC towards treatment goals.  Parisa Is progressing well towards her goals.   Pt prognosis is Excellent.     Pt will continue to benefit from skilled outpatient  physical therapy to address the deficits listed in the problem list box on initial evaluation, provide pt/family education and to maximize pt's level of independence in the home and community environment.   Pt's spiritual, cultural and educational needs considered and pt agreeable to plan of care and goals.     Anticipated barriers to physical therapy: None to note     GOALS:  Short Term Goals (3 Weeks):   1) Pt will demonstrate compliance with initial home exercise program as prescribed by physical therapist to improve independence with management of condition. (MET)  2) Pt to improve passive range of motion in flexion and external rotation at side to at least 100 and 30 degrees, respectively, to allow for improved mobility with dressing and self care. (progressing, not met)  3) Pt to report R upper extremity pain of <3/10 at worst during exercises to improve tolerance to ADLs and self care. (MET)      Long Term Goals (6 Weeks):   1. Pt to achieve <40% limitation as measured by the FOTO to demonstrate decreased disability. (progressing, not met)   2) Pt to improve active range of motion in R shoulder to within 20% of uninvolved extremity to allow for improved functional mobility with work tasks and lifting. (progressing, not met)   3) Pt to demonstrate at least 3+/5 grades on all upper quarter manual muscle testing to facilitate improved strength for work demands. (progressing, not met)  4) Pt to able to wash her hair in the shower with minimal assistance from spouse (progressing, not met)     PLAN     Focus on improving active range of motion and scapular stabilizers.    Neisha Adams, PTA

## 2022-09-12 ENCOUNTER — CLINICAL SUPPORT (OUTPATIENT)
Dept: REHABILITATION | Facility: HOSPITAL | Age: 67
End: 2022-09-12
Payer: MEDICARE

## 2022-09-12 DIAGNOSIS — M25.612 DECREASED ROM OF LEFT SHOULDER: Primary | ICD-10-CM

## 2022-09-12 DIAGNOSIS — M25.512 CHRONIC LEFT SHOULDER PAIN: ICD-10-CM

## 2022-09-12 DIAGNOSIS — G89.29 CHRONIC LEFT SHOULDER PAIN: ICD-10-CM

## 2022-09-12 PROCEDURE — 97110 THERAPEUTIC EXERCISES: CPT

## 2022-09-12 NOTE — PROGRESS NOTES
[facility]   Physical Therapy Treatment Note     Name: Parisa EARL [employer]  Clinic Number: [id number]    Therapy Diagnosis:   Encounter Diagnoses   Name Primary?    Decreased ROM of left shoulder Yes    Chronic left shoulder pain      Physician: Tip Blackwood PA-C    Visit Date: 9/12/2022    Physician Orders: PT Eval and Treat   Medical Diagnosis from Referral: Closed 4-part fracture of proximal humerus, left, initial encounter [S42.292A]  Evaluation Date: 3/10/2022  Authorization Period Expiration: 09/12/2022  Plan of Care Certification Period: 8/9/2022 to 10/03/2022  Visit #/Visits authorized: 42 / 47    PTA Visit #: 0/ 5     Time In: 1100  Time Out: 1155  Total Treatment Time: 55minutes   Total Billable Time:  55minutes (4 TE)    Precautions: Standard    SUBJECTIVE     Patient reports: she feels stiffness and tightness in her shoulder.  She does not have pain but just limited in overhead activity due to the stiffness she experiences.    She was compliant with home exercise program.  Response to previous treatment: good; increased muscle soreness post  Functional change: able to bring her Left hand to her eyebrow line; working on practice placing her hand on top of her head    Pain: 0/10, currently  Location: Left shoulder      OBJECTIVE     Objective Measures updated at progress report unless specified.     Treatment     Parisa received the treatments listed below:      THERAPEUTIC EXERCISES to develop strength, endurance, ROM and flexibility for 55 minutes including:   - Upper arm bike for 6 minutes (3 minutes fwd, 3 minutes backward) at Lvl 4.0 to improve tissue extensibility   - Supine shoulder flexion + 5lb therabar; 5 sec hold, 3 minutes    - Supine shoulder ER at 45 degrees + 5lb therabar; 5 sec hold, 3 minutes each direction  - Supine serratus punch + 5lb therabar; 3 sec hold, 3 minutes  + SL ER 0 AW x 3 mins  - SAPD with Red TB; 3 second hold, 3 minutes  - ER  walkouts with YTB;  3 second hold, 3 minutes - mod cueing to maintain proper shoulder placement  - Scapular protractions with CW/CCW (tiny circles) with 2# weight x 2 minute each direction  +Standing scaption against wall; 3 minutes, 3-5 second hold-Unable to complete   +Standing shoulder abduction against wall; 3 minutes, 3-5 second hold-Unable to complete   +Pulleys-Scaption x 3 mins with emphasis on L UE AROM more so than AAROM from R UE       NT-Wall wash; 3 second hold, 3 minutes - increased difficulty with exercise, requires mod cueing to maintain proper technique; attempted addition of wall wash, patient unable to perform, will consider again in future      Moist heat pack for 10 minutes to Left shoulder in sitting post session.    Patient Education and Home Exercises     Home Exercises Provided and Patient Education Provided     Education provided:   - Functional Left shoulder movements; hand to mouth, hand to eyebrows, hand to head. Patient demonstrates increased difficulty with hand to eyebrows, mod to max cueing to avoid forward head to reach for hand. Visible muscle fasciculations observed. Encouraged patient to perform throughout the day.    Written Home Exercises Provided: Patient instructed to cont prior HEP. Exercises were reviewed and Parisa was able to demonstrate them prior to the end of the session.  Parisa demonstrated good  understanding of the education provided. See EMR under Patient Instructions for exercises provided during therapy sessions    ASSESSMENT     Patient is demonstrating improvement in active strength and ROM with lifting arm in scaption and abduction positioning. Discontinued use of NMES today. Patient requiring minimal cueing to avoid scapular elevation with lift demonstrating improvement in strength and motor control. Attempted progression of wall wash with lift off, patient unable to perform right now. Will consider again in future sessions. Continue progressing patient per POC towards treatment  goals.  Parisa Is progressing well towards her goals.   Pt prognosis is Excellent.     Pt will continue to benefit from skilled outpatient physical therapy to address the deficits listed in the problem list box on initial evaluation, provide pt/family education and to maximize pt's level of independence in the home and community environment.   Pt's spiritual, cultural and educational needs considered and pt agreeable to plan of care and goals.     Anticipated barriers to physical therapy: None to note     GOALS:  Short Term Goals (3 Weeks):   1) Pt will demonstrate compliance with initial home exercise program as prescribed by physical therapist to improve independence with management of condition. (MET)  2) Pt to improve passive range of motion in flexion and external rotation at side to at least 100 and 30 degrees, respectively, to allow for improved mobility with dressing and self care. (progressing, not met)  3) Pt to report R upper extremity pain of <3/10 at worst during exercises to improve tolerance to ADLs and self care. (MET)      Long Term Goals (6 Weeks):   1. Pt to achieve <40% limitation as measured by the FOTO to demonstrate decreased disability. (progressing, not met)   2) Pt to improve active range of motion in R shoulder to within 20% of uninvolved extremity to allow for improved functional mobility with work tasks and lifting. (progressing, not met)   3) Pt to demonstrate at least 3+/5 grades on all upper quarter manual muscle testing to facilitate improved strength for work demands. (progressing, not met)  4) Pt to able to wash her hair in the shower with minimal assistance from spouse (progressing, not met)     PLAN     Focus on improving active range of motion and scapular stabilizers.    Carolin Raymond, PT

## 2022-09-14 NOTE — PROGRESS NOTES
[facility]   Physical Therapy Treatment Note     Name: Parisa EARL [employer]  Clinic Number: [id number]    Therapy Diagnosis:   Encounter Diagnoses   Name Primary?    Decreased ROM of left shoulder Yes    Chronic left shoulder pain        Physician: Tip Blackwood PA-C    Visit Date: 9/15/2022    Physician Orders: PT Eval and Treat   Medical Diagnosis from Referral: Closed 4-part fracture of proximal humerus, left, initial encounter [S42.292A]  Evaluation Date: 3/10/2022  Authorization Period Expiration: 09/12/2022  Plan of Care Certification Period: 8/9/2022 to 10/03/2022  Visit #/Visits authorized: 43 / 47    PTA Visit #: 1/ 5     Time In: 9:00 am  Time Out: 10:05 am  Total Treatment Time: 65 minutes   Total Billable Time:  55minutes (4 TE)    Precautions: Standard    SUBJECTIVE     Patient reports: no pain but feels sore , she is always pretty sore afterwards .and sometimes her shoulder is very sensitive to touch .   She was compliant with home exercise program.  Response to previous treatment: good; increased muscle soreness post  Functional change: able to bring her Left hand to her eyebrow line; working on practice placing her hand on top of her head    Pain: 0/10, currently  Location: Left shoulder      OBJECTIVE     Objective Measures updated at progress report unless specified.     Treatment     Parisa received the treatments listed below:      THERAPEUTIC EXERCISES to develop strength, endurance, ROM and flexibility for 55 minutes including:   - Upper arm bike for 6 minutes (3 minutes fwd, 3 minutes backward) at Lvl 4.0 to improve tissue extensibility   - Supine shoulder flexion + 5lb therabar; 5 sec hold, 3 minutes    - Supine shoulder ER at 45 degrees + 5lb therabar; 5 sec hold, 3 minutes each direction  - Supine serratus punch + 5lb therabar; 3 sec hold, 3 minutes  - SL ER 0 AW x 3 mins  - SAPD with Red TB; 3 second hold, 3 minutes  - ER  walkouts with YTB; 3 second hold, 3  minutes - mod cueing to maintain proper shoulder placement  - Scapular protractions with CW/CCW (tiny circles) with 2# weight x 2 minute each direction  +Standing scaption against wall; 3 minutes, 3-5 second hold-Unable to complete   +Standing shoulder abduction against wall; 3 minutes, 3-5 second hold-Unable to complete   +Pulleys-Scaption x 3 mins with emphasis on L UE AROM more so than AAROM from R UE     NT-Wall wash; 3 second hold, 3 minutes - increased difficulty with exercise, requires mod cueing to maintain proper technique; attempted addition of wall wash, patient unable to perform, will consider again in future    Moist heat pack for 10 minutes to Left shoulder in sitting post session.    Patient Education and Home Exercises     Home Exercises Provided and Patient Education Provided     Education provided:   - Functional Left shoulder movements; hand to mouth, hand to eyebrows, hand to head. Patient demonstrates increased difficulty with hand to eyebrows, mod to max cueing to avoid forward head to reach for hand. Visible muscle fasciculations observed. Encouraged patient to perform throughout the day.    Written Home Exercises Provided: Patient instructed to cont prior HEP. Exercises were reviewed and Parisa was able to demonstrate them prior to the end of the session.  Parisa demonstrated good  understanding of the education provided. See EMR under Patient Instructions for exercises provided during therapy sessions    ASSESSMENT     Difficulty maintaining supine scapular protraction c/ CW/CCW circles for more then 1' due to weakness. Poor tolerance to performing scap stabilization on the wall with inability to maintain elbow extension and proper positioning . Noted improved active ROM and and to achieve full AAROM in scaption and abduction . Overall pt is progressing well with strength and ROM. Continue progressing patient per POC towards treatment goals.    Parisa Is progressing well towards her goals.   Pt  prognosis is Excellent.     Pt will continue to benefit from skilled outpatient physical therapy to address the deficits listed in the problem list box on initial evaluation, provide pt/family education and to maximize pt's level of independence in the home and community environment.   Pt's spiritual, cultural and educational needs considered and pt agreeable to plan of care and goals.     Anticipated barriers to physical therapy: None to note     GOALS:  Short Term Goals (3 Weeks):   1) Pt will demonstrate compliance with initial home exercise program as prescribed by physical therapist to improve independence with management of condition. (MET)  2) Pt to improve passive range of motion in flexion and external rotation at side to at least 100 and 30 degrees, respectively, to allow for improved mobility with dressing and self care. (progressing, not met)  3) Pt to report R upper extremity pain of <3/10 at worst during exercises to improve tolerance to ADLs and self care. (MET)      Long Term Goals (6 Weeks):   1. Pt to achieve <40% limitation as measured by the FOTO to demonstrate decreased disability. (progressing, not met)   2) Pt to improve active range of motion in R shoulder to within 20% of uninvolved extremity to allow for improved functional mobility with work tasks and lifting. (progressing, not met)   3) Pt to demonstrate at least 3+/5 grades on all upper quarter manual muscle testing to facilitate improved strength for work demands. (progressing, not met)  4) Pt to able to wash her hair in the shower with minimal assistance from spouse (progressing, not met)     PLAN     Focus on improving active range of motion and scapular stabilizers.    Teresa Alcala, PTA

## 2022-09-15 ENCOUNTER — CLINICAL SUPPORT (OUTPATIENT)
Dept: REHABILITATION | Facility: HOSPITAL | Age: 67
End: 2022-09-15
Payer: MEDICARE

## 2022-09-15 DIAGNOSIS — M25.512 CHRONIC LEFT SHOULDER PAIN: ICD-10-CM

## 2022-09-15 DIAGNOSIS — M25.612 DECREASED ROM OF LEFT SHOULDER: Primary | ICD-10-CM

## 2022-09-15 DIAGNOSIS — G89.29 CHRONIC LEFT SHOULDER PAIN: ICD-10-CM

## 2022-09-15 PROCEDURE — 97110 THERAPEUTIC EXERCISES: CPT | Mod: CQ

## 2022-09-16 ENCOUNTER — PES CALL (OUTPATIENT)
Dept: ADMINISTRATIVE | Facility: CLINIC | Age: 67
End: 2022-09-16
Payer: MEDICARE

## 2022-09-19 ENCOUNTER — CLINICAL SUPPORT (OUTPATIENT)
Dept: REHABILITATION | Facility: HOSPITAL | Age: 67
End: 2022-09-19
Payer: MEDICARE

## 2022-09-19 DIAGNOSIS — M25.512 CHRONIC LEFT SHOULDER PAIN: ICD-10-CM

## 2022-09-19 DIAGNOSIS — G89.29 CHRONIC LEFT SHOULDER PAIN: ICD-10-CM

## 2022-09-19 DIAGNOSIS — M25.612 DECREASED ROM OF LEFT SHOULDER: Primary | ICD-10-CM

## 2022-09-19 PROCEDURE — 97110 THERAPEUTIC EXERCISES: CPT | Mod: KX,CQ

## 2022-09-19 NOTE — PROGRESS NOTES
[facility]   Physical Therapy Treatment Note     Name: Parisa EARL [employer]  Clinic Number: [id number]    Therapy Diagnosis:   Encounter Diagnoses   Name Primary?    Decreased ROM of left shoulder Yes    Chronic left shoulder pain      Physician: Tip Blackwood PA-C    Visit Date: 9/19/2022    Physician Orders: PT Eval and Treat   Medical Diagnosis from Referral: Closed 4-part fracture of proximal humerus, left, initial encounter [S42.292A]  Evaluation Date: 3/10/2022  Authorization Period Expiration: 09/12/2022  Plan of Care Certification Period: 8/9/2022 to 10/03/2022  Visit #/Visits authorized: 44 / 47    PTA Visit #: 1 / 5     Time In: 1100  Time Out: 1200  Total Treatment Time: 60 minutes   Total Billable Time: 30 minutes (2 TE)    Precautions: Standard    SUBJECTIVE     Patient reports: that she feels sore in general. Reached back yesterday and felt something pull.  She was compliant with home exercise program.  Response to previous treatment: good; increased muscle soreness post  Functional change: able to bring her Left hand to her eyebrow line; working on practice placing her hand on top of her head    Pain: 0/10, currently  Location: Left shoulder      OBJECTIVE     Objective Measures updated at progress report unless specified.     Treatment     Parisa received the treatments listed below:      THERAPEUTIC EXERCISES to develop strength, endurance, ROM and flexibility for 60 minutes including:   - Upper arm bike for 8 minutes (4 minutes fwd, 4 minutes backward) at Lvl 4.0 to improve tissue extensibility   - Supine shoulder flexion + 5lb therabar; 5 sec hold, 3 minutes    - Supine shoulder ER at 45 degrees + 5lb therabar; 5 sec hold, 3 minutes each direction - NT  - Supine serratus punch + 5lb therabar; 3 sec hold, 3 minutes  - SL ER; 3 minutes  - SAPD with Red TB; 5 second hold, 2 x 10 reps  +Shoulder active ER + Yellow TB; 2 x 10 reps  - ER walkouts with YTB; 3 second hold, 3  minutes - mod cueing to maintain proper shoulder placement  - Scapular protractions with CW/CCW (tiny circles) with 2# weight x 30 reps each direction  - Standing scaption against wall; 3 minutes, 3-5 second hold  - Standing shoulder abduction against wall; 3 minutes, 3-5 second hold  - Pulleys (Scaption/Abduction) for 3 minutes each direction with emphasis on L UE AROM more so than AAROM from R UE     Moist heat pack for 00 minutes to Left shoulder in sitting post session. - NT    Patient Education and Home Exercises     Home Exercises Provided and Patient Education Provided     Education provided:   - Functional Left shoulder movements; hand to mouth, hand to eyebrows, hand to head. Patient demonstrates increased difficulty with hand to eyebrows, mod to max cueing to avoid forward head to reach for hand. Visible muscle fasciculations observed. Encouraged patient to perform throughout the day.  - Provided most recent HEP from 8/9/2022 for home use to continue improving UE strength and mobility outside of therapy.    Written Home Exercises Provided: Patient instructed to cont prior HEP. Exercises were reviewed and Parisa was able to demonstrate them prior to the end of the session.  Parisa demonstrated good  understanding of the education provided. See EMR under Patient Instructions for exercises provided during therapy sessions    ASSESSMENT     Ms. Mccauley demonstrated good tolerance to exercises performed. Observed visible improvement in active Left UE movement with standing exercises; visible muscle fasciculations secondary to continued decrease in motor control.   Parisa Is progressing well towards her goals.   Pt prognosis is Excellent.     Pt will continue to benefit from skilled outpatient physical therapy to address the deficits listed in the problem list box on initial evaluation, provide pt/family education and to maximize pt's level of independence in the home and community environment.   Pt's spiritual,  cultural and educational needs considered and pt agreeable to plan of care and goals.     Anticipated barriers to physical therapy: None to note     GOALS:  Short Term Goals (3 Weeks):   1) Pt will demonstrate compliance with initial home exercise program as prescribed by physical therapist to improve independence with management of condition. (MET)  2) Pt to improve passive range of motion in flexion and external rotation at side to at least 100 and 30 degrees, respectively, to allow for improved mobility with dressing and self care. (progressing, not met)  3) Pt to report R upper extremity pain of <3/10 at worst during exercises to improve tolerance to ADLs and self care. (MET)      Long Term Goals (6 Weeks):   1. Pt to achieve <40% limitation as measured by the FOTO to demonstrate decreased disability. (progressing, not met)   2) Pt to improve active range of motion in R shoulder to within 20% of uninvolved extremity to allow for improved functional mobility with work tasks and lifting. (progressing, not met)   3) Pt to demonstrate at least 3+/5 grades on all upper quarter manual muscle testing to facilitate improved strength for work demands. (progressing, not met)  4) Pt to able to wash her hair in the shower with minimal assistance from spouse (progressing, not met)     PLAN     Focus on improving active range of motion and scapular stabilizers.    Neisha Adams, PTA

## 2022-09-22 ENCOUNTER — CLINICAL SUPPORT (OUTPATIENT)
Dept: REHABILITATION | Facility: HOSPITAL | Age: 67
End: 2022-09-22
Payer: MEDICARE

## 2022-09-22 DIAGNOSIS — M25.612 DECREASED ROM OF LEFT SHOULDER: Primary | ICD-10-CM

## 2022-09-22 DIAGNOSIS — G89.29 CHRONIC LEFT SHOULDER PAIN: ICD-10-CM

## 2022-09-22 DIAGNOSIS — M25.512 CHRONIC LEFT SHOULDER PAIN: ICD-10-CM

## 2022-09-22 PROCEDURE — 97110 THERAPEUTIC EXERCISES: CPT

## 2022-09-22 NOTE — PROGRESS NOTES
[facility]   Physical Therapy Treatment Note /Discharge Summary     Name: Parisa Dimas  Clinic Number: [id number]    Therapy Diagnosis:   Encounter Diagnoses   Name Primary?    Decreased ROM of left shoulder Yes    Chronic left shoulder pain      Physician: Tip Blackwood PA-C    Visit Date: 9/22/2022    Physician Orders: PT Eval and Treat   Medical Diagnosis from Referral: Closed 4-part fracture of proximal humerus, left, initial encounter [S42.292A]  Evaluation Date: 3/10/2022  Authorization Period Expiration: 09/12/2022  Plan of Care Certification Period: 8/9/2022 to 10/03/2022  Visit #/Visits authorized: 45 / 47    PTA Visit #: 0 / 5     Time In: 1100  Time Out: 1200  Total Treatment Time: 60 minutes   Total Billable Time: 60 minutes (4 TE)    Precautions: Standard    SUBJECTIVE     Patient reports: that she feels sore in general. Reached back yesterday and felt something pull.  She was compliant with home exercise program.  Response to previous treatment: good; increased muscle soreness post  Functional change: able to bring her Left hand to her eyebrow line; working on practice placing her hand on top of her head    Pain: 0/10, currently  Location: Left shoulder      OBJECTIVE   GH Joint AROM:  Measured in supine : ER at 60 degrees AB: 45 degrees                                                                Flexion: 155 degrees                                                                Abduction: 105 degrees                                   Measured in sitting:  Flexion 100 degrees                                                                 Abduction 90 degrees     Functional ER/AB: To top of forehead        MMT:   Flexion: 4-/5  AB: 4-/5  ER: 4-/5  IR: 4/5  Extension: 4/5    Treatment     Parisa received the treatments listed below:      THERAPEUTIC EXERCISES to develop strength, endurance, ROM and flexibility for 60 minutes including:   - Upper arm bike for 8  minutes (4 minutes fwd, 4 minutes backward) at Lvl 4.0 to improve tissue extensibility   - Supine shoulder flexion + 5lb therabar; 5 sec hold, 3 minutes    - Supine shoulder ER at 45 degrees + 5lb therabar; 5 sec hold, 3 minutes each direction   - Supine serratus punch + 5lb therabar; 3 sec hold, 3 minutes  - SL ER; 3 minutes    - SAPD with Red TB; 5 second hold, 2 x 10 reps  +Shoulder active ER + Yellow TB; 2 x 10 reps  - ER walkouts with YTB; 3 second hold, 3 minutes - mod cueing to maintain proper shoulder placement  - Scapular protractions with CW/CCW (tiny circles) with 2# weight x 30 reps each direction  - Standing scaption against wall; 3 minutes, 3-5 second hold  - Standing shoulder abduction against wall; 3 minutes, 3-5 second hold  - Pulleys (Scaption/Abduction) for 3 minutes each direction with emphasis on L UE AROM more so than AAROM from R UE     Moist heat pack for 00 minutes to Left shoulder in sitting post session. - NT    Patient Education and Home Exercises     Home Exercises Provided and Patient Education Provided     Education provided:   - Functional Left shoulder movements; hand to mouth, hand to eyebrows, hand to head. Patient demonstrates increased difficulty with hand to eyebrows, mod to max cueing to avoid forward head to reach for hand. Visible muscle fasciculations observed. Encouraged patient to perform throughout the day.  - Provided most recent HEP from 8/9/2022 for home use to continue improving UE strength and mobility outside of therapy.    Written Home Exercises Provided: Patient instructed to cont prior HEP. Exercises were reviewed and Parisa was able to demonstrate them prior to the end of the session.  Parisa demonstrated good  understanding of the education provided. See EMR under Patient Instructions for exercises provided during therapy sessions    ASSESSMENT   Parisa remains with strength deficits in her deltoids and scapular stabilizers after her L RTSA.  She has been in  therapy now for 45 visits and has reached her max potential at this time.  She has been instructed in an HEP to facilitate continued strength training and functional mobility progression upon discharge from PT. PT feels she is safe for discharge from current PT POC to promote autonomy in her continued progression of use of her L UE.        Parisa Is progressing well towards her goals.   Pt prognosis is Excellent.     Pt will continue to benefit from skilled outpatient physical therapy to address the deficits listed in the problem list box on initial evaluation, provide pt/family education and to maximize pt's level of independence in the home and community environment.   Pt's spiritual, cultural and educational needs considered and pt agreeable to plan of care and goals.     Anticipated barriers to physical therapy: None to note     GOALS:  Short Term Goals (3 Weeks):   1) Pt will demonstrate compliance with initial home exercise program as prescribed by physical therapist to improve independence with management of condition. (MET)  2) Pt to improve passive range of motion in flexion and external rotation at side to at least 100 and 30 degrees, respectively, to allow for improved mobility with dressing and self care. (progressing, not met)  3) Pt to report R upper extremity pain of <3/10 at worst during exercises to improve tolerance to ADLs and self care. (MET)      Long Term Goals (6 Weeks):   1. Pt to achieve <40% limitation as measured by the FOTO to demonstrate decreased disability. (progressing, not met)   2) Pt to improve active range of motion in R shoulder to within 20% of uninvolved extremity to allow for improved functional mobility with work tasks and lifting. (progressing, not met)   3) Pt to demonstrate at least 3+/5 grades on all upper quarter manual muscle testing to facilitate improved strength for work demands. (progressing, not met)  4) Pt to able to wash her hair in the shower with minimal  assistance from spouse (progressing, not met)     PLAN     Discharge PT POC with HEP in place.      Carolin Raymond, PT

## 2022-10-03 ENCOUNTER — OFFICE VISIT (OUTPATIENT)
Dept: SPORTS MEDICINE | Facility: CLINIC | Age: 67
End: 2022-10-03
Payer: MEDICARE

## 2022-10-03 ENCOUNTER — HOSPITAL ENCOUNTER (OUTPATIENT)
Dept: RADIOLOGY | Facility: HOSPITAL | Age: 67
Discharge: HOME | End: 2022-10-03
Attending: ORTHOPAEDIC SURGERY
Payer: MEDICARE

## 2022-10-03 VITALS
HEART RATE: 84 BPM | SYSTOLIC BLOOD PRESSURE: 134 MMHG | HEIGHT: 63 IN | WEIGHT: 201 LBS | BODY MASS INDEX: 35.61 KG/M2 | DIASTOLIC BLOOD PRESSURE: 73 MMHG

## 2022-10-03 DIAGNOSIS — G89.29 CHRONIC LEFT SHOULDER PAIN: Primary | ICD-10-CM

## 2022-10-03 DIAGNOSIS — M25.512 LEFT SHOULDER PAIN, UNSPECIFIED CHRONICITY: ICD-10-CM

## 2022-10-03 DIAGNOSIS — M25.512 CHRONIC LEFT SHOULDER PAIN: Primary | ICD-10-CM

## 2022-10-03 PROCEDURE — 3044F PR MOST RECENT HEMOGLOBIN A1C LEVEL <7.0%: ICD-10-PCS | Mod: CPTII,S$GLB,, | Performed by: ORTHOPAEDIC SURGERY

## 2022-10-03 PROCEDURE — 73030 XR SHOULDER COMPLETE 2 OR MORE VIEWS LEFT: ICD-10-PCS | Mod: 26,LT,, | Performed by: RADIOLOGY

## 2022-10-03 PROCEDURE — 3044F HG A1C LEVEL LT 7.0%: CPT | Mod: CPTII,S$GLB,, | Performed by: ORTHOPAEDIC SURGERY

## 2022-10-03 PROCEDURE — 1126F PR PAIN SEVERITY QUANTIFIED, NO PAIN PRESENT: ICD-10-PCS | Mod: CPTII,S$GLB,, | Performed by: ORTHOPAEDIC SURGERY

## 2022-10-03 PROCEDURE — 99999 PR PBB SHADOW E&M-EST. PATIENT-LVL IV: CPT | Mod: PBBFAC,,, | Performed by: ORTHOPAEDIC SURGERY

## 2022-10-03 PROCEDURE — 73030 X-RAY EXAM OF SHOULDER: CPT | Mod: TC,LT

## 2022-10-03 PROCEDURE — 4010F PR ACE/ARB THEARPY RXD/TAKEN: ICD-10-PCS | Mod: CPTII,S$GLB,, | Performed by: ORTHOPAEDIC SURGERY

## 2022-10-03 PROCEDURE — 3288F FALL RISK ASSESSMENT DOCD: CPT | Mod: CPTII,S$GLB,, | Performed by: ORTHOPAEDIC SURGERY

## 2022-10-03 PROCEDURE — 4010F ACE/ARB THERAPY RXD/TAKEN: CPT | Mod: CPTII,S$GLB,, | Performed by: ORTHOPAEDIC SURGERY

## 2022-10-03 PROCEDURE — 99213 PR OFFICE/OUTPT VISIT, EST, LEVL III, 20-29 MIN: ICD-10-PCS | Mod: S$GLB,,, | Performed by: ORTHOPAEDIC SURGERY

## 2022-10-03 PROCEDURE — 3288F PR FALLS RISK ASSESSMENT DOCUMENTED: ICD-10-PCS | Mod: CPTII,S$GLB,, | Performed by: ORTHOPAEDIC SURGERY

## 2022-10-03 PROCEDURE — 73030 X-RAY EXAM OF SHOULDER: CPT | Mod: 26,LT,, | Performed by: RADIOLOGY

## 2022-10-03 PROCEDURE — 99999 PR PBB SHADOW E&M-EST. PATIENT-LVL IV: ICD-10-PCS | Mod: PBBFAC,,, | Performed by: ORTHOPAEDIC SURGERY

## 2022-10-03 PROCEDURE — 3075F PR MOST RECENT SYSTOLIC BLOOD PRESS GE 130-139MM HG: ICD-10-PCS | Mod: CPTII,S$GLB,, | Performed by: ORTHOPAEDIC SURGERY

## 2022-10-03 PROCEDURE — 99213 OFFICE O/P EST LOW 20 MIN: CPT | Mod: S$GLB,,, | Performed by: ORTHOPAEDIC SURGERY

## 2022-10-03 PROCEDURE — 1126F AMNT PAIN NOTED NONE PRSNT: CPT | Mod: CPTII,S$GLB,, | Performed by: ORTHOPAEDIC SURGERY

## 2022-10-03 PROCEDURE — 3075F SYST BP GE 130 - 139MM HG: CPT | Mod: CPTII,S$GLB,, | Performed by: ORTHOPAEDIC SURGERY

## 2022-10-03 PROCEDURE — 1101F PR PT FALLS ASSESS DOC 0-1 FALLS W/OUT INJ PAST YR: ICD-10-PCS | Mod: CPTII,S$GLB,, | Performed by: ORTHOPAEDIC SURGERY

## 2022-10-03 PROCEDURE — 3078F DIAST BP <80 MM HG: CPT | Mod: CPTII,S$GLB,, | Performed by: ORTHOPAEDIC SURGERY

## 2022-10-03 PROCEDURE — 3078F PR MOST RECENT DIASTOLIC BLOOD PRESSURE < 80 MM HG: ICD-10-PCS | Mod: CPTII,S$GLB,, | Performed by: ORTHOPAEDIC SURGERY

## 2022-10-03 PROCEDURE — 1101F PT FALLS ASSESS-DOCD LE1/YR: CPT | Mod: CPTII,S$GLB,, | Performed by: ORTHOPAEDIC SURGERY

## 2022-10-03 NOTE — PROGRESS NOTES
S:Parisa Dimas presents for scheduled post-op evaluation.     DATE OF PROCEDURE: 3/7/2022   PROCEDURES PERFORMED:   Left reverse shoulder arthroplasty (CPT 82154-80)  Left shoulder open biceps tenodesis (CPT 74819)    CC: Left shoulder f/u    Parisa Dimas reports to be doing well 7 months s/p the above mentioned procedure. Accompanied by her . She has some intermittent soreness in the shoulder but overall reports doing very well. She is able to do most things she wants to do, including touching her head to do her hair. She reports continued improvement as well. No significant pain.  RHD. No neck pain or radicular symptoms.    PMHx notable for DM type 2   Negative for tobacco.   Positive for diabetes. Last A1C: 5.9    Pain Score: 0-No pain    PAST MEDICAL HISTORY:   Past Medical History:   Diagnosis Date    AR (allergic rhinitis)     AR (allergic rhinitis)     Carotid stenosis     50% RCA    Colon polyp 10/2014    Diabetes mellitus     Diabetes mellitus, type 2     Fatty liver     GERD (gastroesophageal reflux disease)     Heart murmur 1/27/2022    HLD (hyperlipidemia)     HTN (hypertension)     Joint pain     Sleep apnea 2018    Stricture of artery 3/25/2021       PAST SURGICAL HISTORY:  Past Surgical History:   Procedure Laterality Date    REVERSE TOTAL SHOULDER ARTHROPLASTY Left 3/7/2022    Procedure: ARTHROPLASTY, SHOULDER, TOTAL, REVERSE;  Surgeon: MISSY Santoyo MD;  Location: [facility] OR [address] [place] [address];  Service: Orthopedics;  Laterality: Left;  AND BICEP SYNDEMOSIS    SKIN BIOPSY  10yrs.    negative       FAMILY HISTORY:  Family History   Problem Relation Age of Onset    Cancer Mother         gallbladder    Coronary artery disease Father 79    Diabetes Father     Cerebral palsy Sister     Osteoporosis Sister     Epilepsy Sister     Prostate cancer Brother 57    Diabetes Brother     Heart attack Brother     Hypertension Daughter     No Known Problems Son     Diabetes Maternal Aunt     Diabetes Maternal  Uncle     Diabetes Maternal Grandmother     Hypertension Other         multiple    Melanoma Neg Hx        MEDICATIONS:    Current Outpatient Medications:     amLODIPine (NORVASC) 10 MG tablet, TAKE 1 TABLET BY MOUTH ONCE DAILY, Disp: 90 tablet, Rfl: 3    ascorbic acid, vitamin C, (VITAMIN C) 1000 MG tablet, Take 1,000 mg by mouth once daily., Disp: , Rfl:     celecoxib (CELEBREX) 200 MG capsule, Take 1 capsule (200 mg total) by mouth once daily. (Patient not taking: Reported on 10/5/2022), Disp: 60 capsule, Rfl: 2    hydroCHLOROthiazide (HYDRODIURIL) 25 MG tablet, TAKE 1 TABLET BY MOUTH ONCE DAILY, Disp: 90 tablet, Rfl: 3    L.ACID/L.CASEI/B.BIF/B.MELISSA/FOS (PROBIOTIC BLEND ORAL), Take 2 tablets by mouth once daily., Disp: , Rfl:     lancets Misc, To check BG 1 times daily, to use with insurance preferred meter, Disp: 100 each, Rfl: 11    lovastatin (MEVACOR) 20 MG tablet, TAKE 1 TABLET BY MOUTH IN  THE EVENING, Disp: 90 tablet, Rfl: 3    metFORMIN (GLUCOPHAGE-XR) 500 MG ER 24hr tablet, Take 2 tablets (1,000 mg total) by mouth once daily., Disp: 180 tablet, Rfl: 3    multivitamin capsule, Take 1 capsule by mouth once daily., Disp: , Rfl:     OMEGA-3S/DHA/EPA/FISH OIL (OMEGA 3 ORAL), Take 2,800 mg by mouth once daily., Disp: , Rfl:     OPW TEST CLAIM - DO NOT FILL, OPW test claim. Do not fill., Disp: 1 tablet, Rfl: 0    valsartan (DIOVAN) 320 MG tablet, Take 1 tablet (320 mg total) by mouth once daily., Disp: 90 tablet, Rfl: 1    vitamin D (VITAMIN D3) 1000 units Tab, Take 1,000 Units by mouth once daily., Disp: , Rfl:     vitamin E 400 UNIT capsule, Take 400 Units by mouth once daily., Disp: , Rfl:     zinc sulfate (ZINC-15 ORAL), Take by mouth., Disp: , Rfl:     blood-glucose meter kit, To check BG 1 times daily, to use with insurance preferred meter, Disp: 1 each, Rfl: 0    omeprazole (PRILOSEC) 40 MG capsule, TAKE 1 CAPSULE BY MOUTH  ONCE DAILY, Disp: 90 capsule, Rfl: 2    ALLERGIES:  Review of patient's allergies  "indicates:  No Known Allergies     REVIEW OF SYSTEMS:  Constitution: Negative. Negative for chills, fever and night sweats.    Hematologic/Lymphatic: Negative for bleeding problem. Does not bruise/bleed easily.   Skin: Negative for dry skin, itching and rash.Neg for left shoulder pain and muscle weakness.     All other review of symptoms were reviewed and found to be noncontributory.    PHYSICAL EXAMINATION:  Vitals:  /73   Pulse 84   Ht 5' 3" (1.6 m)   Wt 91.2 kg (201 lb)   LMP 08/01/2003   BMI 35.61 kg/m²    General: Well-developed well-nourished 67 y.o. femalein no acute distress   Cardiovascular: Regular rhythm by palpation of distal pulse, normal color and temperature, no concerning varicosities on symptomatic side   Lungs: No labored breathing or wheezing appreciated   Neuro: Alert and oriented ×3   Psychiatric: well oriented to person, place and time, demonstrates normal mood and affect   Skin: No rashes, lesions or ulcers, normal temperature, turgor, and texture on uninvolved extremity    Ortho/SPM Exam  Exam of the left shoulder shows a well-healed incision.  No redness, swelling or drainage.  Sensation intact to light touch over the axillary nerve distribution.  Active forward elevation in scapular plane to 100°, passive to 140° with some mild stiffness. Good deltoid activation. Minimal pain with shoulder abduction. Stable shoulder.     IMAGING:  Radiographs:  X-ray of left shoulder taken in clinic today, images reviewed by me, demonstrates the reverse prosthesis to be in good position.  No signs of loosening or complication otherwise.    ASSESSMENT:      ICD-10-CM ICD-9-CM   1. Chronic left shoulder pain  M25.512 719.41    G89.29 338.29     PLAN:     A/P:  Continue rehab exercises - specifically working on active ROM and strength. Okay to begin lifting more weight at this point, to tolerance.   Take Celebrex 200 mg capsule twice daily as needed for pain. Follow up in 3 months with " Natanael.  All questions answered      Procedures

## 2022-10-05 ENCOUNTER — OFFICE VISIT (OUTPATIENT)
Dept: FAMILY MEDICINE | Facility: CLINIC | Age: 67
End: 2022-10-05
Payer: MEDICARE

## 2022-10-05 VITALS
BODY MASS INDEX: 36.21 KG/M2 | DIASTOLIC BLOOD PRESSURE: 72 MMHG | WEIGHT: 204.38 LBS | OXYGEN SATURATION: 97 % | HEIGHT: 63 IN | SYSTOLIC BLOOD PRESSURE: 142 MMHG | HEART RATE: 87 BPM

## 2022-10-05 DIAGNOSIS — K21.9 GASTROESOPHAGEAL REFLUX DISEASE, UNSPECIFIED WHETHER ESOPHAGITIS PRESENT: ICD-10-CM

## 2022-10-05 DIAGNOSIS — Z00.00 ENCOUNTER FOR PREVENTIVE HEALTH EXAMINATION: Primary | ICD-10-CM

## 2022-10-05 DIAGNOSIS — E78.5 HYPERLIPIDEMIA, UNSPECIFIED HYPERLIPIDEMIA TYPE: ICD-10-CM

## 2022-10-05 DIAGNOSIS — I77.9 BILATERAL CAROTID ARTERY DISEASE, UNSPECIFIED TYPE: ICD-10-CM

## 2022-10-05 DIAGNOSIS — Z74.09 OTHER REDUCED MOBILITY: ICD-10-CM

## 2022-10-05 DIAGNOSIS — Z12.31 ENCOUNTER FOR SCREENING MAMMOGRAM FOR MALIGNANT NEOPLASM OF BREAST: ICD-10-CM

## 2022-10-05 DIAGNOSIS — I10 HYPERTENSION, UNSPECIFIED TYPE: ICD-10-CM

## 2022-10-05 DIAGNOSIS — E66.01 SEVERE OBESITY WITH BODY MASS INDEX (BMI) OF 36.0 TO 36.9 WITH SERIOUS COMORBIDITY: ICD-10-CM

## 2022-10-05 DIAGNOSIS — Z23 NEED FOR INFLUENZA VACCINATION: ICD-10-CM

## 2022-10-05 DIAGNOSIS — E11.40 TYPE 2 DIABETES MELLITUS WITH DIABETIC NEUROPATHY, UNSPECIFIED WHETHER LONG TERM INSULIN USE: ICD-10-CM

## 2022-10-05 PROCEDURE — 1125F AMNT PAIN NOTED PAIN PRSNT: CPT | Mod: CPTII,S$GLB,, | Performed by: NURSE PRACTITIONER

## 2022-10-05 PROCEDURE — 1160F RVW MEDS BY RX/DR IN RCRD: CPT | Mod: CPTII,S$GLB,, | Performed by: NURSE PRACTITIONER

## 2022-10-05 PROCEDURE — 90694 FLU VACCINE - QUADRIVALENT - ADJUVANTED: ICD-10-PCS | Mod: S$GLB,,, | Performed by: NURSE PRACTITIONER

## 2022-10-05 PROCEDURE — 3078F DIAST BP <80 MM HG: CPT | Mod: CPTII,S$GLB,, | Performed by: NURSE PRACTITIONER

## 2022-10-05 PROCEDURE — 1125F PR PAIN SEVERITY QUANTIFIED, PAIN PRESENT: ICD-10-PCS | Mod: CPTII,S$GLB,, | Performed by: NURSE PRACTITIONER

## 2022-10-05 PROCEDURE — 99999 PR PBB SHADOW E&M-EST. PATIENT-LVL V: CPT | Mod: PBBFAC,,, | Performed by: NURSE PRACTITIONER

## 2022-10-05 PROCEDURE — 3008F PR BODY MASS INDEX (BMI) DOCUMENTED: ICD-10-PCS | Mod: CPTII,S$GLB,, | Performed by: NURSE PRACTITIONER

## 2022-10-05 PROCEDURE — G0008 ADMIN INFLUENZA VIRUS VAC: HCPCS | Mod: S$GLB,,, | Performed by: NURSE PRACTITIONER

## 2022-10-05 PROCEDURE — 99999 PR PBB SHADOW E&M-EST. PATIENT-LVL V: ICD-10-PCS | Mod: PBBFAC,,, | Performed by: NURSE PRACTITIONER

## 2022-10-05 PROCEDURE — 3077F SYST BP >= 140 MM HG: CPT | Mod: CPTII,S$GLB,, | Performed by: NURSE PRACTITIONER

## 2022-10-05 PROCEDURE — 3288F PR FALLS RISK ASSESSMENT DOCUMENTED: ICD-10-PCS | Mod: CPTII,S$GLB,, | Performed by: NURSE PRACTITIONER

## 2022-10-05 PROCEDURE — 3044F HG A1C LEVEL LT 7.0%: CPT | Mod: CPTII,S$GLB,, | Performed by: NURSE PRACTITIONER

## 2022-10-05 PROCEDURE — 4010F ACE/ARB THERAPY RXD/TAKEN: CPT | Mod: CPTII,S$GLB,, | Performed by: NURSE PRACTITIONER

## 2022-10-05 PROCEDURE — 3044F PR MOST RECENT HEMOGLOBIN A1C LEVEL <7.0%: ICD-10-PCS | Mod: CPTII,S$GLB,, | Performed by: NURSE PRACTITIONER

## 2022-10-05 PROCEDURE — G0008 FLU VACCINE - QUADRIVALENT - ADJUVANTED: ICD-10-PCS | Mod: S$GLB,,, | Performed by: NURSE PRACTITIONER

## 2022-10-05 PROCEDURE — 4010F PR ACE/ARB THEARPY RXD/TAKEN: ICD-10-PCS | Mod: CPTII,S$GLB,, | Performed by: NURSE PRACTITIONER

## 2022-10-05 PROCEDURE — 1159F MED LIST DOCD IN RCRD: CPT | Mod: CPTII,S$GLB,, | Performed by: NURSE PRACTITIONER

## 2022-10-05 PROCEDURE — 1160F PR REVIEW ALL MEDS BY PRESCRIBER/CLIN PHARMACIST DOCUMENTED: ICD-10-PCS | Mod: CPTII,S$GLB,, | Performed by: NURSE PRACTITIONER

## 2022-10-05 PROCEDURE — G0439 PR MEDICARE ANNUAL WELLNESS SUBSEQUENT VISIT: ICD-10-PCS | Mod: S$GLB,,, | Performed by: NURSE PRACTITIONER

## 2022-10-05 PROCEDURE — G0439 PPPS, SUBSEQ VISIT: HCPCS | Mod: S$GLB,,, | Performed by: NURSE PRACTITIONER

## 2022-10-05 PROCEDURE — 1170F FXNL STATUS ASSESSED: CPT | Mod: CPTII,S$GLB,, | Performed by: NURSE PRACTITIONER

## 2022-10-05 PROCEDURE — 3008F BODY MASS INDEX DOCD: CPT | Mod: CPTII,S$GLB,, | Performed by: NURSE PRACTITIONER

## 2022-10-05 PROCEDURE — 3077F PR MOST RECENT SYSTOLIC BLOOD PRESSURE >= 140 MM HG: ICD-10-PCS | Mod: CPTII,S$GLB,, | Performed by: NURSE PRACTITIONER

## 2022-10-05 PROCEDURE — 1100F PTFALLS ASSESS-DOCD GE2>/YR: CPT | Mod: CPTII,S$GLB,, | Performed by: NURSE PRACTITIONER

## 2022-10-05 PROCEDURE — 1170F PR FUNCTIONAL STATUS ASSESSED: ICD-10-PCS | Mod: CPTII,S$GLB,, | Performed by: NURSE PRACTITIONER

## 2022-10-05 PROCEDURE — 3288F FALL RISK ASSESSMENT DOCD: CPT | Mod: CPTII,S$GLB,, | Performed by: NURSE PRACTITIONER

## 2022-10-05 PROCEDURE — 3078F PR MOST RECENT DIASTOLIC BLOOD PRESSURE < 80 MM HG: ICD-10-PCS | Mod: CPTII,S$GLB,, | Performed by: NURSE PRACTITIONER

## 2022-10-05 PROCEDURE — 1100F PR PT FALLS ASSESS DOC 2+ FALLS/FALL W/INJURY/YR: ICD-10-PCS | Mod: CPTII,S$GLB,, | Performed by: NURSE PRACTITIONER

## 2022-10-05 PROCEDURE — 90694 VACC AIIV4 NO PRSRV 0.5ML IM: CPT | Mod: S$GLB,,, | Performed by: NURSE PRACTITIONER

## 2022-10-05 PROCEDURE — 1159F PR MEDICATION LIST DOCUMENTED IN MEDICAL RECORD: ICD-10-PCS | Mod: CPTII,S$GLB,, | Performed by: NURSE PRACTITIONER

## 2022-10-05 NOTE — PROGRESS NOTES
"  Parisa Dimas presented for a  Medicare AWV and comprehensive Health Risk Assessment today. The following components were reviewed and updated:    Medical history  Family History  Social history  Allergies and Current Medications  Health Risk Assessment  Health Maintenance  Care Team         ** See Completed Assessments for Annual Wellness Visit within the encounter summary.**         The following assessments were completed:  Living Situation  CAGE  Depression Screening  Timed Get Up and Go  Whisper Test  Cognitive Function Screening      Nutrition Screening  ADL Screening  PAQ Screening        Vitals:    10/05/22 1239 10/05/22 1317   BP: (!) 162/70 (!) 142/72   BP Location: Right arm Right arm   Patient Position: Sitting Sitting   BP Method: Medium (Manual)    Pulse: 87    SpO2: 97%    Weight: 92.7 kg (204 lb 5.9 oz)    Height: 5' 3" (1.6 m)      Body mass index is 36.2 kg/m².    Physical Exam  Vitals reviewed.   Constitutional:       General: She is not in acute distress.     Appearance: Normal appearance. She is well-developed and well-groomed. She is obese.   HENT:      Head: Normocephalic.   Eyes:      General:         Right eye: No discharge.         Left eye: No discharge.   Cardiovascular:      Rate and Rhythm: Normal rate.   Pulmonary:      Effort: Pulmonary effort is normal. No respiratory distress.   Abdominal:      General: There is no distension.   Skin:     General: Skin is warm and dry.      Coloration: Skin is not pale.   Neurological:      Mental Status: She is alert and oriented to person, place, and time.      Coordination: Coordination normal.      Gait: Gait normal.   Psychiatric:         Attention and Perception: Attention normal.         Mood and Affect: Mood and affect normal.         Speech: Speech normal.         Behavior: Behavior normal. Behavior is cooperative.         Thought Content: Thought content normal.           Diagnoses and health risks identified today and associated " recommendations/orders:    1. Encounter for preventive health examination    2. Type 2 diabetes mellitus with diabetic neuropathy, unspecified whether long term insulin use  Chronic; stable on medication. Follow up with PCP.  - Ambulatory referral/consult to Podiatry; Future    3. Bilateral carotid artery disease, unspecified type  Chronic; stable on medication. As seen on previous imaging. Follow up with PCP.    4. Hypertension, unspecified type  Chronic; stable on medication. Follow up with PCP.    5. Hyperlipidemia, unspecified hyperlipidemia type  Chronic; stable on medication. Follow up with PCP.    6. Gastroesophageal reflux disease, unspecified whether esophagitis present  Chronic; stable on medication. Follow up with PCP.    7. Other reduced mobility  Ambulates independently but reports difficulty with climbing stairs. Follow up with PCP.    8. Encounter for screening mammogram for malignant neoplasm of breast  - Mammo Digital Screening Bilat w/ Scar; Future    9. Need for influenza vaccination  - Influenza (FLUAD) - Quadrivalent (Adjuvanted) *Preferred* (65+) (PF)    10. Severe obesity with body mass index (BMI) of 36.0 to 36.9 with serious comorbidity  Encouraged patient to continue to eat a low salt/low fat ADA diet and discussed importance of engaging in physical activity at least 5x/week for a minimum of 30 min/day.      Provided Parisa with a 5-10 year written screening schedule and personal prevention plan. Recommendations were developed using the USPSTF age appropriate recommendations. Education, counseling, and referrals were provided as needed. After Visit Summary printed and given to patient which includes a list of additional screenings/tests needed.    Follow up for your next annual wellness visit.    Rose Mary Mabry NP      Advance Care Planning     I offered to discuss advanced care planning, including how to pick a person who would make decisions for you if you were unable to make them for  yourself, called a health care power of , and what kind of decisions you might make such as use of life sustaining treatments such as ventilators and tube feeding when faced with a life limiting illness recorded on a living will that they will need to know. (How you want to be cared for as you near the end of your natural life)     X  Patient is unwilling to engage in a discussion regarding advance directives at this time, prefers to hold off until next year.

## 2022-10-05 NOTE — PATIENT INSTRUCTIONS
Counseling and Referral of Other Preventative  (Italic type indicates deductible and co-insurance are waived)    Patient Name: Parisa Dimas  Today's Date: 10/5/2022    Health Maintenance       Date Due Completion Date    Shingles Vaccine (1 of 2) Never done ---    Colorectal Cancer Screening 10/10/2019 10/10/2014    COVID-19 Vaccine (3 - Booster for Moderna series) 05/25/2021 3/30/2021    Diabetes Urine Screening 12/04/2021 12/4/2020    Foot Exam 03/25/2022 3/25/2021    Mammogram 07/22/2022 7/22/2021    Lipid Panel 07/22/2022 7/22/2021    Hemoglobin A1c 07/27/2022 1/27/2022    Influenza Vaccine (1) 09/01/2022 1/18/2022    Eye Exam 10/07/2022 10/7/2021    TETANUS VACCINE 10/15/2022 10/15/2012    Low Dose Statin 10/05/2023 10/5/2022    DEXA Scan 07/22/2024 7/22/2021        Orders Placed This Encounter   Procedures    Mammo Digital Screening Bilat w/ Scar    Influenza (FLUAD) - Quadrivalent (Adjuvanted) *Preferred* (65+) (PF)    Ambulatory referral/consult to Podiatry     The following information is provided to all patients.  This information is to help you find resources for any of the problems found today that may be affecting your health:                Living healthy guide: www.[facility].[place].gov      Understanding Diabetes: www.diabetes.org      Eating healthy: www.cdc.gov/healthyweight      CDC home safety checklist: www.cdc.gov/steadi/patient.html      Agency on Aging: www.goea.[place].[facility]      Alcoholics anonymous (AA): www.aa.org      Physical Activity: www.[place].nih.gov/ls3vjam      Tobacco use: www.quitwithusla.org

## 2022-10-20 ENCOUNTER — OFFICE VISIT (OUTPATIENT)
Dept: FAMILY MEDICINE | Facility: CLINIC | Age: 67
End: 2022-10-20
Payer: MEDICARE

## 2022-10-20 ENCOUNTER — PATIENT MESSAGE (OUTPATIENT)
Dept: FAMILY MEDICINE | Facility: CLINIC | Age: 67
End: 2022-10-20

## 2022-10-20 VITALS
BODY MASS INDEX: 36.01 KG/M2 | WEIGHT: 203.25 LBS | DIASTOLIC BLOOD PRESSURE: 70 MMHG | OXYGEN SATURATION: 98 % | HEIGHT: 63 IN | TEMPERATURE: 98 F | SYSTOLIC BLOOD PRESSURE: 130 MMHG | HEART RATE: 99 BPM

## 2022-10-20 DIAGNOSIS — E11.40 TYPE 2 DIABETES MELLITUS WITH DIABETIC NEUROPATHY, UNSPECIFIED WHETHER LONG TERM INSULIN USE: ICD-10-CM

## 2022-10-20 DIAGNOSIS — M25.641 STIFFNESS OF JOINT, HAND, RIGHT: ICD-10-CM

## 2022-10-20 DIAGNOSIS — K76.0 FATTY LIVER: ICD-10-CM

## 2022-10-20 DIAGNOSIS — Z00.00 ENCOUNTER FOR PREVENTIVE HEALTH EXAMINATION: Primary | ICD-10-CM

## 2022-10-20 DIAGNOSIS — I77.9 BILATERAL CAROTID ARTERY DISEASE, UNSPECIFIED TYPE: ICD-10-CM

## 2022-10-20 DIAGNOSIS — E78.5 HYPERLIPIDEMIA, UNSPECIFIED HYPERLIPIDEMIA TYPE: ICD-10-CM

## 2022-10-20 DIAGNOSIS — I10 HYPERTENSION, UNSPECIFIED TYPE: ICD-10-CM

## 2022-10-20 DIAGNOSIS — R10.9 ABDOMINAL PAIN, UNSPECIFIED ABDOMINAL LOCATION: ICD-10-CM

## 2022-10-20 DIAGNOSIS — Q24.4 SUBAORTIC MEMBRANE: Primary | ICD-10-CM

## 2022-10-20 PROCEDURE — 3288F FALL RISK ASSESSMENT DOCD: CPT | Mod: CPTII,S$GLB,, | Performed by: FAMILY MEDICINE

## 2022-10-20 PROCEDURE — 3078F DIAST BP <80 MM HG: CPT | Mod: CPTII,S$GLB,, | Performed by: FAMILY MEDICINE

## 2022-10-20 PROCEDURE — 3075F PR MOST RECENT SYSTOLIC BLOOD PRESS GE 130-139MM HG: ICD-10-PCS | Mod: CPTII,S$GLB,, | Performed by: FAMILY MEDICINE

## 2022-10-20 PROCEDURE — 3078F PR MOST RECENT DIASTOLIC BLOOD PRESSURE < 80 MM HG: ICD-10-PCS | Mod: CPTII,S$GLB,, | Performed by: FAMILY MEDICINE

## 2022-10-20 PROCEDURE — 3044F HG A1C LEVEL LT 7.0%: CPT | Mod: CPTII,S$GLB,, | Performed by: FAMILY MEDICINE

## 2022-10-20 PROCEDURE — 99397 PER PM REEVAL EST PAT 65+ YR: CPT | Mod: GZ,S$GLB,, | Performed by: FAMILY MEDICINE

## 2022-10-20 PROCEDURE — 4010F ACE/ARB THERAPY RXD/TAKEN: CPT | Mod: CPTII,S$GLB,, | Performed by: FAMILY MEDICINE

## 2022-10-20 PROCEDURE — 3044F PR MOST RECENT HEMOGLOBIN A1C LEVEL <7.0%: ICD-10-PCS | Mod: CPTII,S$GLB,, | Performed by: FAMILY MEDICINE

## 2022-10-20 PROCEDURE — 1159F PR MEDICATION LIST DOCUMENTED IN MEDICAL RECORD: ICD-10-PCS | Mod: CPTII,S$GLB,, | Performed by: FAMILY MEDICINE

## 2022-10-20 PROCEDURE — 1100F PR PT FALLS ASSESS DOC 2+ FALLS/FALL W/INJURY/YR: ICD-10-PCS | Mod: CPTII,S$GLB,, | Performed by: FAMILY MEDICINE

## 2022-10-20 PROCEDURE — 1100F PTFALLS ASSESS-DOCD GE2>/YR: CPT | Mod: CPTII,S$GLB,, | Performed by: FAMILY MEDICINE

## 2022-10-20 PROCEDURE — 1126F PR PAIN SEVERITY QUANTIFIED, NO PAIN PRESENT: ICD-10-PCS | Mod: CPTII,S$GLB,, | Performed by: FAMILY MEDICINE

## 2022-10-20 PROCEDURE — 99999 PR PBB SHADOW E&M-EST. PATIENT-LVL V: ICD-10-PCS | Mod: PBBFAC,,, | Performed by: FAMILY MEDICINE

## 2022-10-20 PROCEDURE — 99397 PR PREVENTIVE VISIT,EST,65 & OVER: ICD-10-PCS | Mod: GZ,S$GLB,, | Performed by: FAMILY MEDICINE

## 2022-10-20 PROCEDURE — 3288F PR FALLS RISK ASSESSMENT DOCUMENTED: ICD-10-PCS | Mod: CPTII,S$GLB,, | Performed by: FAMILY MEDICINE

## 2022-10-20 PROCEDURE — 1159F MED LIST DOCD IN RCRD: CPT | Mod: CPTII,S$GLB,, | Performed by: FAMILY MEDICINE

## 2022-10-20 PROCEDURE — 4010F PR ACE/ARB THEARPY RXD/TAKEN: ICD-10-PCS | Mod: CPTII,S$GLB,, | Performed by: FAMILY MEDICINE

## 2022-10-20 PROCEDURE — 3075F SYST BP GE 130 - 139MM HG: CPT | Mod: CPTII,S$GLB,, | Performed by: FAMILY MEDICINE

## 2022-10-20 PROCEDURE — 1126F AMNT PAIN NOTED NONE PRSNT: CPT | Mod: CPTII,S$GLB,, | Performed by: FAMILY MEDICINE

## 2022-10-20 PROCEDURE — 99999 PR PBB SHADOW E&M-EST. PATIENT-LVL V: CPT | Mod: PBBFAC,,, | Performed by: FAMILY MEDICINE

## 2022-10-20 RX ORDER — CETIRIZINE HYDROCHLORIDE 10 MG/1
10 TABLET ORAL DAILY
COMMUNITY

## 2022-10-20 NOTE — PROGRESS NOTES
(Portions of this note were dictated using voice recognition software and may contain dictation related errors in spelling/grammar/syntax not found on text review)    CC:   Chief Complaint   Patient presents with    Follow-up       HPI: 67 y.o. female   Last visit for annual exam in March 2021.        Had Left reverse shoulder arthroplasty secondary to comminuted fracture left proximal humerus that occurred on 12/22/2021.  Surgery was in March 2022.  Initially  some concern about heart murmur leading to further evaluation suspicious mitral vegetation.  Had seen cardiology   PET CT was negative for increased uptake in that area.  Was referred for cardiac MRI although was not able to get that done prior to surgery and it was deemed not necessary to delay that workup.  It was later done May 24, 2022 showing EF 69%, no infiltrative disease noted, high velocity flow noted left ventricular outflow tract without evidence of septal obstruction, DDX including subaortic membrane, however only trivial AI was detected.  Cardiology did want to get consultation with CT surgery, no current appointment set.     hypertension on valsartan 320 mg daily , amlodipine 10 mg a day, hydrochlorothiazide 25 mg a day    hyperlipidemia on lovastatin 20 mg daily    fatty liver disease last ultrasound around 2015    carotid  atherosclerosis but with no significant stenosis  (ultrasound 2018)    diabetes on metformin extended release 1000 mg daily    sleep apnea intolerant of CPAP.  Sleeps in a recliner, sleeping on incline is okay but when she sleeps flat she has some struggling with breathing.  States that when she tried CPAP in the past she was got very anxious having the mask on her.  Had heard about the INSPIRE procedure for sleep apnea was wondering if this was offered.    Has not been as active since finishing therapy.  Not back to exercise.  Was concerned about sometimes she gets tired with activities.  She also mentioned a couple months  ago she had heart racing.  Had press EKG showing PACs.  Did state that when she noticed the above symptoms she was under lot of stress, several family members had  recently and suddenly.    Also states every now and then if she eats a fatty meal she will get left upper quadrant pain.  Feels like a bad cramp.  No relationship to GERD (takes omeprazole regularly) no right-sided symptoms.  Symptoms do not happen all the time.  She was wondering if it is or pancreas.     Past Medical History:   Diagnosis Date    AR (allergic rhinitis)     AR (allergic rhinitis)     Carotid stenosis     50% RCA    Colon polyp 10/2014    Diabetes mellitus     Diabetes mellitus, type 2     Fatty liver     GERD (gastroesophageal reflux disease)     Heart murmur 2022    HLD (hyperlipidemia)     HTN (hypertension)     Joint pain     Sleep apnea 2018    Stricture of artery 3/25/2021       Past Surgical History:   Procedure Laterality Date    REVERSE TOTAL SHOULDER ARTHROPLASTY Left 3/7/2022    Procedure: ARTHROPLASTY, SHOULDER, TOTAL, REVERSE;  Surgeon: MISSY Santoyo MD;  Location: [facility] OR [address] [place] [address];  Service: Orthopedics;  Laterality: Left;  AND BICEP SYNDEMOSIS    SKIN BIOPSY  10yrs.    negative       Family History   Problem Relation Age of Onset    Cancer Mother         gallbladder    Coronary artery disease Father 79    Diabetes Father     Cerebral palsy Sister     Osteoporosis Sister     Epilepsy Sister     Prostate cancer Brother 57    Diabetes Brother     Heart attack Brother     Hypertension Daughter     No Known Problems Son     Diabetes Maternal Aunt     Diabetes Maternal Uncle     Diabetes Maternal Grandmother     Hypertension Other         multiple    Melanoma Neg Hx        Social History     Tobacco Use    Smoking status: Never    Smokeless tobacco: Never   Substance Use Topics    Alcohol use: Not Currently     Alcohol/week: 0.0 standard drinks    Drug use: No       Lab Results   Component Value Date    WBC 4.39  02/08/2022    HGB 13.8 02/08/2022    HCT 42.4 02/08/2022    MCV 92 02/08/2022     02/08/2022    CHOL 169 07/22/2021    TRIG 253 (H) 07/22/2021    HDL 48 07/22/2021    ALT 67 (H) 01/27/2022    AST 45 (H) 01/27/2022    BILITOT 0.6 01/27/2022    ALKPHOS 108 01/27/2022     02/08/2022    K 4.0 02/08/2022     02/08/2022    CREATININE 0.7 02/08/2022    ESTGFRAFRICA >60.0 02/08/2022    EGFRNONAA >60.0 02/08/2022    CALCIUM 10.5 02/08/2022    ALBUMIN 4.3 01/27/2022    BUN 12 02/08/2022    CO2 27 02/08/2022    TSH 0.865 07/22/2021    INR 1.0 02/08/2022    HGBA1C 5.9 (H) 01/27/2022    MICALBCREAT 8.9 12/04/2020    LDLCALC 70.4 07/22/2021     (H) 02/08/2022    [id number] 39 07/22/2021             Vital signs reviewed  PE:   APPEARANCE: Well nourished, well developed, in no acute distress.    HEAD: Normocephalic, atraumatic.  EYES: PERRL. EOMI.   Conjunctivae noninjected.  EARS: TM's intact. Light reflex normal. No retraction or perforation  NOSE: Mucosa pink. Airway clear.  MOUTH & THROAT: No tonsillar enlargement. No pharyngeal erythema or exudate.   NECK: Supple with no cervical lymphadenopathy.    CHEST: Good inspiratory effort. Lungs clear to auscultation with no wheezes or crackles.  CARDIOVASCULAR: Normal S1, S2.  3/6 systolic ejection murmur at the aortic region radiating up to both carotid arteries.  ABDOMEN: Bowel sounds normal. Not distended. Soft. No tenderness or masses. No organomegaly.  EXTREMITIES:  Ankle edema 1+ bilaterally   DIABETIC FOOT EXAM: Protective Sensation (w/ 10 gram monofilament):  Right: Intact  Left: Intact    Visual Inspection:  Normal -  Bilateral    Pedal Pulses:   Right: Present  Left: Present    Posterior tibialis:   Right:Present  Left: Present              IMPRESSION  1. Encounter for preventive health examination    2. Type 2 diabetes mellitus with diabetic neuropathy, unspecified whether long term insulin use    3. Bilateral carotid artery disease, unspecified  type    4. Hypertension, unspecified type    5. Hyperlipidemia, unspecified hyperlipidemia type    6. Abdominal pain, unspecified abdominal location    7. Fatty liver    8. Stiffness of joint, hand, right              PLAN  Orders Placed This Encounter   Procedures    US Abdomen Limited    CBC Auto Differential    Comprehensive Metabolic Panel    Lipid Panel    TSH    Microalbumin/Creatinine Ratio, Urine    Hemoglobin A1C    LIPASE    Ambulatory referral/consult to Orthopedics     Hypertension.  Continue current therapy.  Repeat blood pressures improved.  Continue digital medicine follow-up     Diabetes:  Continue metformin.  Check labs     Continue statin     Fatty liver: Update ultrasound lifestyle modification advised     Abdominal pain especially after eating a fatty meal, localized to left upper quadrant, not all the time.  Seems low suspicion for pancreatitis given temporary nature of this but will add lipase to labs.  Will update ultrasound as above although she does not have any specific postprandial right upper quadrant pain or posterior thoracic pain suggestive of gallbladder disease.  In the meantime can try adding Pepcid temporarily if the symptoms happen again, dietary    Sleep apnea, has not been tolerant of CPAP, she had heard that [facility] offers inspire procedure for sleep apnea, not exactly sure but will speak to 1 my colleagues regarding availability of this     Abnormal cardiac MRI, was being worked up last year prior to her surgery, had echo abnormality above, cardiac MRI results as above.  Will consult with her cardiologist to ask about next steps and follow-up     Racing heart, happened a couple of months ago when she was under a lot of stress.  Does have PACs on prior EKG.  No current symptoms.  Would discuss next step being event monitoring although would watch for triggers including stress, lack of sleep, caffeine, alcohol.  Let me know if this happens again and can consider event monitoring  ordering     At end of visit she also brings right hand stiffness, sounds like trigger finger of the middle finger the way she discusses some locking but she also states generally her hand feels stiff all over.  Had talk to her orthopedist regarding her shoulder and was advised a referral but she can not remember if this was placed.  Can place referral with Dr. Steel       SCREENINGS:  Colonoscopy 2014.  2 diminutive hyperplastic polyps, diverticulosis.  (hyperplastic on pathology)     GYN: Dr. Cynthia Dinero     MMG scheduled     Tetanus: 2012, can get repeat at pharmacy   PVX:   March 2018  Prevnar 2019  Flu utd  Look into getting the Shingles vaccine (SHINGRIX) at your local pharmacy (2 part series, done once at/after age 50)   COVID x2        DEXA 2021 normal bone density

## 2022-10-20 NOTE — PATIENT INSTRUCTIONS
Check with your pharmacy regarding getting the tetanus (Tdap) vaccine (once every 10 years)    Look into getting the Shingles vaccine (SHINGRIX) at your local pharmacy (2 part series, done once at/after age 50)    Orders Placed This Encounter   Procedures    US Abdomen Limited    CBC Auto Differential    Comprehensive Metabolic Panel    Lipid Panel    TSH    Microalbumin/Creatinine Ratio, Urine    Hemoglobin A1C    LIPASE

## 2022-10-25 ENCOUNTER — LAB VISIT (OUTPATIENT)
Dept: LAB | Facility: HOSPITAL | Age: 67
End: 2022-10-25
Attending: FAMILY MEDICINE
Payer: MEDICARE

## 2022-10-25 DIAGNOSIS — Z00.00 ENCOUNTER FOR PREVENTIVE HEALTH EXAMINATION: ICD-10-CM

## 2022-10-25 DIAGNOSIS — E11.40 TYPE 2 DIABETES MELLITUS WITH DIABETIC NEUROPATHY, UNSPECIFIED WHETHER LONG TERM INSULIN USE: ICD-10-CM

## 2022-10-25 DIAGNOSIS — I10 HYPERTENSION, UNSPECIFIED TYPE: ICD-10-CM

## 2022-10-25 DIAGNOSIS — E78.5 HYPERLIPIDEMIA, UNSPECIFIED HYPERLIPIDEMIA TYPE: ICD-10-CM

## 2022-10-25 DIAGNOSIS — K76.0 FATTY LIVER: ICD-10-CM

## 2022-10-25 LAB
ALBUMIN SERPL BCP-MCNC: 4.1 G/DL (ref 3.5–5.2)
ALP SERPL-CCNC: 75 U/L (ref 55–135)
ALT SERPL W/O P-5'-P-CCNC: 103 U/L (ref 10–44)
ANION GAP SERPL CALC-SCNC: 14 MMOL/L (ref 8–16)
AST SERPL-CCNC: 85 U/L (ref 10–40)
BASOPHILS # BLD AUTO: 0.05 K/UL (ref 0–0.2)
BASOPHILS NFR BLD: 1.1 % (ref 0–1.9)
BILIRUB SERPL-MCNC: 0.6 MG/DL (ref 0.1–1)
BUN SERPL-MCNC: 15 MG/DL (ref 8–23)
CALCIUM SERPL-MCNC: 10 MG/DL (ref 8.7–10.5)
CHLORIDE SERPL-SCNC: 100 MMOL/L (ref 95–110)
CHOLEST SERPL-MCNC: 169 MG/DL (ref 120–199)
CHOLEST/HDLC SERPL: 3 {RATIO} (ref 2–5)
CO2 SERPL-SCNC: 24 MMOL/L (ref 23–29)
CREAT SERPL-MCNC: 0.8 MG/DL (ref 0.5–1.4)
DIFFERENTIAL METHOD: NORMAL
EOSINOPHIL # BLD AUTO: 0.1 K/UL (ref 0–0.5)
EOSINOPHIL NFR BLD: 2.5 % (ref 0–8)
ERYTHROCYTE [DISTWIDTH] IN BLOOD BY AUTOMATED COUNT: 13.3 % (ref 11.5–14.5)
EST. GFR  (NO RACE VARIABLE): >60 ML/MIN/1.73 M^2
ESTIMATED AVG GLUCOSE: 151 MG/DL (ref 68–131)
GLUCOSE SERPL-MCNC: 153 MG/DL (ref 70–110)
HBA1C MFR BLD: 6.9 % (ref 4–5.6)
HCT VFR BLD AUTO: 40.5 % (ref 37–48.5)
HDLC SERPL-MCNC: 56 MG/DL (ref 40–75)
HDLC SERPL: 33.1 % (ref 20–50)
HGB BLD-MCNC: 13.4 G/DL (ref 12–16)
IMM GRANULOCYTES # BLD AUTO: 0.01 K/UL (ref 0–0.04)
IMM GRANULOCYTES NFR BLD AUTO: 0.2 % (ref 0–0.5)
LDLC SERPL CALC-MCNC: 76.4 MG/DL (ref 63–159)
LIPASE SERPL-CCNC: 39 U/L (ref 4–60)
LYMPHOCYTES # BLD AUTO: 1.7 K/UL (ref 1–4.8)
LYMPHOCYTES NFR BLD: 37.7 % (ref 18–48)
MCH RBC QN AUTO: 29.1 PG (ref 27–31)
MCHC RBC AUTO-ENTMCNC: 33.1 G/DL (ref 32–36)
MCV RBC AUTO: 88 FL (ref 82–98)
MONOCYTES # BLD AUTO: 0.5 K/UL (ref 0.3–1)
MONOCYTES NFR BLD: 10.1 % (ref 4–15)
NEUTROPHILS # BLD AUTO: 2.2 K/UL (ref 1.8–7.7)
NEUTROPHILS NFR BLD: 48.4 % (ref 38–73)
NONHDLC SERPL-MCNC: 113 MG/DL
NRBC BLD-RTO: 0 /100 WBC
PLATELET # BLD AUTO: 157 K/UL (ref 150–450)
PMV BLD AUTO: 10.1 FL (ref 9.2–12.9)
POTASSIUM SERPL-SCNC: 4 MMOL/L (ref 3.5–5.1)
PROT SERPL-MCNC: 7.7 G/DL (ref 6–8.4)
RBC # BLD AUTO: 4.61 M/UL (ref 4–5.4)
SODIUM SERPL-SCNC: 138 MMOL/L (ref 136–145)
TRIGL SERPL-MCNC: 183 MG/DL (ref 30–150)
TSH SERPL DL<=0.005 MIU/L-ACNC: 1.14 UIU/ML (ref 0.4–4)
WBC # BLD AUTO: 4.46 K/UL (ref 3.9–12.7)

## 2022-10-25 PROCEDURE — 84443 ASSAY THYROID STIM HORMONE: CPT | Performed by: FAMILY MEDICINE

## 2022-10-25 PROCEDURE — 80061 LIPID PANEL: CPT | Performed by: FAMILY MEDICINE

## 2022-10-25 PROCEDURE — 36415 COLL VENOUS BLD VENIPUNCTURE: CPT | Performed by: FAMILY MEDICINE

## 2022-10-25 PROCEDURE — 85025 COMPLETE CBC W/AUTO DIFF WBC: CPT | Performed by: FAMILY MEDICINE

## 2022-10-25 PROCEDURE — 80053 COMPREHEN METABOLIC PANEL: CPT | Performed by: FAMILY MEDICINE

## 2022-10-25 PROCEDURE — 83690 ASSAY OF LIPASE: CPT | Performed by: FAMILY MEDICINE

## 2022-10-25 PROCEDURE — 83036 HEMOGLOBIN GLYCOSYLATED A1C: CPT | Performed by: FAMILY MEDICINE

## 2022-10-27 ENCOUNTER — HOSPITAL ENCOUNTER (OUTPATIENT)
Dept: RADIOLOGY | Facility: HOSPITAL | Age: 67
Discharge: HOME | End: 2022-10-27
Attending: FAMILY MEDICINE
Payer: MEDICARE

## 2022-10-27 DIAGNOSIS — K76.0 FATTY LIVER: ICD-10-CM

## 2022-10-27 PROCEDURE — 76705 ECHO EXAM OF ABDOMEN: CPT | Mod: 26,,, | Performed by: RADIOLOGY

## 2022-10-27 PROCEDURE — 76705 US ABDOMEN LIMITED: ICD-10-PCS | Mod: 26,,, | Performed by: RADIOLOGY

## 2022-10-27 PROCEDURE — 76705 ECHO EXAM OF ABDOMEN: CPT | Mod: TC

## 2022-10-31 ENCOUNTER — TELEPHONE (OUTPATIENT)
Dept: FAMILY MEDICINE | Facility: CLINIC | Age: 67
End: 2022-10-31

## 2022-11-02 ENCOUNTER — TELEPHONE (OUTPATIENT)
Dept: FAMILY MEDICINE | Facility: CLINIC | Age: 67
End: 2022-11-02
Payer: MEDICARE

## 2022-11-02 DIAGNOSIS — G47.33 OBSTRUCTIVE SLEEP APNEA HYPOPNEA, SEVERE: Primary | ICD-10-CM

## 2022-11-02 NOTE — TELEPHONE ENCOUNTER
----- Message from Dorothy Ortega MD sent at 10/27/2022  6:42 AM CDT -----  Regarding: RE: Inspire procedure?  Hello   I apologize for the delay, [place] have been out of the country and just got back in town       It looks like her recent bmi was a little high but she is close to criteria for that. Her sleep study in 2018 showed ahi of 35.   I think would be reasonable to move to next step since weight is close. Let me know if you need help getting her into see me.     In general candidacy is depending on insurance bmi has to be 35 or less (medicaire) or 32 or less ( private) . Currently we are note doing on medicaid patients. AHI has to be over 15 and some insurance companies require sleep study within 2 years but I usually get that sorted when they come to see me. I then do a procedure called a drug induced sleep endoscopy to visualize obstruction pattern as there are some patterns that the inspire works on and some it does not. If passes that test then can proceed with implant.     I am always happy to talk to people even if they dont meet criteria so that they can understand about the procedure and why they may or may not qualify to move forward    Thanks for reaching out and again so sorry for delayed reply!   garry  ----- Message -----  From: Alex Cast MD  Sent: 10/20/2022  11:52 AM CDT  To: Dorothy Ortega MD  Subject: Inspire procedure?                               Hi Dr. Ortega,   My name is Alex Cast and i'm a family medicine doc on the [facility] in [place].  I have a patient with sleep apnea that could not tolerate CPAP because of severe anxiety and claustrophobia with the mask.  She had come to clinic asking about the inspire procedure.  I had checked with my ENT colleague here Kina Talbot and she had let me know that you may be doing that procedure through [facility].  I just wanted to reach out to verify and if there is a way that she would need to go over for consult to discuss if she  would be a candidate for such a procedure?  Please let me know your thoughts and I can reach back out to her just to share that information with her. Thanks so much!    Alex

## 2022-11-02 NOTE — TELEPHONE ENCOUNTER
Received correspondence from Dr. Ortega, see below.  I reached out to the patient regarding to see if she is interested in moving forward with the consultation.  I will place consult just in case she replies and she is interested , and so this can just be scheduled if that is the case.  If there is any difficulty with getting schedule, please see if you can reach out with her staff in case they can assist with scheduling.    Orders Placed This Encounter   Procedures    Ambulatory referral/consult to ENT

## 2022-11-08 DIAGNOSIS — Q24.4 SUBAORTIC MEMBRANE: Primary | ICD-10-CM

## 2022-11-10 ENCOUNTER — PATIENT MESSAGE (OUTPATIENT)
Dept: ORTHOPEDICS | Facility: CLINIC | Age: 67
End: 2022-11-10
Payer: MEDICARE

## 2022-11-10 DIAGNOSIS — M79.641 RIGHT HAND PAIN: Primary | ICD-10-CM

## 2022-11-14 ENCOUNTER — HOSPITAL ENCOUNTER (OUTPATIENT)
Dept: RADIOLOGY | Facility: OTHER | Age: 67
Discharge: HOME | End: 2022-11-14
Attending: ORTHOPAEDIC SURGERY
Payer: MEDICARE

## 2022-11-14 ENCOUNTER — OFFICE VISIT (OUTPATIENT)
Dept: ORTHOPEDICS | Facility: CLINIC | Age: 67
End: 2022-11-14
Payer: MEDICARE

## 2022-11-14 VITALS — BODY MASS INDEX: 35.97 KG/M2 | WEIGHT: 203 LBS | HEIGHT: 63 IN

## 2022-11-14 DIAGNOSIS — M25.641 STIFFNESS OF JOINT, HAND, RIGHT: ICD-10-CM

## 2022-11-14 DIAGNOSIS — M79.641 RIGHT HAND PAIN: ICD-10-CM

## 2022-11-14 DIAGNOSIS — M65.331 TRIGGER FINGER, RIGHT MIDDLE FINGER: Primary | ICD-10-CM

## 2022-11-14 PROCEDURE — 99203 OFFICE O/P NEW LOW 30 MIN: CPT | Mod: 25,S$GLB,, | Performed by: ORTHOPAEDIC SURGERY

## 2022-11-14 PROCEDURE — 1159F PR MEDICATION LIST DOCUMENTED IN MEDICAL RECORD: ICD-10-PCS | Mod: CPTII,S$GLB,, | Performed by: ORTHOPAEDIC SURGERY

## 2022-11-14 PROCEDURE — 20550 NJX 1 TENDON SHEATH/LIGAMENT: CPT | Mod: RT,S$GLB,, | Performed by: ORTHOPAEDIC SURGERY

## 2022-11-14 PROCEDURE — 1101F PR PT FALLS ASSESS DOC 0-1 FALLS W/OUT INJ PAST YR: ICD-10-PCS | Mod: CPTII,S$GLB,, | Performed by: ORTHOPAEDIC SURGERY

## 2022-11-14 PROCEDURE — 3044F HG A1C LEVEL LT 7.0%: CPT | Mod: CPTII,S$GLB,, | Performed by: ORTHOPAEDIC SURGERY

## 2022-11-14 PROCEDURE — 4010F ACE/ARB THERAPY RXD/TAKEN: CPT | Mod: CPTII,S$GLB,, | Performed by: ORTHOPAEDIC SURGERY

## 2022-11-14 PROCEDURE — 3008F BODY MASS INDEX DOCD: CPT | Mod: CPTII,S$GLB,, | Performed by: ORTHOPAEDIC SURGERY

## 2022-11-14 PROCEDURE — 4010F PR ACE/ARB THEARPY RXD/TAKEN: ICD-10-PCS | Mod: CPTII,S$GLB,, | Performed by: ORTHOPAEDIC SURGERY

## 2022-11-14 PROCEDURE — 99999 PR PBB SHADOW E&M-EST. PATIENT-LVL III: CPT | Mod: PBBFAC,,, | Performed by: ORTHOPAEDIC SURGERY

## 2022-11-14 PROCEDURE — 3288F PR FALLS RISK ASSESSMENT DOCUMENTED: ICD-10-PCS | Mod: CPTII,S$GLB,, | Performed by: ORTHOPAEDIC SURGERY

## 2022-11-14 PROCEDURE — 3066F PR DOCUMENTATION OF TREATMENT FOR NEPHROPATHY: ICD-10-PCS | Mod: CPTII,S$GLB,, | Performed by: ORTHOPAEDIC SURGERY

## 2022-11-14 PROCEDURE — 20550 TENDON SHEATH: ICD-10-PCS | Mod: RT,S$GLB,, | Performed by: ORTHOPAEDIC SURGERY

## 2022-11-14 PROCEDURE — 73130 X-RAY EXAM OF HAND: CPT | Mod: TC,FY,RT

## 2022-11-14 PROCEDURE — 73130 X-RAY EXAM OF HAND: CPT | Mod: 26,RT,, | Performed by: RADIOLOGY

## 2022-11-14 PROCEDURE — 1125F AMNT PAIN NOTED PAIN PRSNT: CPT | Mod: CPTII,S$GLB,, | Performed by: ORTHOPAEDIC SURGERY

## 2022-11-14 PROCEDURE — 3061F NEG MICROALBUMINURIA REV: CPT | Mod: CPTII,S$GLB,, | Performed by: ORTHOPAEDIC SURGERY

## 2022-11-14 PROCEDURE — 1125F PR PAIN SEVERITY QUANTIFIED, PAIN PRESENT: ICD-10-PCS | Mod: CPTII,S$GLB,, | Performed by: ORTHOPAEDIC SURGERY

## 2022-11-14 PROCEDURE — 1159F MED LIST DOCD IN RCRD: CPT | Mod: CPTII,S$GLB,, | Performed by: ORTHOPAEDIC SURGERY

## 2022-11-14 PROCEDURE — 1101F PT FALLS ASSESS-DOCD LE1/YR: CPT | Mod: CPTII,S$GLB,, | Performed by: ORTHOPAEDIC SURGERY

## 2022-11-14 PROCEDURE — 3066F NEPHROPATHY DOC TX: CPT | Mod: CPTII,S$GLB,, | Performed by: ORTHOPAEDIC SURGERY

## 2022-11-14 PROCEDURE — 99203 PR OFFICE/OUTPT VISIT, NEW, LEVL III, 30-44 MIN: ICD-10-PCS | Mod: 25,S$GLB,, | Performed by: ORTHOPAEDIC SURGERY

## 2022-11-14 PROCEDURE — 3288F FALL RISK ASSESSMENT DOCD: CPT | Mod: CPTII,S$GLB,, | Performed by: ORTHOPAEDIC SURGERY

## 2022-11-14 PROCEDURE — 3008F PR BODY MASS INDEX (BMI) DOCUMENTED: ICD-10-PCS | Mod: CPTII,S$GLB,, | Performed by: ORTHOPAEDIC SURGERY

## 2022-11-14 PROCEDURE — 73130 XR HAND COMPLETE 3 VIEW RIGHT: ICD-10-PCS | Mod: 26,RT,, | Performed by: RADIOLOGY

## 2022-11-14 PROCEDURE — 3061F PR NEG MICROALBUMINURIA RESULT DOCUMENTED/REVIEW: ICD-10-PCS | Mod: CPTII,S$GLB,, | Performed by: ORTHOPAEDIC SURGERY

## 2022-11-14 PROCEDURE — 99999 PR PBB SHADOW E&M-EST. PATIENT-LVL III: ICD-10-PCS | Mod: PBBFAC,,, | Performed by: ORTHOPAEDIC SURGERY

## 2022-11-14 PROCEDURE — 3044F PR MOST RECENT HEMOGLOBIN A1C LEVEL <7.0%: ICD-10-PCS | Mod: CPTII,S$GLB,, | Performed by: ORTHOPAEDIC SURGERY

## 2022-11-14 RX ORDER — DEXAMETHASONE SODIUM PHOSPHATE 4 MG/ML
4 INJECTION, SOLUTION INTRA-ARTICULAR; INTRALESIONAL; INTRAMUSCULAR; INTRAVENOUS; SOFT TISSUE
Status: DISCONTINUED | OUTPATIENT
Start: 2022-11-14 | End: 2022-11-14 | Stop reason: HOSPADM

## 2022-11-14 RX ADMIN — DEXAMETHASONE SODIUM PHOSPHATE 4 MG: 4 INJECTION, SOLUTION INTRA-ARTICULAR; INTRALESIONAL; INTRAMUSCULAR; INTRAVENOUS; SOFT TISSUE at 08:11

## 2022-11-14 NOTE — PROCEDURES
Tendon Sheath    Date/Time: 11/14/2022 8:30 AM  Performed by: Richard Navarro MD  Authorized by: Richard Navarro MD     Consent Done?:  Yes (Verbal)  Indications:  Pain  Site marked: the procedure site was marked    Timeout: prior to procedure the correct patient, procedure, and site was verified    Prep: patient was prepped and draped in usual sterile fashion      Local anesthesia used?: Yes    Local anesthetic: Topical spray prior to injection and 1cc 1% plain lidocaine in the injectate.  Location:  Long finger  Site:  R long flexor tendon sheath  Ultrasonic guidance for needle placement?: No    Needle size:  25 G  Approach:  Volar  Medications:  4 mg dexAMETHasone 4 mg/mL  Patient tolerance:  Patient tolerated the procedure well with no immediate complications

## 2022-11-14 NOTE — PROGRESS NOTES
[facility]  History & Physical  Orthopedics    SUBJECTIVE:      COVID-19 attestation:  This patient was treated during the COVID-19 pandemic.  This was discussed with the patient, they are aware of our current policies and procedures, were given the option of delaying their visit and or switching to a virtual visit, delaying their surgery when applicable, and they elect to proceed.    Chief Complaint:  Right long finger    Referring Provider: Alex Cast MD     History of Present Illness:  Patient is a 67 y.o. right hand dominant female who presents today with complaints of triggering to the right long finger.  The patient notes this began atraumatically several months ago.  It is significantly painful and does catch from a mechanical perspective.  She has no other complaints today and presents for initial evaluation of this problem.     The patient is a/an retired.    Onset of symptoms/DOI was several months ago.    Symptoms are aggravated by activity and movement.    Symptoms are alleviated by rest.    Symptoms consist of pain and decreased ROM.    The patient rates their pain as a 3/10.    Attempted treatment(s) and/or interventions include activity modifications, rest     The patient denies any fevers, chills, N/V, D/C and presents for evaluation.       Past Medical History:   Diagnosis Date    AR (allergic rhinitis)     AR (allergic rhinitis)     Carotid stenosis     50% RCA    Colon polyp 10/2014    Diabetes mellitus     Diabetes mellitus, type 2     Fatty liver     GERD (gastroesophageal reflux disease)     Heart murmur 1/27/2022    HLD (hyperlipidemia)     HTN (hypertension)     Joint pain     Sleep apnea 2018    Stricture of artery 3/25/2021     Past Surgical History:   Procedure Laterality Date    REVERSE TOTAL SHOULDER ARTHROPLASTY Left 3/7/2022    Procedure: ARTHROPLASTY, SHOULDER, TOTAL, REVERSE;  Surgeon: MISSY Santoyo MD;  Location: [facility] OR [address] [place] [address];  Service:  Orthopedics;  Laterality: Left;  AND BICEP SYNDEMOSIS    SKIN BIOPSY  10yrs.    negative     Review of patient's allergies indicates:  No Known Allergies  Social History     Social History Narrative    Stairs- none     Family History   Problem Relation Age of Onset    Cancer Mother         gallbladder    Coronary artery disease Father 79    Diabetes Father     Cerebral palsy Sister     Osteoporosis Sister     Epilepsy Sister     Prostate cancer Brother 57    Diabetes Brother     Heart attack Brother     Hypertension Daughter     No Known Problems Son     Diabetes Maternal Aunt     Diabetes Maternal Uncle     Diabetes Maternal Grandmother     Hypertension Other         multiple    Melanoma Neg Hx          Current Outpatient Medications:     amLODIPine (NORVASC) 10 MG tablet, TAKE 1 TABLET BY MOUTH ONCE DAILY, Disp: 90 tablet, Rfl: 3    ascorbic acid, vitamin C, (VITAMIN C) 1000 MG tablet, Take 1,000 mg by mouth once daily., Disp: , Rfl:     blood-glucose meter kit, To check BG 1 times daily, to use with insurance preferred meter, Disp: 1 each, Rfl: 0    celecoxib (CELEBREX) 200 MG capsule, Take 1 capsule (200 mg total) by mouth once daily., Disp: 60 capsule, Rfl: 2    cetirizine (ZYRTEC) 10 MG tablet, Take 10 mg by mouth once daily., Disp: , Rfl:     hydroCHLOROthiazide (HYDRODIURIL) 25 MG tablet, TAKE 1 TABLET BY MOUTH ONCE DAILY, Disp: 90 tablet, Rfl: 3    L.ACID/L.CASEI/B.BIF/B.MELISSA/FOS (PROBIOTIC BLEND ORAL), Take 2 tablets by mouth once daily., Disp: , Rfl:     lancets Misc, To check BG 1 times daily, to use with insurance preferred meter, Disp: 100 each, Rfl: 11    lovastatin (MEVACOR) 20 MG tablet, TAKE 1 TABLET BY MOUTH IN  THE EVENING, Disp: 90 tablet, Rfl: 3    metFORMIN (GLUCOPHAGE-XR) 500 MG ER 24hr tablet, Take 2 tablets (1,000 mg total) by mouth once daily., Disp: 180 tablet, Rfl: 3    multivitamin capsule, Take 1 capsule by mouth once daily., Disp: , Rfl:     OMEGA-3S/DHA/EPA/FISH OIL (OMEGA 3 ORAL),  "Take 2,800 mg by mouth once daily., Disp: , Rfl:     omeprazole (PRILOSEC) 40 MG capsule, TAKE 1 CAPSULE BY MOUTH  ONCE DAILY, Disp: 90 capsule, Rfl: 2    OPW TEST CLAIM - DO NOT FILL, OPW test claim. Do not fill. (Patient not taking: Reported on 10/20/2022), Disp: 1 tablet, Rfl: 0    valsartan (DIOVAN) 320 MG tablet, Take 1 tablet (320 mg total) by mouth once daily., Disp: 90 tablet, Rfl: 1    vitamin D (VITAMIN D3) 1000 units Tab, Take 1,000 Units by mouth once daily., Disp: , Rfl:     vitamin E 400 UNIT capsule, Take 400 Units by mouth once daily., Disp: , Rfl:     zinc sulfate (ZINC-15 ORAL), Take by mouth., Disp: , Rfl:       Review of Systems:  As per HPI otherwise noncontributory    OBJECTIVE:      Vital Signs (Most Recent):  Vitals:    11/14/22 0819   Weight: 92.1 kg (203 lb)   Height: 5' 3" (1.6 m)     Body mass index is 35.96 kg/m².      Physical Exam:  Constitutional: The patient appears well-developed and well-nourished. No distress.   Skin: No lesions appreciated  Head: Normocephalic and atraumatic.   Nose: Nose normal.   Ears: No deformities seen  Eyes: Conjunctivae and EOM are normal.   Neck: No tracheal deviation present.   Cardiovascular: Normal rate and intact distal pulses.    Pulmonary/Chest: Effort normal. No respiratory distress.   Abdominal: There is no guarding.   Neurological: The patient is alert.   Psychiatric: The patient has a normal mood and affect.     Right Hand/Wrist Examination:    Observation/Inspection:  Swelling  none    Deformity  none  Discoloration  none     Scars   none    Atrophy  none  Patient with significant tenderness palpation right long finger A1 pulley with concha triggering seen today with range of motion    HAND/WRIST EXAMINATION:  Finkelstein's Test   Neg  WHAT Test    Neg  Snuff box tenderness   Neg  Wolfe's Test    Neg  Hook of Hamate Tenderness  Neg  CMC grind    Neg  Circumduction test   Neg    Neurovascular Exam:  Digits WWP, brisk CR < 3s throughout  NVI " motor/LTS to M/R/U nerves, radial pulse 2+  Tinel's Test - Carpal Tunnel  Neg  Tinel's Test - Cubital Tunnel  Neg  Phalen's Test    Neg  Median Nerve Compression Test Neg    ROM hand full, painless    ROM wrist full, painless    ROM elbow full, painless    Abdomen not guarded  Respirations nonlabored  Perfusion intact    Diagnostic Results:     Imaging - I independently viewed the patient's imaging as well as the radiology report.  Xrays of the patient's right hand  demonstrate no evidence of any acute fractures or dislocations with some scattered mild to moderate degenerative changes  EMG - none    ASSESSMENT/PLAN:      67 y.o. yo female with right long finger trigger finger  Plan: The patient and I had a thorough discussion today.  We discussed the working diagnosis as well as several other potential alternative diagnoses.  Treatment options were discussed, both conservative and surgical.  Conservative treatment options would include things such as activity modifications, workplace modifications, a period of rest, oral vs topical OTC and prescription anti-inflammatory medications, occupational therapy, splinting/bracing, immobilization, corticosteroid injections, and others.  Surgical options were discussed as well.     At this time, the patient would like to proceed with a trial of a corticosteroid injection to the right long finger A1 pulley as well as a trigger finger splint which she was provided a handout for today.  NSAIDs advised as well as activity modifications.    Should the patient's symptoms worsen, persist, or fail to improve they should return for reevaluation and I would be happy to see them back anytime.        Richard Navarro M.D.    Please be aware that this note has been generated with the assistance of [email or web address] voice-to-text.  Please excuse any spelling or grammatical errors.    Thank you for choosing Dr. Richard Navarro for your orthopedic hand and upper extremity care. It is our goal to provide you  with exceptional care that will help keep you healthy, active, and get you back in the game.     If you felt that you received exemplary care today, please consider leaving feedback for Dr. Navarro on [employer] at [email or web address].    Please do not hesitate to reach out to us via email, phone, or MyChart with any questions, concerns, or feedback.

## 2022-11-14 NOTE — PROGRESS NOTES
Subjective:      Patient ID: Parisa Dimas is a 67 y.o. female.    Chief Complaint: No chief complaint on file.      HPI:  Parisa Dimas is a 67 y.o. female who presents for surgical evaluation of subaortic membrane. Medical conditions include HTN, HLD, fatty liver disease, carotid atherosclerosis but with no significant stenosis,DM, SHOAIB, GERD. Was sent for clearance for ortho surgery due to heart murmur which prompted this workup. Patient reports prior to this she was never told she had a heart murmur. Denies any symptoms. No shortness of breath, chest pain, dizziness. Occasional lower extremity swelling but this is nothing new. No prior strokes, seizures, blood clots, stents, or sternotomies. No smoking or drinking history.     Current Outpatient Medications   Medication Instructions    amLODIPine (NORVASC) 10 MG tablet TAKE 1 TABLET BY MOUTH ONCE DAILY    ascorbic acid (vitamin C) (VITAMIN C) 1,000 mg, Oral, Daily    blood-glucose meter kit To check BG 1 times daily, to use with insurance preferred meter    celecoxib (CELEBREX) 200 mg, Oral, Daily    cetirizine (ZYRTEC) 10 mg, Oral, Daily    hydroCHLOROthiazide (HYDRODIURIL) 25 MG tablet TAKE 1 TABLET BY MOUTH ONCE DAILY    L.ACID/L.CASEI/B.BIF/B.MELISSA/FOS (PROBIOTIC BLEND ORAL) 2 tablets, Oral, Daily    lancets Misc To check BG 1 times daily, to use with insurance preferred meter    lovastatin (MEVACOR) 20 MG tablet TAKE 1 TABLET BY MOUTH IN  THE EVENING    metFORMIN (GLUCOPHAGE-XR) 1,000 mg, Oral, Daily    multivitamin capsule 1 capsule, Oral, Daily,      OMEGA-3S/DHA/EPA/FISH OIL (OMEGA 3 ORAL) 2,800 mg, Oral, Daily,      omeprazole (PRILOSEC) 40 MG capsule TAKE 1 CAPSULE BY MOUTH  ONCE DAILY    OPW TEST CLAIM - DO NOT FILL OPW test claim. Do not fill.    valsartan (DIOVAN) 320 mg, Oral, Daily    vitamin D (VITAMIN D3) 1,000 Units, Oral, Daily    vitamin E 400 Units, Oral, Daily,      zinc sulfate (ZINC-15 ORAL) Oral       Family and social history  reviewed    Review of patient's allergies indicates:  No Known Allergies  Past Medical History:   Diagnosis Date    AR (allergic rhinitis)     AR (allergic rhinitis)     Carotid stenosis     50% RCA    Colon polyp 10/2014    Diabetes mellitus     Diabetes mellitus, type 2     Fatty liver     GERD (gastroesophageal reflux disease)     Heart murmur 1/27/2022    HLD (hyperlipidemia)     HTN (hypertension)     Joint pain     Sleep apnea 2018    Stricture of artery 3/25/2021     Past Surgical History:   Procedure Laterality Date    REVERSE TOTAL SHOULDER ARTHROPLASTY Left 3/7/2022    Procedure: ARTHROPLASTY, SHOULDER, TOTAL, REVERSE;  Surgeon: MISSY Santoyo MD;  Location: [facility] OR [address] [place] [address];  Service: Orthopedics;  Laterality: Left;  AND BICEP SYNDEMOSIS    SKIN BIOPSY  10yrs.    negative     Family History       Problem Relation (Age of Onset)    Cancer Mother    Cerebral palsy Sister    Coronary artery disease Father (79)    Diabetes Father, Brother, Maternal Aunt, Maternal Uncle, Maternal Grandmother    Epilepsy Sister    Heart attack Brother    Hypertension Daughter, Other    No Known Problems Son    Osteoporosis Sister    Prostate cancer Brother (57)          Social History     Socioeconomic History    Marital status:      Spouse name: Pio    Number of children: 4   Occupational History     Comment: retired- school work   Tobacco Use    Smoking status: Never    Smokeless tobacco: Never   Substance and Sexual Activity    Alcohol use: Not Currently     Alcohol/week: 0.0 standard drinks    Drug use: No    Sexual activity: Yes     Partners: Male     Birth control/protection: Post-menopausal   Other Topics Concern    Are you pregnant or think you may be? No    Breast-feeding No   Social History Narrative    Stairs- none     Social Determinants of Health     Financial Resource Strain: Low Risk     Difficulty of Paying Living Expenses: Not hard at all   Food Insecurity: No Food Insecurity    Worried About  Running Out of Food in the Last Year: Never true    Ran Out of Food in the Last Year: Never true   Transportation Needs: No Transportation Needs    Lack of Transportation (Medical): No    Lack of Transportation (Non-Medical): No   Physical Activity: Insufficiently Active    Days of Exercise per Week: 2 days    Minutes of Exercise per Session: 20 min   Stress: No Stress Concern Present    Feeling of Stress : Only a little   Social Connections: Socially Integrated    Frequency of Communication with Friends and Family: More than three times a week    Frequency of Social Gatherings with Friends and Family: More than three times a week    Attends Caodaism Services: More than 4 times per year    Active Member of Clubs or Organizations: Yes    Attends Club or Organization Meetings: More than 4 times per year    Marital Status:    Housing Stability: Low Risk     Unable to Pay for Housing in the Last Year: No    Number of Places Lived in the Last Year: 1    Unstable Housing in the Last Year: No       Current medications Reviewed    Review of Systems   Constitutional:  Negative for activity change.   HENT:  Negative for nosebleeds.    Eyes:  Negative for visual disturbance.   Respiratory:  Negative for shortness of breath.    Cardiovascular:  Positive for leg swelling (intermittent). Negative for chest pain.   Gastrointestinal:  Negative for nausea.   Musculoskeletal:  Negative for gait problem.   Skin:  Negative for color change.   Neurological:  Negative for dizziness and seizures.   Hematological:  Does not bruise/bleed easily.   Psychiatric/Behavioral:  Negative for sleep disturbance.    Objective:   Physical Exam  Constitutional:       General: She is not in acute distress.  HENT:      Head: Normocephalic and atraumatic.   Eyes:      Pupils: Pupils are equal, round, and reactive to light.   Cardiovascular:      Rate and Rhythm: Normal rate.   Pulmonary:      Effort: Pulmonary effort is normal. No respiratory  distress.   Musculoskeletal:         General: Normal range of motion.      Cervical back: Normal range of motion.   Skin:     Coloration: Skin is not pale.   Neurological:      General: No focal deficit present.      Mental Status: She is alert.   Psychiatric:         Mood and Affect: Mood normal.         Behavior: Behavior normal.       Diagnostic Results: reviewed   US Abdomen 10/27/22  Enlarged, steatotic liver with right lobe cyst.    Cardiac MRI 5/25/22  LV volumes are normal. LV mass is normal. LVEF = 69%.   RV volumes are normal. RVEF = 67%.  No LGE appreciated  High velocity flow is noted in the LVOT on cine imaging without evidence of septal obstruction.  The differential diagnosis for this finding including a subaortic membrane.  However only trivial AI was detected.    MILDRED 2/8/22  The left ventricle is normal in size with hyperdynamic systolic function.  The estimated ejection fraction is 70%.  Aortic valve is sclerotic but without aortic insufficiency.  There is a subvalvular membrane extending from the anterior mitral leaflet to the basal interventricular septum. There is associated turbulent flow at the level of this membrane, responsible for increased LVOT flow velocity seen on surface echo.  There is nonspecific thickening of A2/3 mitral leaflet on the left atrial aspect of the mitral valve. No torn chords or mitral regurgitation seen. This could represent a vegetation. Recommend obtaining blood cultures.  Normal right ventricular size with normal right ventricular systolic function.  Assessment:   Subvalvular membrane   Plan:     I have seen the patient and reviewed the physician assistant's note above. I have personally interviewed and examined the patient at bedside and agree with the findings.     Ms. Dimas is a 67 year-old woman with a history of LVOT pathology (high velocities found since January 2022), carotid stenosis, fatty liver disease, hypertension, diabetes (HbA1C of 6.9), obstructive  sleep apnea, and BMI of 36.  She is asymptomatic from a cardiac standpoint, denying chest pain, dyspnea on exertion, and lower extremity edema.  Her February 2022 transesophageal echocardiogram showed 70% ejection fraction with subaortic membrane that has high velocity flows (but couldn't calculate a gradient).  November 2022 transthoracic echocardiogram showed 75% ejection fraction with the maximum LVOT velocity under 2m/s with no increase on Valsalva.    Cardiac MRI showed high velocity flow in the LVOT without obstruction, which is possibly a subaortic membrane.    We had an extensive discussion with her regarding her LVOT disease, her comorbidities, and her lack of symptoms.  I recommend routine echo surveillance along with medical management.  I also had a long talk with her about the importance of weight loss, exercise, and a heart healthy diet.  This subaortic membrane will likely need to be addressed at some point, so she should try to become the best surgical candidate as possible until then.        Frandy Fitzpatrick MD  Cardiothoracic Surgery  [facility]

## 2022-11-15 ENCOUNTER — HOSPITAL ENCOUNTER (OUTPATIENT)
Dept: RADIOLOGY | Facility: HOSPITAL | Age: 67
Discharge: HOME | End: 2022-11-15
Attending: NURSE PRACTITIONER
Payer: MEDICARE

## 2022-11-15 ENCOUNTER — OFFICE VISIT (OUTPATIENT)
Dept: PODIATRY | Facility: CLINIC | Age: 67
End: 2022-11-15
Payer: MEDICARE

## 2022-11-15 VITALS
SYSTOLIC BLOOD PRESSURE: 123 MMHG | HEIGHT: 63 IN | BODY MASS INDEX: 35.96 KG/M2 | HEART RATE: 77 BPM | DIASTOLIC BLOOD PRESSURE: 73 MMHG

## 2022-11-15 DIAGNOSIS — I87.2 VENOUS INSUFFICIENCY OF BOTH LOWER EXTREMITIES: Primary | ICD-10-CM

## 2022-11-15 DIAGNOSIS — M20.11 VALGUS DEFORMITY OF BOTH GREAT TOES: ICD-10-CM

## 2022-11-15 DIAGNOSIS — Z12.31 ENCOUNTER FOR SCREENING MAMMOGRAM FOR MALIGNANT NEOPLASM OF BREAST: ICD-10-CM

## 2022-11-15 DIAGNOSIS — M20.12 VALGUS DEFORMITY OF BOTH GREAT TOES: ICD-10-CM

## 2022-11-15 DIAGNOSIS — E11.40 TYPE 2 DIABETES MELLITUS WITH DIABETIC NEUROPATHY, UNSPECIFIED WHETHER LONG TERM INSULIN USE: ICD-10-CM

## 2022-11-15 PROCEDURE — 3044F HG A1C LEVEL LT 7.0%: CPT | Mod: CPTII,S$GLB,, | Performed by: PODIATRIST

## 2022-11-15 PROCEDURE — 3078F PR MOST RECENT DIASTOLIC BLOOD PRESSURE < 80 MM HG: ICD-10-PCS | Mod: CPTII,S$GLB,, | Performed by: PODIATRIST

## 2022-11-15 PROCEDURE — 3074F PR MOST RECENT SYSTOLIC BLOOD PRESSURE < 130 MM HG: ICD-10-PCS | Mod: CPTII,S$GLB,, | Performed by: PODIATRIST

## 2022-11-15 PROCEDURE — 3044F PR MOST RECENT HEMOGLOBIN A1C LEVEL <7.0%: ICD-10-PCS | Mod: CPTII,S$GLB,, | Performed by: PODIATRIST

## 2022-11-15 PROCEDURE — 3066F NEPHROPATHY DOC TX: CPT | Mod: CPTII,S$GLB,, | Performed by: PODIATRIST

## 2022-11-15 PROCEDURE — 77063 BREAST TOMOSYNTHESIS BI: CPT | Mod: 26,,, | Performed by: RADIOLOGY

## 2022-11-15 PROCEDURE — 1126F PR PAIN SEVERITY QUANTIFIED, NO PAIN PRESENT: ICD-10-PCS | Mod: CPTII,S$GLB,, | Performed by: PODIATRIST

## 2022-11-15 PROCEDURE — 1160F RVW MEDS BY RX/DR IN RCRD: CPT | Mod: CPTII,S$GLB,, | Performed by: PODIATRIST

## 2022-11-15 PROCEDURE — 3008F BODY MASS INDEX DOCD: CPT | Mod: CPTII,S$GLB,, | Performed by: PODIATRIST

## 2022-11-15 PROCEDURE — 77063 BREAST TOMOSYNTHESIS BI: CPT | Mod: TC

## 2022-11-15 PROCEDURE — 99203 PR OFFICE/OUTPT VISIT, NEW, LEVL III, 30-44 MIN: ICD-10-PCS | Mod: S$GLB,,, | Performed by: PODIATRIST

## 2022-11-15 PROCEDURE — 1159F MED LIST DOCD IN RCRD: CPT | Mod: CPTII,S$GLB,, | Performed by: PODIATRIST

## 2022-11-15 PROCEDURE — 1160F PR REVIEW ALL MEDS BY PRESCRIBER/CLIN PHARMACIST DOCUMENTED: ICD-10-PCS | Mod: CPTII,S$GLB,, | Performed by: PODIATRIST

## 2022-11-15 PROCEDURE — 3061F NEG MICROALBUMINURIA REV: CPT | Mod: CPTII,S$GLB,, | Performed by: PODIATRIST

## 2022-11-15 PROCEDURE — 4010F ACE/ARB THERAPY RXD/TAKEN: CPT | Mod: CPTII,S$GLB,, | Performed by: PODIATRIST

## 2022-11-15 PROCEDURE — 3066F PR DOCUMENTATION OF TREATMENT FOR NEPHROPATHY: ICD-10-PCS | Mod: CPTII,S$GLB,, | Performed by: PODIATRIST

## 2022-11-15 PROCEDURE — 77067 SCR MAMMO BI INCL CAD: CPT | Mod: 26,,, | Performed by: RADIOLOGY

## 2022-11-15 PROCEDURE — 3078F DIAST BP <80 MM HG: CPT | Mod: CPTII,S$GLB,, | Performed by: PODIATRIST

## 2022-11-15 PROCEDURE — 99999 PR PBB SHADOW E&M-EST. PATIENT-LVL V: ICD-10-PCS | Mod: PBBFAC,,, | Performed by: PODIATRIST

## 2022-11-15 PROCEDURE — 99203 OFFICE O/P NEW LOW 30 MIN: CPT | Mod: S$GLB,,, | Performed by: PODIATRIST

## 2022-11-15 PROCEDURE — 77063 MAMMO DIGITAL SCREENING BILAT WITH TOMO: ICD-10-PCS | Mod: 26,,, | Performed by: RADIOLOGY

## 2022-11-15 PROCEDURE — 4010F PR ACE/ARB THEARPY RXD/TAKEN: ICD-10-PCS | Mod: CPTII,S$GLB,, | Performed by: PODIATRIST

## 2022-11-15 PROCEDURE — 99999 PR PBB SHADOW E&M-EST. PATIENT-LVL V: CPT | Mod: PBBFAC,,, | Performed by: PODIATRIST

## 2022-11-15 PROCEDURE — 3074F SYST BP LT 130 MM HG: CPT | Mod: CPTII,S$GLB,, | Performed by: PODIATRIST

## 2022-11-15 PROCEDURE — 77067 MAMMO DIGITAL SCREENING BILAT WITH TOMO: ICD-10-PCS | Mod: 26,,, | Performed by: RADIOLOGY

## 2022-11-15 PROCEDURE — 3008F PR BODY MASS INDEX (BMI) DOCUMENTED: ICD-10-PCS | Mod: CPTII,S$GLB,, | Performed by: PODIATRIST

## 2022-11-15 PROCEDURE — 1159F PR MEDICATION LIST DOCUMENTED IN MEDICAL RECORD: ICD-10-PCS | Mod: CPTII,S$GLB,, | Performed by: PODIATRIST

## 2022-11-15 PROCEDURE — 3061F PR NEG MICROALBUMINURIA RESULT DOCUMENTED/REVIEW: ICD-10-PCS | Mod: CPTII,S$GLB,, | Performed by: PODIATRIST

## 2022-11-15 PROCEDURE — 77067 SCR MAMMO BI INCL CAD: CPT | Mod: TC

## 2022-11-15 PROCEDURE — 1126F AMNT PAIN NOTED NONE PRSNT: CPT | Mod: CPTII,S$GLB,, | Performed by: PODIATRIST

## 2022-11-15 NOTE — PROGRESS NOTES
Subjective:      Patient ID: Parisa Dimas is a 67 y.o. female.    Chief Complaint: Diabetes Mellitus (PCP Dr. Cast, 10/20/22), Diabetic Foot Exam, and Routine Foot Care    Parisa is a 67 y.o. female who presents to the clinic upon referral from Dr. Mabry  for evaluation and treatment of diabetic feet. Parisa has a past medical history of AR (allergic rhinitis), AR (allergic rhinitis), Carotid stenosis, Colon polyp (10/2014), Diabetes mellitus, Diabetes mellitus, type 2, Fatty liver, GERD (gastroesophageal reflux disease), Heart murmur (1/27/2022), HLD (hyperlipidemia), HTN (hypertension), Joint pain, Sleep apnea (2018), and Stricture of artery (3/25/2021).  For annual diabetic foot exam.  Relates intermittent burning and tingling sensation to both feet.  She also complains of intermittent cramping to legs and pain to legs.  She has a history wearing compression stockings however is not wearing them today.  Furthermore she relates that she trims her own toenails.  Accompanied by her .    PCP: Alex Cast MD    Date Last Seen by PCP:  10/20/2022    Current shoe gear: Casual shoes    Hemoglobin A1C   Date Value Ref Range Status   10/25/2022 6.9 (H) 4.0 - 5.6 % Final     Comment:     ADA Screening Guidelines:  5.7-6.4%  Consistent with prediabetes  >or=6.5%  Consistent with diabetes    High levels of fetal hemoglobin interfere with the HbA1C  assay. Heterozygous hemoglobin variants (HbS, HgC, etc)do  not significantly interfere with this assay.   However, presence of multiple variants may affect accuracy.     01/27/2022 5.9 (H) 4.0 - 5.6 % Final     Comment:     ADA Screening Guidelines:  5.7-6.4%  Consistent with prediabetes  >or=6.5%  Consistent with diabetes    High levels of fetal hemoglobin interfere with the HbA1C  assay. Heterozygous hemoglobin variants (HbS, HgC, etc)do  not significantly interfere with this assay.   However, presence of multiple variants may affect accuracy.     07/22/2021  "6.3 (H) 4.0 - 5.6 % Final     Comment:     ADA Screening Guidelines:  5.7-6.4%  Consistent with prediabetes  >or=6.5%  Consistent with diabetes    High levels of fetal hemoglobin interfere with the HbA1C  assay. Heterozygous hemoglobin variants (HbS, HgC, etc)do  not significantly interfere with this assay.   However, presence of multiple variants may affect accuracy.       Vitals:    11/15/22 1355   BP: 123/73   Pulse: 77   Height: 5' 3" (1.6 m)   PainSc: 0-No pain      Past Medical History:   Diagnosis Date    AR (allergic rhinitis)     AR (allergic rhinitis)     Carotid stenosis     50% RCA    Colon polyp 10/2014    Diabetes mellitus     Diabetes mellitus, type 2     Fatty liver     GERD (gastroesophageal reflux disease)     Heart murmur 1/27/2022    HLD (hyperlipidemia)     HTN (hypertension)     Joint pain     Sleep apnea 2018    Stricture of artery 3/25/2021       Past Surgical History:   Procedure Laterality Date    REVERSE TOTAL SHOULDER ARTHROPLASTY Left 3/7/2022    Procedure: ARTHROPLASTY, SHOULDER, TOTAL, REVERSE;  Surgeon: MISSY Santoyo MD;  Location: [facility] OR [address] [place] [address];  Service: Orthopedics;  Laterality: Left;  AND BICEP SYNDEMOSIS    SKIN BIOPSY  10yrs.    negative       Family History   Problem Relation Age of Onset    Cancer Mother         gallbladder    Coronary artery disease Father 79    Diabetes Father     Cerebral palsy Sister     Osteoporosis Sister     Epilepsy Sister     Prostate cancer Brother 57    Diabetes Brother     Heart attack Brother     Hypertension Daughter     No Known Problems Son     Diabetes Maternal Aunt     Diabetes Maternal Uncle     Diabetes Maternal Grandmother     Hypertension Other         multiple    Melanoma Neg Hx        Social History     Socioeconomic History    Marital status:      Spouse name: Pio    Number of children: 4   Occupational History     Comment: retired- school work   Tobacco Use    Smoking status: Never    Smokeless tobacco: Never "   Substance and Sexual Activity    Alcohol use: Not Currently     Alcohol/week: 0.0 standard drinks    Drug use: No    Sexual activity: Yes     Partners: Male     Birth control/protection: Post-menopausal   Other Topics Concern    Are you pregnant or think you may be? No    Breast-feeding No   Social History Narrative    Stairs- none     Social Determinants of Health     Financial Resource Strain: Low Risk     Difficulty of Paying Living Expenses: Not hard at all   Food Insecurity: No Food Insecurity    Worried About Running Out of Food in the Last Year: Never true    Ran Out of Food in the Last Year: Never true   Transportation Needs: No Transportation Needs    Lack of Transportation (Medical): No    Lack of Transportation (Non-Medical): No   Physical Activity: Insufficiently Active    Days of Exercise per Week: 2 days    Minutes of Exercise per Session: 20 min   Stress: No Stress Concern Present    Feeling of Stress : Only a little   Social Connections: Socially Integrated    Frequency of Communication with Friends and Family: More than three times a week    Frequency of Social Gatherings with Friends and Family: More than three times a week    Attends Muslim Services: More than 4 times per year    Active Member of Clubs or Organizations: Yes    Attends Club or Organization Meetings: More than 4 times per year    Marital Status:    Housing Stability: Low Risk     Unable to Pay for Housing in the Last Year: No    Number of Places Lived in the Last Year: 1    Unstable Housing in the Last Year: No       Current Outpatient Medications   Medication Sig Dispense Refill    amLODIPine (NORVASC) 10 MG tablet TAKE 1 TABLET BY MOUTH ONCE DAILY 90 tablet 3    ascorbic acid, vitamin C, (VITAMIN C) 1000 MG tablet Take 1,000 mg by mouth once daily.      celecoxib (CELEBREX) 200 MG capsule Take 1 capsule (200 mg total) by mouth once daily. 60 capsule 2    cetirizine (ZYRTEC) 10 MG tablet Take 10 mg by mouth once daily.       hydroCHLOROthiazide (HYDRODIURIL) 25 MG tablet TAKE 1 TABLET BY MOUTH ONCE DAILY 90 tablet 3    L.ACID/L.CASEI/B.BIF/B.MELISSA/FOS (PROBIOTIC BLEND ORAL) Take 2 tablets by mouth once daily.      lancets Misc To check BG 1 times daily, to use with insurance preferred meter 100 each 11    lovastatin (MEVACOR) 20 MG tablet TAKE 1 TABLET BY MOUTH IN  THE EVENING 90 tablet 3    metFORMIN (GLUCOPHAGE-XR) 500 MG ER 24hr tablet Take 2 tablets (1,000 mg total) by mouth once daily. 180 tablet 3    multivitamin capsule Take 1 capsule by mouth once daily.      OMEGA-3S/DHA/EPA/FISH OIL (OMEGA 3 ORAL) Take 2,800 mg by mouth once daily.      omeprazole (PRILOSEC) 40 MG capsule TAKE 1 CAPSULE BY MOUTH  ONCE DAILY 90 capsule 2    OPW TEST CLAIM - DO NOT FILL OPW test claim. Do not fill. 1 tablet 0    valsartan (DIOVAN) 320 MG tablet Take 1 tablet (320 mg total) by mouth once daily. 90 tablet 1    vitamin D (VITAMIN D3) 1000 units Tab Take 1,000 Units by mouth once daily.      vitamin E 400 UNIT capsule Take 400 Units by mouth once daily.      zinc sulfate (ZINC-15 ORAL) Take by mouth.      blood-glucose meter kit To check BG 1 times daily, to use with insurance preferred meter 1 each 0     No current facility-administered medications for this visit.       Review of patient's allergies indicates:  No Known Allergies      Review of Systems   Constitutional: Negative for chills, fever and malaise/fatigue.   HENT:  Negative for congestion and hearing loss.    Cardiovascular:  Negative for chest pain, claudication and leg swelling.   Respiratory:  Negative for cough and shortness of breath.    Skin:  Negative for nail changes.   Musculoskeletal:  Positive for joint pain. Negative for back pain, muscle cramps and muscle weakness.   Gastrointestinal:  Negative for nausea and vomiting.   Neurological:  Positive for numbness and paresthesias. Negative for weakness.   Psychiatric/Behavioral:  Negative for altered mental status.           Objective:      Physical Exam  Vitals reviewed.   Constitutional:       General: She is not in acute distress.     Appearance: She is obese. She is not ill-appearing.   Cardiovascular:      Pulses:           Dorsalis pedis pulses are 2+ on the right side and 2+ on the left side.        Posterior tibial pulses are 2+ on the right side and 2+ on the left side.      Comments: Mild to moderate edema of the lower extremity bilateral multiple varicosities and telangiectasias.  Skin temp is warm to foot bilateral.  No rubor on dependency bilateral foot.  No hair growth bilateral lower extremity.  Musculoskeletal:      Right foot: Normal range of motion. Bunion present.      Left foot: Normal range of motion. Bunion present.      Comments: Semi rigid forefoot valgus bilateral with moderate track bound hallux abductovalgus under Lapping the 2nd toe bilateral.  Semi rigid flexion contracture of the 2nd toe consistent with hammertoe deformity bilateral foot.  No pain with range of motion or manual muscle strength testing bilateral foot and ankle.   Feet:      Right foot:      Protective Sensation: 10 sites tested.  10 sites sensed.      Skin integrity: No ulcer, blister, skin breakdown, erythema, warmth, callus, dry skin or fissure.      Toenail Condition: Right toenails are normal.      Left foot:      Protective Sensation: 10 sites tested.        Skin integrity: No ulcer, blister, skin breakdown, erythema, warmth, callus, dry skin or fissure.      Toenail Condition: Left toenails are normal.   Skin:     General: Skin is warm.      Capillary Refill: Capillary refill takes less than 2 seconds.      Findings: No ecchymosis or erythema.      Nails: There is no clubbing.      Comments: Nails 1-5 bilateral foot appear to be well trimmed.  There is some mild thickening of nails 1-5 on the right foot.    No open wound or maceration to the foot bilateral.  No significant callus formation noted to foot bilateral.   Neurological:       Mental Status: She is alert and oriented to person, place, and time.      Sensory: Sensation is intact.      Motor: Motor function is intact.             Assessment:       Encounter Diagnoses   Name Primary?    Type 2 diabetes mellitus with diabetic neuropathy, unspecified whether long term insulin use     Venous insufficiency of both lower extremities Yes    Valgus deformity of both great toes          Plan:       Parisa was seen today for diabetes mellitus, diabetic foot exam and routine foot care.    Diagnoses and all orders for this visit:    Venous insufficiency of both lower extremities  -     COMPRESSION STOCKINGS  -      Lower Extremity Veins Bilateral Insufficiency; Future    Type 2 diabetes mellitus with diabetic neuropathy, unspecified whether long term insulin use  -     Ambulatory referral/consult to Podiatry  -     COMPRESSION STOCKINGS    Valgus deformity of both great toes      I counseled the patient on her conditions, their implications and medical management.    Shoe inspection. Diabetic Foot Education. Patient reminded of the importance of good nutrition and blood sugar control to help prevent podiatric complications of diabetes. Patient instructed on proper foot hygeine. We discussed wearing proper shoe gear, daily foot inspections, never walking without protective shoe gear, never putting sharp instruments to feet.    Comprehensive annual diabetic foot exam completed today.  Patient is found to be overall low risk for diabetic foot complication.    New prescription for compression stockings below-knee 20-30 mm Hg to be worn throughout the day.      Venous ultrasound the lower extremity to assess for any possible deep vein thrombosis or reflux that could be contributing to her pain.    We discussed taking over-the-counter nightly magnesium to help with the cramps.    Furthermore we discussed daily exercise for a minimum of 10-15 minutes consisting of low impact activities such as walking to  help with her restless leg syndrome as well as cramping.  Patient also currently taking complex B vitamin daily.  Patient on chronic metformin use which can lead to chronic kidney disease and thiamine deficiency.  Long-term work towards weight loss goals to help further manage diabetes.    RTC within 1 year p.r.n. as discussed.    A portion of this note was generated by voice recognition software and may contain spelling and grammar errors.

## 2022-11-16 ENCOUNTER — OFFICE VISIT (OUTPATIENT)
Dept: CARDIOTHORACIC SURGERY | Facility: CLINIC | Age: 67
End: 2022-11-16
Attending: THORACIC SURGERY (CARDIOTHORACIC VASCULAR SURGERY)
Payer: MEDICARE

## 2022-11-16 ENCOUNTER — HOSPITAL ENCOUNTER (OUTPATIENT)
Dept: CARDIOLOGY | Facility: HOSPITAL | Age: 67
Discharge: HOME | End: 2022-11-16
Attending: THORACIC SURGERY (CARDIOTHORACIC VASCULAR SURGERY)
Payer: MEDICARE

## 2022-11-16 VITALS
BODY MASS INDEX: 35.97 KG/M2 | HEART RATE: 70 BPM | HEIGHT: 63 IN | RESPIRATION RATE: 18 BRPM | SYSTOLIC BLOOD PRESSURE: 123 MMHG | DIASTOLIC BLOOD PRESSURE: 73 MMHG | BODY MASS INDEX: 36.06 KG/M2 | HEART RATE: 74 BPM | WEIGHT: 203 LBS | OXYGEN SATURATION: 98 % | SYSTOLIC BLOOD PRESSURE: 158 MMHG | HEIGHT: 63 IN | DIASTOLIC BLOOD PRESSURE: 58 MMHG | WEIGHT: 203.5 LBS

## 2022-11-16 DIAGNOSIS — Q24.4 SUBAORTIC MEMBRANE: ICD-10-CM

## 2022-11-16 LAB
ASCENDING AORTA: 3.64 CM
AV INDEX (PROSTH): 0.89
AV MEAN GRADIENT: 7 MMHG
AV PEAK GRADIENT: 13 MMHG
AV VALVE AREA: 3.57 CM2
AV VELOCITY RATIO: 0.87
BSA FOR ECHO PROCEDURE: 2.02 M2
CV ECHO LV RWT: 0.52 CM
DOP CALC AO PEAK VEL: 1.78 M/S
DOP CALC AO VTI: 40.21 CM
DOP CALC LVOT AREA: 4 CM2
DOP CALC LVOT DIAMETER: 2.26 CM
DOP CALC LVOT PEAK VEL: 1.55 M/S
DOP CALC LVOT STROKE VOLUME: 143.5 CM3
DOP CALCLVOT PEAK VEL VTI: 35.79 CM
E WAVE DECELERATION TIME: 170.75 MSEC
E/A RATIO: 1.22
E/E' RATIO: 9.22 M/S
ECHO LV POSTERIOR WALL: 0.9 CM (ref 0.6–1.1)
EJECTION FRACTION: 75 %
FRACTIONAL SHORTENING: 41 % (ref 28–44)
INTERVENTRICULAR SEPTUM: 0.86 CM (ref 0.6–1.1)
IVRT: 77.07 MSEC
LA MAJOR: 5.69 CM
LA MINOR: 5.75 CM
LA WIDTH: 4.17 CM
LEFT ATRIUM SIZE: 3.7 CM
LEFT ATRIUM VOLUME INDEX MOD: 41.3 ML/M2
LEFT ATRIUM VOLUME INDEX: 38.5 ML/M2
LEFT ATRIUM VOLUME MOD: 80.47 CM3
LEFT ATRIUM VOLUME: 75.01 CM3
LEFT INTERNAL DIMENSION IN SYSTOLE: 2.05 CM (ref 2.1–4)
LEFT VENTRICLE DIASTOLIC VOLUME INDEX: 25.5 ML/M2
LEFT VENTRICLE DIASTOLIC VOLUME: 49.73 ML
LEFT VENTRICLE MASS INDEX: 44 G/M2
LEFT VENTRICLE SYSTOLIC VOLUME INDEX: 7 ML/M2
LEFT VENTRICLE SYSTOLIC VOLUME: 13.56 ML
LEFT VENTRICULAR INTERNAL DIMENSION IN DIASTOLE: 3.47 CM (ref 3.5–6)
LEFT VENTRICULAR MASS: 84.86 G
LV LATERAL E/E' RATIO: 8.3 M/S
LV SEPTAL E/E' RATIO: 10.38 M/S
MV A" WAVE DURATION": 16.84 MSEC
MV PEAK A VEL: 0.68 M/S
MV PEAK E VEL: 0.83 M/S
MV STENOSIS PRESSURE HALF TIME: 49.52 MS
MV VALVE AREA P 1/2 METHOD: 4.44 CM2
PISA TR MAX VEL: 2.25 M/S
PULM VEIN S/D RATIO: 1.05
PV PEAK D VEL: 0.2 M/S
PV PEAK S VEL: 0.21 M/S
RA MAJOR: 4.51 CM
RA PRESSURE: 3 MMHG
RA WIDTH: 3.1 CM
RIGHT VENTRICULAR END-DIASTOLIC DIMENSION: 3.6 CM
RV TISSUE DOPPLER FREE WALL SYSTOLIC VELOCITY 1 (APICAL 4 CHAMBER VIEW): 14.08 CM/S
SINUS: 3.25 CM
STJ: 2.72 CM
TDI LATERAL: 0.1 M/S
TDI SEPTAL: 0.08 M/S
TDI: 0.09 M/S
TR MAX PG: 20 MMHG
TRICUSPID ANNULAR PLANE SYSTOLIC EXCURSION: 1.66 CM
TV REST PULMONARY ARTERY PRESSURE: 23 MMHG

## 2022-11-16 PROCEDURE — 3061F PR NEG MICROALBUMINURIA RESULT DOCUMENTED/REVIEW: ICD-10-PCS | Mod: CPTII,S$GLB,, | Performed by: THORACIC SURGERY (CARDIOTHORACIC VASCULAR SURGERY)

## 2022-11-16 PROCEDURE — 99999 PR PBB SHADOW E&M-EST. PATIENT-LVL III: CPT | Mod: PBBFAC,,, | Performed by: THORACIC SURGERY (CARDIOTHORACIC VASCULAR SURGERY)

## 2022-11-16 PROCEDURE — 4010F PR ACE/ARB THEARPY RXD/TAKEN: ICD-10-PCS | Mod: CPTII,S$GLB,, | Performed by: THORACIC SURGERY (CARDIOTHORACIC VASCULAR SURGERY)

## 2022-11-16 PROCEDURE — 4010F ACE/ARB THERAPY RXD/TAKEN: CPT | Mod: CPTII,S$GLB,, | Performed by: THORACIC SURGERY (CARDIOTHORACIC VASCULAR SURGERY)

## 2022-11-16 PROCEDURE — 3066F NEPHROPATHY DOC TX: CPT | Mod: CPTII,S$GLB,, | Performed by: THORACIC SURGERY (CARDIOTHORACIC VASCULAR SURGERY)

## 2022-11-16 PROCEDURE — 99499 UNLISTED E&M SERVICE: CPT | Mod: S$GLB,,, | Performed by: THORACIC SURGERY (CARDIOTHORACIC VASCULAR SURGERY)

## 2022-11-16 PROCEDURE — 3044F HG A1C LEVEL LT 7.0%: CPT | Mod: CPTII,S$GLB,, | Performed by: THORACIC SURGERY (CARDIOTHORACIC VASCULAR SURGERY)

## 2022-11-16 PROCEDURE — 3061F NEG MICROALBUMINURIA REV: CPT | Mod: CPTII,S$GLB,, | Performed by: THORACIC SURGERY (CARDIOTHORACIC VASCULAR SURGERY)

## 2022-11-16 PROCEDURE — 99499 RISK ADDL DX/OHS AUDIT: ICD-10-PCS | Mod: S$GLB,,, | Performed by: THORACIC SURGERY (CARDIOTHORACIC VASCULAR SURGERY)

## 2022-11-16 PROCEDURE — 3008F BODY MASS INDEX DOCD: CPT | Mod: CPTII,S$GLB,, | Performed by: THORACIC SURGERY (CARDIOTHORACIC VASCULAR SURGERY)

## 2022-11-16 PROCEDURE — 93306 ECHO (CUPID ONLY): ICD-10-PCS | Mod: 26,,, | Performed by: INTERNAL MEDICINE

## 2022-11-16 PROCEDURE — 93306 TTE W/DOPPLER COMPLETE: CPT | Mod: 26,,, | Performed by: INTERNAL MEDICINE

## 2022-11-16 PROCEDURE — 1126F AMNT PAIN NOTED NONE PRSNT: CPT | Mod: CPTII,S$GLB,, | Performed by: THORACIC SURGERY (CARDIOTHORACIC VASCULAR SURGERY)

## 2022-11-16 PROCEDURE — 99205 PR OFFICE/OUTPT VISIT, NEW, LEVL V, 60-74 MIN: ICD-10-PCS | Mod: S$GLB,,, | Performed by: THORACIC SURGERY (CARDIOTHORACIC VASCULAR SURGERY)

## 2022-11-16 PROCEDURE — 99999 PR PBB SHADOW E&M-EST. PATIENT-LVL III: ICD-10-PCS | Mod: PBBFAC,,, | Performed by: THORACIC SURGERY (CARDIOTHORACIC VASCULAR SURGERY)

## 2022-11-16 PROCEDURE — 99205 OFFICE O/P NEW HI 60 MIN: CPT | Mod: S$GLB,,, | Performed by: THORACIC SURGERY (CARDIOTHORACIC VASCULAR SURGERY)

## 2022-11-16 PROCEDURE — 3077F SYST BP >= 140 MM HG: CPT | Mod: CPTII,S$GLB,, | Performed by: THORACIC SURGERY (CARDIOTHORACIC VASCULAR SURGERY)

## 2022-11-16 PROCEDURE — 3078F PR MOST RECENT DIASTOLIC BLOOD PRESSURE < 80 MM HG: ICD-10-PCS | Mod: CPTII,S$GLB,, | Performed by: THORACIC SURGERY (CARDIOTHORACIC VASCULAR SURGERY)

## 2022-11-16 PROCEDURE — 3077F PR MOST RECENT SYSTOLIC BLOOD PRESSURE >= 140 MM HG: ICD-10-PCS | Mod: CPTII,S$GLB,, | Performed by: THORACIC SURGERY (CARDIOTHORACIC VASCULAR SURGERY)

## 2022-11-16 PROCEDURE — 3066F PR DOCUMENTATION OF TREATMENT FOR NEPHROPATHY: ICD-10-PCS | Mod: CPTII,S$GLB,, | Performed by: THORACIC SURGERY (CARDIOTHORACIC VASCULAR SURGERY)

## 2022-11-16 PROCEDURE — 3008F PR BODY MASS INDEX (BMI) DOCUMENTED: ICD-10-PCS | Mod: CPTII,S$GLB,, | Performed by: THORACIC SURGERY (CARDIOTHORACIC VASCULAR SURGERY)

## 2022-11-16 PROCEDURE — 1126F PR PAIN SEVERITY QUANTIFIED, NO PAIN PRESENT: ICD-10-PCS | Mod: CPTII,S$GLB,, | Performed by: THORACIC SURGERY (CARDIOTHORACIC VASCULAR SURGERY)

## 2022-11-16 PROCEDURE — 3044F PR MOST RECENT HEMOGLOBIN A1C LEVEL <7.0%: ICD-10-PCS | Mod: CPTII,S$GLB,, | Performed by: THORACIC SURGERY (CARDIOTHORACIC VASCULAR SURGERY)

## 2022-11-16 PROCEDURE — 93306 TTE W/DOPPLER COMPLETE: CPT

## 2022-11-16 PROCEDURE — 3078F DIAST BP <80 MM HG: CPT | Mod: CPTII,S$GLB,, | Performed by: THORACIC SURGERY (CARDIOTHORACIC VASCULAR SURGERY)

## 2022-11-16 NOTE — LETTER
[facility]  [address]  [facility] [address]  Phone: [phone]               November 16, 2022      Patient: Parisa Dimas   MR Number: [id number]   YOB: 1955   Date of Visit: 11/16/2022     Pato Damon MD  [address]  [facility] [address]    Dear Dr. Damon,     It has been a privilege for us to see your patient, Ms. Dimas, at our [facility].  We had a chance to meet with her and went over the history, echocardiographic, as well as cardiac MRI findings in detail.  As you know, she is a 67 year-old woman with a history of LVOT pathology (high velocities found since January 2022), carotid stenosis, fatty liver disease, hypertension, diabetes (HbA1C of 6.9), obstructive sleep apnea, and BMI of 36.  She is asymptomatic from a cardiac standpoint, denying chest pain, dyspnea on exertion, and lower extremity edema.  Her February 2022 transesophageal echocardiogram showed 70% ejection fraction with subaortic membrane that has high velocity flows (but couldn't calculate a gradient).  November 2022 transthoracic echocardiogram showed 75% ejection fraction with the maximum LVOT velocity under 2m/s with no increase on Valsalva.    Cardiac MRI showed high velocity flow in the LVOT without obstruction, which is possibly a subaortic membrane.    We had an extensive discussion with her regarding her LVOT disease, her comorbidities, and her lack of symptoms.  I recommend routine echo surveillance along with medical management.  I also had a long talk with her about the importance of weight loss, exercise, and a heart healthy diet.  This subaortic membrane will likely need to be addressed at some point, so she should try to become the best surgical candidate as possible until then.      Thank you for referring Ms. Dimas and for your trust and confidence in our [facility].    Sincerely,     Frandy Fitzpatrick,  MD  Cardiothoracic Surgery  [facility]

## 2022-11-17 ENCOUNTER — HOSPITAL ENCOUNTER (OUTPATIENT)
Dept: RADIOLOGY | Facility: OTHER | Age: 67
Discharge: HOME | End: 2022-11-17
Attending: PODIATRIST
Payer: MEDICARE

## 2022-11-17 DIAGNOSIS — I87.2 VENOUS INSUFFICIENCY OF BOTH LOWER EXTREMITIES: ICD-10-CM

## 2022-11-17 PROCEDURE — 93970 EXTREMITY STUDY: CPT | Mod: TC

## 2022-11-17 PROCEDURE — 93970 US LOWER EXTREMITY VEINS BILATERAL INSUFFICIENCY: ICD-10-PCS | Mod: 26,,, | Performed by: RADIOLOGY

## 2022-11-17 PROCEDURE — 93970 EXTREMITY STUDY: CPT | Mod: 26,,, | Performed by: RADIOLOGY

## 2022-11-18 ENCOUNTER — PATIENT MESSAGE (OUTPATIENT)
Dept: PODIATRY | Facility: CLINIC | Age: 67
End: 2022-11-18
Payer: MEDICARE

## 2022-11-18 DIAGNOSIS — Q24.4 SUBAORTIC MEMBRANE: Primary | ICD-10-CM

## 2022-11-18 DIAGNOSIS — R01.1 HEART MURMUR: ICD-10-CM

## 2022-11-18 DIAGNOSIS — I87.2 VENOUS INSUFFICIENCY OF BOTH LOWER EXTREMITIES: Primary | ICD-10-CM

## 2022-11-18 DIAGNOSIS — I77.9 BILATERAL CAROTID ARTERY DISEASE, UNSPECIFIED TYPE: ICD-10-CM

## 2022-11-30 ENCOUNTER — TELEPHONE (OUTPATIENT)
Dept: ADMINISTRATIVE | Facility: OTHER | Age: 67
End: 2022-11-30
Payer: MEDICARE

## 2022-12-12 ENCOUNTER — OFFICE VISIT (OUTPATIENT)
Dept: CARDIOLOGY | Facility: CLINIC | Age: 67
End: 2022-12-12
Payer: MEDICARE

## 2022-12-12 ENCOUNTER — HOSPITAL ENCOUNTER (OUTPATIENT)
Dept: CARDIOLOGY | Facility: CLINIC | Age: 67
Discharge: HOME | End: 2022-12-12
Payer: MEDICARE

## 2022-12-12 VITALS
OXYGEN SATURATION: 96 % | BODY MASS INDEX: 35.86 KG/M2 | WEIGHT: 202.38 LBS | HEART RATE: 90 BPM | SYSTOLIC BLOOD PRESSURE: 137 MMHG | DIASTOLIC BLOOD PRESSURE: 82 MMHG | HEIGHT: 63 IN

## 2022-12-12 DIAGNOSIS — E11.00 TYPE 2 DIABETES MELLITUS WITH HYPEROSMOLARITY WITHOUT COMA, WITHOUT LONG-TERM CURRENT USE OF INSULIN: ICD-10-CM

## 2022-12-12 DIAGNOSIS — Q24.4 SUBAORTIC MEMBRANE: ICD-10-CM

## 2022-12-12 DIAGNOSIS — I10 PRIMARY HYPERTENSION: Primary | ICD-10-CM

## 2022-12-12 DIAGNOSIS — I42.1 HOCM (HYPERTROPHIC OBSTRUCTIVE CARDIOMYOPATHY): ICD-10-CM

## 2022-12-12 DIAGNOSIS — I48.91 NEW ONSET ATRIAL FIBRILLATION: ICD-10-CM

## 2022-12-12 DIAGNOSIS — I87.2 VENOUS INSUFFICIENCY OF BOTH LOWER EXTREMITIES: ICD-10-CM

## 2022-12-12 DIAGNOSIS — I42.2 HYPERTROPHIC CARDIOMYOPATHY: Primary | ICD-10-CM

## 2022-12-12 DIAGNOSIS — I65.23 BILATERAL CAROTID ARTERY STENOSIS: ICD-10-CM

## 2022-12-12 DIAGNOSIS — I42.2 HYPERTROPHIC CARDIOMYOPATHY: ICD-10-CM

## 2022-12-12 DIAGNOSIS — E78.2 MIXED HYPERLIPIDEMIA: ICD-10-CM

## 2022-12-12 PROCEDURE — 3044F PR MOST RECENT HEMOGLOBIN A1C LEVEL <7.0%: ICD-10-PCS | Mod: CPTII,S$GLB,, | Performed by: INTERNAL MEDICINE

## 2022-12-12 PROCEDURE — 99999 PR PBB SHADOW E&M-EST. PATIENT-LVL V: ICD-10-PCS | Mod: PBBFAC,,, | Performed by: INTERNAL MEDICINE

## 2022-12-12 PROCEDURE — 99499 UNLISTED E&M SERVICE: CPT | Mod: S$GLB,,, | Performed by: INTERNAL MEDICINE

## 2022-12-12 PROCEDURE — 3079F PR MOST RECENT DIASTOLIC BLOOD PRESSURE 80-89 MM HG: ICD-10-PCS | Mod: CPTII,S$GLB,, | Performed by: INTERNAL MEDICINE

## 2022-12-12 PROCEDURE — 3075F SYST BP GE 130 - 139MM HG: CPT | Mod: CPTII,S$GLB,, | Performed by: INTERNAL MEDICINE

## 2022-12-12 PROCEDURE — 1125F AMNT PAIN NOTED PAIN PRSNT: CPT | Mod: CPTII,S$GLB,, | Performed by: INTERNAL MEDICINE

## 2022-12-12 PROCEDURE — 93005 ELECTROCARDIOGRAM TRACING: CPT | Mod: S$GLB,,, | Performed by: INTERNAL MEDICINE

## 2022-12-12 PROCEDURE — 1125F PR PAIN SEVERITY QUANTIFIED, PAIN PRESENT: ICD-10-PCS | Mod: CPTII,S$GLB,, | Performed by: INTERNAL MEDICINE

## 2022-12-12 PROCEDURE — 3008F PR BODY MASS INDEX (BMI) DOCUMENTED: ICD-10-PCS | Mod: CPTII,S$GLB,, | Performed by: INTERNAL MEDICINE

## 2022-12-12 PROCEDURE — 3079F DIAST BP 80-89 MM HG: CPT | Mod: CPTII,S$GLB,, | Performed by: INTERNAL MEDICINE

## 2022-12-12 PROCEDURE — 99999 PR PBB SHADOW E&M-EST. PATIENT-LVL V: CPT | Mod: PBBFAC,,, | Performed by: INTERNAL MEDICINE

## 2022-12-12 PROCEDURE — 1159F MED LIST DOCD IN RCRD: CPT | Mod: CPTII,S$GLB,, | Performed by: INTERNAL MEDICINE

## 2022-12-12 PROCEDURE — 93005 RHYTHM STRIP: ICD-10-PCS | Mod: S$GLB,,, | Performed by: INTERNAL MEDICINE

## 2022-12-12 PROCEDURE — 3061F PR NEG MICROALBUMINURIA RESULT DOCUMENTED/REVIEW: ICD-10-PCS | Mod: CPTII,S$GLB,, | Performed by: INTERNAL MEDICINE

## 2022-12-12 PROCEDURE — 3075F PR MOST RECENT SYSTOLIC BLOOD PRESS GE 130-139MM HG: ICD-10-PCS | Mod: CPTII,S$GLB,, | Performed by: INTERNAL MEDICINE

## 2022-12-12 PROCEDURE — 93010 ELECTROCARDIOGRAM REPORT: CPT | Mod: S$GLB,,, | Performed by: INTERNAL MEDICINE

## 2022-12-12 PROCEDURE — 93010 RHYTHM STRIP: ICD-10-PCS | Mod: S$GLB,,, | Performed by: INTERNAL MEDICINE

## 2022-12-12 PROCEDURE — 3044F HG A1C LEVEL LT 7.0%: CPT | Mod: CPTII,S$GLB,, | Performed by: INTERNAL MEDICINE

## 2022-12-12 PROCEDURE — 3066F PR DOCUMENTATION OF TREATMENT FOR NEPHROPATHY: ICD-10-PCS | Mod: CPTII,S$GLB,, | Performed by: INTERNAL MEDICINE

## 2022-12-12 PROCEDURE — 3066F NEPHROPATHY DOC TX: CPT | Mod: CPTII,S$GLB,, | Performed by: INTERNAL MEDICINE

## 2022-12-12 PROCEDURE — 99499 RISK ADDL DX/OHS AUDIT: ICD-10-PCS | Mod: S$GLB,,, | Performed by: INTERNAL MEDICINE

## 2022-12-12 PROCEDURE — 99204 PR OFFICE/OUTPT VISIT, NEW, LEVL IV, 45-59 MIN: ICD-10-PCS | Mod: S$GLB,,, | Performed by: INTERNAL MEDICINE

## 2022-12-12 PROCEDURE — 3061F NEG MICROALBUMINURIA REV: CPT | Mod: CPTII,S$GLB,, | Performed by: INTERNAL MEDICINE

## 2022-12-12 PROCEDURE — 99204 OFFICE O/P NEW MOD 45 MIN: CPT | Mod: S$GLB,,, | Performed by: INTERNAL MEDICINE

## 2022-12-12 PROCEDURE — 4010F ACE/ARB THERAPY RXD/TAKEN: CPT | Mod: CPTII,S$GLB,, | Performed by: INTERNAL MEDICINE

## 2022-12-12 PROCEDURE — 1159F PR MEDICATION LIST DOCUMENTED IN MEDICAL RECORD: ICD-10-PCS | Mod: CPTII,S$GLB,, | Performed by: INTERNAL MEDICINE

## 2022-12-12 PROCEDURE — 3008F BODY MASS INDEX DOCD: CPT | Mod: CPTII,S$GLB,, | Performed by: INTERNAL MEDICINE

## 2022-12-12 PROCEDURE — 4010F PR ACE/ARB THEARPY RXD/TAKEN: ICD-10-PCS | Mod: CPTII,S$GLB,, | Performed by: INTERNAL MEDICINE

## 2022-12-12 RX ORDER — VERAPAMIL HYDROCHLORIDE 120 MG/1
120 CAPSULE, EXTENDED RELEASE ORAL DAILY
Qty: 30 CAPSULE | Refills: 11 | Status: SHIPPED | OUTPATIENT
Start: 2022-12-12 | End: 2022-12-12

## 2022-12-12 RX ORDER — VERAPAMIL HYDROCHLORIDE 120 MG/1
120 CAPSULE, EXTENDED RELEASE ORAL DAILY
Qty: 90 CAPSULE | Refills: 3 | Status: SHIPPED | OUTPATIENT
Start: 2022-12-12 | End: 2023-01-12 | Stop reason: SDUPTHER

## 2022-12-12 RX ORDER — MAGNESIUM 30 MG
1 TABLET ORAL DAILY
COMMUNITY

## 2022-12-12 RX ORDER — ASPIRIN 81 MG/1
81 TABLET ORAL DAILY
COMMUNITY
End: 2024-01-02

## 2022-12-12 NOTE — PROGRESS NOTES
PCP - Alex Cast MD  Referring Physician:     Subjective:   Patient ID:  Parisa Dimas is a 67 y.o. y.o. female with a history of subaortic membrane, hypertension, hyperlipidemia, type 2 diabetes mellitus, and SHOAIB on CPAP who presents to clinic secondary to venous reflux.  She recently had a venous ultrasound showing reflux in the left and right saphenous veins.  She is had varicose veins for many years.  They are not bothersome to her.  She was concern for blood clots.  I explained to her the role blood clots noted on the study.  We did discuss her diagnosis of subaortic membrane and reviewed her studies including her cardiac MRI with Dr. Estrella.  Review of chart concerning for hypertrophic cardiomyopathy given that her LVOT flow velocity increases to 4 m/sec with Valsalva.  Patient does have dyspnea on exertion which been ongoing for the past few months.  She denies any chest discomfort such as pain, pressure, or tightness at rest or on exertion.  She also denies any symptoms of claudication.  Denies any orthopnea or PND.  She does sleep upright in her recliner with however this is due to her shoulder.  She is not compliant with her CPAP.  She does complain of occasional palpitations without lightheadedness dizziness or syncope.    History:     Active Ambulatory Problems     Diagnosis Date Noted    HTN (hypertension)     HLD (hyperlipidemia)     Fatty liver     GERD (gastroesophageal reflux disease)     AR (allergic rhinitis)     Heel cord tightness 08/21/2014    Disorder of soft tissue 08/21/2014    Foot pain, right 09/30/2015    Tendonitis of ankle or foot 09/30/2015    Impingement syndrome of left shoulder 01/19/2017    History of diverticulitis 05/01/2017    Obstructive sleep apnea hypopnea, severe     Restless leg syndrome 01/27/2022    Type 2 diabetes mellitus, without long-term current use of insulin 01/27/2022    Heart murmur 01/27/2022    Bilateral carotid artery disease 01/27/2022    Type 2  diabetes mellitus with diabetic neuropathy, unspecified whether long term insulin use 01/28/2022    Severe obesity with body mass index (BMI) of 36.0 to 36.9 with serious comorbidity 01/28/2022    Closed 4-part fracture of proximal humerus, left, initial encounter 03/07/2022    Decreased ROM of left shoulder 03/10/2022    Subaortic membrane 11/16/2022     Resolved Ambulatory Problems     Diagnosis Date Noted    Special screening for malignant neoplasms, colon 10/10/2014    Stricture of artery 03/25/2021     Past Medical History:   Diagnosis Date    Carotid stenosis     Colon polyp 10/2014    Diabetes mellitus     Diabetes mellitus, type 2     Joint pain     Sleep apnea 2018       Social History     Tobacco Use    Smoking status: Never    Smokeless tobacco: Never   Substance Use Topics    Alcohol use: Not Currently     Alcohol/week: 0.0 standard drinks     Family History   Problem Relation Age of Onset    Cancer Mother         gallbladder    Coronary artery disease Father 79    Diabetes Father     Cerebral palsy Sister     Osteoporosis Sister     Epilepsy Sister     Prostate cancer Brother 57    Diabetes Brother     Heart attack Brother     Hypertension Daughter     No Known Problems Son     Diabetes Maternal Aunt     Diabetes Maternal Uncle     Diabetes Maternal Grandmother     Hypertension Other         multiple    Melanoma Neg Hx        Meds:   Review of patient's allergies indicates:  Not on File    Current Outpatient Medications:     amLODIPine (NORVASC) 10 MG tablet, TAKE 1 TABLET BY MOUTH ONCE DAILY, Disp: 90 tablet, Rfl: 3    ascorbic acid, vitamin C, (VITAMIN C) 1000 MG tablet, Take 1,000 mg by mouth once daily., Disp: , Rfl:     blood-glucose meter kit, To check BG 1 times daily, to use with insurance preferred meter, Disp: 1 each, Rfl: 0    celecoxib (CELEBREX) 200 MG capsule, Take 1 capsule (200 mg total) by mouth once daily., Disp: 60 capsule, Rfl: 2    cetirizine (ZYRTEC) 10 MG tablet, Take 10 mg by  mouth once daily., Disp: , Rfl:     hydroCHLOROthiazide (HYDRODIURIL) 25 MG tablet, TAKE 1 TABLET BY MOUTH ONCE DAILY, Disp: 90 tablet, Rfl: 3    L.ACID/L.CASEI/B.BIF/B.MELISSA/FOS (PROBIOTIC BLEND ORAL), Take 2 tablets by mouth once daily., Disp: , Rfl:     lancets Misc, To check BG 1 times daily, to use with insurance preferred meter, Disp: 100 each, Rfl: 11    lovastatin (MEVACOR) 20 MG tablet, TAKE 1 TABLET BY MOUTH IN  THE EVENING, Disp: 90 tablet, Rfl: 3    metFORMIN (GLUCOPHAGE-XR) 500 MG ER 24hr tablet, Take 2 tablets (1,000 mg total) by mouth once daily., Disp: 180 tablet, Rfl: 3    multivitamin capsule, Take 1 capsule by mouth once daily., Disp: , Rfl:     OMEGA-3S/DHA/EPA/FISH OIL (OMEGA 3 ORAL), Take 2,800 mg by mouth once daily., Disp: , Rfl:     omeprazole (PRILOSEC) 40 MG capsule, TAKE 1 CAPSULE BY MOUTH  ONCE DAILY, Disp: 90 capsule, Rfl: 2    OPW TEST CLAIM - DO NOT FILL, OPW test claim. Do not fill., Disp: 1 tablet, Rfl: 0    valsartan (DIOVAN) 320 MG tablet, Take 1 tablet (320 mg total) by mouth once daily., Disp: 90 tablet, Rfl: 1    vitamin D (VITAMIN D3) 1000 units Tab, Take 1,000 Units by mouth once daily., Disp: , Rfl:     vitamin E 400 UNIT capsule, Take 400 Units by mouth once daily., Disp: , Rfl:     zinc sulfate (ZINC-15 ORAL), Take by mouth., Disp: , Rfl:     Review of Systems   Constitutional:  Negative for fever.   Eyes:  Negative for blurred vision and double vision.   Respiratory:  Positive for shortness of breath.    Cardiovascular:  Positive for palpitations and leg swelling. Negative for chest pain, orthopnea, claudication and PND.   Gastrointestinal:  Negative for abdominal pain, nausea and vomiting.   Neurological:  Negative for loss of consciousness and weakness.     Objective:   LMP 08/01/2003   Physical Exam  Constitutional:       General: She is not in acute distress.     Appearance: She is not diaphoretic.   HENT:      Head: Normocephalic and atraumatic.      Right Ear:  External ear normal.      Left Ear: External ear normal.   Neck:      Thyroid: No thyromegaly.      Trachea: No tracheal deviation.   Cardiovascular:      Rate and Rhythm: Normal rate. Rhythm irregular.      Pulses:           Carotid pulses are 2+ on the right side and 2+ on the left side.       Radial pulses are 2+ on the right side and 2+ on the left side.        Popliteal pulses are 2+ on the right side and 2+ on the left side.        Dorsalis pedis pulses are 2+ on the right side and 2+ on the left side.        Posterior tibial pulses are 2+ on the right side and 2+ on the left side.      Heart sounds: Normal heart sounds. No murmur heard.    No friction rub. No gallop.      Comments: II/VI systolic murmur that increases with Valsalva.  Pulmonary:      Effort: No respiratory distress.      Breath sounds: No wheezing.   Abdominal:      General: There is no distension.      Tenderness: There is no abdominal tenderness. There is no rebound.   Musculoskeletal:         General: No tenderness or deformity. Normal range of motion.   Lymphadenopathy:      Cervical: No cervical adenopathy.   Skin:     Findings: No erythema or rash.   Neurological:      Mental Status: She is alert and oriented to person, place, and time.       Labs:     Lab Results   Component Value Date     10/25/2022    K 4.0 10/25/2022     10/25/2022    CO2 24 10/25/2022    BUN 15 10/25/2022    CREATININE 0.8 10/25/2022    ANIONGAP 14 10/25/2022     Lab Results   Component Value Date    HGBA1C 6.9 (H) 10/25/2022     No results found for: BNP, BNPTRIAGEBLO    Lab Results   Component Value Date    WBC 4.46 10/25/2022    HGB 13.4 10/25/2022    HCT 40.5 10/25/2022     10/25/2022    GRAN 2.2 10/25/2022    GRAN 48.4 10/25/2022     Lab Results   Component Value Date    CHOL 169 10/25/2022    HDL 56 10/25/2022    LDLCALC 76.4 10/25/2022    TRIG 183 (H) 10/25/2022       Lab Results   Component Value Date     10/25/2022    K 4.0  10/25/2022     10/25/2022    CO2 24 10/25/2022    BUN 15 10/25/2022    CREATININE 0.8 10/25/2022    ANIONGAP 14 10/25/2022     Lab Results   Component Value Date    HGBA1C 6.9 (H) 10/25/2022     No results found for: BNP, BNPTRIAGEBLO Lab Results   Component Value Date    WBC 4.46 10/25/2022    HGB 13.4 10/25/2022    HCT 40.5 10/25/2022     10/25/2022    GRAN 2.2 10/25/2022    GRAN 48.4 10/25/2022     Lab Results   Component Value Date    CHOL 169 10/25/2022    HDL 56 10/25/2022    LDLCALC 76.4 10/25/2022    TRIG 183 (H) 10/25/2022                Cardiovascular Imaging:   Ultrasound lower extremity venous 11/17/2022:  FINDINGS:  Deep venous system:     There is evidence of flow, compressibility and augmentation within bilateral common femoral, femoral, and popliteal veins.  Flow is seen within bilateral peroneal, posterior tibial and anterior tibial veins.     Superficial venous system:     Right leg:  Reflux times greater saphenous vein up to 844 milliseconds.  Reflux times lesser saphenous vein up to 1568 milliseconds.  The maximal diameter within the right greater saphenous vein is 9 mm.  The maximal diameter within the right lesser saphenous vein is 6mm.     Left leg:  Reflux throughout the greater saphenous vein with reflux times up to 3436 milliseconds.  The maximal diameter within the left greater saphenous vein is 13 mm.  The maximal diameter within the left lesser saphenous vein is 8mm.     Impression:  1.  No evidence of deep venous thrombosis in either lower extremity.  2.  Hemodynamically significant venous reflux in the greater saphenous veins, left greater than right.  3.  Hemodynamically significant venous reflux right lesser saphenous vein.    Echo 11/16/2022:  Summary    The left ventricle is normal in size with concentric remodeling and hyperdynamic systolic function. The estimated ejection fraction is 75%.  Normal right ventricular size with normal right ventricular systolic  function.  Normal left ventricular diastolic function.  Mild left atrial enlargement.  The estimated PA systolic pressure is 23 mmHg.  Normal central venous pressure (3 mmHg).  In this study, the highest LV outflow velocities did not exceed 2 m/s, even with the Valsalva maneuver. No significant outflow obstruction is evident     Diagnostic Results: reviewed   US Abdomen 10/27/22  Enlarged, steatotic liver with right lobe cyst.     Cardiac MRI 5/25/22  LV volumes are normal. LV mass is normal. LVEF = 69%.   RV volumes are normal. RVEF = 67%.  No LGE appreciated  High velocity flow is noted in the LVOT on cine imaging without evidence of septal obstruction.  The differential diagnosis for this finding including a subaortic membrane.  However only trivial AI was detected.     MILDRED 2/8/22  The left ventricle is normal in size with hyperdynamic systolic function.  The estimated ejection fraction is 70%.  Aortic valve is sclerotic but without aortic insufficiency.  There is a subvalvular membrane extending from the anterior mitral leaflet to the basal interventricular septum. There is associated turbulent flow at the level of this membrane, responsible for increased LVOT flow velocity seen on surface echo.  There is nonspecific thickening of A2/3 mitral leaflet on the left atrial aspect of the mitral valve. No torn chords or mitral regurgitation seen. This could represent a vegetation. Recommend obtaining blood cultures.  Normal right ventricular size with normal right ventricular systolic function.    Assessment & Plan:     1. Hypertrophic cardiomyopathy: Spectrum with dynamic LVOT obstruction, mild RAVINDER, no JESSICA.  Asymptomatic and best managed medically.  No evidence of subaortic membrane on MILDRED, Surface echo, or cardiac MRI.   2. New onset atrial fibrillation   3. Mixed hyperlipidemia    4. Bilateral carotid artery stenosis        6. Type 2 diabetes mellitus with hyperosmolarity without coma, without long-term current use  of insulin    7. Venous insufficiency of both lower extremities     Hypertension       Plan:  -will order stress echocardiogram to evaluate significance of obstruction, as well as ischemia.  -EKG done to be today given her irregular rate and rhythm.  -stop amlodipine and start verapamil 120 mg daily  -follow-up with general cardiology  -for her new onset atrial fibrillation, will start Eliquis 5 mg b.i.d. and refer to electrophysiology for afib and SCD evaluation.  As stated above verapamil 120 mg started.    Signed:  Hosea Amador M.D.  Page # [phone]  Cardiovascular Fellow  [facility]     Staff:  I have personally taken the history and examined this patient and agree with the fellow's note as stated above and amended it accordingly :-)  There is no evidence of subaortic membrane but evidence of HCM spectrum exists with a typical dynamic aortic flow murmur, RAVINDER, and increased LVOT velocity.  This is best managed medically at present but she should see EP for SCD evaluation and new onset Afib.

## 2022-12-13 ENCOUNTER — OFFICE VISIT (OUTPATIENT)
Dept: ELECTROPHYSIOLOGY | Facility: CLINIC | Age: 67
End: 2022-12-13
Payer: MEDICARE

## 2022-12-13 VITALS
SYSTOLIC BLOOD PRESSURE: 118 MMHG | HEART RATE: 90 BPM | HEIGHT: 63 IN | WEIGHT: 201.94 LBS | BODY MASS INDEX: 35.78 KG/M2 | DIASTOLIC BLOOD PRESSURE: 70 MMHG

## 2022-12-13 DIAGNOSIS — I48.91 NEW ONSET ATRIAL FIBRILLATION: ICD-10-CM

## 2022-12-13 DIAGNOSIS — Z01.89 ENCOUNTER FOR OTHER SPECIFIED SPECIAL EXAMINATIONS: ICD-10-CM

## 2022-12-13 DIAGNOSIS — I48.91 NEW ONSET ATRIAL FIBRILLATION: Primary | ICD-10-CM

## 2022-12-13 PROCEDURE — 1126F AMNT PAIN NOTED NONE PRSNT: CPT | Mod: CPTII,S$GLB,, | Performed by: INTERNAL MEDICINE

## 2022-12-13 PROCEDURE — 3061F NEG MICROALBUMINURIA REV: CPT | Mod: CPTII,S$GLB,, | Performed by: INTERNAL MEDICINE

## 2022-12-13 PROCEDURE — 3288F PR FALLS RISK ASSESSMENT DOCUMENTED: ICD-10-PCS | Mod: CPTII,S$GLB,, | Performed by: INTERNAL MEDICINE

## 2022-12-13 PROCEDURE — 99999 PR PBB SHADOW E&M-EST. PATIENT-LVL IV: ICD-10-PCS | Mod: PBBFAC,,, | Performed by: INTERNAL MEDICINE

## 2022-12-13 PROCEDURE — 1126F PR PAIN SEVERITY QUANTIFIED, NO PAIN PRESENT: ICD-10-PCS | Mod: CPTII,S$GLB,, | Performed by: INTERNAL MEDICINE

## 2022-12-13 PROCEDURE — 3074F SYST BP LT 130 MM HG: CPT | Mod: CPTII,S$GLB,, | Performed by: INTERNAL MEDICINE

## 2022-12-13 PROCEDURE — 1159F MED LIST DOCD IN RCRD: CPT | Mod: CPTII,S$GLB,, | Performed by: INTERNAL MEDICINE

## 2022-12-13 PROCEDURE — 3078F PR MOST RECENT DIASTOLIC BLOOD PRESSURE < 80 MM HG: ICD-10-PCS | Mod: CPTII,S$GLB,, | Performed by: INTERNAL MEDICINE

## 2022-12-13 PROCEDURE — 3066F NEPHROPATHY DOC TX: CPT | Mod: CPTII,S$GLB,, | Performed by: INTERNAL MEDICINE

## 2022-12-13 PROCEDURE — 3008F PR BODY MASS INDEX (BMI) DOCUMENTED: ICD-10-PCS | Mod: CPTII,S$GLB,, | Performed by: INTERNAL MEDICINE

## 2022-12-13 PROCEDURE — 3074F PR MOST RECENT SYSTOLIC BLOOD PRESSURE < 130 MM HG: ICD-10-PCS | Mod: CPTII,S$GLB,, | Performed by: INTERNAL MEDICINE

## 2022-12-13 PROCEDURE — 3061F PR NEG MICROALBUMINURIA RESULT DOCUMENTED/REVIEW: ICD-10-PCS | Mod: CPTII,S$GLB,, | Performed by: INTERNAL MEDICINE

## 2022-12-13 PROCEDURE — 4010F ACE/ARB THERAPY RXD/TAKEN: CPT | Mod: CPTII,S$GLB,, | Performed by: INTERNAL MEDICINE

## 2022-12-13 PROCEDURE — 99999 PR PBB SHADOW E&M-EST. PATIENT-LVL IV: CPT | Mod: PBBFAC,,, | Performed by: INTERNAL MEDICINE

## 2022-12-13 PROCEDURE — 3044F HG A1C LEVEL LT 7.0%: CPT | Mod: CPTII,S$GLB,, | Performed by: INTERNAL MEDICINE

## 2022-12-13 PROCEDURE — 3044F PR MOST RECENT HEMOGLOBIN A1C LEVEL <7.0%: ICD-10-PCS | Mod: CPTII,S$GLB,, | Performed by: INTERNAL MEDICINE

## 2022-12-13 PROCEDURE — 99205 PR OFFICE/OUTPT VISIT, NEW, LEVL V, 60-74 MIN: ICD-10-PCS | Mod: S$GLB,,, | Performed by: INTERNAL MEDICINE

## 2022-12-13 PROCEDURE — 1100F PR PT FALLS ASSESS DOC 2+ FALLS/FALL W/INJURY/YR: ICD-10-PCS | Mod: CPTII,S$GLB,, | Performed by: INTERNAL MEDICINE

## 2022-12-13 PROCEDURE — 1159F PR MEDICATION LIST DOCUMENTED IN MEDICAL RECORD: ICD-10-PCS | Mod: CPTII,S$GLB,, | Performed by: INTERNAL MEDICINE

## 2022-12-13 PROCEDURE — 99205 OFFICE O/P NEW HI 60 MIN: CPT | Mod: S$GLB,,, | Performed by: INTERNAL MEDICINE

## 2022-12-13 PROCEDURE — 3288F FALL RISK ASSESSMENT DOCD: CPT | Mod: CPTII,S$GLB,, | Performed by: INTERNAL MEDICINE

## 2022-12-13 PROCEDURE — 4010F PR ACE/ARB THEARPY RXD/TAKEN: ICD-10-PCS | Mod: CPTII,S$GLB,, | Performed by: INTERNAL MEDICINE

## 2022-12-13 PROCEDURE — 3066F PR DOCUMENTATION OF TREATMENT FOR NEPHROPATHY: ICD-10-PCS | Mod: CPTII,S$GLB,, | Performed by: INTERNAL MEDICINE

## 2022-12-13 PROCEDURE — 3078F DIAST BP <80 MM HG: CPT | Mod: CPTII,S$GLB,, | Performed by: INTERNAL MEDICINE

## 2022-12-13 PROCEDURE — 3008F BODY MASS INDEX DOCD: CPT | Mod: CPTII,S$GLB,, | Performed by: INTERNAL MEDICINE

## 2022-12-13 PROCEDURE — 1100F PTFALLS ASSESS-DOCD GE2>/YR: CPT | Mod: CPTII,S$GLB,, | Performed by: INTERNAL MEDICINE

## 2022-12-13 NOTE — PROGRESS NOTES
Subjective:    Patient ID:  Parisa Dimas is a 67 y.o. female who presents for follow-up of Atrial Fibrillation      67 yF referred for AF management. She was under evaluation for subaortic membrane/HOCM by Dr Marrero. Cardiac MRI revealed no LGE 5/22. ECho 11/16/22 with no evidence of outflow obstruction or gradient. She is due for stress test tomorrow. She was recently in [place] (early Dec 2022) and noticed more TOMLINSON and fatigue as well as palpitations. ECG 12/12/22 showed AF. Eliquis was started; verapamil was used in place of amlodipine. No syncope or near syncope.     11/16/22:  · The left ventricle is normal in size with concentric remodeling and hyperdynamic systolic function. The estimated ejection fraction is 75%.  · Normal right ventricular size with normal right ventricular systolic function.  · Normal left ventricular diastolic function.  · Mild left atrial enlargement.  · The estimated PA systolic pressure is 23 mmHg.  · Normal central venous pressure (3 mmHg).  · In this study, the highest LV outflow velocities did not exceed 2 m/s, even with the Valsalva maneuver. No significant outflow obstruction is evident    Cardic MRI 5/22:  1. LV volumes are normal. LV mass is normal. LVEF = 69%.   2. RV volumes are normal. RVEF = 67%.  3. No LGE appreciated  4. High velocity flow is noted in the LVOT on cine imaging without evidence of septal obstruction.  The differential diagnosis for this finding including a subaortic membrane.  However only trivial AI was detected.    Calvin Marrero    Past Medical History:  No date: AR (allergic rhinitis)  No date: AR (allergic rhinitis)  No date: Carotid stenosis      Comment:  50% RCA  10/2014: Colon polyp  No date: Diabetes mellitus  No date: Diabetes mellitus, type 2  No date: Fatty liver  No date: GERD (gastroesophageal reflux disease)  1/27/2022: Heart murmur  No date: HLD (hyperlipidemia)  No date: HTN (hypertension)  No date: Joint pain  2018: Sleep apnea  3/25/2021:  Stricture of artery    Past Surgical History:  3/7/2022: REVERSE TOTAL SHOULDER ARTHROPLASTY; Left      Comment:  Procedure: ARTHROPLASTY, SHOULDER, TOTAL, REVERSE;                 Surgeon: MISSY Santoyo MD;  Location: [facility] OR [address] [place] [address];  Service: Orthopedics;  Laterality: Left;  AND BICEP               SYNDEMOSIS  10yrs.: SKIN BIOPSY      Comment:  negative    Social History    Socioeconomic History      Marital status:       Spouse name: Pio      Number of children: 4    Occupational History        Comment: retired- school work    Tobacco Use      Smoking status: Never      Smokeless tobacco: Never    Substance and Sexual Activity      Alcohol use: Not Currently        Alcohol/week: 0.0 standard drinks      Drug use: No      Sexual activity: Yes        Partners: Male        Birth control/protection: Post-menopausal    Other Topics      Concerns:        Are you pregnant or think you may be?: No        Breast-feeding: No    Social History Narrative      Stairs- none    Social Determinants of Health  Financial Resource Strain: Low Risk       Difficulty of Paying Living Expenses: Not hard at all  Food Insecurity: No Food Insecurity      Worried About Running Out of Food in the Last Year: Never true      Ran Out of Food in the Last Year: Never true  Transportation Needs: No Transportation Needs      Lack of Transportation (Medical): No      Lack of Transportation (Non-Medical): No  Physical Activity: Insufficiently Active      Days of Exercise per Week: 2 days      Minutes of Exercise per Session: 20 min  Stress: No Stress Concern Present      Feeling of Stress : Only a little  Social Connections: Socially Integrated      Frequency of Communication with Friends and Family: More than three times a week      Frequency of Social Gatherings with Friends and Family: More than three times a week      Attends Denominational Services: More than 4 times per year      Active Member of Clubs or  Organizations: Yes      Attends Club or Organization Meetings: More than 4 times per year      Marital Status:   Housing Stability: Low Risk       Unable to Pay for Housing in the Last Year: No      Number of Places Lived in the Last Year: 1      Unstable Housing in the Last Year: No    Review of patient's family history indicates:      Current Outpatient Medications:  apixaban (ELIQUIS) 5 mg Tab, Take 1 tablet (5 mg total) by mouth 2 (two) times daily., Disp: 180 tablet, Rfl: 3  ascorbic acid, vitamin C, (VITAMIN C) 1000 MG tablet, Take 1,000 mg by mouth once daily., Disp: , Rfl:   aspirin (ECOTRIN) 81 MG EC tablet, Take 81 mg by mouth once daily., Disp: , Rfl:   blood-glucose meter kit, To check BG 1 times daily, to use with insurance preferred meter, Disp: 1 each, Rfl: 0  celecoxib (CELEBREX) 200 MG capsule, Take 1 capsule (200 mg total) by mouth once daily. (Patient not taking: Reported on 12/12/2022), Disp: 60 capsule, Rfl: 2  cetirizine (ZYRTEC) 10 MG tablet, Take 10 mg by mouth once daily., Disp: , Rfl:   hydroCHLOROthiazide (HYDRODIURIL) 25 MG tablet, TAKE 1 TABLET BY MOUTH ONCE DAILY, Disp: 90 tablet, Rfl: 3  L.ACID/L.CASEI/B.BIF/B.MELISSA/FOS (PROBIOTIC BLEND ORAL), Take 2 tablets by mouth once daily., Disp: , Rfl:   lancets Misc, To check BG 1 times daily, to use with insurance preferred meter, Disp: 100 each, Rfl: 11  lovastatin (MEVACOR) 20 MG tablet, TAKE 1 TABLET BY MOUTH IN  THE EVENING, Disp: 90 tablet, Rfl: 3  magnesium 30 mg Tab, Take 1 tablet by mouth Daily., Disp: , Rfl:   metFORMIN (GLUCOPHAGE-XR) 500 MG ER 24hr tablet, Take 2 tablets (1,000 mg total) by mouth once daily., Disp: 180 tablet, Rfl: 3  multivitamin capsule, Take 1 capsule by mouth once daily., Disp: , Rfl:   OMEGA-3S/DHA/EPA/FISH OIL (OMEGA 3 ORAL), Take 2,800 mg by mouth once daily., Disp: , Rfl:   omeprazole (PRILOSEC) 40 MG capsule, TAKE 1 CAPSULE BY MOUTH  ONCE DAILY, Disp: 90 capsule, Rfl: 2  OPW TEST CLAIM - DO NOT FILL,  OPW test claim. Do not fill., Disp: 1 tablet, Rfl: 0  valsartan (DIOVAN) 320 MG tablet, Take 1 tablet (320 mg total) by mouth once daily., Disp: 90 tablet, Rfl: 1  verapamiL (VERELAN) 120 MG C24P, Take 1 capsule (120 mg total) by mouth once daily., Disp: 90 capsule, Rfl: 3  vitamin D (VITAMIN D3) 1000 units Tab, Take 1,000 Units by mouth once daily., Disp: , Rfl:   vitamin E 400 UNIT capsule, Take 400 Units by mouth once daily., Disp: , Rfl:   zinc sulfate (ZINC-15 ORAL), Take by mouth., Disp: , Rfl:     No current facility-administered medications for this visit.            Review of Systems   Constitutional: Positive for malaise/fatigue.   HENT: Negative.     Eyes: Negative.    Cardiovascular:  Positive for irregular heartbeat. Negative for chest pain, dyspnea on exertion, leg swelling, near-syncope, palpitations and syncope.   Respiratory:  Positive for shortness of breath.    Endocrine: Negative.    Hematologic/Lymphatic: Negative.    Skin: Negative.    Musculoskeletal: Negative.    Gastrointestinal: Negative.    Genitourinary: Negative.    Neurological: Negative.  Negative for dizziness and light-headedness.   Psychiatric/Behavioral: Negative.     Allergic/Immunologic: Negative.       Objective:    Physical Exam  Vitals reviewed.   Constitutional:       General: She is not in acute distress.     Appearance: She is well-developed.   HENT:      Head: Normocephalic and atraumatic.   Eyes:      Pupils: Pupils are equal, round, and reactive to light.   Neck:      Thyroid: No thyromegaly.      Vascular: No JVD.   Cardiovascular:      Rate and Rhythm: Normal rate and regular rhythm.      Chest Wall: PMI is not displaced.      Heart sounds: Normal heart sounds, S1 normal and S2 normal. No murmur heard.    No friction rub. No gallop.   Pulmonary:      Effort: Pulmonary effort is normal. No respiratory distress.      Breath sounds: Normal breath sounds. No wheezing or rales.   Abdominal:      General: Bowel sounds are  normal. There is no distension.      Palpations: Abdomen is soft.      Tenderness: There is no abdominal tenderness. There is no guarding or rebound.   Musculoskeletal:         General: No tenderness. Normal range of motion.      Cervical back: Normal range of motion.   Skin:     General: Skin is warm and dry.      Findings: No erythema or rash.   Neurological:      Mental Status: She is alert and oriented to person, place, and time.      Cranial Nerves: No cranial nerve deficit.   Psychiatric:         Behavior: Behavior normal.         Thought Content: Thought content normal.         Judgment: Judgment normal.     ECg: AF with controlled V rates, nl QRS        Assessment:       1. New onset atrial fibrillation         Plan:       67 yoM here for AF management. She is under evaluation for dynamic outflow obstruction. She has no definitive diagnosis despite comprehensive testing. Even if she did have a significant gradient, she would not meet criteria for ICD. With regard to AF, she is symptomatic. I discussed AF and its basic pathophysiology, including its health implications and treatment options with the patient. Specifically, I addressed the need for CVA prophylaxis as well as the goal to reduce symptomatic arrhythmic episodes by pharmacologic and/or procedural methods. Will plan on MILDRED/CV next week and reassess symptoms afterwards.

## 2022-12-13 NOTE — H&P (VIEW-ONLY)
Subjective:    Patient ID:  Parisa Dimas is a 67 y.o. female who presents for follow-up of Atrial Fibrillation      67 yF referred for AF management. She was under evaluation for subaortic membrane/HOCM by Dr Marrero. Cardiac MRI revealed no LGE 5/22. ECho 11/16/22 with no evidence of outflow obstruction or gradient. She is due for stress test tomorrow. She was recently in [place] (early Dec 2022) and noticed more TOMLINSON and fatigue as well as palpitations. ECG 12/12/22 showed AF. Eliquis was started; verapamil was used in place of amlodipine. No syncope or near syncope.     11/16/22:  · The left ventricle is normal in size with concentric remodeling and hyperdynamic systolic function. The estimated ejection fraction is 75%.  · Normal right ventricular size with normal right ventricular systolic function.  · Normal left ventricular diastolic function.  · Mild left atrial enlargement.  · The estimated PA systolic pressure is 23 mmHg.  · Normal central venous pressure (3 mmHg).  · In this study, the highest LV outflow velocities did not exceed 2 m/s, even with the Valsalva maneuver. No significant outflow obstruction is evident    Cardic MRI 5/22:  1. LV volumes are normal. LV mass is normal. LVEF = 69%.   2. RV volumes are normal. RVEF = 67%.  3. No LGE appreciated  4. High velocity flow is noted in the LVOT on cine imaging without evidence of septal obstruction.  The differential diagnosis for this finding including a subaortic membrane.  However only trivial AI was detected.    Calvin Marrero    Past Medical History:  No date: AR (allergic rhinitis)  No date: AR (allergic rhinitis)  No date: Carotid stenosis      Comment:  50% RCA  10/2014: Colon polyp  No date: Diabetes mellitus  No date: Diabetes mellitus, type 2  No date: Fatty liver  No date: GERD (gastroesophageal reflux disease)  1/27/2022: Heart murmur  No date: HLD (hyperlipidemia)  No date: HTN (hypertension)  No date: Joint pain  2018: Sleep apnea  3/25/2021:  Stricture of artery    Past Surgical History:  3/7/2022: REVERSE TOTAL SHOULDER ARTHROPLASTY; Left      Comment:  Procedure: ARTHROPLASTY, SHOULDER, TOTAL, REVERSE;                 Surgeon: MISSY Santoyo MD;  Location: [facility] OR [address] [place] [address];  Service: Orthopedics;  Laterality: Left;  AND BICEP               SYNDEMOSIS  10yrs.: SKIN BIOPSY      Comment:  negative    Social History    Socioeconomic History      Marital status:       Spouse name: Pio      Number of children: 4    Occupational History        Comment: retired- school work    Tobacco Use      Smoking status: Never      Smokeless tobacco: Never    Substance and Sexual Activity      Alcohol use: Not Currently        Alcohol/week: 0.0 standard drinks      Drug use: No      Sexual activity: Yes        Partners: Male        Birth control/protection: Post-menopausal    Other Topics      Concerns:        Are you pregnant or think you may be?: No        Breast-feeding: No    Social History Narrative      Stairs- none    Social Determinants of Health  Financial Resource Strain: Low Risk       Difficulty of Paying Living Expenses: Not hard at all  Food Insecurity: No Food Insecurity      Worried About Running Out of Food in the Last Year: Never true      Ran Out of Food in the Last Year: Never true  Transportation Needs: No Transportation Needs      Lack of Transportation (Medical): No      Lack of Transportation (Non-Medical): No  Physical Activity: Insufficiently Active      Days of Exercise per Week: 2 days      Minutes of Exercise per Session: 20 min  Stress: No Stress Concern Present      Feeling of Stress : Only a little  Social Connections: Socially Integrated      Frequency of Communication with Friends and Family: More than three times a week      Frequency of Social Gatherings with Friends and Family: More than three times a week      Attends Islam Services: More than 4 times per year      Active Member of Clubs or  Organizations: Yes      Attends Club or Organization Meetings: More than 4 times per year      Marital Status:   Housing Stability: Low Risk       Unable to Pay for Housing in the Last Year: No      Number of Places Lived in the Last Year: 1      Unstable Housing in the Last Year: No    Review of patient's family history indicates:      Current Outpatient Medications:  apixaban (ELIQUIS) 5 mg Tab, Take 1 tablet (5 mg total) by mouth 2 (two) times daily., Disp: 180 tablet, Rfl: 3  ascorbic acid, vitamin C, (VITAMIN C) 1000 MG tablet, Take 1,000 mg by mouth once daily., Disp: , Rfl:   aspirin (ECOTRIN) 81 MG EC tablet, Take 81 mg by mouth once daily., Disp: , Rfl:   blood-glucose meter kit, To check BG 1 times daily, to use with insurance preferred meter, Disp: 1 each, Rfl: 0  celecoxib (CELEBREX) 200 MG capsule, Take 1 capsule (200 mg total) by mouth once daily. (Patient not taking: Reported on 12/12/2022), Disp: 60 capsule, Rfl: 2  cetirizine (ZYRTEC) 10 MG tablet, Take 10 mg by mouth once daily., Disp: , Rfl:   hydroCHLOROthiazide (HYDRODIURIL) 25 MG tablet, TAKE 1 TABLET BY MOUTH ONCE DAILY, Disp: 90 tablet, Rfl: 3  L.ACID/L.CASEI/B.BIF/B.MELISSA/FOS (PROBIOTIC BLEND ORAL), Take 2 tablets by mouth once daily., Disp: , Rfl:   lancets Misc, To check BG 1 times daily, to use with insurance preferred meter, Disp: 100 each, Rfl: 11  lovastatin (MEVACOR) 20 MG tablet, TAKE 1 TABLET BY MOUTH IN  THE EVENING, Disp: 90 tablet, Rfl: 3  magnesium 30 mg Tab, Take 1 tablet by mouth Daily., Disp: , Rfl:   metFORMIN (GLUCOPHAGE-XR) 500 MG ER 24hr tablet, Take 2 tablets (1,000 mg total) by mouth once daily., Disp: 180 tablet, Rfl: 3  multivitamin capsule, Take 1 capsule by mouth once daily., Disp: , Rfl:   OMEGA-3S/DHA/EPA/FISH OIL (OMEGA 3 ORAL), Take 2,800 mg by mouth once daily., Disp: , Rfl:   omeprazole (PRILOSEC) 40 MG capsule, TAKE 1 CAPSULE BY MOUTH  ONCE DAILY, Disp: 90 capsule, Rfl: 2  OPW TEST CLAIM - DO NOT FILL,  OPW test claim. Do not fill., Disp: 1 tablet, Rfl: 0  valsartan (DIOVAN) 320 MG tablet, Take 1 tablet (320 mg total) by mouth once daily., Disp: 90 tablet, Rfl: 1  verapamiL (VERELAN) 120 MG C24P, Take 1 capsule (120 mg total) by mouth once daily., Disp: 90 capsule, Rfl: 3  vitamin D (VITAMIN D3) 1000 units Tab, Take 1,000 Units by mouth once daily., Disp: , Rfl:   vitamin E 400 UNIT capsule, Take 400 Units by mouth once daily., Disp: , Rfl:   zinc sulfate (ZINC-15 ORAL), Take by mouth., Disp: , Rfl:     No current facility-administered medications for this visit.            Review of Systems   Constitutional: Positive for malaise/fatigue.   HENT: Negative.     Eyes: Negative.    Cardiovascular:  Positive for irregular heartbeat. Negative for chest pain, dyspnea on exertion, leg swelling, near-syncope, palpitations and syncope.   Respiratory:  Positive for shortness of breath.    Endocrine: Negative.    Hematologic/Lymphatic: Negative.    Skin: Negative.    Musculoskeletal: Negative.    Gastrointestinal: Negative.    Genitourinary: Negative.    Neurological: Negative.  Negative for dizziness and light-headedness.   Psychiatric/Behavioral: Negative.     Allergic/Immunologic: Negative.       Objective:    Physical Exam  Vitals reviewed.   Constitutional:       General: She is not in acute distress.     Appearance: She is well-developed.   HENT:      Head: Normocephalic and atraumatic.   Eyes:      Pupils: Pupils are equal, round, and reactive to light.   Neck:      Thyroid: No thyromegaly.      Vascular: No JVD.   Cardiovascular:      Rate and Rhythm: Normal rate and regular rhythm.      Chest Wall: PMI is not displaced.      Heart sounds: Normal heart sounds, S1 normal and S2 normal. No murmur heard.    No friction rub. No gallop.   Pulmonary:      Effort: Pulmonary effort is normal. No respiratory distress.      Breath sounds: Normal breath sounds. No wheezing or rales.   Abdominal:      General: Bowel sounds are  normal. There is no distension.      Palpations: Abdomen is soft.      Tenderness: There is no abdominal tenderness. There is no guarding or rebound.   Musculoskeletal:         General: No tenderness. Normal range of motion.      Cervical back: Normal range of motion.   Skin:     General: Skin is warm and dry.      Findings: No erythema or rash.   Neurological:      Mental Status: She is alert and oriented to person, place, and time.      Cranial Nerves: No cranial nerve deficit.   Psychiatric:         Behavior: Behavior normal.         Thought Content: Thought content normal.         Judgment: Judgment normal.     ECg: AF with controlled V rates, nl QRS        Assessment:       1. New onset atrial fibrillation         Plan:       67 yoM here for AF management. She is under evaluation for dynamic outflow obstruction. She has no definitive diagnosis despite comprehensive testing. Even if she did have a significant gradient, she would not meet criteria for ICD. With regard to AF, she is symptomatic. I discussed AF and its basic pathophysiology, including its health implications and treatment options with the patient. Specifically, I addressed the need for CVA prophylaxis as well as the goal to reduce symptomatic arrhythmic episodes by pharmacologic and/or procedural methods. Will plan on MILDRED/CV next week and reassess symptoms afterwards.

## 2022-12-14 ENCOUNTER — HOSPITAL ENCOUNTER (OUTPATIENT)
Dept: CARDIOLOGY | Facility: HOSPITAL | Age: 67
Discharge: HOME | End: 2022-12-14
Attending: INTERNAL MEDICINE
Payer: MEDICARE

## 2022-12-14 VITALS — WEIGHT: 199 LBS | BODY MASS INDEX: 35.26 KG/M2 | HEIGHT: 63 IN

## 2022-12-14 DIAGNOSIS — I42.1 HOCM (HYPERTROPHIC OBSTRUCTIVE CARDIOMYOPATHY): ICD-10-CM

## 2022-12-14 DIAGNOSIS — I48.91 ATRIAL FIBRILLATION, UNSPECIFIED TYPE: Primary | ICD-10-CM

## 2022-12-14 LAB
ASCENDING AORTA: 2.89 CM
BSA FOR ECHO PROCEDURE: 2 M2
CV ECHO LV RWT: 0.42 CM
CV STRESS BASE HR: 77 BPM
DIASTOLIC BLOOD PRESSURE: 66 MMHG
DOP CALC LVOT AREA: 3.7 CM2
DOP CALC LVOT DIAMETER: 2.16 CM
E WAVE DECELERATION TIME: 194.49 MSEC
E/A RATIO: 1.58
E/E' RATIO: 12.44 M/S
ECHO LV POSTERIOR WALL: 0.9 CM (ref 0.6–1.1)
EJECTION FRACTION: 65 %
FRACTIONAL SHORTENING: 33 % (ref 28–44)
INTERVENTRICULAR SEPTUM: 0.93 CM (ref 0.6–1.1)
IVRT: 74.22 MSEC
LA MAJOR: 5.86 CM
LA MINOR: 6.07 CM
LA WIDTH: 3.08 CM
LEFT ATRIUM SIZE: 3.52 CM
LEFT ATRIUM VOLUME INDEX: 28.5 ML/M2
LEFT ATRIUM VOLUME: 54.95 CM3
LEFT INTERNAL DIMENSION IN SYSTOLE: 2.86 CM (ref 2.1–4)
LEFT VENTRICLE DIASTOLIC VOLUME INDEX: 42.41 ML/M2
LEFT VENTRICLE DIASTOLIC VOLUME: 81.86 ML
LEFT VENTRICLE MASS INDEX: 65 G/M2
LEFT VENTRICLE SYSTOLIC VOLUME INDEX: 16.2 ML/M2
LEFT VENTRICLE SYSTOLIC VOLUME: 31.23 ML
LEFT VENTRICULAR INTERNAL DIMENSION IN DIASTOLE: 4.27 CM (ref 3.5–6)
LEFT VENTRICULAR MASS: 124.67 G
LV LATERAL E/E' RATIO: 11.2 M/S
LV SEPTAL E/E' RATIO: 14 M/S
MV A" WAVE DURATION": 6.85 MSEC
MV PEAK A VEL: 0.71 M/S
MV PEAK E VEL: 1.12 M/S
MV STENOSIS PRESSURE HALF TIME: 56.4 MS
MV VALVE AREA P 1/2 METHOD: 3.9 CM2
OHS CV CPX 1 MINUTE RECOVERY HEART RATE: 111 BPM
OHS CV CPX 85 PERCENT MAX PREDICTED HEART RATE MALE: 123
OHS CV CPX ESTIMATED METS: 8
OHS CV CPX MAX PREDICTED HEART RATE: 145
OHS CV CPX PATIENT IS FEMALE: 1
OHS CV CPX PATIENT IS MALE: 0
OHS CV CPX PEAK DIASTOLIC BLOOD PRESSURE: 41 MMHG
OHS CV CPX PEAK HEAR RATE: 123 BPM
OHS CV CPX PEAK RATE PRESSURE PRODUCT: NORMAL
OHS CV CPX PEAK SYSTOLIC BLOOD PRESSURE: 201 MMHG
OHS CV CPX PERCENT MAX PREDICTED HEART RATE ACHIEVED: 85
OHS CV CPX RATE PRESSURE PRODUCT PRESENTING: NORMAL
PISA TR MAX VEL: 2.45 M/S
PULM VEIN S/D RATIO: 0.9
PV PEAK D VEL: 0.51 M/S
PV PEAK S VEL: 0.46 M/S
RA MAJOR: 5.65 CM
RA PRESSURE: 3 MMHG
RA WIDTH: 2.75 CM
RIGHT VENTRICULAR END-DIASTOLIC DIMENSION: 3.36 CM
RV TISSUE DOPPLER FREE WALL SYSTOLIC VELOCITY 1 (APICAL 4 CHAMBER VIEW): 15.65 CM/S
SINUS: 3.33 CM
STJ: 2.53 CM
STRESS ECHO POST EXERCISE DUR MIN: 5 MINUTES
STRESS ECHO POST EXERCISE DUR SEC: 23 SECONDS
STRESS ST DEPRESSION: 0.5 MM
SYSTOLIC BLOOD PRESSURE: 140 MMHG
TDI LATERAL: 0.1 M/S
TDI SEPTAL: 0.08 M/S
TDI: 0.09 M/S
TR MAX PG: 24 MMHG
TRICUSPID ANNULAR PLANE SYSTOLIC EXCURSION: 3.03 CM
TV REST PULMONARY ARTERY PRESSURE: 27 MMHG

## 2022-12-14 PROCEDURE — 93351 STRESS TTE COMPLETE: CPT | Mod: 26,,, | Performed by: INTERNAL MEDICINE

## 2022-12-14 PROCEDURE — 93351 STRESS ECHO (CUPID ONLY): ICD-10-PCS | Mod: 26,,, | Performed by: INTERNAL MEDICINE

## 2022-12-14 PROCEDURE — 93351 STRESS TTE COMPLETE: CPT

## 2022-12-15 ENCOUNTER — PATIENT MESSAGE (OUTPATIENT)
Dept: ELECTROPHYSIOLOGY | Facility: CLINIC | Age: 67
End: 2022-12-15
Payer: MEDICARE

## 2022-12-20 ENCOUNTER — LAB VISIT (OUTPATIENT)
Dept: LAB | Facility: HOSPITAL | Age: 67
End: 2022-12-20
Attending: INTERNAL MEDICINE
Payer: MEDICARE

## 2022-12-20 DIAGNOSIS — I48.91 ATRIAL FIBRILLATION, UNSPECIFIED TYPE: ICD-10-CM

## 2022-12-20 LAB
ANION GAP SERPL CALC-SCNC: 11 MMOL/L (ref 8–16)
APTT BLDCRRT: 32.4 SEC (ref 21–32)
BASOPHILS # BLD AUTO: 0.04 K/UL (ref 0–0.2)
BASOPHILS NFR BLD: 1 % (ref 0–1.9)
BUN SERPL-MCNC: 15 MG/DL (ref 8–23)
CALCIUM SERPL-MCNC: 10.1 MG/DL (ref 8.7–10.5)
CHLORIDE SERPL-SCNC: 100 MMOL/L (ref 95–110)
CO2 SERPL-SCNC: 27 MMOL/L (ref 23–29)
CREAT SERPL-MCNC: 0.8 MG/DL (ref 0.5–1.4)
DIFFERENTIAL METHOD: NORMAL
EOSINOPHIL # BLD AUTO: 0.1 K/UL (ref 0–0.5)
EOSINOPHIL NFR BLD: 2.7 % (ref 0–8)
ERYTHROCYTE [DISTWIDTH] IN BLOOD BY AUTOMATED COUNT: 13.4 % (ref 11.5–14.5)
EST. GFR  (NO RACE VARIABLE): >60 ML/MIN/1.73 M^2
GLUCOSE SERPL-MCNC: 141 MG/DL (ref 70–110)
HCT VFR BLD AUTO: 41.5 % (ref 37–48.5)
HGB BLD-MCNC: 13.5 G/DL (ref 12–16)
IMM GRANULOCYTES # BLD AUTO: 0.01 K/UL (ref 0–0.04)
IMM GRANULOCYTES NFR BLD AUTO: 0.2 % (ref 0–0.5)
INR PPP: 1.1 (ref 0.8–1.2)
LYMPHOCYTES # BLD AUTO: 1.6 K/UL (ref 1–4.8)
LYMPHOCYTES NFR BLD: 38.9 % (ref 18–48)
MCH RBC QN AUTO: 29.5 PG (ref 27–31)
MCHC RBC AUTO-ENTMCNC: 32.5 G/DL (ref 32–36)
MCV RBC AUTO: 91 FL (ref 82–98)
MONOCYTES # BLD AUTO: 0.4 K/UL (ref 0.3–1)
MONOCYTES NFR BLD: 9.9 % (ref 4–15)
NEUTROPHILS # BLD AUTO: 1.9 K/UL (ref 1.8–7.7)
NEUTROPHILS NFR BLD: 47.3 % (ref 38–73)
NRBC BLD-RTO: 0 /100 WBC
PLATELET # BLD AUTO: 185 K/UL (ref 150–450)
PMV BLD AUTO: 10.1 FL (ref 9.2–12.9)
POTASSIUM SERPL-SCNC: 4.1 MMOL/L (ref 3.5–5.1)
PROTHROMBIN TIME: 11.4 SEC (ref 9–12.5)
RBC # BLD AUTO: 4.57 M/UL (ref 4–5.4)
SODIUM SERPL-SCNC: 138 MMOL/L (ref 136–145)
WBC # BLD AUTO: 4.04 K/UL (ref 3.9–12.7)

## 2022-12-20 PROCEDURE — 85610 PROTHROMBIN TIME: CPT | Performed by: INTERNAL MEDICINE

## 2022-12-20 PROCEDURE — 36415 COLL VENOUS BLD VENIPUNCTURE: CPT | Performed by: INTERNAL MEDICINE

## 2022-12-20 PROCEDURE — 85730 THROMBOPLASTIN TIME PARTIAL: CPT | Performed by: INTERNAL MEDICINE

## 2022-12-20 PROCEDURE — 85025 COMPLETE CBC W/AUTO DIFF WBC: CPT | Performed by: INTERNAL MEDICINE

## 2022-12-20 PROCEDURE — 80048 BASIC METABOLIC PNL TOTAL CA: CPT | Performed by: INTERNAL MEDICINE

## 2022-12-23 ENCOUNTER — TELEPHONE (OUTPATIENT)
Dept: ELECTROPHYSIOLOGY | Facility: CLINIC | Age: 67
End: 2022-12-23
Payer: MEDICARE

## 2022-12-23 NOTE — TELEPHONE ENCOUNTER
Spoke to Parisa Dimas    CONFIRMED procedure arrival time of 9:00am for Cardioversion     Reiterated instructions including:  -Directions to check in desk  -NPO after midnight night prior to procedure  -High importance of HOLDING Metformin  -Confirmed compliance of Eliquis, will take the morning of the procedure.   She has not missed any doses.   -Pre-procedure LABS completed and reviewed. No alerts noted.     -Confirmed absence or presence of implanted device/stimulator.   Has total joint replacement Left Shoulder.   -Confirmed no fever, cough, or shortness of breath in the past 30 days  -Do not wear mascara day of procedure  -Shower prior to arrival   -Reviewed current visitor policy    Patient verbalized understanding of above and appreciated the call.

## 2022-12-27 ENCOUNTER — HOSPITAL ENCOUNTER (OUTPATIENT)
Facility: HOSPITAL | Age: 67
Discharge: HOME | End: 2022-12-27
Attending: INTERNAL MEDICINE | Admitting: INTERNAL MEDICINE
Payer: MEDICARE

## 2022-12-27 DIAGNOSIS — I48.91 ATRIAL FIBRILLATION, UNSPECIFIED TYPE: Primary | ICD-10-CM

## 2022-12-27 DIAGNOSIS — I48.91 A-FIB: ICD-10-CM

## 2022-12-27 PROCEDURE — 93010 EKG 12-LEAD: ICD-10-PCS | Mod: ,,, | Performed by: INTERNAL MEDICINE

## 2022-12-27 PROCEDURE — 99499 UNLISTED E&M SERVICE: CPT | Mod: ,,, | Performed by: INTERNAL MEDICINE

## 2022-12-27 PROCEDURE — 93005 ELECTROCARDIOGRAM TRACING: CPT

## 2022-12-27 PROCEDURE — 99499 NO LOS: ICD-10-PCS | Mod: ,,, | Performed by: INTERNAL MEDICINE

## 2022-12-27 PROCEDURE — 93010 ELECTROCARDIOGRAM REPORT: CPT | Mod: ,,, | Performed by: INTERNAL MEDICINE

## 2022-12-27 NOTE — INTERVAL H&P NOTE
The patient has been examined and the H&P has been reviewed:    I concur with the findings and changes have been noted since the H&P was written: Mrs. Dimas presents today in normal sinus rhythm. She is feeling well today. Presenting ECG reviewed with Dr. Jernigan, MILDRED/DCCV  canceled as patient is in NSR. We will continue current medication regimen including Eliquis and verapamil. Instructed patient to monitor  how she feels in normal rhythm. Will schedule follow up with Dr. Jernigan in 6 weeks. Patient instructed to call sooner if she feels likeshe is out of rhythm or symptomatic.     DAKSHA Motley-C  Cardiology Electrophysiology NP   [facility]     Attending: Davonte Jernigan MD

## 2022-12-27 NOTE — DISCHARGE SUMMARY
[facility] - Short Stay Cardiac Unit  Cardiac Electrophysiology  Discharge Summary      Patient Name: Parisa Dimas  MRN: [id number]  Admission Date: 12/27/2022  Hospital Length of Stay: 0 days  Discharge Date and Time: 12/27/2022 10:21 AM  Attending Physician: Davonte Jernigan MD  Discharging Provider: Janine Milner NP  Primary Care Physician: Alex Cast MD    HPI: Mrs. Dimas presents today in normal sinus rhythm. She is feeling well today. Presenting ECG reviewed with Dr. Jernigan, MILDRED/DCCV canceled as patient is in NSR. We will continue current medication regimen including Eliquis and verapamil. Instructed patient to monitor how she feels in normal rhythm. Will schedule follow up with Dr. Jernigan in 6 weeks. Patient instructed to call sooner if she feels like she is out of rhythm or symptomatic.     Procedure(s) (LRB):  CARDIOVERSION (N/A)  Transesophageal echo (MILDRED) intra-procedure log documentation (N/A)     Indwelling Lines/Drains at time of discharge:  Lines/Drains/Airways     None      Hospital Course: Presenting ECG reviewed with Dr. Jernigan, MILDRED/DCCV  canceled as patient is in NSR. We will continue current medication regimen including Eliquis and verapamil. Instructed patient to monitor how she feels in normal rhythm. Will schedule follow up with Dr. Jernigan in 6 weeks. Patient instructed to call sooner if she feels like she is out of rhythm or symptomatic.     Goals of Care Treatment Preferences:  Code Status: Full Code     Consults: None    Significant Diagnostic Studies: ECG today showed sinus rhythm with 1st degree AV block with PACs at 79 bpm  ms QRS 74 ms QT/QTc 380/435 ms    Final Active Diagnoses:    Diagnosis Date Noted POA    PRINCIPAL PROBLEM:  Atrial fibrillation [I48.91] 12/27/2022 Yes      Problems Resolved During this Admission:     Discharged Condition: stable    Disposition: Home or Self Care    Follow Up:   Follow-up Information     Davonte Jernigan MD Follow up in 6  week(s).    Specialties: Electrophysiology, Cardiovascular Disease  Why: AF  Contact information:  [facility]  [facility] [address]  [phone]                       Patient Instructions:      Notify your health care provider if you experience any of the following:  increased confusion or weakness     Notify your health care provider if you experience any of the following:  persistent dizziness, light-headedness, or visual disturbances     Notify your health care provider if you experience any of the following:  worsening rash     Notify your health care provider if you experience any of the following:  severe persistent headache     Notify your health care provider if you experience any of the following:  difficulty breathing or increased cough     Notify your health care provider if you experience any of the following:  redness, tenderness, or signs of infection (pain, swelling, redness, odor or green/yellow discharge around incision site)     Notify your health care provider if you experience any of the following:  severe uncontrolled pain     Notify your health care provider if you experience any of the following:  persistent nausea and vomiting or diarrhea     Notify your health care provider if you experience any of the following:  temperature >100.4     Activity as tolerated   Order Comments: -Continue all home medications.  -Follow up with Dr. Jernigan in 6 weeks.  -Contact the clinic sooner if you feel you are out of rhythm or symptomatic.     Medications:  Reconciled Home Medications:      Medication List      CONTINUE taking these medications    apixaban 5 mg Tab  Commonly known as: ELIQUIS  Take 1 tablet (5 mg total) by mouth 2 (two) times daily.     ascorbic acid (vitamin C) 1000 MG tablet  Commonly known as: VITAMIN C  Take 1,000 mg by mouth once daily.     aspirin 81 MG EC tablet  Commonly known as: ECOTRIN  Take 81 mg by mouth once daily.     blood-glucose meter kit  To check BG 1 times  daily, to use with insurance preferred meter     cetirizine 10 MG tablet  Commonly known as: ZYRTEC  Take 10 mg by mouth once daily.     hydroCHLOROthiazide 25 MG tablet  Commonly known as: HYDRODIURIL  TAKE 1 TABLET BY MOUTH ONCE DAILY     lancets Misc  To check BG 1 times daily, to use with insurance preferred meter     lovastatin 20 MG tablet  Commonly known as: MEVACOR  TAKE 1 TABLET BY MOUTH IN  THE EVENING     magnesium 30 mg Tab  Take 1 tablet by mouth Daily.     metFORMIN 500 MG ER 24hr tablet  Commonly known as: GLUCOPHAGE-XR  Take 2 tablets (1,000 mg total) by mouth once daily.     multivitamin capsule  Take 1 capsule by mouth once daily.     OMEGA 3 ORAL  Take 2,800 mg by mouth once daily.     omeprazole 40 MG capsule  Commonly known as: PRILOSEC  TAKE 1 CAPSULE BY MOUTH  ONCE DAILY     OPW TEST CLAIM - DO NOT FILL  OPW test claim. Do not fill.     PROBIOTIC BLEND ORAL  Take 2 tablets by mouth once daily.     valsartan 320 MG tablet  Commonly known as: DIOVAN  Take 1 tablet (320 mg total) by mouth once daily.     verapamiL 120 MG C24p  Commonly known as: VERELAN  Take 1 capsule (120 mg total) by mouth once daily.     vitamin D 1000 units Tab  Commonly known as: VITAMIN D3  Take 1,000 Units by mouth once daily.     vitamin E 400 UNIT capsule  Take 400 Units by mouth once daily.     ZINC-15 ORAL  Take by mouth.        ASK your doctor about these medications    celecoxib 200 MG capsule  Commonly known as: CeleBREX  Take 1 capsule (200 mg total) by mouth once daily.          Plan:  -Continue all home medications  -Follow up with Dr. Jernigan in 6 weeks.    Time spent on the discharge of patient: 10 minutes    Janine Milner NP  Cardiac Electrophysiology  [facility] - Arrhythmia    Attending: Davonte Jernigan MD

## 2022-12-27 NOTE — PROGRESS NOTES
Patient in NSR. Procedure cancelled. Pt DC'd per MD order. Discharge instructions given including when to call MD. All questions answered. Patient declined transport and ambulated off unit with .

## 2022-12-30 ENCOUNTER — OFFICE VISIT (OUTPATIENT)
Dept: CARDIOLOGY | Facility: CLINIC | Age: 67
End: 2022-12-30
Payer: MEDICARE

## 2022-12-30 VITALS
OXYGEN SATURATION: 98 % | SYSTOLIC BLOOD PRESSURE: 149 MMHG | DIASTOLIC BLOOD PRESSURE: 71 MMHG | WEIGHT: 204.13 LBS | HEIGHT: 63 IN | HEART RATE: 74 BPM | BODY MASS INDEX: 36.17 KG/M2

## 2022-12-30 DIAGNOSIS — I77.9 BILATERAL CAROTID ARTERY DISEASE, UNSPECIFIED TYPE: ICD-10-CM

## 2022-12-30 DIAGNOSIS — R01.1 HEART MURMUR: ICD-10-CM

## 2022-12-30 DIAGNOSIS — E11.00 TYPE 2 DIABETES MELLITUS WITH HYPEROSMOLARITY WITHOUT COMA, WITHOUT LONG-TERM CURRENT USE OF INSULIN: ICD-10-CM

## 2022-12-30 DIAGNOSIS — I10 PRIMARY HYPERTENSION: ICD-10-CM

## 2022-12-30 DIAGNOSIS — E78.2 MIXED HYPERLIPIDEMIA: ICD-10-CM

## 2022-12-30 DIAGNOSIS — S42.292A CLOSED 4-PART FRACTURE OF PROXIMAL HUMERUS, LEFT, INITIAL ENCOUNTER: ICD-10-CM

## 2022-12-30 DIAGNOSIS — I48.91 ATRIAL FIBRILLATION, UNSPECIFIED TYPE: ICD-10-CM

## 2022-12-30 DIAGNOSIS — Q24.4 SUBAORTIC MEMBRANE: Primary | ICD-10-CM

## 2022-12-30 PROCEDURE — 1125F AMNT PAIN NOTED PAIN PRSNT: CPT | Mod: CPTII,GC,S$GLB,

## 2022-12-30 PROCEDURE — 1125F PR PAIN SEVERITY QUANTIFIED, PAIN PRESENT: ICD-10-PCS | Mod: CPTII,GC,S$GLB,

## 2022-12-30 PROCEDURE — 1159F PR MEDICATION LIST DOCUMENTED IN MEDICAL RECORD: ICD-10-PCS | Mod: CPTII,GC,S$GLB,

## 2022-12-30 PROCEDURE — 3077F PR MOST RECENT SYSTOLIC BLOOD PRESSURE >= 140 MM HG: ICD-10-PCS | Mod: CPTII,GC,S$GLB,

## 2022-12-30 PROCEDURE — 3061F NEG MICROALBUMINURIA REV: CPT | Mod: CPTII,GC,S$GLB,

## 2022-12-30 PROCEDURE — 3066F PR DOCUMENTATION OF TREATMENT FOR NEPHROPATHY: ICD-10-PCS | Mod: CPTII,GC,S$GLB,

## 2022-12-30 PROCEDURE — 1101F PR PT FALLS ASSESS DOC 0-1 FALLS W/OUT INJ PAST YR: ICD-10-PCS | Mod: CPTII,GC,S$GLB,

## 2022-12-30 PROCEDURE — 3061F PR NEG MICROALBUMINURIA RESULT DOCUMENTED/REVIEW: ICD-10-PCS | Mod: CPTII,GC,S$GLB,

## 2022-12-30 PROCEDURE — 3066F NEPHROPATHY DOC TX: CPT | Mod: CPTII,GC,S$GLB,

## 2022-12-30 PROCEDURE — 4010F PR ACE/ARB THEARPY RXD/TAKEN: ICD-10-PCS | Mod: CPTII,GC,S$GLB,

## 2022-12-30 PROCEDURE — 4010F ACE/ARB THERAPY RXD/TAKEN: CPT | Mod: CPTII,GC,S$GLB,

## 2022-12-30 PROCEDURE — 3008F BODY MASS INDEX DOCD: CPT | Mod: CPTII,GC,S$GLB,

## 2022-12-30 PROCEDURE — 3078F PR MOST RECENT DIASTOLIC BLOOD PRESSURE < 80 MM HG: ICD-10-PCS | Mod: CPTII,GC,S$GLB,

## 2022-12-30 PROCEDURE — 1159F MED LIST DOCD IN RCRD: CPT | Mod: CPTII,GC,S$GLB,

## 2022-12-30 PROCEDURE — 3288F FALL RISK ASSESSMENT DOCD: CPT | Mod: CPTII,GC,S$GLB,

## 2022-12-30 PROCEDURE — 99214 PR OFFICE/OUTPT VISIT, EST, LEVL IV, 30-39 MIN: ICD-10-PCS | Mod: GC,S$GLB,,

## 2022-12-30 PROCEDURE — 3078F DIAST BP <80 MM HG: CPT | Mod: CPTII,GC,S$GLB,

## 2022-12-30 PROCEDURE — 3044F PR MOST RECENT HEMOGLOBIN A1C LEVEL <7.0%: ICD-10-PCS | Mod: CPTII,GC,S$GLB,

## 2022-12-30 PROCEDURE — 99999 PR PBB SHADOW E&M-EST. PATIENT-LVL V: CPT | Mod: PBBFAC,GC,,

## 2022-12-30 PROCEDURE — 3008F PR BODY MASS INDEX (BMI) DOCUMENTED: ICD-10-PCS | Mod: CPTII,GC,S$GLB,

## 2022-12-30 PROCEDURE — 1160F RVW MEDS BY RX/DR IN RCRD: CPT | Mod: CPTII,GC,S$GLB,

## 2022-12-30 PROCEDURE — 3077F SYST BP >= 140 MM HG: CPT | Mod: CPTII,GC,S$GLB,

## 2022-12-30 PROCEDURE — 1101F PT FALLS ASSESS-DOCD LE1/YR: CPT | Mod: CPTII,GC,S$GLB,

## 2022-12-30 PROCEDURE — 1160F PR REVIEW ALL MEDS BY PRESCRIBER/CLIN PHARMACIST DOCUMENTED: ICD-10-PCS | Mod: CPTII,GC,S$GLB,

## 2022-12-30 PROCEDURE — 99214 OFFICE O/P EST MOD 30 MIN: CPT | Mod: GC,S$GLB,,

## 2022-12-30 PROCEDURE — 3044F HG A1C LEVEL LT 7.0%: CPT | Mod: CPTII,GC,S$GLB,

## 2022-12-30 PROCEDURE — 3288F PR FALLS RISK ASSESSMENT DOCUMENTED: ICD-10-PCS | Mod: CPTII,GC,S$GLB,

## 2022-12-30 PROCEDURE — 99999 PR PBB SHADOW E&M-EST. PATIENT-LVL V: ICD-10-PCS | Mod: PBBFAC,GC,,

## 2022-12-30 NOTE — PROGRESS NOTES
Parisa Dimas is a 67 y.o. female who presents for follow up of Establish Care and Follow-up    66 yr old with known hypertension,  hyperlipidemia, AF on AC, fatty liver disease, carotid  atherosclerosis but with no significant stenosis, diabetes on metformin extended release 1000 mg daily, sleep apnea intolerant of CPAP was initially seen in Feb 2022 as part of pre op for humerus surgery. Underwent surgery with no complications.   Had h/o heart murmur prior. TTE showed subaortic membrane with gradients as below. Developed new onset Afib and was seen by Dr. Jernigan 12/2022- on Eliquis and verapamil. Stress echo normal.   She was recently in [place] (early Dec 2022) and noticed more TOMLINSON and fatigue as well as palpitations.  and was there for a week. Went for [facility] when  ECG showed NSR. ECG 12/12/22 showed AF. Eliquis was started; verapamil was used in place of amlodipine. No syncope or near syncope.     Enrolled in digital HTN. Doing well since in sinus rhythm. Walks with no chest pain. Sob. Leg swelling, palpitations, orthopnea, PND. Sleeps in recliner since surgery for comfort. Unable to tolerate CPAP.    Patient with cardiac MRI no LGE, concern for subaortic membrane. Also MILDRED with similar findings.     Saw Dr. Fitzpatrick- 11/2022: recommend routine echo surveillance along with medical management. Follow up closely.  Saw Dr. Marrero 12/2022: Likely HCM, RAVINDER and recommed medical management.  Saw Dr. Salguero after for new onset AF, concerted to NSR changed to verapamil and s/w eliquis.    12/14/2022: Stress echo: The stress echo portion of this study is negative for myocardial ischemia.  The ECG portion of this study is negative for myocardial ischemia.  The test was stopped because the patient experienced shortness of breath.  During stress, the following significant arrhythmias were observed: occasional PVCs.  The patient's exercise capacity was normal.  The left ventricle is normal in size with normal  systolic function. The estimated ejection fraction is 65%.  Normal right ventricular size with normal right ventricular systolic function.  Normal left ventricular diastolic function.  The estimated PA systolic pressure is 27 mmHg.  Normal central venous pressure (3 mmHg).      11/16/2022: TTE: The left ventricle is normal in size with concentric remodeling and hyperdynamic systolic function. The estimated ejection fraction is 75%.  Normal right ventricular size with normal right ventricular systolic function.  Normal left ventricular diastolic function.  Mild left atrial enlargement.  The estimated PA systolic pressure is 23 mmHg.  Normal central venous pressure (3 mmHg).  In this study, the highest LV outflow velocities did not exceed 2 m/s, even with the Valsalva maneuver. No significant outflow obstruction is evident    5/2022: Cardiac MRI: LV volumes are normal. LV mass is normal. LVEF = 69%.   RV volumes are normal. RVEF = 67%.  No LGE appreciated  High velocity flow is noted in the LVOT on cine imaging without evidence of septal obstruction.  The differential diagnosis for this finding including a subaortic membrane.  However only trivial AI was detected.    2/8/2022: MILDRED:The left ventricle is normal in size with hyperdynamic systolic function.  The estimated ejection fraction is 70%.  Aortic valve is sclerotic but without aortic insufficiency.  There is a subvalvular membrane extending from the anterior mitral leaflet to the basal interventricular septum. There is associated turbulent flow at the level of this membrane, responsible for increased LVOT flow velocity seen on surface echo.  There is nonspecific thickening of A2/3 mitral leaflet on the left atrial aspect of the mitral valve. No torn chords or mitral regurgitation seen. This could represent a vegetation. Recommend obtaining blood cultures.  Normal right ventricular size with normal right ventricular systolic function.    1/2022: TTE: The left  ventricle is normal in size with concentric remodeling and hyperdynamic systolic function.  The estimated ejection fraction is 70%.  Normal left ventricular diastolic function.  Elevated LVOT and aortic velocities of 3m/s, which increaes to 4m/s with valsalva. Turbulent flow seen along the basal septum. Unable to adequately visualize the aortic valve and LVOT. Consider further imaging evaluation.  Normal right ventricular size with normal right ventricular systolic function.  The estimated PA systolic pressure is 34 mmHg.  Normal central venous pressure (3 mmHg).    HPI  Review of patient's allergies indicates:  No Known Allergies   Lab Results   Component Value Date     12/20/2022    K 4.1 12/20/2022     12/20/2022    CO2 27 12/20/2022    BUN 15 12/20/2022    CREATININE 0.8 12/20/2022     (H) 12/20/2022    HGBA1C 6.9 (H) 10/25/2022    MG 2.1 04/06/2018    AST 85 (H) 10/25/2022     (H) 10/25/2022    ALBUMIN 4.1 10/25/2022    PROT 7.7 10/25/2022    BILITOT 0.6 10/25/2022    WBC 4.04 12/20/2022    HGB 13.5 12/20/2022    HCT 41.5 12/20/2022    MCV 91 12/20/2022     12/20/2022    INR 1.1 12/20/2022    TSH 1.139 10/25/2022         Lab Results   Component Value Date    CHOL 169 10/25/2022    HDL 56 10/25/2022    TRIG 183 (H) 10/25/2022       Lab Results   Component Value Date    LDLCALC 76.4 10/25/2022       Past Medical History:   Diagnosis Date    AR (allergic rhinitis)     AR (allergic rhinitis)     Atrial fibrillation 12/27/2022    Carotid stenosis     50% RCA    Colon polyp 10/2014    Diabetes mellitus     Diabetes mellitus, type 2     Fatty liver     GERD (gastroesophageal reflux disease)     Heart murmur 1/27/2022    HLD (hyperlipidemia)     HTN (hypertension)     Joint pain     Sleep apnea 2018    Stricture of artery 3/25/2021     Past Surgical History:   Procedure Laterality Date    REVERSE TOTAL SHOULDER ARTHROPLASTY Left 3/7/2022    Procedure: ARTHROPLASTY,  SHOULDER, TOTAL, REVERSE;  Surgeon: MISSY Santoyo MD;  Location: [facility] OR [address] [place] [address];  Service: Orthopedics;  Laterality: Left;  AND BICEP SYNDEMOSIS    SKIN BIOPSY  10yrs.    negative      Tobacco Use    Smoking status: Never    Smokeless tobacco: Never   Substance and Sexual Activity    Alcohol use: Not Currently     Alcohol/week: 0.0 standard drinks    Drug use: No    Sexual activity: Yes     Partners: Male     Birth control/protection: Post-menopausal      Family History   Problem Relation Age of Onset    Cancer Mother         gallbladder    Coronary artery disease Father 79    Diabetes Father     Cerebral palsy Sister     Osteoporosis Sister     Epilepsy Sister     Prostate cancer Brother 57    Diabetes Brother     Heart attack Brother     Hypertension Daughter     No Known Problems Son     Diabetes Maternal Aunt     Diabetes Maternal Uncle     Diabetes Maternal Grandmother     Hypertension Other         multiple    Melanoma Neg Hx         Current Outpatient Medications:     apixaban (ELIQUIS) 5 mg Tab, Take 1 tablet (5 mg total) by mouth 2 (two) times daily., Disp: 180 tablet, Rfl: 3    ascorbic acid, vitamin C, (VITAMIN C) 1000 MG tablet, Take 1,000 mg by mouth once daily., Disp: , Rfl:     aspirin (ECOTRIN) 81 MG EC tablet, Take 81 mg by mouth once daily., Disp: , Rfl:     celecoxib (CELEBREX) 200 MG capsule, Take 1 capsule (200 mg total) by mouth once daily., Disp: 60 capsule, Rfl: 2    cetirizine (ZYRTEC) 10 MG tablet, Take 10 mg by mouth once daily., Disp: , Rfl:     hydroCHLOROthiazide (HYDRODIURIL) 25 MG tablet, TAKE 1 TABLET BY MOUTH ONCE DAILY, Disp: 90 tablet, Rfl: 3    L.ACID/L.CASEI/B.BIF/B.MELISSA/FOS (PROBIOTIC BLEND ORAL), Take 2 tablets by mouth once daily., Disp: , Rfl:     lancets Misc, To check BG 1 times daily, to use with insurance preferred meter, Disp: 100 each, Rfl: 11    lovastatin (MEVACOR) 20 MG tablet, TAKE 1 TABLET BY MOUTH IN  THE EVENING, Disp: 90  "tablet, Rfl: 3    magnesium 30 mg Tab, Take 1 tablet by mouth Daily., Disp: , Rfl:     metFORMIN (GLUCOPHAGE-XR) 500 MG ER 24hr tablet, Take 2 tablets (1,000 mg total) by mouth once daily., Disp: 180 tablet, Rfl: 3    multivitamin capsule, Take 1 capsule by mouth once daily., Disp: , Rfl:     OMEGA-3S/DHA/EPA/FISH OIL (OMEGA 3 ORAL), Take 2,800 mg by mouth once daily., Disp: , Rfl:     omeprazole (PRILOSEC) 40 MG capsule, TAKE 1 CAPSULE BY MOUTH  ONCE DAILY, Disp: 90 capsule, Rfl: 2    OPW TEST CLAIM - DO NOT FILL, OPW test claim. Do not fill., Disp: 1 tablet, Rfl: 0    valsartan (DIOVAN) 320 MG tablet, Take 1 tablet (320 mg total) by mouth once daily., Disp: 90 tablet, Rfl: 1    verapamiL (VERELAN) 120 MG C24P, Take 1 capsule (120 mg total) by mouth once daily., Disp: 90 capsule, Rfl: 3    vitamin D (VITAMIN D3) 1000 units Tab, Take 1,000 Units by mouth once daily., Disp: , Rfl:     vitamin E 400 UNIT capsule, Take 400 Units by mouth once daily., Disp: , Rfl:     zinc sulfate (ZINC-15 ORAL), Take by mouth., Disp: , Rfl:     blood-glucose meter kit, To check BG 1 times daily, to use with insurance preferred meter, Disp: 1 each, Rfl: 0          Review of Systems   Reason unable to perform ROS: See HPI.      Objective:BP (!) 149/71 (BP Location: Left arm, Patient Position: Sitting, BP Method: Large (Automatic))   Pulse 74   Ht 5' 3" (1.6 m)   Wt 92.6 kg (204 lb 2.3 oz)   LMP 08/01/2003   SpO2 98%   BMI 36.16 kg/m²             Physical Exam  Vitals and nursing note reviewed.   Constitutional:       Appearance: She is well-developed.   HENT:      Head: Normocephalic and atraumatic.      Mouth/Throat:      Mouth: Mucous membranes are moist.   Eyes:      Pupils: Pupils are equal, round, and reactive to light.   Cardiovascular:      Rate and Rhythm: Normal rate and regular rhythm.      Pulses: Normal pulses and intact distal pulses.      Heart sounds: Murmur (ESM best heard in arotic area.) heard. "   Pulmonary:      Effort: Pulmonary effort is normal. No respiratory distress.      Breath sounds: Normal breath sounds. No rales.   Abdominal:      General: Abdomen is flat. Bowel sounds are normal. There is no distension.      Palpations: Abdomen is soft.      Tenderness: There is no abdominal tenderness. There is no guarding.   Musculoskeletal:      Cervical back: Normal range of motion.      Right lower leg: No edema.      Left lower leg: No edema.   Skin:     General: Skin is warm.   Neurological:      General: No focal deficit present.      Mental Status: She is alert and oriented to person, place, and time.   Psychiatric:         Mood and Affect: Mood normal.       Assessment:       1. Subaortic membrane    2. Bilateral carotid artery disease, unspecified type    3. Heart murmur    4. Primary hypertension    5. Mixed hyperlipidemia    6. Atrial fibrillation, unspecified type    7. Type 2 diabetes mellitus with hyperosmolarity without coma, without long-term current use of insulin    8. Closed 4-part fracture of proximal humerus, left, initial encounter         Plan:       Parisa was seen today for establish care and follow-up.    Subaortic membrane:  67 yr old with concern for JESSICA  with recent TTE did not reveal significant elevated gradients even with valsalva. Seen by Dr. Marrero and Dr. Fitzpatrick.  Will monitor closely with TTE.   There is concern about HCM by Dr. Marrero. Seen by Dr. Jernigan as well for AF and no indication at this time for ICD. Currently on Verapamil.   MRI with no LGE.   No significant gradient, asymptomatic and no significant AI.  For AF - patient on Eliquis.  Informed patient to update me if any change in clinical status to assess gradients and to address management. Dr. Fitzpatrick recommended medical optimization, weight loss if surgery is needed in future.  Repeat TTE in 6 months to re assess dynamic vs. Fixed LVOT obstruction. TTE and MILDRED reviewed and concern for subaortic membrane. Monitor  for AI.    -     Ambulatory referral/consult to Cardiology  -     Echo; Future; Expected date: 06/30/2023    Bilateral carotid artery disease, unspecified type  -     Ambulatory referral/consult to Cardiology    Heart murmur  -     Ambulatory referral/consult to Cardiology    Primary hypertension  Enrolled in digital hypertension.    Mixed hyperlipidemia  Continue medications.       Atrial fibrillation, unspecified type  Controlled on AC, CCB.  SHOAIB  management. Not tolerating CPAP. She addressed with primary team regarding alternative approaches.    Type 2 diabetes mellitus with hyperosmolarity without coma, without long-term current use of insulin      D/W staff

## 2022-12-30 NOTE — PROGRESS NOTES
I have reviewed the patient's chart and the fellow's clinic note, as well as discussed the case with the fellow. I agree with the findings, assessment, and plan.      Jc George MD  [facility]ative [facility] - [facility]  .

## 2023-01-05 ENCOUNTER — OFFICE VISIT (OUTPATIENT)
Dept: SPORTS MEDICINE | Facility: CLINIC | Age: 68
End: 2023-01-05
Payer: MEDICARE

## 2023-01-05 ENCOUNTER — HOSPITAL ENCOUNTER (OUTPATIENT)
Dept: RADIOLOGY | Facility: HOSPITAL | Age: 68
Discharge: HOME | End: 2023-01-05
Attending: ORTHOPAEDIC SURGERY
Payer: MEDICARE

## 2023-01-05 VITALS
HEIGHT: 63 IN | HEART RATE: 80 BPM | WEIGHT: 200 LBS | SYSTOLIC BLOOD PRESSURE: 148 MMHG | DIASTOLIC BLOOD PRESSURE: 88 MMHG | BODY MASS INDEX: 35.44 KG/M2

## 2023-01-05 DIAGNOSIS — Z96.612 STATUS POST REVERSE TOTAL REPLACEMENT OF LEFT SHOULDER: ICD-10-CM

## 2023-01-05 DIAGNOSIS — R29.898 SHOULDER WEAKNESS: Primary | ICD-10-CM

## 2023-01-05 PROCEDURE — 1126F AMNT PAIN NOTED NONE PRSNT: CPT | Mod: CPTII,S$GLB,, | Performed by: ORTHOPAEDIC SURGERY

## 2023-01-05 PROCEDURE — 99999 PR PBB SHADOW E&M-EST. PATIENT-LVL IV: ICD-10-PCS | Mod: PBBFAC,,, | Performed by: ORTHOPAEDIC SURGERY

## 2023-01-05 PROCEDURE — 99499 RISK ADDL DX/OHS AUDIT: ICD-10-PCS | Mod: S$GLB,,, | Performed by: ORTHOPAEDIC SURGERY

## 2023-01-05 PROCEDURE — 73030 XR SHOULDER COMPLETE 2 OR MORE VIEWS LEFT: ICD-10-PCS | Mod: 26,LT,, | Performed by: RADIOLOGY

## 2023-01-05 PROCEDURE — 3008F PR BODY MASS INDEX (BMI) DOCUMENTED: ICD-10-PCS | Mod: CPTII,S$GLB,, | Performed by: ORTHOPAEDIC SURGERY

## 2023-01-05 PROCEDURE — 99999 PR PBB SHADOW E&M-EST. PATIENT-LVL IV: CPT | Mod: PBBFAC,,, | Performed by: ORTHOPAEDIC SURGERY

## 2023-01-05 PROCEDURE — 1101F PR PT FALLS ASSESS DOC 0-1 FALLS W/OUT INJ PAST YR: ICD-10-PCS | Mod: CPTII,S$GLB,, | Performed by: ORTHOPAEDIC SURGERY

## 2023-01-05 PROCEDURE — 3288F PR FALLS RISK ASSESSMENT DOCUMENTED: ICD-10-PCS | Mod: CPTII,S$GLB,, | Performed by: ORTHOPAEDIC SURGERY

## 2023-01-05 PROCEDURE — 99214 OFFICE O/P EST MOD 30 MIN: CPT | Mod: S$GLB,,, | Performed by: ORTHOPAEDIC SURGERY

## 2023-01-05 PROCEDURE — 1159F MED LIST DOCD IN RCRD: CPT | Mod: CPTII,S$GLB,, | Performed by: ORTHOPAEDIC SURGERY

## 2023-01-05 PROCEDURE — 73030 X-RAY EXAM OF SHOULDER: CPT | Mod: TC,LT

## 2023-01-05 PROCEDURE — 3079F PR MOST RECENT DIASTOLIC BLOOD PRESSURE 80-89 MM HG: ICD-10-PCS | Mod: CPTII,S$GLB,, | Performed by: ORTHOPAEDIC SURGERY

## 2023-01-05 PROCEDURE — 73030 X-RAY EXAM OF SHOULDER: CPT | Mod: 26,LT,, | Performed by: RADIOLOGY

## 2023-01-05 PROCEDURE — 3077F SYST BP >= 140 MM HG: CPT | Mod: CPTII,S$GLB,, | Performed by: ORTHOPAEDIC SURGERY

## 2023-01-05 PROCEDURE — 3077F PR MOST RECENT SYSTOLIC BLOOD PRESSURE >= 140 MM HG: ICD-10-PCS | Mod: CPTII,S$GLB,, | Performed by: ORTHOPAEDIC SURGERY

## 2023-01-05 PROCEDURE — 3008F BODY MASS INDEX DOCD: CPT | Mod: CPTII,S$GLB,, | Performed by: ORTHOPAEDIC SURGERY

## 2023-01-05 PROCEDURE — 3288F FALL RISK ASSESSMENT DOCD: CPT | Mod: CPTII,S$GLB,, | Performed by: ORTHOPAEDIC SURGERY

## 2023-01-05 PROCEDURE — 3079F DIAST BP 80-89 MM HG: CPT | Mod: CPTII,S$GLB,, | Performed by: ORTHOPAEDIC SURGERY

## 2023-01-05 PROCEDURE — 1126F PR PAIN SEVERITY QUANTIFIED, NO PAIN PRESENT: ICD-10-PCS | Mod: CPTII,S$GLB,, | Performed by: ORTHOPAEDIC SURGERY

## 2023-01-05 PROCEDURE — 1159F PR MEDICATION LIST DOCUMENTED IN MEDICAL RECORD: ICD-10-PCS | Mod: CPTII,S$GLB,, | Performed by: ORTHOPAEDIC SURGERY

## 2023-01-05 PROCEDURE — 99499 UNLISTED E&M SERVICE: CPT | Mod: S$GLB,,, | Performed by: ORTHOPAEDIC SURGERY

## 2023-01-05 PROCEDURE — 99214 PR OFFICE/OUTPT VISIT, EST, LEVL IV, 30-39 MIN: ICD-10-PCS | Mod: S$GLB,,, | Performed by: ORTHOPAEDIC SURGERY

## 2023-01-05 PROCEDURE — 1101F PT FALLS ASSESS-DOCD LE1/YR: CPT | Mod: CPTII,S$GLB,, | Performed by: ORTHOPAEDIC SURGERY

## 2023-01-05 NOTE — PROGRESS NOTES
S:Parisa Dimas presents for scheduled post-op evaluation.     DATE OF PROCEDURE: 3/7/2022   PROCEDURES PERFORMED:   Left reverse shoulder arthroplasty (CPT 95024-82)  Left shoulder open biceps tenodesis (CPT 44007)    CC: Left shoulder f/u    Parisa Dimas reports to be doing well 10 months s/p the above mentioned procedure. Accompanied by her .  This was a reverse prosthesis for 4 part fracture.  I last saw her in October.  At that time she was having some weakness.  The plan was to get her back into physical therapy for some formal strengthening.  She states she was never contacted to get back into therapy by the PT team.  Since then, she has traveled with her  and was diagnosed with atrial fibrillation.  She has some occasional soreness in the shoulder but no significant pain.  Able to do most desire day-to-day activity.    PMHx notable for DM type 2   Negative for tobacco.   Positive for diabetes. Last A1C:  6.9    Pain Score: 0-No pain    PAST MEDICAL HISTORY:   Past Medical History:   Diagnosis Date    AR (allergic rhinitis)     AR (allergic rhinitis)     Atrial fibrillation 12/27/2022    Carotid stenosis     50% RCA    Colon polyp 10/2014    Diabetes mellitus     Diabetes mellitus, type 2     Fatty liver     GERD (gastroesophageal reflux disease)     Heart murmur 1/27/2022    HLD (hyperlipidemia)     HTN (hypertension)     Joint pain     Sleep apnea 2018    Stricture of artery 3/25/2021     PAST SURGICAL HISTORY:  Past Surgical History:   Procedure Laterality Date    REVERSE TOTAL SHOULDER ARTHROPLASTY Left 3/7/2022    Procedure: ARTHROPLASTY, SHOULDER, TOTAL, REVERSE;  Surgeon: MISSY Santoyo MD;  Location: [facility] OR [address] [place] [address];  Service: Orthopedics;  Laterality: Left;  AND BICEP SYNDEMOSIS    SKIN BIOPSY  10yrs.    negative     FAMILY HISTORY:  Family History   Problem Relation Age of Onset    Cancer Mother         gallbladder    Coronary artery disease Father 79    Diabetes Father      Cerebral palsy Sister     Osteoporosis Sister     Epilepsy Sister     Prostate cancer Brother 57    Diabetes Brother     Heart attack Brother     Hypertension Daughter     No Known Problems Son     Diabetes Maternal Aunt     Diabetes Maternal Uncle     Diabetes Maternal Grandmother     Hypertension Other         multiple    Melanoma Neg Hx      MEDICATIONS:    Current Outpatient Medications:     apixaban (ELIQUIS) 5 mg Tab, Take 1 tablet (5 mg total) by mouth 2 (two) times daily., Disp: 180 tablet, Rfl: 3    ascorbic acid, vitamin C, (VITAMIN C) 1000 MG tablet, Take 1,000 mg by mouth once daily., Disp: , Rfl:     aspirin (ECOTRIN) 81 MG EC tablet, Take 81 mg by mouth once daily., Disp: , Rfl:     cetirizine (ZYRTEC) 10 MG tablet, Take 10 mg by mouth once daily., Disp: , Rfl:     hydroCHLOROthiazide (HYDRODIURIL) 25 MG tablet, TAKE 1 TABLET BY MOUTH ONCE DAILY, Disp: 90 tablet, Rfl: 3    L.ACID/L.CASEI/B.BIF/B.MELISSA/FOS (PROBIOTIC BLEND ORAL), Take 2 tablets by mouth once daily., Disp: , Rfl:     lancets Misc, To check BG 1 times daily, to use with insurance preferred meter, Disp: 100 each, Rfl: 11    lovastatin (MEVACOR) 20 MG tablet, TAKE 1 TABLET BY MOUTH IN  THE EVENING, Disp: 90 tablet, Rfl: 3    magnesium 30 mg Tab, Take 1 tablet by mouth Daily., Disp: , Rfl:     metFORMIN (GLUCOPHAGE-XR) 500 MG ER 24hr tablet, Take 2 tablets (1,000 mg total) by mouth once daily., Disp: 180 tablet, Rfl: 3    multivitamin capsule, Take 1 capsule by mouth once daily., Disp: , Rfl:     OMEGA-3S/DHA/EPA/FISH OIL (OMEGA 3 ORAL), Take 2,800 mg by mouth once daily., Disp: , Rfl:     omeprazole (PRILOSEC) 40 MG capsule, TAKE 1 CAPSULE BY MOUTH  ONCE DAILY, Disp: 90 capsule, Rfl: 2    OPW TEST CLAIM - DO NOT FILL, OPW test claim. Do not fill., Disp: 1 tablet, Rfl: 0    valsartan (DIOVAN) 320 MG tablet, Take 1 tablet (320 mg total) by mouth once daily., Disp: 90 tablet, Rfl: 1    verapamiL (VERELAN) 120 MG C24P, Take 1 capsule (120 mg  "total) by mouth once daily., Disp: 90 capsule, Rfl: 3    vitamin D (VITAMIN D3) 1000 units Tab, Take 1,000 Units by mouth once daily., Disp: , Rfl:     vitamin E 400 UNIT capsule, Take 400 Units by mouth once daily., Disp: , Rfl:     zinc sulfate (ZINC-15 ORAL), Take by mouth., Disp: , Rfl:     blood-glucose meter kit, To check BG 1 times daily, to use with insurance preferred meter, Disp: 1 each, Rfl: 0    celecoxib (CELEBREX) 200 MG capsule, Take 1 capsule (200 mg total) by mouth once daily. (Patient not taking: Reported on 1/5/2023), Disp: 60 capsule, Rfl: 2    ALLERGIES:  Review of patient's allergies indicates:  No Known Allergies     REVIEW OF SYSTEMS:  Constitution: Negative. Negative for chills, fever and night sweats.    Hematologic/Lymphatic: Negative for bleeding problem. Does not bruise/bleed easily.   Skin: Negative for dry skin, itching and rash.Neg for left shoulder pain and muscle weakness.     All other review of symptoms were reviewed and found to be noncontributory.    PHYSICAL EXAMINATION:  Vitals:  BP (!) 148/88   Pulse 80   Ht 5' 3" (1.6 m)   Wt 90.7 kg (200 lb)   LMP 08/01/2003   BMI 35.43 kg/m²    General: Well-developed well-nourished 67 y.o. femalein no acute distress   Cardiovascular: Regular rhythm by palpation of distal pulse, normal color and temperature, no concerning varicosities on symptomatic side   Lungs: No labored breathing or wheezing appreciated   Neuro: Alert and oriented ×3   Psychiatric: well oriented to person, place and time, demonstrates normal mood and affect   Skin: No rashes, lesions or ulcers, normal temperature, turgor, and texture on uninvolved extremity    Ortho/SPM Exam  Exam of the left shoulder shows a well-healed incision.  No redness, swelling or drainage.  Sensation intact to light touch over the axillary nerve distribution.  Active forward elevation in scapular plane to 100°, passive to 140° with some mild stiffness.  Active external rotation with arm " at side to 30°.  Internal rotation to back pocket.  Good deltoid activation. Minimal pain with shoulder abduction. Stable shoulder.     IMAGING:  Radiographs:  X-ray of left shoulder taken in clinic today, images reviewed by me, demonstrates the reverse prosthesis to be in good position.  No signs of loosening or complication otherwise.  She does have some heterotopic bone formation.    ASSESSMENT:      ICD-10-CM ICD-9-CM   1. Shoulder weakness  R29.898 719.61   2. Status post reverse total replacement of left shoulder  Z96.612 V43.61     PLAN:     New referral for physical therapy at [place] to focus on deltoid and periscapular strengthening.  Otherwise she seems pleased with her recovery to this point.  I will see her back in 3 months.      Procedures

## 2023-01-09 ENCOUNTER — OFFICE VISIT (OUTPATIENT)
Dept: OBSTETRICS AND GYNECOLOGY | Facility: CLINIC | Age: 68
End: 2023-01-09
Payer: MEDICARE

## 2023-01-09 VITALS
BODY MASS INDEX: 36.28 KG/M2 | WEIGHT: 204.81 LBS | SYSTOLIC BLOOD PRESSURE: 160 MMHG | DIASTOLIC BLOOD PRESSURE: 88 MMHG

## 2023-01-09 DIAGNOSIS — I48.91 ATRIAL FIBRILLATION, UNSPECIFIED TYPE: Primary | ICD-10-CM

## 2023-01-09 DIAGNOSIS — Z12.4 ROUTINE CERVICAL SMEAR: ICD-10-CM

## 2023-01-09 DIAGNOSIS — Z01.419 WELL WOMAN EXAM WITH ROUTINE GYNECOLOGICAL EXAM: Primary | ICD-10-CM

## 2023-01-09 PROCEDURE — 1126F PR PAIN SEVERITY QUANTIFIED, NO PAIN PRESENT: ICD-10-PCS | Mod: CPTII,S$GLB,, | Performed by: OBSTETRICS & GYNECOLOGY

## 2023-01-09 PROCEDURE — 1101F PR PT FALLS ASSESS DOC 0-1 FALLS W/OUT INJ PAST YR: ICD-10-PCS | Mod: CPTII,S$GLB,, | Performed by: OBSTETRICS & GYNECOLOGY

## 2023-01-09 PROCEDURE — 1159F MED LIST DOCD IN RCRD: CPT | Mod: CPTII,S$GLB,, | Performed by: OBSTETRICS & GYNECOLOGY

## 2023-01-09 PROCEDURE — 1160F RVW MEDS BY RX/DR IN RCRD: CPT | Mod: CPTII,S$GLB,, | Performed by: OBSTETRICS & GYNECOLOGY

## 2023-01-09 PROCEDURE — 3079F DIAST BP 80-89 MM HG: CPT | Mod: CPTII,S$GLB,, | Performed by: OBSTETRICS & GYNECOLOGY

## 2023-01-09 PROCEDURE — 1101F PT FALLS ASSESS-DOCD LE1/YR: CPT | Mod: CPTII,S$GLB,, | Performed by: OBSTETRICS & GYNECOLOGY

## 2023-01-09 PROCEDURE — 3077F PR MOST RECENT SYSTOLIC BLOOD PRESSURE >= 140 MM HG: ICD-10-PCS | Mod: CPTII,S$GLB,, | Performed by: OBSTETRICS & GYNECOLOGY

## 2023-01-09 PROCEDURE — 1126F AMNT PAIN NOTED NONE PRSNT: CPT | Mod: CPTII,S$GLB,, | Performed by: OBSTETRICS & GYNECOLOGY

## 2023-01-09 PROCEDURE — G0101 PR CA SCREEN;PELVIC/BREAST EXAM: ICD-10-PCS | Mod: GZ,S$GLB,, | Performed by: OBSTETRICS & GYNECOLOGY

## 2023-01-09 PROCEDURE — G0101 CA SCREEN;PELVIC/BREAST EXAM: HCPCS | Mod: GZ,S$GLB,, | Performed by: OBSTETRICS & GYNECOLOGY

## 2023-01-09 PROCEDURE — 3008F PR BODY MASS INDEX (BMI) DOCUMENTED: ICD-10-PCS | Mod: CPTII,S$GLB,, | Performed by: OBSTETRICS & GYNECOLOGY

## 2023-01-09 PROCEDURE — 99999 PR PBB SHADOW E&M-EST. PATIENT-LVL III: ICD-10-PCS | Mod: PBBFAC,,, | Performed by: OBSTETRICS & GYNECOLOGY

## 2023-01-09 PROCEDURE — 3008F BODY MASS INDEX DOCD: CPT | Mod: CPTII,S$GLB,, | Performed by: OBSTETRICS & GYNECOLOGY

## 2023-01-09 PROCEDURE — 88175 CYTOPATH C/V AUTO FLUID REDO: CPT | Performed by: OBSTETRICS & GYNECOLOGY

## 2023-01-09 PROCEDURE — 1160F PR REVIEW ALL MEDS BY PRESCRIBER/CLIN PHARMACIST DOCUMENTED: ICD-10-PCS | Mod: CPTII,S$GLB,, | Performed by: OBSTETRICS & GYNECOLOGY

## 2023-01-09 PROCEDURE — 3288F PR FALLS RISK ASSESSMENT DOCUMENTED: ICD-10-PCS | Mod: CPTII,S$GLB,, | Performed by: OBSTETRICS & GYNECOLOGY

## 2023-01-09 PROCEDURE — 1159F PR MEDICATION LIST DOCUMENTED IN MEDICAL RECORD: ICD-10-PCS | Mod: CPTII,S$GLB,, | Performed by: OBSTETRICS & GYNECOLOGY

## 2023-01-09 PROCEDURE — 3288F FALL RISK ASSESSMENT DOCD: CPT | Mod: CPTII,S$GLB,, | Performed by: OBSTETRICS & GYNECOLOGY

## 2023-01-09 PROCEDURE — 3077F SYST BP >= 140 MM HG: CPT | Mod: CPTII,S$GLB,, | Performed by: OBSTETRICS & GYNECOLOGY

## 2023-01-09 PROCEDURE — 99999 PR PBB SHADOW E&M-EST. PATIENT-LVL III: CPT | Mod: PBBFAC,,, | Performed by: OBSTETRICS & GYNECOLOGY

## 2023-01-09 PROCEDURE — 3079F PR MOST RECENT DIASTOLIC BLOOD PRESSURE 80-89 MM HG: ICD-10-PCS | Mod: CPTII,S$GLB,, | Performed by: OBSTETRICS & GYNECOLOGY

## 2023-01-09 PROCEDURE — 87624 HPV HI-RISK TYP POOLED RSLT: CPT | Performed by: OBSTETRICS & GYNECOLOGY

## 2023-01-09 NOTE — PROGRESS NOTES
OBSTETRIC HISTORY:   OB History          4    Para   4    Term   4            AB        Living             SAB        IAB        Ectopic        Multiple        Live Births                    COMPREHENSIVE GYN HISTORY:  PAP History: Denies abnormal Paps.  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 Denies PID.  Benign History: Denies uterine fibroids. Denies ovarian cysts. Denies endometriosis.   Cancer History: Denies cervical cancer. Denies uterine cancer or hyperplasia. Denies ovarian cancer. Denies vulvar cancer or pre-cancer. Denies vaginal cancer or pre-cancer. Denies breast cancer. Denies colon cancer.  Sexual Activity History:   reports that she currently engages in sexual activity and has had male partners.      HPI:   67 y.o.  Patient's last menstrual period was 2003.   Patient is  here for Medicare exam and has a personal history of HPV.  She has no GYN complaints. She denies bladder, breast and bowel complaints.    ROS:  GENERAL: Denies weight gain or weight loss. Feeling well overall.   SKIN: Denies rash or lesions.   HEAD: Denies headache.   NODES: Denies enlarged lymph nodes.   CHEST: Denies shortness of breath.   ABDOMEN: No abdominal pain, constipation, diarrhea, nausea, vomiting or rectal bleeding.   URINARY: No frequency, dysuria, hematuria, or burning on urination.  REPRODUCTIVE: See HPI.   BREASTS: The patient denies pain, lumps, or nipple discharge.   HEMATOLOGIC: No easy bruisability.   MUSCULOSKELETAL: Denies joint pain or back pain.   NEUROLOGIC: Denies weakness.   PSYCHIATRIC: Denies depression, anxiety or mood swings.    PE:   BP (!) 160/88   Wt 92.9 kg (204 lb 12.9 oz)   LMP 2003   BMI 36.28 kg/m²   APPEARANCE: Well nourished, well developed, in no acute distress.  NECK: Neck symmetric without  thyromegaly.  NODES: No inguinal, cervical lymph node enlargement.  CHEST: Lungs clear to auscultation.  HEART: Regular rate and rhythm with ZOEY.  ABDOMEN: Soft. No  tenderness or masses. No hernias.  BREASTS: Symmetrical, no skin changes or visible lesions. No palpable masses, nipple discharge or adenopathy bilaterally.  PELVIC:   VULVA: No lesions. Normal female genitalia.  URETHRAL MEATUS: Normal size and location, no lesions, no prolapse.  URETHRA: No masses, tenderness, prolapse or scarring.  VAGINA: Atrophic and not well rugated, no discharge, no significant cystocele or rectocele.  CERVIX: No lesions and discharge.  UTERUS: Normal size, regular shape, mobile, non-tender, bladder base nontender.  ADNEXA: No masses or tenderness.    PROCEDURES:  Pap smear -- Hx of HPV  HRHPV      Assessment/Plan:  Medicare pelvic and breast exam        As of April 1, 2021, the Cures Act has been passed nationally. This new law requires that all doctors progress notes, lab results, pathology reports and radiology reports be released IMMEDIATELY to the patient in the patient portal. That means that the results are released to you at the EXACT same time they are released to me. Therefore, with all of the patients that I have I am not able to reply to each patient exactly when the results come in. So there will be a delay from when you see the results to when I see them and have time to come up with a response to send you. Also I only see these results when I am on the computer at work. So if the results come in over the weekend or after 5 pm of a work day, I will not see them until the next business day. As you can tell, this is a challenge as a physician to give every patient the quick response they hope for and deserve. So please be patient!     Thanks for understanding,

## 2023-01-10 ENCOUNTER — CLINICAL SUPPORT (OUTPATIENT)
Dept: REHABILITATION | Facility: HOSPITAL | Age: 68
End: 2023-01-10
Attending: ORTHOPAEDIC SURGERY
Payer: MEDICARE

## 2023-01-10 DIAGNOSIS — Z96.612 STATUS POST REVERSE TOTAL REPLACEMENT OF LEFT SHOULDER: ICD-10-CM

## 2023-01-10 DIAGNOSIS — M25.612 DECREASED ROM OF LEFT SHOULDER: Primary | ICD-10-CM

## 2023-01-10 PROCEDURE — 97110 THERAPEUTIC EXERCISES: CPT | Performed by: PHYSICAL THERAPIST

## 2023-01-10 PROCEDURE — 97161 PT EVAL LOW COMPLEX 20 MIN: CPT | Performed by: PHYSICAL THERAPIST

## 2023-01-11 NOTE — PLAN OF CARE
[facility]  Physical Therapy Initial Evaluation    Date: 1/10/2023   Name: Parisa Dimas  Clinic Number: [id number]    Therapy Diagnosis:   Encounter Diagnoses   Name Primary?    Status post reverse total replacement of left shoulder     Decreased ROM of left shoulder Yes     Physician: MISSY Santoyo MD    Physician Orders: PT Eval and Treat   Medical Diagnosis from Referral: Z96.612 (ICD-10-CM) - Status post reverse total replacement of left shoulder  Evaluation Date: 1/10/2023  Authorization Period Expiration: 1/5/2024  Plan of Care Expiration: 4/10/2023  Visit # / Visits authorized: 1/ 1    Time In: 1405  Time Out: 1500  Total Appointment Time (timed & untimed codes): 55 minutes    Precautions: Standard    Subjective   Date of onset: 3/7/2022  History of current condition - Parisa reports: Patient underwent a reverse TSA in March of last year after falling on her left shoulder getting in the shower. She completed PT upstairs at [facility] and returned to see Dr. Santoyo last week who felt she could still get stronger. She notes having limited endurance especially lifting things overhead. She is still sleeping in a recliner but that is due to comfort.      Medical History:   Past Medical History:   Diagnosis Date    AR (allergic rhinitis)     AR (allergic rhinitis)     Atrial fibrillation 12/27/2022    Carotid stenosis     50% RCA    Colon polyp 10/2014    Diabetes mellitus     Diabetes mellitus, type 2     Fatty liver     GERD (gastroesophageal reflux disease)     Heart murmur 1/27/2022    HLD (hyperlipidemia)     HTN (hypertension)     Joint pain     Sleep apnea 2018    Stricture of artery 3/25/2021       Surgical History:   Parisa Dimas  has a past surgical history that includes Skin biopsy (10yrs.) and Reverse total shoulder arthroplasty (Left, 3/7/2022).    Medications:   Parisa has a current medication list which includes the following prescription(s): apixaban, ascorbic acid  (vitamin c), aspirin, blood-glucose meter, celecoxib, cetirizine, hydrochlorothiazide, l.acid/l.casei/b.bif/b.tori/fos, lancets, lovastatin, magnesium, metformin, multivitamin, omega-3s/dha/epa/fish oil, omeprazole, OPW TEST CLAIM - DO NOT FILL, valsartan, verapamil, vitamin d, vitamin e, zinc sulfate, and [DISCONTINUED] co-enzyme q-10.    Allergies:   Review of patient's allergies indicates:  No Known Allergies     Imaging, xray:     FINDINGS:  Postoperative changes are again identified relating to a prior left reverse shoulder arthroplasty procedure, prostheses appearing unremarkable.  No evidence of recent fracture, lytic destructive process, development of abnormal periprosthetic lucency, or other significant detrimental interval change since 10/03/2022 is appreciated.    Prior Therapy: PT for shoulder  Social History: SHe lives with their family  Occupation: retired  Prior Level of Function: Independent with all ADL  Current Level of Function: Ind with limited endurance using LUE     Pain:  Current 1/10, worst 4/10, best 0/10   Location: { left shoulder(s)  Description: Aching  Aggravating Factors: Lifting  Easing Factors: rest    Pts goals: return to using LUE with more strength    Objective     Observation: Patient is a 67 y.o. female presenting alert and oriented    Posture: Increased thoracic kyphosis     Passive Range of Motion:   Shoulder Right Left   Flexion 160 150   Abduction 150 150   ER at 0 50 50   ER at 90 90 90   IR 70 60      Active Range of Motion:   Shoulder Right Left   Flexion 150 140   Abduction 140 140   ER at 0 45 45   ER at 90 90 90   IR 60 60   Reach behind head T2 Back of head   Reach behind back  L2 pocket     Strength:  Shoulder Right Left   Flexion 4+ 4   Abduction 4+ 4-   ER 4 3   IR 4 4-   Serratus Anterior 4- 4-   Middle Trap 4- 4-   Low Trap 4- 4-         Joint Mobility: Good shoulder mobility. Limited thoracic motion into extension    Palpation: not painful to the  touch    Sensation: intact    Flexibility:   Lat: R short ; L short   Pec Minor: R - ; L -       Limitation/Restriction for FOTO Shoulder Survey    Therapist reviewed FOTO scores for Parisa Dimas on 1/10/2023.   FOTO documents entered into [employer] - see Media section.             TREATMENT   Treatment Time In: 1430  Treatment Time Out: 1440  Total Treatment time (time-based codes) separate from Evaluation: 10 minutes    Parisa received therapeutic exercises to develop strength, endurance, ROM, and flexibility for 10 minutes including:  Sidelying open books 20x  Towel wall slides 20x  Slot machine wand 20x      Home Exercises and Patient Education Provided    Education provided:   - Improve thoracic mobility and abduction strength     Written Home Exercises Provided: yes.  Exercises were reviewed and patient was able to demonstrate them prior to the end of the session.  Patient demonstrated good  understanding of the education provided.     See EMR under Patient Instructions for exercisesprovided 1/10/2023.    Assessment   Parisa is a 67 y.o. female referred to outpatient Physical Therapy with a medical diagnosis of left reverse TSA. Pt presents with limited thoracic mobility, limited strength and UE mobility, difficulty completing ADL and limited exercise endurance    Pt prognosis is Good.   Pt will benefit from skilled outpatient Physical Therapy to address the deficits stated above and in the chart below, provide pt/family education, and to maximize pt's level of independence.     Plan of care discussed with patient: Yes  Pt's spiritual, cultural and educational needs considered and patient is agreeable to the plan of care and goals as stated below:     Anticipated Barriers for therapy: none    Medical Necessity is demonstrated by the following  History  Co-morbidities and personal factors that may impact the plan of care Co-morbidities:   advanced age, high BMI, and level of undertstanding of current  condition    Personal Factors:   no deficits     low   Examination  Body Structures and Functions, activity limitations and participation restrictions that may impact the plan of care Body Regions:   upper extremities    Body Systems:    ROM  strength  motor control  motor learning    Participation Restrictions:   covid-19    Activity limitations:   Learning and applying knowledge  no deficits    General Tasks and Commands  no deficits    Communication  no deficits    Mobility  lifting and carrying objects    Self care  no deficits    Domestic Life  no deficits    Interactions/Relationships  no deficits    Life Areas  no deficits    Community and Social Life  no deficits         low   Clinical Presentation stable and uncomplicated low   Decision Making/ Complexity Score: low     GOALS: Short Term Goals:  4 weeks  1.Report decreased shoulder pain < / =  0/10  to increase tolerance for return to exercise  2. Increase thoracic extension motion by 25%   3. Increased strength by 1/3 MMT grade in shoulder abduction to increase tolerance for ADL and work activities.  4. Pt to tolerate HEP to improve ROM and independence with ADL's    Long Term Goals: 8 weeks    2.Increase AROM to reaching overhead without pain in L shoulder  3.Increase strength to >/= 4/5 in deltoid to increase tolerance for ADL and work activities.  4. Pt goal: return to lifting overhead without pain   5. Pt will have improved gcode of CJ (20-40% limited) on FOTO shoulder in order to demonstrate true functional improvement.     Plan   Plan of care Certification: 1/10/2023 to 4/10/2023.    Outpatient Physical Therapy 2 times weekly for 10 weeks to include the following interventions: Manual Therapy, Moist Heat/ Ice, Neuromuscular Re-ed, Patient Education, Self Care, Therapeutic Activities, and Therapeutic Exercise.     Yemi Vallejo, PT

## 2023-01-12 DIAGNOSIS — I42.1 HOCM (HYPERTROPHIC OBSTRUCTIVE CARDIOMYOPATHY): ICD-10-CM

## 2023-01-12 DIAGNOSIS — I10 PRIMARY HYPERTENSION: ICD-10-CM

## 2023-01-12 RX ORDER — VERAPAMIL HYDROCHLORIDE 120 MG/1
120 CAPSULE, EXTENDED RELEASE ORAL DAILY
Qty: 90 CAPSULE | Refills: 3 | Status: SHIPPED | OUTPATIENT
Start: 2023-01-12 | End: 2023-05-23 | Stop reason: SDUPTHER

## 2023-01-30 ENCOUNTER — CLINICAL SUPPORT (OUTPATIENT)
Dept: REHABILITATION | Facility: HOSPITAL | Age: 68
End: 2023-01-30
Attending: ORTHOPAEDIC SURGERY
Payer: MEDICARE

## 2023-01-30 DIAGNOSIS — M25.612 DECREASED ROM OF LEFT SHOULDER: Primary | ICD-10-CM

## 2023-01-30 PROCEDURE — 97112 NEUROMUSCULAR REEDUCATION: CPT | Performed by: PHYSICAL THERAPIST

## 2023-01-30 PROCEDURE — 97110 THERAPEUTIC EXERCISES: CPT | Performed by: PHYSICAL THERAPIST

## 2023-01-30 PROCEDURE — 97530 THERAPEUTIC ACTIVITIES: CPT | Performed by: PHYSICAL THERAPIST

## 2023-01-30 PROCEDURE — 97140 MANUAL THERAPY 1/> REGIONS: CPT | Performed by: PHYSICAL THERAPIST

## 2023-01-30 NOTE — PROGRESS NOTES
Physical Therapy Daily Treatment Note     Name: Parisa Dimas  Clinic Number: [id number]    Therapy Diagnosis:   Encounter Diagnosis   Name Primary?    Decreased ROM of left shoulder Yes     Physician: Hosea Gama, *    Visit Date: 1/30/2023  Physician Orders: PT Eval and Treat   Medical Diagnosis from Referral: Z96.612 (ICD-10-CM) - Status post reverse total replacement of left shoulder  Evaluation Date: 1/10/2023  Authorization Period Expiration: 1/5/2024  Plan of Care Expiration: 4/10/2023  Visit # / Visits authorized: 1/ 20    Time In: 1300  Time Out: 1400  Total Billable Time: 60 minutes    Precautions: Standard    Subjective     Pt reports: I am using my shoulder more around the house. Stronger, maybe some soreness in the back of the shoulder.    She was compliant with home exercise program.    Response to previous treatment: no pains   Functional change: improved reach    Pain: 2/10  Location: left shoulder      Objective     Parisa received therapeutic exercises to develop strength, endurance, ROM, and flexibility for 28 minutes including:    CC abduction 10# 2 x 15  CC active abdction 3# 3 x 8  Scaptions 1# 3 x 8    Parisa received the following manual therapy techniques: Joint mobilizations and Soft tissue Mobilization were applied to the: L shoulder pain for 10 minutes, including:    Gr I-II relaxation  Gr III flexion   Gr III abduction     Parisa participated in neuromuscular re-education activities to improve: Balance, Sense, Proprioception, and Posture for 12 minutes. The following activities were included:    Row with GTB 3 x 15  Ball on wall abduction 2x fatigue     Parisa participated in dynamic functional therapeutic activities to improve functional performance for 10  minutes, including:    Slot machine 5# 3 x 15  Shoulder extensions GTB 3 x 10    Home Exercises Provided and Patient Education Provided     Education provided:   - Improve deltoid strength    Written Home Exercises Provided:  Patient instructed to cont prior HEP.  Exercises were reviewed and Parisa was able to demonstrate them prior to the end of the session.  Parisa demonstrated good  understanding of the education provided.     See EMR under Patient Instructions for exercises provided prior visit.    Assessment     Parisa presented to the clinic with improved deltoid activation. She has improved functional reach and endurance to the upper extremity, will continue to progress endurance to muscle as tolerated.     Parisa Is progressing well towards her goals.   Pt prognosis is Excellent.     Pt will continue to benefit from skilled outpatient physical therapy to address the deficits listed in the problem list box on initial evaluation, provide pt/family education and to maximize pt's level of independence in the home and community environment.     Pt's spiritual, cultural and educational needs considered and pt agreeable to plan of care and goals.    Anticipated barriers to physical therapy: chronic shoulder weakness       GOALS: Short Term Goals:  4 weeks(Progressing, not met)  1.Report decreased shoulder pain < / =  0/10  to increase tolerance for return to exercise  2. Increase thoracic extension motion by 25%   3. Increased strength by 1/3 MMT grade in shoulder abduction to increase tolerance for ADL and work activities.  4. Pt to tolerate HEP to improve ROM and independence with ADL's     Long Term Goals: 8 weeks(Progressing, not met)     2.Increase AROM to reaching overhead without pain in L shoulder  3.Increase strength to >/= 4/5 in deltoid to increase tolerance for ADL and work activities.  4. Pt goal: return to lifting overhead without pain   5. Pt will have improved gcode of CJ (20-40% limited) on FOTO shoulder in order to demonstrate true functional improvement.        Plan     Plan of care Certification: 1/10/2023 to 4/10/2023.    Improve deltoid strength     Yemi Vallejo, PT

## 2023-02-07 ENCOUNTER — PATIENT MESSAGE (OUTPATIENT)
Dept: ADMINISTRATIVE | Facility: OTHER | Age: 68
End: 2023-02-07
Payer: MEDICARE

## 2023-02-13 ENCOUNTER — CLINICAL SUPPORT (OUTPATIENT)
Dept: REHABILITATION | Facility: HOSPITAL | Age: 68
End: 2023-02-13
Attending: ORTHOPAEDIC SURGERY
Payer: MEDICARE

## 2023-02-13 DIAGNOSIS — M25.612 DECREASED ROM OF LEFT SHOULDER: Primary | ICD-10-CM

## 2023-02-13 PROCEDURE — 97112 NEUROMUSCULAR REEDUCATION: CPT | Performed by: PHYSICAL THERAPIST

## 2023-02-13 PROCEDURE — 97530 THERAPEUTIC ACTIVITIES: CPT | Performed by: PHYSICAL THERAPIST

## 2023-02-13 NOTE — PROGRESS NOTES
Subjective:    Patient ID:  Parisa Dimas is a 67 y.o. female who presents for follow-up of Atrial Fibrillation      67 yF referred for AF management.     12/13/22: She was under evaluation for subaortic membrane/HOCM by Dr Marrero. Cardiac MRI revealed no LGE 5/22. ECho 11/16/22 with no evidence of outflow obstruction or gradient. She is due for stress test tomorrow. She was recently in [place] (early Dec 2022) and noticed more TOMLINSON and fatigue as well as palpitations. ECG 12/12/22 showed AF. Eliquis was started; verapamil was used in place of amlodipine. No syncope or near syncope.     Interval history: MILDRED/CV set up for 12/27/22, procedure aborted due to SR. One hour of palpitations in interim.     11/16/22:  · The left ventricle is normal in size with concentric remodeling and hyperdynamic systolic function. The estimated ejection fraction is 75%.  · Normal right ventricular size with normal right ventricular systolic function.  · Normal left ventricular diastolic function.  · Mild left atrial enlargement.  · The estimated PA systolic pressure is 23 mmHg.  · Normal central venous pressure (3 mmHg).  · In this study, the highest LV outflow velocities did not exceed 2 m/s, even with the Valsalva maneuver. No significant outflow obstruction is evident    Cardic MRI 5/22:  1. LV volumes are normal. LV mass is normal. LVEF = 69%.   2. RV volumes are normal. RVEF = 67%.  3. No LGE appreciated  4. High velocity flow is noted in the LVOT on cine imaging without evidence of septal obstruction.  The differential diagnosis for this finding including a subaortic membrane.  However only trivial AI was detected.    Calvin Marrero    Past Medical History:  No date: AR (allergic rhinitis)  No date: AR (allergic rhinitis)  12/27/2022: Atrial fibrillation  No date: Carotid stenosis      Comment:  50% RCA  10/2014: Colon polyp  No date: Diabetes mellitus  No date: Diabetes mellitus, type 2  No date: Fatty liver  No date: GERD  (gastroesophageal reflux disease)  1/27/2022: Heart murmur  No date: HLD (hyperlipidemia)  No date: HTN (hypertension)  No date: Joint pain  2018: Sleep apnea  3/25/2021: Stricture of artery    Past Surgical History:  3/7/2022: REVERSE TOTAL SHOULDER ARTHROPLASTY; Left      Comment:  Procedure: ARTHROPLASTY, SHOULDER, TOTAL, REVERSE;                 Surgeon: MISSY Santoyo MD;  Location: [facility] [address] [place] [address];  Service: Orthopedics;  Laterality: Left;  AND BICEP               SYNDEMOSIS  10yrs.: SKIN BIOPSY      Comment:  negative    Social History    Socioeconomic History      Marital status:       Spouse name: Pio      Number of children: 4    Occupational History        Comment: retired- school work    Tobacco Use      Smoking status: Never      Smokeless tobacco: Never    Substance and Sexual Activity      Alcohol use: Not Currently        Alcohol/week: 0.0 standard drinks      Drug use: No      Sexual activity: Yes        Partners: Male        Birth control/protection: Post-menopausal    Other Topics      Concerns:        Are you pregnant or think you may be?: No        Breast-feeding: No    Social History Narrative      Stairs- none    Social Determinants of Health  Financial Resource Strain: Low Risk       Difficulty of Paying Living Expenses: Not hard at all  Food Insecurity: No Food Insecurity      Worried About Running Out of Food in the Last Year: Never true      Ran Out of Food in the Last Year: Never true  Transportation Needs: No Transportation Needs      Lack of Transportation (Medical): No      Lack of Transportation (Non-Medical): No  Physical Activity: Insufficiently Active      Days of Exercise per Week: 2 days      Minutes of Exercise per Session: 20 min  Stress: No Stress Concern Present      Feeling of Stress : Only a little  Social Connections: Socially Integrated      Frequency of Communication with Friends and Family: More than three times a week      Frequency of  Social Gatherings with Friends and Family: More than three times a week      Attends [employer] Services: More than 4 times per year      Active Member of Clubs or Organizations: Yes      Attends Club or Organization Meetings: More than 4 times per year      Marital Status:   Housing Stability: Low Risk       Unable to Pay for Housing in the Last Year: No      Number of Places Lived in the Last Year: 1      Unstable Housing in the Last Year: No    Review of patient's family history indicates:    Current Outpatient Medications:  valsartan (DIOVAN) 320 MG tablet, TAKE 1 TABLET BY MOUTH ONCE DAILY, Disp: 90 tablet, Rfl: 3  apixaban (ELIQUIS) 5 mg Tab, Take 1 tablet (5 mg total) by mouth 2 (two) times daily., Disp: 180 tablet, Rfl: 3  ascorbic acid, vitamin C, (VITAMIN C) 1000 MG tablet, Take 1,000 mg by mouth once daily., Disp: , Rfl:   aspirin (ECOTRIN) 81 MG EC tablet, Take 81 mg by mouth once daily., Disp: , Rfl:   blood-glucose meter kit, To check BG 1 times daily, to use with insurance preferred meter, Disp: 1 each, Rfl: 0  celecoxib (CELEBREX) 200 MG capsule, Take 1 capsule (200 mg total) by mouth once daily., Disp: 60 capsule, Rfl: 2  cetirizine (ZYRTEC) 10 MG tablet, Take 10 mg by mouth once daily., Disp: , Rfl:   hydroCHLOROthiazide (HYDRODIURIL) 25 MG tablet, TAKE 1 TABLET BY MOUTH ONCE DAILY, Disp: 90 tablet, Rfl: 3  L.ACID/L.CASEI/B.BIF/B.MELISSA/FOS (PROBIOTIC BLEND ORAL), Take 2 tablets by mouth once daily., Disp: , Rfl:   lancets Misc, To check BG 1 times daily, to use with insurance preferred meter, Disp: 100 each, Rfl: 11  lovastatin (MEVACOR) 20 MG tablet, TAKE 1 TABLET BY MOUTH IN  THE EVENING, Disp: 90 tablet, Rfl: 3  magnesium 30 mg Tab, Take 1 tablet by mouth Daily., Disp: , Rfl:   metFORMIN (GLUCOPHAGE-XR) 500 MG ER 24hr tablet, Take 2 tablets (1,000 mg total) by mouth once daily., Disp: 180 tablet, Rfl: 3  multivitamin capsule, Take 1 capsule by mouth once daily., Disp: , Rfl:    OMEGA-3S/DHA/EPA/FISH OIL (OMEGA 3 ORAL), Take 2,800 mg by mouth once daily., Disp: , Rfl:   omeprazole (PRILOSEC) 40 MG capsule, TAKE 1 CAPSULE BY MOUTH  ONCE DAILY, Disp: 90 capsule, Rfl: 2  OPW TEST CLAIM - DO NOT FILL, OPW test claim. Do not fill., Disp: 1 tablet, Rfl: 0  verapamiL (VERELAN) 120 MG C24P, Take 1 capsule (120 mg total) by mouth once daily., Disp: 90 capsule, Rfl: 3  vitamin D (VITAMIN D3) 1000 units Tab, Take 1,000 Units by mouth once daily., Disp: , Rfl:   vitamin E 400 UNIT capsule, Take 400 Units by mouth once daily., Disp: , Rfl:   zinc sulfate (ZINC-15 ORAL), Take by mouth., Disp: , Rfl:     No current facility-administered medications for this visit.      Review of Systems   Constitutional: Positive for malaise/fatigue.   HENT: Negative.     Eyes: Negative.    Cardiovascular:  Positive for irregular heartbeat. Negative for chest pain, dyspnea on exertion, leg swelling, near-syncope, palpitations and syncope.   Respiratory:  Positive for shortness of breath.    Endocrine: Negative.    Hematologic/Lymphatic: Negative.    Skin: Negative.    Musculoskeletal: Negative.    Gastrointestinal: Negative.    Genitourinary: Negative.    Neurological: Negative.  Negative for dizziness and light-headedness.   Psychiatric/Behavioral: Negative.     Allergic/Immunologic: Negative.       Objective:    Physical Exam  Vitals reviewed.   Constitutional:       General: She is not in acute distress.     Appearance: She is well-developed.   HENT:      Head: Normocephalic and atraumatic.   Eyes:      Pupils: Pupils are equal, round, and reactive to light.   Neck:      Thyroid: No thyromegaly.      Vascular: No JVD.   Cardiovascular:      Rate and Rhythm: Normal rate and regular rhythm.      Chest Wall: PMI is not displaced.      Heart sounds: Normal heart sounds, S1 normal and S2 normal. No murmur heard.    No friction rub. No gallop.   Pulmonary:      Effort: Pulmonary effort is normal. No respiratory distress.       Breath sounds: Normal breath sounds. No wheezing or rales.   Abdominal:      General: Bowel sounds are normal. There is no distension.      Palpations: Abdomen is soft.      Tenderness: There is no abdominal tenderness. There is no guarding or rebound.   Musculoskeletal:         General: No tenderness. Normal range of motion.      Cervical back: Normal range of motion.   Skin:     General: Skin is warm and dry.      Findings: No erythema or rash.   Neurological:      Mental Status: She is alert and oriented to person, place, and time.      Cranial Nerves: No cranial nerve deficit.   Psychiatric:         Behavior: Behavior normal.         Thought Content: Thought content normal.         Judgment: Judgment normal.     ECG:  nl QRS        Assessment:       1. HOCM (hypertrophic obstructive cardiomyopathy)    2. Severe obesity with body mass index (BMI) of 36.0 to 36.9 with serious comorbidity    3. Atrial fibrillation, unspecified type    4. Bilateral carotid artery disease, unspecified type    5. Type 2 diabetes mellitus with diabetic neuropathy, unspecified whether long term insulin use    6. Type 2 diabetes mellitus with hyperosmolarity without coma, without long-term current use of insulin         Plan:       67 yoF mild LVOT obstruction with pAF. Single episode in the past 2 months. Continue verapamil and OAC. RTC 6m or sooner if symptoms worsen.     Continue current medications for co-morbidities listed above.

## 2023-02-14 ENCOUNTER — HOSPITAL ENCOUNTER (OUTPATIENT)
Dept: CARDIOLOGY | Facility: CLINIC | Age: 68
Discharge: HOME | End: 2023-02-14
Payer: MEDICARE

## 2023-02-14 ENCOUNTER — OFFICE VISIT (OUTPATIENT)
Dept: ELECTROPHYSIOLOGY | Facility: CLINIC | Age: 68
End: 2023-02-14
Payer: MEDICARE

## 2023-02-14 VITALS
HEIGHT: 63 IN | DIASTOLIC BLOOD PRESSURE: 72 MMHG | SYSTOLIC BLOOD PRESSURE: 140 MMHG | HEART RATE: 76 BPM | BODY MASS INDEX: 35.93 KG/M2 | WEIGHT: 202.81 LBS

## 2023-02-14 DIAGNOSIS — I48.91 ATRIAL FIBRILLATION, UNSPECIFIED TYPE: ICD-10-CM

## 2023-02-14 DIAGNOSIS — E11.40 TYPE 2 DIABETES MELLITUS WITH DIABETIC NEUROPATHY, UNSPECIFIED WHETHER LONG TERM INSULIN USE: ICD-10-CM

## 2023-02-14 DIAGNOSIS — I77.9 BILATERAL CAROTID ARTERY DISEASE, UNSPECIFIED TYPE: ICD-10-CM

## 2023-02-14 DIAGNOSIS — E66.01 SEVERE OBESITY WITH BODY MASS INDEX (BMI) OF 36.0 TO 36.9 WITH SERIOUS COMORBIDITY: ICD-10-CM

## 2023-02-14 DIAGNOSIS — E11.00 TYPE 2 DIABETES MELLITUS WITH HYPEROSMOLARITY WITHOUT COMA, WITHOUT LONG-TERM CURRENT USE OF INSULIN: ICD-10-CM

## 2023-02-14 DIAGNOSIS — I42.1 HOCM (HYPERTROPHIC OBSTRUCTIVE CARDIOMYOPATHY): Primary | ICD-10-CM

## 2023-02-14 PROCEDURE — 1159F MED LIST DOCD IN RCRD: CPT | Mod: CPTII,S$GLB,, | Performed by: INTERNAL MEDICINE

## 2023-02-14 PROCEDURE — 3008F PR BODY MASS INDEX (BMI) DOCUMENTED: ICD-10-PCS | Mod: CPTII,S$GLB,, | Performed by: INTERNAL MEDICINE

## 2023-02-14 PROCEDURE — 1126F AMNT PAIN NOTED NONE PRSNT: CPT | Mod: CPTII,S$GLB,, | Performed by: INTERNAL MEDICINE

## 2023-02-14 PROCEDURE — 3077F PR MOST RECENT SYSTOLIC BLOOD PRESSURE >= 140 MM HG: ICD-10-PCS | Mod: CPTII,S$GLB,, | Performed by: INTERNAL MEDICINE

## 2023-02-14 PROCEDURE — 93010 RHYTHM STRIP: ICD-10-PCS | Mod: S$GLB,,, | Performed by: INTERNAL MEDICINE

## 2023-02-14 PROCEDURE — 3077F SYST BP >= 140 MM HG: CPT | Mod: CPTII,S$GLB,, | Performed by: INTERNAL MEDICINE

## 2023-02-14 PROCEDURE — 3288F PR FALLS RISK ASSESSMENT DOCUMENTED: ICD-10-PCS | Mod: CPTII,S$GLB,, | Performed by: INTERNAL MEDICINE

## 2023-02-14 PROCEDURE — 93005 ELECTROCARDIOGRAM TRACING: CPT | Mod: S$GLB,,, | Performed by: INTERNAL MEDICINE

## 2023-02-14 PROCEDURE — 1101F PR PT FALLS ASSESS DOC 0-1 FALLS W/OUT INJ PAST YR: ICD-10-PCS | Mod: CPTII,S$GLB,, | Performed by: INTERNAL MEDICINE

## 2023-02-14 PROCEDURE — 99214 OFFICE O/P EST MOD 30 MIN: CPT | Mod: S$GLB,,, | Performed by: INTERNAL MEDICINE

## 2023-02-14 PROCEDURE — 3078F DIAST BP <80 MM HG: CPT | Mod: CPTII,S$GLB,, | Performed by: INTERNAL MEDICINE

## 2023-02-14 PROCEDURE — 1101F PT FALLS ASSESS-DOCD LE1/YR: CPT | Mod: CPTII,S$GLB,, | Performed by: INTERNAL MEDICINE

## 2023-02-14 PROCEDURE — 1126F PR PAIN SEVERITY QUANTIFIED, NO PAIN PRESENT: ICD-10-PCS | Mod: CPTII,S$GLB,, | Performed by: INTERNAL MEDICINE

## 2023-02-14 PROCEDURE — 99999 PR PBB SHADOW E&M-EST. PATIENT-LVL IV: ICD-10-PCS | Mod: PBBFAC,,, | Performed by: INTERNAL MEDICINE

## 2023-02-14 PROCEDURE — 99999 PR PBB SHADOW E&M-EST. PATIENT-LVL IV: CPT | Mod: PBBFAC,,, | Performed by: INTERNAL MEDICINE

## 2023-02-14 PROCEDURE — 1159F PR MEDICATION LIST DOCUMENTED IN MEDICAL RECORD: ICD-10-PCS | Mod: CPTII,S$GLB,, | Performed by: INTERNAL MEDICINE

## 2023-02-14 PROCEDURE — 3288F FALL RISK ASSESSMENT DOCD: CPT | Mod: CPTII,S$GLB,, | Performed by: INTERNAL MEDICINE

## 2023-02-14 PROCEDURE — 99214 PR OFFICE/OUTPT VISIT, EST, LEVL IV, 30-39 MIN: ICD-10-PCS | Mod: S$GLB,,, | Performed by: INTERNAL MEDICINE

## 2023-02-14 PROCEDURE — 93010 ELECTROCARDIOGRAM REPORT: CPT | Mod: S$GLB,,, | Performed by: INTERNAL MEDICINE

## 2023-02-14 PROCEDURE — 4010F PR ACE/ARB THEARPY RXD/TAKEN: ICD-10-PCS | Mod: CPTII,S$GLB,, | Performed by: INTERNAL MEDICINE

## 2023-02-14 PROCEDURE — 93005 RHYTHM STRIP: ICD-10-PCS | Mod: S$GLB,,, | Performed by: INTERNAL MEDICINE

## 2023-02-14 PROCEDURE — 3008F BODY MASS INDEX DOCD: CPT | Mod: CPTII,S$GLB,, | Performed by: INTERNAL MEDICINE

## 2023-02-14 PROCEDURE — 4010F ACE/ARB THERAPY RXD/TAKEN: CPT | Mod: CPTII,S$GLB,, | Performed by: INTERNAL MEDICINE

## 2023-02-14 PROCEDURE — 3078F PR MOST RECENT DIASTOLIC BLOOD PRESSURE < 80 MM HG: ICD-10-PCS | Mod: CPTII,S$GLB,, | Performed by: INTERNAL MEDICINE

## 2023-02-14 NOTE — PROGRESS NOTES
Physical Therapy Daily Treatment Note     Name: Parisa Dimas  Clinic Number: [id number]    Therapy Diagnosis:   Encounter Diagnosis   Name Primary?    Decreased ROM of left shoulder Yes     Physician: Hosea Gama, *    Visit Date: 2/13/2023  Physician Orders: PT Eval and Treat   Medical Diagnosis from Referral: Z96.612 (ICD-10-CM) - Status post reverse total replacement of left shoulder  Evaluation Date: 1/10/2023  Authorization Period Expiration: 12/31/2023  Plan of Care Expiration: 4/10/2023  Visit # / Visits authorized: 1/20    Time In: 1300  Time Out: 1400  Total Billable Time: 30 minutes    Precautions: Standard    Subjective     Pt reports: I am trying to use it more. Getting ready for the [employer], going to be working it 5 times but not heavy lifting.     She was compliant with home exercise program.    Response to previous treatment: no pains   Functional change: improved reach    Pain: 2/10  Location: left shoulder      Objective     Abd 4/5, scaption 4+/5    Parisa received therapeutic exercises to develop strength, endurance, ROM, and flexibility for 30 minutes including:    CC abduction 10# 2 x 15  Standing abduction 2# 3 x 8  Scaptions 2# 3 x 8    Parisa received the following manual therapy techniques: Joint mobilizations and Soft tissue Mobilization were applied to the: L shoulder pain for 5 minutes, including:    Gr I-II relaxation  Shoulder reassessment  STM axilla    Parisa participated in neuromuscular re-education activities to improve: Balance, Sense, Proprioception, and Posture for 15 minutes. The following activities were included:    Prone row 4# 2 x 20  Prone extensions 2# 25x    Parisa participated in dynamic functional therapeutic activities to improve functional performance for 10  minutes, including:    Med ball 2#,4#,6# placement on overhead shelved and transfers 20x each    Home Exercises Provided and Patient Education Provided     Education provided:   - Improve deltoid  strength    Written Home Exercises Provided: Patient instructed to cont prior HEP.  Exercises were reviewed and Parisa was able to demonstrate them prior to the end of the session.  Parisa demonstrated good  understanding of the education provided.     See EMR under Patient Instructions for exercises provided prior visit.    Assessment     Parisa presented with much improved strength to the upper extremity. She is using th arm around the home and was able to mimic tasks with the medicine balls today on shelves. She notes having some difficulty with 4# overhead but 2# well tolerated. Adjusted HEP to deltoid strengthening standing with weights against gravity.     Parisa Is progressing well towards her goals.   Pt prognosis is Excellent.     Pt will continue to benefit from skilled outpatient physical therapy to address the deficits listed in the problem list box on initial evaluation, provide pt/family education and to maximize pt's level of independence in the home and community environment.     Pt's spiritual, cultural and educational needs considered and pt agreeable to plan of care and goals.    Anticipated barriers to physical therapy: chronic shoulder weakness       GOALS: Short Term Goals:  4 weeks(Progressing, not met)  1.Report decreased shoulder pain < / =  0/10  to increase tolerance for return to exercise  2. Increase thoracic extension motion by 25%   3. Increased strength by 1/3 MMT grade in shoulder abduction to increase tolerance for ADL and work activities.  4. Pt to tolerate HEP to improve ROM and independence with ADL's     Long Term Goals: 8 weeks(Progressing, not met)     2.Increase AROM to reaching overhead without pain in L shoulder  3.Increase strength to >/= 4/5 in deltoid to increase tolerance for ADL and work activities.  4. Pt goal: return to lifting overhead without pain   5. Pt will have improved gcode of CJ (20-40% limited) on FOTO shoulder in order to demonstrate true functional  improvement.        Plan     Plan of care Certification: 1/10/2023 to 4/10/2023.    Improve deltoid strength     Yemi Vallejo PT

## 2023-02-27 ENCOUNTER — CLINICAL SUPPORT (OUTPATIENT)
Dept: REHABILITATION | Facility: HOSPITAL | Age: 68
End: 2023-02-27
Attending: ORTHOPAEDIC SURGERY
Payer: MEDICARE

## 2023-02-27 DIAGNOSIS — M25.612 DECREASED ROM OF LEFT SHOULDER: Primary | ICD-10-CM

## 2023-02-27 PROCEDURE — 97110 THERAPEUTIC EXERCISES: CPT | Mod: CQ

## 2023-02-27 PROCEDURE — 97112 NEUROMUSCULAR REEDUCATION: CPT | Mod: CQ

## 2023-02-27 NOTE — PROGRESS NOTES
"  Physical Therapy Daily Treatment Note     Name: Parisa EARL [employer]  Clinic Number: [id number]    Therapy Diagnosis:   Encounter Diagnosis   Name Primary?    Decreased ROM of left shoulder Yes     Physician: Hosea Gama, *    Visit Date: 2/27/2023  Physician Orders: PT Eval and Treat   Medical Diagnosis from Referral: Z96.612 (ICD-10-CM) - Status post reverse total replacement of left shoulder  Evaluation Date: 1/10/2023  Authorization Period Expiration: 12/31/2023  Plan of Care Expiration: 4/10/2023  Visit # / Visits authorized: 2/20    Time In: 1255  Time Out: 1355  Total Billable Time: 30 minutes    Precautions: Standard    Subjective     Pt reports: soreness in shld and lats   She was compliant with home exercise program.  Response to previous treatment: no pains   Functional change: able to work [employer] Saturday     Pain: 2/10  Location: left shoulder      Objective     Abd 4/5, scaption 4+/5    Parisa received therapeutic exercises to develop strength, endurance, ROM, and flexibility for 40 minutes including:    UBE 3'/3' for increase ROM, circulation, and mm strength/endurance   Table step backs 5x/30"  CC abduction 10# 2x15  Standing abduction 2# 3 x 8  Scaptions 2# 3 x 8  Wall shld flex with eccentric shd ext 3x10/3'    Praisa received the following manual therapy techniques: Joint mobilizations and Soft tissue Mobilization were applied to the: L shoulder pain for 0 minutes, including:      Parisa participated in neuromuscular re-education activities to improve: Balance, Sense, Proprioception, and Posture for 15 minutes. The following activities were included:  Prone row 4# 2 x 20  Prone extensions 2# 3x10      Parisa participated in dynamic functional therapeutic activities to improve functional performance for 10  minutes, including:    Med ball 2#,4#,6# placement on overhead shelved and transfers 20x each    Home Exercises Provided and Patient Education Provided     Education provided:   - Improve " deltoid strength    Written Home Exercises Provided: Patient instructed to cont prior HEP.  Exercises were reviewed and Parisa was able to demonstrate them prior to the end of the session.  Parisa demonstrated good  understanding of the education provided.     See EMR under Patient Instructions for exercises provided prior visit.    Assessment   Pt tolerating tx well with no increased pain but appropriate mm fatigue after tx. Continue with L shld and periscapular strengthening. VC/TC for correcting upper trap activation with shld abd/flex.Continue to progress as tolerated.   Parisa Is progressing well towards her goals.   Pt prognosis is Excellent.     Pt will continue to benefit from skilled outpatient physical therapy to address the deficits listed in the problem list box on initial evaluation, provide pt/family education and to maximize pt's level of independence in the home and community environment.     Pt's spiritual, cultural and educational needs considered and pt agreeable to plan of care and goals.    Anticipated barriers to physical therapy: chronic shoulder weakness       GOALS: Short Term Goals:  4 weeks(Progressing, not met)  1.Report decreased shoulder pain < / =  0/10  to increase tolerance for return to exercise  2. Increase thoracic extension motion by 25%   3. Increased strength by 1/3 MMT grade in shoulder abduction to increase tolerance for ADL and work activities.  4. Pt to tolerate HEP to improve ROM and independence with ADL's     Long Term Goals: 8 weeks(Progressing, not met)     2.Increase AROM to reaching overhead without pain in L shoulder  3.Increase strength to >/= 4/5 in deltoid to increase tolerance for ADL and work activities.  4. Pt goal: return to lifting overhead without pain   5. Pt will have improved gcode of CJ (20-40% limited) on FOTO shoulder in order to demonstrate true functional improvement.        Plan     Plan of care Certification: 1/10/2023 to 4/10/2023.    Improve  deltoid strength     Ben Dudley, PTA, STS

## 2023-03-13 ENCOUNTER — CLINICAL SUPPORT (OUTPATIENT)
Dept: REHABILITATION | Facility: HOSPITAL | Age: 68
End: 2023-03-13
Payer: MEDICARE

## 2023-03-13 DIAGNOSIS — M25.612 DECREASED ROM OF LEFT SHOULDER: Primary | ICD-10-CM

## 2023-03-13 PROCEDURE — 97112 NEUROMUSCULAR REEDUCATION: CPT | Mod: CQ

## 2023-03-13 PROCEDURE — 97140 MANUAL THERAPY 1/> REGIONS: CPT | Mod: CQ

## 2023-03-13 PROCEDURE — 97110 THERAPEUTIC EXERCISES: CPT | Mod: CQ

## 2023-03-13 NOTE — PROGRESS NOTES
"  Physical Therapy Daily Treatment Note     Name: Parisa EARL [employer]  Clinic Number: [id number]    Therapy Diagnosis:   Encounter Diagnosis   Name Primary?    Decreased ROM of left shoulder Yes     Physician: MISSY Santoyo MD    Visit Date: 3/13/2023  Physician Orders: PT Eval and Treat   Medical Diagnosis from Referral: Z96.612 (ICD-10-CM) - Status post reverse total replacement of left shoulder  Evaluation Date: 1/10/2023  Authorization Period Expiration: 12/31/2023  Plan of Care Expiration: 4/10/2023  Visit # / Visits authorized: 4/20    Time In: 1355  Time Out: 1455  Total Billable Time: 30 minutes    Precautions: Standard    Subjective     Pt reports: soreness in shld and biceps. 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  Response to previous treatment: no pains   Functional change: able to work [employer] Friday     Pain: 2/10  Location: left shoulder/biceps       Objective     Abd 4/5, scaption 4+/5    Parisa received therapeutic exercises to develop strength, endurance, ROM, and flexibility for 30 minutes including:    UBE 4'/4' for increase ROM, circulation, and mm strength/endurance   Table step backs 5x/30"  CC abduction 10# 2x15  Standing abduction 2# 3x10  Scaptions 2# 3x10  Wall shld flex with eccentric shd ext 3x10/3'    Parisa received the following manual therapy techniques: Joint mobilizations and Soft tissue Mobilization were applied to the: L shoulder pain for 10 minutes, including: scapular mobs, shld flexion and abd stretching end range, oscillation, STM biceps       Parisa participated in neuromuscular re-education activities to improve: Balance, Sense, Proprioception, and Posture for 20 minutes. The following activities were included:  Prone row 4# 3x10  Prone extensions 2# 3x10  Prone HABD 3x10      Parisa participated in dynamic functional therapeutic activities to improve functional performance for 0  minutes, " including:    Med ball 2#,4#,6# placement on overhead shelved and transfers 20x each    Home Exercises Provided and Patient Education Provided     Education provided:   - Improve deltoid strength    Written Home Exercises Provided: Patient instructed to cont prior HEP.  Exercises were reviewed and Parisa was able to demonstrate them prior to the end of the session.  Parisa demonstrated good  understanding of the education provided.     See EMR under Patient Instructions for exercises provided prior visit.    Assessment   Pt tolerating tx well with no increased pain but appropriate mm fatigue after tx. Continue with L shld and periscapular strengthening. VC/TC for correcting upper trap activation with shld abd/flex. TTP L biceps tendon with manual therapy but noted pain relieve after tx. Continue to progress as tolerated.   Parisa Is progressing well towards her goals.   Pt prognosis is Excellent.     Pt will continue to benefit from skilled outpatient physical therapy to address the deficits listed in the problem list box on initial evaluation, provide pt/family education and to maximize pt's level of independence in the home and community environment.     Pt's spiritual, cultural and educational needs considered and pt agreeable to plan of care and goals.    Anticipated barriers to physical therapy: chronic shoulder weakness       GOALS: Short Term Goals:  4 weeks(Progressing, not met)  1.Report decreased shoulder pain < / =  0/10  to increase tolerance for return to exercise  2. Increase thoracic extension motion by 25%   3. Increased strength by 1/3 MMT grade in shoulder abduction to increase tolerance for ADL and work activities.  4. Pt to tolerate HEP to improve ROM and independence with ADL's     Long Term Goals: 8 weeks(Progressing, not met)     2.Increase AROM to reaching overhead without pain in L shoulder  3.Increase strength to >/= 4/5 in deltoid to increase tolerance for ADL and work activities.  4. Pt  goal: return to lifting overhead without pain   5. Pt will have improved gcode of CJ (20-40% limited) on FOTO shoulder in order to demonstrate true functional improvement.        Plan     Plan of care Certification: 1/10/2023 to 4/10/2023.    Improve deltoid strength     Ben Dudley, PTA, STS

## 2023-03-27 ENCOUNTER — CLINICAL SUPPORT (OUTPATIENT)
Dept: REHABILITATION | Facility: HOSPITAL | Age: 68
End: 2023-03-27
Payer: MEDICARE

## 2023-03-27 DIAGNOSIS — M25.612 DECREASED ROM OF LEFT SHOULDER: Primary | ICD-10-CM

## 2023-03-27 PROCEDURE — 97110 THERAPEUTIC EXERCISES: CPT | Performed by: PHYSICAL THERAPIST

## 2023-03-27 PROCEDURE — 97112 NEUROMUSCULAR REEDUCATION: CPT | Performed by: PHYSICAL THERAPIST

## 2023-03-27 NOTE — PROGRESS NOTES
"  Physical Therapy Daily Treatment Note     Name: Parisa EARL [employer]  Clinic Number: [id number]    Therapy Diagnosis:   Encounter Diagnosis   Name Primary?    Decreased ROM of left shoulder Yes     Physician: MISSY Santoyo MD    Visit Date: 3/27/2023  Physician Orders: PT Eval and Treat   Medical Diagnosis from Referral: Z96.612 (ICD-10-CM) - Status post reverse total replacement of left shoulder  Evaluation Date: 1/10/2023  Authorization Period Expiration: 12/31/2023  Plan of Care Expiration: 4/10/2023  Visit # / Visits authorized: 5/20    Time In: 1300  Time Out: 1400  Total Billable Time: 30 minutes    Precautions: Standard    Subjective     Pt reports: I can lift 25# or 35# buckets now at the [employer]  She was compliant with home exercise program.  Response to previous treatment: no pains   Functional change: able to work [employer] Friday     Pain: 2/10  Location: left shoulder/biceps       Objective     Abd 4/5, scaption 4+/5    Parisa received therapeutic exercises to develop strength, endurance, ROM, and flexibility for 30 minutes including:    UBE 4'/4' for increase ROM, circulation, and mm strength/endurance   Standing abduction 2# 3x10  Scaptions 2# 3x10  CC adduction 7# 3x15 eccentric return    NT  Table step backs 5x/30"    Wall shld flex with eccentric shd ext 3x10/3'    Parisa received the following manual therapy techniques: Joint mobilizations and Soft tissue Mobilization were applied to the: L shoulder pain for 10 minutes, including: scapular mobs, shld flexion and abd stretching end range, oscillation, STM biceps       Parisa participated in neuromuscular re-education activities to improve: Balance, Sense, Proprioception, and Posture for 20 minutes. The following activities were included:  Prone row 4# 3x10  Prone extensions 3# 3x10  Standing extensions CC 10# BUE 3 x 15    NT  Prone HABD 3x10      Parisa participated in dynamic functional therapeutic activities to improve functional performance for 0  " minutes, including:    Med ball 2#,4#,6# placement on overhead shelved and transfers 20x each    Home Exercises Provided and Patient Education Provided     Education provided:   - Improve deltoid strength    Written Home Exercises Provided: Patient instructed to cont prior HEP.  Exercises were reviewed and Parisa was able to demonstrate them prior to the end of the session.  Parisa demonstrated good  understanding of the education provided.     See EMR under Patient Instructions for exercises provided prior visit.    Assessment     Parisa presented with improved strength to the deltoid and tolerated prone progression with weights today. Strength 4+/5 in the deltoid today. Able to work at the [employer] without limitations, notes just a soreness over the front of the shoulder. Discharge with goals met.    Parisa Is progressing well towards her goals.   Pt prognosis is Excellent.     Pt will continue to benefit from skilled outpatient physical therapy to address the deficits listed in the problem list box on initial evaluation, provide pt/family education and to maximize pt's level of independence in the home and community environment.     Pt's spiritual, cultural and educational needs considered and pt agreeable to plan of care and goals.    Anticipated barriers to physical therapy: chronic shoulder weakness       GOALS: Short Term Goals:  4 weeks(met)  1.Report decreased shoulder pain < / =  0/10  to increase tolerance for return to exercise  2. Increase thoracic extension motion by 25%   3. Increased strength by 1/3 MMT grade in shoulder abduction to increase tolerance for ADL and work activities.  4. Pt to tolerate HEP to improve ROM and independence with ADL's     Long Term Goals: 8 weeks(met)     2.Increase AROM to reaching overhead without pain in L shoulder  3.Increase strength to >/= 4/5 in deltoid to increase tolerance for ADL and work activities.  4. Pt goal: return to lifting overhead without pain   5. Pt will  have improved gcode of CJ (20-40% limited) on FOTO shoulder in order to demonstrate true functional improvement.        Plan     Plan of care Certification: 1/10/2023 to 4/10/2023.    D/C with goals met    Yemi Vallejo, PT, DPT, OCS

## 2023-03-28 ENCOUNTER — PES CALL (OUTPATIENT)
Dept: ADMINISTRATIVE | Facility: CLINIC | Age: 68
End: 2023-03-28
Payer: MEDICARE

## 2023-04-10 NOTE — PROGRESS NOTES
CC: Left shoulder f/u    DATE OF PROCEDURE: 3/7/2022   PROCEDURES PERFORMED:   Left reverse shoulder arthroplasty (CPT 20924-26)  Left shoulder open biceps tenodesis (CPT 08245)    Parisa Dimas reports to be doing well 1 year s/p the above mentioned procedure. Accompanied by her .  This was a reverse prosthesis for 4 part fracture.  I last saw her in October.  At that time she was having some weakness.  PT sessions 4-5x since then with some improvement.   Since then, she has traveled with her  and was diagnosed with atrial fibrillation.  She has some occasional soreness in the shoulder but no significant pain.  Able to do most desire day-to-day activity.    PMHx notable for DM type 2   Negative for tobacco.   Positive for diabetes. Last A1C:  6.9    PAST MEDICAL HISTORY:   Past Medical History:   Diagnosis Date    AR (allergic rhinitis)     AR (allergic rhinitis)     Atrial fibrillation 12/27/2022    Carotid stenosis     50% RCA    Colon polyp 10/2014    Diabetes mellitus     Diabetes mellitus, type 2     Fatty liver     GERD (gastroesophageal reflux disease)     Heart murmur 1/27/2022    HLD (hyperlipidemia)     HTN (hypertension)     Joint pain     Sleep apnea 2018    Stricture of artery 3/25/2021     PAST SURGICAL HISTORY:  Past Surgical History:   Procedure Laterality Date    REVERSE TOTAL SHOULDER ARTHROPLASTY Left 3/7/2022    Procedure: ARTHROPLASTY, SHOULDER, TOTAL, REVERSE;  Surgeon: MISSY Santoyo MD;  Location: [facility] OR [address] [place] [address];  Service: Orthopedics;  Laterality: Left;  AND BICEP SYNDEMOSIS    SKIN BIOPSY  10yrs.    negative     FAMILY HISTORY:  Family History   Problem Relation Age of Onset    Cancer Mother         gallbladder    Coronary artery disease Father 79    Diabetes Father     Cerebral palsy Sister     Osteoporosis Sister     Epilepsy Sister     Prostate cancer Brother 57    Diabetes Brother     Heart attack Brother     Hypertension Daughter     No Known Problems Son      Diabetes Maternal Aunt     Diabetes Maternal Uncle     Diabetes Maternal Grandmother     Hypertension Other         multiple    Melanoma Neg Hx      MEDICATIONS:    Current Outpatient Medications:     ascorbic acid, vitamin C, (VITAMIN C) 1000 MG tablet, Take 1,000 mg by mouth once daily., Disp: , Rfl:     aspirin (ECOTRIN) 81 MG EC tablet, Take 81 mg by mouth once daily., Disp: , Rfl:     cetirizine (ZYRTEC) 10 MG tablet, Take 10 mg by mouth once daily., Disp: , Rfl:     hydroCHLOROthiazide (HYDRODIURIL) 25 MG tablet, TAKE 1 TABLET BY MOUTH ONCE DAILY, Disp: 90 tablet, Rfl: 3    L.ACID/L.CASEI/B.BIF/B.MELISSA/FOS (PROBIOTIC BLEND ORAL), Take 2 tablets by mouth once daily., Disp: , Rfl:     lancets Misc, To check BG 1 times daily, to use with insurance preferred meter, Disp: 100 each, Rfl: 11    lovastatin (MEVACOR) 20 MG tablet, TAKE 1 TABLET BY MOUTH IN  THE EVENING, Disp: 90 tablet, Rfl: 3    magnesium 30 mg Tab, Take 1 tablet by mouth Daily., Disp: , Rfl:     metFORMIN (GLUCOPHAGE-XR) 500 MG ER 24hr tablet, Take 2 tablets (1,000 mg total) by mouth once daily., Disp: 180 tablet, Rfl: 3    multivitamin capsule, Take 1 capsule by mouth once daily., Disp: , Rfl:     OMEGA-3S/DHA/EPA/FISH OIL (OMEGA 3 ORAL), Take 2,800 mg by mouth once daily., Disp: , Rfl:     omeprazole (PRILOSEC) 40 MG capsule, TAKE 1 CAPSULE BY MOUTH  ONCE DAILY, Disp: 90 capsule, Rfl: 2    OPW TEST CLAIM - DO NOT FILL, OPW test claim. Do not fill., Disp: 1 tablet, Rfl: 0    valsartan (DIOVAN) 320 MG tablet, TAKE 1 TABLET BY MOUTH ONCE DAILY, Disp: 90 tablet, Rfl: 3    verapamiL (VERELAN) 120 MG C24P, Take 1 capsule (120 mg total) by mouth once daily., Disp: 90 capsule, Rfl: 3    vitamin D (VITAMIN D3) 1000 units Tab, Take 1,000 Units by mouth once daily., Disp: , Rfl:     vitamin E 400 UNIT capsule, Take 400 Units by mouth once daily., Disp: , Rfl:     zinc sulfate (ZINC-15 ORAL), Take by mouth., Disp: , Rfl:     blood-glucose meter kit, To check BG  1 times daily, to use with insurance preferred meter, Disp: 1 each, Rfl: 0    celecoxib (CELEBREX) 200 MG capsule, Take 1 capsule (200 mg total) by mouth once daily. (Patient not taking: Reported on 4/11/2023), Disp: 60 capsule, Rfl: 2    ALLERGIES:  Review of patient's allergies indicates:  No Known Allergies     REVIEW OF SYSTEMS:  Constitution: Negative. Negative for chills, fever and night sweats.    Hematologic/Lymphatic: Negative for bleeding problem. Does not bruise/bleed easily.   Skin: Negative for dry skin, itching and rash.Neg for left shoulder pain and muscle weakness.     All other review of symptoms were reviewed and found to be noncontributory.    PHYSICAL EXAMINATION:  Vitals:  Wt 92.1 kg (203 lb)   LMP 08/01/2003   BMI 35.96 kg/m²    General: Well-developed well-nourished 68 y.o. femalein no acute distress   Cardiovascular: Regular rhythm by palpation of distal pulse, normal color and temperature, no concerning varicosities on symptomatic side   Lungs: No labored breathing or wheezing appreciated   Neuro: Alert and oriented ×3   Psychiatric: well oriented to person, place and time, demonstrates normal mood and affect   Skin: No rashes, lesions or ulcers, normal temperature, turgor, and texture on uninvolved extremity    Ortho/SPM Exam  Exam of the left shoulder shows a well-healed incision.  No redness, swelling or drainage.  Sensation intact to light touch over the axillary nerve distribution.  Active forward elevation in scapular plane to 110, passive to 160° with some mild stiffness.  Active external rotation with arm at side to 40°.  Internal rotation to back pocket.  Good deltoid activation. Stable shoulder.     IMAGING:  Radiographs:  X-ray of left shoulder taken in clinic today, images reviewed by me, demonstrates the reverse prosthesis again to be in good position.  No signs of loosening or complication otherwise.  She does have some heterotopic bone formation as noted  before    ASSESSMENT:      ICD-10-CM ICD-9-CM   1. Shoulder weakness  R29.898 719.61   2. Status post reverse total replacement of left shoulder  Z96.612 V43.61       PLAN:     Findings discussed with the patient.  Functional improvement since our last visit.  Overall doing well.  I would not recommend any further treatment or workup at this time.  Return to clinic as needed.      Procedures

## 2023-04-11 ENCOUNTER — OFFICE VISIT (OUTPATIENT)
Dept: SPORTS MEDICINE | Facility: CLINIC | Age: 68
End: 2023-04-11
Payer: MEDICARE

## 2023-04-11 ENCOUNTER — HOSPITAL ENCOUNTER (OUTPATIENT)
Dept: RADIOLOGY | Facility: HOSPITAL | Age: 68
Discharge: HOME | End: 2023-04-11
Attending: ORTHOPAEDIC SURGERY
Payer: MEDICARE

## 2023-04-11 VITALS — WEIGHT: 203 LBS | BODY MASS INDEX: 35.96 KG/M2

## 2023-04-11 DIAGNOSIS — R29.898 SHOULDER WEAKNESS: Primary | ICD-10-CM

## 2023-04-11 DIAGNOSIS — M25.512 LEFT SHOULDER PAIN, UNSPECIFIED CHRONICITY: ICD-10-CM

## 2023-04-11 DIAGNOSIS — Z96.612 STATUS POST REVERSE TOTAL REPLACEMENT OF LEFT SHOULDER: ICD-10-CM

## 2023-04-11 PROCEDURE — 99999 PR PBB SHADOW E&M-EST. PATIENT-LVL III: ICD-10-PCS | Mod: PBBFAC,,, | Performed by: ORTHOPAEDIC SURGERY

## 2023-04-11 PROCEDURE — 99214 OFFICE O/P EST MOD 30 MIN: CPT | Mod: S$GLB,,, | Performed by: ORTHOPAEDIC SURGERY

## 2023-04-11 PROCEDURE — 99214 PR OFFICE/OUTPT VISIT, EST, LEVL IV, 30-39 MIN: ICD-10-PCS | Mod: S$GLB,,, | Performed by: ORTHOPAEDIC SURGERY

## 2023-04-11 PROCEDURE — 4010F PR ACE/ARB THEARPY RXD/TAKEN: ICD-10-PCS | Mod: CPTII,S$GLB,, | Performed by: ORTHOPAEDIC SURGERY

## 2023-04-11 PROCEDURE — 4010F ACE/ARB THERAPY RXD/TAKEN: CPT | Mod: CPTII,S$GLB,, | Performed by: ORTHOPAEDIC SURGERY

## 2023-04-11 PROCEDURE — 1159F MED LIST DOCD IN RCRD: CPT | Mod: CPTII,S$GLB,, | Performed by: ORTHOPAEDIC SURGERY

## 2023-04-11 PROCEDURE — 99999 PR PBB SHADOW E&M-EST. PATIENT-LVL III: CPT | Mod: PBBFAC,,, | Performed by: ORTHOPAEDIC SURGERY

## 2023-04-11 PROCEDURE — 1126F PR PAIN SEVERITY QUANTIFIED, NO PAIN PRESENT: ICD-10-PCS | Mod: CPTII,S$GLB,, | Performed by: ORTHOPAEDIC SURGERY

## 2023-04-11 PROCEDURE — 3288F PR FALLS RISK ASSESSMENT DOCUMENTED: ICD-10-PCS | Mod: CPTII,S$GLB,, | Performed by: ORTHOPAEDIC SURGERY

## 2023-04-11 PROCEDURE — 3008F BODY MASS INDEX DOCD: CPT | Mod: CPTII,S$GLB,, | Performed by: ORTHOPAEDIC SURGERY

## 2023-04-11 PROCEDURE — 1101F PT FALLS ASSESS-DOCD LE1/YR: CPT | Mod: CPTII,S$GLB,, | Performed by: ORTHOPAEDIC SURGERY

## 2023-04-11 PROCEDURE — 73030 XR SHOULDER COMPLETE 2 OR MORE VIEWS LEFT: ICD-10-PCS | Mod: 26,LT,, | Performed by: RADIOLOGY

## 2023-04-11 PROCEDURE — 3288F FALL RISK ASSESSMENT DOCD: CPT | Mod: CPTII,S$GLB,, | Performed by: ORTHOPAEDIC SURGERY

## 2023-04-11 PROCEDURE — 1101F PR PT FALLS ASSESS DOC 0-1 FALLS W/OUT INJ PAST YR: ICD-10-PCS | Mod: CPTII,S$GLB,, | Performed by: ORTHOPAEDIC SURGERY

## 2023-04-11 PROCEDURE — 1126F AMNT PAIN NOTED NONE PRSNT: CPT | Mod: CPTII,S$GLB,, | Performed by: ORTHOPAEDIC SURGERY

## 2023-04-11 PROCEDURE — 3008F PR BODY MASS INDEX (BMI) DOCUMENTED: ICD-10-PCS | Mod: CPTII,S$GLB,, | Performed by: ORTHOPAEDIC SURGERY

## 2023-04-11 PROCEDURE — 1159F PR MEDICATION LIST DOCUMENTED IN MEDICAL RECORD: ICD-10-PCS | Mod: CPTII,S$GLB,, | Performed by: ORTHOPAEDIC SURGERY

## 2023-04-11 PROCEDURE — 73030 X-RAY EXAM OF SHOULDER: CPT | Mod: 26,LT,, | Performed by: RADIOLOGY

## 2023-04-11 PROCEDURE — 73030 X-RAY EXAM OF SHOULDER: CPT | Mod: TC,LT

## 2023-04-19 ENCOUNTER — PATIENT MESSAGE (OUTPATIENT)
Dept: FAMILY MEDICINE | Facility: CLINIC | Age: 68
End: 2023-04-19
Payer: MEDICARE

## 2023-04-19 DIAGNOSIS — E11.9 CONTROLLED TYPE 2 DIABETES MELLITUS WITHOUT COMPLICATION, WITHOUT LONG-TERM CURRENT USE OF INSULIN: ICD-10-CM

## 2023-05-23 ENCOUNTER — TELEPHONE (OUTPATIENT)
Dept: ADMINISTRATIVE | Facility: CLINIC | Age: 68
End: 2023-05-23

## 2023-05-23 DIAGNOSIS — I42.1 HOCM (HYPERTROPHIC OBSTRUCTIVE CARDIOMYOPATHY): ICD-10-CM

## 2023-05-23 DIAGNOSIS — I48.91 NEW ONSET ATRIAL FIBRILLATION: ICD-10-CM

## 2023-05-23 DIAGNOSIS — I10 PRIMARY HYPERTENSION: ICD-10-CM

## 2023-05-23 RX ORDER — VERAPAMIL HYDROCHLORIDE 120 MG/1
120 CAPSULE, EXTENDED RELEASE ORAL DAILY
Qty: 90 CAPSULE | Refills: 3 | Status: SHIPPED | OUTPATIENT
Start: 2023-05-23 | End: 2023-06-07

## 2023-05-24 ENCOUNTER — OFFICE VISIT (OUTPATIENT)
Dept: FAMILY MEDICINE | Facility: CLINIC | Age: 68
End: 2023-05-24
Payer: MEDICARE

## 2023-05-24 VITALS
WEIGHT: 201.31 LBS | HEART RATE: 74 BPM | TEMPERATURE: 98 F | HEIGHT: 63 IN | DIASTOLIC BLOOD PRESSURE: 80 MMHG | BODY MASS INDEX: 35.67 KG/M2 | SYSTOLIC BLOOD PRESSURE: 147 MMHG | OXYGEN SATURATION: 97 %

## 2023-05-24 DIAGNOSIS — I48.91 ATRIAL FIBRILLATION, UNSPECIFIED TYPE: ICD-10-CM

## 2023-05-24 DIAGNOSIS — I15.2 HYPERTENSION ASSOCIATED WITH DIABETES: ICD-10-CM

## 2023-05-24 DIAGNOSIS — K21.9 GASTROESOPHAGEAL REFLUX DISEASE WITHOUT ESOPHAGITIS: ICD-10-CM

## 2023-05-24 DIAGNOSIS — G47.33 OBSTRUCTIVE SLEEP APNEA HYPOPNEA, SEVERE: ICD-10-CM

## 2023-05-24 DIAGNOSIS — H61.23 BILATERAL IMPACTED CERUMEN: ICD-10-CM

## 2023-05-24 DIAGNOSIS — Z00.00 ENCOUNTER FOR PREVENTIVE HEALTH EXAMINATION: Primary | ICD-10-CM

## 2023-05-24 DIAGNOSIS — I77.9 BILATERAL CAROTID ARTERY DISEASE, UNSPECIFIED TYPE: ICD-10-CM

## 2023-05-24 DIAGNOSIS — I42.1 HOCM (HYPERTROPHIC OBSTRUCTIVE CARDIOMYOPATHY): ICD-10-CM

## 2023-05-24 DIAGNOSIS — E11.40 TYPE 2 DIABETES MELLITUS WITH DIABETIC NEUROPATHY, UNSPECIFIED WHETHER LONG TERM INSULIN USE: ICD-10-CM

## 2023-05-24 DIAGNOSIS — E11.59 HYPERTENSION ASSOCIATED WITH DIABETES: ICD-10-CM

## 2023-05-24 DIAGNOSIS — E66.01 SEVERE OBESITY WITH BODY MASS INDEX (BMI) OF 35.0 TO 39.9 WITH COMORBIDITY: ICD-10-CM

## 2023-05-24 DIAGNOSIS — E78.5 HYPERLIPIDEMIA ASSOCIATED WITH TYPE 2 DIABETES MELLITUS: ICD-10-CM

## 2023-05-24 DIAGNOSIS — R26.9 ABNORMALITY OF GAIT AND MOBILITY: ICD-10-CM

## 2023-05-24 DIAGNOSIS — E11.69 HYPERLIPIDEMIA ASSOCIATED WITH TYPE 2 DIABETES MELLITUS: ICD-10-CM

## 2023-05-24 PROBLEM — S42.292A CLOSED 4-PART FRACTURE OF PROXIMAL HUMERUS, LEFT, INITIAL ENCOUNTER: Status: RESOLVED | Noted: 2022-03-07 | Resolved: 2023-05-24

## 2023-05-24 PROCEDURE — G0439 PPPS, SUBSEQ VISIT: HCPCS | Mod: S$GLB,,, | Performed by: NURSE PRACTITIONER

## 2023-05-24 PROCEDURE — G0439 PR MEDICARE ANNUAL WELLNESS SUBSEQUENT VISIT: ICD-10-PCS | Mod: S$GLB,,, | Performed by: NURSE PRACTITIONER

## 2023-05-24 PROCEDURE — 3079F DIAST BP 80-89 MM HG: CPT | Mod: CPTII,S$GLB,, | Performed by: NURSE PRACTITIONER

## 2023-05-24 PROCEDURE — 1170F FXNL STATUS ASSESSED: CPT | Mod: CPTII,S$GLB,, | Performed by: NURSE PRACTITIONER

## 2023-05-24 PROCEDURE — 99999 PR PBB SHADOW E&M-EST. PATIENT-LVL V: ICD-10-PCS | Mod: PBBFAC,,, | Performed by: NURSE PRACTITIONER

## 2023-05-24 PROCEDURE — 1159F PR MEDICATION LIST DOCUMENTED IN MEDICAL RECORD: ICD-10-PCS | Mod: CPTII,S$GLB,, | Performed by: NURSE PRACTITIONER

## 2023-05-24 PROCEDURE — 3288F FALL RISK ASSESSMENT DOCD: CPT | Mod: CPTII,S$GLB,, | Performed by: NURSE PRACTITIONER

## 2023-05-24 PROCEDURE — 3008F BODY MASS INDEX DOCD: CPT | Mod: CPTII,S$GLB,, | Performed by: NURSE PRACTITIONER

## 2023-05-24 PROCEDURE — 99999 PR PBB SHADOW E&M-EST. PATIENT-LVL V: CPT | Mod: PBBFAC,,, | Performed by: NURSE PRACTITIONER

## 2023-05-24 PROCEDURE — 1160F RVW MEDS BY RX/DR IN RCRD: CPT | Mod: CPTII,S$GLB,, | Performed by: NURSE PRACTITIONER

## 2023-05-24 PROCEDURE — 1159F MED LIST DOCD IN RCRD: CPT | Mod: CPTII,S$GLB,, | Performed by: NURSE PRACTITIONER

## 2023-05-24 PROCEDURE — 3008F PR BODY MASS INDEX (BMI) DOCUMENTED: ICD-10-PCS | Mod: CPTII,S$GLB,, | Performed by: NURSE PRACTITIONER

## 2023-05-24 PROCEDURE — 1101F PT FALLS ASSESS-DOCD LE1/YR: CPT | Mod: CPTII,S$GLB,, | Performed by: NURSE PRACTITIONER

## 2023-05-24 PROCEDURE — 3077F SYST BP >= 140 MM HG: CPT | Mod: CPTII,S$GLB,, | Performed by: NURSE PRACTITIONER

## 2023-05-24 PROCEDURE — 4010F PR ACE/ARB THEARPY RXD/TAKEN: ICD-10-PCS | Mod: CPTII,S$GLB,, | Performed by: NURSE PRACTITIONER

## 2023-05-24 PROCEDURE — 1160F PR REVIEW ALL MEDS BY PRESCRIBER/CLIN PHARMACIST DOCUMENTED: ICD-10-PCS | Mod: CPTII,S$GLB,, | Performed by: NURSE PRACTITIONER

## 2023-05-24 PROCEDURE — 4010F ACE/ARB THERAPY RXD/TAKEN: CPT | Mod: CPTII,S$GLB,, | Performed by: NURSE PRACTITIONER

## 2023-05-24 PROCEDURE — 1126F PR PAIN SEVERITY QUANTIFIED, NO PAIN PRESENT: ICD-10-PCS | Mod: CPTII,S$GLB,, | Performed by: NURSE PRACTITIONER

## 2023-05-24 PROCEDURE — 3079F PR MOST RECENT DIASTOLIC BLOOD PRESSURE 80-89 MM HG: ICD-10-PCS | Mod: CPTII,S$GLB,, | Performed by: NURSE PRACTITIONER

## 2023-05-24 PROCEDURE — 3288F PR FALLS RISK ASSESSMENT DOCUMENTED: ICD-10-PCS | Mod: CPTII,S$GLB,, | Performed by: NURSE PRACTITIONER

## 2023-05-24 PROCEDURE — 1126F AMNT PAIN NOTED NONE PRSNT: CPT | Mod: CPTII,S$GLB,, | Performed by: NURSE PRACTITIONER

## 2023-05-24 PROCEDURE — 1170F PR FUNCTIONAL STATUS ASSESSED: ICD-10-PCS | Mod: CPTII,S$GLB,, | Performed by: NURSE PRACTITIONER

## 2023-05-24 PROCEDURE — 1101F PR PT FALLS ASSESS DOC 0-1 FALLS W/OUT INJ PAST YR: ICD-10-PCS | Mod: CPTII,S$GLB,, | Performed by: NURSE PRACTITIONER

## 2023-05-24 PROCEDURE — 3077F PR MOST RECENT SYSTOLIC BLOOD PRESSURE >= 140 MM HG: ICD-10-PCS | Mod: CPTII,S$GLB,, | Performed by: NURSE PRACTITIONER

## 2023-05-24 NOTE — PATIENT INSTRUCTIONS
Counseling and Referral of Other Preventative  (Italic type indicates deductible and co-insurance are waived)    Patient Name: Parisa Dimas  Today's Date: 5/24/2023    Health Maintenance       Date Due Completion Date    Shingles Vaccine (1 of 2) Never done ---    Colorectal Cancer Screening 10/10/2019 10/10/2014    COVID-19 Vaccine (3 - Booster for Moderna series) 05/25/2021 3/30/2021    TETANUS VACCINE 10/15/2022 10/15/2012    Hemoglobin A1c 04/25/2023 10/25/2022    Eye Exam 10/19/2023 10/19/2022    Diabetes Urine Screening 10/25/2023 10/25/2022    Lipid Panel 10/25/2023 10/25/2022    Foot Exam 11/15/2023 11/15/2022    Mammogram 11/15/2023 11/15/2022    DEXA Scan 07/22/2024 7/22/2021        No orders of the defined types were placed in this encounter.    The following information is provided to all patients.  This information is to help you find resources for any of the problems found today that may be affecting your health:                Living healthy guide: www.[facility].[place].gov      Understanding Diabetes: www.diabetes.org      Eating healthy: www.cdc.gov/healthyweight      CDC home safety checklist: www.cdc.gov/steadi/patient.html      Agency on Aging: www.goea.[place].[facility]      Alcoholics anonymous (AA): www.aa.org      Physical Activity: www.ashkan.nih.gov/af4pgcj      Tobacco use: www.quitwithusla.org

## 2023-05-24 NOTE — PROGRESS NOTES
"  Parisa Dimas presented for a  Medicare AWV and comprehensive Health Risk Assessment today. The following components were reviewed and updated:    Medical history  Family History  Social history  Allergies and Current Medications  Health Risk Assessment  Health Maintenance  Care Team         ** See Completed Assessments for Annual Wellness Visit within the encounter summary.**         The following assessments were completed:  Living Situation  CAGE  Depression Screening  Timed Get Up and Go  Whisper Test  Cognitive Function Screening  Nutrition Screening  ADL Screening  PAQ Screening          Vitals:    05/24/23 0820   BP: (!) 147/80   BP Location: Left arm   Patient Position: Sitting   BP Method: Large (Automatic)   Pulse: 74   Temp: 97.8 °F (36.6 °C)   TempSrc: Oral   SpO2: 97%   Weight: 91.3 kg (201 lb 4.8 oz)   Height: 5' 3" (1.6 m)     Body mass index is 35.66 kg/m².  Physical Exam  Constitutional:       General: She is not in acute distress.     Appearance: She is not ill-appearing.   HENT:      Head: Normocephalic.      Right Ear: There is impacted cerumen.      Left Ear: There is impacted cerumen.      Nose: Nose normal.   Eyes:      Conjunctiva/sclera: Conjunctivae normal.      Pupils: Pupils are equal, round, and reactive to light.   Cardiovascular:      Rate and Rhythm: Normal rate and regular rhythm.   Pulmonary:      Effort: Pulmonary effort is normal.      Breath sounds: Normal breath sounds.   Abdominal:      General: Bowel sounds are normal. There is no distension.      Palpations: Abdomen is soft.   Musculoskeletal:         General: Normal range of motion.      Cervical back: Normal range of motion.   Lymphadenopathy:      Cervical: No cervical adenopathy.   Skin:     General: Skin is warm and dry.   Neurological:      Mental Status: She is alert and oriented to person, place, and time.   Psychiatric:         Mood and Affect: Mood normal.         Behavior: Behavior normal.             Diagnoses " and health risks identified today and associated recommendations/orders:    1. Encounter for preventive health examination  - Health maintenance reviewed  - Screening colonoscopy overdue; encouraged to schedule    2. Type 2 diabetes mellitus with diabetic neuropathy, unspecified whether long term insulin use  - Chronic, stable, continue current medication therapy  - Followed by PCP    3. Hypertension associated with diabetes  - Chronic, stable, continue antihypertensive medication therapy  - Followed by PCP    4. Hyperlipidemia associated with type 2 diabetes mellitus  - Chronic, stable, continue statin medication therapy  - Followed by PCP    5. Atrial fibrillation, unspecified type  6. HOCM (hypertrophic obstructive cardiomyopathy)  7. Bilateral carotid artery disease, unspecified type  - Followed by cardiology    8. Severe obesity with body mass index (BMI) of 35.0 to 39.9 with comorbidity  - Recommend healthy diet and exercise    9. Obstructive sleep apnea hypopnea, severe  - Unable to tolerate CPAP  - Followed by PCP    10. Gastroesophageal reflux disease without esophagitis  -Followed by PCP    11. Abnormality of gait and mobility  - Difficulty climbing stairs  - Declines PT referral    12. Bilateral impacted cerumen  - Recommend Debrox, use as directed  - Follow up with PCP      Provided Parisa with a 5-10 year written screening schedule and personal prevention plan. Recommendations were developed using the USPSTF age appropriate recommendations. Education, counseling, and referrals were provided as needed. After Visit Summary printed and given to patient which includes a list of additional screenings\tests needed.    Follow up for PCP visit as scheduled and Annual Medicare Wellness in 1 year.    DAKSHA Trammell  I offered to discuss advanced care planning, including how to pick a person who would make decisions for you if you were unable to make them for yourself, called a health care power of  , and what kind of decisions you might make such as use of life sustaining treatments such as ventilators and tube feeding when faced with a life limiting illness recorded on a living will that they will need to know. (How you want to be cared for as you near the end of your natural life)     X  Patient is unwilling to engage in a discussion regarding advance directives at this time.

## 2023-05-31 ENCOUNTER — PATIENT MESSAGE (OUTPATIENT)
Dept: DERMATOLOGY | Facility: CLINIC | Age: 68
End: 2023-05-31

## 2023-06-19 ENCOUNTER — TELEPHONE (OUTPATIENT)
Dept: DERMATOLOGY | Facility: CLINIC | Age: 68
End: 2023-06-19

## 2023-06-19 RX ORDER — OMEPRAZOLE 40 MG/1
CAPSULE, DELAYED RELEASE ORAL
Qty: 90 CAPSULE | Refills: 1 | Status: SHIPPED | OUTPATIENT
Start: 2023-06-19 | End: 2023-11-27

## 2023-06-19 NOTE — TELEPHONE ENCOUNTER
----- Message from Darryl Gray sent at 6/19/2023  3:40 PM CDT -----  Regarding: appt request  Contact: pt [phone]  Pt requesting call back RE: would like to schedule appt for itching and mole check nothing available in epic    Confirmed contact below:  Contact Name:Parisa Dimas  Phone Number: [phone]

## 2023-06-19 NOTE — TELEPHONE ENCOUNTER
No care due was identified.  [facility] Embedded Care Due Messages. Reference number: [id number].   6/19/2023 1:27:09 AM CDT

## 2023-06-19 NOTE — TELEPHONE ENCOUNTER
Refill Decision Note   Parisa Dimas  is requesting a refill authorization.  Brief Assessment and Rationale for Refill:  Approve     Medication Therapy Plan:         Comments:     Note composed:2:11 PM 06/19/2023

## 2023-06-20 ENCOUNTER — PATIENT OUTREACH (OUTPATIENT)
Dept: ADMINISTRATIVE | Facility: HOSPITAL | Age: 68
End: 2023-06-20

## 2023-06-20 NOTE — PROGRESS NOTES
Non-compliant GAP report chart review - Chart review completed for the following  test if overdue  (Mammogram, Colonoscopy, Cervical Cancer Screening,  Diabetic lab testing, and/or Dilated EYE EXAM)      Care Everywhere and Media reports - updates requested and reviewed.        [facility] and [facility] reviewed.     DIS reviewed for test needed.        Health Maintenance Due   Topic Date Due    Shingles Vaccine (1 of 2) Never done    Colorectal Cancer Screening  10/10/2019    COVID-19 Vaccine (3 - Moderna series) 05/25/2021    TETANUS VACCINE  10/15/2022    Hemoglobin A1c  04/25/2023

## 2023-06-21 ENCOUNTER — PATIENT MESSAGE (OUTPATIENT)
Dept: ADMINISTRATIVE | Facility: HOSPITAL | Age: 68
End: 2023-06-21

## 2023-06-27 ENCOUNTER — TELEPHONE (OUTPATIENT)
Dept: SPORTS MEDICINE | Facility: CLINIC | Age: 68
End: 2023-06-27

## 2023-06-27 ENCOUNTER — OFFICE VISIT (OUTPATIENT)
Dept: SPORTS MEDICINE | Facility: CLINIC | Age: 68
End: 2023-06-27
Payer: MEDICARE

## 2023-06-27 ENCOUNTER — HOSPITAL ENCOUNTER (OUTPATIENT)
Dept: RADIOLOGY | Facility: HOSPITAL | Age: 68
Discharge: HOME | End: 2023-06-27
Attending: ORTHOPAEDIC SURGERY
Payer: MEDICARE

## 2023-06-27 VITALS
BODY MASS INDEX: 35.61 KG/M2 | HEIGHT: 63 IN | HEART RATE: 74 BPM | SYSTOLIC BLOOD PRESSURE: 158 MMHG | WEIGHT: 201 LBS | DIASTOLIC BLOOD PRESSURE: 73 MMHG

## 2023-06-27 DIAGNOSIS — M25.562 LEFT KNEE PAIN, UNSPECIFIED CHRONICITY: ICD-10-CM

## 2023-06-27 DIAGNOSIS — M25.562 CHRONIC PAIN OF LEFT KNEE: ICD-10-CM

## 2023-06-27 DIAGNOSIS — M54.16 LUMBAR RADICULOPATHY: ICD-10-CM

## 2023-06-27 DIAGNOSIS — G89.29 CHRONIC PAIN OF LEFT KNEE: ICD-10-CM

## 2023-06-27 DIAGNOSIS — M17.12 PRIMARY OSTEOARTHRITIS OF LEFT KNEE: Primary | ICD-10-CM

## 2023-06-27 PROCEDURE — 1101F PT FALLS ASSESS-DOCD LE1/YR: CPT | Mod: CPTII,S$GLB,, | Performed by: ORTHOPAEDIC SURGERY

## 2023-06-27 PROCEDURE — 1101F PR PT FALLS ASSESS DOC 0-1 FALLS W/OUT INJ PAST YR: ICD-10-PCS | Mod: CPTII,S$GLB,, | Performed by: ORTHOPAEDIC SURGERY

## 2023-06-27 PROCEDURE — 73564 X-RAY EXAM KNEE 4 OR MORE: CPT | Mod: TC,LT

## 2023-06-27 PROCEDURE — 3288F PR FALLS RISK ASSESSMENT DOCUMENTED: ICD-10-PCS | Mod: CPTII,S$GLB,, | Performed by: ORTHOPAEDIC SURGERY

## 2023-06-27 PROCEDURE — 3008F BODY MASS INDEX DOCD: CPT | Mod: CPTII,S$GLB,, | Performed by: ORTHOPAEDIC SURGERY

## 2023-06-27 PROCEDURE — 72100 XR LUMBAR SPINE 2 OR 3 VIEWS: ICD-10-PCS | Mod: 26,,, | Performed by: RADIOLOGY

## 2023-06-27 PROCEDURE — 3008F PR BODY MASS INDEX (BMI) DOCUMENTED: ICD-10-PCS | Mod: CPTII,S$GLB,, | Performed by: ORTHOPAEDIC SURGERY

## 2023-06-27 PROCEDURE — 73564 X-RAY EXAM KNEE 4 OR MORE: CPT | Mod: 26,LT,, | Performed by: RADIOLOGY

## 2023-06-27 PROCEDURE — 1125F AMNT PAIN NOTED PAIN PRSNT: CPT | Mod: CPTII,S$GLB,, | Performed by: ORTHOPAEDIC SURGERY

## 2023-06-27 PROCEDURE — 99999 PR PBB SHADOW E&M-EST. PATIENT-LVL IV: CPT | Mod: PBBFAC,,, | Performed by: ORTHOPAEDIC SURGERY

## 2023-06-27 PROCEDURE — 3078F PR MOST RECENT DIASTOLIC BLOOD PRESSURE < 80 MM HG: ICD-10-PCS | Mod: CPTII,S$GLB,, | Performed by: ORTHOPAEDIC SURGERY

## 2023-06-27 PROCEDURE — 20610 DRAIN/INJ JOINT/BURSA W/O US: CPT | Mod: LT,S$GLB,, | Performed by: ORTHOPAEDIC SURGERY

## 2023-06-27 PROCEDURE — 1159F PR MEDICATION LIST DOCUMENTED IN MEDICAL RECORD: ICD-10-PCS | Mod: CPTII,S$GLB,, | Performed by: ORTHOPAEDIC SURGERY

## 2023-06-27 PROCEDURE — 99214 OFFICE O/P EST MOD 30 MIN: CPT | Mod: 25,S$GLB,, | Performed by: ORTHOPAEDIC SURGERY

## 2023-06-27 PROCEDURE — 20610 LARGE JOINT ASPIRATION/INJECTION: L KNEE: ICD-10-PCS | Mod: LT,S$GLB,, | Performed by: ORTHOPAEDIC SURGERY

## 2023-06-27 PROCEDURE — 1159F MED LIST DOCD IN RCRD: CPT | Mod: CPTII,S$GLB,, | Performed by: ORTHOPAEDIC SURGERY

## 2023-06-27 PROCEDURE — 3077F SYST BP >= 140 MM HG: CPT | Mod: CPTII,S$GLB,, | Performed by: ORTHOPAEDIC SURGERY

## 2023-06-27 PROCEDURE — 72100 X-RAY EXAM L-S SPINE 2/3 VWS: CPT | Mod: TC

## 2023-06-27 PROCEDURE — 4010F ACE/ARB THERAPY RXD/TAKEN: CPT | Mod: CPTII,S$GLB,, | Performed by: ORTHOPAEDIC SURGERY

## 2023-06-27 PROCEDURE — 3078F DIAST BP <80 MM HG: CPT | Mod: CPTII,S$GLB,, | Performed by: ORTHOPAEDIC SURGERY

## 2023-06-27 PROCEDURE — 73562 X-RAY EXAM OF KNEE 3: CPT | Mod: 26,RT,, | Performed by: RADIOLOGY

## 2023-06-27 PROCEDURE — 73562 XR KNEE ORTHO LEFT WITH FLEXION: ICD-10-PCS | Mod: 26,RT,, | Performed by: RADIOLOGY

## 2023-06-27 PROCEDURE — 73564 XR KNEE ORTHO LEFT WITH FLEXION: ICD-10-PCS | Mod: 26,LT,, | Performed by: RADIOLOGY

## 2023-06-27 PROCEDURE — 72100 X-RAY EXAM L-S SPINE 2/3 VWS: CPT | Mod: 26,,, | Performed by: RADIOLOGY

## 2023-06-27 PROCEDURE — 3077F PR MOST RECENT SYSTOLIC BLOOD PRESSURE >= 140 MM HG: ICD-10-PCS | Mod: CPTII,S$GLB,, | Performed by: ORTHOPAEDIC SURGERY

## 2023-06-27 PROCEDURE — 4010F PR ACE/ARB THEARPY RXD/TAKEN: ICD-10-PCS | Mod: CPTII,S$GLB,, | Performed by: ORTHOPAEDIC SURGERY

## 2023-06-27 PROCEDURE — 99999 PR PBB SHADOW E&M-EST. PATIENT-LVL IV: ICD-10-PCS | Mod: PBBFAC,,, | Performed by: ORTHOPAEDIC SURGERY

## 2023-06-27 PROCEDURE — 3288F FALL RISK ASSESSMENT DOCD: CPT | Mod: CPTII,S$GLB,, | Performed by: ORTHOPAEDIC SURGERY

## 2023-06-27 PROCEDURE — 99214 PR OFFICE/OUTPT VISIT, EST, LEVL IV, 30-39 MIN: ICD-10-PCS | Mod: 25,S$GLB,, | Performed by: ORTHOPAEDIC SURGERY

## 2023-06-27 PROCEDURE — 1125F PR PAIN SEVERITY QUANTIFIED, PAIN PRESENT: ICD-10-PCS | Mod: CPTII,S$GLB,, | Performed by: ORTHOPAEDIC SURGERY

## 2023-06-27 RX ORDER — CELECOXIB 200 MG/1
200 CAPSULE ORAL DAILY
Qty: 60 CAPSULE | Refills: 2 | Status: SHIPPED | OUTPATIENT
Start: 2023-06-27

## 2023-06-27 RX ORDER — CELECOXIB 200 MG/1
200 CAPSULE ORAL
Status: CANCELLED | OUTPATIENT
Start: 2023-06-27

## 2023-06-27 RX ADMIN — TRIAMCINOLONE ACETONIDE 40 MG: 40 INJECTION, SUSPENSION INTRA-ARTICULAR; INTRAMUSCULAR at 09:06

## 2023-06-27 NOTE — TELEPHONE ENCOUNTER
Patient was called by Dr. Santoyo and rescheduled for 9am.     Treasure Jacobo ATC, OTC  Sports Medicine Assistant - Dr. Frandy Santoyo   [facility] [place]

## 2023-06-27 NOTE — PROGRESS NOTES
CC: Left LE pain, sciatica    DATE OF PROCEDURE: 3/7/2022   PROCEDURES PERFORMED:   Left reverse shoulder arthroplasty (CPT 56064-32)  Left shoulder open biceps tenodesis (CPT 36708)    Today 06/27/2023: Patient reports 3 weeks of left lower extremity pain.  She reports history of left knee pain but this feels different to her.  She also endorses history of bilateral sciatica pain.  Pain is located in the posterior aspect of the left leg that extends to the anterior knee and down to the left ankle.  Pain is worse with activity such as walking but is also present at rest and at night.  She denies any concha numbness or tingling.  She has tried some Tylenol.    History 04/11/2022:  Parisa Dimas reports to be doing well 1 year s/p the above mentioned procedure. Accompanied by her .  This was a reverse prosthesis for 4 part fracture.  I last saw her in October.  At that time she was having some weakness.  PT sessions 4-5x since then with some improvement.   Since then, she has traveled with her  and was diagnosed with atrial fibrillation.  She has some occasional soreness in the shoulder but no significant pain.  Able to do most desire day-to-day activity.    PMHx notable for DM type 2   Negative for tobacco.   Positive for diabetes. Last A1C:  6.9    PAST MEDICAL HISTORY:   Past Medical History:   Diagnosis Date    AR (allergic rhinitis)     AR (allergic rhinitis)     Atrial fibrillation 12/27/2022    Carotid stenosis     50% RCA    Closed 4-part fracture of proximal humerus, left, initial encounter 3/7/2022    Colon polyp 10/2014    Diabetes mellitus     Diabetes mellitus, type 2     Fatty liver     GERD (gastroesophageal reflux disease)     Heart murmur 1/27/2022    HLD (hyperlipidemia)     HTN (hypertension)     Impingement syndrome of left shoulder     Joint pain     Sleep apnea 2018    Stricture of artery 3/25/2021     PAST SURGICAL HISTORY:  Past Surgical History:   Procedure  Laterality Date    REVERSE TOTAL SHOULDER ARTHROPLASTY Left 3/7/2022    Procedure: ARTHROPLASTY, SHOULDER, TOTAL, REVERSE;  Surgeon: MISSY Santoyo MD;  Location: [facility] OR [address] [place] [address];  Service: Orthopedics;  Laterality: Left;  AND BICEP SYNDEMOSIS    SKIN BIOPSY  10yrs.    negative     FAMILY HISTORY:  Family History   Problem Relation Age of Onset    Cancer Mother         gallbladder    Coronary artery disease Father 79    Diabetes Father     Cerebral palsy Sister     Osteoporosis Sister     Epilepsy Sister     Prostate cancer Brother 57    Diabetes Brother     Heart attack Brother     Hypertension Daughter     No Known Problems Son     Diabetes Maternal Aunt     Diabetes Maternal Uncle     Diabetes Maternal Grandmother     Hypertension Other         multiple    Melanoma Neg Hx      MEDICATIONS:    Current Outpatient Medications:     apixaban (ELIQUIS) 5 mg Tab, Take 1 tablet (5 mg total) by mouth 2 (two) times daily., Disp: 180 tablet, Rfl: 3    ascorbic acid, vitamin C, (VITAMIN C) 1000 MG tablet, Take 1,000 mg by mouth once daily., Disp: , Rfl:     aspirin (ECOTRIN) 81 MG EC tablet, Take 81 mg by mouth once daily., Disp: , Rfl:     blood sugar diagnostic Strp, To check BG 1 times daily, to use with insurance preferred meter, Disp: 100 strip, Rfl: 11    blood-glucose meter kit, To check BG 1 times daily, to use with insurance preferred meter, Disp: 1 each, Rfl: 0    celecoxib (CELEBREX) 200 MG capsule, Take 1 capsule (200 mg total) by mouth once daily., Disp: 60 capsule, Rfl: 2    cetirizine (ZYRTEC) 10 MG tablet, Take 10 mg by mouth once daily., Disp: , Rfl:     hydroCHLOROthiazide (HYDRODIURIL) 25 MG tablet, TAKE 1 TABLET BY MOUTH ONCE DAILY, Disp: 90 tablet, Rfl: 3    L.ACID/L.CASEI/B.BIF/B.MELISSA/FOS (PROBIOTIC BLEND ORAL), Take 2 tablets by mouth once daily., Disp: , Rfl:     lancets Misc, To check BG 1 times daily, to use with insurance preferred meter, Disp: 100 each, Rfl: 11     "lovastatin (MEVACOR) 20 MG tablet, TAKE 1 TABLET BY MOUTH IN  THE EVENING, Disp: 90 tablet, Rfl: 3    magnesium 30 mg Tab, Take 1 tablet by mouth Daily., Disp: , Rfl:     metFORMIN (GLUCOPHAGE-XR) 500 MG ER 24hr tablet, TAKE 2 TABLETS BY MOUTH  ONCE DAILY, Disp: 180 tablet, Rfl: 3    multivitamin capsule, Take 1 capsule by mouth once daily., Disp: , Rfl:     OMEGA-3S/DHA/EPA/FISH OIL (OMEGA 3 ORAL), Take 2,800 mg by mouth once daily., Disp: , Rfl:     omeprazole (PRILOSEC) 40 MG capsule, TAKE 1 CAPSULE BY MOUTH  ONCE DAILY, Disp: 90 capsule, Rfl: 1    OPW TEST CLAIM - DO NOT FILL, OPW test claim. Do not fill. (Patient not taking: Reported on 5/24/2023), Disp: 1 tablet, Rfl: 0    valsartan (DIOVAN) 320 MG tablet, TAKE 1 TABLET BY MOUTH ONCE DAILY, Disp: 90 tablet, Rfl: 3    verapamiL (VERELAN) 240 MG C24P, Take 1 capsule (240 mg total) by mouth once daily., Disp: 90 capsule, Rfl: 1    vitamin D (VITAMIN D3) 1000 units Tab, Take 1,000 Units by mouth once daily., Disp: , Rfl:     vitamin E 400 UNIT capsule, Take 400 Units by mouth once daily., Disp: , Rfl:     zinc sulfate (ZINC-15 ORAL), Take by mouth., Disp: , Rfl:     ALLERGIES:  Review of patient's allergies indicates:  No Known Allergies     REVIEW OF SYSTEMS:  Constitution: Negative. Negative for chills, fever and night sweats.    Hematologic/Lymphatic: Negative for bleeding problem. Does not bruise/bleed easily.   Skin: Negative for dry skin, itching and rash.Neg for left shoulder pain and muscle weakness.     All other review of symptoms were reviewed and found to be noncontributory.    PHYSICAL EXAMINATION:  Vitals:  BP (!) 158/73   Pulse 74   Ht 5' 3" (1.6 m)   Wt 91.2 kg (201 lb)   LMP 08/01/2003   BMI 35.61 kg/m²    General: Well-developed well-nourished 68 y.o. femalein no acute distress   Cardiovascular: Regular rhythm by palpation of distal pulse, normal color and temperature, no concerning varicosities on symptomatic side   Lungs: No " labored breathing or wheezing appreciated   Neuro: Alert and oriented ×3   Psychiatric: well oriented to person, place and time, demonstrates normal mood and affect   Skin: No rashes, lesions or ulcers, normal temperature, turgor, and texture on uninvolved extremity    Ortho/SPM Exam  Exam of the left knee shows no swelling or skin lesions.  No significant tenderness to palpation medial or lateral joint line.  Active range of motion 0-120.  Decreased patellar mobility with no patellar grind.  She does have pain with forced extension.  No pain with forced flexion.  She has pain with medial Yoli testing.  No pain with lateral Yoli testing.  Stable to varus and valgus stress.    IMAGING:  Radiographs:  AP and lateral views of the lumbar spine were ordered and reviewed by me showing spondylosis and disc space narrowing predominant at the 4 5 level.  Standing AP, Real and lateral and sunrise views of the left knee were ordered showing DJD with medial joint space narrowing.    ASSESSMENT:      ICD-10-CM ICD-9-CM   1. Primary osteoarthritis of left knee  M17.12 715.16   2. Chronic pain of left knee  M25.562 719.46    G89.29 338.29   3. Lumbar radiculopathy  M54.16 724.4       PLAN:     Findings discussed with the patient.  Left knee corticosteroid injection performed as it was felt that there is component of intra-articular knee pathology in addition to possible radicular symptoms.  If no improvement with injection will consider referring to spine team.  Celebrex also ordered.    Large Joint Aspiration/Injection: L knee    Date/Time: 6/27/2023 9:00 AM  Performed by: MISSY Santoyo MD  Authorized by: MISSY Santoyo MD     Consent Done?:  Yes (Verbal)  Indications:  Pain  Site marked: the procedure site was marked    Timeout: prior to procedure the correct patient, procedure, and site was verified    Prep: patient was prepped and draped in usual sterile fashion      Local anesthesia used?: Yes    Local  anesthetic:  Co-phenylcaine spray (0.2% Naropin)  Anesthetic total (ml):  4      Details:  Needle Size:  22 G  Ultrasonic Guidance for needle placement?: No    Approach:  Lateral  Location:  Knee  Site:  L knee  Medications:  40 mg triamcinolone acetonide 40 mg/mL  Patient tolerance:  Patient tolerated the procedure well with no immediate complications

## 2023-07-10 ENCOUNTER — TELEPHONE (OUTPATIENT)
Dept: FAMILY MEDICINE | Facility: CLINIC | Age: 68
End: 2023-07-10
Payer: MEDICARE

## 2023-07-10 VITALS — SYSTOLIC BLOOD PRESSURE: 116 MMHG | DIASTOLIC BLOOD PRESSURE: 64 MMHG

## 2023-07-10 RX ORDER — TRIAMCINOLONE ACETONIDE 40 MG/ML
40 INJECTION, SUSPENSION INTRA-ARTICULAR; INTRAMUSCULAR
Status: DISCONTINUED | OUTPATIENT
Start: 2023-06-27 | End: 2023-07-10 | Stop reason: HOSPADM

## 2023-07-18 RX ORDER — LOVASTATIN 20 MG/1
TABLET ORAL
Qty: 90 TABLET | Refills: 0 | Status: SHIPPED | OUTPATIENT
Start: 2023-07-18 | End: 2023-10-11

## 2023-07-18 NOTE — TELEPHONE ENCOUNTER
Care Due:                  Date            Visit Type   Department     Provider  --------------------------------------------------------------------------------                                EP -                              PRIMARY      [facility] FAMILY  Last Visit: 10-      CARE ([facility])   MEDICINE       Alex Cast  Next Visit: None Scheduled  None         None Found                                                            Last  Test          Frequency    Reason                     Performed    Due Date  --------------------------------------------------------------------------------    Office Visit  12 months..  hydroCHLOROthiazide,       10-   10-                             lovastatin, metFORMIN,                             omeprazole, valsartan,                             verapamiL................    HBA1C.......  6 months...  metFORMIN................  10-   04-    [facility] Embedded Care Due Messages. Reference number: [id number].   7/18/2023 1:00:57 AM CDT

## 2023-07-18 NOTE — TELEPHONE ENCOUNTER
Provider Staff:  Action required for this patient     Please see care gap opportunities below in Care Due Message.    Thanks!  [facility]     Appointments      Date Provider   Last Visit   10/20/2022 Alex Cast MD   Next Visit   Visit date not found Alex Cast MD     Refill Decision Note   Parisa Dimas  is requesting a refill authorization.  Brief Assessment and Rationale for Refill:  Approve     Medication Therapy Plan:         Comments:     Note composed:11:34 AM 07/18/2023             Appointments     Last Visit   10/20/2022 Alex Cast MD   Next Visit   Visit date not found Alex Cast MD

## 2023-07-24 ENCOUNTER — PATIENT MESSAGE (OUTPATIENT)
Dept: ADMINISTRATIVE | Facility: HOSPITAL | Age: 68
End: 2023-07-24
Payer: MEDICARE

## 2023-08-15 ENCOUNTER — HOSPITAL ENCOUNTER (OUTPATIENT)
Dept: CARDIOLOGY | Facility: CLINIC | Age: 68
Discharge: HOME | End: 2023-08-15
Payer: MEDICARE

## 2023-08-15 ENCOUNTER — OFFICE VISIT (OUTPATIENT)
Dept: ELECTROPHYSIOLOGY | Facility: CLINIC | Age: 68
End: 2023-08-15
Payer: MEDICARE

## 2023-08-15 VITALS
HEIGHT: 63 IN | DIASTOLIC BLOOD PRESSURE: 80 MMHG | HEART RATE: 90 BPM | WEIGHT: 201.75 LBS | SYSTOLIC BLOOD PRESSURE: 130 MMHG | BODY MASS INDEX: 35.75 KG/M2

## 2023-08-15 DIAGNOSIS — I10 PRIMARY HYPERTENSION: ICD-10-CM

## 2023-08-15 DIAGNOSIS — I48.0 PAROXYSMAL ATRIAL FIBRILLATION: Primary | ICD-10-CM

## 2023-08-15 DIAGNOSIS — Q24.4 SUBAORTIC MEMBRANE: ICD-10-CM

## 2023-08-15 DIAGNOSIS — I48.91 ATRIAL FIBRILLATION, UNSPECIFIED TYPE: ICD-10-CM

## 2023-08-15 PROCEDURE — 99214 OFFICE O/P EST MOD 30 MIN: CPT | Mod: S$GLB,,, | Performed by: INTERNAL MEDICINE

## 2023-08-15 PROCEDURE — 3075F PR MOST RECENT SYSTOLIC BLOOD PRESS GE 130-139MM HG: ICD-10-PCS | Mod: CPTII,S$GLB,, | Performed by: INTERNAL MEDICINE

## 2023-08-15 PROCEDURE — 1101F PR PT FALLS ASSESS DOC 0-1 FALLS W/OUT INJ PAST YR: ICD-10-PCS | Mod: CPTII,S$GLB,, | Performed by: INTERNAL MEDICINE

## 2023-08-15 PROCEDURE — 3079F DIAST BP 80-89 MM HG: CPT | Mod: CPTII,S$GLB,, | Performed by: INTERNAL MEDICINE

## 2023-08-15 PROCEDURE — 1159F PR MEDICATION LIST DOCUMENTED IN MEDICAL RECORD: ICD-10-PCS | Mod: CPTII,S$GLB,, | Performed by: INTERNAL MEDICINE

## 2023-08-15 PROCEDURE — 93010 ELECTROCARDIOGRAM REPORT: CPT | Mod: S$GLB,,, | Performed by: INTERNAL MEDICINE

## 2023-08-15 PROCEDURE — 99214 PR OFFICE/OUTPT VISIT, EST, LEVL IV, 30-39 MIN: ICD-10-PCS | Mod: S$GLB,,, | Performed by: INTERNAL MEDICINE

## 2023-08-15 PROCEDURE — 3008F BODY MASS INDEX DOCD: CPT | Mod: CPTII,S$GLB,, | Performed by: INTERNAL MEDICINE

## 2023-08-15 PROCEDURE — 1126F AMNT PAIN NOTED NONE PRSNT: CPT | Mod: CPTII,S$GLB,, | Performed by: INTERNAL MEDICINE

## 2023-08-15 PROCEDURE — 4010F PR ACE/ARB THEARPY RXD/TAKEN: ICD-10-PCS | Mod: CPTII,S$GLB,, | Performed by: INTERNAL MEDICINE

## 2023-08-15 PROCEDURE — 3288F FALL RISK ASSESSMENT DOCD: CPT | Mod: CPTII,S$GLB,, | Performed by: INTERNAL MEDICINE

## 2023-08-15 PROCEDURE — 93010 RHYTHM STRIP: ICD-10-PCS | Mod: S$GLB,,, | Performed by: INTERNAL MEDICINE

## 2023-08-15 PROCEDURE — 1159F MED LIST DOCD IN RCRD: CPT | Mod: CPTII,S$GLB,, | Performed by: INTERNAL MEDICINE

## 2023-08-15 PROCEDURE — 99999 PR PBB SHADOW E&M-EST. PATIENT-LVL IV: CPT | Mod: PBBFAC,,, | Performed by: INTERNAL MEDICINE

## 2023-08-15 PROCEDURE — 3075F SYST BP GE 130 - 139MM HG: CPT | Mod: CPTII,S$GLB,, | Performed by: INTERNAL MEDICINE

## 2023-08-15 PROCEDURE — 4010F ACE/ARB THERAPY RXD/TAKEN: CPT | Mod: CPTII,S$GLB,, | Performed by: INTERNAL MEDICINE

## 2023-08-15 PROCEDURE — 1126F PR PAIN SEVERITY QUANTIFIED, NO PAIN PRESENT: ICD-10-PCS | Mod: CPTII,S$GLB,, | Performed by: INTERNAL MEDICINE

## 2023-08-15 PROCEDURE — 1101F PT FALLS ASSESS-DOCD LE1/YR: CPT | Mod: CPTII,S$GLB,, | Performed by: INTERNAL MEDICINE

## 2023-08-15 PROCEDURE — 93005 RHYTHM STRIP: ICD-10-PCS | Mod: S$GLB,,, | Performed by: INTERNAL MEDICINE

## 2023-08-15 PROCEDURE — 93005 ELECTROCARDIOGRAM TRACING: CPT | Mod: S$GLB,,, | Performed by: INTERNAL MEDICINE

## 2023-08-15 PROCEDURE — 3079F PR MOST RECENT DIASTOLIC BLOOD PRESSURE 80-89 MM HG: ICD-10-PCS | Mod: CPTII,S$GLB,, | Performed by: INTERNAL MEDICINE

## 2023-08-15 PROCEDURE — 3288F PR FALLS RISK ASSESSMENT DOCUMENTED: ICD-10-PCS | Mod: CPTII,S$GLB,, | Performed by: INTERNAL MEDICINE

## 2023-08-15 PROCEDURE — 3008F PR BODY MASS INDEX (BMI) DOCUMENTED: ICD-10-PCS | Mod: CPTII,S$GLB,, | Performed by: INTERNAL MEDICINE

## 2023-08-15 PROCEDURE — 99999 PR PBB SHADOW E&M-EST. PATIENT-LVL IV: ICD-10-PCS | Mod: PBBFAC,,, | Performed by: INTERNAL MEDICINE

## 2023-08-15 PROCEDURE — 1160F PR REVIEW ALL MEDS BY PRESCRIBER/CLIN PHARMACIST DOCUMENTED: ICD-10-PCS | Mod: CPTII,S$GLB,, | Performed by: INTERNAL MEDICINE

## 2023-08-15 PROCEDURE — 1160F RVW MEDS BY RX/DR IN RCRD: CPT | Mod: CPTII,S$GLB,, | Performed by: INTERNAL MEDICINE

## 2023-08-15 RX ORDER — VITAMIN B COMPLEX
1 CAPSULE ORAL DAILY
COMMUNITY

## 2023-08-15 RX ORDER — ACETAMINOPHEN 160 MG/5ML
200 SUSPENSION, ORAL (FINAL DOSE FORM) ORAL DAILY
COMMUNITY

## 2023-08-31 RX ORDER — HYDROCHLOROTHIAZIDE 25 MG/1
TABLET ORAL
Qty: 90 TABLET | Refills: 0 | Status: SHIPPED | OUTPATIENT
Start: 2023-08-31 | End: 2023-11-27 | Stop reason: SDUPTHER

## 2023-08-31 NOTE — TELEPHONE ENCOUNTER
Provider Staff:  Action required for this patient     Please see care gap opportunities below in Care Due Message.    Thanks!  [facility]     Appointments      Date Provider   Last Visit   10/20/2022 Alex Cast MD   Next Visit   Visit date not found Alex Cast MD     Refill Decision Note   Parisa Dimas  is requesting a refill authorization.  Brief Assessment and Rationale for Refill:  Approve     Medication Therapy Plan:         Comments:     Note composed:7:10 AM 08/31/2023

## 2023-08-31 NOTE — TELEPHONE ENCOUNTER
Care Due:                  Date            Visit Type   Department     Provider  --------------------------------------------------------------------------------                                EP -                              PRIMARY      [facility] FAMILY  Last Visit: 10-      CARE ([facility])   MEDICINE       Alex Cast  Next Visit: None Scheduled  None         None Found                                                            Last  Test          Frequency    Reason                     Performed    Due Date  --------------------------------------------------------------------------------    CMP.........  12 months..  lovastatin...............  10-   10-    Lipid Panel.  12 months..  lovastatin...............  10-   10-    [employer] Embedded Care Due Messages. Reference number: [id number].   8/30/2023 10:48:05 PM CDT

## 2023-09-12 ENCOUNTER — OFFICE VISIT (OUTPATIENT)
Dept: DERMATOLOGY | Facility: CLINIC | Age: 68
End: 2023-09-12
Payer: MEDICARE

## 2023-09-12 DIAGNOSIS — D18.01 ANGIOMA OF SKIN: ICD-10-CM

## 2023-09-12 DIAGNOSIS — L81.4 LENTIGO: ICD-10-CM

## 2023-09-12 DIAGNOSIS — L82.1 SK (SEBORRHEIC KERATOSIS): Primary | ICD-10-CM

## 2023-09-12 DIAGNOSIS — Z12.83 SCREENING EXAM FOR SKIN CANCER: ICD-10-CM

## 2023-09-12 PROCEDURE — 1101F PT FALLS ASSESS-DOCD LE1/YR: CPT | Mod: CPTII,S$GLB,, | Performed by: DERMATOLOGY

## 2023-09-12 PROCEDURE — 3288F PR FALLS RISK ASSESSMENT DOCUMENTED: ICD-10-PCS | Mod: CPTII,S$GLB,, | Performed by: DERMATOLOGY

## 2023-09-12 PROCEDURE — 4010F PR ACE/ARB THEARPY RXD/TAKEN: ICD-10-PCS | Mod: CPTII,S$GLB,, | Performed by: DERMATOLOGY

## 2023-09-12 PROCEDURE — 99203 OFFICE O/P NEW LOW 30 MIN: CPT | Mod: S$GLB,,, | Performed by: DERMATOLOGY

## 2023-09-12 PROCEDURE — 1159F MED LIST DOCD IN RCRD: CPT | Mod: CPTII,S$GLB,, | Performed by: DERMATOLOGY

## 2023-09-12 PROCEDURE — 4010F ACE/ARB THERAPY RXD/TAKEN: CPT | Mod: CPTII,S$GLB,, | Performed by: DERMATOLOGY

## 2023-09-12 PROCEDURE — 1160F PR REVIEW ALL MEDS BY PRESCRIBER/CLIN PHARMACIST DOCUMENTED: ICD-10-PCS | Mod: CPTII,S$GLB,, | Performed by: DERMATOLOGY

## 2023-09-12 PROCEDURE — 1160F RVW MEDS BY RX/DR IN RCRD: CPT | Mod: CPTII,S$GLB,, | Performed by: DERMATOLOGY

## 2023-09-12 PROCEDURE — 99203 PR OFFICE/OUTPT VISIT, NEW, LEVL III, 30-44 MIN: ICD-10-PCS | Mod: S$GLB,,, | Performed by: DERMATOLOGY

## 2023-09-12 PROCEDURE — 1159F PR MEDICATION LIST DOCUMENTED IN MEDICAL RECORD: ICD-10-PCS | Mod: CPTII,S$GLB,, | Performed by: DERMATOLOGY

## 2023-09-12 PROCEDURE — 99999 PR PBB SHADOW E&M-EST. PATIENT-LVL III: CPT | Mod: PBBFAC,,, | Performed by: DERMATOLOGY

## 2023-09-12 PROCEDURE — 1126F PR PAIN SEVERITY QUANTIFIED, NO PAIN PRESENT: ICD-10-PCS | Mod: CPTII,S$GLB,, | Performed by: DERMATOLOGY

## 2023-09-12 PROCEDURE — 1126F AMNT PAIN NOTED NONE PRSNT: CPT | Mod: CPTII,S$GLB,, | Performed by: DERMATOLOGY

## 2023-09-12 PROCEDURE — 1101F PR PT FALLS ASSESS DOC 0-1 FALLS W/OUT INJ PAST YR: ICD-10-PCS | Mod: CPTII,S$GLB,, | Performed by: DERMATOLOGY

## 2023-09-12 PROCEDURE — 3288F FALL RISK ASSESSMENT DOCD: CPT | Mod: CPTII,S$GLB,, | Performed by: DERMATOLOGY

## 2023-09-12 PROCEDURE — 99999 PR PBB SHADOW E&M-EST. PATIENT-LVL III: ICD-10-PCS | Mod: PBBFAC,,, | Performed by: DERMATOLOGY

## 2023-09-12 NOTE — PROGRESS NOTES
Subjective:      Patient ID:  Parisa Dimas is a 68 y.o. female who presents for   Chief Complaint   Patient presents with    Skin Check     TBSE    Lesion     R thigh, L infraorbital     History of Present Illness: The patient presents for follow up of skin check.    The patient was last seen on: 7/01/2019 for TBSE.  No h/o nmsc or mm.    Other skin complaints:   Patient with new complaint of lesion(s)  Location: L infraorbital  Duration: 3+ months  Symptoms: none  Relieving factors/Previous treatments: none    Patient with new complaint of lesion(s)  Location: R thigh  Duration: 3+ months  Symptoms: none  Relieving factors/Previous treatments: none    Patient with new complaint of itching(s)  Location: back  Duration: 1+ year  Symptoms: itchy  Relieving factors/Previous treatments: otc lotion, hydrocortisone cream prn        Review of Systems   Skin:  Positive for activity-related sunscreen use and wears hat. Negative for daily sunscreen use and recent sunburn.   Hematologic/Lymphatic: Bruises/bleeds easily (eliquis).       Objective:   Physical Exam   Constitutional: She appears well-developed and well-nourished. No distress.   Neurological: She is alert and oriented to person, place, and time. She is not disoriented.   Psychiatric: She has a normal mood and affect.   Skin:   Areas Examined (abnormalities noted in diagram):   Head / Face Inspection Performed  Neck Inspection Performed  Chest / Axilla Inspection Performed  Abdomen Inspection Performed  Genitals / Buttocks / Groin Inspection Performed  Back Inspection Performed  RUE Inspected  LUE Inspection Performed  RLE Inspected  LLE Inspection Performed  Nails and Digits Inspection Performed                     Diagram Legend     Erythematous scaling macule/papule c/w actinic keratosis       Vascular papule c/w angioma      Pigmented verrucoid papule/plaque c/w seborrheic keratosis      Yellow umbilicated papule c/w sebaceous hyperplasia      Irregularly  shaped tan macule c/w lentigo     1-2 mm smooth white papules consistent with Milia      Movable subcutaneous cyst with punctum c/w epidermal inclusion cyst      Subcutaneous movable cyst c/w pilar cyst      Firm pink to brown papule c/w dermatofibroma      Pedunculated fleshy papule(s) c/w skin tag(s)      Evenly pigmented macule c/w junctional nevus     Mildly variegated pigmented, slightly irregular-bordered macule c/w mildly atypical nevus      Flesh colored to evenly pigmented papule c/w intradermal nevus       Pink pearly papule/plaque c/w basal cell carcinoma      Erythematous hyperkeratotic cursted plaque c/w SCC      Surgical scar with no sign of skin cancer recurrence      Open and closed comedones      Inflammatory papules and pustules      Verrucoid papule consistent consistent with wart     Erythematous eczematous patches and plaques     Dystrophic onycholytic nail with subungual debris c/w onychomycosis     Umbilicated papule    Erythematous-base heme-crusted tan verrucoid plaque consistent with inflamed seborrheic keratosis     Erythematous Silvery Scaling Plaque c/w Psoriasis     See annotation      Assessment / Plan:        SK (seborrheic keratosis)  Reassurance given to patient. No treatment is necessary.   Treatment of benign, asymptomatic lesions may be considered cosmetic.  Discussed options, risks, benefits and alternatives for treatment. All questions answered. $150 for 2 left infraorbital lesions      Lentigo   - minor problem and chronic.   Reassurance given to patient. No treatment necessary.       Angioma of skin   - minor problem and chronic.   Reassurance given to patient. No treatment necessary.       Screening exam for skin cancer  Total body skin examination performed today including at least 12 points as noted in physical examination. No lesions suspicious for malignancy noted.    Recommend daily sun protection/avoidance, use of at least SPF 30, broad spectrum sunscreen (OTC drug),  skin self examinations, and routine physician surveillance to optimize early detection               No follow-ups on file.

## 2023-09-12 NOTE — PATIENT INSTRUCTIONS
Recommend apply Sarna lotion or Cerave anti-itch lotion/cream or Dermeleve to skin as often as needed. May store in refrigerator for additional cooling properties.    Sarna and Cerave anti-itch can be purchased at any [employer] or "[employer].  Dermeleve is available exclusively at [employer] or by [phone]    Romy CASTANON Discount Code: [id number]    30gram tube = $28.49 with code  60gram tube = $37.99 with code    Shipping is free for both sizes          SEBORRHEIC KERATOSES        What causes seborrheic keratoses?    Seborrheic keratoses are harmless, common skin growths that first appear during adult life.  As time goes by, more growths appear.  Some persons have a very large number of them.  Seborrheic keratoses appear on both covered and uncovered parts of the body; they are not caused by sunlight.  The tendency to develop seborrheic keratoses is inherited.    Seborrheic keratoses are harmless and never become malignant.  They begin as slightly raised, light brown spots.  Gradually they thicken and take on a rough wartlike surface.  They slowly darken and may turn black.  These color changes are harmless.  Seborrheic keratoses are superficial and look as if they were stuck on the skin.  Persons who have had several seborrheic keratoses can usually recognize this type of benign growth.  However, if you are concerned or unsure about any growth, consult me.    Treatment    Seborrheic keratoses can easily be removed in the office.  The only reason for removing a seborrheic keratosis is your wish to get rid of it.

## 2023-11-15 ENCOUNTER — TELEPHONE (OUTPATIENT)
Dept: FAMILY MEDICINE | Facility: CLINIC | Age: 68
End: 2023-11-15
Payer: MEDICARE

## 2023-11-15 ENCOUNTER — OFFICE VISIT (OUTPATIENT)
Dept: FAMILY MEDICINE | Facility: CLINIC | Age: 68
End: 2023-11-15
Payer: MEDICARE

## 2023-11-15 ENCOUNTER — PATIENT MESSAGE (OUTPATIENT)
Dept: FAMILY MEDICINE | Facility: CLINIC | Age: 68
End: 2023-11-15
Payer: MEDICARE

## 2023-11-15 ENCOUNTER — LAB VISIT (OUTPATIENT)
Dept: LAB | Facility: HOSPITAL | Age: 68
End: 2023-11-15
Attending: NURSE PRACTITIONER
Payer: MEDICARE

## 2023-11-15 VITALS
WEIGHT: 204.38 LBS | SYSTOLIC BLOOD PRESSURE: 140 MMHG | HEIGHT: 63 IN | OXYGEN SATURATION: 98 % | BODY MASS INDEX: 36.21 KG/M2 | DIASTOLIC BLOOD PRESSURE: 80 MMHG | HEART RATE: 70 BPM

## 2023-11-15 DIAGNOSIS — E11.40 TYPE 2 DIABETES MELLITUS WITH DIABETIC NEUROPATHY, UNSPECIFIED WHETHER LONG TERM INSULIN USE: ICD-10-CM

## 2023-11-15 DIAGNOSIS — I48.0 PAROXYSMAL ATRIAL FIBRILLATION: ICD-10-CM

## 2023-11-15 DIAGNOSIS — G89.29 CHRONIC RIGHT-SIDED LOW BACK PAIN WITH RIGHT-SIDED SCIATICA: ICD-10-CM

## 2023-11-15 DIAGNOSIS — Z12.31 SCREENING MAMMOGRAM FOR BREAST CANCER: ICD-10-CM

## 2023-11-15 DIAGNOSIS — M54.41 CHRONIC RIGHT-SIDED LOW BACK PAIN WITH RIGHT-SIDED SCIATICA: ICD-10-CM

## 2023-11-15 DIAGNOSIS — Z00.00 ANNUAL PHYSICAL EXAM: ICD-10-CM

## 2023-11-15 DIAGNOSIS — E78.5 HYPERLIPIDEMIA ASSOCIATED WITH TYPE 2 DIABETES MELLITUS: ICD-10-CM

## 2023-11-15 DIAGNOSIS — Z23 NEED FOR VACCINATION: ICD-10-CM

## 2023-11-15 DIAGNOSIS — Z01.419 WELL WOMAN EXAM: Primary | ICD-10-CM

## 2023-11-15 DIAGNOSIS — H61.23 EXCESSIVE CERUMEN IN BOTH EAR CANALS: ICD-10-CM

## 2023-11-15 DIAGNOSIS — E11.69 HYPERLIPIDEMIA ASSOCIATED WITH TYPE 2 DIABETES MELLITUS: ICD-10-CM

## 2023-11-15 DIAGNOSIS — I10 PRIMARY HYPERTENSION: ICD-10-CM

## 2023-11-15 DIAGNOSIS — E66.01 SEVERE OBESITY (BMI 35.0-39.9) WITH COMORBIDITY: ICD-10-CM

## 2023-11-15 LAB
ALBUMIN SERPL BCP-MCNC: 4.4 G/DL (ref 3.5–5.2)
ALP SERPL-CCNC: 60 U/L (ref 55–135)
ALT SERPL W/O P-5'-P-CCNC: 80 U/L (ref 10–44)
ANION GAP SERPL CALC-SCNC: 12 MMOL/L (ref 8–16)
AST SERPL-CCNC: 56 U/L (ref 10–40)
BASOPHILS # BLD AUTO: 0.04 K/UL (ref 0–0.2)
BASOPHILS NFR BLD: 0.8 % (ref 0–1.9)
BILIRUB SERPL-MCNC: 0.6 MG/DL (ref 0.1–1)
BUN SERPL-MCNC: 14 MG/DL (ref 8–23)
CALCIUM SERPL-MCNC: 10.3 MG/DL (ref 8.7–10.5)
CHLORIDE SERPL-SCNC: 97 MMOL/L (ref 95–110)
CHOLEST SERPL-MCNC: 172 MG/DL (ref 120–199)
CHOLEST/HDLC SERPL: 3 {RATIO} (ref 2–5)
CO2 SERPL-SCNC: 29 MMOL/L (ref 23–29)
CREAT SERPL-MCNC: 0.8 MG/DL (ref 0.5–1.4)
DIFFERENTIAL METHOD: NORMAL
EOSINOPHIL # BLD AUTO: 0.1 K/UL (ref 0–0.5)
EOSINOPHIL NFR BLD: 1.9 % (ref 0–8)
ERYTHROCYTE [DISTWIDTH] IN BLOOD BY AUTOMATED COUNT: 12.8 % (ref 11.5–14.5)
EST. GFR  (NO RACE VARIABLE): >60 ML/MIN/1.73 M^2
ESTIMATED AVG GLUCOSE: 128 MG/DL (ref 68–131)
GLUCOSE SERPL-MCNC: 120 MG/DL (ref 70–110)
HBA1C MFR BLD: 6.1 % (ref 4–5.6)
HCT VFR BLD AUTO: 41.1 % (ref 37–48.5)
HDLC SERPL-MCNC: 57 MG/DL (ref 40–75)
HDLC SERPL: 33.1 % (ref 20–50)
HGB BLD-MCNC: 13.7 G/DL (ref 12–16)
IMM GRANULOCYTES # BLD AUTO: 0 K/UL (ref 0–0.04)
IMM GRANULOCYTES NFR BLD AUTO: 0 % (ref 0–0.5)
LDLC SERPL CALC-MCNC: 74.2 MG/DL (ref 63–159)
LYMPHOCYTES # BLD AUTO: 1.7 K/UL (ref 1–4.8)
LYMPHOCYTES NFR BLD: 36.4 % (ref 18–48)
MCH RBC QN AUTO: 30.6 PG (ref 27–31)
MCHC RBC AUTO-ENTMCNC: 33.3 G/DL (ref 32–36)
MCV RBC AUTO: 92 FL (ref 82–98)
MONOCYTES # BLD AUTO: 0.5 K/UL (ref 0.3–1)
MONOCYTES NFR BLD: 9.7 % (ref 4–15)
NEUTROPHILS # BLD AUTO: 2.4 K/UL (ref 1.8–7.7)
NEUTROPHILS NFR BLD: 51.2 % (ref 38–73)
NONHDLC SERPL-MCNC: 115 MG/DL
NRBC BLD-RTO: 0 /100 WBC
PLATELET # BLD AUTO: 176 K/UL (ref 150–450)
PMV BLD AUTO: 11.1 FL (ref 9.2–12.9)
POTASSIUM SERPL-SCNC: 3.7 MMOL/L (ref 3.5–5.1)
PROT SERPL-MCNC: 7.7 G/DL (ref 6–8.4)
RBC # BLD AUTO: 4.47 M/UL (ref 4–5.4)
SODIUM SERPL-SCNC: 138 MMOL/L (ref 136–145)
TRIGL SERPL-MCNC: 204 MG/DL (ref 30–150)
TSH SERPL DL<=0.005 MIU/L-ACNC: 0.91 UIU/ML (ref 0.4–4)
WBC # BLD AUTO: 4.73 K/UL (ref 3.9–12.7)

## 2023-11-15 PROCEDURE — 1101F PR PT FALLS ASSESS DOC 0-1 FALLS W/OUT INJ PAST YR: ICD-10-PCS | Mod: CPTII,S$GLB,, | Performed by: NURSE PRACTITIONER

## 2023-11-15 PROCEDURE — 3077F SYST BP >= 140 MM HG: CPT | Mod: CPTII,S$GLB,, | Performed by: NURSE PRACTITIONER

## 2023-11-15 PROCEDURE — G0008 ADMIN INFLUENZA VIRUS VAC: HCPCS | Mod: S$GLB,,, | Performed by: NURSE PRACTITIONER

## 2023-11-15 PROCEDURE — G0008 FLU VACCINE - QUADRIVALENT - ADJUVANTED: ICD-10-PCS | Mod: S$GLB,,, | Performed by: NURSE PRACTITIONER

## 2023-11-15 PROCEDURE — 36415 COLL VENOUS BLD VENIPUNCTURE: CPT | Mod: PO | Performed by: NURSE PRACTITIONER

## 2023-11-15 PROCEDURE — 3066F PR DOCUMENTATION OF TREATMENT FOR NEPHROPATHY: ICD-10-PCS | Mod: CPTII,S$GLB,, | Performed by: NURSE PRACTITIONER

## 2023-11-15 PROCEDURE — 99999 PR PBB SHADOW E&M-EST. PATIENT-LVL V: CPT | Mod: PBBFAC,,, | Performed by: NURSE PRACTITIONER

## 2023-11-15 PROCEDURE — 3044F HG A1C LEVEL LT 7.0%: CPT | Mod: CPTII,S$GLB,, | Performed by: NURSE PRACTITIONER

## 2023-11-15 PROCEDURE — 3079F PR MOST RECENT DIASTOLIC BLOOD PRESSURE 80-89 MM HG: ICD-10-PCS | Mod: CPTII,S$GLB,, | Performed by: NURSE PRACTITIONER

## 2023-11-15 PROCEDURE — 83036 HEMOGLOBIN GLYCOSYLATED A1C: CPT | Performed by: NURSE PRACTITIONER

## 2023-11-15 PROCEDURE — 3066F NEPHROPATHY DOC TX: CPT | Mod: CPTII,S$GLB,, | Performed by: NURSE PRACTITIONER

## 2023-11-15 PROCEDURE — 3061F PR NEG MICROALBUMINURIA RESULT DOCUMENTED/REVIEW: ICD-10-PCS | Mod: CPTII,S$GLB,, | Performed by: NURSE PRACTITIONER

## 2023-11-15 PROCEDURE — 90694 FLU VACCINE - QUADRIVALENT - ADJUVANTED: ICD-10-PCS | Mod: S$GLB,,, | Performed by: NURSE PRACTITIONER

## 2023-11-15 PROCEDURE — 3079F DIAST BP 80-89 MM HG: CPT | Mod: CPTII,S$GLB,, | Performed by: NURSE PRACTITIONER

## 2023-11-15 PROCEDURE — 3288F PR FALLS RISK ASSESSMENT DOCUMENTED: ICD-10-PCS | Mod: CPTII,S$GLB,, | Performed by: NURSE PRACTITIONER

## 2023-11-15 PROCEDURE — 85025 COMPLETE CBC W/AUTO DIFF WBC: CPT | Performed by: NURSE PRACTITIONER

## 2023-11-15 PROCEDURE — 1159F MED LIST DOCD IN RCRD: CPT | Mod: CPTII,S$GLB,, | Performed by: NURSE PRACTITIONER

## 2023-11-15 PROCEDURE — 4010F PR ACE/ARB THEARPY RXD/TAKEN: ICD-10-PCS | Mod: CPTII,S$GLB,, | Performed by: NURSE PRACTITIONER

## 2023-11-15 PROCEDURE — 1160F PR REVIEW ALL MEDS BY PRESCRIBER/CLIN PHARMACIST DOCUMENTED: ICD-10-PCS | Mod: CPTII,S$GLB,, | Performed by: NURSE PRACTITIONER

## 2023-11-15 PROCEDURE — 3288F FALL RISK ASSESSMENT DOCD: CPT | Mod: CPTII,S$GLB,, | Performed by: NURSE PRACTITIONER

## 2023-11-15 PROCEDURE — 4010F ACE/ARB THERAPY RXD/TAKEN: CPT | Mod: CPTII,S$GLB,, | Performed by: NURSE PRACTITIONER

## 2023-11-15 PROCEDURE — 1125F AMNT PAIN NOTED PAIN PRSNT: CPT | Mod: CPTII,S$GLB,, | Performed by: NURSE PRACTITIONER

## 2023-11-15 PROCEDURE — 1101F PT FALLS ASSESS-DOCD LE1/YR: CPT | Mod: CPTII,S$GLB,, | Performed by: NURSE PRACTITIONER

## 2023-11-15 PROCEDURE — 3008F PR BODY MASS INDEX (BMI) DOCUMENTED: ICD-10-PCS | Mod: CPTII,S$GLB,, | Performed by: NURSE PRACTITIONER

## 2023-11-15 PROCEDURE — 1125F PR PAIN SEVERITY QUANTIFIED, PAIN PRESENT: ICD-10-PCS | Mod: CPTII,S$GLB,, | Performed by: NURSE PRACTITIONER

## 2023-11-15 PROCEDURE — 99215 PR OFFICE/OUTPT VISIT, EST, LEVL V, 40-54 MIN: ICD-10-PCS | Mod: 25,S$GLB,, | Performed by: NURSE PRACTITIONER

## 2023-11-15 PROCEDURE — 80053 COMPREHEN METABOLIC PANEL: CPT | Performed by: NURSE PRACTITIONER

## 2023-11-15 PROCEDURE — 3044F PR MOST RECENT HEMOGLOBIN A1C LEVEL <7.0%: ICD-10-PCS | Mod: CPTII,S$GLB,, | Performed by: NURSE PRACTITIONER

## 2023-11-15 PROCEDURE — 3061F NEG MICROALBUMINURIA REV: CPT | Mod: CPTII,S$GLB,, | Performed by: NURSE PRACTITIONER

## 2023-11-15 PROCEDURE — 3008F BODY MASS INDEX DOCD: CPT | Mod: CPTII,S$GLB,, | Performed by: NURSE PRACTITIONER

## 2023-11-15 PROCEDURE — 1160F RVW MEDS BY RX/DR IN RCRD: CPT | Mod: CPTII,S$GLB,, | Performed by: NURSE PRACTITIONER

## 2023-11-15 PROCEDURE — 84443 ASSAY THYROID STIM HORMONE: CPT | Performed by: NURSE PRACTITIONER

## 2023-11-15 PROCEDURE — 1159F PR MEDICATION LIST DOCUMENTED IN MEDICAL RECORD: ICD-10-PCS | Mod: CPTII,S$GLB,, | Performed by: NURSE PRACTITIONER

## 2023-11-15 PROCEDURE — 80061 LIPID PANEL: CPT | Performed by: NURSE PRACTITIONER

## 2023-11-15 PROCEDURE — 3077F PR MOST RECENT SYSTOLIC BLOOD PRESSURE >= 140 MM HG: ICD-10-PCS | Mod: CPTII,S$GLB,, | Performed by: NURSE PRACTITIONER

## 2023-11-15 PROCEDURE — 99999 PR PBB SHADOW E&M-EST. PATIENT-LVL V: ICD-10-PCS | Mod: PBBFAC,,, | Performed by: NURSE PRACTITIONER

## 2023-11-15 PROCEDURE — 99215 OFFICE O/P EST HI 40 MIN: CPT | Mod: 25,S$GLB,, | Performed by: NURSE PRACTITIONER

## 2023-11-15 PROCEDURE — 90694 VACC AIIV4 NO PRSRV 0.5ML IM: CPT | Mod: S$GLB,,, | Performed by: NURSE PRACTITIONER

## 2023-11-15 NOTE — PROGRESS NOTES
"Subjective     Patient ID: Parisa Dimas is a 68 y.o. female.    Chief Complaint: Annual Exam    This is a pleasant patient of Dr. Cast who is known to me.  She presents today accompanied by her  for her annual physical.  Past medical history includes     Patient Active Problem List:     HTN (hypertension)     HLD (hyperlipidemia)     Fatty liver     GERD (gastroesophageal reflux disease)     AR (allergic rhinitis)     Heel cord tightness     Disorder of soft tissue     Foot pain, right     Tendonitis of ankle or foot     Impingement syndrome of left shoulder     History of diverticulitis     Obstructive sleep apnea hypopnea, severe     Restless leg syndrome     Type 2 diabetes mellitus, without long-term current use of insulin     Heart murmur     Bilateral carotid artery disease     Type 2 diabetes mellitus with diabetic neuropathy, unspecified whether long term insulin use     Severe obesity (BMI 35.0-39.9) with comorbidity     Decreased ROM of left shoulder     Subaortic membrane     Atrial fibrillation     HOCM (hypertrophic obstructive cardiomyopathy)    VSS today.  Eating a variety of fruits and vegetables trying to avoid fatty foods.  Drinking "lots of water" daily.  Has 1 cup of caffeine (coffee) daily.  Admits to eating carbs and sweets regularly.  Has no exercise routine currently.  Emotionally doing "okay". Lost her sister this past summer and her brother last year but doing well over all. Continues to suffer with chronic, right sided low back pain that radiates down right leg. Willing to try PT to help with pain. Also wishing to have well woman exam with OBGYN. Also wants to establish care with new cardiologist. No other issues or complaints today.       Review of Systems   Endocrine: Positive for polyuria.   Genitourinary:  Positive for frequency.   Musculoskeletal:  Positive for back pain (chronic).   Neurological:  Positive for numbness (tingling to LLE, intermittent).          Objective "     Physical Exam  Vitals reviewed.   Constitutional:       General: She is not in acute distress.     Appearance: Normal appearance. She is well-developed and well-groomed. She is obese.   HENT:      Head: Normocephalic.      Right Ear: External ear normal.      Left Ear: External ear normal.      Ears:      Comments: Cerumen noted to both ear canals, ear wash ordered and tolerated well     Nose: Nose normal.      Mouth/Throat:      Mouth: Mucous membranes are moist.      Pharynx: No posterior oropharyngeal erythema.   Eyes:      General:         Right eye: No discharge.         Left eye: No discharge.      Extraocular Movements: Extraocular movements intact.      Pupils: Pupils are equal, round, and reactive to light.   Neck:      Vascular: No carotid bruit.   Cardiovascular:      Rate and Rhythm: Normal rate and regular rhythm.      Heart sounds: Murmur heard.   Pulmonary:      Effort: Pulmonary effort is normal. No respiratory distress.      Breath sounds: Normal breath sounds. No wheezing or rhonchi.   Abdominal:      General: Bowel sounds are normal. There is no distension.      Palpations: Abdomen is soft.      Tenderness: There is no abdominal tenderness. There is no guarding or rebound.   Musculoskeletal:      Lumbar back: Tenderness present.      Right lower leg: No edema.      Left lower leg: No edema.      Comments: Right sided low back pain, radiates down right leg   Lymphadenopathy:      Cervical: No cervical adenopathy.   Skin:     General: Skin is warm and dry.      Coloration: Skin is not pale.   Neurological:      Mental Status: She is alert and oriented to person, place, and time.      Coordination: Coordination normal.      Gait: Gait normal.   Psychiatric:         Attention and Perception: Attention normal.         Mood and Affect: Mood and affect normal.         Speech: Speech normal.         Behavior: Behavior normal. Behavior is cooperative.         Thought Content: Thought content normal.             Assessment and Plan     1. Well woman exam  -     Ambulatory referral/consult to Obstetrics / Gynecology; Future; Expected date: 11/22/2023    2. Annual physical exam  -     CBC Auto Differential; Future; Expected date: 11/15/2023  -     Comprehensive Metabolic Panel; Future; Expected date: 11/15/2023    3. Paroxysmal atrial fibrillation  -     CBC Auto Differential; Future; Expected date: 11/15/2023  -     Comprehensive Metabolic Panel; Future; Expected date: 11/15/2023  -     Ambulatory referral/consult to Cardiology; Future; Expected date: 11/22/2023    4. Hyperlipidemia associated with type 2 diabetes mellitus  -     Lipid Panel; Future; Expected date: 11/15/2023  -     Comprehensive Metabolic Panel; Future; Expected date: 05/15/2024  -     Hemoglobin A1C; Future; Expected date: 05/15/2024    5. Type 2 diabetes mellitus with diabetic neuropathy, unspecified whether long term insulin use  -     Hemoglobin A1C; Future; Expected date: 11/15/2023  -     Microalbumin/creatinine urine ratio; Future; Expected date: 11/15/2023    6. Primary hypertension  -     TSH; Future; Expected date: 11/15/2023  -     Ambulatory referral/consult to Cardiology; Future; Expected date: 11/22/2023    7. Chronic right-sided low back pain with right-sided sciatica  -     Ambulatory referral/consult to Physical/Occupational Therapy; Future; Expected date: 11/22/2023    8. Excessive cerumen in both ear canals  -     Ear wax removal    9. Need for vaccination  -     Influenza (FLUAD) - Quadrivalent (Adjuvanted) *Preferred* (65+) (PF)    10. Screening mammogram for breast cancer  -     Mammo Digital Screening Bilat w/ Scar; Future; Expected date: 11/15/2023    11. Severe obesity (BMI 35.0-39.9) with comorbidity        - Labs ordered today  - Continue with current treatment plan  - Eat a low salt/low fat ADA diet and discussed importance of engaging in physical activity at least 5x/week for a minimum of 30 min/day  - Heating pad to  right lower back multiple times daily, no more than 15 min at a time  - Follow up with Cardiology, OB/GYN, and PT  - Will assist with scheduling MMG  - Follow up with PCP in 6 months for routine follow-up, or sooner if needed         Follow up in about 6 months (around 5/15/2024), or if symptoms worsen or fail to improve.          I spent a total of 45 minutes on the day of the visit.  This includes face to face time and non-face to face time preparing to see the patient (eg, review of tests), obtaining and/or reviewing separately obtained history, documenting clinical information in the electronic or other health record, independently interpreting results and communicating results to the patient/family/caregiver, or care coordinator.

## 2023-11-15 NOTE — TELEPHONE ENCOUNTER
----- Message from Radha Merino RN sent at 11/15/2023 10:24 AM CST -----  Regarding: Appointment  To clarify....    The appointment is needed after her referrals are complete.   Cardiology and OB/GYN.    Thanks

## 2023-11-15 NOTE — TELEPHONE ENCOUNTER
----- Message from Radha Merino RN sent at 11/15/2023 10:18 AM CST -----  Regarding: Appointment  Good Morning,    The above patient needs an appointment in the next few days, please.    Thanks

## 2023-11-25 DIAGNOSIS — I42.1 HOCM (HYPERTROPHIC OBSTRUCTIVE CARDIOMYOPATHY): ICD-10-CM

## 2023-11-25 DIAGNOSIS — I10 PRIMARY HYPERTENSION: ICD-10-CM

## 2023-11-26 NOTE — TELEPHONE ENCOUNTER
Care Due:                  Date            Visit Type   Department     Provider  --------------------------------------------------------------------------------                                EP -                              PRIMARY      [facility] FAMILY  Last Visit: 10-      CARE ([facility])   MEDICINE       Alex Cast  Next Visit: None Scheduled  None         None Found                                                            Last  Test          Frequency    Reason                     Performed    Due Date  --------------------------------------------------------------------------------    Office Visit  15 months..  hydroCHLOROthiazide,       10-   01-                             lovastatin, metFORMIN,                             omeprazole, valsartan,                             verapamiL................    [employer] Embedded Care Due Messages. Reference number: [id number].   11/25/2023 10:33:39 PM CST

## 2023-11-27 ENCOUNTER — CLINICAL SUPPORT (OUTPATIENT)
Dept: REHABILITATION | Facility: HOSPITAL | Age: 68
End: 2023-11-27
Payer: MEDICARE

## 2023-11-27 DIAGNOSIS — G89.29 CHRONIC RIGHT-SIDED LOW BACK PAIN WITH RIGHT-SIDED SCIATICA: ICD-10-CM

## 2023-11-27 DIAGNOSIS — R29.898 DECREASED STRENGTH OF LOWER EXTREMITY: ICD-10-CM

## 2023-11-27 DIAGNOSIS — M54.41 CHRONIC RIGHT-SIDED LOW BACK PAIN WITH RIGHT-SIDED SCIATICA: ICD-10-CM

## 2023-11-27 DIAGNOSIS — Z74.09 IMPAIRED FUNCTIONAL MOBILITY, BALANCE, GAIT, AND ENDURANCE: ICD-10-CM

## 2023-11-27 PROCEDURE — 97140 MANUAL THERAPY 1/> REGIONS: CPT

## 2023-11-27 PROCEDURE — 97112 NEUROMUSCULAR REEDUCATION: CPT

## 2023-11-27 PROCEDURE — 97162 PT EVAL MOD COMPLEX 30 MIN: CPT

## 2023-11-27 RX ORDER — OMEPRAZOLE 40 MG/1
CAPSULE, DELAYED RELEASE ORAL
Qty: 90 CAPSULE | Refills: 0 | Status: SHIPPED | OUTPATIENT
Start: 2023-11-27 | End: 2024-02-01

## 2023-11-27 RX ORDER — VERAPAMIL HYDROCHLORIDE 240 MG/1
240 CAPSULE, EXTENDED RELEASE ORAL
Qty: 90 CAPSULE | Refills: 0 | Status: SHIPPED | OUTPATIENT
Start: 2023-11-27

## 2023-11-27 RX ORDER — HYDROCHLOROTHIAZIDE 25 MG/1
TABLET ORAL
Qty: 90 TABLET | Refills: 0 | Status: SHIPPED | OUTPATIENT
Start: 2023-11-27 | End: 2024-01-11

## 2023-11-27 NOTE — PROGRESS NOTES
Please see initial evaluation/plan of care.    Spoke w/pt:  Pt would like to know if Dr Gifty Kincaid would sent in Rx for inflammation such as NSAID and muscle relaxant for short term bf he tries pain management? Please advise.  Thanks

## 2023-11-27 NOTE — TELEPHONE ENCOUNTER
Refill Routing Note   Medication(s) are not appropriate for processing by [facility] for the following reason(s):        Required vitals abnormal    [facility] action(s):  Defer   Requires appointment : Yes               Appointments  past 12m or future 3m with PCP    Date Provider   Last Visit   10/20/2022 Alex Cast MD   Next Visit   Visit date not found Alex Cast MD   ED visits in past 90 days: 0        Note composed:11:28 PM 11/26/2023

## 2023-11-28 NOTE — TELEPHONE ENCOUNTER
No care due was identified.  [facility] Embedded Care Due Messages. Reference number: [id number].   11/27/2023 9:36:56 PM CST

## 2023-11-28 NOTE — TELEPHONE ENCOUNTER
Refill Decision Note   Parisa Dimas  is requesting a refill authorization.  Brief Assessment and Rationale for Refill:  Approve     Medication Therapy Plan:         Comments:     Note composed:10:34 PM 11/27/2023

## 2023-12-01 ENCOUNTER — CLINICAL SUPPORT (OUTPATIENT)
Dept: REHABILITATION | Facility: HOSPITAL | Age: 68
End: 2023-12-01
Payer: MEDICARE

## 2023-12-01 DIAGNOSIS — R29.898 DECREASED STRENGTH OF LOWER EXTREMITY: Primary | ICD-10-CM

## 2023-12-01 DIAGNOSIS — Z74.09 IMPAIRED FUNCTIONAL MOBILITY, BALANCE, GAIT, AND ENDURANCE: ICD-10-CM

## 2023-12-01 PROCEDURE — 97112 NEUROMUSCULAR REEDUCATION: CPT

## 2023-12-01 NOTE — PROGRESS NOTES
[facility]   Physical Therapy Treatment Note      Name: Parisa Dimas  [facility] Number: [id number]    Therapy Diagnosis:   Encounter Diagnoses   Name Primary?    Decreased strength of lower extremity Yes    Impaired functional mobility, balance, gait, and endurance      Physician: Rose Mary Mabry NP    Visit Date: 12/1/2023    Physician Orders: PT Eval and Treat low back right leg  Medical Diagnosis from Referral: M54.41 (ICD-10-CM) - Lumbago with sciatica, right side G89.29 (ICD-10-CM) - Other chronic pain  Evaluation Date: 11/27/2023  Authorization Period Expiration: 1/12/2024  Plan of Care Expiration: 1/31/2023  Progress Note Due: 10th visit  Date of Surgery: NA  Visit # / Visits authorized: 1/24 + eval  FOTO: 1/ 3     Precautions: Standard      Time In: 11:00 AM  Time Out: 11:56 AM  Total Billable Time: 38 minutes    PTA Visit #: 0/5       Subjective     Patient reports: Parisa reports no new changes in symptoms but did want to go over home exercise program because she was doing them wrong.  She was compliant with home exercise program.  Response to previous treatment: No adverse reactions  Functional change: Ongoing    Pain: 3/10  Location: bilateral back , buttocks , lower legs, and upper legs     Patients goals: Improve walking    Objective      Objective Measures updated at progress report unless specified.     Treatment     Parisa received the treatments listed below:      therapeutic exercises: to develop strength, endurance, ROM, flexibility, posture, and core stabilization for 00 minutes including:      manual therapy techniques: Joint mobilizations and Manual traction were applied to the: lumbar/thoracic spine for 00 minutes, including:   Grade I-IV thoracic PA mobilizations  Grade I-IV lumbar PA mobilizations     neuromuscular re-education activities: to improve Balance, Coordination, Kinesthetic, Sense, Proprioception, Posture, and Motor Control for 56 minutes, including:    Education on activity modification, plan of care, home exercise program, and functional anatomy  DKTC and LTR 5' each  Straight leg raise 2 x 10 bilateral  PPT 5 sec hold x 20 reps  PPT with glut bridge 5 sec hold x 20 reps  PPT with glut bridge and hip abduction isometric with belt 3 sec hold x 20 reps  Side lying clamshells 2 x 10 bilateral  Side lying hip abduction 2 x 10 bilateral     Prone press ups - unable to perform due to shoulder history      therapeutic activities: to improve functional performance for 00 minutes, including:     Patient Education and Home Exercises       Education provided:   - Activity modification  - Plan of care  - Home exercise program  - Functional Anatomy    Written Home Exercises Provided: Patient instructed to cont prior HEP. Exercises were reviewed and Parisa was able to demonstrate them prior to the end of the session.  Parisa demonstrated good  understanding of the education provided. See Electronic Medical Record under Patient Instructions for exercises provided during therapy sessions    Assessment     Parisa presents to the clinic for first follow since intial evaluation and tolerated treatment well. Continued assessment of symptoms required due to subjective reports of evolving symptoms within different positions. Subjective symptoms change from flexion to extension bias exercise; therefore will focus on flexion to neutral bias initially and adjust plan based on symptoms. Additionally, patient limited with extension/prone bias positions due to left TSA. Interventions this date focused on proximal hip strengthening and abdominal core motor control/activation. She required verbal and tactile cues to perform correctly. Education on home exercise program given and patient verbalized understanding. Will progress as tolerated next session.     Parisa Is progressing well towards her goals.   Patient prognosis is Good.     Patient will continue to benefit from skilled outpatient  physical therapy to address the deficits listed in the problem list box on initial evaluation, provide pt/family education and to maximize pt's level of independence in the home and community environment.     Patient's spiritual, cultural and educational needs considered and pt agreeable to plan of care and goals.     Anticipated Barriers for therapy: chronicity of symptoms     Goals:  Short Term Goals: 4 weeks (Ongoing, Not Met)  1. Patient will reduce maximal pain rating to < 3/10 pain to facilitate ability to sleep through the night and recover from PT interventions.  2. Patient will be able to stand/ambulate for at least 10 minutes with < 3/10 pain to improve standing/walking tolerance.   3. Patient will be able to lift at least 5-10# with < 3/10 pain to improve lifting mechanics for work related tasks.     Long Term Goals: 8-10 weeks (Ongoing, Not Met)  1. Patient will improve 2 minute walk test by at least 40 feet indicating a clinically significant change in function.   2. Patient will demonstrate > 4/5 lower quarter strength to facilitate transfers from sit to stand from various surfaces without restriction.  3. Patient will improve 30 second sit to stand to at least 10x for improvement with transfer tasks.    4. Patient will improve FOTO intake score to at least 66 indicating a clinically significant change in function.    Plan     Plan of care Certification: 11/27/2023 to 1/31/2023.     Outpatient Physical Therapy 2 times weekly for 8-10 weeks to include the following interventions: Cervical/Lumbar Traction, Gait Training, Manual Therapy, Moist Heat/ Ice, Neuromuscular Re-ed, Patient Education, Self Care, Therapeutic Activities, and Therapeutic Exercise.      Emilia Olivera, PT, DPT

## 2023-12-02 PROBLEM — R29.898 DECREASED STRENGTH OF LOWER EXTREMITY: Status: ACTIVE | Noted: 2023-12-02

## 2023-12-02 PROBLEM — Z74.09 IMPAIRED FUNCTIONAL MOBILITY, BALANCE, GAIT, AND ENDURANCE: Status: ACTIVE | Noted: 2023-12-02

## 2023-12-05 ENCOUNTER — CLINICAL SUPPORT (OUTPATIENT)
Dept: REHABILITATION | Facility: HOSPITAL | Age: 68
End: 2023-12-05
Payer: MEDICARE

## 2023-12-05 DIAGNOSIS — Z74.09 IMPAIRED FUNCTIONAL MOBILITY, BALANCE, GAIT, AND ENDURANCE: ICD-10-CM

## 2023-12-05 DIAGNOSIS — R29.898 DECREASED STRENGTH OF LOWER EXTREMITY: Primary | ICD-10-CM

## 2023-12-05 PROCEDURE — 97112 NEUROMUSCULAR REEDUCATION: CPT | Mod: CQ

## 2023-12-05 NOTE — PROGRESS NOTES
[facility]   Physical Therapy Treatment Note      Name: Parisa Dimas  [facility] Number: [id number]    Therapy Diagnosis:   Encounter Diagnoses   Name Primary?    Decreased strength of lower extremity Yes    Impaired functional mobility, balance, gait, and endurance      Physician: Rose Mary Mabry NP    Visit Date: 12/5/2023    Physician Orders: PT Eval and Treat low back right leg  Medical Diagnosis from Referral: M54.41 (ICD-10-CM) - Lumbago with sciatica, right side G89.29 (ICD-10-CM) - Other chronic pain  Evaluation Date: 11/27/2023  Authorization Period Expiration: 1/12/2024  Plan of Care Expiration: 1/31/2024  Progress Note Due: 10th visit  Date of Surgery: NA  Visit # / Visits authorized: 2 / 24 + eval  FOTO: 1/3     Precautions: Standard      Time In: 1105  Time Out: 1200  Total Billable Time: 55 minutes (4 NMR)    PTA Visit #: 1 / 5     Subjective     Patient reports: that her pain continues to come and go. Today she has some discomfort on her Right side and into the outside of her mid thigh.  She was compliant with home exercise program.  Response to previous treatment: appropriate muscle response  Functional change: ongoing    Pain: 3/10, currently  Location: bilateral back, buttocks, lower legs, and upper legs   Patients goals: Improve walking    Objective      Objective Measures updated at progress report unless specified.     Treatment     Parisa received the treatments listed below:      manual therapy techniques: Joint mobilizations and Manual traction were applied to the: lumbar/thoracic spine for 00 minutes, including:   Grade I-IV thoracic PA mobilizations  Grade I-IV lumbar PA mobilizations     neuromuscular re-education activities: to improve Balance, Coordination, Kinesthetic, Sense, Proprioception, Posture, and Motor Control for 55 minutes, including:   Education on activity modification, plan of care, home exercise program, and functional anatomy  DKTC and LTR; 5  minutes each  Straight leg raise; 2 x 10 reps Bilateral  Posterior pelvic tilt; 5 second hold, 20 reps  Posterior pelvic tilt with glut bridge; 5 second hold, 20 reps  Posterior pelvic tilt with glut bridge and hip abduction isometric with belt; 3 second hold, 20 reps  SL clamshells; 5 second hold, 2 x 10 reps Bilateral  SL hip abduction; 2 x 10 reps Bilateral   Prone press ups - unable to perform due to shoulder history      therapeutic activities: to improve functional performance for 00 minutes, including:     Patient Education and Home Exercises       Education provided:   - Activity modification  - Plan of care  - Home exercise program  - Functional Anatomy    Written Home Exercises Provided: Patient instructed to cont prior HEP. Exercises were reviewed and Parisa was able to demonstrate them prior to the end of the session.  Parisa demonstrated good  understanding of the education provided. See Electronic Medical Record under Patient Instructions for exercises provided during therapy sessions    Assessment     There is good tolerance to exercises performed noting appropriate muscle response throughout. Requires cueing for proper muscle activation with core specific exercises. Fatigues easily with hip and glute biased strengthening. Will continue per POC towards treatment goals.  Parisa Is progressing well towards her goals.   Patient prognosis is Good.     Patient will continue to benefit from skilled outpatient physical therapy to address the deficits listed in the problem list box on initial evaluation, provide pt/family education and to maximize pt's level of independence in the home and community environment.     Patient's spiritual, cultural and educational needs considered and pt agreeable to plan of care and goals.     Anticipated Barriers for therapy: chronicity of symptoms     Goals:  Short Term Goals: 4 weeks (Ongoing, Not Met)  1. Patient will reduce maximal pain rating to < 3/10 pain to facilitate  ability to sleep through the night and recover from PT interventions.  2. Patient will be able to stand/ambulate for at least 10 minutes with < 3/10 pain to improve standing/walking tolerance.   3. Patient will be able to lift at least 5-10# with < 3/10 pain to improve lifting mechanics for work related tasks.     Long Term Goals: 8-10 weeks (Ongoing, Not Met)  1. Patient will improve 2 minute walk test by at least 40 feet indicating a clinically significant change in function.   2. Patient will demonstrate > 4/5 lower quarter strength to facilitate transfers from sit to stand from various surfaces without restriction.  3. Patient will improve 30 second sit to stand to at least 10x for improvement with transfer tasks.    4. Patient will improve FOTO intake score to at least 66 indicating a clinically significant change in function.    Plan     Plan of care Certification: 11/27/2023 to 1/31/2024.     Outpatient Physical Therapy 2 times weekly for 8-10 weeks to include the following interventions: Cervical/Lumbar Traction, Gait Training, Manual Therapy, Moist Heat/ Ice, Neuromuscular Re-ed, Patient Education, Self Care, Therapeutic Activities, and Therapeutic Exercise.      Neisha Adams, PTA

## 2023-12-07 ENCOUNTER — OFFICE VISIT (OUTPATIENT)
Dept: PODIATRY | Facility: CLINIC | Age: 68
End: 2023-12-07
Payer: MEDICARE

## 2023-12-07 ENCOUNTER — TELEPHONE (OUTPATIENT)
Dept: PODIATRY | Facility: CLINIC | Age: 68
End: 2023-12-07

## 2023-12-07 VITALS
HEIGHT: 63 IN | DIASTOLIC BLOOD PRESSURE: 78 MMHG | HEART RATE: 88 BPM | SYSTOLIC BLOOD PRESSURE: 123 MMHG | BODY MASS INDEX: 36.2 KG/M2

## 2023-12-07 DIAGNOSIS — L60.0 ONYCHOCRYPTOSIS: ICD-10-CM

## 2023-12-07 DIAGNOSIS — E11.51 TYPE 2 DIABETES MELLITUS WITH PERIPHERAL VASCULAR DISEASE: ICD-10-CM

## 2023-12-07 DIAGNOSIS — B35.1 ONYCHOMYCOSIS: ICD-10-CM

## 2023-12-07 DIAGNOSIS — L60.8 PINCER NAIL DEFORMITY: Primary | ICD-10-CM

## 2023-12-07 DIAGNOSIS — M20.12 VALGUS DEFORMITY OF BOTH GREAT TOES: ICD-10-CM

## 2023-12-07 DIAGNOSIS — M20.11 VALGUS DEFORMITY OF BOTH GREAT TOES: ICD-10-CM

## 2023-12-07 DIAGNOSIS — I87.2 VENOUS INSUFFICIENCY OF BOTH LOWER EXTREMITIES: ICD-10-CM

## 2023-12-07 PROCEDURE — 3074F SYST BP LT 130 MM HG: CPT | Mod: CPTII,S$GLB,, | Performed by: PODIATRIST

## 2023-12-07 PROCEDURE — 4010F PR ACE/ARB THEARPY RXD/TAKEN: ICD-10-PCS | Mod: CPTII,S$GLB,, | Performed by: PODIATRIST

## 2023-12-07 PROCEDURE — 1125F AMNT PAIN NOTED PAIN PRSNT: CPT | Mod: CPTII,S$GLB,, | Performed by: PODIATRIST

## 2023-12-07 PROCEDURE — 1159F MED LIST DOCD IN RCRD: CPT | Mod: CPTII,S$GLB,, | Performed by: PODIATRIST

## 2023-12-07 PROCEDURE — 3078F DIAST BP <80 MM HG: CPT | Mod: CPTII,S$GLB,, | Performed by: PODIATRIST

## 2023-12-07 PROCEDURE — 3078F PR MOST RECENT DIASTOLIC BLOOD PRESSURE < 80 MM HG: ICD-10-PCS | Mod: CPTII,S$GLB,, | Performed by: PODIATRIST

## 2023-12-07 PROCEDURE — 99999 PR PBB SHADOW E&M-EST. PATIENT-LVL IV: ICD-10-PCS | Mod: PBBFAC,,, | Performed by: PODIATRIST

## 2023-12-07 PROCEDURE — 3066F PR DOCUMENTATION OF TREATMENT FOR NEPHROPATHY: ICD-10-PCS | Mod: CPTII,S$GLB,, | Performed by: PODIATRIST

## 2023-12-07 PROCEDURE — 3074F PR MOST RECENT SYSTOLIC BLOOD PRESSURE < 130 MM HG: ICD-10-PCS | Mod: CPTII,S$GLB,, | Performed by: PODIATRIST

## 2023-12-07 PROCEDURE — 3008F PR BODY MASS INDEX (BMI) DOCUMENTED: ICD-10-PCS | Mod: CPTII,S$GLB,, | Performed by: PODIATRIST

## 2023-12-07 PROCEDURE — 3044F PR MOST RECENT HEMOGLOBIN A1C LEVEL <7.0%: ICD-10-PCS | Mod: CPTII,S$GLB,, | Performed by: PODIATRIST

## 2023-12-07 PROCEDURE — 1160F PR REVIEW ALL MEDS BY PRESCRIBER/CLIN PHARMACIST DOCUMENTED: ICD-10-PCS | Mod: CPTII,S$GLB,, | Performed by: PODIATRIST

## 2023-12-07 PROCEDURE — 4010F ACE/ARB THERAPY RXD/TAKEN: CPT | Mod: CPTII,S$GLB,, | Performed by: PODIATRIST

## 2023-12-07 PROCEDURE — 3061F PR NEG MICROALBUMINURIA RESULT DOCUMENTED/REVIEW: ICD-10-PCS | Mod: CPTII,S$GLB,, | Performed by: PODIATRIST

## 2023-12-07 PROCEDURE — 3061F NEG MICROALBUMINURIA REV: CPT | Mod: CPTII,S$GLB,, | Performed by: PODIATRIST

## 2023-12-07 PROCEDURE — 3044F HG A1C LEVEL LT 7.0%: CPT | Mod: CPTII,S$GLB,, | Performed by: PODIATRIST

## 2023-12-07 PROCEDURE — 99999 PR PBB SHADOW E&M-EST. PATIENT-LVL IV: CPT | Mod: PBBFAC,,, | Performed by: PODIATRIST

## 2023-12-07 PROCEDURE — 1159F PR MEDICATION LIST DOCUMENTED IN MEDICAL RECORD: ICD-10-PCS | Mod: CPTII,S$GLB,, | Performed by: PODIATRIST

## 2023-12-07 PROCEDURE — 99214 OFFICE O/P EST MOD 30 MIN: CPT | Mod: S$GLB,,, | Performed by: PODIATRIST

## 2023-12-07 PROCEDURE — 3066F NEPHROPATHY DOC TX: CPT | Mod: CPTII,S$GLB,, | Performed by: PODIATRIST

## 2023-12-07 PROCEDURE — 1160F RVW MEDS BY RX/DR IN RCRD: CPT | Mod: CPTII,S$GLB,, | Performed by: PODIATRIST

## 2023-12-07 PROCEDURE — 1125F PR PAIN SEVERITY QUANTIFIED, PAIN PRESENT: ICD-10-PCS | Mod: CPTII,S$GLB,, | Performed by: PODIATRIST

## 2023-12-07 PROCEDURE — 99214 PR OFFICE/OUTPT VISIT, EST, LEVL IV, 30-39 MIN: ICD-10-PCS | Mod: S$GLB,,, | Performed by: PODIATRIST

## 2023-12-07 PROCEDURE — 3008F BODY MASS INDEX DOCD: CPT | Mod: CPTII,S$GLB,, | Performed by: PODIATRIST

## 2023-12-07 NOTE — PROGRESS NOTES
Subjective:      Patient ID: Parisa Dimas is a 68 y.o. female.    Chief Complaint: Diabetes Mellitus (11/15/23/  Clotilde), Diabetic Foot Exam, Routine Foot Care, and Foot Problem (Bilateral great toenail)    Parisa is a 68 y.o. female who presents to the clinic upon referral from Dr. Pamella dykes. provider found  for evaluation and treatment of diabetic feet. Parisa has a past medical history of AR (allergic rhinitis), AR (allergic rhinitis), Atrial fibrillation (12/27/2022), Carotid stenosis, Closed 4-part fracture of proximal humerus, left, initial encounter (3/7/2022), Colon polyp (10/2014), Diabetes mellitus, Diabetes mellitus, type 2, Fatty liver, GERD (gastroesophageal reflux disease), Heart murmur (1/27/2022), HLD (hyperlipidemia), HTN (hypertension), Impingement syndrome of left shoulder, Joint pain, Sleep apnea (2018), and Stricture of artery (3/25/2021).  For annual diabetic foot exam.  Relates intermittent burning and tingling sensation to both feet.  She also complains of intermittent cramping to legs and pain to legs.  She has a history wearing compression stockings however is not wearing them today.  Furthermore she relates that she trims her own toenails.  Accompanied by her .    12/07/2023:  Presents for annual diabetic foot exam.  Inquiring about the appearance of her great toenails on both feet.  She is also inquiring about the swelling to her legs.  Received a prescription for compression stockings at the last visit however did not fill the prescription.  Accompanied by her .  No pain reported to her feet today.  Denies any burning, tingling or numbness to her feet.    PCP: Alex Cast MD Karen Chavez-Jones NP on 11/15/2023  Date Last Seen by PCP:  10/20/2022    Current shoe gear: Casual shoes    Hemoglobin A1C   Date Value Ref Range Status   11/15/2023 6.1 (H) 4.0 - 5.6 % Final     Comment:     ADA Screening Guidelines:  5.7-6.4%  Consistent with prediabetes  >or=6.5%   "Consistent with diabetes    High levels of fetal hemoglobin interfere with the HbA1C  assay. Heterozygous hemoglobin variants (HbS, HgC, etc)do  not significantly interfere with this assay.   However, presence of multiple variants may affect accuracy.     10/25/2022 6.9 (H) 4.0 - 5.6 % Final     Comment:     ADA Screening Guidelines:  5.7-6.4%  Consistent with prediabetes  >or=6.5%  Consistent with diabetes    High levels of fetal hemoglobin interfere with the HbA1C  assay. Heterozygous hemoglobin variants (HbS, HgC, etc)do  not significantly interfere with this assay.   However, presence of multiple variants may affect accuracy.     01/27/2022 5.9 (H) 4.0 - 5.6 % Final     Comment:     ADA Screening Guidelines:  5.7-6.4%  Consistent with prediabetes  >or=6.5%  Consistent with diabetes    High levels of fetal hemoglobin interfere with the HbA1C  assay. Heterozygous hemoglobin variants (HbS, HgC, etc)do  not significantly interfere with this assay.   However, presence of multiple variants may affect accuracy.       Vitals:    12/07/23 1106   BP: 123/78   Pulse: 88   Height: 5' 3" (1.6 m)   PainSc:   2      Past Medical History:   Diagnosis Date    AR (allergic rhinitis)     AR (allergic rhinitis)     Atrial fibrillation 12/27/2022    Carotid stenosis     50% RCA    Closed 4-part fracture of proximal humerus, left, initial encounter 3/7/2022    Colon polyp 10/2014    Diabetes mellitus     Diabetes mellitus, type 2     Fatty liver     GERD (gastroesophageal reflux disease)     Heart murmur 1/27/2022    HLD (hyperlipidemia)     HTN (hypertension)     Impingement syndrome of left shoulder     Joint pain     Sleep apnea 2018    Stricture of artery 3/25/2021       Past Surgical History:   Procedure Laterality Date    REVERSE TOTAL SHOULDER ARTHROPLASTY Left 3/7/2022    Procedure: ARTHROPLASTY, SHOULDER, TOTAL, REVERSE;  Surgeon: MISSY Santoyo MD;  Location: [facility] OR [address] [place] [address];  Service: Orthopedics;  Laterality: Left;  " AND BICEP SYNDEMOSIS    SKIN BIOPSY  10yrs.    negative       Family History   Problem Relation Age of Onset    Cancer Mother         gallbladder    Coronary artery disease Father 79    Diabetes Father     Cerebral palsy Sister     Osteoporosis Sister     Epilepsy Sister     Prostate cancer Brother 57    Diabetes Brother     Heart attack Brother     Hypertension Daughter     No Known Problems Son     Diabetes Maternal Aunt     Diabetes Maternal Uncle     Diabetes Maternal Grandmother     Hypertension Other         multiple    Melanoma Neg Hx        Social History     Socioeconomic History    Marital status:      Spouse name: Pio    Number of children: 4   Occupational History     Comment: retired- school work   Tobacco Use    Smoking status: Never    Smokeless tobacco: Never   Substance and Sexual Activity    Alcohol use: Not Currently     Alcohol/week: 0.0 standard drinks of alcohol    Drug use: No    Sexual activity: Yes     Partners: Male     Birth control/protection: Post-menopausal   Other Topics Concern    Are you pregnant or think you may be? No    Breast-feeding No   Social History Narrative    Stairs- none     Social Determinants of Health     Financial Resource Strain: Low Risk  (11/8/2023)    Overall Financial Resource Strain (CARDIA)     Difficulty of Paying Living Expenses: Not hard at all   Food Insecurity: No Food Insecurity (11/8/2023)    Hunger Vital Sign     Worried About Running Out of Food in the Last Year: Never true     Ran Out of Food in the Last Year: Never true   Transportation Needs: No Transportation Needs (11/8/2023)    PRAPARE - Transportation     Lack of Transportation (Medical): No     Lack of Transportation (Non-Medical): No   Physical Activity: Unknown (11/8/2023)    Exercise Vital Sign     Days of Exercise per Week: Patient refused     Minutes of Exercise per Session: 0 min   Stress: No Stress Concern Present (11/8/2023)    [employer] -  Occupational Stress Questionnaire     Feeling of Stress : Not at all   Social Connections: Socially Integrated (11/8/2023)    Social Connection and Isolation Panel [NHANES]     Frequency of Communication with Friends and Family: More than three times a week     Frequency of Social Gatherings with Friends and Family: More than three times a week     Attends Bahai Services: More than 4 times per year     Active Member of Clubs or Organizations: Yes     Attends Club or Organization Meetings: More than 4 times per year     Marital Status:    Housing Stability: Low Risk  (11/8/2023)    Housing Stability Vital Sign     Unable to Pay for Housing in the Last Year: No     Number of Places Lived in the Last Year: 1     Unstable Housing in the Last Year: No       Current Outpatient Medications   Medication Sig Dispense Refill    apixaban (ELIQUIS) 5 mg Tab Take 1 tablet (5 mg total) by mouth 2 (two) times daily. 180 tablet 3    ascorbic acid, vitamin C, (VITAMIN C) 1000 MG tablet Take 1,000 mg by mouth once daily.      aspirin (ECOTRIN) 81 MG EC tablet Take 81 mg by mouth once daily.      b complex vitamins capsule Take 1 capsule by mouth once daily.      blood sugar diagnostic Strp To check BG 1 times daily, to use with insurance preferred meter 100 strip 11    celecoxib (CELEBREX) 200 MG capsule Take 1 capsule (200 mg total) by mouth once daily. 60 capsule 2    cetirizine (ZYRTEC) 10 MG tablet Take 10 mg by mouth once daily.      coenzyme Q10 200 mg capsule Take 200 mg by mouth once daily.      hydroCHLOROthiazide (HYDRODIURIL) 25 MG tablet TAKE 1 TABLET BY MOUTH ONCE  DAILY 90 tablet 0    L.ACID/L.CASEI/B.BIF/B.MELISSA/FOS (PROBIOTIC BLEND ORAL) Take 2 tablets by mouth once daily.      lancets Misc To check BG 1 times daily, to use with insurance preferred meter 100 each 11    lovastatin (MEVACOR) 20 MG tablet TAKE 1 TABLET BY MOUTH IN THE  EVENING 90 tablet 0    magnesium 30 mg Tab Take 1 tablet by mouth Daily.       metFORMIN (GLUCOPHAGE-XR) 500 MG ER 24hr tablet TAKE 2 TABLETS BY MOUTH  ONCE DAILY 180 tablet 3    multivitamin capsule Take 1 capsule by mouth once daily.      OMEGA-3S/DHA/EPA/FISH OIL (OMEGA 3 ORAL) Take 2,800 mg by mouth once daily.      omeprazole (PRILOSEC) 40 MG capsule TAKE 1 CAPSULE BY MOUTH ONCE  DAILY 90 capsule 0    OPW TEST CLAIM - DO NOT FILL OPW test claim. Do not fill. 1 tablet 0    valsartan (DIOVAN) 320 MG tablet TAKE 1 TABLET BY MOUTH ONCE DAILY 90 tablet 3    verapamiL (VERELAN) 240 MG C24P TAKE 1 CAPSULE BY MOUTH ONCE  DAILY 90 capsule 0    vitamin D (VITAMIN D3) 1000 units Tab Take 1,000 Units by mouth once daily.      vitamin E 400 UNIT capsule Take 400 Units by mouth once daily.      zinc sulfate (ZINC-15 ORAL) Take by mouth.      blood-glucose meter kit To check BG 1 times daily, to use with insurance preferred meter 1 each 0     No current facility-administered medications for this visit.       Review of patient's allergies indicates:  No Known Allergies      Review of Systems   Constitutional: Negative for chills, fever and malaise/fatigue.   HENT:  Negative for congestion and hearing loss.    Cardiovascular:  Negative for chest pain, claudication and leg swelling.   Respiratory:  Negative for cough and shortness of breath.    Skin:  Negative for nail changes.   Musculoskeletal:  Positive for joint pain. Negative for back pain, muscle cramps and muscle weakness.   Gastrointestinal:  Negative for nausea and vomiting.   Neurological:  Positive for numbness and paresthesias. Negative for weakness.   Psychiatric/Behavioral:  Negative for altered mental status.            Objective:      Physical Exam  Vitals reviewed.   Constitutional:       General: She is not in acute distress.     Appearance: She is obese. She is not ill-appearing.   Cardiovascular:      Pulses:           Dorsalis pedis pulses are 2+ on the right side and 2+ on the left side.        Posterior tibial pulses are detected w/  Doppler on the right side and detected w/ Doppler on the left side.      Comments: Nonpalpable PT bilateral secondary to edema however strong biphasic PT/DP with Doppler.  Mild to moderate edema of the lower extremity bilateral multiple varicosities and telangiectasias.  Skin temp is warm to foot bilateral.  No rubor on dependency bilateral foot.  No hair growth bilateral lower extremity.  Musculoskeletal:      Right foot: Normal range of motion. Bunion present.      Left foot: Normal range of motion. Bunion present.      Comments: Semi rigid forefoot valgus bilateral with moderate track bound hallux abductovalgus under Lapping the 2nd toe bilateral.  Semi rigid flexion contracture of the 2nd toe consistent with hammertoe deformity bilateral foot.  No pain with range of motion or manual muscle strength testing bilateral foot and ankle.   Feet:      Right foot:      Protective Sensation: 10 sites tested.  10 sites sensed.      Skin integrity: No ulcer, blister, skin breakdown, erythema, warmth, callus, dry skin or fissure.      Toenail Condition: Right toenails are abnormally thick and long. Fungal disease present.     Left foot:      Protective Sensation: 10 sites tested.  10 sites sensed.      Skin integrity: No ulcer, blister, skin breakdown, erythema, warmth, callus, dry skin or fissure.      Toenail Condition: Left toenails are abnormally thick and ingrown. Fungal disease present.  Skin:     General: Skin is warm.      Capillary Refill: Capillary refill takes less than 2 seconds.      Findings: No ecchymosis or erythema.      Nails: There is no clubbing.      Comments: Hallux nail bilateral is thickened, partially loosened with moderate underlying debris and discolored white-yellow at the distal 1/3 of the left hallux nail plate and distal 1/2 of the right hallux nail plate.  Moderately incurvated lateral nail border of the left hallux without surrounding localized edema, drainage or signs infection.  No  localized pain on palpation.  The medial and lateral nail borders of the hallux bilateral are moderately incurvated without symptoms.   Neurological:      Mental Status: She is alert and oriented to person, place, and time.      Sensory: Sensation is intact.      Motor: Motor function is intact.               Assessment:       Encounter Diagnoses   Name Primary?    Pincer nail deformity Yes    Type 2 diabetes mellitus with peripheral vascular disease     Venous insufficiency of both lower extremities     Onychomycosis     Onychocryptosis     Valgus deformity of both great toes          Plan:       Parisa was seen today for diabetes mellitus, diabetic foot exam, routine foot care and foot problem.    Diagnoses and all orders for this visit:    Pincer nail deformity    Type 2 diabetes mellitus with peripheral vascular disease  -     COMPRESSION STOCKINGS    Venous insufficiency of both lower extremities  -     COMPRESSION STOCKINGS    Onychomycosis    Onychocryptosis    Valgus deformity of both great toes      I counseled the patient on her conditions, their implications and medical management.    Shoe inspection. Diabetic Foot Education. Patient reminded of the importance of good nutrition and blood sugar control to help prevent podiatric complications of diabetes. Patient instructed on proper foot hygeine. We discussed wearing proper shoe gear, daily foot inspections, never walking without protective shoe gear, never putting sharp instruments to feet.    Comprehensive annual diabetic foot exam completed today.  Patient found to be overall low risk for diabetic foot complication.  Sensation intact to both feet with strong audible pulses utilizing Doppler.  Patient has moderate venous insufficiency.  We discussed elevation at rest and use of compression stockings 20-30 mm Hg below-knee which were represcribed.  Patient referred to [facility].    We discussed treatment options for ingrown toenails in  detail however patient declined.  We discussed phenol and alcohol matrixectomy with risks and benefits and postoperative course.    In addition we discussed treatment options for onychomycosis consisting of topical versus systemic therapy in detail.  Patient declined any further treatment at this time.  We will continue to monitor.  Inspected the patient's current shoes which are too short on the right foot and causing direct pressure against her toes.  We discussed the risks of continued pressure damaging the nail plate as well as leading to potential ulceration and infection.      RTC within 1 year p.r.n. as discussed.     Assisted per Jose J LAGUNASM PGY 3    A portion of this note was generated by voice recognition software and may contain spelling and grammar errors.

## 2023-12-08 ENCOUNTER — CLINICAL SUPPORT (OUTPATIENT)
Dept: REHABILITATION | Facility: HOSPITAL | Age: 68
End: 2023-12-08
Payer: MEDICARE

## 2023-12-08 DIAGNOSIS — Z74.09 IMPAIRED FUNCTIONAL MOBILITY, BALANCE, GAIT, AND ENDURANCE: ICD-10-CM

## 2023-12-08 DIAGNOSIS — R29.898 DECREASED STRENGTH OF LOWER EXTREMITY: Primary | ICD-10-CM

## 2023-12-08 PROCEDURE — 97530 THERAPEUTIC ACTIVITIES: CPT | Mod: CQ

## 2023-12-08 PROCEDURE — 97112 NEUROMUSCULAR REEDUCATION: CPT | Mod: CQ

## 2023-12-08 NOTE — PROGRESS NOTES
[facility]   Physical Therapy Treatment Note      Name: Parisa Dimas  [facility] Number: [id number]    Therapy Diagnosis:   Encounter Diagnoses   Name Primary?    Decreased strength of lower extremity Yes    Impaired functional mobility, balance, gait, and endurance      Physician: Rose Mary Mabry NP    Visit Date: 12/8/2023    Physician Orders: PT Eval and Treat low back right leg  Medical Diagnosis from Referral: M54.41 (ICD-10-CM) - Lumbago with sciatica, right side G89.29 (ICD-10-CM) - Other chronic pain  Evaluation Date: 11/27/2023  Authorization Period Expiration: 1/12/2024  Plan of Care Expiration: 1/31/2024  Progress Note Due: 10th visit  Date of Surgery: NA  Visit # / Visits authorized: 3 / 24 + eval  FOTO: 1/3     Precautions: Standard      Time In: 1100  Time Out: 1200  Total Billable Time: 60 minutes (3 NMR, 1 TA)    PTA Visit #: 2 / 5     Subjective     Patient reports: doing ok today, no new complaints.  She was compliant with home exercise program.  Response to previous treatment: appropriate muscle response  Functional change: ongoing    Pain: 0/10, currently  Location: bilateral back, buttocks, lower legs, and upper legs   Patients goals: Improve walking    Objective      Objective Measures updated at progress report unless specified.     Treatment     Parisa received the treatments listed below:      manual therapy techniques: Joint mobilizations and Manual traction were applied to the: lumbar/thoracic spine for 00 minutes, including:   Grade I-IV thoracic PA mobilizations  Grade I-IV lumbar PA mobilizations     neuromuscular re-education activities: to improve Balance, Coordination, Kinesthetic, Sense, Proprioception, Posture, and Motor Control for 50 minutes, including:   Patient Education:  - activity modification  - plan of care  - home exercise program  - and functional anatomy    DKTC and LTR; 5 minutes each  Straight leg raise; 3 x 10 reps Bilateral  Posterior  pelvic tilt; 10 second hold, 20 reps  Posterior pelvic tilt with glut bridge; 5 second hold, 20 reps  Posterior pelvic tilt with glut bridge and hip abduction isometric with belt; 3 second hold, 20 reps  SL clamshells; 5 second hold, 2 x 10 reps Bilateral with Red TB  SL hip abduction; 2 x 10 reps Bilateral   Prone press ups - unable to perform due to shoulder history      therapeutic activities: to improve functional performance for 10 minutes, including:   Nustep for 10 minutes/1,000K steps for endogenous release of opioids to improve tolerance to ADL's    Patient Education and Home Exercises       Education provided:   - Activity modification  - Plan of care  - Home exercise program  - Functional Anatomy    Written Home Exercises Provided: Patient instructed to cont prior HEP. Exercises were reviewed and Parisa was able to demonstrate them prior to the end of the session.  Parisa demonstrated good  understanding of the education provided. See Electronic Medical Record under Patient Instructions for exercises provided during therapy sessions    Assessment     Demonstrates good tolerance to exercises performed noting appropriate muscle response throughout. Requires verbal cueing for proper muscle activation with core exercise and dual activity. She would benefit from introduction of static balance in standing. Will continue per POC towards treatment goals.  Parisa Is progressing well towards her goals.   Patient prognosis is Good.     Patient will continue to benefit from skilled outpatient physical therapy to address the deficits listed in the problem list box on initial evaluation, provide pt/family education and to maximize pt's level of independence in the home and community environment.     Patient's spiritual, cultural and educational needs considered and pt agreeable to plan of care and goals.     Anticipated Barriers for therapy: chronicity of symptoms     Goals:  Short Term Goals: 4 weeks (Ongoing, Not  Met)  1. Patient will reduce maximal pain rating to < 3/10 pain to facilitate ability to sleep through the night and recover from PT interventions.  2. Patient will be able to stand/ambulate for at least 10 minutes with < 3/10 pain to improve standing/walking tolerance.   3. Patient will be able to lift at least 5-10# with < 3/10 pain to improve lifting mechanics for work related tasks.     Long Term Goals: 8-10 weeks (Ongoing, Not Met)  1. Patient will improve 2 minute walk test by at least 40 feet indicating a clinically significant change in function.   2. Patient will demonstrate > 4/5 lower quarter strength to facilitate transfers from sit to stand from various surfaces without restriction.  3. Patient will improve 30 second sit to stand to at least 10x for improvement with transfer tasks.    4. Patient will improve FOTO intake score to at least 66 indicating a clinically significant change in function.    Plan     Plan of care Certification: 11/27/2023 to 1/31/2024.     Outpatient Physical Therapy 2 times weekly for 8-10 weeks to include the following interventions: Cervical/Lumbar Traction, Gait Training, Manual Therapy, Moist Heat/ Ice, Neuromuscular Re-ed, Patient Education, Self Care, Therapeutic Activities, and Therapeutic Exercise.      Neisha Adams, PTA

## 2023-12-12 ENCOUNTER — CLINICAL SUPPORT (OUTPATIENT)
Dept: REHABILITATION | Facility: HOSPITAL | Age: 68
End: 2023-12-12
Payer: MEDICARE

## 2023-12-12 DIAGNOSIS — Z74.09 IMPAIRED FUNCTIONAL MOBILITY, BALANCE, GAIT, AND ENDURANCE: ICD-10-CM

## 2023-12-12 DIAGNOSIS — R29.898 DECREASED STRENGTH OF LOWER EXTREMITY: Primary | ICD-10-CM

## 2023-12-12 PROCEDURE — 97112 NEUROMUSCULAR REEDUCATION: CPT | Mod: CQ

## 2023-12-12 RX ORDER — LOVASTATIN 20 MG/1
TABLET ORAL
Qty: 90 TABLET | Refills: 0 | Status: SHIPPED | OUTPATIENT
Start: 2023-12-12 | End: 2024-02-19 | Stop reason: SDUPTHER

## 2023-12-12 NOTE — PROGRESS NOTES
"[facility]   Physical Therapy Treatment Note      Name: Parisa Dimas  Clinic Number: [id number]    Therapy Diagnosis:   Encounter Diagnoses   Name Primary?    Decreased strength of lower extremity Yes    Impaired functional mobility, balance, gait, and endurance      Physician: Rose Mary Mabry NP    Visit Date: 12/12/2023    Physician Orders: PT Eval and Treat low back right leg  Medical Diagnosis from Referral: M54.41 (ICD-10-CM) - Lumbago with sciatica, right side G89.29 (ICD-10-CM) - Other chronic pain  Evaluation Date: 11/27/2023  Authorization Period Expiration: 1/12/2024  Plan of Care Expiration: 1/31/2024  Progress Note Due: 10th visit  Date of Surgery: NA  Visit # / Visits authorized: 4 / 24 + eval  FOTO: 1/3     Precautions: Standard      Time In: 0953  Time Out: 1100  Total Billable Time: 67 minutes (4 NMR)    PTA Visit #: 3 / 5     Subjective     Patient reports: that she continues to be ok until she moves a certain way and then feels a "twinge" across her low back. States she hasn't noticed a change in her symptoms since beginning PT. She notes increased pain after sitting for prolonged periods of time.  She was compliant with home exercise program.  Response to previous treatment: appropriate muscle response  Functional change: ongoing    Pain: 0/10, currently  Location: bilateral back, buttocks, lower legs, and upper legs   Patients goals: Improve walking    Objective      Objective Measures updated at progress report unless specified.     Treatment     Parisa received the treatments listed below:      manual therapy techniques: Joint mobilizations and Manual traction were applied to the: lumbar/thoracic spine for 00 minutes, including:   Grade I-IV thoracic PA mobilizations  Grade I-IV lumbar PA mobilizations     neuromuscular re-education activities: to improve Balance, Coordination, Kinesthetic, Sense, Proprioception, Posture, and Motor Control for 62 minutes, " including:   Patient Education:  - activity modification  - plan of care  - home exercise program  - and functional anatomy    DKTC and LTR; 5 minutes each  Posterior pelvic tilt; 10 second hold, 20 reps  Posterior pelvic tilt with glut bridge; 5 second hold, 20 reps  Posterior pelvic tilt with glut bridge and hip abduction isometric with belt; 5 second hold, 20 reps  Standing TA activation with SB; 5 second hold, 20 reps  Standing TA activation with SB + march; 15 reps (difficulty)  Palloff press; Red TB, 15 reps each direction    Not performed:  Straight leg raise; 3 x 10 reps Bilateral  SL clamshells; 5 second hold, 2 x 10 reps Bilateral with Red TB  SL hip abduction; 2 x 10 reps Bilateral   Press ups on wall; consider next visit    therapeutic activities: to improve functional performance for 5 minutes, including:   Nustep/Recumbent Bike for 5 minutes/1,000K steps for endogenous release of opioids to improve tolerance to ADL's    Patient Education and Home Exercises       Education provided:   - Activity modification  - Plan of care  - Home exercise program  - Functional Anatomy    Written Home Exercises Provided: Patient instructed to cont prior HEP. Exercises were reviewed and Parisa was able to demonstrate them prior to the end of the session.  Parisa demonstrated good  understanding of the education provided. See Electronic Medical Record under Patient Instructions for exercises provided during therapy sessions    Assessment     Added standing static core activities today. She demonstrated increased difficulty with core activation and dynamic movement. Overall good tolerance to exercises performed noting appropriate muscle response. Consider wall press ups next visit to improve lumbar spine mobility and reduce pain. Continue per POC towards treatment goals.  Parisa Is progressing well towards her goals.   Patient prognosis is Good.     Patient will continue to benefit from skilled outpatient physical therapy to  address the deficits listed in the problem list box on initial evaluation, provide pt/family education and to maximize pt's level of independence in the home and community environment.     Patient's spiritual, cultural and educational needs considered and pt agreeable to plan of care and goals.     Anticipated Barriers for therapy: chronicity of symptoms     Goals:  Short Term Goals: 4 weeks (Ongoing, Not Met)  1. Patient will reduce maximal pain rating to < 3/10 pain to facilitate ability to sleep through the night and recover from PT interventions.  2. Patient will be able to stand/ambulate for at least 10 minutes with < 3/10 pain to improve standing/walking tolerance.   3. Patient will be able to lift at least 5-10# with < 3/10 pain to improve lifting mechanics for work related tasks.     Long Term Goals: 8-10 weeks (Ongoing, Not Met)  1. Patient will improve 2 minute walk test by at least 40 feet indicating a clinically significant change in function.   2. Patient will demonstrate > 4/5 lower quarter strength to facilitate transfers from sit to stand from various surfaces without restriction.  3. Patient will improve 30 second sit to stand to at least 10x for improvement with transfer tasks.    4. Patient will improve FOTO intake score to at least 66 indicating a clinically significant change in function.    Plan     Plan of care Certification: 11/27/2023 to 1/31/2024.     Outpatient Physical Therapy 2 times weekly for 8-10 weeks to include the following interventions: Cervical/Lumbar Traction, Gait Training, Manual Therapy, Moist Heat/ Ice, Neuromuscular Re-ed, Patient Education, Self Care, Therapeutic Activities, and Therapeutic Exercise.      Neisha Adams, PTA

## 2023-12-13 NOTE — TELEPHONE ENCOUNTER
Refill Decision Note   Parisa Dimas  is requesting a refill authorization.  Brief Assessment and Rationale for Refill:  Approve     Medication Therapy Plan:         Comments:     Note composed:11:06 PM 12/12/2023

## 2023-12-13 NOTE — TELEPHONE ENCOUNTER
No care due was identified.  [facility] Embedded Care Due Messages. Reference number: [id number].   12/12/2023 10:48:56 PM CST

## 2023-12-15 ENCOUNTER — CLINICAL SUPPORT (OUTPATIENT)
Dept: REHABILITATION | Facility: HOSPITAL | Age: 68
End: 2023-12-15
Payer: MEDICARE

## 2023-12-15 DIAGNOSIS — R29.898 DECREASED STRENGTH OF LOWER EXTREMITY: Primary | ICD-10-CM

## 2023-12-15 DIAGNOSIS — Z74.09 IMPAIRED FUNCTIONAL MOBILITY, BALANCE, GAIT, AND ENDURANCE: ICD-10-CM

## 2023-12-15 PROCEDURE — 97112 NEUROMUSCULAR REEDUCATION: CPT

## 2023-12-15 PROCEDURE — 97530 THERAPEUTIC ACTIVITIES: CPT

## 2023-12-15 NOTE — PROGRESS NOTES
[facility]   Physical Therapy Treatment Note      Name: Parisa Dimas  [facility] Number: [id number]    Therapy Diagnosis:   Encounter Diagnoses   Name Primary?    Decreased strength of lower extremity Yes    Impaired functional mobility, balance, gait, and endurance      Physician: Rose Mary Mabry NP    Visit Date: 12/15/2023    Physician Orders: PT Eval and Treat low back right leg  Medical Diagnosis from Referral: M54.41 (ICD-10-CM) - Lumbago with sciatica, right side G89.29 (ICD-10-CM) - Other chronic pain  Evaluation Date: 11/27/2023  Authorization Period Expiration: 1/12/2024  Plan of Care Expiration: 1/31/2024  Progress Note Due: 10th visit  Date of Surgery: NA  Visit # / Visits authorized: 5 / 24 + eval  FOTO: 1/3     Precautions: Standard      Time In: 10:00 AM  Time Out: 10:56 AM  Total Billable Time: 56 minutes    PTA Visit #: 3 / 5     Subjective     Patient reports: She reports that she has not really had much change in symptoms since beginning physical therapy and continues to have pain with majority of her movements.   She was compliant with home exercise program.  Response to previous treatment: appropriate muscle response  Functional change: ongoing    Pain: 0/10, currently  Location: bilateral back, buttocks, lower legs, and upper legs   Patients goals: Improve walking    Objective      Objective Measures updated at progress report unless specified.     Treatment     Parisa received the treatments listed below:      manual therapy techniques: Joint mobilizations and Manual traction were applied to the: lumbar/thoracic spine for 00 minutes, including:      neuromuscular re-education activities: to improve Balance, Coordination, Kinesthetic, Sense, Proprioception, Posture, and Motor Control for 17 minutes, including:   Patient Education:  - activity modification  - plan of care  - home exercise program  - and functional anatomy  Standing hip abduction 2 x 10 bilateral x 2  rounds  Standing hip extension 2 x 10 bilateral x 2 rounds    therapeutic activities: to improve functional performance for 39 minutes, including:   Reassessment performed  Nustep/Recumbent Bike for 10 minutes/1,000K steps for endogenous release of opioids to improve tolerance to ADL's  Sit to stands 5 x 5 x 2 rounds - moderate to maximal cueing on maintaining neutral lumbar spine    Patient Education and Home Exercises       Education provided:   - Activity modification  - Plan of care  - Home exercise program  - Functional Anatomy    Written Home Exercises Provided: Patient instructed to cont prior HEP. Exercises were reviewed and Parisa was able to demonstrate them prior to the end of the session.  Parisa demonstrated good  understanding of the education provided. See Electronic Medical Record under Patient Instructions for exercises provided during therapy sessions    Assessment     Reassessment performed this session due to no improvements with symptoms since beginning physical therapy interventions. Adjusted exercises to focus on extension bias positions as compared to flexion bias as flexion seems to exacerbate her symptoms. She tolerated these adjustments much better and improvement with symptoms noted post session. She required maximal to moderate cueing to assist with performance of proper squatting techniques. Able to perform post session independently but requires off-loading at end range due to pain in low back likely due to flexed lumbar spine. Will continue to progress with interventions next visit pending symptoms.     Parisa Is progressing well towards her goals.   Patient prognosis is Good.     Patient will continue to benefit from skilled outpatient physical therapy to address the deficits listed in the problem list box on initial evaluation, provide pt/family education and to maximize pt's level of independence in the home and community environment.     Patient's spiritual, cultural and educational  needs considered and pt agreeable to plan of care and goals.     Anticipated Barriers for therapy: chronicity of symptoms     Goals:  Short Term Goals: 4 weeks (Ongoing, Not Met)  1. Patient will reduce maximal pain rating to < 3/10 pain to facilitate ability to sleep through the night and recover from PT interventions.  2. Patient will be able to stand/ambulate for at least 10 minutes with < 3/10 pain to improve standing/walking tolerance.   3. Patient will be able to lift at least 5-10# with < 3/10 pain to improve lifting mechanics for work related tasks.     Long Term Goals: 8-10 weeks (Ongoing, Not Met)  1. Patient will improve 2 minute walk test by at least 40 feet indicating a clinically significant change in function.   2. Patient will demonstrate > 4/5 lower quarter strength to facilitate transfers from sit to stand from various surfaces without restriction.  3. Patient will improve 30 second sit to stand to at least 10x for improvement with transfer tasks.    4. Patient will improve FOTO intake score to at least 66 indicating a clinically significant change in function.    Plan     Plan of care Certification: 11/27/2023 to 1/31/2024.     Outpatient Physical Therapy 2 times weekly for 8-10 weeks to include the following interventions: Cervical/Lumbar Traction, Gait Training, Manual Therapy, Moist Heat/ Ice, Neuromuscular Re-ed, Patient Education, Self Care, Therapeutic Activities, and Therapeutic Exercise.      Emilia Olivera, PT, DPT

## 2023-12-19 ENCOUNTER — CLINICAL SUPPORT (OUTPATIENT)
Dept: REHABILITATION | Facility: HOSPITAL | Age: 68
End: 2023-12-19
Payer: MEDICARE

## 2023-12-19 DIAGNOSIS — R29.898 DECREASED STRENGTH OF LOWER EXTREMITY: Primary | ICD-10-CM

## 2023-12-19 DIAGNOSIS — Z74.09 IMPAIRED FUNCTIONAL MOBILITY, BALANCE, GAIT, AND ENDURANCE: ICD-10-CM

## 2023-12-19 PROCEDURE — 97530 THERAPEUTIC ACTIVITIES: CPT

## 2023-12-19 PROCEDURE — 97112 NEUROMUSCULAR REEDUCATION: CPT

## 2023-12-21 NOTE — PROGRESS NOTES
[facility]   Physical Therapy Treatment Note      Name: Parisa Dimas  [facility] Number: [id number]    Therapy Diagnosis:   Encounter Diagnoses   Name Primary?    Decreased strength of lower extremity Yes    Impaired functional mobility, balance, gait, and endurance      Physician: Rose Mary Mabry NP    Visit Date: 12/19/2023    Physician Orders: PT Eval and Treat low back right leg  Medical Diagnosis from Referral: M54.41 (ICD-10-CM) - Lumbago with sciatica, right side G89.29 (ICD-10-CM) - Other chronic pain  Evaluation Date: 11/27/2023  Authorization Period Expiration: 1/12/2024  Plan of Care Expiration: 1/31/2024  Progress Note Due: 10th visit  Date of Surgery: NA  Visit # / Visits authorized: 6 / 24 + eval  FOTO: 1/3     Precautions: Standard      Time In: 11:00 AM  Time Out: 11:53 AM  Total Billable Time: 53 minutes    PTA Visit #: 3 / 5     Subjective     Patient reports: She reports that she has not really had much change in symptoms since beginning physical therapy and continues to have pain with majority of her movements.   She was compliant with home exercise program.  Response to previous treatment: appropriate muscle response  Functional change: ongoing    Pain: 0/10, currently  Location: bilateral back, buttocks, lower legs, and upper legs   Patients goals: Improve walking    Objective      Objective Measures updated at progress report unless specified.     Treatment     Parisa received the treatments listed below:      manual therapy techniques: Joint mobilizations and Manual traction were applied to the: lumbar/thoracic spine for 00 minutes, including:      neuromuscular re-education activities: to improve Balance, Coordination, Kinesthetic, Sense, Proprioception, Posture, and Motor Control for 14 minutes, including:   Patient Education:  - activity modification  - plan of care  - home exercise program  - and functional anatomy  Standing hip abduction 2 x 10 bilateral x 2  rounds  Standing hip extension 2 x 10 bilateral x 2 rounds    therapeutic activities: to improve functional performance for 39 minutes, including:   Nustep/Recumbent Bike for 10 minutes/1,000K steps for endogenous release of opioids to improve tolerance to ADL's  Sit to stands 5 x 5 x 2 rounds - moderate to maximal cueing on maintaining neutral lumbar spine  Lateral band walks GTB 10 laps in // bars  Standing prone press ups 2 x 10 with 3 sec hold    Patient Education and Home Exercises       Education provided:   - Activity modification  - Plan of care  - Home exercise program  - Functional Anatomy    Written Home Exercises Provided: Patient instructed to cont prior HEP. Exercises were reviewed and Parisa was able to demonstrate them prior to the end of the session.  Parisa demonstrated good  understanding of the education provided. See Electronic Medical Record under Patient Instructions for exercises provided during therapy sessions    Assessment     Continued with new exercises from last week and she tolerated these symptoms well. Continued improvement noted with extension based exercises. Added standing press ups this date in // bars to assist with reducing lumbar radiculopathy pain. Can progress with standing abdominal core exercises next visit. Will continue to progress with interventions next visit pending symptoms.     Parisa Is progressing well towards her goals.   Patient prognosis is Good.     Patient will continue to benefit from skilled outpatient physical therapy to address the deficits listed in the problem list box on initial evaluation, provide pt/family education and to maximize pt's level of independence in the home and community environment.     Patient's spiritual, cultural and educational needs considered and pt agreeable to plan of care and goals.     Anticipated Barriers for therapy: chronicity of symptoms     Goals:  Short Term Goals: 4 weeks (Ongoing, Not Met)  1. Patient will reduce maximal  pain rating to < 3/10 pain to facilitate ability to sleep through the night and recover from PT interventions.  2. Patient will be able to stand/ambulate for at least 10 minutes with < 3/10 pain to improve standing/walking tolerance.   3. Patient will be able to lift at least 5-10# with < 3/10 pain to improve lifting mechanics for work related tasks.     Long Term Goals: 8-10 weeks (Ongoing, Not Met)  1. Patient will improve 2 minute walk test by at least 40 feet indicating a clinically significant change in function.   2. Patient will demonstrate > 4/5 lower quarter strength to facilitate transfers from sit to stand from various surfaces without restriction.  3. Patient will improve 30 second sit to stand to at least 10x for improvement with transfer tasks.    4. Patient will improve FOTO intake score to at least 66 indicating a clinically significant change in function.    Plan     Plan of care Certification: 11/27/2023 to 1/31/2024.     Outpatient Physical Therapy 2 times weekly for 8-10 weeks to include the following interventions: Cervical/Lumbar Traction, Gait Training, Manual Therapy, Moist Heat/ Ice, Neuromuscular Re-ed, Patient Education, Self Care, Therapeutic Activities, and Therapeutic Exercise.      Emilia Olivera, PT, DPT

## 2023-12-22 ENCOUNTER — CLINICAL SUPPORT (OUTPATIENT)
Dept: REHABILITATION | Facility: HOSPITAL | Age: 68
End: 2023-12-22
Payer: MEDICARE

## 2023-12-22 DIAGNOSIS — Z74.09 IMPAIRED FUNCTIONAL MOBILITY, BALANCE, GAIT, AND ENDURANCE: ICD-10-CM

## 2023-12-22 DIAGNOSIS — R29.898 DECREASED STRENGTH OF LOWER EXTREMITY: Primary | ICD-10-CM

## 2023-12-22 PROCEDURE — 97530 THERAPEUTIC ACTIVITIES: CPT | Mod: CQ

## 2023-12-22 NOTE — PROGRESS NOTES
[facility]   Physical Therapy Treatment Note      Name: Parisa Dimas  [facility] Number: [id number]    Therapy Diagnosis:   Encounter Diagnoses   Name Primary?    Decreased strength of lower extremity Yes    Impaired functional mobility, balance, gait, and endurance      Physician: Rose Mary Mabry NP    Visit Date: 12/22/2023    Physician Orders: PT Eval and Treat low back right leg  Medical Diagnosis from Referral: M54.41 (ICD-10-CM) - Lumbago with sciatica, right side G89.29 (ICD-10-CM) - Other chronic pain  Evaluation Date: 11/27/2023  Authorization Period Expiration: 1/12/2024  Plan of Care Expiration: 1/31/2024  Progress Note Due: 10th visit  Date of Surgery: NA  Visit # / Visits authorized: 7 / 24 + eval  FOTO: 1/3     Precautions: Standard      Time In: 1010  Time Out: 1100  Total Billable Time: 25 minutes (2 TA)    PTA Visit #: 1 / 5     Subjective     Patient reports: She reports that she has not really had much change in symptoms since beginning physical therapy and continues to have pain with majority of her movements.   She was compliant with home exercise program.  Response to previous treatment: appropriate muscle response  Functional change: ongoing    Pain: 0/10, currently  Location: bilateral back, buttocks, lower legs, and upper legs   Patients goals: Improve walking    Objective      Objective Measures updated at progress report unless specified.     Treatment     Parisa received the treatments listed below:      manual therapy techniques: Joint mobilizations and Manual traction were applied to the: lumbar/thoracic spine for 00 minutes, including:      neuromuscular re-education activities: to improve Balance, Coordination, Kinesthetic, Sense, Proprioception, Posture, and Motor Control for 10 minutes, including:   Patient Education:  - activity modification  - plan of care  - home exercise program  - and functional anatomy    Standing hip abduction 2 x 10 bilateral x 2  rounds  Standing hip extension 2 x 10 bilateral x 2 rounds    Consider in future visits:  Standing TA activation with SB;  Standing TA activation with SB + march;   Pallof Press;    therapeutic activities: to improve functional performance for 45 minutes, including:   Nustep/Recumbent Bike for 10 minutes/1,000K steps at Lvl 3 for endogenous release of opioids to improve tolerance to ADL's  Sit to stands 5 x 5 x 2 rounds - moderate to maximal cueing on maintaining neutral lumbar spine  Lateral band walks with Green TB; 5 laps at 1/2 wall (16 feet)  Standing prone press ups (against wall); 2 x 10 reps with 3 sec hold    Patient Education and Home Exercises       Education provided:   - Activity modification  - Plan of care  - Home exercise program  - Functional Anatomy    Written Home Exercises Provided: Patient instructed to cont prior HEP. Exercises were reviewed and Parisa was able to demonstrate them prior to the end of the session.  Parisa demonstrated good  understanding of the education provided. See Electronic Medical Record under Patient Instructions for exercises provided during therapy sessions    Assessment     There is good tolerance to exercises performed. Adequate fatigue reported with hip strengthening exercises. Cueing required during sit to stands for hip hip and neutral spine. Will continue per POC towards treatment goals.  Parisa Is progressing well towards her goals.   Patient prognosis is Good.     Patient will continue to benefit from skilled outpatient physical therapy to address the deficits listed in the problem list box on initial evaluation, provide pt/family education and to maximize pt's level of independence in the home and community environment.     Patient's spiritual, cultural and educational needs considered and pt agreeable to plan of care and goals.     Anticipated Barriers for therapy: chronicity of symptoms     Goals:  Short Term Goals: 4 weeks (Ongoing, Not Met)  1. Patient will  reduce maximal pain rating to < 3/10 pain to facilitate ability to sleep through the night and recover from PT interventions.  2. Patient will be able to stand/ambulate for at least 10 minutes with < 3/10 pain to improve standing/walking tolerance.   3. Patient will be able to lift at least 5-10# with < 3/10 pain to improve lifting mechanics for work related tasks.     Long Term Goals: 8-10 weeks (Ongoing, Not Met)  1. Patient will improve 2 minute walk test by at least 40 feet indicating a clinically significant change in function.   2. Patient will demonstrate > 4/5 lower quarter strength to facilitate transfers from sit to stand from various surfaces without restriction.  3. Patient will improve 30 second sit to stand to at least 10x for improvement with transfer tasks.    4. Patient will improve FOTO intake score to at least 66 indicating a clinically significant change in function.    Plan     Plan of Care Certification: 11/27/2023 to 1/31/2024.     Outpatient Physical Therapy 2 times weekly for 8-10 weeks to include the following interventions: Cervical/Lumbar Traction, Gait Training, Manual Therapy, Moist Heat/ Ice, Neuromuscular Re-ed, Patient Education, Self Care, Therapeutic Activities, and Therapeutic Exercise.      Neisha Adams, PTA

## 2023-12-24 DIAGNOSIS — I10 PRIMARY HYPERTENSION: ICD-10-CM

## 2023-12-25 NOTE — TELEPHONE ENCOUNTER
No care due was identified.  [facility] Embedded Care Due Messages. Reference number: [id number].   12/24/2023 9:24:33 PM CST

## 2023-12-26 ENCOUNTER — CLINICAL SUPPORT (OUTPATIENT)
Dept: REHABILITATION | Facility: HOSPITAL | Age: 68
End: 2023-12-26
Payer: MEDICARE

## 2023-12-26 DIAGNOSIS — R29.898 DECREASED STRENGTH OF LOWER EXTREMITY: Primary | ICD-10-CM

## 2023-12-26 DIAGNOSIS — Z74.09 IMPAIRED FUNCTIONAL MOBILITY, BALANCE, GAIT, AND ENDURANCE: ICD-10-CM

## 2023-12-26 PROCEDURE — 97530 THERAPEUTIC ACTIVITIES: CPT

## 2023-12-26 PROCEDURE — 97112 NEUROMUSCULAR REEDUCATION: CPT

## 2023-12-26 RX ORDER — VALSARTAN 320 MG/1
TABLET ORAL
Qty: 90 TABLET | Refills: 0 | Status: SHIPPED | OUTPATIENT
Start: 2023-12-26

## 2023-12-26 NOTE — TELEPHONE ENCOUNTER
Refill Decision Note   Parisa Dimas  is requesting a refill authorization.  Brief Assessment and Rationale for Refill:  Approve     Medication Therapy Plan:  Due for appt soon      Comments:     Note composed:12:30 PM 12/26/2023            none

## 2023-12-26 NOTE — PROGRESS NOTES
"[facility]   Physical Therapy Treatment Note      Name: Parisa Dimas  Clinic Number: [id number]    Therapy Diagnosis:   Encounter Diagnoses   Name Primary?    Decreased strength of lower extremity Yes    Impaired functional mobility, balance, gait, and endurance      Physician: Rose Mary Mabry NP    Visit Date: 12/26/2023    Physician Orders: PT Eval and Treat low back right leg  Medical Diagnosis from Referral: M54.41 (ICD-10-CM) - Lumbago with sciatica, right side G89.29 (ICD-10-CM) - Other chronic pain  Evaluation Date: 11/27/2023  Authorization Period Expiration: 1/12/2024  Plan of Care Expiration: 1/31/2024  Progress Note Due: 10th visit  Date of Surgery: NA  Visit # / Visits authorized: 8 / 24 + eval  FOTO: 1/3     Precautions: Standard      Time In: 10:58 AM  Time Out: 11:55 AM  Total Billable Time: 57 minutes     PTA Visit #: 1 / 5     Subjective     Patient reports: Parisa reports that she was still having some "zingers" in her back going down the back and front of the leg but was starting to feel better. She reports a bad day yesterday but believes it could be from all the standing and moving she did yesterday for Christmas.  She was compliant with home exercise program.  Response to previous treatment: appropriate muscle response  Functional change: ongoing    Pain: 0/10, currently  Location: bilateral back, buttocks, lower legs, and upper legs   Patients goals: Improve walking    Objective      Objective Measures updated at progress report unless specified.     Treatment     Parisa received the treatments listed below:      manual therapy techniques: Joint mobilizations and Manual traction were applied to the: lumbar/thoracic spine for 5 minutes, including:   Long axis distraction    neuromuscular re-education activities: to improve Balance, Coordination, Kinesthetic, Sense, Proprioception, Posture, and Motor Control for 12 minutes, including:   Patient Education:  - " activity modification  - plan of care  - home exercise program  - and functional anatomy    Standing hip abduction 2 x 10 bilateral x 2 rounds  Standing hip extension 2 x 10 bilateral x 2 rounds  Supine sciatic nerve glides x 20 reps     therapeutic activities: to improve functional performance for 40 minutes, including:   Nustep/Recumbent Bike for 10 minutes/1,000K steps at Lvl 3 for endogenous release of opioids to improve tolerance to ADL's  Sit to stands 5 x 5 x 2 rounds - moderate to maximal cueing on maintaining neutral lumbar spine  Lateral band walks with Green TB; 5 laps at 1/2 wall (16 feet)  Standing prone press ups (against wall); 2 x 10 reps with 3 sec hold  Standing TA with ball press downs 3 second x 20 reps    Next visit:  Standing TA with ball press + marches x 20 reps  Paloff Press GTB 20 reps bilateral    Patient Education and Home Exercises       Education provided:   - Activity modification  - Plan of care  - Home exercise program  - Functional Anatomy    Written Home Exercises Provided: Patient instructed to cont prior HEP. Exercises were reviewed and Parisa was able to demonstrate them prior to the end of the session.  Parisa demonstrated good  understanding of the education provided. See Electronic Medical Record under Patient Instructions for exercises provided during therapy sessions    Assessment     Parisa continues to demonstrate evolving symptoms noting improvement and/or reproduction of symptoms in both flexion and extension positions. Per assessment, patient is demonstrating possible hip and low back pathology. Modified exercises this date and attempted multiple positions to assist in understanding her symptoms. No eliciting of pain with manual therapy, sciatic nerve glides, and glut exercises while loaded or single limb loaded positions increased her pain. Will perform reassessment next visit and progress/modify exercise interventions accordingly.     Parisa Is progressing well towards  her goals.   Patient prognosis is Good.     Patient will continue to benefit from skilled outpatient physical therapy to address the deficits listed in the problem list box on initial evaluation, provide pt/family education and to maximize pt's level of independence in the home and community environment.     Patient's spiritual, cultural and educational needs considered and pt agreeable to plan of care and goals.     Anticipated Barriers for therapy: chronicity of symptoms     Goals:  Short Term Goals: 4 weeks (Ongoing, Not Met)  1. Patient will reduce maximal pain rating to < 3/10 pain to facilitate ability to sleep through the night and recover from PT interventions.  2. Patient will be able to stand/ambulate for at least 10 minutes with < 3/10 pain to improve standing/walking tolerance.   3. Patient will be able to lift at least 5-10# with < 3/10 pain to improve lifting mechanics for work related tasks.     Long Term Goals: 8-10 weeks (Ongoing, Not Met)  1. Patient will improve 2 minute walk test by at least 40 feet indicating a clinically significant change in function.   2. Patient will demonstrate > 4/5 lower quarter strength to facilitate transfers from sit to stand from various surfaces without restriction.  3. Patient will improve 30 second sit to stand to at least 10x for improvement with transfer tasks.    4. Patient will improve FOTO intake score to at least 66 indicating a clinically significant change in function.    Plan     Plan of Care Certification: 11/27/2023 to 1/31/2024.     Outpatient Physical Therapy 2 times weekly for 8-10 weeks to include the following interventions: Cervical/Lumbar Traction, Gait Training, Manual Therapy, Moist Heat/ Ice, Neuromuscular Re-ed, Patient Education, Self Care, Therapeutic Activities, and Therapeutic Exercise.      Emilia Olivera, PT, DPT

## 2024-01-02 ENCOUNTER — OFFICE VISIT (OUTPATIENT)
Dept: CARDIOLOGY | Facility: CLINIC | Age: 69
End: 2024-01-02
Payer: MEDICARE

## 2024-01-02 VITALS
HEART RATE: 80 BPM | WEIGHT: 205.69 LBS | BODY MASS INDEX: 36.45 KG/M2 | HEIGHT: 63 IN | DIASTOLIC BLOOD PRESSURE: 74 MMHG | OXYGEN SATURATION: 96 % | SYSTOLIC BLOOD PRESSURE: 174 MMHG

## 2024-01-02 DIAGNOSIS — I10 PRIMARY HYPERTENSION: ICD-10-CM

## 2024-01-02 DIAGNOSIS — G47.33 OBSTRUCTIVE SLEEP APNEA HYPOPNEA, SEVERE: ICD-10-CM

## 2024-01-02 DIAGNOSIS — E11.00 TYPE 2 DIABETES MELLITUS WITH HYPEROSMOLARITY WITHOUT COMA, WITHOUT LONG-TERM CURRENT USE OF INSULIN: ICD-10-CM

## 2024-01-02 DIAGNOSIS — I42.1 HOCM (HYPERTROPHIC OBSTRUCTIVE CARDIOMYOPATHY): ICD-10-CM

## 2024-01-02 DIAGNOSIS — I77.9 BILATERAL CAROTID ARTERY DISEASE, UNSPECIFIED TYPE: ICD-10-CM

## 2024-01-02 DIAGNOSIS — I48.0 PAROXYSMAL ATRIAL FIBRILLATION: ICD-10-CM

## 2024-01-02 DIAGNOSIS — E78.2 MIXED HYPERLIPIDEMIA: ICD-10-CM

## 2024-01-02 DIAGNOSIS — Q24.4 SUBAORTIC MEMBRANE: Primary | ICD-10-CM

## 2024-01-02 DIAGNOSIS — E66.01 SEVERE OBESITY (BMI 35.0-39.9) WITH COMORBIDITY: ICD-10-CM

## 2024-01-02 PROCEDURE — 99999 PR PBB SHADOW E&M-EST. PATIENT-LVL V: CPT | Mod: PBBFAC,,, | Performed by: PHYSICIAN ASSISTANT

## 2024-01-02 PROCEDURE — 99214 OFFICE O/P EST MOD 30 MIN: CPT | Mod: S$GLB,,, | Performed by: PHYSICIAN ASSISTANT

## 2024-01-02 NOTE — PROGRESS NOTES
"    General Cardiology Clinic Note  Reason for Visit: Follow up   Last Clinic Visit: 12/30/2022 with Dr. Helga Felix    HPI:   Parisa Dimas is a 68 y.o. female who presents for follow up.     Problems:   Subaortic membrane vs HOCM ?  Hypertension  Hyperlipidemia   Atrial fibrillation  Mild carotid artery disease   Type 2 DM  SHOAIB, intolerant to CPAP   Venous insufficiency   GERD    Interval HPI  Patient presents for routine follow up. She reports occasional burning chest pain at rest radiating up throat that is relieved with TUMS and she attributes to GERD. This occurred last night as she was getting ready for bed. She states she was in Afib for about a week in the beginning of last month. She states she felt "funny" and her Apple Watch alerted her that she was in Afib. She spontaneously converted back to sinus rhythm. She denies TOMLINSON, pedal edema, PND, rapid weight gain, syncope, near syncope, bleeding episodes, and symptoms of TIA. She does PT for sciatica. Started about a month ago. Otherwise not doing much exercise. She denies chest pain during PT.     ROS:      Review of Systems   Constitutional: Negative for diaphoresis, malaise/fatigue, weight gain and weight loss.   HENT:  Negative for nosebleeds.    Eyes:  Negative for vision loss in left eye, vision loss in right eye and visual disturbance.   Cardiovascular:  Negative for chest pain, claudication, dyspnea on exertion, irregular heartbeat, leg swelling, near-syncope, orthopnea, palpitations, paroxysmal nocturnal dyspnea and syncope.   Respiratory:  Negative for cough, shortness of breath, sleep disturbances due to breathing, snoring and wheezing.    Hematologic/Lymphatic: Negative for bleeding problem. Does not bruise/bleed easily.   Skin:  Negative for poor wound healing and rash.   Musculoskeletal:  Negative for muscle cramps and myalgias.   Gastrointestinal:  Positive for heartburn. Negative for bloating, abdominal pain, diarrhea, melena, nausea and " vomiting.   Genitourinary:  Negative for hematuria and nocturia.   Neurological:  Negative for brief paralysis, dizziness, headaches, light-headedness, numbness and weakness.   Psychiatric/Behavioral:  Negative for depression.    Allergic/Immunologic: Negative for hives.       PMH:     Past Medical History:   Diagnosis Date    AR (allergic rhinitis)     AR (allergic rhinitis)     Atrial fibrillation 12/27/2022    Carotid stenosis     50% RCA    Closed 4-part fracture of proximal humerus, left, initial encounter 3/7/2022    Colon polyp 10/2014    Diabetes mellitus     Diabetes mellitus, type 2     Fatty liver     GERD (gastroesophageal reflux disease)     Heart murmur 1/27/2022    HLD (hyperlipidemia)     HTN (hypertension)     Impingement syndrome of left shoulder     Joint pain     Sleep apnea 2018    Stricture of artery 3/25/2021     Past Surgical History:   Procedure Laterality Date    REVERSE TOTAL SHOULDER ARTHROPLASTY Left 3/7/2022    Procedure: ARTHROPLASTY, SHOULDER, TOTAL, REVERSE;  Surgeon: MISSY Santoyo MD;  Location: [facility] OR [address] [place] [address];  Service: Orthopedics;  Laterality: Left;  AND BICEP SYNDEMOSIS    SKIN BIOPSY  10yrs.    negative     Allergies:   Review of patient's allergies indicates:  No Known Allergies  Medications:     Current Outpatient Medications on File Prior to Visit   Medication Sig Dispense Refill    apixaban (ELIQUIS) 5 mg Tab Take 1 tablet (5 mg total) by mouth 2 (two) times daily. 180 tablet 3    ascorbic acid, vitamin C, (VITAMIN C) 1000 MG tablet Take 1,000 mg by mouth once daily.      aspirin (ECOTRIN) 81 MG EC tablet Take 81 mg by mouth once daily.      b complex vitamins capsule Take 1 capsule by mouth once daily.      blood sugar diagnostic Strp To check BG 1 times daily, to use with insurance preferred meter 100 strip 11    blood-glucose meter kit To check BG 1 times daily, to use with insurance preferred meter 1 each 0    celecoxib (CELEBREX) 200 MG capsule Take 1 capsule (200  mg total) by mouth once daily. 60 capsule 2    cetirizine (ZYRTEC) 10 MG tablet Take 10 mg by mouth once daily.      coenzyme Q10 200 mg capsule Take 200 mg by mouth once daily.      hydroCHLOROthiazide (HYDRODIURIL) 25 MG tablet TAKE 1 TABLET BY MOUTH ONCE  DAILY 90 tablet 0    L.ACID/L.CASEI/B.BIF/B.MELISSA/FOS (PROBIOTIC BLEND ORAL) Take 2 tablets by mouth once daily.      lancets Misc To check BG 1 times daily, to use with insurance preferred meter 100 each 11    lovastatin (MEVACOR) 20 MG tablet TAKE 1 TABLET BY MOUTH IN THE  EVENING 90 tablet 0    magnesium 30 mg Tab Take 1 tablet by mouth Daily.      metFORMIN (GLUCOPHAGE-XR) 500 MG ER 24hr tablet TAKE 2 TABLETS BY MOUTH  ONCE DAILY 180 tablet 3    multivitamin capsule Take 1 capsule by mouth once daily.      OMEGA-3S/DHA/EPA/FISH OIL (OMEGA 3 ORAL) Take 2,800 mg by mouth once daily.      omeprazole (PRILOSEC) 40 MG capsule TAKE 1 CAPSULE BY MOUTH ONCE  DAILY 90 capsule 0    OPW TEST CLAIM - DO NOT FILL OPW test claim. Do not fill. 1 tablet 0    valsartan (DIOVAN) 320 MG tablet TAKE 1 TABLET BY MOUTH ONCE  DAILY 90 tablet 0    verapamiL (VERELAN) 240 MG C24P TAKE 1 CAPSULE BY MOUTH ONCE  DAILY 90 capsule 0    vitamin D (VITAMIN D3) 1000 units Tab Take 1,000 Units by mouth once daily.      vitamin E 400 UNIT capsule Take 400 Units by mouth once daily.      zinc sulfate (ZINC-15 ORAL) Take by mouth.       No current facility-administered medications on file prior to visit.     Social History:     Social History     Tobacco Use    Smoking status: Never    Smokeless tobacco: Never   Substance Use Topics    Alcohol use: Not Currently     Alcohol/week: 0.0 standard drinks of alcohol     Family History:     Family History   Problem Relation Age of Onset    Cancer Mother         gallbladder    Coronary artery disease Father 79    Diabetes Father     Cerebral palsy Sister     Osteoporosis Sister     Epilepsy Sister     Prostate cancer Brother 57    Diabetes Brother      "Heart attack Brother     Hypertension Daughter     No Known Problems Son     Diabetes Maternal Aunt     Diabetes Maternal Uncle     Diabetes Maternal Grandmother     Hypertension Other         multiple    Melanoma Neg Hx      Physical Exam:   BP (!) 174/74   Pulse 80   Ht 5' 3" (1.6 m)   Wt 93.3 kg (205 lb 11 oz)   LMP 08/01/2003   SpO2 96%   BMI 36.44 kg/m²        Physical Exam  Vitals and nursing note reviewed.   Constitutional:       General: She is not in acute distress.     Appearance: Normal appearance. She is not ill-appearing.   HENT:      Head: Normocephalic and atraumatic.   Eyes:      General: No scleral icterus.     Conjunctiva/sclera: Conjunctivae normal.   Neck:      Thyroid: No thyromegaly.      Vascular: No carotid bruit or JVD.   Cardiovascular:      Rate and Rhythm: Normal rate and regular rhythm.      Pulses: Normal pulses.      Heart sounds: Murmur heard.      Systolic murmur is present with a grade of 3/6.      No friction rub. No gallop.   Pulmonary:      Effort: Pulmonary effort is normal.      Breath sounds: Normal breath sounds. No wheezing, rhonchi or rales.   Chest:      Chest wall: No tenderness.   Abdominal:      General: Bowel sounds are normal. There is no distension.      Palpations: Abdomen is soft.      Tenderness: There is no abdominal tenderness.   Musculoskeletal:         General: No swelling.      Cervical back: Neck supple.      Right lower leg: No edema.      Left lower leg: No edema.   Skin:     General: Skin is warm and dry.      Coloration: Skin is not pale.      Findings: No erythema or rash.      Nails: There is no clubbing.   Neurological:      Mental Status: She is alert and oriented to person, place, and time. Mental status is at baseline.   Psychiatric:         Mood and Affect: Mood and affect normal.         Behavior: Behavior normal.          Labs:     Lab Results   Component Value Date     11/15/2023    K 3.7 11/15/2023    CL 97 11/15/2023    CO2 29 " "11/15/2023    BUN 14 11/15/2023    CREATININE 0.8 11/15/2023    ANIONGAP 12 11/15/2023     Lab Results   Component Value Date    HGBA1C 6.1 (H) 11/15/2023     No results found for: "BNP", "BNPTRIAGEBLO" Lab Results   Component Value Date    WBC 4.73 11/15/2023    HGB 13.7 11/15/2023    HCT 41.1 11/15/2023     11/15/2023    GRAN 2.4 11/15/2023    GRAN 51.2 11/15/2023     Lab Results   Component Value Date    CHOL 172 11/15/2023    HDL 57 11/15/2023    LDLCALC 74.2 11/15/2023    TRIG 204 (H) 11/15/2023          Imaging:   Echocardiograms:   TTE 11/16/2022  The left ventricle is normal in size with concentric remodeling and hyperdynamic systolic function. The estimated ejection fraction is 75%.  Normal right ventricular size with normal right ventricular systolic function.  Normal left ventricular diastolic function.  Mild left atrial enlargement.  The estimated PA systolic pressure is 23 mmHg.  Normal central venous pressure (3 mmHg).  In this study, the highest LV outflow velocities did not exceed 2 m/s, even with the Valsalva maneuver. No significant outflow obstruction is evident    MILDRED 2/8/2022  The left ventricle is normal in size with hyperdynamic systolic function.  The estimated ejection fraction is 70%.  Aortic valve is sclerotic but without aortic insufficiency.  There is a subvalvular membrane extending from the anterior mitral leaflet to the basal interventricular septum. There is associated turbulent flow at the level of this membrane, responsible for increased LVOT flow velocity seen on surface echo.  There is nonspecific thickening of A2/3 mitral leaflet on the left atrial aspect of the mitral valve. No torn chords or mitral regurgitation seen. This could represent a vegetation. Recommend obtaining blood cultures.  Normal right ventricular size with normal right ventricular systolic function.    TTE 1/31/2022  The left ventricle is normal in size with concentric remodeling and hyperdynamic " systolic function.  The estimated ejection fraction is 70%.  Normal left ventricular diastolic function.  Elevated LVOT and aortic velocities of 3m/s, which increaes to 4m/s with valsalva. Turbulent flow seen along the basal septum. Unable to adequately visualize the aortic valve and LVOT. Consider further imaging evaluation.  Normal right ventricular size with normal right ventricular systolic function.  The estimated PA systolic pressure is 34 mmHg.  Normal central venous pressure (3 mmHg).    Stress Tests:   Stress echo 12/14/2022  The stress echo portion of this study is negative for myocardial ischemia.  The ECG portion of this study is negative for myocardial ischemia.  The test was stopped because the patient experienced shortness of breath.  During stress, the following significant arrhythmias were observed: occasional PVCs.  The patient's exercise capacity was normal.  The left ventricle is normal in size with normal systolic function. The estimated ejection fraction is 65%.  Normal right ventricular size with normal right ventricular systolic function.  Normal left ventricular diastolic function.  The estimated PA systolic pressure is 27 mmHg.  Normal central venous pressure (3 mmHg).    Caths:   None    Other:  Cardiac MRI 5/24/2022  LV volumes are normal. LV mass is normal. LVEF = 69%.   RV volumes are normal. RVEF = 67%.  No LGE appreciated  High velocity flow is noted in the LVOT on cine imaging without evidence of septal obstruction.  The differential diagnosis for this finding including a subaortic membrane.  However only trivial AI was detected.    Venous insufficiency ultrasound 11/17/2022  1.  No evidence of deep venous thrombosis in either lower extremity.     2.  Hemodynamically significant venous reflux in the greater saphenous veins, left greater than right.     3.  Hemodynamically significant venous reflux right lesser saphenous vein.    Carotid ultrasound 4/6/2018  No evidence of a  hemodynamically significant carotid bifurcation stenosis.       Assessment:     1. Subaortic membrane    2. HOCM (hypertrophic obstructive cardiomyopathy)    3. Primary hypertension    4. Mixed hyperlipidemia    5. Paroxysmal atrial fibrillation    6. Bilateral carotid artery disease, unspecified type    7. Type 2 diabetes mellitus with hyperosmolarity without coma, without long-term current use of insulin    8. Severe obesity (BMI 35.0-39.9) with comorbidity    9. Obstructive sleep apnea hypopnea, severe      Plan:     Subaortic membrane vs HOCM  Cardiac MRI 5/2022 with high velocity in LVOT but no septal obstruction and no LGE, possibly a subaortic membrane. TTE 11/2022 with no outflow tract obstruction, AV not well visualized. Stress echo 12/22 normal.   Evaluated by Dr Marrero previously. No evidence of subaortic membrane, but evidence of HCM and mild asymmetric septal hypertrophy. Recommended medical management. Also saw Dr. Fitzpatrick 11/2022 and recommended routine echo surveillance and medical management.   Obtain updated TTE to re assess dynamic vs. Fixed LVOT obstruction     Hypertension  Elevated in clinic today. Monitored in digital HTN program. Controlled about half the time but not checking often.   Pt will check BP daily at home for the next two weeks to determine if BP is controlled. If >130/80 will switch HCTZ to Chlorthalidone.   Continue HCTZ 25 mg daily for now  Continue Valsartan 320 mg daily   Continue Verapamil 240 mg daily     Hyperlipidemia  LDL at goal on Lovastatin. TG mildly elevated. Continue fish oil.     Paroxysmal atrial fibrillation  Following with EP  Continue Eliquis and Verapamil     Carotid artery disease   No significant stenosis on 2018 ultrasound     Type 2 DM  Well controlled    Obesity   Following a heart health diet such as the Mediterranean Diet is recommended in addition to 150 minutes a week of moderate intensity exercise to lower cholesterol, maintain a healthy body weight,  and improve overall cardiovascular health.    SHOAIB  Unable to tolerate CPAP     Follow up in 6 months.     Signed:  Tori Cordova PA-C  Cardiology   [phone] - General

## 2024-01-03 ENCOUNTER — CLINICAL SUPPORT (OUTPATIENT)
Dept: REHABILITATION | Facility: HOSPITAL | Age: 69
End: 2024-01-03
Payer: MEDICARE

## 2024-01-03 DIAGNOSIS — R29.898 DECREASED STRENGTH OF LOWER EXTREMITY: Primary | ICD-10-CM

## 2024-01-03 DIAGNOSIS — Z74.09 IMPAIRED FUNCTIONAL MOBILITY, BALANCE, GAIT, AND ENDURANCE: ICD-10-CM

## 2024-01-03 PROCEDURE — 97112 NEUROMUSCULAR REEDUCATION: CPT

## 2024-01-03 PROCEDURE — 97530 THERAPEUTIC ACTIVITIES: CPT

## 2024-01-04 ENCOUNTER — CLINICAL SUPPORT (OUTPATIENT)
Dept: REHABILITATION | Facility: HOSPITAL | Age: 69
End: 2024-01-04
Payer: MEDICARE

## 2024-01-04 DIAGNOSIS — R29.898 DECREASED STRENGTH OF LOWER EXTREMITY: Primary | ICD-10-CM

## 2024-01-04 DIAGNOSIS — Z74.09 IMPAIRED FUNCTIONAL MOBILITY, BALANCE, GAIT, AND ENDURANCE: ICD-10-CM

## 2024-01-04 PROCEDURE — 97530 THERAPEUTIC ACTIVITIES: CPT

## 2024-01-04 PROCEDURE — 97112 NEUROMUSCULAR REEDUCATION: CPT

## 2024-01-08 NOTE — PROGRESS NOTES
[facility]   Physical Therapy Treatment Note      Name: Parisa Dimas  [facility] Number: [id number]    Therapy Diagnosis:   Encounter Diagnoses   Name Primary?    Decreased strength of lower extremity Yes    Impaired functional mobility, balance, gait, and endurance      Physician: Rose Mary Mabry NP    Visit Date: 1/3/2024    Physician Orders: PT Eval and Treat low back right leg  Medical Diagnosis from Referral: M54.41 (ICD-10-CM) - Lumbago with sciatica, right side G89.29 (ICD-10-CM) - Other chronic pain  Evaluation Date: 11/27/2023  Authorization Period Expiration: 1/12/2024  Plan of Care Expiration: 1/31/2024  Progress Note Due: 10th visit  Date of Surgery: NA  Visit # / Visits authorized: 1 / 20  Episode #: 9  FOTO: 1/3     Precautions: Standard      Time In: 2:30 PM  Time Out: 3:28 PM  Total Billable Time: 58 minutes     PTA Visit #: 1 / 5     Subjective     Patient reports: Parisa reports slightly improved symptoms in her low back since last session but having increased anterior thigh pain recently.   She was compliant with home exercise program.  Response to previous treatment: appropriate muscle response  Functional change: ongoing    Pain: 4/10, currently  Location: bilateral back, buttocks, lower legs, and upper legs   Patients goals: Improve walking    Objective      Objective Measures updated at progress report unless specified.     Treatment     Parisa received the treatments listed below:      manual therapy techniques: Joint mobilizations and Manual traction were applied to the: lumbar/thoracic spine for 5 minutes, including:   Long axis distraction    neuromuscular re-education activities: to improve Balance, Coordination, Kinesthetic, Sense, Proprioception, Posture, and Motor Control for 12 minutes, including:   Patient Education:  - activity modification  - plan of care  - home exercise program  - and functional anatomy    Standing hip abduction 2 x 10 bilateral x 2  rounds  Standing hip extension 2 x 10 bilateral x 2 rounds  Supine sciatic nerve glides x 20 reps     therapeutic activities: to improve functional performance for 41 minutes, including:   Reassessment performed  Nustep/Recumbent Bike for 10 minutes/1,000K steps at Lvl 3 for endogenous release of opioids to improve tolerance to ADL's  Sit to stands 5 x 5 x 2 rounds - moderate to maximal cueing on maintaining neutral lumbar spine  Lateral band walks with Green TB; 5 laps at 1/2 wall (16 feet)  Standing prone press ups (against wall); 2 x 10 reps with 3 sec hold  Standing TA with ball press downs 3 second x 20 reps    Next visit:  Standing TA with ball press + marches x 20 reps  Paloff Press GTB 20 reps bilateral    Patient Education and Home Exercises       Education provided:   - Activity modification  - Plan of care  - Home exercise program  - Functional Anatomy    Written Home Exercises Provided: Patient instructed to cont prior HEP. Exercises were reviewed and Parisa was able to demonstrate them prior to the end of the session.  Parisa demonstrated good  understanding of the education provided. See Electronic Medical Record under Patient Instructions for exercises provided during therapy sessions    Assessment     Slight improvement in subjective complaints since previous session regarding her low back symptoms but having anterior thigh pain but unable to determine nature of symptoms. Reassessment performed regarding anterior thigh pain and complaints are consistent with hip flexor strain due to concordant pain with hip flexor manual muscle testing and straight leg raise; therefore, possible hip and lumbar pathology occurring concurrently. Will perform reassessment in upcoming visits and progress/modify exercise interventions accordingly.     Parisa Is progressing well towards her goals.   Patient prognosis is Good.     Patient will continue to benefit from skilled outpatient physical therapy to address the deficits  listed in the problem list box on initial evaluation, provide pt/family education and to maximize pt's level of independence in the home and community environment.     Patient's spiritual, cultural and educational needs considered and pt agreeable to plan of care and goals.     Anticipated Barriers for therapy: chronicity of symptoms     Goals:  Short Term Goals: 4 weeks (Ongoing, Not Met)  1. Patient will reduce maximal pain rating to < 3/10 pain to facilitate ability to sleep through the night and recover from PT interventions.  2. Patient will be able to stand/ambulate for at least 10 minutes with < 3/10 pain to improve standing/walking tolerance.   3. Patient will be able to lift at least 5-10# with < 3/10 pain to improve lifting mechanics for work related tasks.     Long Term Goals: 8-10 weeks (Ongoing, Not Met)  1. Patient will improve 2 minute walk test by at least 40 feet indicating a clinically significant change in function.   2. Patient will demonstrate > 4/5 lower quarter strength to facilitate transfers from sit to stand from various surfaces without restriction.  3. Patient will improve 30 second sit to stand to at least 10x for improvement with transfer tasks.    4. Patient will improve FOTO intake score to at least 66 indicating a clinically significant change in function.    Plan     Plan of Care Certification: 11/27/2023 to 1/31/2024.     Outpatient Physical Therapy 2 times weekly for 8-10 weeks to include the following interventions: Cervical/Lumbar Traction, Gait Training, Manual Therapy, Moist Heat/ Ice, Neuromuscular Re-ed, Patient Education, Self Care, Therapeutic Activities, and Therapeutic Exercise.      Emilia Olivera, PT, DPT

## 2024-01-09 ENCOUNTER — CLINICAL SUPPORT (OUTPATIENT)
Dept: REHABILITATION | Facility: HOSPITAL | Age: 69
End: 2024-01-09
Payer: MEDICARE

## 2024-01-09 DIAGNOSIS — R29.898 DECREASED STRENGTH OF LOWER EXTREMITY: Primary | ICD-10-CM

## 2024-01-09 DIAGNOSIS — Z74.09 IMPAIRED FUNCTIONAL MOBILITY, BALANCE, GAIT, AND ENDURANCE: ICD-10-CM

## 2024-01-09 PROCEDURE — 97530 THERAPEUTIC ACTIVITIES: CPT

## 2024-01-09 NOTE — PROGRESS NOTES
"[facility]   Physical Therapy Treatment Note      Name: Parisa Dimas  Clinic Number: [id number]    Therapy Diagnosis:   Encounter Diagnoses   Name Primary?    Decreased strength of lower extremity Yes    Impaired functional mobility, balance, gait, and endurance      Physician: Rose Mary Mabry NP    Visit Date: 1/4/2024    Physician Orders: PT Eval and Treat low back right leg  Medical Diagnosis from Referral: M54.41 (ICD-10-CM) - Lumbago with sciatica, right side G89.29 (ICD-10-CM) - Other chronic pain  Evaluation Date: 11/27/2023  Authorization Period Expiration: 1/12/2024  Plan of Care Expiration: 1/31/2024  Progress Note Due: 10th visit  Date of Surgery: NA  Visit # / Visits authorized: 2 / 20  Episode #: 11  FOTO: 1/3     Precautions: Standard      Time In: 9:46 AM  Time Out: 10:44 AM  Total Billable Time: 58 minutes     PTA Visit #: 1 / 5     Subjective     Patient reports: "My low back has not been bothering as much and I was able to sleep through the night last night as compared to previous nights"  She was compliant with home exercise program.  Response to previous treatment: appropriate muscle response  Functional change: ongoing    Pain: 4/10, currently  Location: bilateral back, buttocks, lower legs, and upper legs   Patients goals: Improve walking    Objective      Objective Measures updated at progress report unless specified.     Treatment     Parisa received the treatments listed below:      manual therapy techniques: Joint mobilizations and Manual traction were applied to the: lumbar/thoracic spine for 00 minutes, including:     neuromuscular re-education activities: to improve Balance, Coordination, Kinesthetic, Sense, Proprioception, Posture, and Motor Control for 12 minutes, including:   Patient Education:  - activity modification  - plan of care  - home exercise program  - and functional anatomy    Standing hip abduction 2 x 10 bilateral x 2 rounds  Standing hip " extension 2 x 10 bilateral x 2 rounds  Supine sciatic nerve glides x 20 reps     therapeutic activities: to improve functional performance for 46 minutes, including:   Nustep/Recumbent Bike for 10 minutes/1,000K steps at Lvl 3 for endogenous release of opioids to improve tolerance to ADL's  Sit to stands 5 x 5 x 2 rounds - moderate to maximal cueing on maintaining neutral lumbar spine  Lateral band walks with Green TB; 5 laps at 1/2 wall (16 feet)  Standing prone press ups (against wall); 2 x 10 reps with 3 sec hold  Standing TA with ball press downs 3 second x 20 reps  Standing TA with ball press + marches x 20 reps  Paloff Press GTB 20 reps bilateral    Patient Education and Home Exercises       Education provided:   - Activity modification  - Plan of care  - Home exercise program  - Functional Anatomy    Written Home Exercises Provided: Patient instructed to cont prior HEP. Exercises were reviewed and Parisa was able to demonstrate them prior to the end of the session.  Parisa demonstrated good  understanding of the education provided. See Electronic Medical Record under Patient Instructions for exercises provided during therapy sessions    Assessment     Continued improvement with symptoms this sessions with emphasis on proximal hip strengthening and extension bias lumbar exercises which has provided a positive response of symptoms. Will progress with resistance training next session and focus on progressive loading for hip flexor strengthening. Will perform reassessment in upcoming visits and progress/modify exercise interventions accordingly.     Parisa Is progressing well towards her goals.   Patient prognosis is Good.     Patient will continue to benefit from skilled outpatient physical therapy to address the deficits listed in the problem list box on initial evaluation, provide pt/family education and to maximize pt's level of independence in the home and community environment.     Patient's spiritual,  cultural and educational needs considered and pt agreeable to plan of care and goals.     Anticipated Barriers for therapy: chronicity of symptoms     Goals:  Short Term Goals: 4 weeks (Ongoing, Not Met)  1. Patient will reduce maximal pain rating to < 3/10 pain to facilitate ability to sleep through the night and recover from PT interventions.  2. Patient will be able to stand/ambulate for at least 10 minutes with < 3/10 pain to improve standing/walking tolerance.   3. Patient will be able to lift at least 5-10# with < 3/10 pain to improve lifting mechanics for work related tasks.     Long Term Goals: 8-10 weeks (Ongoing, Not Met)  1. Patient will improve 2 minute walk test by at least 40 feet indicating a clinically significant change in function.   2. Patient will demonstrate > 4/5 lower quarter strength to facilitate transfers from sit to stand from various surfaces without restriction.  3. Patient will improve 30 second sit to stand to at least 10x for improvement with transfer tasks.    4. Patient will improve FOTO intake score to at least 66 indicating a clinically significant change in function.    Plan     Plan of Care Certification: 11/27/2023 to 1/31/2024.     Outpatient Physical Therapy 2 times weekly for 8-10 weeks to include the following interventions: Cervical/Lumbar Traction, Gait Training, Manual Therapy, Moist Heat/ Ice, Neuromuscular Re-ed, Patient Education, Self Care, Therapeutic Activities, and Therapeutic Exercise.      Emilia Olivera, PT, DPT

## 2024-01-09 NOTE — PROGRESS NOTES
[facility]   Physical Therapy Treatment Note      Name: Parisa Dimas  [facility] Number: [id number]    Therapy Diagnosis:   Encounter Diagnoses   Name Primary?    Decreased strength of lower extremity Yes    Impaired functional mobility, balance, gait, and endurance      Physician: Rose Mary Mabry NP    Visit Date: 1/9/2024    Physician Orders: PT Eval and Treat low back right leg  Medical Diagnosis from Referral: M54.41 (ICD-10-CM) - Lumbago with sciatica, right side G89.29 (ICD-10-CM) - Other chronic pain  Evaluation Date: 11/27/2023  Authorization Period Expiration: 1/12/2024  Plan of Care Expiration: 1/31/2024  Progress Note Due: 10th visit  Date of Surgery: NA  Visit # / Visits authorized: 2 / 20  Episode #: 11  FOTO: 1/3     Precautions: Standard      Time In: 9:55 AM  Time Out: 10:50 AM  Total Billable Time: 54 minutes     PTA Visit #: 1 / 5     Subjective     Patient reports: Back has been feeling better but starting to have pain in the front of the leg.   She was compliant with home exercise program.  Response to previous treatment: appropriate muscle response  Functional change: ongoing    Pain: 4/10, currently  Location: bilateral back, buttocks, lower legs, and upper legs   Patients goals: Improve walking    Objective      Objective Measures updated at progress report unless specified.     Treatment     Parisa received the treatments listed below:      manual therapy techniques: Joint mobilizations and Manual traction were applied to the: lumbar/thoracic spine for 00 minutes, including:     neuromuscular re-education activities: to improve Balance, Coordination, Kinesthetic, Sense, Proprioception, Posture, and Motor Control for 8 minutes, including:   Patient Education:  - activity modification  - plan of care  - home exercise program  - and functional anatomy    Standing hip abduction 2 x 10 bilateral x 2 rounds  Standing hip extension 2 x 10 bilateral x 2  rounds    therapeutic activities: to improve functional performance for 46 minutes, including:   Nustep/Recumbent Bike for 10 minutes/1,000K steps at Lvl 3 for endogenous release of opioids to improve tolerance to ADL's  Lateral band walks with Green TB; 5 laps at 1/2 wall (16 feet)  Standing prone press ups (against wall); 2 x 10 reps with 3 sec hold  Standing TA with ball press downs 3 second x 20 reps  Standing TA with ball press + marches x 20 reps  Paloff Press GTB 20 reps bilateral  Hurdles forward/lateral (medium height) 3 x 10 each direction bilateral     Patient Education and Home Exercises       Education provided:   - Activity modification  - Plan of care  - Home exercise program  - Functional Anatomy    Written Home Exercises Provided: Patient instructed to cont prior HEP. Exercises were reviewed and Parisa was able to demonstrate them prior to the end of the session.  Parisa demonstrated good  understanding of the education provided. See Electronic Medical Record under Patient Instructions for exercises provided during therapy sessions    Assessment     Continued with current interventions and progressed with load, intensity, and frequency. Per assessment of exercises, patient demonstrating hip flexor strain and low back symptoms concurrently. Will perform reassessment in upcoming visits and progress/modify exercise interventions accordingly.     Parisa Is progressing well towards her goals.   Patient prognosis is Good.     Patient will continue to benefit from skilled outpatient physical therapy to address the deficits listed in the problem list box on initial evaluation, provide pt/family education and to maximize pt's level of independence in the home and community environment.     Patient's spiritual, cultural and educational needs considered and pt agreeable to plan of care and goals.     Anticipated Barriers for therapy: chronicity of symptoms     Goals:  Short Term Goals: 4 weeks (Ongoing, Not  Met)  1. Patient will reduce maximal pain rating to < 3/10 pain to facilitate ability to sleep through the night and recover from PT interventions.  2. Patient will be able to stand/ambulate for at least 10 minutes with < 3/10 pain to improve standing/walking tolerance.   3. Patient will be able to lift at least 5-10# with < 3/10 pain to improve lifting mechanics for work related tasks.     Long Term Goals: 8-10 weeks (Ongoing, Not Met)  1. Patient will improve 2 minute walk test by at least 40 feet indicating a clinically significant change in function.   2. Patient will demonstrate > 4/5 lower quarter strength to facilitate transfers from sit to stand from various surfaces without restriction.  3. Patient will improve 30 second sit to stand to at least 10x for improvement with transfer tasks.    4. Patient will improve FOTO intake score to at least 66 indicating a clinically significant change in function.    Plan     Plan of Care Certification: 11/27/2023 to 1/31/2024.     Outpatient Physical Therapy 2 times weekly for 8-10 weeks to include the following interventions: Cervical/Lumbar Traction, Gait Training, Manual Therapy, Moist Heat/ Ice, Neuromuscular Re-ed, Patient Education, Self Care, Therapeutic Activities, and Therapeutic Exercise.      Emilia Olivera, PT, DPT

## 2024-01-10 ENCOUNTER — OFFICE VISIT (OUTPATIENT)
Dept: OBSTETRICS AND GYNECOLOGY | Facility: CLINIC | Age: 69
End: 2024-01-10
Payer: MEDICARE

## 2024-01-10 ENCOUNTER — HOSPITAL ENCOUNTER (OUTPATIENT)
Dept: CARDIOLOGY | Facility: HOSPITAL | Age: 69
Discharge: HOME | End: 2024-01-10
Attending: PHYSICIAN ASSISTANT
Payer: MEDICARE

## 2024-01-10 VITALS
HEART RATE: 84 BPM | WEIGHT: 205 LBS | BODY MASS INDEX: 36.32 KG/M2 | DIASTOLIC BLOOD PRESSURE: 78 MMHG | HEIGHT: 63 IN | SYSTOLIC BLOOD PRESSURE: 178 MMHG

## 2024-01-10 VITALS
WEIGHT: 205 LBS | HEIGHT: 63 IN | SYSTOLIC BLOOD PRESSURE: 143 MMHG | DIASTOLIC BLOOD PRESSURE: 84 MMHG | BODY MASS INDEX: 36.32 KG/M2

## 2024-01-10 DIAGNOSIS — Q24.4 SUBAORTIC MEMBRANE: ICD-10-CM

## 2024-01-10 DIAGNOSIS — I42.1 HOCM (HYPERTROPHIC OBSTRUCTIVE CARDIOMYOPATHY): ICD-10-CM

## 2024-01-10 DIAGNOSIS — Z01.419 WELL WOMAN EXAM: ICD-10-CM

## 2024-01-10 DIAGNOSIS — Z12.4 ROUTINE CERVICAL SMEAR: Primary | ICD-10-CM

## 2024-01-10 LAB
ASCENDING AORTA: 3.28 CM
AV MEAN GRADIENT: 14 MMHG
AV PEAK GRADIENT: 24 MMHG
BSA FOR ECHO PROCEDURE: 2.03 M2
CV ECHO LV RWT: 0.49 CM
DOP CALC AO PEAK VEL: 2.46 M/S
DOP CALC AO VTI: 50.48 CM
DOP CALC LVOT AREA: 3.3 CM2
DOP CALC LVOT DIAMETER: 2.06 CM
E WAVE DECELERATION TIME: 224.01 MSEC
E/A RATIO: 0.99
E/E' RATIO: 10.5 M/S
ECHO LV POSTERIOR WALL: 0.95 CM (ref 0.6–1.1)
EJECTION FRACTION: 73 %
FRACTIONAL SHORTENING: 44 % (ref 28–44)
INTERVENTRICULAR SEPTUM: 0.88 CM (ref 0.6–1.1)
LA MAJOR: 5.25 CM
LA MINOR: 5.11 CM
LA WIDTH: 3.46 CM
LEFT ATRIUM SIZE: 3.08 CM
LEFT ATRIUM VOLUME INDEX MOD: 27.8 ML/M2
LEFT ATRIUM VOLUME INDEX: 24.1 ML/M2
LEFT ATRIUM VOLUME MOD: 54.27 CM3
LEFT ATRIUM VOLUME: 46.91 CM3
LEFT INTERNAL DIMENSION IN SYSTOLE: 2.17 CM (ref 2.1–4)
LEFT VENTRICLE DIASTOLIC VOLUME INDEX: 33.78 ML/M2
LEFT VENTRICLE DIASTOLIC VOLUME: 65.88 ML
LEFT VENTRICLE MASS INDEX: 55 G/M2
LEFT VENTRICLE SYSTOLIC VOLUME INDEX: 8 ML/M2
LEFT VENTRICLE SYSTOLIC VOLUME: 15.58 ML
LEFT VENTRICULAR INTERNAL DIMENSION IN DIASTOLE: 3.9 CM (ref 3.5–6)
LEFT VENTRICULAR MASS: 107.77 G
LV LATERAL E/E' RATIO: 9.33 M/S
LV SEPTAL E/E' RATIO: 12 M/S
MV A" WAVE DURATION": 7.99 MSEC
MV PEAK A VEL: 0.85 M/S
MV PEAK E VEL: 0.84 M/S
MV STENOSIS PRESSURE HALF TIME: 64.96 MS
MV VALVE AREA P 1/2 METHOD: 3.39 CM2
PISA TR MAX VEL: 2.01 M/S
PULM VEIN S/D RATIO: 1.21
PV PEAK D VEL: 0.43 M/S
PV PEAK S VEL: 0.52 M/S
RA MAJOR: 4.3 CM
RA PRESSURE ESTIMATED: 3 MMHG
RA WIDTH: 3.16 CM
RIGHT VENTRICULAR END-DIASTOLIC DIMENSION: 3.15 CM
RV TB RVSP: 5 MMHG
SINUS: 3.1 CM
STJ: 2.57 CM
TDI LATERAL: 0.09 M/S
TDI SEPTAL: 0.07 M/S
TDI: 0.08 M/S
TR MAX PG: 16 MMHG
TRICUSPID ANNULAR PLANE SYSTOLIC EXCURSION: 2.43 CM
TV REST PULMONARY ARTERY PRESSURE: 19 MMHG
Z-SCORE OF LEFT VENTRICULAR DIMENSION IN END DIASTOLE: -3.55
Z-SCORE OF LEFT VENTRICULAR DIMENSION IN END SYSTOLE: -3.6

## 2024-01-10 PROCEDURE — 87624 HPV HI-RISK TYP POOLED RSLT: CPT | Performed by: OBSTETRICS & GYNECOLOGY

## 2024-01-10 PROCEDURE — 93306 TTE W/DOPPLER COMPLETE: CPT

## 2024-01-10 PROCEDURE — 99999 PR PBB SHADOW E&M-EST. PATIENT-LVL IV: CPT | Mod: PBBFAC,,, | Performed by: OBSTETRICS & GYNECOLOGY

## 2024-01-10 PROCEDURE — 88175 CYTOPATH C/V AUTO FLUID REDO: CPT | Performed by: OBSTETRICS & GYNECOLOGY

## 2024-01-10 PROCEDURE — 93306 TTE W/DOPPLER COMPLETE: CPT | Mod: 26,,, | Performed by: INTERNAL MEDICINE

## 2024-01-10 PROCEDURE — G0101 CA SCREEN;PELVIC/BREAST EXAM: HCPCS | Mod: S$GLB,,, | Performed by: OBSTETRICS & GYNECOLOGY

## 2024-01-10 NOTE — PROGRESS NOTES
"  OBSTETRIC HISTORY:   OB History          4    Para   4    Term   4            AB        Living   4         SAB        IAB        Ectopic        Multiple        Live Births   4                   COMPREHENSIVE GYN HISTORY:  PAP History: Denies abnormal Paps.  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 Denies PID.  Benign History: Denies uterine fibroids. Denies ovarian cysts. Denies endometriosis.   Cancer History: Denies cervical cancer. Denies uterine cancer or hyperplasia. Denies ovarian cancer. Denies vulvar cancer or pre-cancer. Denies vaginal cancer or pre-cancer. Denies breast cancer. Denies colon cancer.  Sexual Activity History:   reports that she currently engages in sexual activity and has had male partners.       HPI:   68 y.o.  Patient's last menstrual period was 2003.   Patient is  here for Medicare exam and has a personal history of HPV.  She has no GYN complaints. She denies bladder, breast and bowel complaints.    ROS:  GENERAL: No weight gain or weight loss.   CHEST: No shortness of breath.   ABDOMEN: No abdominal pain, constipation, diarrhea, nausea, vomiting or rectal bleeding.   URINARY: No frequency, dysuria, hematuria, or burning on urination.  REPRODUCTIVE: See HPI.   BREASTS: No breast pain, lumps, or nipple discharge.   PSYCHIATRIC: No depression or anxiety.       PE:   BP (!) 143/84   Ht 5' 3" (1.6 m)   Wt 93 kg (205 lb 0.4 oz)   LMP 2003   BMI 36.32 kg/m²   APPEARANCE: Well nourished, well developed, in no acute distress.  NECK: Neck symmetric without  thyromegaly.  NODES: No inguinal, cervical lymph node enlargement.  CHEST: Lungs clear to auscultation.  HEART: Regular rate and rhythm, no murmurs, rubs or gallops.  ABDOMEN: Soft. No tenderness or masses. No hernias.  BREASTS: Symmetrical, no skin changes or visible lesions. No palpable masses, nipple discharge or adenopathy bilaterally.  PELVIC:   VULVA: No lesions. Normal female genitalia.  URETHRAL MEATUS: " Normal size and location, no lesions, no prolapse.  URETHRA: No masses, tenderness, prolapse or scarring.  VAGINA: Atrophic and not well rugated, no discharge, no significant cystocele or rectocele.  CERVIX: No lesions and discharge.   UTERUS: Normal size, regular shape, mobile, non-tender, bladder base nontender.  ADNEXA: No masses or tenderness.      PROCEDURES:  Pap smear -- Hx of HPV  HRHPV        Assessment/Plan:  Medicare pelvic and breast exam    As of April 1, 2021, the Cures Act has been passed nationally. This new law requires that all doctors progress notes, lab results, pathology reports and radiology reports be released IMMEDIATELY to the patient in the patient portal. That means that the results are released to you at the EXACT same time they are released to me. Therefore, with all of the patients that I have I am not able to reply to each patient exactly when the results come in. So there will be a delay from when you see the results to when I see them and have time to come up with a response to send you. Also I only see these results when I am on the computer at work. So if the results come in over the weekend or after 5 pm of a work day, I will not see them until the next business day. As you can tell, this is a challenge as a physician to give every patient the quick response they hope for and deserve. So please be patient!     Thanks for understanding,

## 2024-01-11 ENCOUNTER — TELEPHONE (OUTPATIENT)
Dept: CARDIOLOGY | Facility: HOSPITAL | Age: 69
End: 2024-01-11
Payer: MEDICARE

## 2024-01-11 DIAGNOSIS — I10 PRIMARY HYPERTENSION: Primary | ICD-10-CM

## 2024-01-11 RX ORDER — CHLORTHALIDONE 25 MG/1
25 TABLET ORAL DAILY
Qty: 30 TABLET | Refills: 11 | Status: SHIPPED | OUTPATIENT
Start: 2024-01-11 | End: 2024-02-05 | Stop reason: SDUPTHER

## 2024-01-11 NOTE — TELEPHONE ENCOUNTER
Called patient to discuss echocardiogram results. The images were reviewed with Dr. Kemp. She does not have evidence of HOCM. 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 She is asymptomatic so no change in management needed at this point. Will have her follow up in clinic in one year with Dr. Kemp.     Additionally, she has been checking her BP every day for the past week at home and BP is above goal. Will switch HCTZ to Chlorthalidone and check BMP in two weeks.

## 2024-01-17 LAB
FINAL PATHOLOGIC DIAGNOSIS: NORMAL
HPV HR 12 DNA SPEC QL NAA+PROBE: NEGATIVE
HPV16 AG SPEC QL: NEGATIVE
HPV18 DNA SPEC QL NAA+PROBE: NEGATIVE
Lab: NORMAL

## 2024-01-19 ENCOUNTER — CLINICAL SUPPORT (OUTPATIENT)
Dept: REHABILITATION | Facility: HOSPITAL | Age: 69
End: 2024-01-19
Payer: MEDICARE

## 2024-01-19 DIAGNOSIS — R29.898 DECREASED STRENGTH OF LOWER EXTREMITY: Primary | ICD-10-CM

## 2024-01-19 DIAGNOSIS — Z74.09 IMPAIRED FUNCTIONAL MOBILITY, BALANCE, GAIT, AND ENDURANCE: ICD-10-CM

## 2024-01-19 PROCEDURE — 97530 THERAPEUTIC ACTIVITIES: CPT | Mod: CQ

## 2024-01-19 NOTE — PROGRESS NOTES
[facility]   Physical Therapy Treatment Note      Name: Parisa Dimas  Clinic Number: [id number]    Therapy Diagnosis:   Encounter Diagnoses   Name Primary?    Decreased strength of lower extremity Yes    Impaired functional mobility, balance, gait, and endurance      Physician: Rose Mary Mabry NP    Visit Date: 1/19/2024    Physician Orders: PT Eval and Treat low back right leg  Medical Diagnosis from Referral: M54.41 (ICD-10-CM) - Lumbago with sciatica, right side G89.29 (ICD-10-CM) - Other chronic pain  Evaluation Date: 11/27/2023  Authorization Period Expiration: 12/31/2024  Plan of Care Expiration: 1/31/2024  Progress Note Due: 10th visit  Date of Surgery: NA  Visit # / Visits authorized: 4 / 20  Episode Visit: 13  FOTO: 1/3     Precautions: Standard      Time In: 0957  Time Out: 1058  Total Billable Time: 61 minutes (4 TA)    PTA Visit #: 1 / 5     Subjective     Patient reports: she has not had any problems over the last week. States she went to [employer] this morning and the pain caught her down the leg. She missed last appointment because she wasn't feeling good. She denies pain at the moment.   She was compliant with home exercise program.  Response to previous treatment: appropriate muscle response  Functional change: ongoing    Pain: 0/10, currently  Location: bilateral back, buttocks, lower legs, and upper legs   Patients goals: Improve walking    Objective      Objective Measures updated at progress report unless specified.     Treatment     Parisa received the treatments listed below:      manual therapy techniques: Joint mobilizations and Manual traction were applied to the: lumbar/thoracic spine for 00 minutes, including:     neuromuscular re-education activities: to improve Balance, Coordination, Kinesthetic, Sense, Proprioception, Posture, and Motor Control for 8 minutes, including:   Patient Education:  - activity modification  - plan of care  - home exercise  program  - and functional anatomy    Standing hip abduction 2 x 10 bilateral x 2 rounds  Standing hip extension 2 x 10 bilateral x 2 rounds    therapeutic activities: to improve functional performance for 53 minutes, including:   Nustep/Recumbent Bike for 10 minutes/1,000K steps at Lvl 3 for endogenous release of opioids to improve tolerance to ADL's  Lateral band walks with Green TB; 5 laps at 1/2 wall (16 feet)  Standing prone press ups (against wall); 2 x 10 reps with 3 sec hold  Standing TA with ball press downs 3 second x 20 reps  Standing TA with ball press + marches x 20 reps  Paloff Press + lift; Green TB, 3 x 10 reps Bilateral  Hurdles forward/lateral (medium height) 3 x 10 each direction bilateral   Sit to stands in std chair; 2 x 10 reps with cueing for core and glute activation     Patient Education and Home Exercises       Education provided:   - Activity modification  - Plan of care  - Home exercise program  - Functional Anatomy    Written Home Exercises Provided: Patient instructed to cont prior HEP. Exercises were reviewed and Parisa was able to demonstrate them prior to the end of the session.  Parisa demonstrated good  understanding of the education provided. See Electronic Medical Record under Patient Instructions for exercises provided during therapy sessions    Assessment     Added sit to stands with OH press today, patient reported exacerbation of pain into Right LE. Encouraged emphasis on glute and core activation which improved her symptoms. Exercise completed without overhead press today. Good tolerance to therapeutic interventions noting adequate muscle response throughout. Challenged with lateral and forward mally step taps. Will continue per POC towards treatment goals.  Parisa Is progressing well towards her goals.   Patient prognosis is Good.     Patient will continue to benefit from skilled outpatient physical therapy to address the deficits listed in the problem list box on initial  evaluation, provide pt/family education and to maximize pt's level of independence in the home and community environment.     Patient's spiritual, cultural and educational needs considered and pt agreeable to plan of care and goals.     Anticipated Barriers for therapy: chronicity of symptoms     Goals:  Short Term Goals: 4 weeks (Ongoing, Not Met)  1. Patient will reduce maximal pain rating to < 3/10 pain to facilitate ability to sleep through the night and recover from PT interventions.  2. Patient will be able to stand/ambulate for at least 10 minutes with < 3/10 pain to improve standing/walking tolerance.   3. Patient will be able to lift at least 5-10# with < 3/10 pain to improve lifting mechanics for work related tasks.     Long Term Goals: 8-10 weeks (Ongoing, Not Met)  1. Patient will improve 2 minute walk test by at least 40 feet indicating a clinically significant change in function.   2. Patient will demonstrate > 4/5 lower quarter strength to facilitate transfers from sit to stand from various surfaces without restriction.  3. Patient will improve 30 second sit to stand to at least 10x for improvement with transfer tasks.    4. Patient will improve FOTO intake score to at least 66 indicating a clinically significant change in function.    Plan     Plan of Care Certification: 11/27/2023 to 1/31/2024.     Outpatient Physical Therapy 2 times weekly for 8-10 weeks to include the following interventions: Cervical/Lumbar Traction, Gait Training, Manual Therapy, Moist Heat/ Ice, Neuromuscular Re-ed, Patient Education, Self Care, Therapeutic Activities, and Therapeutic Exercise.      Neisha Adams, PTA

## 2024-01-23 ENCOUNTER — CLINICAL SUPPORT (OUTPATIENT)
Dept: REHABILITATION | Facility: HOSPITAL | Age: 69
End: 2024-01-23
Payer: MEDICARE

## 2024-01-23 DIAGNOSIS — R29.898 DECREASED STRENGTH OF LOWER EXTREMITY: Primary | ICD-10-CM

## 2024-01-23 DIAGNOSIS — Z74.09 IMPAIRED FUNCTIONAL MOBILITY, BALANCE, GAIT, AND ENDURANCE: ICD-10-CM

## 2024-01-23 PROCEDURE — 97530 THERAPEUTIC ACTIVITIES: CPT

## 2024-01-23 NOTE — PROGRESS NOTES
"[facility]   Physical Therapy Treatment Note      Name: Parisa Dimas  Clinic Number: [id number]    Therapy Diagnosis:   Encounter Diagnoses   Name Primary?    Decreased strength of lower extremity Yes    Impaired functional mobility, balance, gait, and endurance      Physician: Rose Mary Mabry NP    Visit Date: 1/23/2024    Physician Orders: PT Eval and Treat low back right leg  Medical Diagnosis from Referral: M54.41 (ICD-10-CM) - Lumbago with sciatica, right side G89.29 (ICD-10-CM) - Other chronic pain  Evaluation Date: 11/27/2023  Authorization Period Expiration: 12/31/2024  Plan of Care Expiration: 1/31/2024  Progress Note Due: 10th visit  Date of Surgery: NA  Visit # / Visits authorized: 5 / 20  Episode Visit: 14  FOTO: 1/3     Precautions: Standard      Time In: 11:00 AM  Time Out: 11:56 AM  Total Billable Time: 30 minutes    PTA Visit #: 1 / 5     Subjective     Patient reports: She reports that she went one week without any pain and then it started back up again. She reports that she had a few rough days since her last session but it is feeling okay today and just a slight "twinge".    She was compliant with home exercise program.  Response to previous treatment: appropriate muscle response  Functional change: ongoing    Pain: 0/10, currently  Location: bilateral back, buttocks, lower legs, and upper legs   Patients goals: Improve walking    Objective      Objective Measures updated at progress report unless specified.     Treatment     Parisa received the treatments listed below:      manual therapy techniques: Joint mobilizations and Manual traction were applied to the: lumbar/thoracic spine for 00 minutes, including:     neuromuscular re-education activities: to improve Balance, Coordination, Kinesthetic, Sense, Proprioception, Posture, and Motor Control for 8 minutes, including:   Patient Education:  - activity modification  - plan of care  - home exercise program  - and " functional anatomy    Standing hip abduction 2 x 10 bilateral x 2 rounds  Standing hip extension 2 x 10 bilateral x 2 rounds    therapeutic activities: to improve functional performance for 48 minutes, including:   Nustep/Recumbent Bike for 10 minutes/1,000K steps at Lvl 3 for endogenous release of opioids to improve tolerance to ADL's  Lateral band walks with Green TB; 5 laps at 1/2 wall (16 feet)  Standing prone press ups (against wall); 2 x 10 reps with 3 sec hold  Standing TA with ball press downs 3 second x 20 reps  Standing TA with ball press + marches x 20 reps  Paloff Press + lift; Green TB, 3 x 10 reps Bilateral  Hurdles forward/lateral (medium height) 3 x 10 each direction bilateral   Sit to stands in std chair; 2 x 10 reps with cueing for core and glute activation     Patient Education and Home Exercises       Education provided:   - Activity modification  - Plan of care  - Home exercise program  - Functional Anatomy    Written Home Exercises Provided: Patient instructed to cont prior HEP. Exercises were reviewed and Parisa was able to demonstrate them prior to the end of the session.  Parisa demonstrated good  understanding of the education provided. See Electronic Medical Record under Patient Instructions for exercises provided during therapy sessions    Assessment     Continued with current interventions addressing proximal hip strengthening and abdominal core motor control while maintaining extension bias preference. Good tolerance to intensity, load, and frequency but fatigue noted post session. Adjusted plan of care to 1x/week for increased independence with home exercise program and/or gym with therapy sessions. Will reassess in upcoming visits.     Parisa Is progressing well towards her goals.   Patient prognosis is Good.     Patient will continue to benefit from skilled outpatient physical therapy to address the deficits listed in the problem list box on initial evaluation, provide pt/family  education and to maximize pt's level of independence in the home and community environment.     Patient's spiritual, cultural and educational needs considered and pt agreeable to plan of care and goals.     Anticipated Barriers for therapy: chronicity of symptoms     Goals:  Short Term Goals: 4 weeks (Ongoing, Not Met)  1. Patient will reduce maximal pain rating to < 3/10 pain to facilitate ability to sleep through the night and recover from PT interventions.  2. Patient will be able to stand/ambulate for at least 10 minutes with < 3/10 pain to improve standing/walking tolerance.   3. Patient will be able to lift at least 5-10# with < 3/10 pain to improve lifting mechanics for work related tasks.     Long Term Goals: 8-10 weeks (Ongoing, Not Met)  1. Patient will improve 2 minute walk test by at least 40 feet indicating a clinically significant change in function.   2. Patient will demonstrate > 4/5 lower quarter strength to facilitate transfers from sit to stand from various surfaces without restriction.  3. Patient will improve 30 second sit to stand to at least 10x for improvement with transfer tasks.    4. Patient will improve FOTO intake score to at least 66 indicating a clinically significant change in function.    Plan     Plan of Care Certification: 11/27/2023 to 2/29/2024.     Outpatient Physical Therapy 1 times weekly for 4 weeks to include the following interventions: Cervical/Lumbar Traction, Gait Training, Manual Therapy, Moist Heat/ Ice, Neuromuscular Re-ed, Patient Education, Self Care, Therapeutic Activities, and Therapeutic Exercise.      Emilia Olivera, PT, DPT

## 2024-01-31 ENCOUNTER — CLINICAL SUPPORT (OUTPATIENT)
Dept: REHABILITATION | Facility: HOSPITAL | Age: 69
End: 2024-01-31
Payer: MEDICARE

## 2024-01-31 DIAGNOSIS — R29.898 DECREASED STRENGTH OF LOWER EXTREMITY: Primary | ICD-10-CM

## 2024-01-31 DIAGNOSIS — Z74.09 IMPAIRED FUNCTIONAL MOBILITY, BALANCE, GAIT, AND ENDURANCE: ICD-10-CM

## 2024-01-31 PROCEDURE — 97530 THERAPEUTIC ACTIVITIES: CPT

## 2024-01-31 NOTE — PROGRESS NOTES
"[facility]   Physical Therapy Treatment Note      Name: Parisa Dimas  Clinic Number: [id number]    Therapy Diagnosis:   Encounter Diagnoses   Name Primary?    Decreased strength of lower extremity Yes    Impaired functional mobility, balance, gait, and endurance      Physician: Rose Mary Mabry NP    Visit Date: 1/31/2024    Physician Orders: PT Eval and Treat low back right leg  Medical Diagnosis from Referral: M54.41 (ICD-10-CM) - Lumbago with sciatica, right side G89.29 (ICD-10-CM) - Other chronic pain  Evaluation Date: 11/27/2023  Authorization Period Expiration: 12/31/2024  Plan of Care Expiration: 1/31/2024  Progress Note Due: 10th visit  Date of Surgery: NA  Visit # / Visits authorized: 6 / 20  Episode Visit: 15  FOTO: 1/3     Precautions: Standard      Time In: 11:00 AM  Time Out: 11:56 AM  Total Billable Time: 30 minutes    PTA Visit #: 1 / 5     Subjective     Patient reports: She reports that she has been feeling pretty good for the last week and only feeling slight "twinges" but overall doing well.    She was compliant with home exercise program.  Response to previous treatment: appropriate muscle response  Functional change: ongoing    Pain: 0/10, currently  Location: bilateral back, buttocks, lower legs, and upper legs   Patients goals: Improve walking    Objective      Objective Measures updated at progress report unless specified.     Treatment     Parisa received the treatments listed below:      manual therapy techniques: Joint mobilizations and Manual traction were applied to the: lumbar/thoracic spine for 00 minutes, including:     neuromuscular re-education activities: to improve Balance, Coordination, Kinesthetic, Sense, Proprioception, Posture, and Motor Control for 8 minutes, including:   Patient Education:  - activity modification  - plan of care  - home exercise program  - and functional anatomy    Standing hip abduction 2 x 10 bilateral x 2 rounds  Standing " hip extension 2 x 10 bilateral x 2 rounds    therapeutic activities: to improve functional performance for 48 minutes, including:   Nustep/Recumbent Bike for 10 minutes/1,000K steps at Lvl 3 for endogenous release of opioids to improve tolerance to ADL's  Lateral band walks with Green TB; 5 laps at 1/2 wall (16 feet)  Standing prone press ups (against wall); 2 x 10 reps with 3 sec hold  Standing TA with ball press downs 3 second x 20 reps  Standing TA with ball press + marches x 20 reps  Paloff Press + lift; Green TB, 3 x 10 reps Bilateral  Hurdles forward/lateral (medium height) 3 x 10 each direction bilateral   Sit to stands in std chair; 2 x 10 reps with cueing for core and glute activation     Patient Education and Home Exercises       Education provided:   - Activity modification  - Plan of care  - Home exercise program  - Functional Anatomy    Written Home Exercises Provided: Patient instructed to cont prior HEP. Exercises were reviewed and Parisa was able to demonstrate them prior to the end of the session.  Parisa demonstrated good  understanding of the education provided. See Electronic Medical Record under Patient Instructions for exercises provided during therapy sessions    Assessment     Parisa presents to the clinic with continuation of improved symptoms only noting mild central low back pain sparingly. Able to give patient an additional exercise and position to assist with symptoms as needed through maintaining mobility while in an unloaded position; patient response to these exercises. Educated patient to continue with daily exercises focusing on abdominal core and proximal hip; patient verbalized understanding. Greatest complaint has been addressed regarding radicular symptoms and this has not returned in over 2.5 weeks. Will continue with remaining physical therapy sessions and begin with discharge planning with home exercise program.     Parisa Is progressing well towards her goals.   Patient  prognosis is Good.     Patient will continue to benefit from skilled outpatient physical therapy to address the deficits listed in the problem list box on initial evaluation, provide pt/family education and to maximize pt's level of independence in the home and community environment.     Patient's spiritual, cultural and educational needs considered and pt agreeable to plan of care and goals.     Anticipated Barriers for therapy: chronicity of symptoms     Goals:  Short Term Goals: 4 weeks (Ongoing, Not Met)  1. Patient will reduce maximal pain rating to < 3/10 pain to facilitate ability to sleep through the night and recover from PT interventions.  2. Patient will be able to stand/ambulate for at least 10 minutes with < 3/10 pain to improve standing/walking tolerance.   3. Patient will be able to lift at least 5-10# with < 3/10 pain to improve lifting mechanics for work related tasks.     Long Term Goals: 8-10 weeks (Ongoing, Not Met)  1. Patient will improve 2 minute walk test by at least 40 feet indicating a clinically significant change in function.   2. Patient will demonstrate > 4/5 lower quarter strength to facilitate transfers from sit to stand from various surfaces without restriction.  3. Patient will improve 30 second sit to stand to at least 10x for improvement with transfer tasks.    4. Patient will improve FOTO intake score to at least 66 indicating a clinically significant change in function.    Plan     Plan of Care Certification: 11/27/2023 to 2/29/2024.     Outpatient Physical Therapy 1 times weekly for 4 weeks to include the following interventions: Cervical/Lumbar Traction, Gait Training, Manual Therapy, Moist Heat/ Ice, Neuromuscular Re-ed, Patient Education, Self Care, Therapeutic Activities, and Therapeutic Exercise.      Emilia Olivera, PT, DPT

## 2024-02-01 ENCOUNTER — PATIENT MESSAGE (OUTPATIENT)
Dept: CARDIOLOGY | Facility: CLINIC | Age: 69
End: 2024-02-01
Payer: MEDICARE

## 2024-02-01 ENCOUNTER — LAB VISIT (OUTPATIENT)
Dept: LAB | Facility: HOSPITAL | Age: 69
End: 2024-02-01
Payer: MEDICARE

## 2024-02-01 DIAGNOSIS — I10 PRIMARY HYPERTENSION: ICD-10-CM

## 2024-02-01 LAB
ANION GAP SERPL CALC-SCNC: 10 MMOL/L (ref 8–16)
BUN SERPL-MCNC: 18 MG/DL (ref 8–23)
CALCIUM SERPL-MCNC: 10.6 MG/DL (ref 8.7–10.5)
CHLORIDE SERPL-SCNC: 100 MMOL/L (ref 95–110)
CO2 SERPL-SCNC: 29 MMOL/L (ref 23–29)
CREAT SERPL-MCNC: 0.8 MG/DL (ref 0.5–1.4)
EST. GFR  (NO RACE VARIABLE): >60 ML/MIN/1.73 M^2
GLUCOSE SERPL-MCNC: 148 MG/DL (ref 70–110)
POTASSIUM SERPL-SCNC: 3.8 MMOL/L (ref 3.5–5.1)
SODIUM SERPL-SCNC: 139 MMOL/L (ref 136–145)

## 2024-02-01 PROCEDURE — 80048 BASIC METABOLIC PNL TOTAL CA: CPT | Performed by: PHYSICIAN ASSISTANT

## 2024-02-01 PROCEDURE — 36415 COLL VENOUS BLD VENIPUNCTURE: CPT | Performed by: PHYSICIAN ASSISTANT

## 2024-02-01 RX ORDER — OMEPRAZOLE 40 MG/1
CAPSULE, DELAYED RELEASE ORAL
Qty: 90 CAPSULE | Refills: 3 | Status: SHIPPED | OUTPATIENT
Start: 2024-02-01

## 2024-02-01 NOTE — TELEPHONE ENCOUNTER
Care Due:                  Date            Visit Type   Department     Provider  --------------------------------------------------------------------------------                                EP -                              PRIMARY      [facility] FAMILY  Last Visit: 10-      CARE ([facility])   MEDICINE       Alex Cast  Next Visit: None Scheduled  None         None Found                                                            Last  Test          Frequency    Reason                     Performed    Due Date  --------------------------------------------------------------------------------    Office Visit  15 months..  lovastatin, metFORMIN,     10-   01-                             omeprazole, valsartan,                             verapamiL................    [employer] Embedded Care Due Messages. Reference number: [id number].   1/31/2024 10:14:06 PM CST

## 2024-02-06 ENCOUNTER — DOCUMENTATION ONLY (OUTPATIENT)
Dept: CARDIOLOGY | Facility: CLINIC | Age: 69
End: 2024-02-06
Payer: MEDICARE

## 2024-02-06 RX ORDER — CHLORTHALIDONE 25 MG/1
25 TABLET ORAL DAILY
Qty: 90 TABLET | Refills: 3 | Status: SHIPPED | OUTPATIENT
Start: 2024-02-06 | End: 2025-02-05

## 2024-02-07 ENCOUNTER — CLINICAL SUPPORT (OUTPATIENT)
Dept: REHABILITATION | Facility: HOSPITAL | Age: 69
End: 2024-02-07
Payer: MEDICARE

## 2024-02-07 DIAGNOSIS — R29.898 DECREASED STRENGTH OF LOWER EXTREMITY: Primary | ICD-10-CM

## 2024-02-07 DIAGNOSIS — Z74.09 IMPAIRED FUNCTIONAL MOBILITY, BALANCE, GAIT, AND ENDURANCE: ICD-10-CM

## 2024-02-07 PROCEDURE — 97530 THERAPEUTIC ACTIVITIES: CPT

## 2024-02-07 NOTE — PROGRESS NOTES
[facility]   Physical Therapy Treatment Note      Name: Parisa Dimas  [facility] Number: [id number]    Therapy Diagnosis:   Encounter Diagnoses   Name Primary?    Decreased strength of lower extremity Yes    Impaired functional mobility, balance, gait, and endurance      Physician: Rose Mary aMbry NP    Visit Date: 2/7/2024    Physician Orders: PT Eval and Treat low back right leg  Medical Diagnosis from Referral: M54.41 (ICD-10-CM) - Lumbago with sciatica, right side G89.29 (ICD-10-CM) - Other chronic pain  Evaluation Date: 11/27/2023  Authorization Period Expiration: 12/31/2024  Plan of Care Expiration: 1/31/2024  Progress Note Due: 10th visit  Date of Surgery: NA  Visit # / Visits authorized: 7 / 20  Episode Visit: 16  FOTO: 1/3     Precautions: Standard      Time In: 11:01 AM  Time Out: 11:58 AM  Total Billable Time: 57 minutes    PTA Visit #: 1 / 5     Subjective     Patient reports: She reports that she has been feeling good all week but was having some pain this morning. She states that she orginially came into physical therapy feeling an 80-90% limitation and now feels like a 0-10% limitation.   She was compliant with home exercise program.  Response to previous treatment: appropriate muscle response  Functional change: Going for weeks without symptoms     Pain: 0/10, currently  Location: bilateral back, buttocks, lower legs, and upper legs   Patients goals: Improve walking    Objective      Objective Measures updated at progress report unless specified.     Treatment     Parisa received the treatments listed below:      manual therapy techniques: Joint mobilizations and Manual traction were applied to the: lumbar/thoracic spine for 00 minutes, including:     neuromuscular re-education activities: to improve Balance, Coordination, Kinesthetic, Sense, Proprioception, Posture, and Motor Control for 00 minutes, including:     therapeutic activities: to improve functional performance  for 57 minutes, including:   Nustep/Recumbent Bike for 10 minutes/1,000K steps at Lvl 3 for endogenous release of opioids to improve tolerance to ADL's  Lateral band walks with Green TB; 5 laps at 1/2 wall (16 feet)  Standing prone press ups (against wall); 2 x 10 reps with 3 sec hold  Standing TA with ball press downs 3 second x 20 reps  Standing TA with ball press + marches x 20 reps  Paloff Press + lift; Green TB, 3 x 10 reps Bilateral  Hurdles forward/lateral (medium height) 3 x 10 each direction bilateral   Sit to stands in std chair; 3 x 10 with 4# dumbbell   Standing cone taps 3  x 10   Russian twists weighted and non-weighted exercises 3 x 10     Patient Education and Home Exercises       Education provided:   - Activity modification  - Plan of care  - Home exercise program  - Functional Anatomy    Written Home Exercises Provided: Patient instructed to cont prior HEP. Exercises were reviewed and Parisa was able to demonstrate them prior to the end of the session.  Parisa demonstrated good  understanding of the education provided. See Electronic Medical Record under Patient Instructions for exercises provided during therapy sessions    Assessment     Parisa continues to work hard in therapy and presents with 90% improvement in symptoms. Able to progress with load, intensity and frequency this date in closed and open chain. She tolerated with good challenge and therapist continues to educate patient on positions and movements to help alleviate symptoms when they occur. Will continue to focus on these remaining strength deficits and progress with challenge next visit.     Parisa Is progressing well towards her goals.   Patient prognosis is Good.     Patient will continue to benefit from skilled outpatient physical therapy to address the deficits listed in the problem list box on initial evaluation, provide pt/family education and to maximize pt's level of independence in the home and community environment.      Patient's spiritual, cultural and educational needs considered and pt agreeable to plan of care and goals.     Anticipated Barriers for therapy: chronicity of symptoms     Goals:  Short Term Goals: 4 weeks (Ongoing, Not Met)  1. Patient will reduce maximal pain rating to < 3/10 pain to facilitate ability to sleep through the night and recover from PT interventions.  2. Patient will be able to stand/ambulate for at least 10 minutes with < 3/10 pain to improve standing/walking tolerance.   3. Patient will be able to lift at least 5-10# with < 3/10 pain to improve lifting mechanics for work related tasks.     Long Term Goals: 8-10 weeks (Ongoing, Not Met)  1. Patient will improve 2 minute walk test by at least 40 feet indicating a clinically significant change in function.   2. Patient will demonstrate > 4/5 lower quarter strength to facilitate transfers from sit to stand from various surfaces without restriction.  3. Patient will improve 30 second sit to stand to at least 10x for improvement with transfer tasks.    4. Patient will improve FOTO intake score to at least 66 indicating a clinically significant change in function.    Plan     Plan of Care Certification: 11/27/2023 to 2/29/2024.     Outpatient Physical Therapy 1 times weekly for 4 weeks to include the following interventions: Cervical/Lumbar Traction, Gait Training, Manual Therapy, Moist Heat/ Ice, Neuromuscular Re-ed, Patient Education, Self Care, Therapeutic Activities, and Therapeutic Exercise.      Emilia Olivera, PT, DPT

## 2024-02-14 ENCOUNTER — CLINICAL SUPPORT (OUTPATIENT)
Dept: REHABILITATION | Facility: HOSPITAL | Age: 69
End: 2024-02-14
Payer: MEDICARE

## 2024-02-14 DIAGNOSIS — Z74.09 IMPAIRED FUNCTIONAL MOBILITY, BALANCE, GAIT, AND ENDURANCE: ICD-10-CM

## 2024-02-14 DIAGNOSIS — R29.898 DECREASED STRENGTH OF LOWER EXTREMITY: Primary | ICD-10-CM

## 2024-02-14 PROCEDURE — 97530 THERAPEUTIC ACTIVITIES: CPT

## 2024-02-17 NOTE — TELEPHONE ENCOUNTER
Care Due:                  Date            Visit Type   Department     Provider  --------------------------------------------------------------------------------                                EP -                              PRIMARY      [facility] FAMILY  Last Visit: 10-      CARE ([facility])   MEDICINE       Alex Cast  Next Visit: None Scheduled  None         None Found                                                            Last  Test          Frequency    Reason                     Performed    Due Date  --------------------------------------------------------------------------------    HBA1C.......  6 months...  metFORMIN................  11- 05-    [facility] Embedded Care Due Messages. Reference number: [id number].   2/16/2024 9:25:34 PM CST

## 2024-02-19 RX ORDER — LOVASTATIN 20 MG/1
TABLET ORAL
Qty: 90 TABLET | Refills: 3 | Status: SHIPPED | OUTPATIENT
Start: 2024-02-19

## 2024-02-19 NOTE — PROGRESS NOTES
[facility]   Physical Therapy Treatment Note      Name: Parisa Dimas  [facility] Number: [id number]    Therapy Diagnosis:   Encounter Diagnoses   Name Primary?    Decreased strength of lower extremity Yes    Impaired functional mobility, balance, gait, and endurance      Physician: Rose Mary Mabry NP    Visit Date: 2/14/2024    Physician Orders: PT Eval and Treat low back right leg  Medical Diagnosis from Referral: M54.41 (ICD-10-CM) - Lumbago with sciatica, right side G89.29 (ICD-10-CM) - Other chronic pain  Evaluation Date: 11/27/2023  Authorization Period Expiration: 12/31/2024  Plan of Care Expiration: 1/31/2024  Progress Note Due: 10th visit  Date of Surgery: NA  Visit # / Visits authorized: 8 / 20  Episode Visit: 17  FOTO: 1/3     Precautions: Standard      Time In: 11:00 AM  Time Out: 11:57 AM  Total Billable Time: 30 minutes    PTA Visit #: 1 / 5     Subjective     Patient reports: No new complaints this date.   She was compliant with home exercise program.  Response to previous treatment: appropriate muscle response  Functional change: Going for weeks without symptoms     Pain: 0/10, currently  Location: bilateral back, buttocks, lower legs, and upper legs   Patients goals: Improve walking    Objective      Objective Measures updated at progress report unless specified.     Treatment     Parisa received the treatments listed below:      manual therapy techniques: Joint mobilizations and Manual traction were applied to the: lumbar/thoracic spine for 00 minutes, including:     neuromuscular re-education activities: to improve Balance, Coordination, Kinesthetic, Sense, Proprioception, Posture, and Motor Control for 00 minutes, including:     therapeutic activities: to improve functional performance for 57 minutes, including:   Nustep/Recumbent Bike for 10 minutes/1,000K steps at Lvl 3 for endogenous release of opioids to improve tolerance to ADL's  Lateral band walks with Green TB;  5 laps at 1/2 wall (16 feet)  Standing prone press ups (against wall); 2 x 10 reps with 3 sec hold  Standing TA with ball press downs 3 second x 20 reps  Standing TA with ball press + marches x 20 reps  Paloff Press + lift; Green TB, 3 x 10 reps Bilateral  Hurdles forward/lateral (medium height) 3 x 10 each direction bilateral   Sit to stands in std chair; 3 x 10 with 4# dumbbell   Standing cone taps 3  x 10   Russian twists weighted and non-weighted exercises 3 x 10     Patient Education and Home Exercises       Education provided:   - Activity modification  - Plan of care  - Home exercise program  - Functional Anatomy    Written Home Exercises Provided: Patient instructed to cont prior HEP. Exercises were reviewed and Parisa was able to demonstrate them prior to the end of the session.  Parisa demonstrated good  understanding of the education provided. See Electronic Medical Record under Patient Instructions for exercises provided during therapy sessions    Assessment     Parisa continues to demonstrate improvement with overall symptoms with only a few moments of pain but pain is reduced within less than 10 seconds. Therapist continues to provide additional exercises to be performed for home exercise program to maintain progressions made during therapy.     Parisa Is progressing well towards her goals.   Patient prognosis is Good.     Patient will continue to benefit from skilled outpatient physical therapy to address the deficits listed in the problem list box on initial evaluation, provide pt/family education and to maximize pt's level of independence in the home and community environment.     Patient's spiritual, cultural and educational needs considered and pt agreeable to plan of care and goals.     Anticipated Barriers for therapy: chronicity of symptoms     Goals:  Short Term Goals: 4 weeks (Ongoing, Not Met)  1. Patient will reduce maximal pain rating to < 3/10 pain to facilitate ability to sleep through the  night and recover from PT interventions.  2. Patient will be able to stand/ambulate for at least 10 minutes with < 3/10 pain to improve standing/walking tolerance.   3. Patient will be able to lift at least 5-10# with < 3/10 pain to improve lifting mechanics for work related tasks.     Long Term Goals: 8-10 weeks (Ongoing, Not Met)  1. Patient will improve 2 minute walk test by at least 40 feet indicating a clinically significant change in function.   2. Patient will demonstrate > 4/5 lower quarter strength to facilitate transfers from sit to stand from various surfaces without restriction.  3. Patient will improve 30 second sit to stand to at least 10x for improvement with transfer tasks.    4. Patient will improve FOTO intake score to at least 66 indicating a clinically significant change in function.    Plan     Plan of Care Certification: 11/27/2023 to 2/29/2024.     Outpatient Physical Therapy 1 times weekly for 4 weeks to include the following interventions: Cervical/Lumbar Traction, Gait Training, Manual Therapy, Moist Heat/ Ice, Neuromuscular Re-ed, Patient Education, Self Care, Therapeutic Activities, and Therapeutic Exercise.      Emilia Olivera, PT, DPT

## 2024-02-21 ENCOUNTER — CLINICAL SUPPORT (OUTPATIENT)
Dept: REHABILITATION | Facility: HOSPITAL | Age: 69
End: 2024-02-21
Payer: MEDICARE

## 2024-02-21 DIAGNOSIS — R29.898 DECREASED STRENGTH OF LOWER EXTREMITY: Primary | ICD-10-CM

## 2024-02-21 DIAGNOSIS — Z74.09 IMPAIRED FUNCTIONAL MOBILITY, BALANCE, GAIT, AND ENDURANCE: ICD-10-CM

## 2024-02-21 PROCEDURE — 97530 THERAPEUTIC ACTIVITIES: CPT

## 2024-02-22 NOTE — PROGRESS NOTES
[facility]   Physical Therapy Treatment Note      Name: Parisa Dimas  [facility] Number: [id number]    Therapy Diagnosis:   Encounter Diagnoses   Name Primary?    Decreased strength of lower extremity Yes    Impaired functional mobility, balance, gait, and endurance      Physician: Rose Mary Mabry NP    Visit Date: 2/21/2024    Physician Orders: PT Eval and Treat low back right leg  Medical Diagnosis from Referral: M54.41 (ICD-10-CM) - Lumbago with sciatica, right side G89.29 (ICD-10-CM) - Other chronic pain  Evaluation Date: 11/27/2023  Authorization Period Expiration: 12/31/2024  Plan of Care Expiration: 1/31/2024  Progress Note Due: 10th visit  Date of Surgery: NA  Visit # / Visits authorized: 9 / 20  Episode Visit: 18  FOTO: 1/3     Precautions: Standard      Time In: 11:00 AM  Time Out: 12:00 PM  Total Billable Time: 60 minutes    PTA Visit #: 1 / 5     Subjective     Patient reports: She reports that 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 She reports that she did notice the pain as much when she was working but noticed it after.    She was compliant with home exercise program.  Response to previous treatment: appropriate muscle response  Functional change: Going for weeks without symptoms     Pain: 0/10, currently  Location: bilateral back, buttocks, lower legs, and upper legs   Patients goals: Improve walking    Objective      Objective Measures updated at progress report unless specified.     Treatment     Parisa received the treatments listed below:      manual therapy techniques: Joint mobilizations and Manual traction were applied to the: lumbar/thoracic spine for 00 minutes, including:     neuromuscular re-education activities: to improve Balance, Coordination, Kinesthetic, Sense, Proprioception, Posture, and Motor Control for 00 minutes, including:     therapeutic activities: to improve  functional performance for 60 minutes, including:   Nustep/Recumbent Bike for 10 minutes/1,000K steps at Lvl 3 for endogenous release of opioids to improve tolerance to ADL's  Lateral band walks with Green TB; 5 laps at 1/2 wall (16 feet)  Standing prone press ups (against wall); 2 x 10 reps with 3 sec hold  Standing TA with ball press downs 3 second x 20 reps  Standing TA with ball press + marches x 20 reps  Paloff Press + lift; Green TB, 3 x 10 reps Bilateral  Hurdles forward/lateral (medium height) 3 x 10 each direction bilateral   Sit to stands in std chair; 3 x 10 with 4# dumbbell   Standing cone taps 3  x 10   Russian twists weighted and non-weighted exercises 3 x 10   Discussed seated, standing, and supine positions for all exercises given     Patient Education and Home Exercises       Education provided:   - Activity modification  - Plan of care  - Home exercise program  - Functional Anatomy    Written Home Exercises Provided: Patient instructed to cont prior HEP. Exercises were reviewed and Parisa was able to demonstrate them prior to the end of the session.  Parisa demonstrated good  understanding of the education provided. 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 Due to increases symptoms, interventions focused on providing her with standing, seated, and supine exercises that she can do to help with her symptoms when they occur. She was able to perform all of these exercises with minimal cueing for correction of lumbar neutral spine. She demonstrates improvement with extension symptoms in a loaded position but flexion preference in an off loaded position. Will provide patient with comprehensive home exercise program next visit and perform discharge summary.     Parisa Is  progressing well towards her goals.   Patient prognosis is Good.     Patient will continue to benefit from skilled outpatient physical therapy to address the deficits listed in the problem list box on initial evaluation, provide pt/family education and to maximize pt's level of independence in the home and community environment.     Patient's spiritual, cultural and educational needs considered and pt agreeable to plan of care and goals.     Anticipated Barriers for therapy: chronicity of symptoms     Goals:  Short Term Goals: 4 weeks (Ongoing, Not Met)  1. Patient will reduce maximal pain rating to < 3/10 pain to facilitate ability to sleep through the night and recover from PT interventions.  2. Patient will be able to stand/ambulate for at least 10 minutes with < 3/10 pain to improve standing/walking tolerance.   3. Patient will be able to lift at least 5-10# with < 3/10 pain to improve lifting mechanics for work related tasks.     Long Term Goals: 8-10 weeks (Ongoing, Not Met)  1. Patient will improve 2 minute walk test by at least 40 feet indicating a clinically significant change in function.   2. Patient will demonstrate > 4/5 lower quarter strength to facilitate transfers from sit to stand from various surfaces without restriction.  3. Patient will improve 30 second sit to stand to at least 10x for improvement with transfer tasks.    4. Patient will improve FOTO intake score to at least 66 indicating a clinically significant change in function.    Plan     Plan of Care Certification: 11/27/2023 to 2/29/2024.     Outpatient Physical Therapy 1 times weekly for 4 weeks to include the following interventions: Cervical/Lumbar Traction, Gait Training, Manual Therapy, Moist Heat/ Ice, Neuromuscular Re-ed, Patient Education, Self Care, Therapeutic Activities, and Therapeutic Exercise.      Emilia Olivera, PT, DPT

## 2024-02-28 ENCOUNTER — CLINICAL SUPPORT (OUTPATIENT)
Dept: REHABILITATION | Facility: HOSPITAL | Age: 69
End: 2024-02-28
Payer: MEDICARE

## 2024-02-28 DIAGNOSIS — R29.898 DECREASED STRENGTH OF LOWER EXTREMITY: Primary | ICD-10-CM

## 2024-02-28 DIAGNOSIS — Z74.09 IMPAIRED FUNCTIONAL MOBILITY, BALANCE, GAIT, AND ENDURANCE: ICD-10-CM

## 2024-02-28 PROCEDURE — 97530 THERAPEUTIC ACTIVITIES: CPT

## 2024-02-28 NOTE — PROGRESS NOTES
[facility]   Physical Therapy Treatment Note      Name: Parisa Dimas  Clinic Number: [id number]    Therapy Diagnosis:   Encounter Diagnoses   Name Primary?    Decreased strength of lower extremity Yes    Impaired functional mobility, balance, gait, and endurance      Physician: Rose Mary Mabry NP    Visit Date: 2/28/2024    Physician Orders: PT Eval and Treat low back right leg  Medical Diagnosis from Referral: M54.41 (ICD-10-CM) - Lumbago with sciatica, right side G89.29 (ICD-10-CM) - Other chronic pain  Evaluation Date: 11/27/2023  Authorization Period Expiration: 12/31/2024  Plan of Care Expiration: 3/31/2024  Progress Note Due: 10th visit  Date of Surgery: NA  Visit # / Visits authorized: 10 / 20  Episode Visit: 18  FOTO: 1/3     Precautions: Standard      Time In: 11:00 AM  Time Out: 12:00 PM  Total Billable Time: 60 minutes    PTA Visit #: 1 / 5     Subjective     Patient reports: She reports that she had tough day yesterday after sitting for about 3-4 hours watching a movie and then had a lot of pain going to walk at the store but besides this moment, she has really been doing good.   She was compliant with home exercise program.  Response to previous treatment: appropriate muscle response  Functional change: Going for weeks without symptoms     Pain: 0/10, currently  Location: bilateral back, buttocks, lower legs, and upper legs   Patients goals: Improve walking    Objective      Observation: Patient ambulates to clinic independently     Posture: Forward head and rounded shoulders     Lumbar Range of Motion: * Denotes Pain    ROM   Flexion WNL   Extension WNL   Left Side Bending WNL   Right Side Bending WNL      Lower Extremity Strength  Right LE   Left LE     Hip Ext 4+/5 Hip Ext 4+/5   Hip ABD 4/5 Hip ABD  4+/5   Hip IR 4+/5 Hip IR 5/5   Hip ER 4+/5 Hip IR 5/5   Hip ADD (seated) 5/5 Hip ADD (seated) 5/5   Hip FLEX 4+/5 Hip FLEX 4+/5   Knee FLEX 4+/5 Knee FLEX 5/5   Knee  EXT 5/5 Knee EXT 5/5   Plantar flexion (seated) 5/5 Plantar flexion (seated) 5/5   Dorsiflexion 5/5 Dorsiflexion 5/5      Special Tests:   FABERs: Neg Right   EDIN: Neg Right  Hip Scour: Neg     Neural Tension Testing:  SLR: L (Neg); R (Neg)  Slump Test: L (Neg); R (Neg)     Sensation: Intact bilaterally      Red flag screen:               B/B changes: Neg              Clonus: Neg              Babinski: Neg     Endurance Assessment:    Evaluation   Timed Up and Go 10 seconds; 8 seconds   30 sec STS  9x with bilateral upper extremity assist; 13x      2 Minute Walk Test 262 feet with 1 episode of back pain      Table: Population Norms for TUG    Age  Average TUG    60 - 69 years  8.1 seconds    70 - 79 years  9.2 seconds    80 - 99 years  11.3 seconds         Intake Outcome Measure for FOTO Lumbar Spine Survey     Therapist reviewed FOTO scores for Parisa Dimas on 11/27/2023.   FOTO report - see Media section or FOTO account episode details.     Intake Score: 63; see media section           Treatment     Parisa received the treatments listed below:      manual therapy techniques: Joint mobilizations and Manual traction were applied to the: lumbar/thoracic spine for 00 minutes, including:     neuromuscular re-education activities: to improve Balance, Coordination, Kinesthetic, Sense, Proprioception, Posture, and Motor Control for 00 minutes, including:     therapeutic activities: to improve functional performance for 60 minutes, including:   Reassessment   Nustep/Recumbent Bike for 10 minutes/1,000K steps at Lvl 3 for endogenous release of opioids to improve tolerance to ADL's  Lateral band walks with Green TB; 5 laps at 1/2 wall (16 feet)  Standing prone press ups (against wall); 2 x 10 reps with 3 sec hold  Standing TA with ball press downs 3 second x 20 reps  Standing TA with ball press + marches x 20 reps  Paloff Press + lift; Green TB, 3 x 10 reps Bilateral  Hurdles forward/lateral (medium height) 3 x 10 each  direction bilateral   Sit to stands in std chair; 3 x 10 with 4# dumbbell   Standing cone taps 3  x 10   Russian twists weighted and non-weighted exercises 3 x 10   Discussed seated, standing, and supine positions for all exercises given     Patient Education and Home Exercises       Education provided:   - Activity modification  - Plan of care  - Home exercise program  - Functional Anatomy    Written Home Exercises Provided: Patient instructed to cont prior HEP. Exercises were reviewed and Parisa was able to demonstrate them prior to the end of the session.  Parisa demonstrated good  understanding of the education provided. See Electronic Medical Record under Patient Instructions for exercises provided during therapy sessions    Assessment     Due to increased lumbar symptoms this session, therapist and patient in agreement to continue with therapy for 2 more weeks with strong emphasis on home exercise program and to perform exercises when pain is increased to determine if current home exercise program provides a positive response with symptoms. Will reassess at upcoming sessions.     Parisa Is progressing well towards her goals.   Patient prognosis is Good.     Patient will continue to benefit from skilled outpatient physical therapy to address the deficits listed in the problem list box on initial evaluation, provide pt/family education and to maximize pt's level of independence in the home and community environment.     Patient's spiritual, cultural and educational needs considered and pt agreeable to plan of care and goals.     Anticipated Barriers for therapy: chronicity of symptoms     Goals:  Short Term Goals: 4 weeks   1. Patient will reduce maximal pain rating to < 3/10 pain to facilitate ability to sleep through the night and recover from PT interventions.(Met)  2. Patient will be able to stand/ambulate for at least 10 minutes with < 3/10 pain to improve standing/walking tolerance. (Met)  3. Patient will be  able to lift at least 5-10# with < 3/10 pain to improve lifting mechanics for work related tasks.(Met)     Long Term Goals: 8-10 weeks   1. Patient will improve 2 minute walk test by at least 40 feet indicating a clinically significant change in function. (Met)  2. Patient will demonstrate > 4/5 lower quarter strength to facilitate transfers from sit to stand from various surfaces without restriction.(Met)  3. Patient will improve 30 second sit to stand to at least 10x for improvement with transfer tasks.  (Met)  4. Patient will improve FOTO intake score to at least 66 indicating a clinically significant change in function.(Met)    Plan     Plan of Care Certification: 11/27/2023 to 3/31/2024.     Outpatient Physical Therapy 1 times weekly for 4 weeks to include the following interventions: Cervical/Lumbar Traction, Gait Training, Manual Therapy, Moist Heat/ Ice, Neuromuscular Re-ed, Patient Education, Self Care, Therapeutic Activities, and Therapeutic Exercise.      Emilia Olivera, PT, DPT

## 2024-03-06 ENCOUNTER — CLINICAL SUPPORT (OUTPATIENT)
Dept: REHABILITATION | Facility: HOSPITAL | Age: 69
End: 2024-03-06
Payer: MEDICARE

## 2024-03-06 DIAGNOSIS — R29.898 DECREASED STRENGTH OF LOWER EXTREMITY: Primary | ICD-10-CM

## 2024-03-06 DIAGNOSIS — Z74.09 IMPAIRED FUNCTIONAL MOBILITY, BALANCE, GAIT, AND ENDURANCE: ICD-10-CM

## 2024-03-06 PROCEDURE — 97530 THERAPEUTIC ACTIVITIES: CPT

## 2024-03-06 NOTE — PROGRESS NOTES
[facility]   Physical Therapy Treatment Note      Name: Parisa Dimas  Clinic Number: [id number]    Therapy Diagnosis:   Encounter Diagnoses   Name Primary?    Decreased strength of lower extremity Yes    Impaired functional mobility, balance, gait, and endurance      Physician: Rose Mary Mabry NP    Visit Date: 3/6/2024    Physician Orders: PT Eval and Treat low back right leg  Medical Diagnosis from Referral: M54.41 (ICD-10-CM) - Lumbago with sciatica, right side G89.29 (ICD-10-CM) - Other chronic pain  Evaluation Date: 11/27/2023  Authorization Period Expiration: 12/31/2024  Plan of Care Expiration: 3/31/2024  Progress Note Due: 10th visit  Date of Surgery: NA  Visit # / Visits authorized: 11 / 20  Episode Visit: 18  FOTO: 1/3     Precautions: Standard      Time In: 10:00 AM  Time Out: 11:00 AM  Total Billable Time: 30 minutes    PTA Visit #: 1 / 5     Subjective     Patient reports: She reports that she is still having some random moments of sharp pain in the low back and with certain movements. She reports that her symptoms are significantly improved but there is a remaining pain.   She was compliant with home exercise program.  Response to previous treatment: appropriate muscle response  Functional change: Going for weeks without symptoms     Pain: 0/10, currently  Location: bilateral back, buttocks, lower legs, and upper legs   Patients goals: Improve walking    Objective      Observation: Patient ambulates to clinic independently     Posture: Forward head and rounded shoulders     Lumbar Range of Motion: * Denotes Pain    ROM   Flexion WNL   Extension WNL   Left Side Bending WNL   Right Side Bending WNL      Lower Extremity Strength  Right LE   Left LE     Hip Ext 4+/5 Hip Ext 4+/5   Hip ABD 4/5 Hip ABD  4+/5   Hip IR 4+/5 Hip IR 5/5   Hip ER 4+/5 Hip IR 5/5   Hip ADD (seated) 5/5 Hip ADD (seated) 5/5   Hip FLEX 4+/5 Hip FLEX 4+/5   Knee FLEX 4+/5 Knee FLEX 5/5   Knee EXT 5/5  Knee EXT 5/5   Plantar flexion (seated) 5/5 Plantar flexion (seated) 5/5   Dorsiflexion 5/5 Dorsiflexion 5/5      Special Tests:   FABERs: Neg Right   EDIN: Neg Right  Hip Scour: Neg     Neural Tension Testing:  SLR: L (Neg); R (Neg)  Slump Test: L (Neg); R (Neg)     Sensation: Intact bilaterally      Red flag screen:               B/B changes: Neg              Clonus: Neg              Babinski: Neg     Endurance Assessment:    Evaluation   Timed Up and Go 10 seconds; 8 seconds   30 sec STS  9x with bilateral upper extremity assist; 13x      2 Minute Walk Test 262 feet with 1 episode of back pain      Table: Population Norms for TUG    Age  Average TUG    60 - 69 years  8.1 seconds    70 - 79 years  9.2 seconds    80 - 99 years  11.3 seconds         Intake Outcome Measure for FOTO Lumbar Spine Survey     Therapist reviewed FOTO scores for Parisa Dimas on 11/27/2023.   FOTO report - see Media section or FOTO account episode details.     Intake Score: 63; see media section           Treatment     Parisa received the treatments listed below:      manual therapy techniques: Joint mobilizations and Manual traction were applied to the: lumbar/thoracic spine for 00 minutes, including:     neuromuscular re-education activities: to improve Balance, Coordination, Kinesthetic, Sense, Proprioception, Posture, and Motor Control for 00 minutes, including:     therapeutic activities: to improve functional performance for 60 minutes, including:   Nustep/Recumbent Bike for 10 minutes/1,000K steps at Lvl 3 for endogenous release of opioids to improve tolerance to ADL's  Lateral band walks with Green TB; 5 laps at 1/2 wall (16 feet)  Standing prone press ups (against wall); 2 x 10 reps with 3 sec hold  Standing TA with ball press downs 3 second x 20 reps  Standing TA with ball press + marches x 20 reps  Paloff Press + lift; Green TB, 3 x 10 reps Bilateral  Hurdles forward/lateral (medium height) 3 x 10 each direction bilateral   Sit  to stands in std chair; 3 x 10 with 4# dumbbell   Standing cone taps 3  x 10   Russian twists weighted and non-weighted exercises 3 x 10   Discussed seated, standing, and supine positions for all exercises given     Patient Education and Home Exercises       Education provided:   - Activity modification  - Plan of care  - Home exercise program  - Functional Anatomy    Written Home Exercises Provided: Patient instructed to cont prior HEP. Exercises were reviewed and Parisa was able to demonstrate them prior to the end of the session.  Parisa demonstrated good  understanding of the education provided. See Electronic Medical Record under Patient Instructions for exercises provided during therapy sessions    Assessment     Patient remains with low back symptoms that occur intermittently. Patient has been attending therapy for 20 visits and beginning to demonstrate a plateau affect without any changes over the last 5-7 sessions. Despite significant gains in overall function, range of motion, meeting all of her goals, and strength since initial evaluation, therapist and patient in agreement to reach out to MD/NP for referral to back and spine for follow up. Patient scheduled for 1x next week pending status with referral. Educated patient to continue with home exercise program that provides relief.     Parisa Is progressing well towards her goals.   Patient prognosis is Good.     Patient will continue to benefit from skilled outpatient physical therapy to address the deficits listed in the problem list box on initial evaluation, provide pt/family education and to maximize pt's level of independence in the home and community environment.     Patient's spiritual, cultural and educational needs considered and pt agreeable to plan of care and goals.     Anticipated Barriers for therapy: chronicity of symptoms     Goals:  Short Term Goals: 4 weeks   1. Patient will reduce maximal pain rating to < 3/10 pain to facilitate ability to  sleep through the night and recover from PT interventions.(Met)  2. Patient will be able to stand/ambulate for at least 10 minutes with < 3/10 pain to improve standing/walking tolerance. (Met)  3. Patient will be able to lift at least 5-10# with < 3/10 pain to improve lifting mechanics for work related tasks.(Met)     Long Term Goals: 8-10 weeks   1. Patient will improve 2 minute walk test by at least 40 feet indicating a clinically significant change in function. (Met)  2. Patient will demonstrate > 4/5 lower quarter strength to facilitate transfers from sit to stand from various surfaces without restriction.(Met)  3. Patient will improve 30 second sit to stand to at least 10x for improvement with transfer tasks.  (Met)  4. Patient will improve FOTO intake score to at least 66 indicating a clinically significant change in function.(Met)    Plan     Plan of Care Certification: 11/27/2023 to 3/31/2024.     Outpatient Physical Therapy 1 times weekly for 4 weeks to include the following interventions: Cervical/Lumbar Traction, Gait Training, Manual Therapy, Moist Heat/ Ice, Neuromuscular Re-ed, Patient Education, Self Care, Therapeutic Activities, and Therapeutic Exercise.      Emilia Olivera, PT, DPT

## 2024-03-13 ENCOUNTER — CLINICAL SUPPORT (OUTPATIENT)
Dept: REHABILITATION | Facility: HOSPITAL | Age: 69
End: 2024-03-13
Payer: MEDICARE

## 2024-03-13 DIAGNOSIS — Z74.09 IMPAIRED FUNCTIONAL MOBILITY, BALANCE, GAIT, AND ENDURANCE: ICD-10-CM

## 2024-03-13 DIAGNOSIS — R29.898 DECREASED STRENGTH OF LOWER EXTREMITY: Primary | ICD-10-CM

## 2024-03-13 PROCEDURE — 97530 THERAPEUTIC ACTIVITIES: CPT

## 2024-03-25 DIAGNOSIS — I48.91 NEW ONSET ATRIAL FIBRILLATION: ICD-10-CM

## 2024-05-13 NOTE — PROGRESS NOTES
Last 5 Patient Entered Readings                                      Current 30 Day Average: 120/75     Recent Readings 6/4/2018 6/1/2018 5/27/2018 5/21/2018 5/17/2018    SBP (mmHg) 118 124 115 121 122    DBP (mmHg) 75 77 71 70 80    Pulse 74 80 71 83 76      Sent [facility] message to follow up with Ms. Parisa Dimas.    Per 30 day average, blood pressure is well controlled 120/75 mmHg. Encouraged adherence to low sodium diet and physical activity guidelines. Advised patient to call or message with questions or concerns. Follow up in 12 weeks.          no

## 2024-05-16 ENCOUNTER — LAB VISIT (OUTPATIENT)
Dept: LAB | Facility: HOSPITAL | Age: 69
End: 2024-05-16
Attending: NURSE PRACTITIONER
Payer: MEDICARE

## 2024-05-16 DIAGNOSIS — E11.69 HYPERLIPIDEMIA ASSOCIATED WITH TYPE 2 DIABETES MELLITUS: ICD-10-CM

## 2024-05-16 DIAGNOSIS — E78.5 HYPERLIPIDEMIA ASSOCIATED WITH TYPE 2 DIABETES MELLITUS: ICD-10-CM

## 2024-05-16 LAB
ALBUMIN SERPL BCP-MCNC: 4.2 G/DL (ref 3.5–5.2)
ALP SERPL-CCNC: 60 U/L (ref 55–135)
ALT SERPL W/O P-5'-P-CCNC: 76 U/L (ref 10–44)
ANION GAP SERPL CALC-SCNC: 12 MMOL/L (ref 8–16)
AST SERPL-CCNC: 58 U/L (ref 10–40)
BILIRUB SERPL-MCNC: 0.6 MG/DL (ref 0.1–1)
BUN SERPL-MCNC: 15 MG/DL (ref 8–23)
CALCIUM SERPL-MCNC: 10.5 MG/DL (ref 8.7–10.5)
CHLORIDE SERPL-SCNC: 98 MMOL/L (ref 95–110)
CO2 SERPL-SCNC: 27 MMOL/L (ref 23–29)
CREAT SERPL-MCNC: 0.8 MG/DL (ref 0.5–1.4)
EST. GFR  (NO RACE VARIABLE): >60 ML/MIN/1.73 M^2
ESTIMATED AVG GLUCOSE: 148 MG/DL (ref 68–131)
GLUCOSE SERPL-MCNC: 161 MG/DL (ref 70–110)
HBA1C MFR BLD: 6.8 % (ref 4–5.6)
POTASSIUM SERPL-SCNC: 3.7 MMOL/L (ref 3.5–5.1)
PROT SERPL-MCNC: 7.5 G/DL (ref 6–8.4)
SODIUM SERPL-SCNC: 137 MMOL/L (ref 136–145)

## 2024-05-16 PROCEDURE — 36415 COLL VENOUS BLD VENIPUNCTURE: CPT | Mod: PO | Performed by: NURSE PRACTITIONER

## 2024-05-16 PROCEDURE — 83036 HEMOGLOBIN GLYCOSYLATED A1C: CPT | Performed by: NURSE PRACTITIONER

## 2024-05-16 PROCEDURE — 80053 COMPREHEN METABOLIC PANEL: CPT | Performed by: NURSE PRACTITIONER

## 2024-05-17 ENCOUNTER — PATIENT MESSAGE (OUTPATIENT)
Dept: FAMILY MEDICINE | Facility: CLINIC | Age: 69
End: 2024-05-17
Payer: MEDICARE

## 2024-05-27 ENCOUNTER — OFFICE VISIT (OUTPATIENT)
Dept: FAMILY MEDICINE | Facility: CLINIC | Age: 69
End: 2024-05-27
Payer: MEDICARE

## 2024-05-27 VITALS
SYSTOLIC BLOOD PRESSURE: 132 MMHG | HEIGHT: 63 IN | TEMPERATURE: 98 F | WEIGHT: 202.63 LBS | DIASTOLIC BLOOD PRESSURE: 74 MMHG | HEART RATE: 81 BPM | OXYGEN SATURATION: 98 % | BODY MASS INDEX: 35.9 KG/M2

## 2024-05-27 DIAGNOSIS — I48.91 ATRIAL FIBRILLATION, UNSPECIFIED TYPE: ICD-10-CM

## 2024-05-27 DIAGNOSIS — Z12.11 COLON CANCER SCREENING: ICD-10-CM

## 2024-05-27 DIAGNOSIS — G47.33 OSA (OBSTRUCTIVE SLEEP APNEA): Primary | ICD-10-CM

## 2024-05-27 DIAGNOSIS — R20.2 PARESTHESIA: ICD-10-CM

## 2024-05-27 DIAGNOSIS — E11.40 TYPE 2 DIABETES MELLITUS WITH DIABETIC NEUROPATHY, UNSPECIFIED WHETHER LONG TERM INSULIN USE: ICD-10-CM

## 2024-05-27 PROCEDURE — 99999 PR PBB SHADOW E&M-EST. PATIENT-LVL V: CPT | Mod: PBBFAC,,, | Performed by: FAMILY MEDICINE

## 2024-05-27 PROCEDURE — 3008F BODY MASS INDEX DOCD: CPT | Mod: CPTII,S$GLB,, | Performed by: FAMILY MEDICINE

## 2024-05-27 PROCEDURE — 1101F PT FALLS ASSESS-DOCD LE1/YR: CPT | Mod: CPTII,S$GLB,, | Performed by: FAMILY MEDICINE

## 2024-05-27 PROCEDURE — 99397 PER PM REEVAL EST PAT 65+ YR: CPT | Mod: S$GLB,,, | Performed by: FAMILY MEDICINE

## 2024-05-27 PROCEDURE — 3044F HG A1C LEVEL LT 7.0%: CPT | Mod: CPTII,S$GLB,, | Performed by: FAMILY MEDICINE

## 2024-05-27 PROCEDURE — 3288F FALL RISK ASSESSMENT DOCD: CPT | Mod: CPTII,S$GLB,, | Performed by: FAMILY MEDICINE

## 2024-05-27 PROCEDURE — 1160F RVW MEDS BY RX/DR IN RCRD: CPT | Mod: CPTII,S$GLB,, | Performed by: FAMILY MEDICINE

## 2024-05-27 PROCEDURE — 1159F MED LIST DOCD IN RCRD: CPT | Mod: CPTII,S$GLB,, | Performed by: FAMILY MEDICINE

## 2024-05-27 PROCEDURE — 1126F AMNT PAIN NOTED NONE PRSNT: CPT | Mod: CPTII,S$GLB,, | Performed by: FAMILY MEDICINE

## 2024-05-27 PROCEDURE — 3078F DIAST BP <80 MM HG: CPT | Mod: CPTII,S$GLB,, | Performed by: FAMILY MEDICINE

## 2024-05-27 PROCEDURE — 4010F ACE/ARB THERAPY RXD/TAKEN: CPT | Mod: CPTII,S$GLB,, | Performed by: FAMILY MEDICINE

## 2024-05-27 PROCEDURE — 3075F SYST BP GE 130 - 139MM HG: CPT | Mod: CPTII,S$GLB,, | Performed by: FAMILY MEDICINE

## 2024-05-27 NOTE — PROGRESS NOTES
(Portions of this note were dictated using voice recognition software and may contain dictation related errors in spelling/grammar/syntax not found on text review)    CC:   Chief Complaint   Patient presents with    Follow-up       HPI: 69 y.o. female   Last visit for annual exam in 2022        Had Left reverse shoulder arthroplasty secondary to comminuted fracture left proximal humerus that occurred on 12/22/2021.  Surgery was in March 2022.  Initially  some concern about heart murmur leading to further evaluation suspicious mitral vegetation.  Had seen cardiology   PET CT was negative for increased uptake in that area.  Was referred for cardiac MRI although was not able to get that done prior to surgery and it was deemed not necessary to delay that workup.  It was later done May 24, 2022 showing EF 69%, no infiltrative disease noted, high velocity flow noted left ventricular outflow tract without evidence of septal obstruction, DDX including subaortic membrane, however only trivial AI was detected.          echo 11/16/2022 showed no evidence of outflow obstruction  She had a stress test done in 2022 that was negative for ischemic disease.  Ejection fraction 65%, normal diastolic function.  Occasional PVCs observed.    She subsequently was noted to have new onset atrial fibrillation in December 20, 2022.  Started Eliquis, verapamil 240 mg daily.  Last cardiology visit was 01/02/2024.  Last EP visit was August of 2023.  She does feel like she has occasional bouts of AFib where she is symptomatic, feels tired, short of breath at times.  May happen every couple of weeks.  She did not notice sometimes what she would start to go walking she would feel it, would have to stop and rest.    hypertension on valsartan 320 mg daily , verapamil 240 mg daily in place of amlodipine, chlorthalidone 25 mg daily    30 day average blood pressure through digital HTN program    128/63      hyperlipidemia on lovastatin 20 mg  daily    fatty liver disease last ultrasound 2022    carotid  atherosclerosis but with no significant stenosis  (ultrasound 2018)    diabetes on metformin extended release 1000 mg daily    sleep apnea intolerant of CPAP.  Sleeps in a recliner, sleeping on incline is okay but when she sleeps flat she has some struggling with breathing.  States that when she tried CPAP in the past she was got very anxious having the mask on her.  Had heard about the INSPIRE procedure for sleep apnea was wondering if this was offered.        Past Medical History:   Diagnosis Date    AR (allergic rhinitis)     AR (allergic rhinitis)     Atrial fibrillation 12/27/2022    Carotid stenosis     50% RCA    Closed 4-part fracture of proximal humerus, left, initial encounter 3/7/2022    Colon polyp 10/2014    Diabetes mellitus     Diabetes mellitus, type 2     Fatty liver     GERD (gastroesophageal reflux disease)     Heart murmur 1/27/2022    HLD (hyperlipidemia)     HTN (hypertension)     Impingement syndrome of left shoulder     Joint pain     Sleep apnea 2018    Stricture of artery 3/25/2021       Past Surgical History:   Procedure Laterality Date    REVERSE TOTAL SHOULDER ARTHROPLASTY Left 3/7/2022    Procedure: ARTHROPLASTY, SHOULDER, TOTAL, REVERSE;  Surgeon: MISSY Santoyo MD;  Location: [facility] OR [address] [place] [address];  Service: Orthopedics;  Laterality: Left;  AND BICEP SYNDEMOSIS    SKIN BIOPSY  10yrs.    negative       Family History   Problem Relation Name Age of Onset    Cancer Mother          gallbladder    Coronary artery disease Father  79    Diabetes Father      Cerebral palsy Sister x1     Osteoporosis Sister x1     Epilepsy Sister x1     Prostate cancer Brother x1 57    Diabetes Brother x1     Heart attack Brother x1     Hypertension Daughter x2     No Known Problems Son x2     Diabetes Maternal Aunt      Diabetes Maternal Uncle      Diabetes Maternal Grandmother      Hypertension Other          multiple    Melanoma Neg Hx          Social History     Tobacco Use    Smoking status: Never    Smokeless tobacco: Never   Substance Use Topics    Alcohol use: Not Currently     Alcohol/week: 0.0 standard drinks of alcohol    Drug use: No       Lab Results   Component Value Date    WBC 4.73 11/15/2023    HGB 13.7 11/15/2023    HCT 41.1 11/15/2023    MCV 92 11/15/2023     11/15/2023    CHOL 172 11/15/2023    TRIG 204 (H) 11/15/2023    HDL 57 11/15/2023    ALT 76 (H) 05/16/2024    AST 58 (H) 05/16/2024    BILITOT 0.6 05/16/2024    ALKPHOS 60 05/16/2024     05/16/2024    K 3.7 05/16/2024    CL 98 05/16/2024    CREATININE 0.8 05/16/2024    ESTGFRAFRICA >60.0 02/08/2022    EGFRNONAA >60.0 02/08/2022    CALCIUM 10.5 05/16/2024    ALBUMIN 4.2 05/16/2024    BUN 15 05/16/2024    CO2 27 05/16/2024    TSH 0.910 11/15/2023    INR 1.1 12/20/2022    HGBA1C 6.8 (H) 05/16/2024    MICALBCREAT Unable to calculate 11/15/2023    LDLCALC 74.2 11/15/2023     (H) 05/16/2024    [id number] 39 07/22/2021             Vital signs reviewed  PE:   APPEARANCE: Well nourished, well developed, in no acute distress.    HEAD: Normocephalic, atraumatic.  EYES: PERRL. EOMI.   Conjunctivae noninjected.  EARS: TM's intact. Light reflex normal. No retraction or perforation  NOSE: Mucosa pink. Airway clear.  MOUTH & THROAT: No tonsillar enlargement. No pharyngeal erythema or exudate.   NECK: Supple with no cervical lymphadenopathy.  No carotid bruits.  No thyromegaly  CHEST: Good inspiratory effort. Lungs clear to auscultation with no wheezes or crackles.  CARDIOVASCULAR: Normal S1, S2.  3/6 systolic ejection murmur at the aortic region radiating up to both carotid arteries.  ABDOMEN: Bowel sounds normal. Not distended. Soft. No tenderness or masses. No organomegaly.  EXTREMITIES:    Diabetic foot exam through Podiatry 12/07/2023            IMPRESSION  1. SHOAIB (obstructive sleep apnea)    2. Atrial fibrillation, unspecified type    3. Colon cancer screening    4.  Type 2 diabetes mellitus with diabetic neuropathy, unspecified whether long term insulin use    5. Paresthesia                PLAN  Orders Placed This Encounter   Procedures    Comprehensive Metabolic Panel    Iron and TIBC    Hemoglobin A1C    Ferritin    Vitamin B12    TSH    Lipid Panel    Microalbumin/Creatinine Ratio, Urine    Ambulatory referral/consult to Sleep Disorders    Ambulatory referral/consult to Endo Procedure       Blood pressure stable     Given symptomatic episodes of AFib, would recommend following back up with EP.  Currently on rate control strategy with verapamil and Eliquis.  Not sure  given her paroxysms of symptoms whether a pill in the pocket strategy maybe helpful?    Note she has been taking in online ordered supplement that has multiple substances including grapefruit seed extract.  She was taking this because she wanted to do something that would help get her energy.  I would recommend it would probably be best for her to stop this especially given her multiple medications in case there was any interaction    Also for sleep apnea is not treated.  Did discuss potential for this and worsening of AFib.  Will place referral for sleep Medicine in case this can be re-evaluated    She also incidentally mentions some occasional restless leg symptoms and occasional numbness in her fingertips.  Will get labs above in 3 months with visit to follow    Follow up in 3 months after blood work above    SCREENINGS:  Colonoscopy 2014.  2 diminutive hyperplastic polyps, diverticulosis.  (hyperplastic on pathology) repeat referral placed     GYN: Dr. Cynthia Dinero     MMG 12/11/2023     Tetanus: 2012, can get repeat at pharmacy   PVX:   March 2018  Prevnar 2019  Flu utd  Look into getting the Shingles vaccine (SHINGRIX) at your local pharmacy (2 part series, done once at/after age 50)  COVID x2 whenever 5 on file he never more       DEXA 2021 normal bone density

## 2024-05-27 NOTE — PATIENT INSTRUCTIONS
Check with your pharmacy regarding getting the tetanus (Tdap) vaccine (once every 10 years)    Look into getting the Shingles vaccine (SHINGRIX) at your local pharmacy (2 part series, done once at/after age 50)    Covid booster: updated formula omicron booster effective Sept 2023:  [facility] users can check availability and schedule their vaccinations via [facility]. If you don't have a [facility] account, you can sign up at [email or web address], call [phone] or visit [email or web address] for available locations and times

## 2024-05-28 DIAGNOSIS — Z00.00 ENCOUNTER FOR MEDICARE ANNUAL WELLNESS EXAM: ICD-10-CM

## 2024-07-19 ENCOUNTER — TELEPHONE (OUTPATIENT)
Dept: ADMINISTRATIVE | Facility: CLINIC | Age: 69
End: 2024-07-19
Payer: MEDICARE

## 2024-07-19 ENCOUNTER — PATIENT MESSAGE (OUTPATIENT)
Dept: OTHER | Facility: OTHER | Age: 69
End: 2024-07-19
Payer: MEDICARE

## 2024-07-19 NOTE — TELEPHONE ENCOUNTER
Called pt, informed pt I was calling to remind pt of her in office EAWV on 7/22/24; clinic location provided to patient; pt confirmed appointment and informed to bring all medications to appt

## 2024-07-22 ENCOUNTER — OFFICE VISIT (OUTPATIENT)
Dept: FAMILY MEDICINE | Facility: CLINIC | Age: 69
End: 2024-07-22
Payer: MEDICARE

## 2024-07-22 VITALS
HEIGHT: 63 IN | DIASTOLIC BLOOD PRESSURE: 60 MMHG | BODY MASS INDEX: 35.17 KG/M2 | OXYGEN SATURATION: 96 % | HEART RATE: 92 BPM | WEIGHT: 198.5 LBS | SYSTOLIC BLOOD PRESSURE: 130 MMHG

## 2024-07-22 DIAGNOSIS — Z00.00 ENCOUNTER FOR MEDICARE ANNUAL WELLNESS EXAM: Primary | ICD-10-CM

## 2024-07-22 DIAGNOSIS — I77.9 BILATERAL CAROTID ARTERY DISEASE, UNSPECIFIED TYPE: ICD-10-CM

## 2024-07-22 DIAGNOSIS — Z74.09 OTHER REDUCED MOBILITY: ICD-10-CM

## 2024-07-22 DIAGNOSIS — E66.01 SEVERE OBESITY (BMI 35.0-35.9 WITH COMORBIDITY): ICD-10-CM

## 2024-07-22 DIAGNOSIS — Z78.0 ASYMPTOMATIC POSTMENOPAUSAL STATE: ICD-10-CM

## 2024-07-22 DIAGNOSIS — E11.42 DIABETIC POLYNEUROPATHY ASSOCIATED WITH TYPE 2 DIABETES MELLITUS: ICD-10-CM

## 2024-07-22 DIAGNOSIS — E11.65 TYPE 2 DIABETES MELLITUS WITH HYPERGLYCEMIA, WITHOUT LONG-TERM CURRENT USE OF INSULIN: ICD-10-CM

## 2024-07-22 DIAGNOSIS — I48.0 PAROXYSMAL ATRIAL FIBRILLATION: ICD-10-CM

## 2024-07-22 DIAGNOSIS — I42.1 HOCM (HYPERTROPHIC OBSTRUCTIVE CARDIOMYOPATHY): ICD-10-CM

## 2024-07-22 PROCEDURE — 1159F MED LIST DOCD IN RCRD: CPT | Mod: CPTII,S$GLB,, | Performed by: NURSE PRACTITIONER

## 2024-07-22 PROCEDURE — 1158F ADVNC CARE PLAN TLK DOCD: CPT | Mod: CPTII,S$GLB,, | Performed by: NURSE PRACTITIONER

## 2024-07-22 PROCEDURE — 3078F DIAST BP <80 MM HG: CPT | Mod: CPTII,S$GLB,, | Performed by: NURSE PRACTITIONER

## 2024-07-22 PROCEDURE — 3044F HG A1C LEVEL LT 7.0%: CPT | Mod: CPTII,S$GLB,, | Performed by: NURSE PRACTITIONER

## 2024-07-22 PROCEDURE — 3288F FALL RISK ASSESSMENT DOCD: CPT | Mod: CPTII,S$GLB,, | Performed by: NURSE PRACTITIONER

## 2024-07-22 PROCEDURE — G0439 PPPS, SUBSEQ VISIT: HCPCS | Mod: S$GLB,,, | Performed by: NURSE PRACTITIONER

## 2024-07-22 PROCEDURE — 1101F PT FALLS ASSESS-DOCD LE1/YR: CPT | Mod: CPTII,S$GLB,, | Performed by: NURSE PRACTITIONER

## 2024-07-22 PROCEDURE — 1126F AMNT PAIN NOTED NONE PRSNT: CPT | Mod: CPTII,S$GLB,, | Performed by: NURSE PRACTITIONER

## 2024-07-22 PROCEDURE — 4010F ACE/ARB THERAPY RXD/TAKEN: CPT | Mod: CPTII,S$GLB,, | Performed by: NURSE PRACTITIONER

## 2024-07-22 PROCEDURE — 99999 PR PBB SHADOW E&M-EST. PATIENT-LVL V: CPT | Mod: PBBFAC,,, | Performed by: NURSE PRACTITIONER

## 2024-07-22 PROCEDURE — 1160F RVW MEDS BY RX/DR IN RCRD: CPT | Mod: CPTII,S$GLB,, | Performed by: NURSE PRACTITIONER

## 2024-07-22 PROCEDURE — 3075F SYST BP GE 130 - 139MM HG: CPT | Mod: CPTII,S$GLB,, | Performed by: NURSE PRACTITIONER

## 2024-07-22 NOTE — PATIENT INSTRUCTIONS
Counseling and Referral of Other Preventative  (Italic type indicates deductible and co-insurance are waived)    Patient Name: Parisa Dimas  Today's Date: 7/22/2024    Health Maintenance       Date Due Completion Date    Shingles Vaccine (1 of 2) Never done ---    RSV Vaccine (Age 60+ and Pregnant patients) (1 - 1-dose 60+ series) Never done ---    Colorectal Cancer Screening 10/10/2019 10/10/2014    TETANUS VACCINE 10/15/2022 10/15/2012    DEXA Scan 07/22/2024 7/22/2021    Eye Exam 10/12/2024 10/12/2023    Influenza Vaccine (1) 09/01/2024 11/15/2023    Diabetes Urine Screening 11/15/2024 11/15/2023    Lipid Panel 11/15/2024 11/15/2023    Hemoglobin A1c 11/16/2024 5/16/2024    Foot Exam 12/07/2024 12/7/2023    Mammogram 12/11/2024 12/11/2023    Low Dose Statin 05/27/2025 5/27/2024        Orders Placed This Encounter   Procedures    DXA Bone Density Axial Skeleton 1 or more sites     The following information is provided to all patients.  This information is to help you find resources for any of the problems found today that may be affecting your health:                  Living healthy guide: www.[facility].[place].gov      Understanding Diabetes: www.diabetes.org      Eating healthy: www.cdc.gov/healthyweight      [facility] home safety checklist: www.cdc.gov/steadi/patient.html      Agency on Aging: www.goea.[place].[facility]      Alcoholics anonymous (AA): www.aa.org      Physical Activity: www.ashkan.nih.gov/aj2kpgg      Tobacco use: www.quitwithusla.org

## 2024-07-22 NOTE — PROGRESS NOTES
"  Parisa Dimas presented for a  Medicare AWV and comprehensive Health Risk Assessment today. The following components were reviewed and updated:    Medical history  Family History  Social history  Allergies and Current Medications  Health Risk Assessment  Health Maintenance  Care Team         ** See Completed Assessments for Annual Wellness Visit within the encounter summary.**         The following assessments were completed:  Living Situation  CAGE  Depression Screening  Timed Get Up and Go  Whisper Test  Cognitive Function Screening      Nutrition Screening  ADL Screening  PAQ Screening      Opioid documentation:      Patient does not have a current opioid prescription.        Vitals:    07/22/24 1313   BP: 130/60   BP Location: Left arm   Patient Position: Sitting   BP Method: Large (Manual)   Pulse: 92   SpO2: 96%   Weight: 90.1 kg (198 lb 8.4 oz)   Height: 5' 3" (1.6 m)     Body mass index is 35.17 kg/m².    Physical Exam  Vitals reviewed.   Constitutional:       General: She is not in acute distress.     Appearance: Normal appearance. She is well-developed and well-groomed. She is obese.   HENT:      Head: Normocephalic.   Cardiovascular:      Rate and Rhythm: Normal rate.   Pulmonary:      Effort: Pulmonary effort is normal. No respiratory distress.   Abdominal:      General: There is no distension.   Skin:     Coloration: Skin is not pale.   Neurological:      Mental Status: She is alert and oriented to person, place, and time.      Coordination: Coordination normal.   Psychiatric:         Attention and Perception: Attention normal.         Mood and Affect: Mood and affect normal.         Speech: Speech normal.         Behavior: Behavior normal. Behavior is cooperative.         Thought Content: Thought content normal.             Diagnoses and health risks identified today and associated recommendations/orders:    1. Encounter for Medicare annual wellness exam  - Ambulatory Referral/Consult to [facility] " Annual Wellness Visit (eAWV)    2. HOCM (hypertrophic obstructive cardiomyopathy)  Chronic; stable. Followed by Cardiology.    3. Diabetic polyneuropathy associated with type 2 diabetes mellitus  Chronic; stable on metformin medication. Follow up with PCP.    4. Type 2 diabetes mellitus with hyperglycemia, without long-term current use of insulin  Chronic; stable on metformin medication. Follow up with PCP.    5. Bilateral carotid artery disease, unspecified type  Chronic; stable on statin medication. Followed by Cardiology.    6. Paroxysmal atrial fibrillation  Chronic; stable on Eliquis medication. Followed by Cardiology.    7. Asymptomatic postmenopausal state  - DXA Bone Density Axial Skeleton 1 or more sites; Future    8. Other reduced mobility  Ambulates independently; slightly slowed but stable gait. Follow up with PCP.    9. Severe obesity (BMI 35.0-35.9 with comorbidity)  Eat a low salt/low fat ADA diet and discussed importance of engaging in physical activity at least 5x/week for a minimum of 30 min/day.      Provided Parisa with a 5-10 year written screening schedule and personal prevention plan. Recommendations were developed using the USPSTF age appropriate recommendations. Education, counseling, and referrals were provided as needed. After Visit Summary given to patient which includes a list of additional screenings/tests needed.    Follow up for your next annual wellness visit.    Rose Mary Mabry NP      Advance Care Planning     I offered to discuss advanced care planning, including how to pick a person who would make decisions for you if you were unable to make them for yourself, called a health care power of , and what kind of decisions you might make such as use of life sustaining treatments such as ventilators and tube feeding when faced with a life limiting illness recorded on a living will that they will need to know. (How you want to be cared for as you near the end of your natural  life)     X Patient is interested in learning more about how to make advanced directives.  I provided them paperwork and offered to discuss this with them.

## 2024-07-23 PROBLEM — I48.0 PAROXYSMAL ATRIAL FIBRILLATION: Status: ACTIVE | Noted: 2022-12-27

## 2024-07-23 PROBLEM — E11.65 TYPE 2 DIABETES MELLITUS WITH HYPERGLYCEMIA, WITHOUT LONG-TERM CURRENT USE OF INSULIN: Status: ACTIVE | Noted: 2022-01-27

## 2024-07-23 PROBLEM — E11.42 DIABETIC POLYNEUROPATHY ASSOCIATED WITH TYPE 2 DIABETES MELLITUS: Status: ACTIVE | Noted: 2022-01-28

## 2024-07-26 ENCOUNTER — TELEPHONE (OUTPATIENT)
Dept: ENDOSCOPY | Facility: HOSPITAL | Age: 69
End: 2024-07-26
Payer: MEDICARE

## 2024-07-26 VITALS — HEIGHT: 63 IN | BODY MASS INDEX: 35.08 KG/M2 | WEIGHT: 198 LBS

## 2024-07-26 DIAGNOSIS — Z12.11 COLON CANCER SCREENING: ICD-10-CM

## 2024-07-26 DIAGNOSIS — Z12.11 SPECIAL SCREENING FOR MALIGNANT NEOPLASMS, COLON: Primary | ICD-10-CM

## 2024-07-26 RX ORDER — POLYETHYLENE GLYCOL 3350, SODIUM SULFATE ANHYDROUS, SODIUM BICARBONATE, SODIUM CHLORIDE, POTASSIUM CHLORIDE 236; 22.74; 6.74; 5.86; 2.97 G/4L; G/4L; G/4L; G/4L; G/4L
4 POWDER, FOR SOLUTION ORAL ONCE
Qty: 4000 ML | Refills: 0 | Status: SHIPPED | OUTPATIENT
Start: 2024-07-26 | End: 2024-07-26

## 2024-07-26 NOTE — TELEPHONE ENCOUNTER
Received patient's call needing to reschedule PAT appt. Informed patient I can go ahead and get her scheduled for her procedure.

## 2024-07-26 NOTE — TELEPHONE ENCOUNTER
Spoke to patient to schedule procedure(s) Colonoscopy       Physician to perform procedure(s) Dr. LAMONT Ledezma  Date of Procedure (s) 9/18/24  Arrival Time 11:00 AM  Time of Procedure(s) 12:00 AM   Location of Procedure(s) [place] [address] Floor  Type of Rx Prep sent to patient: PEG  Instructions provided to patient via [facility]    Patient was informed on the following information and verbalized understanding. Screening questionnaire reviewed with patient and complete. If procedure requires anesthesia, a responsible adult needs to be present to accompany the patient home, patient cannot drive after receiving anesthesia. Appointment details are tentative, especially check-in time. Patient will receive a prep-op call 7 days prior to confirm check-in time for procedure. If applicable the patient should contact their pharmacy to verify Rx for procedure prep is ready for pick-up. Patient was advised to call the scheduling department at [phone] if pharmacy states no Rx is available. Patient was advised to call the endoscopy scheduling department if any questions or concerns arise.      [facility] Scheduling Department    The patient is currently under an internal cardiologist Dr. Davonte tony and requires a blood thinner Eliquis (apixaban) for their upcoming scheduled Colonoscopy on 9/18/24.

## 2024-07-26 NOTE — TELEPHONE ENCOUNTER
----- Message from Jelani Rose RN sent at 2024 10:51 AM CDT -----  Regardin/18 BT  The patient is currently under an internal cardiologist Dr. Davonte tony and requires a blood thinner Eliquis (apixaban) for their upcoming scheduled Colonoscopy on 24.

## 2024-07-29 ENCOUNTER — HOSPITAL ENCOUNTER (OUTPATIENT)
Dept: RADIOLOGY | Facility: HOSPITAL | Age: 69
Discharge: HOME | End: 2024-07-29
Attending: NURSE PRACTITIONER
Payer: MEDICARE

## 2024-07-29 DIAGNOSIS — Z78.0 ASYMPTOMATIC POSTMENOPAUSAL STATE: ICD-10-CM

## 2024-07-29 PROCEDURE — 77080 DXA BONE DENSITY AXIAL: CPT | Mod: TC

## 2024-07-29 PROCEDURE — 77080 DXA BONE DENSITY AXIAL: CPT | Mod: 26,,, | Performed by: RADIOLOGY

## 2024-07-30 RX ORDER — METFORMIN HYDROCHLORIDE 500 MG/1
1000 TABLET, EXTENDED RELEASE ORAL DAILY
Qty: 180 TABLET | Refills: 3 | Status: SHIPPED | OUTPATIENT
Start: 2024-07-30

## 2024-07-30 NOTE — TELEPHONE ENCOUNTER
No care due was identified.  [facility] Embedded Care Due Messages. Reference number: [id number].   7/30/2024 12:08:29 PM CDT

## 2024-08-15 DIAGNOSIS — I48.0 PAROXYSMAL ATRIAL FIBRILLATION: Primary | ICD-10-CM

## 2024-08-19 ENCOUNTER — TELEPHONE (OUTPATIENT)
Dept: ENDOSCOPY | Facility: HOSPITAL | Age: 69
End: 2024-08-19
Payer: MEDICARE

## 2024-08-19 NOTE — TELEPHONE ENCOUNTER
Dear Dr Jernigan,    Patient has a scheduled procedure Colonoscopy on 9/18/24 and is currently taking a blood thinner prescribed by your office. In order to ensure patient safety, we would like to confirm that the patient can place their blood thinner medication on hold for the procedure. Can he/she discontinue Eliquis (apixaban) for a minimum of 2 days prior to the procedure?     Thank you for your prompt reply.    [facility] Endoscopy Scheduling

## 2024-08-26 NOTE — PROGRESS NOTES
Subjective   Patient ID:  Parisa Dimas is a 69 y.o. female who presents for follow-up of Atrial Fibrillation      69 yF referred for AF management.     12/13/22: She was under evaluation for subaortic membrane/HOCM by Dr Marrero. Cardiac MRI revealed no LGE 5/22. ECho 11/16/22 with no evidence of outflow obstruction or gradient. She is due for stress test tomorrow. She was recently in [place] (early Dec 2022) and noticed more TOMLINSON and fatigue as well as palpitations. ECG 12/12/22 showed AF. Eliquis was started; verapamil was used in place of amlodipine. No syncope or near syncope.     2/23: MILDRED/CV set up for 12/27/22, procedure aborted due to SR. One hour of palpitations in interim.     Interval history: Some 12-24h episodes, once every 6-8 weeks    1/24 Echo:  ·  Left Ventricle: The left ventricle is normal in size. Normal wall thickness. Normal wall motion. There is hyperdynamic systolic function with a visually estimated ejection fraction of 70 - 75%. Ejection fraction by visual approximation is 73%. There is normal diastolic function. The peak gradient at rest was in the low 40s and increased to mid 60s after Valsalva.  ·  Right Ventricle: Normal right ventricular cavity size. Wall thickness is normal. Right ventricle wall motion  is normal. Systolic function is normal.  ·  Aortic Valve: There is mild annular calcification present.  ·  Pulmonary Artery: The estimated pulmonary artery systolic pressure is 19 mmHg.  ·  IVC/SVC: Normal venous pressure at 3 mmHg.  ·  Pericardium: There is a trivial posterior effusion just at base.    11/16/22:  · The left ventricle is normal in size with concentric remodeling and hyperdynamic systolic function. The estimated ejection fraction is 75%.  · Normal right ventricular size with normal right ventricular systolic function.  · Normal left ventricular diastolic function.  · Mild left atrial enlargement.  · The estimated PA systolic pressure is 23 mmHg.  · Normal central venous  pressure (3 mmHg).  · In this study, the highest LV outflow velocities did not exceed 2 m/s, even with the Valsalva maneuver. No significant outflow obstruction is evident    Cardic MRI 5/22:  1. LV volumes are normal. LV mass is normal. LVEF = 69%.   2. RV volumes are normal. RVEF = 67%.  3. No LGE appreciated  4. High velocity flow is noted in the LVOT on cine imaging without evidence of septal obstruction.  The differential diagnosis for this finding including a subaortic membrane.  However only trivial AI was detected.    Calvin Marrero    My interpretation of the ECG is:  NSR  ms nl QRS    Past Medical History:  No date: AR (allergic rhinitis)  No date: AR (allergic rhinitis)  12/27/2022: Atrial fibrillation  No date: Carotid stenosis      Comment:  50% RCA  3/7/2022: Closed 4-part fracture of proximal humerus, left, initial   encounter  10/2014: Colon polyp  No date: Diabetes mellitus  No date: Diabetes mellitus, type 2  No date: Fatty liver  No date: GERD (gastroesophageal reflux disease)  1/27/2022: Heart murmur  No date: HLD (hyperlipidemia)  No date: HTN (hypertension)  No date: Impingement syndrome of left shoulder  No date: Joint pain  2018: Sleep apnea  3/25/2021: Stricture of artery    Past Surgical History:  3/7/2022: REVERSE TOTAL SHOULDER ARTHROPLASTY; Left      Comment:  Procedure: ARTHROPLASTY, SHOULDER, TOTAL, REVERSE;                 Surgeon: MISSY Santoyo MD;  Location: [facility] OR [address] [place] [address];  Service: Orthopedics;  Laterality: Left;  AND BICEP               SYNDEMOSIS  10yrs.: SKIN BIOPSY      Comment:  negative    Social History    Socioeconomic History      Marital status:       Spouse name: Pio      Number of children: 4    Occupational History        Comment: retired- school work    Tobacco Use      Smoking status: Never      Smokeless tobacco: Never    Substance and Sexual Activity      Alcohol use: Not Currently        Alcohol/week: 0.0 standard drinks of  alcohol      Drug use: No      Sexual activity: Yes        Partners: Male        Birth control/protection: Post-menopausal    Other Topics      Concerns:        Are you pregnant or think you may be?: No        Breast-feeding: No    Social History Narrative      Stairs- none    Social Determinants of Health  Financial Resource Strain: Low Risk  (7/22/2024)      Overall Financial Resource Strain (CARDIA)          Difficulty of Paying Living Expenses: Not hard at all  Food Insecurity: No Food Insecurity (7/22/2024)      Hunger Vital Sign          Worried About Running Out of Food in the Last Year: Never true          Ran Out of Food in the Last Year: Never true  Transportation Needs: No Transportation Needs (7/22/2024)      PRAPARE - Transportation          Lack of Transportation (Medical): No          Lack of Transportation (Non-Medical): No  Physical Activity: Inactive (7/22/2024)      Exercise Vital Sign          Days of Exercise per Week: 0 days          Minutes of Exercise per Session: 0 min  Stress: No Stress Concern Present (7/22/2024)      [employer] - Occupational Stress Questionnaire          Feeling of Stress : Not at all  Housing Stability: Low Risk  (7/22/2024)      Housing Stability Vital Sign          Unable to Pay for Housing in the Last Year: No          Homeless in the Last Year: No    Review of patient's family history indicates:  Problem: Cancer      Relation: Mother          Name:               Age of Onset: (Not Specified)              Comment: gallbladder  Problem: Coronary artery disease      Relation: Father          Name:               Age of Onset: 79  Problem: Diabetes      Relation: Father          Name:               Age of Onset: (Not Specified)  Problem: Cerebral palsy      Relation: Sister          Name: x1              Age of Onset: (Not Specified)  Problem: Osteoporosis      Relation: Sister          Name: x1              Age of Onset: (Not  Specified)  Problem: Epilepsy      Relation: Sister          Name: x1              Age of Onset: (Not Specified)  Problem: Prostate cancer      Relation: Brother          Name: x1              Age of Onset: 57  Problem: Diabetes      Relation: Brother          Name: x1              Age of Onset: (Not Specified)  Problem: Heart attack      Relation: Brother          Name: x1              Age of Onset: (Not Specified)  Problem: Hypertension      Relation: Daughter          Name: x2              Age of Onset: (Not Specified)  Problem: No Known Problems      Relation: Son          Name: x2              Age of Onset: (Not Specified)  Problem: Diabetes      Relation: Maternal Aunt          Name:               Age of Onset: (Not Specified)  Problem: Diabetes      Relation: Maternal Uncle          Name:               Age of Onset: (Not Specified)  Problem: Diabetes      Relation: Maternal Grandmother          Name:               Age of Onset: (Not Specified)  Problem: Hypertension      Relation: Other          Name:               Age of Onset: (Not Specified)              Comment: multiple  Problem: Melanoma      Relation: Neg Hx          Name:               Age of Onset: (Not Specified)    Current Outpatient Medications:  apixaban (ELIQUIS) 5 mg Tab, Take 1 tablet (5 mg total) by mouth 2 (two) times daily., Disp: 180 tablet, Rfl: 3  ascorbic acid, vitamin C, (VITAMIN C) 1000 MG tablet, Take 1,000 mg by mouth once daily., Disp: , Rfl:   b complex vitamins capsule, Take 1 capsule by mouth once daily., Disp: , Rfl:   blood sugar diagnostic Strp, To check BG 1 times daily, to use with insurance preferred meter, Disp: 100 strip, Rfl: 11  blood-glucose meter kit, To check BG 1 times daily, to use with insurance preferred meter, Disp: 1 each, Rfl: 0  celecoxib (CELEBREX) 200 MG capsule, Take 1 capsule (200 mg total) by mouth once daily., Disp: 60 capsule, Rfl: 2  cetirizine (ZYRTEC) 10 MG tablet, Take 10 mg by mouth once daily.,  Disp: , Rfl:   chlorthalidone (HYGROTEN) 25 MG Tab, Take 1 tablet (25 mg total) by mouth once daily., Disp: 90 tablet, Rfl: 3  coenzyme Q10 200 mg capsule, Take 100 mg by mouth once daily., Disp: , Rfl:    L.ACID/L.CASEI/B.BIF/B.MELISSA/FOS (PROBIOTIC BLEND ORAL), Take 2 tablets by mouth once daily., Disp: , Rfl:   lancets Misc, To check BG 1 times daily, to use with insurance preferred meter, Disp: 100 each, Rfl: 11  lovastatin (MEVACOR) 20 MG tablet, TAKE 1 TABLET BY MOUTH IN THE  EVENING, Disp: 90 tablet, Rfl: 3  magnesium 30 mg Tab, Take 1 tablet by mouth Daily., Disp: , Rfl:   metFORMIN (GLUCOPHAGE-XR) 500 MG ER 24hr tablet, Take 2 tablets (1,000 mg total) by mouth once daily., Disp: 180 tablet, Rfl: 3  multivitamin capsule, Take 1 capsule by mouth once daily., Disp: , Rfl:   OMEGA-3S/DHA/EPA/FISH OIL (OMEGA 3 ORAL), Take 2,800 mg by mouth once daily., Disp: , Rfl:   omeprazole (PRILOSEC) 40 MG capsule, TAKE 1 CAPSULE BY MOUTH ONCE  DAILY, Disp: 90 capsule, Rfl: 3  valsartan (DIOVAN) 320 MG tablet, Take 1 tablet (320 mg total) by mouth once daily., Disp: 90 tablet, Rfl: 1  verapamiL (VERELAN) 240 MG C24P, Take 1 capsule (240 mg total) by mouth once daily., Disp: 90 capsule, Rfl: 1  vitamin D (VITAMIN D3) 1000 units Tab, Take 1,000 Units by mouth once daily., Disp: , Rfl:   vitamin E 400 UNIT capsule, Take 400 Units by mouth once daily., Disp: , Rfl:   zinc sulfate (ZINC-15 ORAL), Take by mouth., Disp: , Rfl:     No current facility-administered medications for this visit.          Review of Systems   Constitutional: Positive for malaise/fatigue.   HENT: Negative.     Eyes: Negative.    Cardiovascular:  Positive for irregular heartbeat. Negative for chest pain, dyspnea on exertion, leg swelling, near-syncope, palpitations and syncope.   Respiratory:  Negative for shortness of breath.    Endocrine: Negative.    Hematologic/Lymphatic: Negative.    Skin: Negative.    Musculoskeletal: Negative.    Gastrointestinal:  Negative.    Genitourinary: Negative.    Neurological: Negative.  Negative for dizziness and light-headedness.   Psychiatric/Behavioral: Negative.     Allergic/Immunologic: Negative.           Objective     Physical Exam  Vitals reviewed.   Constitutional:       General: She is not in acute distress.     Appearance: She is well-developed.   HENT:      Head: Normocephalic and atraumatic.   Eyes:      Pupils: Pupils are equal, round, and reactive to light.   Neck:      Thyroid: No thyromegaly.      Vascular: No JVD.   Cardiovascular:      Rate and Rhythm: Normal rate and regular rhythm.      Chest Wall: PMI is not displaced.      Heart sounds: Normal heart sounds, S1 normal and S2 normal. No murmur heard.     No friction rub. No gallop.   Pulmonary:      Effort: Pulmonary effort is normal. No respiratory distress.      Breath sounds: Normal breath sounds. No wheezing or rales.   Abdominal:      General: Bowel sounds are normal. There is no distension.      Palpations: Abdomen is soft.      Tenderness: There is no abdominal tenderness. There is no guarding or rebound.   Musculoskeletal:         General: No tenderness. Normal range of motion.      Cervical back: Normal range of motion.   Skin:     General: Skin is warm and dry.      Findings: No erythema or rash.   Neurological:      Mental Status: She is alert and oriented to person, place, and time.      Cranial Nerves: No cranial nerve deficit.   Psychiatric:         Behavior: Behavior normal.         Thought Content: Thought content normal.         Judgment: Judgment normal.          Assessment and Plan     1. Paroxysmal atrial fibrillation    2. HOCM (hypertrophic obstructive cardiomyopathy)    3. Primary hypertension        Plan:  69 yoF here for AF management. Infrequent recurrence with stable burden. No changes to therapy. RTC 1y

## 2024-08-27 ENCOUNTER — OFFICE VISIT (OUTPATIENT)
Dept: ELECTROPHYSIOLOGY | Facility: CLINIC | Age: 69
End: 2024-08-27
Payer: MEDICARE

## 2024-08-27 ENCOUNTER — HOSPITAL ENCOUNTER (OUTPATIENT)
Dept: CARDIOLOGY | Facility: CLINIC | Age: 69
Discharge: HOME | End: 2024-08-27
Payer: MEDICARE

## 2024-08-27 VITALS
DIASTOLIC BLOOD PRESSURE: 80 MMHG | WEIGHT: 199.5 LBS | SYSTOLIC BLOOD PRESSURE: 138 MMHG | HEART RATE: 79 BPM | BODY MASS INDEX: 35.35 KG/M2 | HEIGHT: 63 IN

## 2024-08-27 DIAGNOSIS — I48.0 PAROXYSMAL ATRIAL FIBRILLATION: ICD-10-CM

## 2024-08-27 DIAGNOSIS — I42.1 HOCM (HYPERTROPHIC OBSTRUCTIVE CARDIOMYOPATHY): ICD-10-CM

## 2024-08-27 DIAGNOSIS — I48.0 PAROXYSMAL ATRIAL FIBRILLATION: Primary | ICD-10-CM

## 2024-08-27 DIAGNOSIS — I10 PRIMARY HYPERTENSION: ICD-10-CM

## 2024-08-27 LAB
OHS QRS DURATION: 66 MS
OHS QTC CALCULATION: 426 MS

## 2024-08-27 PROCEDURE — 99999 PR PBB SHADOW E&M-EST. PATIENT-LVL IV: CPT | Mod: PBBFAC,,, | Performed by: INTERNAL MEDICINE

## 2024-08-27 PROCEDURE — 1159F MED LIST DOCD IN RCRD: CPT | Mod: CPTII,S$GLB,, | Performed by: INTERNAL MEDICINE

## 2024-08-27 PROCEDURE — 4010F ACE/ARB THERAPY RXD/TAKEN: CPT | Mod: CPTII,S$GLB,, | Performed by: INTERNAL MEDICINE

## 2024-08-27 PROCEDURE — 3008F BODY MASS INDEX DOCD: CPT | Mod: CPTII,S$GLB,, | Performed by: INTERNAL MEDICINE

## 2024-08-27 PROCEDURE — 1126F AMNT PAIN NOTED NONE PRSNT: CPT | Mod: CPTII,S$GLB,, | Performed by: INTERNAL MEDICINE

## 2024-08-27 PROCEDURE — 93005 ELECTROCARDIOGRAM TRACING: CPT | Mod: S$GLB,,, | Performed by: INTERNAL MEDICINE

## 2024-08-27 PROCEDURE — 3075F SYST BP GE 130 - 139MM HG: CPT | Mod: CPTII,S$GLB,, | Performed by: INTERNAL MEDICINE

## 2024-08-27 PROCEDURE — 3288F FALL RISK ASSESSMENT DOCD: CPT | Mod: CPTII,S$GLB,, | Performed by: INTERNAL MEDICINE

## 2024-08-27 PROCEDURE — 99214 OFFICE O/P EST MOD 30 MIN: CPT | Mod: S$GLB,,, | Performed by: INTERNAL MEDICINE

## 2024-08-27 PROCEDURE — 1101F PT FALLS ASSESS-DOCD LE1/YR: CPT | Mod: CPTII,S$GLB,, | Performed by: INTERNAL MEDICINE

## 2024-08-27 PROCEDURE — 3044F HG A1C LEVEL LT 7.0%: CPT | Mod: CPTII,S$GLB,, | Performed by: INTERNAL MEDICINE

## 2024-08-27 PROCEDURE — 3060F POS MICROALBUMINURIA REV: CPT | Mod: CPTII,S$GLB,, | Performed by: INTERNAL MEDICINE

## 2024-08-27 PROCEDURE — 93010 ELECTROCARDIOGRAM REPORT: CPT | Mod: S$GLB,,, | Performed by: STUDENT IN AN ORGANIZED HEALTH CARE EDUCATION/TRAINING PROGRAM

## 2024-08-27 PROCEDURE — 3079F DIAST BP 80-89 MM HG: CPT | Mod: CPTII,S$GLB,, | Performed by: INTERNAL MEDICINE

## 2024-08-27 PROCEDURE — 3066F NEPHROPATHY DOC TX: CPT | Mod: CPTII,S$GLB,, | Performed by: INTERNAL MEDICINE

## 2024-08-29 ENCOUNTER — PATIENT MESSAGE (OUTPATIENT)
Dept: FAMILY MEDICINE | Facility: CLINIC | Age: 69
End: 2024-08-29

## 2024-08-29 ENCOUNTER — OFFICE VISIT (OUTPATIENT)
Dept: FAMILY MEDICINE | Facility: CLINIC | Age: 69
End: 2024-08-29
Payer: MEDICARE

## 2024-08-29 VITALS
BODY MASS INDEX: 34.8 KG/M2 | OXYGEN SATURATION: 96 % | SYSTOLIC BLOOD PRESSURE: 126 MMHG | TEMPERATURE: 98 F | DIASTOLIC BLOOD PRESSURE: 68 MMHG | HEART RATE: 85 BPM | WEIGHT: 196.44 LBS | HEIGHT: 63 IN

## 2024-08-29 DIAGNOSIS — G47.33 OSA (OBSTRUCTIVE SLEEP APNEA): ICD-10-CM

## 2024-08-29 DIAGNOSIS — E11.40 TYPE 2 DIABETES MELLITUS WITH DIABETIC NEUROPATHY, UNSPECIFIED WHETHER LONG TERM INSULIN USE: Primary | ICD-10-CM

## 2024-08-29 DIAGNOSIS — I48.91 ATRIAL FIBRILLATION, UNSPECIFIED TYPE: ICD-10-CM

## 2024-08-29 PROCEDURE — G2211 COMPLEX E/M VISIT ADD ON: HCPCS | Mod: S$GLB,,, | Performed by: FAMILY MEDICINE

## 2024-08-29 PROCEDURE — 1159F MED LIST DOCD IN RCRD: CPT | Mod: CPTII,S$GLB,, | Performed by: FAMILY MEDICINE

## 2024-08-29 PROCEDURE — 3060F POS MICROALBUMINURIA REV: CPT | Mod: CPTII,S$GLB,, | Performed by: FAMILY MEDICINE

## 2024-08-29 PROCEDURE — 99215 OFFICE O/P EST HI 40 MIN: CPT | Mod: S$GLB,,, | Performed by: FAMILY MEDICINE

## 2024-08-29 PROCEDURE — 3078F DIAST BP <80 MM HG: CPT | Mod: CPTII,S$GLB,, | Performed by: FAMILY MEDICINE

## 2024-08-29 PROCEDURE — 4010F ACE/ARB THERAPY RXD/TAKEN: CPT | Mod: CPTII,S$GLB,, | Performed by: FAMILY MEDICINE

## 2024-08-29 PROCEDURE — 3044F HG A1C LEVEL LT 7.0%: CPT | Mod: CPTII,S$GLB,, | Performed by: FAMILY MEDICINE

## 2024-08-29 PROCEDURE — 99999 PR PBB SHADOW E&M-EST. PATIENT-LVL V: CPT | Mod: PBBFAC,,, | Performed by: FAMILY MEDICINE

## 2024-08-29 PROCEDURE — 3074F SYST BP LT 130 MM HG: CPT | Mod: CPTII,S$GLB,, | Performed by: FAMILY MEDICINE

## 2024-08-29 PROCEDURE — 1126F AMNT PAIN NOTED NONE PRSNT: CPT | Mod: CPTII,S$GLB,, | Performed by: FAMILY MEDICINE

## 2024-08-29 PROCEDURE — 3066F NEPHROPATHY DOC TX: CPT | Mod: CPTII,S$GLB,, | Performed by: FAMILY MEDICINE

## 2024-08-29 PROCEDURE — 3008F BODY MASS INDEX DOCD: CPT | Mod: CPTII,S$GLB,, | Performed by: FAMILY MEDICINE

## 2024-08-29 NOTE — PROGRESS NOTES
(Portions of this note were dictated using voice recognition software and may contain dictation related errors in spelling/grammar/syntax not found on text review)    CC:   Chief Complaint   Patient presents with    Follow-up       HPI: 69 y.o. female            Had Left reverse shoulder arthroplasty secondary to comminuted fracture left proximal humerus that occurred on 12/22/2021.  Surgery was in March 2022.  Initially  some concern about heart murmur leading to further evaluation suspicious mitral vegetation.  Had seen cardiology   PET CT was negative for increased uptake in that area.  Was referred for cardiac MRI although was not able to get that done prior to surgery and it was deemed not necessary to delay that workup.  It was later done May 24, 2022 showing EF 69%, no infiltrative disease noted, high velocity flow noted left ventricular outflow tract without evidence of septal obstruction, DDX including subaortic membrane, however only trivial AI was detected.          echo 11/16/2022 showed no evidence of outflow obstruction  She had a stress test done in 2022 that was negative for ischemic disease.  Ejection fraction 65%, normal diastolic function.  Occasional PVCs observed.    She subsequently was noted to have new onset atrial fibrillation in December 20, 2022.  Started Eliquis, verapamil 240 mg daily.  Last cardiology visit was 01/02/2024.  Last EP visit was August of 2024.  Had noted some occasional paroxysms of AFib, had recommended continued treatment as is and return in 1 year unless any worsening.  She does state that every 6 weeks or so she will have a bout of 12-24 hours where she feels like she is in AFib, heart rates up, and she is feeling poorly.  She will usually try to lie down on recliner until it passes    hypertension on valsartan 320 mg daily , verapamil 240 mg daily in place of amlodipine, chlorthalidone 25 mg daily    30 day average blood pressure through digital HTN  program    127/67    Most recent echo 01/02/2024: EF 70-75%, mild aortic annular calcification    hyperlipidemia on lovastatin 20 mg daily    fatty liver disease last ultrasound 2022    carotid  atherosclerosis but with no significant stenosis  (ultrasound 2018)    diabetes on metformin extended release 1000 mg daily    sleep apnea intolerant of CPAP.  Sleeps in a recliner, sleeping on incline is okay but when she sleeps flat she has some struggling with breathing.  States that when she tried CPAP in the past she was got very anxious having the mask on her.  Had heard about the INSPIRE procedure for sleep apnea was wondering if this was offered.  Had placed referral to sleep Medicine at last appointment, had appointment on 07/11 but was not completed    Does admit to eating too many sweets, not much exercise        Past Medical History:   Diagnosis Date    AR (allergic rhinitis)     AR (allergic rhinitis)     Atrial fibrillation 12/27/2022    Carotid stenosis     50% RCA    Closed 4-part fracture of proximal humerus, left, initial encounter 3/7/2022    Colon polyp 10/2014    Diabetes mellitus     Diabetes mellitus, type 2     Fatty liver     GERD (gastroesophageal reflux disease)     Heart murmur 1/27/2022    HLD (hyperlipidemia)     HTN (hypertension)     Impingement syndrome of left shoulder     Joint pain     Sleep apnea 2018    Stricture of artery 3/25/2021       Past Surgical History:   Procedure Laterality Date    REVERSE TOTAL SHOULDER ARTHROPLASTY Left 3/7/2022    Procedure: ARTHROPLASTY, SHOULDER, TOTAL, REVERSE;  Surgeon: MISSY Santoyo MD;  Location: [facility] OR [address] [place] [address];  Service: Orthopedics;  Laterality: Left;  AND BICEP SYNDEMOSIS    SKIN BIOPSY  10yrs.    negative       Family History   Problem Relation Name Age of Onset    Cancer Mother          gallbladder    Coronary artery disease Father  79    Diabetes Father      Cerebral palsy Sister x1     Osteoporosis Sister x1     Epilepsy Sister x1      Prostate cancer Brother x1 57    Diabetes Brother x1     Heart attack Brother x1     Hypertension Daughter x2     No Known Problems Son x2     Diabetes Maternal Aunt      Diabetes Maternal Uncle      Diabetes Maternal Grandmother      Hypertension Other          multiple    Melanoma Neg Hx         Social History     Tobacco Use    Smoking status: Never    Smokeless tobacco: Never   Substance Use Topics    Alcohol use: Not Currently     Alcohol/week: 0.0 standard drinks of alcohol    Drug use: No       Lab Results   Component Value Date    WBC 4.73 11/15/2023    HGB 13.7 11/15/2023    HCT 41.1 11/15/2023    MCV 92 11/15/2023     11/15/2023    CHOL 159 08/22/2024    TRIG 229 (H) 08/22/2024    HDL 50 08/22/2024    ALT 71 (H) 08/22/2024    AST 69 (H) 08/22/2024    BILITOT 0.6 08/22/2024    ALKPHOS 61 08/22/2024     08/22/2024    K 3.9 08/22/2024     08/22/2024    CREATININE 0.8 08/22/2024    ESTGFRAFRICA >60.0 02/08/2022    EGFRNONAA >60.0 02/08/2022    CALCIUM 10.4 08/22/2024    ALBUMIN 4.1 08/22/2024    BUN 15 08/22/2024    CO2 27 08/22/2024    TSH 1.642 08/22/2024    INR 1.1 12/20/2022    HGBA1C 6.6 (H) 08/22/2024    MICALBCREAT 83.1 (H) 08/22/2024    LDLCALC 63.2 08/22/2024     (H) 08/22/2024    [id number] 39 07/22/2021       AST (U/L)   Date Value   08/22/2024 69 (H)   05/16/2024 58 (H)   11/15/2023 56 (H)   10/25/2022 85 (H)   01/27/2022 45 (H)   07/22/2021 54 (H)   12/04/2020 52 (H)   10/28/2019 49 (H)   11/08/2018 67 (H)   04/06/2018 31     ALT (U/L)   Date Value   08/22/2024 71 (H)   05/16/2024 76 (H)   11/15/2023 80 (H)   10/25/2022 103 (H)   01/27/2022 67 (H)   07/22/2021 78 (H)   12/04/2020 71 (H)   10/28/2019 77 (H)   11/08/2018 106 (H)   04/06/2018 50 (H)     Hemoglobin A1C (%)   Date Value   08/22/2024 6.6 (H)   05/16/2024 6.8 (H)   11/15/2023 6.1 (H)   10/25/2022 6.9 (H)   01/27/2022 5.9 (H)   07/22/2021 6.3 (H)   12/04/2020 6.2 (H)   10/28/2019 6.2 (H)   11/08/2018 5.9 (H)    04/06/2018 5.5           Vital signs reviewed  Wt Readings from Last 10 Encounters:   08/29/24 89.1 kg (196 lb 6.9 oz)   08/27/24 90.5 kg (199 lb 8.3 oz)   07/26/24 89.8 kg (198 lb)   07/22/24 90.1 kg (198 lb 8.4 oz)   05/27/24 91.9 kg (202 lb 9.6 oz)   01/10/24 93 kg (205 lb)   01/10/24 93 kg (205 lb 0.4 oz)   01/02/24 93.3 kg (205 lb 11 oz)   11/15/23 92.7 kg (204 lb 5.9 oz)   08/15/23 91.5 kg (201 lb 11.5 oz)       PE:   APPEARANCE: Well nourished, well developed, in no acute distress.    HEAD: Normocephalic, atraumatic.  EYES: PERRL. EOMI.   Conjunctivae noninjected.  EARS: TM's intact. Light reflex normal. No retraction or perforation  NOSE: Mucosa pink. Airway clear.  MOUTH & THROAT: No tonsillar enlargement. No pharyngeal erythema or exudate.   NECK: Supple with no cervical lymphadenopathy.  No carotid bruits.  No thyromegaly  CHEST: Good inspiratory effort. Lungs clear to auscultation with no wheezes or crackles.  CARDIOVASCULAR: Normal S1, S2.  Occasional ectopy noted.  3/6 systolic ejection murmur at the aortic region radiating up to both carotid arteries.  ABDOMEN: Bowel sounds normal. Not distended. Soft. No tenderness or masses. No organomegaly.  EXTREMITIES:    Diabetic foot exam through Podiatry 12/07/2023            IMPRESSION  1. Type 2 diabetes mellitus with diabetic neuropathy, unspecified whether long term insulin use    2. Atrial fibrillation, unspecified type    3. SHOAIB (obstructive sleep apnea)                  PLAN  No orders of the defined types were placed in this encounter.     Diabetes stable, A1c 6.6 .  Continue current therapy  Continue statin     Lipids stable     Fatty liver, elevated transaminases chronic stable.  Emphasize healthy diet, exercise, weight loss    AFib: Continue rate control with verapamil, Eliquis for stroke prevention.  Continues to have some occasional paroxysms 12-24 hours where she feels like she is going back into AFib and feeling poorly.  Will message her  cardiologist in case pill in the pocket strategy might be helpful in the circumstances.  She can look into getting a kardiamobile device as well available online where she can record her heart rhythm when she is feeling poorly.    BP stable.  Continue digital medicine follow-up     Advise to reschedule his sleep medicine that she had to cancel her appointment because of some conflict.    Return 6 months or sooner p.r.n.     Total time spent including face-to-face time and chart review and documentation time:  40 min      SCREENINGS:  Colonoscopy 2014.  2 diminutive hyperplastic polyps, diverticulosis.  (hyperplastic on pathology) repeat is scheduled on 09/18/2024     GYN: Dr. Cynthia Dinero     MMG 12/11/2023     Tetanus: 2012, can get repeat at pharmacy   PVX:   March 2018  Prevnar 2019  Flu utd  Look into getting the Shingles vaccine (SHINGRIX) at your local pharmacy (2 part series, done once at/after age 50)  COVID booster eligible       DEXA 2024 normal bone density

## 2024-08-29 NOTE — PATIENT INSTRUCTIONS
Check with your pharmacy regarding getting the tetanus (Tdap) vaccine (once every 10 years)    Look into getting the Shingles vaccine (SHINGRIX) at your local pharmacy (2 part series, done once at/after age 50)

## 2024-09-13 ENCOUNTER — TELEPHONE (OUTPATIENT)
Dept: ENDOSCOPY | Facility: HOSPITAL | Age: 69
End: 2024-09-13
Payer: MEDICARE

## 2024-09-13 NOTE — TELEPHONE ENCOUNTER
Spoke to pt for pre-call to confirm scheduled Colonoscopy and patient verbalized understanding of the following:       Date of Procedure (s)  verified 9/18/24  Arrival Time 10:30 AM verified.  Location of Procedure(s) [address] verified.  NPO status reinforced. Ok to continue clear liquids up until 4 hours prior to the Endoscopy procedure.   Denies GLP-1s.    Pt is taking  Eliquis (apixaban) , Pt instructed to stop taking Eliquis (apixaban)  on:  9/16/24, per endoscopy protocol.  Approval to hold received from Dr. Jernigan.   Pt confirmed receipt of prep instructions and Rx prep (if applicable).  Instructions provided to patient via [facility]  Pt confirmed ride home after procedure if procedure requires anesthesia.   Pre-call screening questionnaire reviewed and completed with patient.   Appointment details are tentative, including check-in time.  If the patient begins taking any blood thinning medications, injectable weight loss/diabetes medications (other than insulin), or Adipex (phentermine) patient was instructed to contact the endoscopy scheduling department as soon as possible.  Patient was advised to call the endoscopy scheduling department if any questions or concerns arise.       [facility]oscopy Scheduling Department

## 2024-09-17 ENCOUNTER — ANESTHESIA EVENT (OUTPATIENT)
Dept: ENDOSCOPY | Facility: HOSPITAL | Age: 69
End: 2024-09-17
Payer: MEDICARE

## 2024-09-18 ENCOUNTER — HOSPITAL ENCOUNTER (OUTPATIENT)
Facility: HOSPITAL | Age: 69
Discharge: HOME | End: 2024-09-18
Attending: INTERNAL MEDICINE | Admitting: INTERNAL MEDICINE
Payer: MEDICARE

## 2024-09-18 ENCOUNTER — ANESTHESIA (OUTPATIENT)
Dept: ENDOSCOPY | Facility: HOSPITAL | Age: 69
End: 2024-09-18
Payer: MEDICARE

## 2024-09-18 VITALS
RESPIRATION RATE: 13 BRPM | WEIGHT: 194 LBS | HEART RATE: 65 BPM | HEIGHT: 63 IN | DIASTOLIC BLOOD PRESSURE: 70 MMHG | SYSTOLIC BLOOD PRESSURE: 128 MMHG | TEMPERATURE: 98 F | OXYGEN SATURATION: 100 % | BODY MASS INDEX: 34.38 KG/M2

## 2024-09-18 DIAGNOSIS — Z86.010 PERSONAL HISTORY OF COLONIC POLYPS: ICD-10-CM

## 2024-09-18 LAB
GLUCOSE SERPL-MCNC: 129 MG/DL (ref 70–110)
POCT GLUCOSE: 129 MG/DL (ref 70–110)

## 2024-09-18 PROCEDURE — 27201012 HC FORCEPS, HOT/COLD, DISP: Performed by: INTERNAL MEDICINE

## 2024-09-18 PROCEDURE — 25000003 PHARM REV CODE 250: Performed by: NURSE ANESTHETIST, CERTIFIED REGISTERED

## 2024-09-18 PROCEDURE — 37000008 HC ANESTHESIA 1ST 15 MINUTES: Performed by: INTERNAL MEDICINE

## 2024-09-18 PROCEDURE — 88305 TISSUE EXAM BY PATHOLOGIST: CPT | Performed by: PATHOLOGY

## 2024-09-18 PROCEDURE — 63600175 PHARM REV CODE 636 W HCPCS: Performed by: NURSE ANESTHETIST, CERTIFIED REGISTERED

## 2024-09-18 PROCEDURE — 45380 COLONOSCOPY AND BIOPSY: CPT | Mod: PT,,, | Performed by: INTERNAL MEDICINE

## 2024-09-18 PROCEDURE — 37000009 HC ANESTHESIA EA ADD 15 MINS: Performed by: INTERNAL MEDICINE

## 2024-09-18 PROCEDURE — 45380 COLONOSCOPY AND BIOPSY: CPT | Mod: PT | Performed by: INTERNAL MEDICINE

## 2024-09-18 PROCEDURE — 25000003 PHARM REV CODE 250: Performed by: INTERNAL MEDICINE

## 2024-09-18 PROCEDURE — 88305 TISSUE EXAM BY PATHOLOGIST: CPT | Mod: 26,,, | Performed by: PATHOLOGY

## 2024-09-18 PROCEDURE — 82962 GLUCOSE BLOOD TEST: CPT | Performed by: INTERNAL MEDICINE

## 2024-09-18 RX ORDER — PHENYLEPHRINE HYDROCHLORIDE 10 MG/ML
INJECTION INTRAVENOUS
Status: DISCONTINUED | OUTPATIENT
Start: 2024-09-18 | End: 2024-09-18

## 2024-09-18 RX ORDER — PROPOFOL 10 MG/ML
VIAL (ML) INTRAVENOUS
Status: DISCONTINUED | OUTPATIENT
Start: 2024-09-18 | End: 2024-09-18

## 2024-09-18 RX ORDER — SODIUM CHLORIDE 9 MG/ML
INJECTION, SOLUTION INTRAVENOUS CONTINUOUS
Status: DISCONTINUED | OUTPATIENT
Start: 2024-09-18 | End: 2024-09-18 | Stop reason: HOSPADM

## 2024-09-18 RX ORDER — SODIUM CHLORIDE 0.9 % (FLUSH) 0.9 %
10 SYRINGE (ML) INJECTION
Status: DISCONTINUED | OUTPATIENT
Start: 2024-09-18 | End: 2024-09-18 | Stop reason: HOSPADM

## 2024-09-18 RX ORDER — LIDOCAINE HYDROCHLORIDE 20 MG/ML
INJECTION INTRAVENOUS
Status: DISCONTINUED | OUTPATIENT
Start: 2024-09-18 | End: 2024-09-18

## 2024-09-18 RX ORDER — PROPOFOL 10 MG/ML
VIAL (ML) INTRAVENOUS CONTINUOUS PRN
Status: DISCONTINUED | OUTPATIENT
Start: 2024-09-18 | End: 2024-09-18

## 2024-09-18 RX ADMIN — SODIUM CHLORIDE: 0.9 INJECTION, SOLUTION INTRAVENOUS at 10:09

## 2024-09-18 RX ADMIN — PROPOFOL 80 MG: 10 INJECTION, EMULSION INTRAVENOUS at 11:09

## 2024-09-18 RX ADMIN — PHENYLEPHRINE HYDROCHLORIDE 100 MCG: 10 INJECTION INTRAVENOUS at 11:09

## 2024-09-18 RX ADMIN — PROPOFOL 150 MCG/KG/MIN: 10 INJECTION, EMULSION INTRAVENOUS at 11:09

## 2024-09-18 RX ADMIN — SODIUM CHLORIDE: 9 INJECTION, SOLUTION INTRAVENOUS at 10:09

## 2024-09-18 RX ADMIN — LIDOCAINE HYDROCHLORIDE 100 MG: 20 INJECTION, SOLUTION INTRAVENOUS at 11:09

## 2024-09-18 NOTE — H&P
Short Stay Endoscopy History and Physical    PCP - Alex Cast MD    Procedure - Colonoscopy  ASA - per anesthesia  Mallampati - per anesthesia  History of Anesthesia problems - no  Family history Anesthesia problems - no   Plan of anesthesia - General    HPI:  This is a 69 y.o. female here for evaluation of : personal history of colon polyps      ROS:  Constitutional: No fevers, chills, No weight loss  CV: No chest pain  Pulm: No cough, No shortness of breath  GI: see HPI  Derm: No rash    Medical History:  has a past medical history of AR (allergic rhinitis), AR (allergic rhinitis), Atrial fibrillation (12/27/2022), Carotid stenosis, Closed 4-part fracture of proximal humerus, left, initial encounter (3/7/2022), Colon polyp (10/2014), Diabetes mellitus, Diabetes mellitus, type 2, Fatty liver, GERD (gastroesophageal reflux disease), Heart murmur (1/27/2022), HLD (hyperlipidemia), HTN (hypertension), Impingement syndrome of left shoulder, Joint pain, Sleep apnea (2018), and Stricture of artery (3/25/2021).    Surgical History:  has a past surgical history that includes Skin biopsy (10yrs.) and Reverse total shoulder arthroplasty (Left, 03/07/2022).    Family History: family history includes Cancer in her mother; Cerebral palsy in her sister; Coronary artery disease (age of onset: 79) in her father; Diabetes in her brother, father, maternal aunt, maternal grandmother, and maternal uncle; Epilepsy in her sister; Heart attack in her brother; Hypertension in her daughter and another family member; No Known Problems in her son; Osteoporosis in her sister; Prostate cancer (age of onset: 57) in her brother.. Otherwise no colon cancer, inflammatory bowel disease, or GI malignancies.    Social History:  reports that she has never smoked. She has never used smokeless tobacco. She reports that she does not currently use alcohol. She reports that she does not use drugs.    Review of patient's allergies  indicates:  No Known Allergies    Medications:   Medications Prior to Admission   Medication Sig Dispense Refill Last Dose    celecoxib (CELEBREX) 200 MG capsule Take 1 capsule (200 mg total) by mouth once daily. 60 capsule 2 Past Month    chlorthalidone (HYGROTEN) 25 MG Tab Take 1 tablet (25 mg total) by mouth once daily. 90 tablet 3 9/18/2024    metFORMIN (GLUCOPHAGE-XR) 500 MG ER 24hr tablet Take 2 tablets (1,000 mg total) by mouth once daily. 180 tablet 3 9/17/2024    valsartan (DIOVAN) 320 MG tablet TAKE 1 TABLET BY MOUTH ONCE  DAILY 90 tablet 3 9/18/2024    verapamiL (VERELAN) 240 MG C24P TAKE 1 CAPSULE BY MOUTH ONCE  DAILY 90 capsule 3 9/18/2024    apixaban (ELIQUIS) 5 mg Tab Take 1 tablet (5 mg total) by mouth 2 (two) times daily. 180 tablet 3 9/15/2024    ascorbic acid, vitamin C, (VITAMIN C) 1000 MG tablet Take 1,000 mg by mouth once daily.   9/16/2024    b complex vitamins capsule Take 1 capsule by mouth once daily.   9/16/2024    blood sugar diagnostic Strp To check BG 1 times daily, to use with insurance preferred meter 100 strip 11     blood-glucose meter kit To check BG 1 times daily, to use with insurance preferred meter 1 each 0     cetirizine (ZYRTEC) 10 MG tablet Take 10 mg by mouth once daily.   9/16/2024    coenzyme Q10 200 mg capsule Take 100 mg by mouth once daily.   9/16/2024    L.ACID/L.CASEI/B.BIF/B.MELISSA/FOS (PROBIOTIC BLEND ORAL) Take 2 tablets by mouth once daily.       lancets Misc To check BG 1 times daily, to use with insurance preferred meter 100 each 11     lovastatin (MEVACOR) 20 MG tablet TAKE 1 TABLET BY MOUTH IN THE  EVENING 90 tablet 3 9/16/2024    magnesium 30 mg Tab Take 1 tablet by mouth Daily.   9/15/2024    multivitamin capsule Take 1 capsule by mouth once daily.   9/16/2024    OMEGA-3S/DHA/EPA/FISH OIL (OMEGA 3 ORAL) Take 2,800 mg by mouth once daily.   9/16/2024    omeprazole (PRILOSEC) 40 MG capsule TAKE 1 CAPSULE BY MOUTH ONCE  DAILY 90 capsule 3 9/16/2024    vitamin D  (VITAMIN D3) 1000 units Tab Take 1,000 Units by mouth once daily.   9/16/2024    vitamin E 400 UNIT capsule Take 400 Units by mouth once daily.   9/16/2024    zinc sulfate (ZINC-15 ORAL) Take by mouth.   9/16/2024         Physical Exam:    Vital Signs:   Vitals:    09/18/24 1033   BP: (!) 154/75   Pulse: 74   Resp: 18   Temp: 97.2 °F (36.2 °C)       General Appearance: Well appearing in no acute distress  Eyes:    No scleral icterus  ENT: Neck supple, Lips, mucosa, and tongue normal; teeth and gums normal  Abdomen: Soft, non tender, non distended with positive bowel sounds. No hepatosplenomegaly, ascites, or mass.  Extremities: 2+ pulses, no clubbing, cyanosis or edema  Skin: No rash      Labs:  Lab Results   Component Value Date    WBC 4.73 11/15/2023    HGB 13.7 11/15/2023    HCT 41.1 11/15/2023     11/15/2023    CHOL 159 08/22/2024    TRIG 229 (H) 08/22/2024    HDL 50 08/22/2024    ALT 71 (H) 08/22/2024    AST 69 (H) 08/22/2024     08/22/2024    K 3.9 08/22/2024     08/22/2024    CREATININE 0.8 08/22/2024    BUN 15 08/22/2024    CO2 27 08/22/2024    TSH 1.642 08/22/2024    INR 1.1 12/20/2022    HGBA1C 6.6 (H) 08/22/2024       I have explained the risks and benefits of endoscopy procedures to the patient including but not limited to bleeding, perforation, infection, and death.  The patient was asked if they understand and allowed to ask any further questions to their satisfaction.    Monster Ledezma MD

## 2024-09-18 NOTE — ANESTHESIA PREPROCEDURE EVALUATION
"[facility]  Anesthesia Pre-Operative Evaluation         Patient Name: Parisa Dimas  YOB: 1955  MRN: [id number]    SUBJECTIVE:     Pre-operative evaluation for Procedure(s) (LRB):  COLONOSCOPY (N/A)     09/18/2024    Parisa Dimas is a 69 y.o. female w/ a significant PMHx of HTN, Hld, GERD, SHOAIB, obesity, T2DM, BL BASSAM, HOCM vs Subaortic membrane     In terms of HOCM vs Subaortic membrane per last cardiology note in 2024, "Cardiac MRI 5/2022 with high velocity in LVOT but no septal obstruction and no LGE, possibly a subaortic membrane. TTE 11/2022 with no outflow tract obstruction, AV not well visualized. Stress echo 12/22 normal.   Evaluated by Dr Marrero previously. No evidence of subaortic membrane, but evidence of HCM and mild asymmetric septal hypertrophy. Recommended medical management. Also saw Dr. Fitzpatrick 11/2022 and recommended routine echo surveillance and medical management."    Patient now presents for the above procedure(s).    TTE:   Left Ventricle: The left ventricle is normal in size. Normal wall thickness. Normal wall motion. There is hyperdynamic systolic function with a visually estimated ejection fraction of 70 - 75%. Ejection fraction by visual approximation is 73%. There is normal diastolic function. The peak gradient at rest was in the low 40s and increased to mid 60s after Valsalva.    Right Ventricle: Normal right ventricular cavity size. Wall thickness is normal. Right ventricle wall motion  is normal. Systolic function is normal.    Aortic Valve: There is mild annular calcification present.    Pulmonary Artery: The estimated pulmonary artery systolic pressure is 19 mmHg.    IVC/SVC: Normal venous pressure at 3 mmHg.    Pericardium: There is a trivial posterior effusion just at base.    Patient Active Problem List   Diagnosis    HTN (hypertension)    HLD (hyperlipidemia)    Fatty liver    GERD (gastroesophageal reflux disease)    AR (allergic rhinitis)    Heel " cord tightness    Disorder of soft tissue    Foot pain, right    Tendonitis of ankle or foot    Impingement syndrome of left shoulder    History of diverticulitis    Obstructive sleep apnea hypopnea, severe    Restless leg syndrome    Type 2 diabetes mellitus with hyperglycemia, without long-term current use of insulin    Heart murmur    Bilateral carotid artery disease    Diabetic polyneuropathy associated with type 2 diabetes mellitus    Severe obesity (BMI 35.0-35.9 with comorbidity)    Decreased ROM of left shoulder    Subaortic membrane    Paroxysmal atrial fibrillation    HOCM (hypertrophic obstructive cardiomyopathy)    Decreased strength of lower extremity    Impaired functional mobility, balance, gait, and endurance       Review of patient's allergies indicates:  No Known Allergies    Current Inpatient Medications:      No current facility-administered medications on file prior to encounter.     Current Outpatient Medications on File Prior to Encounter   Medication Sig Dispense Refill    apixaban (ELIQUIS) 5 mg Tab Take 1 tablet (5 mg total) by mouth 2 (two) times daily. 180 tablet 3    ascorbic acid, vitamin C, (VITAMIN C) 1000 MG tablet Take 1,000 mg by mouth once daily.      b complex vitamins capsule Take 1 capsule by mouth once daily.      blood sugar diagnostic Strp To check BG 1 times daily, to use with insurance preferred meter 100 strip 11    blood-glucose meter kit To check BG 1 times daily, to use with insurance preferred meter 1 each 0    celecoxib (CELEBREX) 200 MG capsule Take 1 capsule (200 mg total) by mouth once daily. 60 capsule 2    cetirizine (ZYRTEC) 10 MG tablet Take 10 mg by mouth once daily.      chlorthalidone (HYGROTEN) 25 MG Tab Take 1 tablet (25 mg total) by mouth once daily. 90 tablet 3    coenzyme Q10 200 mg capsule Take 100 mg by mouth once daily.      L.ACID/L.CASEI/B.BIF/B.MELISSA/FOS (PROBIOTIC BLEND ORAL) Take 2 tablets by mouth once daily.      lancets Misc To check BG 1  times daily, to use with insurance preferred meter 100 each 11    lovastatin (MEVACOR) 20 MG tablet TAKE 1 TABLET BY MOUTH IN THE  EVENING 90 tablet 3    magnesium 30 mg Tab Take 1 tablet by mouth Daily.      multivitamin capsule Take 1 capsule by mouth once daily.      OMEGA-3S/DHA/EPA/FISH OIL (OMEGA 3 ORAL) Take 2,800 mg by mouth once daily.      omeprazole (PRILOSEC) 40 MG capsule TAKE 1 CAPSULE BY MOUTH ONCE  DAILY 90 capsule 3    vitamin D (VITAMIN D3) 1000 units Tab Take 1,000 Units by mouth once daily.      vitamin E 400 UNIT capsule Take 400 Units by mouth once daily.      zinc sulfate (ZINC-15 ORAL) Take by mouth.         Past Surgical History:   Procedure Laterality Date    REVERSE TOTAL SHOULDER ARTHROPLASTY Left 3/7/2022    Procedure: ARTHROPLASTY, SHOULDER, TOTAL, REVERSE;  Surgeon: MISSY Santoyo MD;  Location: [facility] OR [address] [place] [address];  Service: Orthopedics;  Laterality: Left;  AND BICEP SYNDEMOSIS    SKIN BIOPSY  10yrs.    negative       OBJECTIVE:     Vital Signs Range (Last 24H):         Significant Labs:  Lab Results   Component Value Date    WBC 4.73 11/15/2023    HGB 13.7 11/15/2023    HCT 41.1 11/15/2023     11/15/2023    CHOL 159 08/22/2024    TRIG 229 (H) 08/22/2024    HDL 50 08/22/2024    ALT 71 (H) 08/22/2024    AST 69 (H) 08/22/2024     08/22/2024    K 3.9 08/22/2024     08/22/2024    CREATININE 0.8 08/22/2024    BUN 15 08/22/2024    CO2 27 08/22/2024    TSH 1.642 08/22/2024    INR 1.1 12/20/2022    HGBA1C 6.6 (H) 08/22/2024       Diagnostic Studies: No relevant studies.    EKG:   Results for orders placed or performed during the hospital encounter of 12/27/22   EKG 12-LEAD    Collection Time: 12/27/22  8:42 AM    Narrative    Test Reason : I48.91,I48.91,    Vent. Rate : 079 BPM     Atrial Rate : 079 BPM     P-R Int : 218 ms          QRS Dur : 074 ms      QT Int : 380 ms       P-R-T Axes : 065 031 044 degrees     QTc Int : 435 ms    Sinus rhythm with 1st degree A-V block with  Premature atrial complexes  Possible Left atrial enlargement  Low anterior forces  Abnormal ECG  When compared with ECG of 14-DEC-2022 07:59,  No significant change was found    Confirmed by ERMELINDA CLEMENT MD ([id number]) on 12/27/2022 10:03:39 AM    Referred By: NITHYA GONZALEZ           Confirmed By:ERMELINDA CLEMENT MD       ASSESSMENT/PLAN:           Pre-op Assessment    I have reviewed the Patient Summary Reports.     I have reviewed the Nursing Notes. I have reviewed the NPO Status.   I have reviewed the Medications.     Review of Systems  Anesthesia Hx:  No problems with previous Anesthesia             Denies Family Hx of Anesthesia complications.     EENT/Dental:  chronic allergic rhinitis           Cardiovascular:     Hypertension               HOCM vs subaortic membrane under echo surveillance                          Pulmonary:        Sleep Apnea                Hepatic/GI:     GERD Liver Disease,            Neurological:  Neurology Normal                                      Endocrine:  Diabetes, type 2         Obesity / BMI > 30      Physical Exam  General: Cooperative, Alert and Oriented    Airway:  Mallampati: III   Mouth Opening: Normal  TM Distance: Normal  Tongue: Normal  Neck ROM: Normal ROM    Dental:  Intact        Anesthesia Plan  Type of Anesthesia, risks & benefits discussed:    Anesthesia Type: Gen Natural Airway  Intra-op Monitoring Plan: Standard ASA Monitors  Post Op Pain Control Plan: multimodal analgesia  Induction:  IV  Informed Consent: Informed consent signed with the Patient and all parties understand the risks and agree with anesthesia plan.  All questions answered.   ASA Score: 3  Day of Surgery Review of History & Physical: H&P Update referred to the surgeon/provider.    Ready For Surgery From Anesthesia Perspective.     .

## 2024-09-18 NOTE — [employer] PATIENT INSTRUCTIONS
Discharge Summary/Instructions after an Endoscopic Procedure  Patient Name: Parisa Dimas  Patient MRN: [id number]  Patient YOB: 1955  Wednesday, September 18, 2024  Monster Ledezma MD  Dear patient,  As a result of recent federal legislation (The Federal Cures Act), you may   receive lab or pathology results from your procedure in your [facility]   account before your physician is able to contact you. Your physician or   their representative will relay the results to you with their   recommendations at their soonest availability.  Thank you,  Your health is very important to us during the Covid Crisis. Following your   procedure today, you will receive a daily text for 2 weeks asking about   signs or symptoms of Covid 19.  Please respond to this text when you   receive it so we can follow up and keep you as safe as possible.   RESTRICTIONS:  During your procedure today, you received medications for sedation.  These   medications may affect your judgment, balance and coordination.  Therefore,   for 24 hours, you have the following restrictions:   - DO NOT drive a car, operate machinery, make legal/financial decisions,   sign important papers or drink alcohol.    ACTIVITY:  Today: no heavy lifting, straining or running due to procedural   sedation/anesthesia.  The following day: return to full activity including work.  DIET:  Eat and drink normally unless instructed otherwise.     TREATMENT FOR COMMON SIDE EFFECTS:  - Mild abdominal pain, nausea, belching, bloating or excessive gas:  rest,   eat lightly and use a heating pad.  - Sore Throat: treat with throat lozenges and/or gargle with warm salt   water.  - Because air was used during the procedure, expelling large amounts of air   from your rectum or belching is normal.  - If a bowel prep was taken, you may not have a bowel movement for 1-3 days.    This is normal.  SYMPTOMS TO WATCH FOR AND REPORT TO YOUR PHYSICIAN:  1. Abdominal pain or bloating, other  than gas cramps.  2. Chest pain.  3. Back pain.  4. Signs of infection such as: chills or fever occurring within 24 hours   after the procedure.  5. Rectal bleeding, which would show as bright red, maroon, or black stools.   (A tablespoon of blood from the rectum is not serious, especially if   hemorrhoids are present.)  6. Vomiting.  7. Weakness or dizziness.  GO DIRECTLY TO THE NEAREST EMERGENCY ROOM IF YOU HAVE ANY OF THE FOLLOWING:      Difficulty breathing              Chills and/or fever over 101 F   Persistent vomiting and/or vomiting blood   Severe abdominal pain   Severe chest pain   Black, tarry stools   Bleeding- more than one tablespoon   Any other symptom or condition that you feel may need urgent attention  Your doctor recommends these additional instructions:  If any biopsies were taken, your doctors clinic will contact you in 1 to 2   weeks with any results.  - Discharge patient to home.   - Patient has a contact number available for emergencies.  The signs and   symptoms of potential delayed complications were discussed with the   patient.  Return to normal activities tomorrow.  Written discharge   instructions were provided to the patient.   - Resume previous diet.   - Continue present medications.   - Await pathology results.   - Repeat colonoscopy in 5 years for surveillance.  For questions, problems or results please call your physician - Monster Ledezma MD.  EMERGENCY PHONE NUMBER: [phone],  LAB RESULTS: [phone]  IF A COMPLICATION OR EMERGENCY SITUATION ARISES AND YOU ARE UNABLE TO REACH   YOUR PHYSICIAN - GO DIRECTLY TO THE EMERGENCY ROOM.  Monster Ledezma MD  9/18/2024 11:53:00 AM  This report has been verified and signed electronically.  Dear patient,  As a result of recent federal legislation (The Federal Cures Act), you may   receive lab or pathology results from your procedure in your [facility]   account before your physician is able to contact you. Your physician or    their representative will relay the results to you with their   recommendations at their soonest availability.  Thank you,  [employer]

## 2024-09-20 LAB
FINAL PATHOLOGIC DIAGNOSIS: NORMAL
GROSS: NORMAL
Lab: NORMAL

## 2025-01-14 RX ORDER — CHLORTHALIDONE 25 MG/1
25 TABLET ORAL DAILY
Qty: 90 TABLET | Refills: 3 | Status: SHIPPED | OUTPATIENT
Start: 2025-01-14 | End: 2026-01-14

## 2025-01-23 RX ORDER — LOVASTATIN 20 MG/1
TABLET ORAL
Qty: 90 TABLET | Refills: 1 | Status: SHIPPED | OUTPATIENT
Start: 2025-01-23

## 2025-01-24 NOTE — TELEPHONE ENCOUNTER
No care due was identified.  [facility] Embedded Care Due Messages. Reference number: [id number].   1/23/2025 9:39:44 PM CST

## 2025-01-24 NOTE — TELEPHONE ENCOUNTER
Refill Decision Note   Parisa Dimas  is requesting a refill authorization.  Brief Assessment and Rationale for Refill:  Approve     Medication Therapy Plan:         Comments:     Note composed:11:22 PM 01/23/2025

## 2025-01-27 ENCOUNTER — OFFICE VISIT (OUTPATIENT)
Dept: FAMILY MEDICINE | Facility: CLINIC | Age: 70
End: 2025-01-27
Payer: MEDICARE

## 2025-01-27 VITALS
OXYGEN SATURATION: 96 % | HEART RATE: 83 BPM | TEMPERATURE: 98 F | BODY MASS INDEX: 35.62 KG/M2 | SYSTOLIC BLOOD PRESSURE: 130 MMHG | HEIGHT: 63 IN | WEIGHT: 201.06 LBS | DIASTOLIC BLOOD PRESSURE: 70 MMHG

## 2025-01-27 DIAGNOSIS — I48.91 ATRIAL FIBRILLATION, UNSPECIFIED TYPE: ICD-10-CM

## 2025-01-27 DIAGNOSIS — Z12.31 OTHER SCREENING MAMMOGRAM: ICD-10-CM

## 2025-01-27 DIAGNOSIS — E78.5 HYPERLIPIDEMIA ASSOCIATED WITH TYPE 2 DIABETES MELLITUS: ICD-10-CM

## 2025-01-27 DIAGNOSIS — G47.33 OSA (OBSTRUCTIVE SLEEP APNEA): ICD-10-CM

## 2025-01-27 DIAGNOSIS — Z01.818 PREOP EXAMINATION: Primary | ICD-10-CM

## 2025-01-27 DIAGNOSIS — I10 PRIMARY HYPERTENSION: ICD-10-CM

## 2025-01-27 DIAGNOSIS — E11.69 HYPERLIPIDEMIA ASSOCIATED WITH TYPE 2 DIABETES MELLITUS: ICD-10-CM

## 2025-01-27 DIAGNOSIS — H26.9 CATARACT, UNSPECIFIED CATARACT TYPE, UNSPECIFIED LATERALITY: ICD-10-CM

## 2025-01-27 DIAGNOSIS — I42.1 HOCM (HYPERTROPHIC OBSTRUCTIVE CARDIOMYOPATHY): ICD-10-CM

## 2025-01-27 DIAGNOSIS — E11.40 TYPE 2 DIABETES MELLITUS WITH DIABETIC NEUROPATHY, UNSPECIFIED WHETHER LONG TERM INSULIN USE: ICD-10-CM

## 2025-01-27 PROCEDURE — 3078F DIAST BP <80 MM HG: CPT | Mod: CPTII,S$GLB,, | Performed by: FAMILY MEDICINE

## 2025-01-27 PROCEDURE — 1101F PT FALLS ASSESS-DOCD LE1/YR: CPT | Mod: CPTII,S$GLB,, | Performed by: FAMILY MEDICINE

## 2025-01-27 PROCEDURE — 99999 PR PBB SHADOW E&M-EST. PATIENT-LVL V: CPT | Mod: PBBFAC,,, | Performed by: FAMILY MEDICINE

## 2025-01-27 PROCEDURE — 1160F RVW MEDS BY RX/DR IN RCRD: CPT | Mod: CPTII,S$GLB,, | Performed by: FAMILY MEDICINE

## 2025-01-27 PROCEDURE — 3008F BODY MASS INDEX DOCD: CPT | Mod: CPTII,S$GLB,, | Performed by: FAMILY MEDICINE

## 2025-01-27 PROCEDURE — 3075F SYST BP GE 130 - 139MM HG: CPT | Mod: CPTII,S$GLB,, | Performed by: FAMILY MEDICINE

## 2025-01-27 PROCEDURE — 3288F FALL RISK ASSESSMENT DOCD: CPT | Mod: CPTII,S$GLB,, | Performed by: FAMILY MEDICINE

## 2025-01-27 PROCEDURE — 1126F AMNT PAIN NOTED NONE PRSNT: CPT | Mod: CPTII,S$GLB,, | Performed by: FAMILY MEDICINE

## 2025-01-27 PROCEDURE — G2211 COMPLEX E/M VISIT ADD ON: HCPCS | Mod: S$GLB,,, | Performed by: FAMILY MEDICINE

## 2025-01-27 PROCEDURE — 99214 OFFICE O/P EST MOD 30 MIN: CPT | Mod: S$GLB,,, | Performed by: FAMILY MEDICINE

## 2025-01-27 PROCEDURE — 1159F MED LIST DOCD IN RCRD: CPT | Mod: CPTII,S$GLB,, | Performed by: FAMILY MEDICINE

## 2025-01-27 RX ORDER — LANCETS 26 GAUGE
EACH MISCELLANEOUS
Qty: 1 EACH | Refills: 0 | Status: SHIPPED | OUTPATIENT
Start: 2025-01-27

## 2025-01-27 NOTE — PATIENT INSTRUCTIONS
Schedule Lab Appointments from [employer]    1. From the top toolbar, click on MAKE APPOINTMENT.        2. Select LAB SERVICES.        3. Answer the question by selecting the appropriate  Answer.        4. Answer the question by selecting the appropriate option.        5. Answer the question by selecting YES or NO.        6. Select the LOCATION.        7. Choose an appointment time slot.        8. Verify and schedule the appointment. Tap Schedule it at the bottom of the screen.          9. Your LAB appointment is now SCHEDULED.

## 2025-01-27 NOTE — PROGRESS NOTES
(Portions of this note were dictated using voice recognition software and may contain dictation related errors in spelling/grammar/syntax not found on text review)    CC:   Chief Complaint   Patient presents with    Pre-op Exam       HPI: 69 y.o. female      Preop exam for cataracts:   Date January 30th ,February 13th  Surgeon Dr. Villanueva    Medical history:       Had Left reverse shoulder arthroplasty secondary to comminuted fracture left proximal humerus that occurred on 12/22/2021.  Surgery was in March 2022.  Initially  some concern about heart murmur leading to further evaluation suspicious mitral vegetation.  Had seen cardiology   PET CT was negative for increased uptake in that area.  Was referred for cardiac MRI although was not able to get that done prior to surgery and it was deemed not necessary to delay that workup.  It was later done May 24, 2022 showing EF 69%, no infiltrative disease noted, high velocity flow noted left ventricular outflow tract without evidence of septal obstruction, DDX including subaortic membrane, however only trivial AI was detected.          echo 11/16/2022 showed no evidence of outflow obstruction.  Updated echo 01/10/2024 for surveillance shows no outflow obstruction.  She had a stress test done in 2022 that was negative for ischemic disease.  Ejection fraction 65%, normal diastolic function.  Occasional PVCs observed.    She subsequently was noted to have new onset atrial fibrillation in December 20, 2022.  Started Eliquis, verapamil 240 mg daily.  Last cardiology visit was 01/02/2024.  Last EP visit was August of 2024.  Had noted some occasional paroxysms of AFib, had recommended continued treatment as is and return in 1 year unless any worsening.     hypertension on valsartan 320 mg daily , verapamil 240 mg daily in place of amlodipine, chlorthalidone 25 mg daily    30 day average blood pressure through digital HTN program    136/68    Most recent echo 01/02/2024: EF 70-75%,  mild aortic annular calcification    hyperlipidemia on lovastatin 20 mg daily    fatty liver disease last ultrasound 2022    carotid  atherosclerosis but with no significant stenosis  (ultrasound 2018)    diabetes on metformin extended release 1000 mg daily.  Did have elevated microalbumin on recent testing in August of 2024    sleep apnea intolerant of CPAP.  Sleeps in a recliner, sleeping on incline is okay but when she sleeps flat she has some struggling with breathing.  States that when she tried CPAP in the past she was got very anxious having the mask on her.  Had heard about the INSPIRE procedure for sleep apnea was wondering if this was offered.  Had placed referral to sleep Medicine at last appointment, had appointment on 07/11 but was not completed    Does admit to eating too many sweets, not much exercise        Past Medical History:   Diagnosis Date    AR (allergic rhinitis)     AR (allergic rhinitis)     Atrial fibrillation 12/27/2022    Carotid stenosis     50% RCA    Closed 4-part fracture of proximal humerus, left, initial encounter 03/07/2022    Colon polyp 10/2014    Diabetes mellitus     Diabetes mellitus, type 2     Fatty liver     GERD (gastroesophageal reflux disease)     Heart murmur 01/27/2022    HLD (hyperlipidemia)     HTN (hypertension)     Impingement syndrome of left shoulder     Joint pain     Sleep apnea 2018    Stricture of artery 03/25/2021       Past Surgical History:   Procedure Laterality Date    COLONOSCOPY N/A 9/18/2024    Procedure: COLONOSCOPY;  Surgeon: Monster Ledezma MD;  Location: [facility];  Service: Endoscopy;  Laterality: N/A;  Referred by Alex Cast MD, pending Eliquis, PEG, portal -ml  ok to hold Eliquis 2 days per Dr Jernigan/MADY Malin RN-GT  9/13/24-Precall complete-DS    REVERSE TOTAL SHOULDER ARTHROPLASTY Left 03/07/2022    Procedure: ARTHROPLASTY, SHOULDER, TOTAL, REVERSE;  Surgeon: MISSY Santoyo MD;  Location: [facility] OR [address] [place] [address];  Service:  Orthopedics;  Laterality: Left;  AND BICEP SYNDEMOSIS    SKIN BIOPSY  10yrs.    negative       Family History   Problem Relation Name Age of Onset    Cancer Mother          gallbladder    Coronary artery disease Father  79    Diabetes Father      Cerebral palsy Sister x1     Osteoporosis Sister x1     Epilepsy Sister x1     Prostate cancer Brother x1 57    Diabetes Brother x1     Heart attack Brother x1     Hypertension Daughter x2     No Known Problems Son x2     Diabetes Maternal Aunt      Diabetes Maternal Uncle      Diabetes Maternal Grandmother      Hypertension Other          multiple    Melanoma Neg Hx         Social History     Tobacco Use    Smoking status: Never    Smokeless tobacco: Never   Substance Use Topics    Alcohol use: Not Currently     Alcohol/week: 0.0 standard drinks of alcohol    Drug use: No       Lab Results   Component Value Date    WBC 4.73 11/15/2023    HGB 13.7 11/15/2023    HCT 41.1 11/15/2023    MCV 92 11/15/2023     11/15/2023    CHOL 159 08/22/2024    TRIG 229 (H) 08/22/2024    HDL 50 08/22/2024    ALT 71 (H) 08/22/2024    AST 69 (H) 08/22/2024    BILITOT 0.6 08/22/2024    ALKPHOS 61 08/22/2024     08/22/2024    K 3.9 08/22/2024     08/22/2024    CREATININE 0.8 08/22/2024    ESTGFRAFRICA >60.0 02/08/2022    EGFRNONAA >60.0 02/08/2022    CALCIUM 10.4 08/22/2024    ALBUMIN 4.1 08/22/2024    BUN 15 08/22/2024    CO2 27 08/22/2024    TSH 1.642 08/22/2024    INR 1.1 12/20/2022    HGBA1C 6.6 (H) 08/22/2024    MICALBCREAT 83.1 (H) 08/22/2024    LDLCALC 63.2 08/22/2024     (H) 08/22/2024    [id number] 39 07/22/2021       AST (U/L)   Date Value   08/22/2024 69 (H)   05/16/2024 58 (H)   11/15/2023 56 (H)   10/25/2022 85 (H)   01/27/2022 45 (H)   07/22/2021 54 (H)   12/04/2020 52 (H)   10/28/2019 49 (H)   11/08/2018 67 (H)   04/06/2018 31     ALT (U/L)   Date Value   08/22/2024 71 (H)   05/16/2024 76 (H)   11/15/2023 80 (H)   10/25/2022 103 (H)   01/27/2022 67 (H)    07/22/2021 78 (H)   12/04/2020 71 (H)   10/28/2019 77 (H)   11/08/2018 106 (H)   04/06/2018 50 (H)     Hemoglobin A1C (%)   Date Value   08/22/2024 6.6 (H)   05/16/2024 6.8 (H)   11/15/2023 6.1 (H)   10/25/2022 6.9 (H)   01/27/2022 5.9 (H)   07/22/2021 6.3 (H)   12/04/2020 6.2 (H)   10/28/2019 6.2 (H)   11/08/2018 5.9 (H)   04/06/2018 5.5           Vital signs reviewed  Wt Readings from Last 10 Encounters:   01/27/25 91.2 kg (201 lb 1 oz)   09/18/24 88 kg (194 lb)   08/29/24 89.1 kg (196 lb 6.9 oz)   08/27/24 90.5 kg (199 lb 8.3 oz)   07/26/24 89.8 kg (198 lb)   07/22/24 90.1 kg (198 lb 8.4 oz)   05/27/24 91.9 kg (202 lb 9.6 oz)   01/10/24 93 kg (205 lb)   01/10/24 93 kg (205 lb 0.4 oz)   01/02/24 93.3 kg (205 lb 11 oz)       PE:   APPEARANCE: Well nourished, well developed, in no acute distress.    HEAD: Normocephalic, atraumatic.  EYES: PERRL. EOMI.   Conjunctivae noninjected.  EARS: TM's intact. Light reflex normal. No retraction or perforation  NOSE: Mucosa pink. Airway clear.  MOUTH & THROAT: No tonsillar enlargement. No pharyngeal erythema or exudate.   NECK: Supple with no cervical lymphadenopathy.  No carotid bruits.  No thyromegaly  CHEST: Good inspiratory effort. Lungs clear to auscultation with no wheezes or crackles.  CARDIOVASCULAR: Normal S1, S2.  Occasional ectopy noted.  3/6 systolic ejection murmur at the aortic region radiating up to both carotid arteries.  ABDOMEN: Bowel sounds normal. Not distended. Soft. No tenderness or masses. No organomegaly.  EXTREMITIES:             IMPRESSION  1. Preop examination    2. Cataract, unspecified cataract type, unspecified laterality    3. Type 2 diabetes mellitus with diabetic neuropathy, unspecified whether long term insulin use    4. Atrial fibrillation, unspecified type    5. SHOAIB (obstructive sleep apnea)    6. Hyperlipidemia associated with type 2 diabetes mellitus    7. Primary hypertension    8. HOCM (hypertrophic obstructive cardiomyopathy)    9.  Other screening mammogram                    PLAN  Orders Placed This Encounter   Procedures    Mammo Digital Screening Bilat w/ Scar    CBC Auto Differential    Comprehensive Metabolic Panel    Lipid Panel    TSH    Hemoglobin A1C    Microalbumin/Creatinine Ratio, Urine     Update labs, she will schedule on her portal     Diabetes stable, A1c 6.6 .  Continue current therapy  Continue statin     Lipids stable     Fatty liver, elevated transaminases chronic stable.  Emphasize healthy diet, exercise, weight loss    AFib: Continue rate control with verapamil, Eliquis for stroke prevention.  Prior HCM noted on cardiac MRI although most recent echo in January of 2024 did not show any significant outflow obstruction noted.  EF 73%, normal diastolic function.  Will follow back up with Cardiology, referral placed as she is due for her visit this year.    BP stable.  Continue digital medicine follow-up     Advise to reschedule his sleep medicine that she had to cancel her appointment because of some conflict.    Medically stable for cataract procedure. Will fill out form.  Her ophthalmologist form states that she does not need to hold her Eliquis for her procedure.      SCREENINGS:  Colonoscopy 2024:  Hemorrhoids, non bleeding.  Diverticulosis.  5 polyps transverse/ascending/cecum, 2-4 mm.  Repeat in 5 years     GYN: Dr. Cynthia Dinero     MMG 12/11/2023, orders in     Tetanus: 2012, can get repeat at pharmacy   PVX:   March 2018  Prevnar 2019  Flu utd  Look into getting the Shingles vaccine (SHINGRIX) at your local pharmacy (2 part series, done once at/after age 50)  COVID booster eligible       DEXA 2024 normal bone density

## 2025-02-20 DIAGNOSIS — I48.91 NEW ONSET ATRIAL FIBRILLATION: ICD-10-CM

## 2025-03-03 ENCOUNTER — HOSPITAL ENCOUNTER (OUTPATIENT)
Dept: RADIOLOGY | Facility: HOSPITAL | Age: 70
Discharge: HOME | End: 2025-03-03
Attending: FAMILY MEDICINE
Payer: MEDICARE

## 2025-03-03 ENCOUNTER — OFFICE VISIT (OUTPATIENT)
Dept: CARDIOLOGY | Facility: CLINIC | Age: 70
End: 2025-03-03
Payer: MEDICARE

## 2025-03-03 VITALS
SYSTOLIC BLOOD PRESSURE: 158 MMHG | DIASTOLIC BLOOD PRESSURE: 80 MMHG | HEIGHT: 63 IN | BODY MASS INDEX: 35.43 KG/M2 | OXYGEN SATURATION: 97 % | HEART RATE: 76 BPM | WEIGHT: 199.94 LBS

## 2025-03-03 DIAGNOSIS — I48.0 PAROXYSMAL ATRIAL FIBRILLATION: ICD-10-CM

## 2025-03-03 DIAGNOSIS — Z12.31 OTHER SCREENING MAMMOGRAM: ICD-10-CM

## 2025-03-03 DIAGNOSIS — I77.9 BILATERAL CAROTID ARTERY DISEASE, UNSPECIFIED TYPE: ICD-10-CM

## 2025-03-03 DIAGNOSIS — G47.33 OBSTRUCTIVE SLEEP APNEA HYPOPNEA, SEVERE: ICD-10-CM

## 2025-03-03 DIAGNOSIS — I48.91 ATRIAL FIBRILLATION, UNSPECIFIED TYPE: ICD-10-CM

## 2025-03-03 DIAGNOSIS — I10 PRIMARY HYPERTENSION: ICD-10-CM

## 2025-03-03 DIAGNOSIS — Q24.4 SUBAORTIC MEMBRANE: Primary | ICD-10-CM

## 2025-03-03 DIAGNOSIS — E66.01 SEVERE OBESITY (BMI 35.0-35.9 WITH COMORBIDITY): ICD-10-CM

## 2025-03-03 DIAGNOSIS — E11.65 TYPE 2 DIABETES MELLITUS WITH HYPERGLYCEMIA, WITHOUT LONG-TERM CURRENT USE OF INSULIN: ICD-10-CM

## 2025-03-03 DIAGNOSIS — E78.2 MIXED HYPERLIPIDEMIA: ICD-10-CM

## 2025-03-03 PROBLEM — I42.1 HOCM (HYPERTROPHIC OBSTRUCTIVE CARDIOMYOPATHY): Status: RESOLVED | Noted: 2023-02-14 | Resolved: 2025-03-03

## 2025-03-03 PROCEDURE — 3077F SYST BP >= 140 MM HG: CPT | Mod: CPTII,S$GLB,, | Performed by: PHYSICIAN ASSISTANT

## 2025-03-03 PROCEDURE — 77067 SCR MAMMO BI INCL CAD: CPT | Mod: 26,,, | Performed by: RADIOLOGY

## 2025-03-03 PROCEDURE — 99214 OFFICE O/P EST MOD 30 MIN: CPT | Mod: S$GLB,,, | Performed by: PHYSICIAN ASSISTANT

## 2025-03-03 PROCEDURE — 3008F BODY MASS INDEX DOCD: CPT | Mod: CPTII,S$GLB,, | Performed by: PHYSICIAN ASSISTANT

## 2025-03-03 PROCEDURE — 1126F AMNT PAIN NOTED NONE PRSNT: CPT | Mod: CPTII,S$GLB,, | Performed by: PHYSICIAN ASSISTANT

## 2025-03-03 PROCEDURE — 99999 PR PBB SHADOW E&M-EST. PATIENT-LVL V: CPT | Mod: PBBFAC,,, | Performed by: PHYSICIAN ASSISTANT

## 2025-03-03 PROCEDURE — 3051F HG A1C>EQUAL 7.0%<8.0%: CPT | Mod: CPTII,S$GLB,, | Performed by: PHYSICIAN ASSISTANT

## 2025-03-03 PROCEDURE — 77063 BREAST TOMOSYNTHESIS BI: CPT | Mod: 26,,, | Performed by: RADIOLOGY

## 2025-03-03 PROCEDURE — 3288F FALL RISK ASSESSMENT DOCD: CPT | Mod: CPTII,S$GLB,, | Performed by: PHYSICIAN ASSISTANT

## 2025-03-03 PROCEDURE — 1159F MED LIST DOCD IN RCRD: CPT | Mod: CPTII,S$GLB,, | Performed by: PHYSICIAN ASSISTANT

## 2025-03-03 PROCEDURE — 1160F RVW MEDS BY RX/DR IN RCRD: CPT | Mod: CPTII,S$GLB,, | Performed by: PHYSICIAN ASSISTANT

## 2025-03-03 PROCEDURE — 1101F PT FALLS ASSESS-DOCD LE1/YR: CPT | Mod: CPTII,S$GLB,, | Performed by: PHYSICIAN ASSISTANT

## 2025-03-03 PROCEDURE — 77067 SCR MAMMO BI INCL CAD: CPT | Mod: TC

## 2025-03-03 PROCEDURE — 3079F DIAST BP 80-89 MM HG: CPT | Mod: CPTII,S$GLB,, | Performed by: PHYSICIAN ASSISTANT

## 2025-03-03 RX ORDER — TIMOLOL MALEATE 5 MG/ML
1 SOLUTION/ DROPS OPHTHALMIC
COMMUNITY
Start: 2025-02-14

## 2025-03-03 NOTE — PROGRESS NOTES
"    General Cardiology Clinic Note  Reason for Visit: Follow up   Last Clinic Visit: 1/2/2024    HPI:   Parisa Dimas is a 69 y.o. female who presents for follow up.     Problems:   Subaortic membrane  Hypertension  Hyperlipidemia   Atrial fibrillation  Mild carotid artery disease   Type 2 DM  SHOAIB, intolerant to CPAP   Venous insufficiency   GERD    Interval HPI  Patient presents for follow up. She states every once in a while she goes into AF. Episodes typically occur every couple months, and usually last about a day before reverting to sinus rhythm. The last episodes she had lasted an entire week. They seem to be triggered by over exertion or fatigue. She reports TOMLINSON with prolonged walking or moderate exertion. This has been stable since last visit. She denies orthopnea, PND, edema, syncope, near syncope, weight gain, chest pain. She doesn't do any regimented exercise.     1/2/2024 HPI  Patient presents for routine follow up. She reports occasional burning chest pain at rest radiating up throat that is relieved with TUMS and she attributes to GERD. This occurred last night as she was getting ready for bed. She states she was in Afib for about a week in the beginning of last month. She states she felt "funny" and her Apple Watch alerted her that she was in Afib. She spontaneously converted back to sinus rhythm. She denies TOMLINSON, pedal edema, PND, rapid weight gain, syncope, near syncope, bleeding episodes, and symptoms of TIA. She does PT for sciatica. Started about a month ago. Otherwise not doing much exercise. She denies chest pain during PT.     ROS:      Review of Systems   Constitutional: Negative for diaphoresis, malaise/fatigue, weight gain and weight loss.   HENT:  Negative for nosebleeds.    Eyes:  Negative for vision loss in left eye, vision loss in right eye and visual disturbance.   Cardiovascular:  Negative for chest pain, claudication, dyspnea on exertion, irregular heartbeat, leg swelling, " near-syncope, orthopnea, palpitations, paroxysmal nocturnal dyspnea and syncope.   Respiratory:  Negative for cough, shortness of breath, sleep disturbances due to breathing, snoring and wheezing.    Hematologic/Lymphatic: Negative for bleeding problem. Does not bruise/bleed easily.   Skin:  Negative for poor wound healing and rash.   Musculoskeletal:  Negative for muscle cramps and myalgias.   Gastrointestinal:  Positive for heartburn. Negative for bloating, abdominal pain, diarrhea, melena, nausea and vomiting.   Genitourinary:  Negative for hematuria and nocturia.   Neurological:  Negative for brief paralysis, dizziness, headaches, light-headedness, numbness and weakness.   Psychiatric/Behavioral:  Negative for depression.    Allergic/Immunologic: Negative for hives.       PMH:     Past Medical History:   Diagnosis Date    AR (allergic rhinitis)     AR (allergic rhinitis)     Atrial fibrillation 12/27/2022    Carotid stenosis     50% RCA    Closed 4-part fracture of proximal humerus, left, initial encounter 03/07/2022    Colon polyp 10/2014    Diabetes mellitus     Diabetes mellitus, type 2     Fatty liver     GERD (gastroesophageal reflux disease)     Heart murmur 01/27/2022    HLD (hyperlipidemia)     HTN (hypertension)     Impingement syndrome of left shoulder     Joint pain     Sleep apnea 2018    Stricture of artery 03/25/2021     Past Surgical History:   Procedure Laterality Date    COLONOSCOPY N/A 9/18/2024    Procedure: COLONOSCOPY;  Surgeon: Monster Ledezma MD;  Location: [facility];  Service: Endoscopy;  Laterality: N/A;  Referred by Alex Cast MD, pending Eliquis, PEG, portal -ml  ok to hold Eliquis 2 days per Dr Jernigan/MADY Malin RN-GT  9/13/24-Precall complete-DS    REVERSE TOTAL SHOULDER ARTHROPLASTY Left 03/07/2022    Procedure: ARTHROPLASTY, SHOULDER, TOTAL, REVERSE;  Surgeon: MISSY Santoyo MD;  Location: [facility] [address] [place] [address];  Service: Orthopedics;  Laterality: Left;  AND BICEP  SYNDEMOSIS    SKIN BIOPSY  10yrs.    negative     Allergies:   Review of patient's allergies indicates:  No Known Allergies  Medications:     Current Outpatient Medications on File Prior to Visit   Medication Sig Dispense Refill    apixaban (ELIQUIS) 5 mg Tab Take 1 tablet (5 mg total) by mouth 2 (two) times daily. 180 tablet 3    ascorbic acid, vitamin C, (VITAMIN C) 1000 MG tablet Take 1,000 mg by mouth once daily.      b complex vitamins capsule Take 1 capsule by mouth once daily.      blood sugar diagnostic Strp To check BG 1 times daily, to use with insurance preferred meter 100 strip 11    blood-glucose meter kit To check BG 1 times daily, to use with insurance preferred meter 1 each 0    celecoxib (CELEBREX) 200 MG capsule Take 1 capsule (200 mg total) by mouth once daily. 60 capsule 2    cetirizine (ZYRTEC) 10 MG tablet Take 10 mg by mouth once daily.      chlorthalidone (HYGROTEN) 25 MG Tab Take 1 tablet (25 mg total) by mouth once daily. 90 tablet 3    coenzyme Q10 200 mg capsule Take 100 mg by mouth once daily.      L.ACID/L.CASEI/B.BIF/B.MELISSA/FOS (PROBIOTIC BLEND ORAL) Take 2 tablets by mouth once daily.      lancets Misc To check BG 1 times daily, to use with insurance preferred meter 100 each 11    lancing device with lancets Kit Use with lancets 1 each 0    lovastatin (MEVACOR) 20 MG tablet TAKE 1 TABLET BY MOUTH IN THE  EVENING 90 tablet 1    magnesium 30 mg Tab Take 1 tablet by mouth Daily.      metFORMIN (GLUCOPHAGE-XR) 500 MG ER 24hr tablet Take 2 tablets (1,000 mg total) by mouth once daily. 180 tablet 3    multivitamin capsule Take 1 capsule by mouth once daily.      OMEGA-3S/DHA/EPA/FISH OIL (OMEGA 3 ORAL) Take 2,800 mg by mouth once daily.      omeprazole (PRILOSEC) 40 MG capsule TAKE 1 CAPSULE BY MOUTH ONCE  DAILY 90 capsule 2    timolol maleate 0.5% (TIMOPTIC) 0.5 % Drop Place 1 drop into the left eye.      valsartan (DIOVAN) 320 MG tablet TAKE 1 TABLET BY MOUTH ONCE  DAILY 90 tablet 3     "verapamiL (VERELAN) 240 MG C24P TAKE 1 CAPSULE BY MOUTH ONCE  DAILY 90 capsule 3    vitamin D (VITAMIN D3) 1000 units Tab Take 1,000 Units by mouth once daily.      vitamin E 400 UNIT capsule Take 400 Units by mouth once daily.      zinc sulfate (ZINC-15 ORAL) Take by mouth.       No current facility-administered medications on file prior to visit.     Social History:     Social History     Tobacco Use    Smoking status: Never    Smokeless tobacco: Never   Substance Use Topics    Alcohol use: Not Currently     Alcohol/week: 0.0 standard drinks of alcohol     Family History:     Family History   Problem Relation Name Age of Onset    Cancer Mother          gallbladder    Coronary artery disease Father  79    Diabetes Father      Cerebral palsy Sister x1     Osteoporosis Sister x1     Epilepsy Sister x1     Prostate cancer Brother x1 57    Diabetes Brother x1     Heart attack Brother x1     Hypertension Daughter x2     No Known Problems Son x2     Diabetes Maternal Aunt      Diabetes Maternal Uncle      Diabetes Maternal Grandmother      Hypertension Other          multiple    Melanoma Neg Hx       Physical Exam:   BP (!) 158/80   Pulse 76   Ht 5' 3" (1.6 m)   Wt 90.7 kg (199 lb 15.3 oz)   LMP 08/01/2003   SpO2 97%   BMI 35.42 kg/m²        Physical Exam  Vitals and nursing note reviewed.   Constitutional:       General: She is not in acute distress.     Appearance: Normal appearance. She is not ill-appearing.   HENT:      Head: Normocephalic and atraumatic.   Eyes:      General: No scleral icterus.     Conjunctiva/sclera: Conjunctivae normal.   Neck:      Thyroid: No thyromegaly.      Vascular: No carotid bruit or JVD.   Cardiovascular:      Rate and Rhythm: Normal rate and regular rhythm.      Pulses: Normal pulses.      Heart sounds: Murmur heard.      Systolic murmur is present with a grade of 3/6.      No friction rub. No gallop.   Pulmonary:      Effort: Pulmonary effort is normal.      Breath sounds: Normal " "breath sounds. No wheezing, rhonchi or rales.   Chest:      Chest wall: No tenderness.   Abdominal:      General: Bowel sounds are normal. There is no distension.      Palpations: Abdomen is soft.      Tenderness: There is no abdominal tenderness.   Musculoskeletal:         General: No swelling.      Cervical back: Neck supple.      Right lower leg: No edema.      Left lower leg: No edema.   Skin:     General: Skin is warm and dry.      Coloration: Skin is not pale.      Findings: No erythema or rash.      Nails: There is no clubbing.   Neurological:      Mental Status: She is alert and oriented to person, place, and time. Mental status is at baseline.   Psychiatric:         Mood and Affect: Mood and affect normal.         Behavior: Behavior normal.          Labs:     Lab Results   Component Value Date     08/22/2024    K 3.9 08/22/2024     08/22/2024    CO2 27 08/22/2024    BUN 15 08/22/2024    CREATININE 0.8 08/22/2024    ANIONGAP 11 08/22/2024     Lab Results   Component Value Date    HGBA1C 6.6 (H) 08/22/2024     No results found for: "BNP", "BNPTRIAGEBLO" Lab Results   Component Value Date    WBC 4.71 03/03/2025    HGB 13.0 03/03/2025    HCT 39.5 03/03/2025     (L) 03/03/2025    GRAN 2.3 03/03/2025    GRAN 47.8 03/03/2025     Lab Results   Component Value Date    CHOL 159 08/22/2024    HDL 50 08/22/2024    LDLCALC 63.2 08/22/2024    TRIG 229 (H) 08/22/2024          Imaging:   Echocardiograms:   TTE 1/10/2024    Left Ventricle: The left ventricle is normal in size. Normal wall thickness. Normal wall motion. There is hyperdynamic systolic function with a visually estimated ejection fraction of 70 - 75%. Ejection fraction by visual approximation is 73%. There is normal diastolic function. The peak gradient at rest was in the low 40s and increased to mid 60s after Valsalva.    Right Ventricle: Normal right ventricular cavity size. Wall thickness is normal. Right ventricle wall motion  is normal. " Systolic function is normal.    Aortic Valve: There is mild annular calcification present.    Pulmonary Artery: The estimated pulmonary artery systolic pressure is 19 mmHg.    IVC/SVC: Normal venous pressure at 3 mmHg.    Pericardium: There is a trivial posterior effusion just at base.    TTE 11/16/2022  The left ventricle is normal in size with concentric remodeling and hyperdynamic systolic function. The estimated ejection fraction is 75%.  Normal right ventricular size with normal right ventricular systolic function.  Normal left ventricular diastolic function.  Mild left atrial enlargement.  The estimated PA systolic pressure is 23 mmHg.  Normal central venous pressure (3 mmHg).  In this study, the highest LV outflow velocities did not exceed 2 m/s, even with the Valsalva maneuver. No significant outflow obstruction is evident    MILDRED 2/8/2022  The left ventricle is normal in size with hyperdynamic systolic function.  The estimated ejection fraction is 70%.  Aortic valve is sclerotic but without aortic insufficiency.  There is a subvalvular membrane extending from the anterior mitral leaflet to the basal interventricular septum. There is associated turbulent flow at the level of this membrane, responsible for increased LVOT flow velocity seen on surface echo.  There is nonspecific thickening of A2/3 mitral leaflet on the left atrial aspect of the mitral valve. No torn chords or mitral regurgitation seen. This could represent a vegetation. Recommend obtaining blood cultures.  Normal right ventricular size with normal right ventricular systolic function.    TTE 1/31/2022  The left ventricle is normal in size with concentric remodeling and hyperdynamic systolic function.  The estimated ejection fraction is 70%.  Normal left ventricular diastolic function.  Elevated LVOT and aortic velocities of 3m/s, which increaes to 4m/s with valsalva. Turbulent flow seen along the basal septum. Unable to adequately visualize the  aortic valve and LVOT. Consider further imaging evaluation.  Normal right ventricular size with normal right ventricular systolic function.  The estimated PA systolic pressure is 34 mmHg.  Normal central venous pressure (3 mmHg).    Stress Tests:   Stress echo 12/14/2022  The stress echo portion of this study is negative for myocardial ischemia.  The ECG portion of this study is negative for myocardial ischemia.  The test was stopped because the patient experienced shortness of breath.  During stress, the following significant arrhythmias were observed: occasional PVCs.  The patient's exercise capacity was normal.  The left ventricle is normal in size with normal systolic function. The estimated ejection fraction is 65%.  Normal right ventricular size with normal right ventricular systolic function.  Normal left ventricular diastolic function.  The estimated PA systolic pressure is 27 mmHg.  Normal central venous pressure (3 mmHg).    Caths:   None    Other:  Cardiac MRI 5/24/2022  LV volumes are normal. LV mass is normal. LVEF = 69%.   RV volumes are normal. RVEF = 67%.  No LGE appreciated  High velocity flow is noted in the LVOT on cine imaging without evidence of septal obstruction.  The differential diagnosis for this finding including a subaortic membrane.  However only trivial AI was detected.    Venous insufficiency ultrasound 11/17/2022  1.  No evidence of deep venous thrombosis in either lower extremity.     2.  Hemodynamically significant venous reflux in the greater saphenous veins, left greater than right.     3.  Hemodynamically significant venous reflux right lesser saphenous vein.    Carotid ultrasound 4/6/2018  No evidence of a hemodynamically significant carotid bifurcation stenosis.       Assessment:     1. Subaortic membrane    2. Atrial fibrillation, unspecified type    3. HOCM (hypertrophic obstructive cardiomyopathy)    4. Primary hypertension    5. Mixed hyperlipidemia    6. Paroxysmal atrial  fibrillation    7. Bilateral carotid artery disease, unspecified type    8. Severe obesity (BMI 35.0-35.9 with comorbidity)    9. Type 2 diabetes mellitus with hyperglycemia, without long-term current use of insulin    10. Obstructive sleep apnea hypopnea, severe      Plan:     Subaortic membrane   TTE last year with no evidence of HOCM. She has hyperdynamic LV with a dynamic LVOT gradient that increases with valsalva and evidence of subaortic membrane on prior MILDRED.   She reports mild, stable TOMLINSON which is likely due to deconditioning and obesity, but will obtain updated TTE.       Paroxysmal atrial fibrillation  Following with EP  Continue Eliquis and Verapamil     Hypertension  Elevated in clinic today but well controlled at other clinic visits and at home. Monitored in digital HTN program. Continue Chlorthalidone 25 mg daily   Continue Valsartan 320 mg daily   Continue Verapamil 240 mg daily     Hyperlipidemia  LDL at goal on Lovastatin. TG mildly elevated. Continue fish oil.     Carotid artery disease   No significant stenosis on 2018 ultrasound     Type 2 DM  Well controlled. A1c 6.6%    Severe Obesity   Following a heart health diet such as the Mediterranean Diet is recommended in addition to 150 minutes a week of moderate intensity exercise to lower cholesterol, maintain a healthy body weight, and improve overall cardiovascular health.    SHOAIB  Unable to tolerate CPAP     Follow up in 12 months.     Signed:  Tori Cordova PA-C  Cardiology   [phone] - General

## 2025-03-05 ENCOUNTER — PATIENT MESSAGE (OUTPATIENT)
Dept: FAMILY MEDICINE | Facility: CLINIC | Age: 70
End: 2025-03-05
Payer: MEDICARE

## 2025-03-05 DIAGNOSIS — E11.40 TYPE 2 DIABETES MELLITUS WITH DIABETIC NEUROPATHY, UNSPECIFIED WHETHER LONG TERM INSULIN USE: Primary | ICD-10-CM

## 2025-03-05 NOTE — TELEPHONE ENCOUNTER
Labs three-month with visit to follow   Orders Placed This Encounter   Procedures    Lipid Panel    Comprehensive Metabolic Panel    Hemoglobin A1C

## 2025-03-06 ENCOUNTER — TELEPHONE (OUTPATIENT)
Dept: FAMILY MEDICINE | Facility: CLINIC | Age: 70
End: 2025-03-06
Payer: MEDICARE

## 2025-03-11 ENCOUNTER — HOSPITAL ENCOUNTER (OUTPATIENT)
Dept: CARDIOLOGY | Facility: HOSPITAL | Age: 70
Discharge: HOME | End: 2025-03-11
Attending: PHYSICIAN ASSISTANT
Payer: MEDICARE

## 2025-03-11 ENCOUNTER — RESULTS FOLLOW-UP (OUTPATIENT)
Dept: CARDIOLOGY | Facility: CLINIC | Age: 70
End: 2025-03-11
Payer: MEDICARE

## 2025-03-11 VITALS
HEIGHT: 63 IN | HEART RATE: 70 BPM | WEIGHT: 199.94 LBS | BODY MASS INDEX: 35.43 KG/M2 | DIASTOLIC BLOOD PRESSURE: 65 MMHG | SYSTOLIC BLOOD PRESSURE: 130 MMHG

## 2025-03-11 DIAGNOSIS — Q24.4 SUBAORTIC MEMBRANE: ICD-10-CM

## 2025-03-11 LAB
ASCENDING AORTA: 3.66 CM
AV AREA BY CONTINUOUS VTI: 3 CM2
AV INDEX (PROSTH): 0.84
AV LVOT MEAN GRADIENT: 7 MMHG
AV LVOT PEAK GRADIENT: 11 MMHG
AV MEAN GRADIENT: 8 MMHG
AV PEAK GRADIENT: 16 MMHG
AV VALVE AREA BY VELOCITY RATIO: 2.9 CM²
AV VALVE AREA: 2.9 CM2
AV VELOCITY RATIO: 0.85
BSA FOR ECHO PROCEDURE: 2.01 M2
CV ECHO LV RWT: 0.38 CM
DOP CALC AO PEAK VEL: 2 M/S
DOP CALC AO VTI: 41.4 CM
DOP CALC LVOT AREA: 3.5 CM2
DOP CALC LVOT DIAMETER: 2.1 CM
DOP CALC LVOT PEAK VEL: 1.7 M/S
DOP CALC LVOT STROKE VOLUME: 119.8 CM3
DOP CALCLVOT PEAK VEL VTI: 34.6 CM
E WAVE DECELERATION TIME: 194 MS
E/A RATIO: 1.7
E/E' RATIO: 10 M/S
ECHO EF ESTIMATED: 65 %
ECHO LV POSTERIOR WALL: 0.8 CM (ref 0.6–1.1)
FRACTIONAL SHORTENING: 35.7 % (ref 28–44)
INTERVENTRICULAR SEPTUM: 0.8 CM (ref 0.6–1.1)
IVRT: 99 MS
LA MAJOR: 6.1 CM
LA MINOR: 5.4 CM
LA WIDTH: 3.8 CM
LEFT ATRIUM SIZE: 3.8 CM
LEFT ATRIUM VOLUME INDEX MOD: 34 ML/M2
LEFT ATRIUM VOLUME INDEX: 36 ML/M2
LEFT ATRIUM VOLUME MOD: 65 ML
LEFT ATRIUM VOLUME: 70 CM3
LEFT INTERNAL DIMENSION IN SYSTOLE: 2.7 CM (ref 2.1–4)
LEFT VENTRICLE DIASTOLIC VOLUME INDEX: 40.41 ML/M2
LEFT VENTRICLE DIASTOLIC VOLUME: 78 ML
LEFT VENTRICLE MASS INDEX: 52.5 G/M2
LEFT VENTRICLE SYSTOLIC VOLUME INDEX: 14 ML/M2
LEFT VENTRICLE SYSTOLIC VOLUME: 27 ML
LEFT VENTRICULAR INTERNAL DIMENSION IN DIASTOLE: 4.2 CM (ref 3.5–6)
LEFT VENTRICULAR MASS: 101.3 G
LV LATERAL E/E' RATIO: 9.5
LV SEPTAL E/E' RATIO: 10.6
MV A" WAVE DURATION": 76.12 MS
MV PEAK A VEL: 0.56 M/S
MV PEAK E VEL: 0.95 M/S
OHS CV RV/LV RATIO: 0.76 CM
PISA TR MAX VEL: 2.4 M/S
PULM VEIN A" WAVE DURATION": 76.12 MS
PULM VEIN S/D RATIO: 1.4
PULMONIC VEIN PEAK A VELOCITY: 0.3 M/S
PV PEAK D VEL: 0.35 M/S
PV PEAK S VEL: 0.49 M/S
RA MAJOR: 5.67 CM
RA PRESSURE ESTIMATED: 3 MMHG
RA WIDTH: 3.46 CM
RIGHT VENTRICLE DIASTOLIC BASEL DIMENSION: 3.2 CM
RV TB RVSP: 5 MMHG
RV TISSUE DOPPLER FREE WALL SYSTOLIC VELOCITY 1 (APICAL 4 CHAMBER VIEW): 17.4 CM/S
SINUS: 3.13 CM
STJ: 2.82 CM
TDI LATERAL: 0.1 M/S
TDI SEPTAL: 0.09 M/S
TDI: 0.1 M/S
TRICUSPID ANNULAR PLANE SYSTOLIC EXCURSION: 2.29 CM
TV PEAK GRADIENT: 23 MMHG
TV REST PULMONARY ARTERY PRESSURE: 26 MMHG
Z-SCORE OF LEFT VENTRICULAR DIMENSION IN END DIASTOLE: -2.63
Z-SCORE OF LEFT VENTRICULAR DIMENSION IN END SYSTOLE: -1.74

## 2025-03-11 PROCEDURE — 93306 TTE W/DOPPLER COMPLETE: CPT

## 2025-03-11 PROCEDURE — 93306 TTE W/DOPPLER COMPLETE: CPT | Mod: 26,,, | Performed by: INTERNAL MEDICINE

## 2025-04-15 NOTE — ANESTHESIA POSTPROCEDURE EVALUATION
Anesthesia Post Evaluation    Patient: Parisa Dimas    Procedure(s) Performed: Procedure(s) (LRB):  COLONOSCOPY (N/A)    Final Anesthesia Type: general      Patient location during evaluation: GI PACU  Patient participation: Yes- Able to Participate  Level of consciousness: awake and alert, oriented and awake  Post-procedure vital signs: reviewed and stable  Pain management: adequate  Airway patency: patent  SHOAIB mitigation strategies: Multimodal analgesia  PONV status at discharge: No PONV  Anesthetic complications: no      Cardiovascular status: blood pressure returned to baseline and hemodynamically stable  Respiratory status: unassisted and spontaneous ventilation  Hydration status: euvolemic  Follow-up not needed.              Vitals Value Taken Time   /70 09/18/24 1225   Temp 36.5 °C (97.7 °F) 09/18/24 1155   Pulse 65 09/18/24 1225   Resp 13 09/18/24 1225   SpO2 100 % 09/18/24 1225         No case tracking events are documented in the log.      Pain/Adele Score: Adele Score: 10 (9/18/2024 12:25 PM)           [Patient presents for infusion] : infusion therapy as documented below

## 2025-05-28 ENCOUNTER — TELEPHONE (OUTPATIENT)
Dept: DERMATOLOGY | Facility: CLINIC | Age: 70
End: 2025-05-28
Payer: MEDICARE

## 2025-05-28 NOTE — TELEPHONE ENCOUNTER
----- Message from Med Assistant Murrell sent at 5/26/2025  4:52 PM CDT -----  Regarding: FW: appt  request  Contact: pt [phone]    ----- Message -----  From: Antoinette Baig MA  Sent: 5/26/2025   2:06 PM CDT  To: Craig Gloria Staff  Subject: appt  request                                    Type:  Needs Medical AdviceWho Called: Parisa is calling for  an appt  for some moles that she is concerned about  Would the patient rather a call back or a response via [facility]?  Call back Best Call Back Number: pt [phone] Additional Information:

## 2025-06-23 ENCOUNTER — OFFICE VISIT (OUTPATIENT)
Dept: DERMATOLOGY | Facility: CLINIC | Age: 70
End: 2025-06-23
Payer: MEDICARE

## 2025-06-23 DIAGNOSIS — L81.4 LENTIGO: ICD-10-CM

## 2025-06-23 DIAGNOSIS — D18.01 ANGIOMA OF SKIN: ICD-10-CM

## 2025-06-23 DIAGNOSIS — L30.4 INTERTRIGO: ICD-10-CM

## 2025-06-23 DIAGNOSIS — L82.1 SK (SEBORRHEIC KERATOSIS): Primary | ICD-10-CM

## 2025-06-23 DIAGNOSIS — Z12.83 SCREENING EXAM FOR SKIN CANCER: ICD-10-CM

## 2025-06-23 PROCEDURE — 3051F HG A1C>EQUAL 7.0%<8.0%: CPT | Mod: CPTII,S$GLB,, | Performed by: DERMATOLOGY

## 2025-06-23 PROCEDURE — 4010F ACE/ARB THERAPY RXD/TAKEN: CPT | Mod: CPTII,S$GLB,, | Performed by: DERMATOLOGY

## 2025-06-23 PROCEDURE — 1160F RVW MEDS BY RX/DR IN RCRD: CPT | Mod: CPTII,S$GLB,, | Performed by: DERMATOLOGY

## 2025-06-23 PROCEDURE — 1126F AMNT PAIN NOTED NONE PRSNT: CPT | Mod: CPTII,S$GLB,, | Performed by: DERMATOLOGY

## 2025-06-23 PROCEDURE — 99214 OFFICE O/P EST MOD 30 MIN: CPT | Mod: S$GLB,,, | Performed by: DERMATOLOGY

## 2025-06-23 PROCEDURE — 1159F MED LIST DOCD IN RCRD: CPT | Mod: CPTII,S$GLB,, | Performed by: DERMATOLOGY

## 2025-06-23 PROCEDURE — 99999 PR PBB SHADOW E&M-EST. PATIENT-LVL III: CPT | Mod: PBBFAC,,, | Performed by: DERMATOLOGY

## 2025-06-23 NOTE — PROGRESS NOTES
Subjective:      Patient ID:  Parisa Dimas is a 70 y.o. female who presents for   Chief Complaint   Patient presents with    Skin Check     History of Present Illness: The patient presents for follow up of skin check.    The patient was last seen on: 9/12/2023 for TBSE.  No h/o nmsc or mm.    Other skin complaints: Pt c/o rash on lower abdomen intermittent x 1 year. + burns. Using diaper rash cream and medicated powder.    Patient with new area of concern:   Location: R thigh. Since last visit. Changing in color and size.   Previous treatments: none            Review of Systems   Skin:  Positive for wears hat. Negative for daily sunscreen use, activity-related sunscreen use and recent sunburn.   Hematologic/Lymphatic: Bruises/bleeds easily (eliquis).       Objective:   Physical Exam   Constitutional: She appears well-developed and well-nourished. She is obese.  No distress.   Neurological: She is alert and oriented to person, place, and time. She is not disoriented.   Psychiatric: She has a normal mood and affect.   Skin:   Areas Examined (abnormalities noted in diagram):   Head / Face Inspection Performed  Neck Inspection Performed  Chest / Axilla Inspection Performed  Abdomen Inspection Performed  Genitals / Buttocks / Groin Inspection Performed  Back Inspection Performed  RUE Inspected  LUE Inspection Performed  RLE Inspected  LLE Inspection Performed  Nails and Digits Inspection Performed                     Diagram Legend     Erythematous scaling macule/papule c/w actinic keratosis       Vascular papule c/w angioma      Pigmented verrucoid papule/plaque c/w seborrheic keratosis      Yellow umbilicated papule c/w sebaceous hyperplasia      Irregularly shaped tan macule c/w lentigo     1-2 mm smooth white papules consistent with Milia      Movable subcutaneous cyst with punctum c/w epidermal inclusion cyst      Subcutaneous movable cyst c/w pilar cyst      Firm pink to brown papule c/w dermatofibroma       Pedunculated fleshy papule(s) c/w skin tag(s)      Evenly pigmented macule c/w junctional nevus     Mildly variegated pigmented, slightly irregular-bordered macule c/w mildly atypical nevus      Flesh colored to evenly pigmented papule c/w intradermal nevus       Pink pearly papule/plaque c/w basal cell carcinoma      Erythematous hyperkeratotic cursted plaque c/w SCC      Surgical scar with no sign of skin cancer recurrence      Open and closed comedones      Inflammatory papules and pustules      Verrucoid papule consistent consistent with wart     Erythematous eczematous patches and plaques     Dystrophic onycholytic nail with subungual debris c/w onychomycosis     Umbilicated papule    Erythematous-base heme-crusted tan verrucoid plaque consistent with inflamed seborrheic keratosis     Erythematous Silvery Scaling Plaque c/w Psoriasis     See annotation      Assessment / Plan:        SK (seborrheic keratosis)   - minor problem and chronic.   Reassurance given to patient. No treatment necessary.       Lentigo   - minor problem and chronic.   Reassurance given to patient. No treatment necessary.       Angioma of skin  This is a benign vascular lesion. Reassurance given. No treatment required.       Intertrigo  -     triamcinolone 0.033%, ciclopirox 0.257%, milk of magnesia topical suspension; AAA bid after cool blow dry. Shake well before use. This compounded medication expires in 30 days.  Dispense: 180 g; Refill: 1    Flares:  Recommend Cool blow dry and then application of prescription medication.     Maintenance:  Cool blow dry after showering 1x/day then apply Zeasorb AF powder.     Screening exam for skin cancer  Total body skin examination performed today including at least 12 points as noted in physical examination. No lesions suspicious for malignancy noted.    Recommend daily sun protection/avoidance, use of at least SPF 30, broad spectrum sunscreen (OTC drug), skin self examinations, and routine  physician surveillance to optimize early detection        Recommend apply Sarna lotion or Cerave anti-itch lotion/cream to skin as often as needed. May store in refrigerator for additional cooling properties.    Sarna and Cerave anti-itch can be purchased at any [employer] or [employer].               No follow-ups on file.

## 2025-06-23 NOTE — PATIENT INSTRUCTIONS
Flares:  Recommend Cool blow dry and then application of prescription medication.     Maintenance:  Cool blow dry after showering 1x/day then apply Zeasorb AF powder.     Recommend apply Sarna lotion or Cerave anti-itch lotion/cream to skin as often as needed. May store in refrigerator for additional cooling properties.    Sarna and Cerave anti-itch can be purchased at any [email or web address] or [employer].

## 2025-07-10 RX ORDER — LOVASTATIN 20 MG/1
20 TABLET ORAL NIGHTLY
Qty: 90 TABLET | Refills: 1 | Status: SHIPPED | OUTPATIENT
Start: 2025-07-10

## 2025-07-10 NOTE — TELEPHONE ENCOUNTER
Refill Decision Note   Parisa Dimas  is requesting a refill authorization.  Brief Assessment and Rationale for Refill:  Approve     Medication Therapy Plan:        Comments:     Note composed:3:29 AM 07/10/2025

## 2025-07-10 NOTE — TELEPHONE ENCOUNTER
No care due was identified.  [facility] Embedded Care Due Messages. Reference number: [id number].   7/09/2025 9:05:18 PM CDT

## 2025-07-13 ENCOUNTER — E-VISIT (OUTPATIENT)
Dept: FAMILY MEDICINE | Facility: CLINIC | Age: 70
End: 2025-07-13
Payer: MEDICARE

## 2025-07-13 DIAGNOSIS — I10 PRIMARY HYPERTENSION: ICD-10-CM

## 2025-07-13 DIAGNOSIS — I48.91 ATRIAL FIBRILLATION, UNSPECIFIED TYPE: ICD-10-CM

## 2025-07-13 DIAGNOSIS — Q24.4 SUBAORTIC MEMBRANE: Primary | ICD-10-CM

## 2025-07-14 NOTE — PROGRESS NOTES
Patient ID: Parisa Dimas is a 70 y.o. female.        E-Visit Time Tracking:             Chief Complaint: General Illness (Entered automatically based on patient selection in [facility].)      The patient initiated a request through [facility] on 7/13/2025 for evaluation and management with a chief complaint of General Illness (Entered automatically based on patient selection in [facility].)     I evaluated the questionnaire submission on 07/14/2025.  Hi I received your recent E visit questionnaire regarding getting a clearance to go work out at the fitness center.  I reviewed your medical chart regarding especially your cardiac history and your most recent cardiology visit note from 03/03/2025.  I reviewed your most recent echo report from 03/11/2025 showing normal pumping of the heart and evidence of a subaortic membrane but no evidence of obstruction of blood flow.  Therefore structurally with the heart it does not seem like there was any contraindication to doing exercise.  I see that you do also have some occasional AFib episodes.  Will be important to continue your blood thinner Eliquis in your verapamil for rate control.  If you start exercising I would definitely recommend starting slow and working up gradually just to see if AFib acts up when you exerts herself or you start to have more issues with shortness and breath.  Per cardiology note it seem like perhaps some of the shortness and breath issues may have just been from some deconditioning so if that is the case, typically slow graduated exercise increase we should expect some shortness and breath to steadily improve as your body gets more condition to it.  Also given your blood pressure medications including the chlorthalidone, make sure that you are staying very well hydrated with exercise.  If you start having any severe symptoms with exercise, notify the office in case you need further workup.  You had a stress echo a couple of years ago which was  negative for any kind of blockage of the heart arteries although there were just some occasional irregular heartbeats noted.  I will fill out the form and fax it in.  Please let me know if you have any other concerns.    [facility]-Common Problems    7/13/2025 10:03 PM CDT - Filed by Patient   What do you need help with? Other Concern   Do you agree to participate in an E-Visit? Yes   If you have any of the following symptoms, please go to the nearest emergency room or call 911: I acknowledge   What is the main issue you would like addressed today? Physician clearance form for fitness center needs to be filled out. Call me at [phone].   Please describe your symptoms. Physician clearance form   Where is your problem located? N/A   On a scale of 1-10, where 10 is the worst you can imagine, how severe are your symptoms? (range: 1 - 10) 1   Have you had these symptoms before? No   How long have you been having these symptoms? (Just today, For a few days, For a week, For one to four weeks, For more than a month) About a week   What helps with your symptoms? N/A   What makes your symptoms feel worse? N/A   Are these symptoms related to a condition that you currently have? (Yes, No, Not sure) No   Please describe any probable cause for your symptoms. N:S   Provide any information you feel is important to your history not asked above N/A   Please attach any relevant images or files    Are you able to take your vitals? No         Encounter Diagnoses   Name Primary?    Subaortic membrane Yes    Atrial fibrillation, unspecified type     Primary hypertension         No orders of the defined types were placed in this encounter.           No follow-ups on file.

## 2025-07-16 ENCOUNTER — PATIENT MESSAGE (OUTPATIENT)
Dept: FAMILY MEDICINE | Facility: CLINIC | Age: 70
End: 2025-07-16
Payer: MEDICARE

## 2025-07-16 ENCOUNTER — TELEPHONE (OUTPATIENT)
Dept: FAMILY MEDICINE | Facility: CLINIC | Age: 70
End: 2025-07-16
Payer: MEDICARE

## 2025-07-16 DIAGNOSIS — G47.33 OSA (OBSTRUCTIVE SLEEP APNEA): ICD-10-CM

## 2025-07-16 DIAGNOSIS — E78.5 HYPERLIPIDEMIA ASSOCIATED WITH TYPE 2 DIABETES MELLITUS: ICD-10-CM

## 2025-07-16 DIAGNOSIS — I10 PRIMARY HYPERTENSION: ICD-10-CM

## 2025-07-16 DIAGNOSIS — Z79.899 OTHER LONG TERM (CURRENT) DRUG THERAPY: ICD-10-CM

## 2025-07-16 DIAGNOSIS — E11.42 DIABETIC POLYNEUROPATHY ASSOCIATED WITH TYPE 2 DIABETES MELLITUS: ICD-10-CM

## 2025-07-16 DIAGNOSIS — R41.3 MEMORY CHANGE: ICD-10-CM

## 2025-07-16 DIAGNOSIS — E11.69 HYPERLIPIDEMIA ASSOCIATED WITH TYPE 2 DIABETES MELLITUS: ICD-10-CM

## 2025-07-16 DIAGNOSIS — E11.40 TYPE 2 DIABETES MELLITUS WITH DIABETIC NEUROPATHY, UNSPECIFIED WHETHER LONG TERM INSULIN USE: Primary | ICD-10-CM

## 2025-07-16 NOTE — TELEPHONE ENCOUNTER
Please schedule patient an office visit as she is due.  Was here with  today.  I am ordering pre labs to be done prior to her office visit.  Her 's going tomorrow for labs not sure if she wants to go that day or depending on when her office visit as scheduled    Orders Placed This Encounter   Procedures    CBC Auto Differential    Comprehensive Metabolic Panel    Lipid Panel    TSH    Vitamin D    Vitamin B12    Hemoglobin A1C    Microalbumin/Creatinine Ratio, Urine

## 2025-07-17 ENCOUNTER — OFFICE VISIT (OUTPATIENT)
Dept: PODIATRY | Facility: CLINIC | Age: 70
End: 2025-07-17
Payer: MEDICARE

## 2025-07-17 ENCOUNTER — LAB VISIT (OUTPATIENT)
Dept: LAB | Facility: HOSPITAL | Age: 70
End: 2025-07-17
Attending: FAMILY MEDICINE
Payer: MEDICARE

## 2025-07-17 VITALS
HEIGHT: 63 IN | WEIGHT: 199.94 LBS | SYSTOLIC BLOOD PRESSURE: 149 MMHG | DIASTOLIC BLOOD PRESSURE: 80 MMHG | HEART RATE: 89 BPM | BODY MASS INDEX: 35.43 KG/M2

## 2025-07-17 DIAGNOSIS — L60.0 ONYCHOCRYPTOSIS: ICD-10-CM

## 2025-07-17 DIAGNOSIS — I87.2 VENOUS INSUFFICIENCY OF BOTH LOWER EXTREMITIES: ICD-10-CM

## 2025-07-17 DIAGNOSIS — R41.3 MEMORY CHANGE: ICD-10-CM

## 2025-07-17 DIAGNOSIS — L60.9 DISEASE OF NAIL: ICD-10-CM

## 2025-07-17 DIAGNOSIS — E11.42 DIABETIC POLYNEUROPATHY ASSOCIATED WITH TYPE 2 DIABETES MELLITUS: ICD-10-CM

## 2025-07-17 DIAGNOSIS — E78.5 HYPERLIPIDEMIA ASSOCIATED WITH TYPE 2 DIABETES MELLITUS: ICD-10-CM

## 2025-07-17 DIAGNOSIS — E11.51 TYPE 2 DIABETES MELLITUS WITH PERIPHERAL VASCULAR DISEASE: Primary | ICD-10-CM

## 2025-07-17 DIAGNOSIS — E11.40 TYPE 2 DIABETES MELLITUS WITH DIABETIC NEUROPATHY, UNSPECIFIED WHETHER LONG TERM INSULIN USE: ICD-10-CM

## 2025-07-17 DIAGNOSIS — E11.69 HYPERLIPIDEMIA ASSOCIATED WITH TYPE 2 DIABETES MELLITUS: ICD-10-CM

## 2025-07-17 DIAGNOSIS — G47.33 OSA (OBSTRUCTIVE SLEEP APNEA): ICD-10-CM

## 2025-07-17 DIAGNOSIS — I10 PRIMARY HYPERTENSION: ICD-10-CM

## 2025-07-17 DIAGNOSIS — Z79.899 OTHER LONG TERM (CURRENT) DRUG THERAPY: ICD-10-CM

## 2025-07-17 LAB
25(OH)D3+25(OH)D2 SERPL-MCNC: 51 NG/ML (ref 30–96)
ABSOLUTE EOSINOPHIL (OHS): 0.1 K/UL
ABSOLUTE MONOCYTE (OHS): 0.56 K/UL (ref 0.3–1)
ABSOLUTE NEUTROPHIL COUNT (OHS): 2.21 K/UL (ref 1.8–7.7)
ALBUMIN SERPL BCP-MCNC: 4.2 G/DL (ref 3.5–5.2)
ALBUMIN/CREAT UR: 13.2 UG/MG
ALP SERPL-CCNC: 57 UNIT/L (ref 40–150)
ALT SERPL W/O P-5'-P-CCNC: 64 UNIT/L (ref 10–44)
ANION GAP (OHS): 13 MMOL/L (ref 8–16)
AST SERPL-CCNC: 59 UNIT/L (ref 11–45)
BASOPHILS # BLD AUTO: 0.03 K/UL
BASOPHILS NFR BLD AUTO: 0.6 %
BILIRUB SERPL-MCNC: 0.8 MG/DL (ref 0.1–1)
BUN SERPL-MCNC: 17 MG/DL (ref 8–23)
CALCIUM SERPL-MCNC: 10.4 MG/DL (ref 8.7–10.5)
CHLORIDE SERPL-SCNC: 100 MMOL/L (ref 95–110)
CHOLEST SERPL-MCNC: 157 MG/DL (ref 120–199)
CHOLEST/HDLC SERPL: 3.3 {RATIO} (ref 2–5)
CO2 SERPL-SCNC: 25 MMOL/L (ref 23–29)
CREAT SERPL-MCNC: 0.8 MG/DL (ref 0.5–1.4)
CREAT UR-MCNC: 114 MG/DL (ref 15–325)
EAG (OHS): 151 MG/DL (ref 68–131)
ERYTHROCYTE [DISTWIDTH] IN BLOOD BY AUTOMATED COUNT: 13.1 % (ref 11.5–14.5)
GFR SERPLBLD CREATININE-BSD FMLA CKD-EPI: >60 ML/MIN/1.73/M2
GLUCOSE SERPL-MCNC: 160 MG/DL (ref 70–110)
HBA1C MFR BLD: 6.9 % (ref 4–5.6)
HCT VFR BLD AUTO: 42.5 % (ref 37–48.5)
HDLC SERPL-MCNC: 48 MG/DL (ref 40–75)
HDLC SERPL: 30.6 % (ref 20–50)
HGB BLD-MCNC: 14 GM/DL (ref 12–16)
IMM GRANULOCYTES # BLD AUTO: 0.01 K/UL (ref 0–0.04)
IMM GRANULOCYTES NFR BLD AUTO: 0.2 % (ref 0–0.5)
LDLC SERPL CALC-MCNC: 63 MG/DL (ref 63–159)
LYMPHOCYTES # BLD AUTO: 2.3 K/UL (ref 1–4.8)
MCH RBC QN AUTO: 29.5 PG (ref 27–31)
MCHC RBC AUTO-ENTMCNC: 32.9 G/DL (ref 32–36)
MCV RBC AUTO: 90 FL (ref 82–98)
MICROALBUMIN UR-MCNC: 15 UG/ML (ref ?–5000)
NONHDLC SERPL-MCNC: 109 MG/DL
NUCLEATED RBC (/100WBC) (OHS): 0 /100 WBC
PLATELET # BLD AUTO: 180 K/UL (ref 150–450)
PMV BLD AUTO: 10.3 FL (ref 9.2–12.9)
POTASSIUM SERPL-SCNC: 3.5 MMOL/L (ref 3.5–5.1)
PROT SERPL-MCNC: 7.9 GM/DL (ref 6–8.4)
RBC # BLD AUTO: 4.74 M/UL (ref 4–5.4)
RELATIVE EOSINOPHIL (OHS): 1.9 %
RELATIVE LYMPHOCYTE (OHS): 44.1 % (ref 18–48)
RELATIVE MONOCYTE (OHS): 10.7 % (ref 4–15)
RELATIVE NEUTROPHIL (OHS): 42.5 % (ref 38–73)
SODIUM SERPL-SCNC: 138 MMOL/L (ref 136–145)
TRIGL SERPL-MCNC: 230 MG/DL (ref 30–150)
TSH SERPL-ACNC: 1.18 UIU/ML (ref 0.4–4)
VIT B12 SERPL-MCNC: 942 PG/ML (ref 210–950)
WBC # BLD AUTO: 5.21 K/UL (ref 3.9–12.7)

## 2025-07-17 PROCEDURE — 84132 ASSAY OF SERUM POTASSIUM: CPT

## 2025-07-17 PROCEDURE — 82306 VITAMIN D 25 HYDROXY: CPT

## 2025-07-17 PROCEDURE — 82607 VITAMIN B-12: CPT

## 2025-07-17 PROCEDURE — 84443 ASSAY THYROID STIM HORMONE: CPT

## 2025-07-17 PROCEDURE — 36415 COLL VENOUS BLD VENIPUNCTURE: CPT

## 2025-07-17 PROCEDURE — 83036 HEMOGLOBIN GLYCOSYLATED A1C: CPT

## 2025-07-17 PROCEDURE — 83718 ASSAY OF LIPOPROTEIN: CPT

## 2025-07-17 PROCEDURE — 85025 COMPLETE CBC W/AUTO DIFF WBC: CPT

## 2025-07-17 PROCEDURE — 99999 PR PBB SHADOW E&M-EST. PATIENT-LVL V: CPT | Mod: PBBFAC,,, | Performed by: PODIATRIST

## 2025-07-17 PROCEDURE — 82570 ASSAY OF URINE CREATININE: CPT

## 2025-07-17 NOTE — PROGRESS NOTES
Subjective:      Patient ID: Parisa Dimas is a 70 y.o. female.    Chief Complaint: Diabetic Foot Exam and Follow-up    Parisa is a 70 y.o. female who presents to the clinic upon referral from Dr. Hodges  for evaluation and treatment of diabetic feet. Praisa has a past medical history of AR (allergic rhinitis), AR (allergic rhinitis), Atrial fibrillation (12/27/2022), Carotid stenosis, Closed 4-part fracture of proximal humerus, left, initial encounter (03/07/2022), Colon polyp (10/2014), Diabetes mellitus, Diabetes mellitus, type 2, Fatty liver, GERD (gastroesophageal reflux disease), Heart murmur (01/27/2022), HLD (hyperlipidemia), HTN (hypertension), Impingement syndrome of left shoulder, Joint pain, Sleep apnea (2018), and Stricture of artery (03/25/2021).  For annual diabetic foot exam.  Relates intermittent burning and tingling sensation to both feet.  She also complains of intermittent cramping to legs and pain to legs.  She has a history wearing compression stockings however is not wearing them today.  Furthermore she relates that she trims her own toenails.  Accompanied by her .    12/07/2023:  Presents for annual diabetic foot exam.  Inquiring about the appearance of her great toenails on both feet.  She is also inquiring about the swelling to her legs.  Received a prescription for compression stockings at the last visit however did not fill the prescription.  Accompanied by her .  No pain reported to her feet today.  Denies any burning, tingling or numbness to her feet.    07/17/2025: Returns for annual foot exam.  Also inquiring about another prescription for compression stockings.  She is getting some irritation around the big toenails consistent with an ingrown toenail.  She tries to wear comfortable shoes to help alleviate the discomfort.    PCP: Alex Cast MD    Date Last Seen by PCP:  01/27/2025    Current shoe gear: Casual shoes    Hemoglobin A1C   Date Value Ref Range  "Status   03/03/2025 7.0 (H) 4.0 - 5.6 % Final     Comment:     ADA Screening Guidelines:  5.7-6.4%  Consistent with prediabetes  >or=6.5%  Consistent with diabetes    High levels of fetal hemoglobin interfere with the HbA1C  assay. Heterozygous hemoglobin variants (HbS, HgC, etc)do  not significantly interfere with this assay.   However, presence of multiple variants may affect accuracy.     08/22/2024 6.6 (H) 4.0 - 5.6 % Final     Comment:     ADA Screening Guidelines:  5.7-6.4%  Consistent with prediabetes  >or=6.5%  Consistent with diabetes    High levels of fetal hemoglobin interfere with the HbA1C  assay. Heterozygous hemoglobin variants (HbS, HgC, etc)do  not significantly interfere with this assay.   However, presence of multiple variants may affect accuracy.     05/16/2024 6.8 (H) 4.0 - 5.6 % Final     Comment:     ADA Screening Guidelines:  5.7-6.4%  Consistent with prediabetes  >or=6.5%  Consistent with diabetes    High levels of fetal hemoglobin interfere with the HbA1C  assay. Heterozygous hemoglobin variants (HbS, HgC, etc)do  not significantly interfere with this assay.   However, presence of multiple variants may affect accuracy.       Vitals:    07/17/25 0939   BP: (!) 149/80   Pulse: 89   Weight: 90.7 kg (199 lb 15.3 oz)   Height: 5' 3" (1.6 m)   PainSc: 0-No pain      Past Medical History:   Diagnosis Date    AR (allergic rhinitis)     AR (allergic rhinitis)     Atrial fibrillation 12/27/2022    Carotid stenosis     50% RCA    Closed 4-part fracture of proximal humerus, left, initial encounter 03/07/2022    Colon polyp 10/2014    Diabetes mellitus     Diabetes mellitus, type 2     Fatty liver     GERD (gastroesophageal reflux disease)     Heart murmur 01/27/2022    HLD (hyperlipidemia)     HTN (hypertension)     Impingement syndrome of left shoulder     Joint pain     Sleep apnea 2018    Stricture of artery 03/25/2021       Past Surgical History:   Procedure Laterality Date    CATARACT EXTRACTION " Bilateral     Right eye Jan 30 2025; Left eye Feb 13 2025    COLONOSCOPY N/A 09/18/2024    Procedure: COLONOSCOPY;  Surgeon: Monster Ledezma MD;  Location: [facility];  Service: Endoscopy;  Laterality: N/A;  Referred by Alex Cast MD, pending Eliquis, PEG, portal -ml  ok to hold Eliquis 2 days per Dr Jernigan/MADY Malin RN-GT  9/13/24-Precall complete-DS    REVERSE TOTAL SHOULDER ARTHROPLASTY Left 03/07/2022    Procedure: ARTHROPLASTY, SHOULDER, TOTAL, REVERSE;  Surgeon: MISSY Santoyo MD;  Location: [facility] OR [address] [place] [address];  Service: Orthopedics;  Laterality: Left;  AND BICEP SYNDEMOSIS    SKIN BIOPSY  10yrs.    negative       Family History   Problem Relation Name Age of Onset    Cancer Mother          gallbladder    Coronary artery disease Father  79    Diabetes Father      Cerebral palsy Sister x1     Osteoporosis Sister x1     Epilepsy Sister x1     Prostate cancer Brother x1 57    Diabetes Brother x1     Heart attack Brother x1     Hypertension Daughter x2     No Known Problems Son x2     Diabetes Maternal Aunt      Diabetes Maternal Uncle      Diabetes Maternal Grandmother      Hypertension Other          multiple    Melanoma Neg Hx         Social History     Socioeconomic History    Marital status:      Spouse name: Pio    Number of children: 4   Occupational History     Comment: retired- school work   Tobacco Use    Smoking status: Never    Smokeless tobacco: Never   Substance and Sexual Activity    Alcohol use: Not Currently     Alcohol/week: 0.0 standard drinks of alcohol    Drug use: No    Sexual activity: Yes     Partners: Male     Birth control/protection: Post-menopausal   Other Topics Concern    Are you pregnant or think you may be? No    Breast-feeding No   Social History Narrative    Stairs- none     Social Drivers of Health     Financial Resource Strain: Low Risk  (1/16/2025)    Overall Financial Resource Strain (CARDIA)     Difficulty of Paying Living Expenses: Not hard at all    Food Insecurity: No Food Insecurity (1/16/2025)    Hunger Vital Sign     Worried About Running Out of Food in the Last Year: Never true     Ran Out of Food in the Last Year: Never true   Transportation Needs: No Transportation Needs (7/22/2024)    PRAPARE - Transportation     Lack of Transportation (Medical): No     Lack of Transportation (Non-Medical): No   Physical Activity: Unknown (1/16/2025)    Exercise Vital Sign     Days of Exercise per Week: Patient declined     Minutes of Exercise per Session: 0 min   Stress: No Stress Concern Present (1/16/2025)    [employer] [place] - Occupational Stress Questionnaire     Feeling of Stress : Not at all   Housing Stability: Unknown (1/16/2025)    Housing Stability Vital Sign     Unable to Pay for Housing in the Last Year: No     Homeless in the Last Year: No       Current Outpatient Medications   Medication Sig Dispense Refill    apixaban (ELIQUIS) 5 mg Tab Take 1 tablet (5 mg total) by mouth 2 (two) times daily. 180 tablet 3    ascorbic acid, vitamin C, (VITAMIN C) 1000 MG tablet Take 1,000 mg by mouth once daily.      b complex vitamins capsule Take 1 capsule by mouth once daily.      blood sugar diagnostic Strp To check BG 1 times daily, to use with insurance preferred meter 100 strip 11    celecoxib (CELEBREX) 200 MG capsule Take 1 capsule (200 mg total) by mouth once daily. 60 capsule 2    cetirizine (ZYRTEC) 10 MG tablet Take 10 mg by mouth once daily.      chlorthalidone (HYGROTEN) 25 MG Tab Take 1 tablet (25 mg total) by mouth once daily. 90 tablet 3    coenzyme Q10 200 mg capsule Take 100 mg by mouth once daily.      L.ACID/L.CASEI/B.BIF/B.MELISSA/FOS (PROBIOTIC BLEND ORAL) Take 2 tablets by mouth once daily.      lancets Misc To check BG 1 times daily, to use with insurance preferred meter 100 each 11    lancing device with lancets Kit Use with lancets 1 each 0    lovastatin (MEVACOR) 20 MG tablet TAKE 1 TABLET BY MOUTH IN THE  EVENING 90  tablet 1    magnesium 30 mg Tab Take 1 tablet by mouth Daily.      metFORMIN (GLUCOPHAGE-XR) 500 MG ER 24hr tablet TAKE 2 TABLETS BY MOUTH ONCE  DAILY 180 tablet 0    multivitamin capsule Take 1 capsule by mouth once daily.      OMEGA-3S/DHA/EPA/FISH OIL (OMEGA 3 ORAL) Take 2,800 mg by mouth once daily.      omeprazole (PRILOSEC) 40 MG capsule TAKE 1 CAPSULE BY MOUTH ONCE  DAILY 90 capsule 2    timolol maleate 0.5% (TIMOPTIC) 0.5 % Drop Place 1 drop into the left eye.      triamcinolone 0.033%, ciclopirox 0.257%, milk of magnesia topical suspension Apply to the affected area twice daily after cool blow dry. Shake well before use. This compounded medication expires in 30 days. 180 g 1    valsartan (DIOVAN) 320 MG tablet TAKE 1 TABLET BY MOUTH ONCE  DAILY 90 tablet 3    verapamiL (VERELAN) 240 MG C24P TAKE 1 CAPSULE BY MOUTH ONCE  DAILY 90 capsule 3    vitamin D (VITAMIN D3) 1000 units Tab Take 1,000 Units by mouth once daily.      vitamin E 400 UNIT capsule Take 400 Units by mouth once daily.      zinc sulfate (ZINC-15 ORAL) Take by mouth.      blood-glucose meter kit To check BG 1 times daily, to use with insurance preferred meter 1 each 0     No current facility-administered medications for this visit.       Review of patient's allergies indicates:  No Known Allergies      Review of Systems   Constitutional: Negative for chills, fever and malaise/fatigue.   HENT:  Negative for congestion and hearing loss.    Cardiovascular:  Negative for chest pain, claudication and leg swelling.   Respiratory:  Negative for cough and shortness of breath.    Skin:  Negative for nail changes.   Musculoskeletal:  Positive for joint pain. Negative for back pain, muscle cramps and muscle weakness.   Gastrointestinal:  Negative for nausea and vomiting.   Neurological:  Positive for numbness and paresthesias. Negative for weakness.   Psychiatric/Behavioral:  Negative for altered mental status.            Objective:      Physical  Exam  Vitals reviewed.   Constitutional:       General: She is not in acute distress.     Appearance: She is obese. She is not ill-appearing.   Cardiovascular:      Pulses:           Dorsalis pedis pulses are 2+ on the right side and 2+ on the left side.        Posterior tibial pulses are 1+ on the right side and 1+ on the left side.      Comments: Strong biphasic signal to the PT bilateral with Doppler.  Mild to moderate edema of the lower extremity bilateral multiple varicosities and telangiectasias.  Skin temp is warm to foot bilateral.  No rubor on dependency bilateral foot.  No hair growth bilateral lower extremity.  Musculoskeletal:      Right foot: Normal range of motion. Bunion present.      Left foot: Normal range of motion. Bunion present.      Comments: Semi rigid forefoot valgus bilateral with moderate track bound hallux abductovalgus under Lapping the 2nd toe bilateral.  Semi rigid flexion contracture of the 2nd toe consistent with hammertoe deformity bilateral foot.  No pain with range of motion or manual muscle strength testing bilateral foot and ankle.   Feet:      Right foot:      Protective Sensation: 10 sites tested.  10 sites sensed.      Skin integrity: No ulcer, blister, skin breakdown, erythema, warmth, callus, dry skin or fissure.      Toenail Condition: Right toenails are abnormally thick and long. Fungal disease present.     Left foot:      Protective Sensation: 10 sites tested.  10 sites sensed.      Skin integrity: No ulcer, blister, skin breakdown, erythema, warmth, callus, dry skin or fissure.      Toenail Condition: Left toenails are abnormally thick and ingrown. Fungal disease present.  Skin:     General: Skin is warm.      Capillary Refill: Capillary refill takes less than 2 seconds.      Findings: No ecchymosis or erythema.      Nails: There is no clubbing.      Comments: Hallux nail bilateral is thickened, partially loosened with moderate underlying debris and discolored  white-yellow at the distal 1/3 of the left hallux nail plate and distal 1/2 of the right hallux nail plate.  Moderately incurvated lateral nail border of the left hallux without surrounding localized edema, drainage or signs infection.  No localized pain on palpation.  The medial and lateral nail borders of the hallux bilateral are moderately incurvated without symptoms.   Neurological:      Mental Status: She is alert and oriented to person, place, and time.      Sensory: Sensation is intact.      Motor: Motor function is intact.               Assessment:       Encounter Diagnoses   Name Primary?    Type 2 diabetes mellitus with peripheral vascular disease Yes    Venous insufficiency of both lower extremities     Onychocryptosis     Disease of nail          Plan:       Parisa was seen today for diabetic foot exam and follow-up.    Diagnoses and all orders for this visit:    Type 2 diabetes mellitus with peripheral vascular disease  -     Foot Exam Performed  -     Routine Foot Care  -     COMPRESSION STOCKINGS    Venous insufficiency of both lower extremities  -     COMPRESSION STOCKINGS    Onychocryptosis  -     Routine Foot Care    Disease of nail  -     Routine Foot Care      I counseled the patient on her conditions, their implications and medical management.    Shoe inspection. Diabetic Foot Education. Patient reminded of the importance of good nutrition and blood sugar control to help prevent podiatric complications of diabetes. Patient instructed on proper foot hygeine. We discussed wearing proper shoe gear, daily foot inspections, never walking without protective shoe gear, never putting sharp instruments to feet.    Comprehensive annual diabetic foot exam completed today.  Patient found to be overall low risk for diabetic foot complication.  Sensation intact to both feet with strong audible pulses utilizing Doppler.  Patient has moderate venous insufficiency.  We discussed elevation at rest and use of  compression stockings 20-30 mm Hg below-knee which were represcribed.  Patient referred to [facility].    Routine foot care per attached note for the incurvated nails.  We discussed home care instructions.  We also discussed a phenol alcohol matrixectomy along the medial nail border of the affected toes.  Risks and benefits were discussed.  Patient elected to continue conservative care for now.    New prescription for compression stockings 20-30 mm Hg below-knee.    RTC annually or p.r.n. as discussed    Assisted by William Silva DPM PGY 2    A portion of this note was generated by voice recognition software and may contain spelling and grammar errors.

## 2025-07-17 NOTE — PROCEDURES
"Routine Foot Care    Date/Time: 7/17/2025 9:45 AM    Performed by: Refugio Hodges DPM  Authorized by: Refugio Hodges DPM    Time out: Immediately prior to procedure a "time out" was called to verify the correct patient, procedure, equipment, support staff and site/side marked as required.    Consent Done?:  Yes (Verbal)  Hyperkeratotic Skin Lesions?: No      Nail Care Type:  Debride(Left 1st Toe and Right 1st Toe)  Patient tolerance:  Patient tolerated the procedure well with no immediate complications     Used sterile nail nipper. Assisted by William Silva DPM PGY 2    "

## 2025-07-23 ENCOUNTER — TELEPHONE (OUTPATIENT)
Dept: PODIATRY | Facility: CLINIC | Age: 70
End: 2025-07-23
Payer: MEDICARE

## 2025-07-23 NOTE — TELEPHONE ENCOUNTER
Copied from CRM #[id number]. Topic: General Inquiry - Patient Advice  >> Jul 23, 2025  8:58 AM Michela wrote:  [facility] would like a call back to discuss order that was sent for compression socks and quantity contact - [phone] rashida    Spoke to Rashida at [facility] and she stated that they only cover one pair of compression stockings.  She would like a new order covering only 1 pair of compression stockings.  She can get one pair every yr.  Her fax # is [phone] to fax.     Polina

## 2025-07-25 ENCOUNTER — OFFICE VISIT (OUTPATIENT)
Dept: FAMILY MEDICINE | Facility: CLINIC | Age: 70
End: 2025-07-25
Payer: MEDICARE

## 2025-07-25 VITALS
BODY MASS INDEX: 35.39 KG/M2 | OXYGEN SATURATION: 97 % | WEIGHT: 199.75 LBS | DIASTOLIC BLOOD PRESSURE: 66 MMHG | SYSTOLIC BLOOD PRESSURE: 130 MMHG | HEIGHT: 63 IN | HEART RATE: 78 BPM

## 2025-07-25 DIAGNOSIS — I77.9 BILATERAL CAROTID ARTERY DISEASE, UNSPECIFIED TYPE: ICD-10-CM

## 2025-07-25 DIAGNOSIS — E11.69 HYPERLIPIDEMIA ASSOCIATED WITH TYPE 2 DIABETES MELLITUS: ICD-10-CM

## 2025-07-25 DIAGNOSIS — E66.01 SEVERE OBESITY (BMI 35.0-35.9 WITH COMORBIDITY): ICD-10-CM

## 2025-07-25 DIAGNOSIS — I48.0 PAROXYSMAL ATRIAL FIBRILLATION: ICD-10-CM

## 2025-07-25 DIAGNOSIS — Z74.09 OTHER REDUCED MOBILITY: ICD-10-CM

## 2025-07-25 DIAGNOSIS — E11.42 DIABETIC POLYNEUROPATHY ASSOCIATED WITH TYPE 2 DIABETES MELLITUS: ICD-10-CM

## 2025-07-25 DIAGNOSIS — E78.5 HYPERLIPIDEMIA ASSOCIATED WITH TYPE 2 DIABETES MELLITUS: ICD-10-CM

## 2025-07-25 DIAGNOSIS — E11.65 TYPE 2 DIABETES MELLITUS WITH HYPERGLYCEMIA, WITHOUT LONG-TERM CURRENT USE OF INSULIN: ICD-10-CM

## 2025-07-25 DIAGNOSIS — I10 PRIMARY HYPERTENSION: ICD-10-CM

## 2025-07-25 DIAGNOSIS — K21.9 GASTROESOPHAGEAL REFLUX DISEASE, UNSPECIFIED WHETHER ESOPHAGITIS PRESENT: ICD-10-CM

## 2025-07-25 DIAGNOSIS — Z00.00 ENCOUNTER FOR MEDICARE ANNUAL WELLNESS EXAM: Primary | ICD-10-CM

## 2025-07-25 PROCEDURE — 4010F ACE/ARB THERAPY RXD/TAKEN: CPT | Mod: CPTII,S$GLB,, | Performed by: NURSE PRACTITIONER

## 2025-07-25 PROCEDURE — 3066F NEPHROPATHY DOC TX: CPT | Mod: CPTII,S$GLB,, | Performed by: NURSE PRACTITIONER

## 2025-07-25 PROCEDURE — 3288F FALL RISK ASSESSMENT DOCD: CPT | Mod: CPTII,S$GLB,, | Performed by: NURSE PRACTITIONER

## 2025-07-25 PROCEDURE — 1101F PT FALLS ASSESS-DOCD LE1/YR: CPT | Mod: CPTII,S$GLB,, | Performed by: NURSE PRACTITIONER

## 2025-07-25 PROCEDURE — 1160F RVW MEDS BY RX/DR IN RCRD: CPT | Mod: CPTII,S$GLB,, | Performed by: NURSE PRACTITIONER

## 2025-07-25 PROCEDURE — 3061F NEG MICROALBUMINURIA REV: CPT | Mod: CPTII,S$GLB,, | Performed by: NURSE PRACTITIONER

## 2025-07-25 PROCEDURE — 1126F AMNT PAIN NOTED NONE PRSNT: CPT | Mod: CPTII,S$GLB,, | Performed by: NURSE PRACTITIONER

## 2025-07-25 PROCEDURE — 1159F MED LIST DOCD IN RCRD: CPT | Mod: CPTII,S$GLB,, | Performed by: NURSE PRACTITIONER

## 2025-07-25 PROCEDURE — 3044F HG A1C LEVEL LT 7.0%: CPT | Mod: CPTII,S$GLB,, | Performed by: NURSE PRACTITIONER

## 2025-07-25 PROCEDURE — 3078F DIAST BP <80 MM HG: CPT | Mod: CPTII,S$GLB,, | Performed by: NURSE PRACTITIONER

## 2025-07-25 PROCEDURE — 3075F SYST BP GE 130 - 139MM HG: CPT | Mod: CPTII,S$GLB,, | Performed by: NURSE PRACTITIONER

## 2025-07-25 PROCEDURE — G0439 PPPS, SUBSEQ VISIT: HCPCS | Mod: S$GLB,,, | Performed by: NURSE PRACTITIONER

## 2025-07-25 PROCEDURE — 1158F ADVNC CARE PLAN TLK DOCD: CPT | Mod: CPTII,S$GLB,, | Performed by: NURSE PRACTITIONER

## 2025-07-25 PROCEDURE — 99999 PR PBB SHADOW E&M-EST. PATIENT-LVL V: CPT | Mod: PBBFAC,,, | Performed by: NURSE PRACTITIONER

## 2025-07-25 NOTE — PATIENT INSTRUCTIONS
Counseling and Referral of Other Preventative  (Italic type indicates deductible and co-insurance are waived)    Patient Name: Parisa Dimas  Today's Date: 7/25/2025    Health Maintenance       Date Due Completion Date    Shingles Vaccine (1 of 2) Never done ---    RSV Vaccine (Age 60+ and Pregnant patients) (1 - Risk 60-74 years 1-dose series) Never done ---    TETANUS VACCINE 10/15/2022 10/15/2012    Diabetic Eye Exam 10/12/2024 10/12/2023    Influenza Vaccine (1) 09/01/2025 11/15/2023    Hemoglobin A1c 01/17/2026 7/17/2025    Mammogram 03/03/2026 3/3/2025    Low Dose Statin 07/17/2026 7/17/2025    Diabetes Urine Screening 07/17/2026 7/17/2025    Foot Exam 07/17/2026 7/17/2025    Lipid Panel 07/17/2026 7/17/2025    DEXA Scan 07/29/2029 7/29/2024    Colorectal Cancer Screening 09/18/2029 9/18/2024        No orders of the defined types were placed in this encounter.    The following information is provided to all patients.  This information is to help you find resources for any of the problems found today that may be affecting your health:                  Living healthy guide: www.[facility].[place].gov      Understanding Diabetes: www.diabetes.org      Eating healthy: www.cdc.gov/healthyweight      CDC home safety checklist: www.cdc.gov/steadi/patient.html      Agency on Aging: www.goea.[place].gov      Alcoholics anonymous (AA): www.aa.org      Physical Activity: www.[place].nih.gov/nd1lsze      Tobacco use: www.quitwithusla.org

## 2025-07-25 NOTE — PROGRESS NOTES
"  Parisa Dimas presented for a  Medicare AWV and comprehensive Health Risk Assessment today. The following components were reviewed and updated:    Medical history  Family History  Social history  Allergies and Current Medications  Health Risk Assessment  Health Maintenance  Care Team         ** See Completed Assessments for Annual Wellness Visit within the encounter summary.**         The following assessments were completed:  Living Situation  CAGE  Depression Screening  Timed Get Up and Go  Whisper Test  Cognitive Function Screening      Nutrition Screening  ADL Screening  PAQ Screening      Opioid documentation:      Patient does not have a current opioid prescription.        Vitals:    07/25/25 1307   BP: 130/66   BP Location: Left arm   Patient Position: Sitting   Pulse: 78   SpO2: 97%   Weight: 90.6 kg (199 lb 11.8 oz)   Height: 5' 3" (1.6 m)     Body mass index is 35.38 kg/m².    Physical Exam  Vitals reviewed.   Constitutional:       General: She is not in acute distress.     Appearance: Normal appearance. She is well-developed and well-groomed. She is obese.   HENT:      Head: Normocephalic.   Eyes:      General:         Right eye: No discharge.         Left eye: No discharge.   Cardiovascular:      Rate and Rhythm: Normal rate.   Pulmonary:      Effort: Pulmonary effort is normal. No respiratory distress.   Skin:     Coloration: Skin is not pale.   Neurological:      Mental Status: She is alert and oriented to person, place, and time.      Coordination: Coordination normal.      Gait: Gait abnormal (slowed but stable).   Psychiatric:         Attention and Perception: Attention normal.         Mood and Affect: Mood and affect normal.         Speech: Speech normal.         Behavior: Behavior normal. Behavior is cooperative.         Thought Content: Thought content normal.             Diagnoses and health risks identified today and associated recommendations/orders:    1. Encounter for Medicare annual " wellness exam    2. Type 2 diabetes mellitus with hyperglycemia, without long-term current use of insulin  Chronic; stable on metformin medication. Follow up with PCP.  - Diabetes Digital Medicine (DDMP) Enrollment Order    3. Hyperlipidemia associated with type 2 diabetes mellitus  Chronic; stable on lovastatin medication. Follow up with PCP.    4. Diabetic polyneuropathy associated with type 2 diabetes mellitus  Chronic; stable on metformin medication. Follow up with PCP.    5. Bilateral carotid artery disease, unspecified type  Chronic; stable on lovastatin medication. Follow up with PCP.    6. Paroxysmal atrial fibrillation  Chronic; stable on Eliquis medication. Follow up with PCP.    7. Primary hypertension  Chronic; stable on chlorthalidone, valsartan and verapamil medication. Follow up with PCP.    8. Gastroesophageal reflux disease, unspecified whether esophagitis present  Chronic; stable on omeprazole medication. Follow up with PCP.    9. Other reduced mobility  Ambulates independently; slowed but stable gait. Follow up with PCP.    10. Severe obesity (BMI 35.0-35.9 with comorbidity)  Eat a low salt/low fat ADA diet and discussed importance of engaging in physical activity at least 5x/week for a minimum of 30 min/day.        Provided Parisa with a 5-10 year written screening schedule and personal prevention plan. Recommendations were developed using the USPSTF age appropriate recommendations. Education, counseling, and referrals were provided as needed. After Visit Summary given to patient which includes a list of additional screenings/tests needed.    Follow up for your next annual wellness visit.    Rose Mary Mabry NP      Advance Care Planning     I offered to discuss advanced care planning, including how to pick a person who would make decisions for you if you were unable to make them for yourself, called a health care power of , and what kind of decisions you might make such as use of life  sustaining treatments such as ventilators and tube feeding when faced with a life limiting illness recorded on a living will that they will need to know. (How you want to be cared for as you near the end of your natural life)     X  Patient has advanced directives written and agrees to provide copies to the institution.

## 2025-07-28 DIAGNOSIS — I42.1 HOCM (HYPERTROPHIC OBSTRUCTIVE CARDIOMYOPATHY): ICD-10-CM

## 2025-07-28 DIAGNOSIS — I10 PRIMARY HYPERTENSION: ICD-10-CM

## 2025-07-29 RX ORDER — VALSARTAN 320 MG/1
320 TABLET ORAL
Qty: 90 TABLET | Refills: 1 | Status: SHIPPED | OUTPATIENT
Start: 2025-07-29

## 2025-07-29 RX ORDER — VERAPAMIL HYDROCHLORIDE 240 MG/1
240 CAPSULE, DELAYED RELEASE ORAL
Qty: 90 CAPSULE | Refills: 1 | Status: SHIPPED | OUTPATIENT
Start: 2025-07-29

## 2025-07-29 NOTE — TELEPHONE ENCOUNTER
Refill Decision Note   Parisa Dimas  is requesting a refill authorization.  Brief Assessment and Rationale for Refill:  Approve     Medication Therapy Plan:         Comments:     Note composed:10:53 AM 07/29/2025

## 2025-07-29 NOTE — TELEPHONE ENCOUNTER
No care due was identified.  Health [facility] Embedded Care Due Messages. Reference number: [id number].   7/29/2025 10:51:41 AM CDT

## 2025-07-31 ENCOUNTER — OFFICE VISIT (OUTPATIENT)
Dept: FAMILY MEDICINE | Facility: CLINIC | Age: 70
End: 2025-07-31
Payer: MEDICARE

## 2025-07-31 VITALS
BODY MASS INDEX: 35.04 KG/M2 | OXYGEN SATURATION: 97 % | HEIGHT: 63 IN | HEART RATE: 86 BPM | WEIGHT: 197.75 LBS | TEMPERATURE: 99 F | SYSTOLIC BLOOD PRESSURE: 134 MMHG | DIASTOLIC BLOOD PRESSURE: 70 MMHG

## 2025-07-31 DIAGNOSIS — G47.33 OSA (OBSTRUCTIVE SLEEP APNEA): ICD-10-CM

## 2025-07-31 DIAGNOSIS — E11.65 TYPE 2 DIABETES MELLITUS WITH HYPERGLYCEMIA, WITHOUT LONG-TERM CURRENT USE OF INSULIN: Primary | ICD-10-CM

## 2025-07-31 PROCEDURE — G2211 COMPLEX E/M VISIT ADD ON: HCPCS | Mod: S$GLB,,, | Performed by: FAMILY MEDICINE

## 2025-07-31 PROCEDURE — 99214 OFFICE O/P EST MOD 30 MIN: CPT | Mod: S$GLB,,, | Performed by: FAMILY MEDICINE

## 2025-07-31 PROCEDURE — 3044F HG A1C LEVEL LT 7.0%: CPT | Mod: CPTII,S$GLB,, | Performed by: FAMILY MEDICINE

## 2025-07-31 PROCEDURE — 3075F SYST BP GE 130 - 139MM HG: CPT | Mod: CPTII,S$GLB,, | Performed by: FAMILY MEDICINE

## 2025-07-31 PROCEDURE — 3066F NEPHROPATHY DOC TX: CPT | Mod: CPTII,S$GLB,, | Performed by: FAMILY MEDICINE

## 2025-07-31 PROCEDURE — 99999 PR PBB SHADOW E&M-EST. PATIENT-LVL V: CPT | Mod: PBBFAC,,, | Performed by: FAMILY MEDICINE

## 2025-07-31 PROCEDURE — 4010F ACE/ARB THERAPY RXD/TAKEN: CPT | Mod: CPTII,S$GLB,, | Performed by: FAMILY MEDICINE

## 2025-07-31 PROCEDURE — 3078F DIAST BP <80 MM HG: CPT | Mod: CPTII,S$GLB,, | Performed by: FAMILY MEDICINE

## 2025-07-31 PROCEDURE — 1126F AMNT PAIN NOTED NONE PRSNT: CPT | Mod: CPTII,S$GLB,, | Performed by: FAMILY MEDICINE

## 2025-07-31 PROCEDURE — 3061F NEG MICROALBUMINURIA REV: CPT | Mod: CPTII,S$GLB,, | Performed by: FAMILY MEDICINE

## 2025-07-31 PROCEDURE — 3288F FALL RISK ASSESSMENT DOCD: CPT | Mod: CPTII,S$GLB,, | Performed by: FAMILY MEDICINE

## 2025-07-31 PROCEDURE — 1159F MED LIST DOCD IN RCRD: CPT | Mod: CPTII,S$GLB,, | Performed by: FAMILY MEDICINE

## 2025-07-31 PROCEDURE — 3008F BODY MASS INDEX DOCD: CPT | Mod: CPTII,S$GLB,, | Performed by: FAMILY MEDICINE

## 2025-07-31 PROCEDURE — 1101F PT FALLS ASSESS-DOCD LE1/YR: CPT | Mod: CPTII,S$GLB,, | Performed by: FAMILY MEDICINE

## 2025-07-31 RX ORDER — TIRZEPATIDE 2.5 MG/.5ML
2.5 INJECTION, SOLUTION SUBCUTANEOUS
Qty: 4 PEN | Refills: 11 | Status: SHIPPED | OUTPATIENT
Start: 2025-07-31

## 2025-07-31 NOTE — PATIENT INSTRUCTIONS
Check with your pharmacy regarding getting the tetanus (Tdap) vaccine (once every 10 years)    Look into getting the Shingles vaccine (SHINGRIX) at your local pharmacy (2 part series, done once at/after age 50)      Www.Diabetes.org (american diabetes association website)       Goal blood sugar when checked in the morning before meals: less than 130-140    Goal sugar when checked at least 2 hours after a meal: less than 180             CARBOHYDRATE GOALS: around 3-4 servings per meal (1 serving is 15 grams of total carbohydrates)

## 2025-07-31 NOTE — PROGRESS NOTES
(Portions of this note were dictated using voice recognition software and may contain dictation related errors in spelling/grammar/syntax not found on text review)    CC:   Chief Complaint   Patient presents with    Follow-up       HPI: 70 y.o. female            Had Left reverse shoulder arthroplasty secondary to comminuted fracture left proximal humerus that occurred on 12/22/2021.  Surgery was in March 2022.  Initially  some concern about heart murmur leading to further evaluation suspicious mitral vegetation.  Had seen cardiology   PET CT was negative for increased uptake in that area.  Was referred for cardiac MRI although was not able to get that done prior to surgery and it was deemed not necessary to delay that workup.  It was later done May 24, 2022 showing EF 69%, no infiltrative disease noted, high velocity flow noted left ventricular outflow tract without evidence of septal obstruction, DDX including subaortic membrane, however only trivial AI was detected.          echo 11/16/2022 showed no evidence of outflow obstruction.  Most recent updated echo March of 2025 shows EF 60-65%, normal diastolic function, mildly dilated left and right atrium, no pulmonary hypertension, sub valvular Ridge present in the left ventricular outflow tract, no significant obstructive findings noted, no aortic insufficiency noted.    She had a stress test done in 2022 that was negative for ischemic disease.  Ejection fraction 65%, normal diastolic function.  Occasional PVCs observed.    She subsequently was noted to have new onset atrial fibrillation in December 20, 2022.  Started Eliquis, verapamil 240 mg daily.  Last cardiology visit was March of 2025.  Last EP visit was August of 2024.  Had noted some occasional paroxysms of AFib, had recommended continued treatment as is and return in 1 year unless any worsening.     hypertension on valsartan 320 mg daily , verapamil 240 mg daily in place of amlodipine, chlorthalidone 25 mg  daily    30 day average blood pressure through digital HTN program    113/60    hyperlipidemia on lovastatin 20 mg daily    fatty liver disease last ultrasound 2022    carotid  atherosclerosis but with no significant stenosis  (ultrasound 2018)    diabetes on metformin extended release 1000 mg daily.  Did have elevated microalbumin on recent testing in August of 2024 although no sign of microalbuminuria on recent testing below.  Does feel like she notices occasional on digested pill in the stool.  Sometimes does get diarrhea.  Blood sugars sometimes 160s to 180s fasting    sleep apnea intolerant of CPAP.  Sleeps in a recliner, sleeping on incline is okay but when she sleeps flat she has some struggling with breathing.  States that when she tried CPAP in the past she was got very anxious having the mask on her.  In the past she had inquired about the INSPIRE procedure for sleep apnea was wondering if this was offered.  Had placed referral to sleep Medicine at a prior appointment, had appointment on 07/11 but was not completed    Does admit to eating too many sweets, not much exercise        Past Medical History:   Diagnosis Date    AR (allergic rhinitis)     AR (allergic rhinitis)     Atrial fibrillation 12/27/2022    Carotid stenosis     50% RCA    Closed 4-part fracture of proximal humerus, left, initial encounter 03/07/2022    Colon polyp 10/2014    Diabetes mellitus     Diabetes mellitus, type 2     Fatty liver     GERD (gastroesophageal reflux disease)     Heart murmur 01/27/2022    HLD (hyperlipidemia)     HTN (hypertension)     Impingement syndrome of left shoulder     Joint pain     Sleep apnea 2018    Stricture of artery 03/25/2021       Past Surgical History:   Procedure Laterality Date    CATARACT EXTRACTION Bilateral     Right eye Jan 30 2025; Left eye Feb 13 2025; Done by Dr. Giorgio Villanueva    COLONOSCOPY N/A 09/18/2024    Procedure: COLONOSCOPY;  Surgeon: Monster Ledezma MD;  Location: [facility];   Service: Endoscopy;  Laterality: N/A;  Referred by Alex Cast MD, pending Eliquis, PEG, portal -ml  ok to hold Eliquis 2 days per Dr Jernigan/MADY Malin RN-GT  9/13/24-Precall complete-DS    REVERSE TOTAL SHOULDER ARTHROPLASTY Left 03/07/2022    Procedure: ARTHROPLASTY, SHOULDER, TOTAL, REVERSE;  Surgeon: MISSY Santoyo MD;  Location: [facility] OR [address] [place] [address];  Service: Orthopedics;  Laterality: Left;  AND BICEP SYNDEMOSIS    SKIN BIOPSY  10yrs.    negative       Family History   Problem Relation Name Age of Onset    Cancer Mother          gallbladder    Coronary artery disease Father  79    Diabetes Father      Cerebral palsy Sister x1     Osteoporosis Sister x1     Epilepsy Sister x1     Heart attack Sister x1     Prostate cancer Brother x1 57    Diabetes Brother x1     Heart attack Brother x1     Hypertension Daughter x2     No Known Problems Son x2     Diabetes Maternal Aunt      Diabetes Maternal Uncle      Diabetes Maternal Grandmother      Hypertension Other          multiple    Melanoma Neg Hx         Social History     Tobacco Use    Smoking status: Never    Smokeless tobacco: Never   Substance Use Topics    Alcohol use: Not Currently     Alcohol/week: 0.0 standard drinks of alcohol    Drug use: No       Lab Results   Component Value Date    WBC 5.21 07/17/2025    HGB 14.0 07/17/2025    HCT 42.5 07/17/2025    MCV 90 07/17/2025     07/17/2025    CHOL 157 07/17/2025    TRIG 230 (H) 07/17/2025    HDL 48 07/17/2025    ALT 64 (H) 07/17/2025    AST 59 (H) 07/17/2025    BILITOT 0.8 07/17/2025    ALKPHOS 57 07/17/2025     07/17/2025    K 3.5 07/17/2025     07/17/2025    CREATININE 0.8 07/17/2025    ESTGFRAFRICA >60.0 02/08/2022    EGFRNONAA >60.0 02/08/2022    CALCIUM 10.4 07/17/2025    ALBUMIN 4.2 07/17/2025    BUN 17 07/17/2025    CO2 25 07/17/2025    TSH 1.179 07/17/2025    INR 1.1 12/20/2022    HGBA1C 6.9 (H) 07/17/2025    MICALBCREAT 13.2 07/17/2025    LDLCALC 63.0 07/17/2025     (H)  07/17/2025    [id number] 51 07/17/2025       AST   Date Value   07/17/2025 59 unit/L (H)   03/03/2025 66 U/L (H)   08/22/2024 69 U/L (H)   05/16/2024 58 U/L (H)   11/15/2023 56 U/L (H)   10/25/2022 85 U/L (H)   01/27/2022 45 U/L (H)   07/22/2021 54 U/L (H)   12/04/2020 52 U/L (H)   10/28/2019 49 U/L (H)   11/08/2018 67 U/L (H)     ALT   Date Value   07/17/2025 64 unit/L (H)   03/03/2025 76 U/L (H)   08/22/2024 71 U/L (H)   05/16/2024 76 U/L (H)   11/15/2023 80 U/L (H)   10/25/2022 103 U/L (H)   01/27/2022 67 U/L (H)   07/22/2021 78 U/L (H)   12/04/2020 71 U/L (H)   10/28/2019 77 U/L (H)   11/08/2018 106 U/L (H)     Hemoglobin A1C (%)   Date Value   03/03/2025 7.0 (H)   08/22/2024 6.6 (H)   05/16/2024 6.8 (H)   11/15/2023 6.1 (H)   10/25/2022 6.9 (H)   01/27/2022 5.9 (H)   07/22/2021 6.3 (H)   12/04/2020 6.2 (H)   10/28/2019 6.2 (H)   11/08/2018 5.9 (H)     Hemoglobin A1c (%)   Date Value   07/17/2025 6.9 (H)           Vital signs reviewed  Wt Readings from Last 10 Encounters:   07/31/25 89.7 kg (197 lb 12 oz)   07/25/25 90.6 kg (199 lb 11.8 oz)   07/17/25 90.7 kg (199 lb 15.3 oz)   03/11/25 90.7 kg (199 lb 15.4 oz)   03/03/25 90.7 kg (199 lb 15.3 oz)   01/27/25 91.2 kg (201 lb 1 oz)   09/18/24 88 kg (194 lb)   08/29/24 89.1 kg (196 lb 6.9 oz)   08/27/24 90.5 kg (199 lb 8.3 oz)   07/26/24 89.8 kg (198 lb)       PE:   APPEARANCE: Well nourished, well developed, in no acute distress.    HEAD: Normocephalic, atraumatic.  EYES: PERRL. EOMI.   Conjunctivae noninjected.  EARS: TM's intact. Light reflex normal. No retraction or perforation  NOSE: Mucosa pink. Airway clear.  MOUTH & THROAT: No tonsillar enlargement. No pharyngeal erythema or exudate.   NECK: Supple with no cervical lymphadenopathy.  No carotid bruits.  No thyromegaly  CHEST: Good inspiratory effort. Lungs clear to auscultation with no wheezes or crackles.  CARDIOVASCULAR: Normal S1, S2.  Occasional ectopy noted.  3/6 systolic ejection murmur at the aortic  region radiating up to both carotid arteries.  ABDOMEN: Bowel sounds normal. Not distended. Soft. No tenderness or masses. No organomegaly.  EXTREMITIES:             IMPRESSION  1. Type 2 diabetes mellitus with hyperglycemia, without long-term current use of insulin    2. SHOAIB (obstructive sleep apnea)                      PLAN       Updated labs reviewed     Diabetes stable, A1c 6.9.  Given some of the symptoms described above, will try to transitioned over to Mounjaro, will also help with sleep apnea, weight in addition to diabetes control.  Will start 2.5 mg Mounjaro weekly.  Continue follow up blood sugars closely at home, if getting consistently less than 140 fasting, can decrease metformin to 500 mg daily.  Expecting to eventually come off metformin altogether and just maintained on Mounjaro based on titration and blood sugars.  She can send me a message in a month if she is tolerating the Mounjaro and I can increase to the next dose if she would like.  Discussed potential GI side effects of Mounjaro.    Also signed up for digital diabetes, has to start in putting her numbers   Eye exam up-to-date, Dr. Giorgio Villanueva    Continue statin ;Lipids stable     Fatty liver, elevated transaminases chronic stable slightly down trending.  Emphasize healthy diet, exercise, weight loss    AFib: Continue rate control with verapamil, Eliquis for stroke prevention.  Prior HCM noted on cardiac MRI although most recent echo  did not show any significant outflow obstruction noted.  Continues with Cardiology follow-ups    BP stable.  Continue digital medicine follow-up      Three-month follow up with labs below done prior  Orders Placed This Encounter   Procedures    CBC Auto Differential    Comprehensive Metabolic Panel    Lipid Panel    Hemoglobin A1C           SCREENINGS:  Colonoscopy 2024:  Hemorrhoids, non bleeding.  Diverticulosis.  5 polyps transverse/ascending/cecum, 2-4 mm.  Repeat in 5 years     GYN: Dr. Cynthia Dinero     MMG  03/03/2025     Tetanus: 2012, can get repeat at pharmacy   PVX:   March 2018  Prevnar 2019  Flu utd  Look into getting the Shingles vaccine (SHINGRIX) at your local pharmacy (2 part series, done once at/after age 50)  COVID booster eligible       DEXA 2024 normal bone density

## 2025-08-06 RX ORDER — METFORMIN HYDROCHLORIDE 500 MG/1
1000 TABLET, EXTENDED RELEASE ORAL
Qty: 180 TABLET | Refills: 3 | Status: SHIPPED | OUTPATIENT
Start: 2025-08-06

## 2025-08-06 NOTE — TELEPHONE ENCOUNTER
No care due was identified.  [facility] Embedded Care Due Messages. Reference number: [id number].   8/05/2025 9:22:45 PM CDT

## 2025-08-06 NOTE — TELEPHONE ENCOUNTER
Refill Routing Note   Medication(s) are not appropriate for processing by [facility] for the following reason(s):        Drug-disease interaction    [facility] action(s):  Defer        Medication Therapy Plan: Drug-Disease: metFORMIN and Fatty liver; DEFER      Appointments  past 12m or future 3m with PCP    Date Provider   Last Visit   7/31/2025 Alex Cast MD   Next Visit   11/13/2025 Alex Cast MD   ED visits in past 90 days: 0        Note composed:5:27 AM 08/06/2025

## 2025-09-02 RX ORDER — OMEPRAZOLE 40 MG/1
40 CAPSULE, DELAYED RELEASE ORAL
Qty: 90 CAPSULE | Refills: 3 | Status: SHIPPED | OUTPATIENT
Start: 2025-09-02

## (undated) DEVICE — SUT VICRYL PLUS 0 CT1 18IN

## (undated) DEVICE — GOWN SMART IMP BREATHABLE XXLG

## (undated) DEVICE — GAUZE SPONGE 4X4 12PLY

## (undated) DEVICE — DRAPE STERI INSTRUMENT 1018

## (undated) DEVICE — ELECTRODE REM PLYHSV RETURN 9

## (undated) DEVICE — PAD SHOULDER CARE POLAR

## (undated) DEVICE — SUPPORT SLING SHOT II MEDIUM

## (undated) DEVICE — SEE MEDLINE ITEM 157166

## (undated) DEVICE — SYR IRRIGATION BULB STER 60ML

## (undated) DEVICE — APPLICATOR CHLORAPREP ORN 26ML

## (undated) DEVICE — SYS LABEL CORRECT MED

## (undated) DEVICE — DRESSING AQUACEL AG ADV 3.5X12

## (undated) DEVICE — DRESSING AQUACEL AG 3.5X10IN

## (undated) DEVICE — TAPE SURG DURAPORE 2 X10YD

## (undated) DEVICE — HUMERAL CUP TRIAL ADAPTOR

## (undated) DEVICE — HOOD T-5 TEAR AWAY STERILE

## (undated) DEVICE — SCALPEL #15 BLADE STRL DISP.

## (undated) DEVICE — SEE MEDLINE ITEM 157131

## (undated) DEVICE — MASK FLYTE HOOD PEEL AWAY

## (undated) DEVICE — BLADE SAG 18.0X1.27X100

## (undated) DEVICE — PUMP COLD THERAPY

## (undated) DEVICE — PAD SHOULDER POLAR CARE XL

## (undated) DEVICE — SOL IRR NACL .9% 3000ML

## (undated) DEVICE — SUT VICRYL 2-0 36 CT-1

## (undated) DEVICE — SUT MONOCRYL 3-0 PS-1

## (undated) DEVICE — SUT FIBERWIRE 2 38 IN TAPER

## (undated) DEVICE — DRAPE INCISE IOBAN 2 23X33IN

## (undated) DEVICE — SUT VICRYL PLUS 2-0 CT1 18

## (undated) DEVICE — BIT DRILL 3.1MM

## (undated) DEVICE — SYS CLSR DERMABOND PRINEO 22CM

## (undated) DEVICE — DRAPE STERI-DRAPE 1000 17X11IN

## (undated) DEVICE — SUT 0 VICRYL / CT-1

## (undated) DEVICE — KIT TOTAL KNEE TKOFG

## (undated) DEVICE — KIT IRR SUCTION HND PIECE

## (undated) DEVICE — TIP YANKAUERS BULB NO VENT

## (undated) DEVICE — SEE MEDLINE ITEM 146292

## (undated) DEVICE — DRAPE STERI U-SHAPED 47X51IN

## (undated) DEVICE — NDL 18GA X1 1/2 REG BEVEL